# Patient Record
Sex: FEMALE | Race: WHITE | NOT HISPANIC OR LATINO
[De-identification: names, ages, dates, MRNs, and addresses within clinical notes are randomized per-mention and may not be internally consistent; named-entity substitution may affect disease eponyms.]

---

## 2018-02-27 ENCOUNTER — RESULT REVIEW (OUTPATIENT)
Age: 73
End: 2018-02-27

## 2018-03-19 ENCOUNTER — APPOINTMENT (OUTPATIENT)
Dept: VASCULAR SURGERY | Facility: CLINIC | Age: 73
End: 2018-03-19
Payer: MEDICARE

## 2018-03-19 VITALS
HEIGHT: 64 IN | HEART RATE: 93 BPM | BODY MASS INDEX: 33.29 KG/M2 | DIASTOLIC BLOOD PRESSURE: 85 MMHG | WEIGHT: 195 LBS | TEMPERATURE: 98.6 F | SYSTOLIC BLOOD PRESSURE: 138 MMHG

## 2018-03-19 DIAGNOSIS — Z71.9 COUNSELING, UNSPECIFIED: ICD-10-CM

## 2018-03-19 PROCEDURE — 93922 UPR/L XTREMITY ART 2 LEVELS: CPT

## 2018-03-19 PROCEDURE — 99203 OFFICE O/P NEW LOW 30 MIN: CPT

## 2022-04-27 ENCOUNTER — INPATIENT (INPATIENT)
Facility: HOSPITAL | Age: 77
LOS: 9 days | Discharge: NURSING, REHAB OR LONG-TERM CARE | End: 2022-05-07
Attending: HOSPITALIST | Admitting: HOSPITALIST
Payer: MEDICARE

## 2022-04-27 VITALS
OXYGEN SATURATION: 100 % | HEART RATE: 89 BPM | SYSTOLIC BLOOD PRESSURE: 118 MMHG | HEIGHT: 65.5 IN | RESPIRATION RATE: 20 BRPM | DIASTOLIC BLOOD PRESSURE: 68 MMHG | TEMPERATURE: 98 F

## 2022-04-27 DIAGNOSIS — R50.9 FEVER, UNSPECIFIED: ICD-10-CM

## 2022-04-27 LAB
ALBUMIN SERPL ELPH-MCNC: 3.3 G/DL — SIGNIFICANT CHANGE UP (ref 3.3–5)
ALBUMIN SERPL ELPH-MCNC: 3.6 G/DL — SIGNIFICANT CHANGE UP (ref 3.3–5)
ALP SERPL-CCNC: 111 U/L — SIGNIFICANT CHANGE UP (ref 40–120)
ALP SERPL-CCNC: 114 U/L — SIGNIFICANT CHANGE UP (ref 40–120)
ALT FLD-CCNC: 11 U/L — SIGNIFICANT CHANGE UP (ref 4–33)
ALT FLD-CCNC: 12 U/L — SIGNIFICANT CHANGE UP (ref 4–33)
ANION GAP SERPL CALC-SCNC: 16 MMOL/L — HIGH (ref 7–14)
ANION GAP SERPL CALC-SCNC: 17 MMOL/L — HIGH (ref 7–14)
APPEARANCE UR: CLEAR — SIGNIFICANT CHANGE UP
AST SERPL-CCNC: 26 U/L — SIGNIFICANT CHANGE UP (ref 4–32)
AST SERPL-CCNC: 40 U/L — HIGH (ref 4–32)
BASE EXCESS BLDV CALC-SCNC: -2.2 MMOL/L — LOW (ref -2–3)
BASOPHILS # BLD AUTO: 0 K/UL — SIGNIFICANT CHANGE UP (ref 0–0.2)
BASOPHILS NFR BLD AUTO: 0 % — SIGNIFICANT CHANGE UP (ref 0–2)
BILIRUB SERPL-MCNC: 0.6 MG/DL — SIGNIFICANT CHANGE UP (ref 0.2–1.2)
BILIRUB SERPL-MCNC: 0.6 MG/DL — SIGNIFICANT CHANGE UP (ref 0.2–1.2)
BILIRUB UR-MCNC: NEGATIVE — SIGNIFICANT CHANGE UP
BLOOD GAS VENOUS COMPREHENSIVE RESULT: SIGNIFICANT CHANGE UP
BUN SERPL-MCNC: 42 MG/DL — HIGH (ref 7–23)
BUN SERPL-MCNC: 43 MG/DL — HIGH (ref 7–23)
CALCIUM SERPL-MCNC: 8.6 MG/DL — SIGNIFICANT CHANGE UP (ref 8.4–10.5)
CALCIUM SERPL-MCNC: 9.1 MG/DL — SIGNIFICANT CHANGE UP (ref 8.4–10.5)
CHLORIDE BLDV-SCNC: 98 MMOL/L — SIGNIFICANT CHANGE UP (ref 96–108)
CHLORIDE SERPL-SCNC: 93 MMOL/L — LOW (ref 98–107)
CHLORIDE SERPL-SCNC: 99 MMOL/L — SIGNIFICANT CHANGE UP (ref 98–107)
CO2 BLDV-SCNC: 24.5 MMOL/L — SIGNIFICANT CHANGE UP (ref 22–26)
CO2 SERPL-SCNC: 13 MMOL/L — LOW (ref 22–31)
CO2 SERPL-SCNC: 15 MMOL/L — LOW (ref 22–31)
COLOR SPEC: YELLOW — SIGNIFICANT CHANGE UP
CREAT SERPL-MCNC: 1.39 MG/DL — HIGH (ref 0.5–1.3)
CREAT SERPL-MCNC: 1.51 MG/DL — HIGH (ref 0.5–1.3)
CRP SERPL-MCNC: 219.8 MG/L — HIGH
DIFF PNL FLD: NEGATIVE — SIGNIFICANT CHANGE UP
EGFR: 36 ML/MIN/1.73M2 — LOW
EGFR: 39 ML/MIN/1.73M2 — LOW
EOSINOPHIL # BLD AUTO: 0 K/UL — SIGNIFICANT CHANGE UP (ref 0–0.5)
EOSINOPHIL NFR BLD AUTO: 0 % — SIGNIFICANT CHANGE UP (ref 0–6)
ERYTHROCYTE [SEDIMENTATION RATE] IN BLOOD: 77 MM/HR — HIGH (ref 4–25)
GAS PNL BLDV: 126 MMOL/L — LOW (ref 136–145)
GLUCOSE BLDV-MCNC: 131 MG/DL — HIGH (ref 70–99)
GLUCOSE SERPL-MCNC: 120 MG/DL — HIGH (ref 70–99)
GLUCOSE SERPL-MCNC: 124 MG/DL — HIGH (ref 70–99)
GLUCOSE UR QL: NEGATIVE — SIGNIFICANT CHANGE UP
HCO3 BLDV-SCNC: 23 MMOL/L — SIGNIFICANT CHANGE UP (ref 22–29)
HCT VFR BLD CALC: 37.2 % — SIGNIFICANT CHANGE UP (ref 34.5–45)
HCT VFR BLDA CALC: 37 % — SIGNIFICANT CHANGE UP (ref 34.5–46.5)
HGB BLD CALC-MCNC: 12.2 G/DL — SIGNIFICANT CHANGE UP (ref 11.5–15.5)
HGB BLD-MCNC: 11.9 G/DL — SIGNIFICANT CHANGE UP (ref 11.5–15.5)
IANC: 28.31 K/UL — HIGH (ref 1.8–7.4)
KETONES UR-MCNC: NEGATIVE — SIGNIFICANT CHANGE UP
LACTATE BLDV-MCNC: 1.8 MMOL/L — SIGNIFICANT CHANGE UP (ref 0.5–2)
LEUKOCYTE ESTERASE UR-ACNC: NEGATIVE — SIGNIFICANT CHANGE UP
LYMPHOCYTES # BLD AUTO: 1.09 K/UL — SIGNIFICANT CHANGE UP (ref 1–3.3)
LYMPHOCYTES # BLD AUTO: 3.5 % — LOW (ref 13–44)
MCHC RBC-ENTMCNC: 27.3 PG — SIGNIFICANT CHANGE UP (ref 27–34)
MCHC RBC-ENTMCNC: 32 GM/DL — SIGNIFICANT CHANGE UP (ref 32–36)
MCV RBC AUTO: 85.3 FL — SIGNIFICANT CHANGE UP (ref 80–100)
MONOCYTES # BLD AUTO: 1.9 K/UL — HIGH (ref 0–0.9)
MONOCYTES NFR BLD AUTO: 6.1 % — SIGNIFICANT CHANGE UP (ref 2–14)
NEUTROPHILS # BLD AUTO: 28.21 K/UL — HIGH (ref 1.8–7.4)
NEUTROPHILS NFR BLD AUTO: 87.8 % — HIGH (ref 43–77)
NEUTS BAND # BLD: 2.6 % — SIGNIFICANT CHANGE UP (ref 0–6)
NITRITE UR-MCNC: NEGATIVE — SIGNIFICANT CHANGE UP
PCO2 BLDV: 41 MMHG — SIGNIFICANT CHANGE UP (ref 39–42)
PH BLDV: 7.36 — SIGNIFICANT CHANGE UP (ref 7.32–7.43)
PH UR: 5.5 — SIGNIFICANT CHANGE UP (ref 5–8)
PLAT MORPH BLD: NORMAL — SIGNIFICANT CHANGE UP
PLATELET # BLD AUTO: 308 K/UL — SIGNIFICANT CHANGE UP (ref 150–400)
PLATELET COUNT - ESTIMATE: NORMAL — SIGNIFICANT CHANGE UP
PO2 BLDV: <20 MMHG — SIGNIFICANT CHANGE UP
POTASSIUM BLDV-SCNC: 4.5 MMOL/L — SIGNIFICANT CHANGE UP (ref 3.5–5.1)
POTASSIUM SERPL-MCNC: 4.1 MMOL/L — SIGNIFICANT CHANGE UP (ref 3.5–5.3)
POTASSIUM SERPL-MCNC: 5.5 MMOL/L — HIGH (ref 3.5–5.3)
POTASSIUM SERPL-SCNC: 4.1 MMOL/L — SIGNIFICANT CHANGE UP (ref 3.5–5.3)
POTASSIUM SERPL-SCNC: 5.5 MMOL/L — HIGH (ref 3.5–5.3)
PROT SERPL-MCNC: 6.7 G/DL — SIGNIFICANT CHANGE UP (ref 6–8.3)
PROT SERPL-MCNC: 7.5 G/DL — SIGNIFICANT CHANGE UP (ref 6–8.3)
PROT UR-MCNC: ABNORMAL
RBC # BLD: 4.36 M/UL — SIGNIFICANT CHANGE UP (ref 3.8–5.2)
RBC # FLD: 15 % — HIGH (ref 10.3–14.5)
RBC BLD AUTO: NORMAL — SIGNIFICANT CHANGE UP
SAO2 % BLDV: 22.8 % — SIGNIFICANT CHANGE UP
SARS-COV-2 RNA SPEC QL NAA+PROBE: SIGNIFICANT CHANGE UP
SODIUM SERPL-SCNC: 125 MMOL/L — LOW (ref 135–145)
SODIUM SERPL-SCNC: 128 MMOL/L — LOW (ref 135–145)
SP GR SPEC: 1.02 — SIGNIFICANT CHANGE UP (ref 1–1.05)
UROBILINOGEN FLD QL: SIGNIFICANT CHANGE UP
WBC # BLD: 31.21 K/UL — HIGH (ref 3.8–10.5)
WBC # FLD AUTO: 31.21 K/UL — HIGH (ref 3.8–10.5)

## 2022-04-27 PROCEDURE — 74176 CT ABD & PELVIS W/O CONTRAST: CPT | Mod: 26,59,MA

## 2022-04-27 PROCEDURE — 99285 EMERGENCY DEPT VISIT HI MDM: CPT | Mod: FS,CS

## 2022-04-27 PROCEDURE — 73503 X-RAY EXAM HIP UNI 4/> VIEWS: CPT | Mod: 26,RT

## 2022-04-27 PROCEDURE — 71045 X-RAY EXAM CHEST 1 VIEW: CPT | Mod: 26

## 2022-04-27 PROCEDURE — 99223 1ST HOSP IP/OBS HIGH 75: CPT

## 2022-04-27 PROCEDURE — G1004: CPT

## 2022-04-27 PROCEDURE — 74174 CTA ABD&PLVS W/CONTRAST: CPT | Mod: 26,MG

## 2022-04-27 RX ORDER — MORPHINE SULFATE 50 MG/1
4 CAPSULE, EXTENDED RELEASE ORAL ONCE
Refills: 0 | Status: DISCONTINUED | OUTPATIENT
Start: 2022-04-27 | End: 2022-04-27

## 2022-04-27 RX ORDER — DIPHENHYDRAMINE HCL 50 MG
50 CAPSULE ORAL ONCE
Refills: 0 | Status: COMPLETED | OUTPATIENT
Start: 2022-04-27 | End: 2022-04-27

## 2022-04-27 RX ORDER — HYDROMORPHONE HYDROCHLORIDE 2 MG/ML
1 INJECTION INTRAMUSCULAR; INTRAVENOUS; SUBCUTANEOUS ONCE
Refills: 0 | Status: DISCONTINUED | OUTPATIENT
Start: 2022-04-27 | End: 2022-04-27

## 2022-04-27 RX ORDER — HYDROCORTISONE 20 MG
200 TABLET ORAL ONCE
Refills: 0 | Status: COMPLETED | OUTPATIENT
Start: 2022-04-27 | End: 2022-04-27

## 2022-04-27 RX ORDER — HYDROCORTISONE 20 MG
200 TABLET ORAL ONCE
Refills: 0 | Status: DISCONTINUED | OUTPATIENT
Start: 2022-04-27 | End: 2022-04-27

## 2022-04-27 RX ORDER — PIPERACILLIN AND TAZOBACTAM 4; .5 G/20ML; G/20ML
3.38 INJECTION, POWDER, LYOPHILIZED, FOR SOLUTION INTRAVENOUS ONCE
Refills: 0 | Status: COMPLETED | OUTPATIENT
Start: 2022-04-27 | End: 2022-04-27

## 2022-04-27 RX ORDER — VANCOMYCIN HCL 1 G
1000 VIAL (EA) INTRAVENOUS ONCE
Refills: 0 | Status: COMPLETED | OUTPATIENT
Start: 2022-04-27 | End: 2022-04-27

## 2022-04-27 RX ADMIN — Medication 250 MILLIGRAM(S): at 18:36

## 2022-04-27 RX ADMIN — MORPHINE SULFATE 4 MILLIGRAM(S): 50 CAPSULE, EXTENDED RELEASE ORAL at 15:30

## 2022-04-27 RX ADMIN — MORPHINE SULFATE 4 MILLIGRAM(S): 50 CAPSULE, EXTENDED RELEASE ORAL at 13:36

## 2022-04-27 RX ADMIN — PIPERACILLIN AND TAZOBACTAM 3.38 GRAM(S): 4; .5 INJECTION, POWDER, LYOPHILIZED, FOR SOLUTION INTRAVENOUS at 18:26

## 2022-04-27 RX ADMIN — Medication 50 MILLIGRAM(S): at 20:32

## 2022-04-27 RX ADMIN — PIPERACILLIN AND TAZOBACTAM 200 GRAM(S): 4; .5 INJECTION, POWDER, LYOPHILIZED, FOR SOLUTION INTRAVENOUS at 17:29

## 2022-04-27 RX ADMIN — MORPHINE SULFATE 4 MILLIGRAM(S): 50 CAPSULE, EXTENDED RELEASE ORAL at 13:45

## 2022-04-27 RX ADMIN — Medication 200 MILLIGRAM(S): at 17:29

## 2022-04-27 RX ADMIN — MORPHINE SULFATE 4 MILLIGRAM(S): 50 CAPSULE, EXTENDED RELEASE ORAL at 15:12

## 2022-04-27 RX ADMIN — HYDROMORPHONE HYDROCHLORIDE 1 MILLIGRAM(S): 2 INJECTION INTRAMUSCULAR; INTRAVENOUS; SUBCUTANEOUS at 18:36

## 2022-04-27 NOTE — H&P ADULT - HISTORY OF PRESENT ILLNESS
75 y/o F with pmh of SVT s/p ablation, HTN, HLD, hypothyroidism, vertigo, peripheral neuropathy, osteoarthritis, cervical ca p/w R sided groin/pelvic pain.  Pt reports pain starting 3 days ago described as severe pain that is dull in quality.  Pain makes it difficult to ambulate and bear weight.  Pt has been walking with cane and dragging R leg.  Pt reports she saw her PMD who performed hip joint injection without relief.  Pt reports walking without her cane for a few days prior to onset of symptoms.  She reports no falls or other injury.  She has otherwise felt at baseline without fevers, chills, cough, headache, abd pain, or n/v/d.      In the ED, pt had temp of 101.2 and leukocytosis to 31.  She was given morphine and Dilaudid for pain, and empiric vancomycin and Zosyn.

## 2022-04-27 NOTE — ED ADULT NURSE REASSESSMENT NOTE - NS ED NURSE REASSESS COMMENT FT1
Report rcvd from ANNA Cazares: Pt. resting calm and comfortable in bed. Denies pain/discomfort at this time. Mildly diaphoretic, afebrile. VSS. Pre-medicated for CT scan. Resident MD made aware, pt. pending CT. Pt. states her allergy to IV contrast results in hives.

## 2022-04-27 NOTE — ED PROVIDER NOTE - PHYSICAL EXAMINATION
Gen: Well appearing in NAD  Head: NC/AT  Neck: trachea midline  Resp:  No distress  Ext: no deformities  Neuro:  A&O appears non focal  Skin:  Warm and dry as visualized  Psych:  Normal affect and mood Gen: Well appearing in NAD  Head: NC/AT  Neck: trachea midline  Resp:  No distress  Ext: no deformities  Neuro:  A&O appears non focal  Skin:  Warm and dry as visualized  Psych:  Normal affect and mood  ATTENDING PHYSICAL EXAM  GEN - NAD; well appearing; A+O x3  HEAD - NC/AT; EYES/NOSE - PERRL, EOMI, mucous membranes moist, no discharge; THROAT: Oral cavity and pharynx normal. No inflammation, swelling, exudate, or lesions  NECK: Neck supple, non-tender without lymphadenopathy, no masses, no JVD  PULMONARY - CTA b/l, symmetric breath sounds, no w/r/r  CARDIAC -s1s2, RRR, no M,R,G  ABDOMEN - +NABS, + distended with TTP throughout but mostly RLQ.    BACK - no CVA tenderness, No vertebral or paravertebral tenderness  EXTREMITIES - symmetric pulses, 2+ dp, capillary refill < 2 seconds, no clubbing, no cyanosis, 1+ edema b/l  NEUROLOGIC - alert, CN 2-12 intact

## 2022-04-27 NOTE — H&P ADULT - NSHPLABSRESULTS_GEN_ALL_CORE
128<L>  |  99  |  43<H>  ----------------------------<  120<H>  4.1   |  13<L>  |  1.39<H>    Ca    8.6      2022 17:29    TPro  6.7  /  Alb  3.3  /  TBili  0.6  /  DBili  x   /  AST  26  /  ALT  12  /  AlkPhos  114                              11.9   31.21 )-----------( 308      ( 2022 14:13 )             37.2                   Urinalysis Basic - ( 2022 19:26 )    Color: Yellow / Appearance: Clear / S.019 / pH: x  Gluc: x / Ketone: Negative  / Bili: Negative / Urobili: <2 mg/dL   Blood: x / Protein: Trace / Nitrite: Negative   Leuk Esterase: Negative / RBC: x / WBC x   Sq Epi: x / Non Sq Epi: x / Bacteria: x    < from: CT Abdomen and Pelvis No Cont (22 @ 15:40) >    Right pelvic fractures with associated enlargement/hemorrhage of right   obturator internus muscle.    < end of copied text >    < from: CT Angio Abdomen and Pelvis w/ IV Cont (22 @ 21:37) >    Similar enlargement of the right obturator internus muscle is again noted   with hyperattenuation suggestive of hematoma.    There is no CTA evidence of contrast extravasation to suggest active   bleeding.    Similarappearance of pelvic fractures.    < end of copied text >    CXR film reviewed: Clear lungs

## 2022-04-27 NOTE — H&P ADULT - PROBLEM SELECTOR PROBLEM 7
DVT prophylaxis Orientation to room/Use of non-skid footwear for ambulating patients, use of appropriate size clothing to prevent risk of tripping/Patient and family education available to parents and patient/Document fall prevention teaching and include in plan of care Hypothyroidism

## 2022-04-27 NOTE — ED PROVIDER NOTE - NSICDXPASTMEDICALHX_GEN_ALL_CORE_FT
PAST MEDICAL HISTORY:  H/O hyperlipidemia     H/O peripheral neuropathy     H/O supraventricular tachycardia s/p ablation    History of cervical cancer     Palpitations     Vertigo

## 2022-04-27 NOTE — ED PROVIDER NOTE - CLINICAL SUMMARY MEDICAL DECISION MAKING FREE TEXT BOX
75 y/o female c/o right groin pain/rlq pain x 3 days with difficulty ambulating  -unclear - possible msk, r/o intrabadominal pathology  -labs esr crp  -ct a/p xrays  -pain control

## 2022-04-27 NOTE — CONSULT NOTE ADULT - NS ATTEND AMEND GEN_ALL_CORE FT
Agree with above. 76F presents with R groin pain. Patient says she had no falls however she is a poor historian and there are reports that she did in fact fall at some point this month. Was seen by her orthopaedist who gave her a steroid injection, unclear if this was superficial or intra-articular. Imaging reveals a R inferior/superior pubic rami fx. Patient presenting with sepsis, FU BCx +Staph aureus.   CT scan does not show a R hip effusion. Will hold off on any hip aspiration until further imaging is done (US/MRI) to evaluate for a collection/effusion. Would try to avoid aspiration in the setting of bacteremia to avoid contamination of the joint unless there is a high suspicion of a septic joint.

## 2022-04-27 NOTE — ED PROVIDER NOTE - NSICDXPASTSURGICALHX_GEN_ALL_CORE_FT
PAST SURGICAL HISTORY:  Hx of ankle fusion     Hx of cholecystectomy     S/P total abdominal hysterectomy

## 2022-04-27 NOTE — ED ADULT NURSE NOTE - OBJECTIVE STATEMENT
Received pt in bed  A and OX 3 in NAD  c/o RLQ abd pain onset 1 week ago worse last 3 days, denies N/.V fever chills, diarrhea, abd soft non distended, tender to RLQ. pt denies urinary complaints, also endorses pain int he hips B/L abd multiple joints aches, fall precautions in place, IV initiated, pt medicated as per order

## 2022-04-27 NOTE — H&P ADULT - PROBLEM SELECTOR PLAN 3
Fever and leukocytosis.  Possibly reactive 2/2 hematoma and fracture.  No source of infection identified  - monitor off antibiotics and f/u cultures

## 2022-04-27 NOTE — ED ADULT TRIAGE NOTE - CHIEF COMPLAINT QUOTE
pt coming from home with R. groin pain x 4 days, denies fall, pain to right hip/thigh, calf. some ABD diff. ABD.

## 2022-04-27 NOTE — H&P ADULT - NSHPREVIEWOFSYSTEMS_GEN_ALL_CORE
Review of Systems:   CONSTITUTIONAL: No fever or chills  EYES: No eye pain, visual disturbances, or discharge  ENMT:  No difficulty hearing, no throat pain  NECK: No pain or stiffness  RESPIRATORY: No cough, No shortness of breath  CARDIOVASCULAR: No chest pain, palpitations, dizziness, or leg swelling  GASTROINTESTINAL: No abdominal pain, nausea, vomiting or diarrhea  GENITOURINARY: No dysuria, or hematuria  NEUROLOGICAL: No headaches, weakness, or numbness  SKIN: No rashes, or lesions   LYMPH NODES: No enlarged glands  ENDOCRINE: No heat or cold intolerance  MUSCULOSKELETAL: pelvic pain as above  PSYCHIATRIC: No depression or anxiety  HEME/LYMPH: No easy bruising, or bleeding  ALLERGY AND IMMUNOLOGIC: No hives or eczema

## 2022-04-27 NOTE — H&P ADULT - NSHPPHYSICALEXAM_GEN_ALL_CORE
Vital Signs Last 24 Hrs  T(C): 36.8 (28 Apr 2022 05:49), Max: 38.4 (27 Apr 2022 15:15)  T(F): 98.2 (28 Apr 2022 05:49), Max: 101.2 (27 Apr 2022 15:15)  HR: 72 (28 Apr 2022 05:49) (72 - 96)  BP: 108/66 (28 Apr 2022 05:49) (103/61 - 133/63)  BP(mean): --  RR: 18 (28 Apr 2022 05:49) (17 - 20)  SpO2: 100% (28 Apr 2022 05:49) (98% - 100%)    PHYSICAL EXAM:  GENERAL: No Acute Distress  EYES: conjunctiva and sclera clear  ENMT: Moist mucous membranes   NECK: Supple  PULMONARY: Clear to auscultation bilaterally  CARDIAC: Regular rate and rhythm; No murmurs, rubs, or gallops  GASTROINTESTINAL: Abdomen soft, Nontender, Nondistended; Bowel sounds normal  EXTREMITIES:   No clubbing, cyanosis, or pedal edema  PSYCH: Normal Affect, Normal Behavior  NEUROLOGY:   - Mental status A&O x 3,  SKIN: No rashes or lesions  MUSCULOSKELETAL: No joint swelling

## 2022-04-27 NOTE — CONSULT NOTE ADULT - ASSESSMENT
A/P: 76 Female w/ R superior/inferior pubic rami fracture  -Pain control  -FU inlet/outlet views  -PT/WBAT with assistive devices as needed  -No acute orthopedic surgical intervention  -Care per primary team   -Follow up with Dr. Kolton Collazo as outpatient in 2 weeks, call office for appointment [phone] A/P: 76 Female w/ Right superior/inferior pubic rami fracture  -Pain control  -FU inlet/outlet views  -PT/WBAT with assistive devices as needed  -No acute orthopedic surgical intervention  -Care per primary team   -Follow up with Dr. Jordan, primary orthopedist as outpatient A/P: 76 Female w/ Right superior/inferior pubic rami fracture. Low suspicion for septic hip.     -Pain control  -FU inlet/outlet views  -PT/WBAT with assistive devices as needed  -No acute orthopedic surgical intervention  -Care per primary team   -Follow up with Dr. Jordan, primary orthopedist as outpatient

## 2022-04-27 NOTE — ED PROVIDER NOTE - OBJECTIVE STATEMENT
75 y/o female hx cervica ca, SVT arthritis presents to ER c/o right hip/groin and abdominal pain x 3 days. Pt. states over the past 3 days she has been experiencing worsneing right groin pain radiating down her leg and also RLQ pain - states these symptoms are worse with movment and cannot move leg or bear weight due to pain. tried taking tylenol and celebrex with minimal relief. took tramadol today also with minimal relief. Denies numbness tingling incontinence weakness dizziness. 77 y/o female hx cervical ca, SVT arthritis presents to ER c/o right hip/groin and abdominal pain x 3 days. Pt. states over the past 3 days she has been experiencing worsneing right groin pain radiating down her leg and also RLQ pain - states these symptoms are worse with movment and cannot move leg or bear weight due to pain. tried taking tylenol and celebrex with minimal relief. took tramadol today also with minimal relief. Denies numbness tingling incontinence weakness dizziness.  Attending - Agree with above.  I evaluated patient myself. 77 y/o f with sister at bedside.  c/o increasing RLQ/right inguinal pain since last Friday.  Never had this prior to Friday.  No preceding trauma/injury.  associated nausea.  No vomiting.  Last BM 2 days ago.  Pain radiates into medial aspect of right thigh.  Worse with  movement.  Plan to go to PMD for pain today, but unable to move on her own due to pain, so EMS called.  Denies fever, cough, recent covid.  No chest pain or shortness of breath.  Reports recently had noted increased b/l LE edema and to Cardiologist last week who prescribed a diuretic and recommeneded limiting fluid intake.  Takes tramadol for chronic knee/hip arthritis pains, and tramadol temporarily relieved RLQ pain.

## 2022-04-27 NOTE — ED PROVIDER NOTE - NS ED ATTENDING STATEMENT MOD
This was a shared visit with the KUNAL. I reviewed and verified the documentation and independently performed the documented:

## 2022-04-27 NOTE — H&P ADULT - ASSESSMENT
77 y/o F with pmh of SVT s/p ablation, HTN, HLD, hypothyroidism, vertigo, peripheral neuropathy, osteoarthritis, cervical ca p/w R sided groin/pelvic pain and found to have fx for R superior and inferior pubic rami with hematoma of R obturator internus muscle.  Pt also with fever and leukocytosis but no clear source of infection.

## 2022-04-27 NOTE — ED ADULT NURSE NOTE - NSIMPLEMENTINTERV_GEN_ALL_ED
Implemented All Fall with Harm Risk Interventions:  [place] to call system. Call bell, personal items and telephone within reach. Instruct patient to call for assistance. Room bathroom lighting operational. Non-slip footwear when patient is off stretcher. Physically safe environment: no spills, clutter or unnecessary equipment. Stretcher in lowest position, wheels locked, appropriate side rails in place. Provide visual cue, wrist band, yellow gown, etc. Monitor gait and stability. Monitor for mental status changes and reorient to person, place, and time. Review medications for side effects contributing to fall risk. Reinforce activity limits and safety measures with patient and family. Provide visual clues: red socks.

## 2022-04-28 DIAGNOSIS — E03.9 HYPOTHYROIDISM, UNSPECIFIED: ICD-10-CM

## 2022-04-28 DIAGNOSIS — S32.599A OTHER SPECIFIED FRACTURE OF UNSPECIFIED PUBIS, INITIAL ENCOUNTER FOR CLOSED FRACTURE: ICD-10-CM

## 2022-04-28 DIAGNOSIS — I10 ESSENTIAL (PRIMARY) HYPERTENSION: ICD-10-CM

## 2022-04-28 DIAGNOSIS — T14.8XXA OTHER INJURY OF UNSPECIFIED BODY REGION, INITIAL ENCOUNTER: ICD-10-CM

## 2022-04-28 DIAGNOSIS — Z29.9 ENCOUNTER FOR PROPHYLACTIC MEASURES, UNSPECIFIED: ICD-10-CM

## 2022-04-28 DIAGNOSIS — N17.9 ACUTE KIDNEY FAILURE, UNSPECIFIED: ICD-10-CM

## 2022-04-28 DIAGNOSIS — E87.1 HYPO-OSMOLALITY AND HYPONATREMIA: ICD-10-CM

## 2022-04-28 DIAGNOSIS — R65.10 SYSTEMIC INFLAMMATORY RESPONSE SYNDROME (SIRS) OF NON-INFECTIOUS ORIGIN WITHOUT ACUTE ORGAN DYSFUNCTION: ICD-10-CM

## 2022-04-28 LAB
ANION GAP SERPL CALC-SCNC: 15 MMOL/L — HIGH (ref 7–14)
BASOPHILS # BLD AUTO: 0.04 K/UL — SIGNIFICANT CHANGE UP (ref 0–0.2)
BASOPHILS NFR BLD AUTO: 0.1 % — SIGNIFICANT CHANGE UP (ref 0–2)
BUN SERPL-MCNC: 44 MG/DL — HIGH (ref 7–23)
CALCIUM SERPL-MCNC: 8.7 MG/DL — SIGNIFICANT CHANGE UP (ref 8.4–10.5)
CHLORIDE SERPL-SCNC: 95 MMOL/L — LOW (ref 98–107)
CHLORIDE UR-SCNC: <20 MMOL/L — SIGNIFICANT CHANGE UP
CO2 SERPL-SCNC: 16 MMOL/L — LOW (ref 22–31)
CREAT SERPL-MCNC: 1.52 MG/DL — HIGH (ref 0.5–1.3)
EGFR: 35 ML/MIN/1.73M2 — LOW
EOSINOPHIL # BLD AUTO: 0 K/UL — SIGNIFICANT CHANGE UP (ref 0–0.5)
EOSINOPHIL NFR BLD AUTO: 0 % — SIGNIFICANT CHANGE UP (ref 0–6)
GLUCOSE SERPL-MCNC: 214 MG/DL — HIGH (ref 70–99)
GRAM STN FLD: SIGNIFICANT CHANGE UP
HCT VFR BLD CALC: 36.2 % — SIGNIFICANT CHANGE UP (ref 34.5–45)
HGB BLD-MCNC: 11.5 G/DL — SIGNIFICANT CHANGE UP (ref 11.5–15.5)
IANC: 25.12 K/UL — HIGH (ref 1.8–7.4)
IMM GRANULOCYTES NFR BLD AUTO: 1.3 % — SIGNIFICANT CHANGE UP (ref 0–1.5)
LYMPHOCYTES # BLD AUTO: 1.11 K/UL — SIGNIFICANT CHANGE UP (ref 1–3.3)
LYMPHOCYTES # BLD AUTO: 4.1 % — LOW (ref 13–44)
MAGNESIUM SERPL-MCNC: 2.3 MG/DL — SIGNIFICANT CHANGE UP (ref 1.6–2.6)
MCHC RBC-ENTMCNC: 27.2 PG — SIGNIFICANT CHANGE UP (ref 27–34)
MCHC RBC-ENTMCNC: 31.8 GM/DL — LOW (ref 32–36)
MCV RBC AUTO: 85.6 FL — SIGNIFICANT CHANGE UP (ref 80–100)
METHOD TYPE: SIGNIFICANT CHANGE UP
MONOCYTES # BLD AUTO: 0.56 K/UL — SIGNIFICANT CHANGE UP (ref 0–0.9)
MONOCYTES NFR BLD AUTO: 2.1 % — SIGNIFICANT CHANGE UP (ref 2–14)
MSSA DNA SPEC QL NAA+PROBE: SIGNIFICANT CHANGE UP
NEUTROPHILS # BLD AUTO: 25.12 K/UL — HIGH (ref 1.8–7.4)
NEUTROPHILS NFR BLD AUTO: 92.4 % — HIGH (ref 43–77)
NRBC # BLD: 0 /100 WBCS — SIGNIFICANT CHANGE UP
NRBC # FLD: 0 K/UL — SIGNIFICANT CHANGE UP
PHOSPHATE SERPL-MCNC: 3.3 MG/DL — SIGNIFICANT CHANGE UP (ref 2.5–4.5)
PLATELET # BLD AUTO: 266 K/UL — SIGNIFICANT CHANGE UP (ref 150–400)
POTASSIUM SERPL-MCNC: 4.1 MMOL/L — SIGNIFICANT CHANGE UP (ref 3.5–5.3)
POTASSIUM SERPL-SCNC: 4.1 MMOL/L — SIGNIFICANT CHANGE UP (ref 3.5–5.3)
POTASSIUM UR-SCNC: 39.1 MMOL/L — SIGNIFICANT CHANGE UP
RBC # BLD: 4.23 M/UL — SIGNIFICANT CHANGE UP (ref 3.8–5.2)
RBC # FLD: 15.2 % — HIGH (ref 10.3–14.5)
SODIUM SERPL-SCNC: 126 MMOL/L — LOW (ref 135–145)
SODIUM UR-SCNC: <20 MMOL/L — SIGNIFICANT CHANGE UP
SPECIMEN SOURCE: SIGNIFICANT CHANGE UP
SPECIMEN SOURCE: SIGNIFICANT CHANGE UP
TSH SERPL-MCNC: 1.67 UIU/ML — SIGNIFICANT CHANGE UP (ref 0.27–4.2)
WBC # BLD: 27.19 K/UL — HIGH (ref 3.8–10.5)
WBC # FLD AUTO: 27.19 K/UL — HIGH (ref 3.8–10.5)

## 2022-04-28 PROCEDURE — 93306 TTE W/DOPPLER COMPLETE: CPT | Mod: 26

## 2022-04-28 PROCEDURE — 99222 1ST HOSP IP/OBS MODERATE 55: CPT

## 2022-04-28 PROCEDURE — 99233 SBSQ HOSP IP/OBS HIGH 50: CPT | Mod: GC

## 2022-04-28 RX ORDER — ROPINIROLE 8 MG/1
5 TABLET, FILM COATED, EXTENDED RELEASE ORAL AT BEDTIME
Refills: 0 | Status: DISCONTINUED | OUTPATIENT
Start: 2022-04-28 | End: 2022-05-07

## 2022-04-28 RX ORDER — FENOFIBRATE,MICRONIZED 130 MG
48 CAPSULE ORAL DAILY
Refills: 0 | Status: DISCONTINUED | OUTPATIENT
Start: 2022-04-28 | End: 2022-05-07

## 2022-04-28 RX ORDER — PANTOPRAZOLE SODIUM 20 MG/1
40 TABLET, DELAYED RELEASE ORAL
Refills: 0 | Status: DISCONTINUED | OUTPATIENT
Start: 2022-04-28 | End: 2022-04-28

## 2022-04-28 RX ORDER — HYDROMORPHONE HYDROCHLORIDE 2 MG/ML
1 INJECTION INTRAMUSCULAR; INTRAVENOUS; SUBCUTANEOUS ONCE
Refills: 0 | Status: DISCONTINUED | OUTPATIENT
Start: 2022-04-28 | End: 2022-04-28

## 2022-04-28 RX ORDER — CELECOXIB 200 MG/1
200 CAPSULE ORAL DAILY
Refills: 0 | Status: DISCONTINUED | OUTPATIENT
Start: 2022-04-28 | End: 2022-04-28

## 2022-04-28 RX ORDER — SODIUM CHLORIDE 9 MG/ML
1000 INJECTION INTRAMUSCULAR; INTRAVENOUS; SUBCUTANEOUS
Refills: 0 | Status: DISCONTINUED | OUTPATIENT
Start: 2022-04-28 | End: 2022-04-29

## 2022-04-28 RX ORDER — FENOFIBRATE,MICRONIZED 130 MG
145 CAPSULE ORAL DAILY
Refills: 0 | Status: DISCONTINUED | OUTPATIENT
Start: 2022-04-28 | End: 2022-04-28

## 2022-04-28 RX ORDER — SENNA PLUS 8.6 MG/1
1 TABLET ORAL DAILY
Refills: 0 | Status: DISCONTINUED | OUTPATIENT
Start: 2022-04-28 | End: 2022-04-29

## 2022-04-28 RX ORDER — DULOXETINE HYDROCHLORIDE 30 MG/1
60 CAPSULE, DELAYED RELEASE ORAL DAILY
Refills: 0 | Status: DISCONTINUED | OUTPATIENT
Start: 2022-04-28 | End: 2022-05-07

## 2022-04-28 RX ORDER — VANCOMYCIN HCL 1 G
1000 VIAL (EA) INTRAVENOUS EVERY 24 HOURS
Refills: 0 | Status: DISCONTINUED | OUTPATIENT
Start: 2022-04-28 | End: 2022-04-29

## 2022-04-28 RX ORDER — OXYCODONE HYDROCHLORIDE 5 MG/1
10 TABLET ORAL EVERY 6 HOURS
Refills: 0 | Status: DISCONTINUED | OUTPATIENT
Start: 2022-04-28 | End: 2022-04-30

## 2022-04-28 RX ORDER — HEPARIN SODIUM 5000 [USP'U]/ML
5000 INJECTION INTRAVENOUS; SUBCUTANEOUS THREE TIMES A DAY
Refills: 0 | Status: DISCONTINUED | OUTPATIENT
Start: 2022-04-28 | End: 2022-04-28

## 2022-04-28 RX ORDER — POLYETHYLENE GLYCOL 3350 17 G/17G
17 POWDER, FOR SOLUTION ORAL DAILY
Refills: 0 | Status: DISCONTINUED | OUTPATIENT
Start: 2022-04-28 | End: 2022-04-29

## 2022-04-28 RX ORDER — LANOLIN ALCOHOL/MO/W.PET/CERES
3 CREAM (GRAM) TOPICAL ONCE
Refills: 0 | Status: COMPLETED | OUTPATIENT
Start: 2022-04-28 | End: 2022-04-28

## 2022-04-28 RX ORDER — SPIRONOLACTONE 25 MG/1
1 TABLET, FILM COATED ORAL
Qty: 0 | Refills: 0 | DISCHARGE

## 2022-04-28 RX ORDER — LEVOTHYROXINE SODIUM 125 MCG
125 TABLET ORAL DAILY
Refills: 0 | Status: DISCONTINUED | OUTPATIENT
Start: 2022-04-28 | End: 2022-05-07

## 2022-04-28 RX ORDER — SPIRONOLACTONE 25 MG/1
50 TABLET, FILM COATED ORAL DAILY
Refills: 0 | Status: DISCONTINUED | OUTPATIENT
Start: 2022-04-28 | End: 2022-04-28

## 2022-04-28 RX ORDER — MECLIZINE HCL 12.5 MG
12.5 TABLET ORAL
Refills: 0 | Status: DISCONTINUED | OUTPATIENT
Start: 2022-04-28 | End: 2022-05-01

## 2022-04-28 RX ORDER — GABAPENTIN 400 MG/1
300 CAPSULE ORAL
Refills: 0 | Status: DISCONTINUED | OUTPATIENT
Start: 2022-04-28 | End: 2022-05-07

## 2022-04-28 RX ADMIN — Medication 48 MILLIGRAM(S): at 12:33

## 2022-04-28 RX ADMIN — Medication 3 MILLIGRAM(S): at 03:44

## 2022-04-28 RX ADMIN — OXYCODONE HYDROCHLORIDE 10 MILLIGRAM(S): 5 TABLET ORAL at 12:32

## 2022-04-28 RX ADMIN — ROPINIROLE 5 MILLIGRAM(S): 8 TABLET, FILM COATED, EXTENDED RELEASE ORAL at 22:43

## 2022-04-28 RX ADMIN — GABAPENTIN 300 MILLIGRAM(S): 400 CAPSULE ORAL at 17:51

## 2022-04-28 RX ADMIN — SODIUM CHLORIDE 75 MILLILITER(S): 9 INJECTION INTRAMUSCULAR; INTRAVENOUS; SUBCUTANEOUS at 18:50

## 2022-04-28 RX ADMIN — Medication 250 MILLIGRAM(S): at 13:26

## 2022-04-28 RX ADMIN — SODIUM CHLORIDE 75 MILLILITER(S): 9 INJECTION INTRAMUSCULAR; INTRAVENOUS; SUBCUTANEOUS at 22:45

## 2022-04-28 RX ADMIN — SENNA PLUS 1 TABLET(S): 8.6 TABLET ORAL at 13:27

## 2022-04-28 RX ADMIN — OXYCODONE HYDROCHLORIDE 10 MILLIGRAM(S): 5 TABLET ORAL at 19:30

## 2022-04-28 RX ADMIN — DULOXETINE HYDROCHLORIDE 60 MILLIGRAM(S): 30 CAPSULE, DELAYED RELEASE ORAL at 12:33

## 2022-04-28 RX ADMIN — POLYETHYLENE GLYCOL 3350 17 GRAM(S): 17 POWDER, FOR SOLUTION ORAL at 13:27

## 2022-04-28 RX ADMIN — OXYCODONE HYDROCHLORIDE 10 MILLIGRAM(S): 5 TABLET ORAL at 18:46

## 2022-04-28 RX ADMIN — HEPARIN SODIUM 5000 UNIT(S): 5000 INJECTION INTRAVENOUS; SUBCUTANEOUS at 05:38

## 2022-04-28 RX ADMIN — HYDROMORPHONE HYDROCHLORIDE 1 MILLIGRAM(S): 2 INJECTION INTRAMUSCULAR; INTRAVENOUS; SUBCUTANEOUS at 02:03

## 2022-04-28 RX ADMIN — Medication 125 MICROGRAM(S): at 06:40

## 2022-04-28 RX ADMIN — GABAPENTIN 300 MILLIGRAM(S): 400 CAPSULE ORAL at 06:41

## 2022-04-28 RX ADMIN — PANTOPRAZOLE SODIUM 40 MILLIGRAM(S): 20 TABLET, DELAYED RELEASE ORAL at 06:41

## 2022-04-28 RX ADMIN — OXYCODONE HYDROCHLORIDE 10 MILLIGRAM(S): 5 TABLET ORAL at 13:32

## 2022-04-28 RX ADMIN — SODIUM CHLORIDE 75 MILLILITER(S): 9 INJECTION INTRAMUSCULAR; INTRAVENOUS; SUBCUTANEOUS at 06:41

## 2022-04-28 RX ADMIN — Medication 10 MILLIGRAM(S): at 20:48

## 2022-04-28 NOTE — PROGRESS NOTE ADULT - PROBLEM SELECTOR PLAN 3
Patient presented with elevated Scr, unknown baseline, associated to Hyponatremia.   Could be 2/2 to hypovolemia.  - gentle hydration  - trend creatinine

## 2022-04-28 NOTE — PATIENT PROFILE ADULT - FALL HARM RISK - HARM RISK INTERVENTIONS
Assistance with ambulation/Monitor gait and stability/Reinforce activity limits and safety measures with patient and family/Review medications for side effects contributing to fall risk/Use of alarms - bed, chair and/or voice tab/Visual Cue: Yellow wristband and red socks/Bed in lowest position, wheels locked, appropriate side rails in place/Call bell, personal items and telephone in reach/Instruct patient to call for assistance before getting out of bed or chair/Non-slip footwear when patient is out of bed/[place] to call system/Physically safe environment - no spills, clutter or unnecessary equipment/Purposeful Proactive Rounding/Room/bathroom lighting operational, light cord in reach

## 2022-04-28 NOTE — CHART NOTE - NSCHARTNOTEFT_GEN_A_CORE
ORTHO CHART NOTE    Per D/W Dr Collazo, there is no clear orthopaedic etiology for current symptoms, pt may be WBAT with PT.  Pt may follow up as outpatient with Dr Collazo or outside Orthopaedic Surgeon as needed.  Dr Collazo office- [phone]. ORTHO CHART NOTE    Per D/W Dr Collazo, there is no clear orthopaedic etiology for current symptoms. Pt may be WBAT with PT.  Pt may follow up as outpatient with Dr Collazo or outside Orthopaedic Surgeon as needed.  Dr Collazo office- [phone].  Addendum: Per medicine team, Bcx growing GP cocci, and hip effusion per US, pls reconsult if IR aspiration cx positive. ORTHO CHART NOTE    Per D/W Dr Collazo, there is no clear orthopaedic etiology for current symptoms. Pt may be WBAT with PT.  Pt may follow up as outpatient with Dr Collazo or outside Orthopaedic Surgeon as needed.  Dr Collazo office- [phone].  Addendum: Per medicine team, Bcx growing GP cocci, and hip effusion per  bedside US, pls reconsult if IR aspiration cx positive.

## 2022-04-28 NOTE — PROGRESS NOTE ADULT - PROBLEM SELECTOR PLAN 2
Patient with hematoma of right obturator internus muscle + fever and leukocytosis concerning for infected hematoma. Meet SIRS criteria.  - will start Metronidazole and CTX empirically  - f/u BCx and UCx

## 2022-04-28 NOTE — PROGRESS NOTE ADULT - PROBLEM SELECTOR PLAN 1
- Ortho consulted: no acute intervention indicated  ------ Follow up with Dr. Jordan, primary orthopedist as outpatient  - pain control with Dilaudid/tramadol  - PT kaci

## 2022-04-28 NOTE — PHYSICAL THERAPY INITIAL EVALUATION ADULT - PERTINENT HX OF CURRENT PROBLEM, REHAB EVAL
Patient is 76 year old female PMHx of SVT s/p ablation, HTN, HLD, hypothyroidism, vertigo, peripheral neuropathy, osteoarthritis, cervical ca present with right sided groin/pelvic pain.

## 2022-04-28 NOTE — PROGRESS NOTE ADULT - SUBJECTIVE AND OBJECTIVE BOX
PROGRESS NOTE:   Authored by Dr. Shy Glaser    Patient is a 76y old  Female who presents with a chief complaint of pelvic pain (2022 23:59)      SUBJECTIVE / OVERNIGHT EVENTS: note ni progress    ADDITIONAL REVIEW OF SYSTEMS:    MEDICATIONS  (STANDING):  celecoxib 200 milliGRAM(s) Oral daily  DULoxetine 60 milliGRAM(s) Oral daily  fenofibrate Tablet 48 milliGRAM(s) Oral daily  gabapentin 300 milliGRAM(s) Oral two times a day  levothyroxine 125 MICROGram(s) Oral daily  rOPINIRole 5 milliGRAM(s) Oral at bedtime  sodium chloride 0.9%. 1000 milliLiter(s) (75 mL/Hr) IV Continuous <Continuous>    MEDICATIONS  (PRN):  meclizine 12.5 milliGRAM(s) Oral two times a day PRN Dizziness      CAPILLARY BLOOD GLUCOSE        I&O's Summary      PHYSICAL EXAM:  Vital Signs Last 24 Hrs  T(C): 36.8 (2022 05:49), Max: 38.4 (2022 15:15)  T(F): 98.2 (2022 05:49), Max: 101.2 (2022 15:15)  HR: 72 (2022 05:49) (72 - 96)  BP: 108/66 (2022 05:49) (103/61 - 133/63)  BP(mean): --  RR: 18 (2022 05:49) (17 - 20)  SpO2: 100% (2022 05:49) (98% - 100%)    GENERAL: NAD, lying comfortably in bed  HEAD: Atraumatic, normocephalic  EYES: EOMI b/l PERRLA b/l, conjunctiva and sclera clear  NECK: Supple, No JVD, No LAD  RESPIRATORY: Normal respiratory effort; lungs are clear to auscultation bilaterally  CARDIOVASCULAR: Regular rate and rhythm, normal S1 and S2, no murmur/rub/gallop; No lower extremity edema  ABDOMEN: Nontender, normoactive bowel sounds, no rebound/guarding; No hepatosplenomegaly  MUSCULOSKELETAL: no clubbing or cyanosis of digits; no joint swelling or tenderness to palpation  NEURO: Non focal   PSYCH: A+O to person, place, and time; affect appropriate    LABS:                        11.9   31.21 )-----------( 308      ( 2022 14:13 )             37.2         128<L>  |  99  |  43<H>  ----------------------------<  120<H>  4.1   |  13<L>  |  1.39<H>    Ca    8.6      2022 17:29    TPro  6.7  /  Alb  3.3  /  TBili  0.6  /  DBili  x   /  AST  26  /  ALT  12  /  AlkPhos  114            Urinalysis Basic - ( 2022 19:26 )    Color: Yellow / Appearance: Clear / S.019 / pH: x  Gluc: x / Ketone: Negative  / Bili: Negative / Urobili: <2 mg/dL   Blood: x / Protein: Trace / Nitrite: Negative   Leuk Esterase: Negative / RBC: x / WBC x   Sq Epi: x / Non Sq Epi: x / Bacteria: x

## 2022-04-28 NOTE — CONSULT NOTE ADULT - SUBJECTIVE AND OBJECTIVE BOX
HPI:  77 y/o F with pmh of SVT s/p ablation, vertigo, osteoarthritis, cervical ca p/w R sided groin/pelvic pain  "15/10"   Pain makes it difficult to ambulate and bear weight.  Pt has been walking with cane and dragging R leg.  Pt reports she saw her PMD who performed hip joint injection without relief.    Initially had pain in left knee.  Fell a few months ago while getting into car slipped on wet leaves.   Has had several falls in past; hardware in right ankle,  fx left ribs.    In the ED, pt had temp of 101.2 and leukocytosis to 31.  She was given morphine and Dilaudid for pain, and empiric vancomycin and Zosyn.  (2022 23:59)    Blood culture x 2 Staph aureus    PAST MEDICAL & SURGICAL HISTORY:  History of cervical cancer    H/O peripheral neuropathy    Vertigo    Palpitations    H/O supraventricular tachycardia  s/p ablation    H/O hyperlipidemia    Hx of cholecystectomy    S/P total abdominal hysterectomy    Hx of ankle fusion        Allergies    erythromycin (Hives)  IV Contrast (Anaphylaxis)    Intolerances        ANTIMICROBIALS:  vancomycin  IVPB 1000 every 24 hours      OTHER MEDS:  DULoxetine 60 milliGRAM(s) Oral daily  fenofibrate Tablet 48 milliGRAM(s) Oral daily  gabapentin 300 milliGRAM(s) Oral two times a day  levothyroxine 125 MICROGram(s) Oral daily  meclizine 12.5 milliGRAM(s) Oral two times a day PRN  oxyCODONE    IR 10 milliGRAM(s) Oral every 6 hours PRN  polyethylene glycol 3350 17 Gram(s) Oral daily  rOPINIRole 5 milliGRAM(s) Oral at bedtime  senna 1 Tablet(s) Oral daily  sodium chloride 0.9%. 1000 milliLiter(s) IV Continuous <Continuous>      SOCIAL HISTORY:    FAMILY HISTORY:  FH: myocardial infarction (Mother)        REVIEW OF SYSTEMS  [  ] ROS unobtainable because:    [ x ] All other systems negative except as noted below:	    Constitutional:  [ ] fever [ ] chills  [ ] weight loss  [ ] weakness  Skin:  [ ] rash [ ] phlebitis	  Eyes: [ ] icterus [ ] pain  [ ] discharge	  ENMT: [ ] sore throat  [ ] thrush [ ] ulcers [ ] exudates  Respiratory: [ ] dyspnea [ ] hemoptysis [ ] cough [ ] sputum	  Cardiovascular:  [ ] chest pain [ ] palpitations [ ] edema	  Gastrointestinal:  [ ] nausea [ ] vomiting [ ] diarrhea [ ] constipation [ ] pain	  Genitourinary:  [ ] dysuria [ ] frequency [ ] hematuria [ ] discharge [ ] flank pain  [ ] incontinence  Musculoskeletal:  [ ] myalgias [ ] arthralgias [ ] arthritis  [ ] back pain  right groin pain  Neurological:  [ ] headache [ ] seizures  [ ] confusion/altered mental status  Psychiatric:  [ ] anxiety [ ] depression	  Hematology/Lymphatics:  [ ] lymphadenopathy  Endocrine:  [ ] adrenal [ ] thyroid  Allergic/Immunologic:	 [ ] transplant [ ] seasonal    PHYSICAL EXAM:  Constitutional: non toxic, uncomfortable due to right groin pain  HEENT: flushed  Oral cavity: Clear, no lesions  RS: Chest clear ant  CVS: S1, S2   Abdomen: Soft. No guarding/rigidity/tenderness.  : Block  Extremities: no effusion knees, decreased ROM right hip due to pain, scar right ankle  Skin: no rash  Neuro: Alert, oriented to time/place/person      Drug Dosing Weight  Height (cm): 166.4 (2022 02:44)  Weight (kg): 96 (2022 02:44)  BMI (kg/m2): 34.7 (2022 02:44)  BSA (m2): 2.04 (2022 02:44)    Vital Signs Last 24 Hrs  T(F): 98.4 (22 @ 12:30), Max: 101.2 (22 @ 15:15)    Vital Signs Last 24 Hrs  HR: 76 (22 @ 12:30) (72 - 86)  BP: 120/87 (22 @ 12:30) (103/61 - 120/87)  RR: 18 (22 @ 12:30)  SpO2: 100% (22 @ 12:30) (98% - 100%)  Wt(kg): --                          11.5   27.19 )-----------( 266      ( 2022 10:51 )             36.2           126<L>  |  95<L>  |  44<H>  ----------------------------<  214<H>  4.1   |  16<L>  |  1.52<H>    Ca    8.7      2022 10:51  Phos  3.3       Mg     2.30         TPro  6.7  /  Alb  3.3  /  TBili  0.6  /  DBili  x   /  AST  26  /  ALT  12  /  AlkPhos  114        Urinalysis Basic - ( 2022 19:26 )    Color: Yellow / Appearance: Clear / S.019 / pH: x  Gluc: x / Ketone: Negative  / Bili: Negative / Urobili: <2 mg/dL   Blood: x / Protein: Trace / Nitrite: Negative   Leuk Esterase: Negative / RBC: x / WBC x   Sq Epi: x / Non Sq Epi: x / Bacteria: x        MICROBIOLOGY:  .Blood Blood-Peripheral  22   Growth in aerobic bottle: Gram Positive Cocci in Clusters  ***Blood Panel PCR results on this specimen are available  approximately 3 hours after the Gram stain result.***  Gram stain, PCR, and/or culture results may not always  correspond due to difference in methodologies.  ************************************************************  This PCR assay was performed by multiplex PCR. This  Assay tests for 66 bacterial and resistance gene targets.  Please refer to the [facility] test directory  at [email or web address] for details.  --  Blood Culture PCR          RADIOLOGY:    rd< from: Xray Chest 1 View- PORTABLE-Urgent (Xray Chest 1 View- PORTABLE-Urgent .) (22 @ 23:38) >    ACC: [id number] EXAM:  XR CHEST PORTABLE URGENT 1V                          PROCEDURE DATE:  2022          INTERPRETATION:  CLINICAL INFORMATION: Admission chest radiograph.    TECHNIQUE: Frontal view of the chest    COMPARISON: Chest radiograph  3/1/2016.    FINDINGS:    The lungs are clear.  Heart size cannot be accurately assessed in this projection.  No acute osseous abnormality. Chronic deformities of the right ribs.      IMPRESSION:    Clear lungs.        --- End of Report ---    < end of copied text >  < from: CT Angio Abdomen and Pelvis w/ IV Cont (22 @ 21:37) >  IMPRESSION:  Similar enlargement of the right obturator internus muscle is again noted   with hyperattenuation suggestive of hematoma.    There is no CTA evidence of contrast extravasation to suggest active   bleeding.    Similarappearance of pelvic fractures.    < end of copied text >  
Orthopedics Consult Note:  75 y/o Female, poor historian w/ PMH of cervical cancer presents to the ED c/o R groin pain s/p mechanical fall, unknown when but likely within the last month. Pt was able to walk after fall. Per patient she had an injection in the right hip by primary orthopedist Dr. Jordan ~1 week ago. Reports on 4/23 she bent over to play with her cat and since then has had progressively worsening pain. Per ED, patient had temp of 101.2 and WBC 31.1, started on Vanco/Zosyn and planning for further imaging of abdomen/pelvis. Pt denies HS/LOC, fever/chills. Denies pain/injury elsewhere. Pt at baseline ambulates with a cane, but states has not been ambulating since Saturday secondary to pain.     ROS: 10 point review of systems otherwise negative unless noted in HPI     PAST MEDICAL HISTORY:  Cervical cancer     Allergies  erythromycin (Hives)  IV Contrast (Anaphylaxis)    Vital Signs Last 24 Hrs  T(C): 38.4 (04-27-22 @ 15:15), Max: 38.4 (04-27-22 @ 15:15)  T(F): 101.2 (04-27-22 @ 15:15), Max: 101.2 (04-27-22 @ 15:15)  HR: 96 (04-27-22 @ 15:15) (89 - 96)  BP: 133/63 (04-27-22 @ 15:15) (118/68 - 133/63)  BP(mean): --  RR: 18 (04-27-22 @ 15:15) (18 - 20)  SpO2: 100% (04-27-22 @ 15:15) (100% - 100%)      Labs:                        11.9   31.21 )-----------( 308      ( 27 Apr 2022 14:13 )             37.2     27 Apr 2022 14:13    125    |  93     |  42     ----------------------------<  124    5.5     |  15     |  1.51     Ca    9.1        27 Apr 2022 14:13    TPro  7.5    /  Alb  3.6    /  TBili  0.6    /  DBili  x      /  AST  40     /  ALT  11     /  AlkPhos  111    27 Apr 2022 14:13        Physical Exam:  Gen: NAD  R LE:   Skin intact, +TTP groin, no bony TTP elsewhere  unable to SLR secondary to pain, +log roll/heel strike  +EHL/FHL/TA/GS function, sensation intact to light touch, DP 1+   Extremities warm, compartments soft      Imaging:   < from: CT Abdomen and Pelvis No Cont (04.27.22 @ 15:40) >  ACC: [id number] EXAM:  CT ABDOMEN AND PELVIS                        PROCEDURE DATE:  04/27/2022    INTERPRETATION:  CLINICAL INFORMATION: Right lower quadrant pain. Right   groin pain.    COMPARISON: None.    CONTRAST/COMPLICATIONS:  IV Contrast: NONE  Oral Contrast: NONE  Complications: None reported at time of study completion    PROCEDURE:  CT of the Abdomen and Pelvis was performed.  Sagittal and coronal reformats were performed.    FINDINGS:  LOWER CHEST: Within normal limits.    LIVER: Within normal limits.  BILE DUCTS: Normal caliber.  GALLBLADDER: Cholecystectomy.  SPLEEN: Within normal limits.  PANCREAS: Within normal limits.  ADRENALS: Within normal limits.  KIDNEYS/URETERS: Within normal limits.    BLADDER: Within normal limits.  REPRODUCTIVE ORGANS: Hysterectomy. Enlargement of the right obturator   internus muscle with adjacent fatty stranding and hemorrhage.    BOWEL: No bowel obstruction. Small hiatal hernia. Moderate amount of   stool.No appendicitis. Surgical clips are noted in the right lower   quadrant adjacent to a short tubular structure which may represent   portions of an appendix.  PERITONEUM: No ascites.  VESSELS: Atherosclerotic changes.  RETROPERITONEUM/LYMPH NODES: No lymphadenopathy.  ABDOMINAL WALL: Within normal limits.  BONES: Fractures of the right inferior and superior pubic rami, right   hemisacrum. The sacroiliac joints are within normal limits.    IMPRESSION:  Right pelvic fractures with associated enlargement/hemorrhage of right   obturator internus muscle.    --- End of Report ---      BRENDA MCADAMS MD; Attending Radiologist  This document has been electronically signed. Apr 27 2022  4:06PM    < end of copied text >

## 2022-04-28 NOTE — PROGRESS NOTE ADULT - ATTENDING COMMENTS
75 y/o F with pmh of SVT s/p ablation, HTN, HLD, hypothyroidism, vertigo, peripheral neuropathy, osteoarthritis, cervical ca p/w R sided groin/pelvic pain and found to have fx for R superior and inferior pubic rami with hematoma of R obturator internus muscle c/b sepsis 2/2 gram positive bacteremia     #Sepsis: Blood cultures growing gram positive cocci in clusters. CXR clear, UA neg. Source likely the hip. Leukocytosis, fever, elevated crp and esr raise suspicion for a septic joint wes given recent injection into joint space. Will obtain MRI to assess for effusion that can be tapped. Start on vanc for now, f/u final cx.  #Pelvic pain: 2/2 fracture of R superior and inferior pubic rami with hematoma of R obturator internus muscle, r/o septic joint as above. No acute intervention as per ortho recs. Pain control, bowel regimen. Block in place for now. Fracture likely related to underlying osteopenia, no recent falls  #KATHERINE: pre-renal in the setting of hypovolemic? Trial of IVF. F/u BMP and urine lytes  #hyponatremia: hypovolemic hyponatremia? Trial of IVF and trend. F/u urine lytes    Rest of care as above

## 2022-04-28 NOTE — CONSULT NOTE ADULT - ASSESSMENT
77 yo woman with OA, h/o falls presenting with right hip pain found to have fever and Staph aureus bacteremia  Imaging shows pelvic fractures with hyperattenuation right obturator internus muscle    Concern for septic arthritis right hip  Endocarditis in ddx    Suggest:  start cefazolin 2 gm iv q 8 h  aspirate right hip for culture and sensitivities  repeat blood culture to assess for clearance  check echo to look for vegetation    will follow 77 yo woman with OA, h/o falls presenting with right hip pain found to have fever and Staph aureus bacteremia  Imaging shows pelvic fractures with hyperattenuation right obturator internus muscle    Concern for septic arthritis right hip  Endocarditis in ddx  Leucocytosis  KATHERINE    Suggest:  start cefazolin 2 gm iv q 12 h  aspirate right hip for culture and sensitivities  repeat blood culture to assess for clearance  check echo to look for vegetation    will follow

## 2022-04-28 NOTE — CONSULT NOTE ADULT - ASSESSMENT
Interventional Radiology    Evaluate for Procedure:     HPI: 76y Female w/ PMHx osteoarthritis, cervical cancer presenting with R sided groin / pelvic pain. Pain started 3 days ago, outpatient doctor did hip joint aspiration and pain has not improved. Presented to the ED and found to be febrile with leukocytosis. Also found to be bacteremic.     Workup in the ED w/ CT showing pubic rami fractures with adjacent intramuscular hematomas. Gram positive cocci bacteremia. Ortho consulted. Bedside U/S showing hip joint effusion.     IR consulted for aspiration of hip joint for concern of septic joint.     Allergies: erythromycin (Hives)  IV Contrast (Anaphylaxis)    Medications (Abx/Cardiac/Anticoagulation/Blood Products)    heparin   Injectable: 5000 Unit(s) SubCutaneous (04-28 @ 05:38)  piperacillin/tazobactam IVPB...: 200 mL/Hr IV Intermittent (04-27 @ 17:29)  vancomycin  IVPB: 250 mL/Hr IV Intermittent (04-28 @ 13:26)  vancomycin  IVPB.: 250 mL/Hr IV Intermittent (04-27 @ 18:36)    Data:  166.4  96  T(C): 36.9  HR: 76  BP: 120/87  RR: 18  SpO2: 100%    -WBC 27.19 / HgB 11.5 / Hct 36.2 / Plt 266  -Na 126 / Cl 95 / BUN 44 / Glucose 214  -K 4.1 / CO2 16 / Cr 1.52  -ALT -- / Alk Phos -- / T.Bili --      Radiology:   76y Female w/ PMHx osteoarthritis, cervical cancer presenting with R sided groin / pelvic pain. Pain started 3 days ago, outpatient doctor did hip joint aspiration and pain has not improved. Presented to the ED and found to be febrile with leukocytosis. Also found to be bacteremic.     Workup in the ED w/ CT showing pubic rami fractures with adjacent intramuscular hematomas. Gram positive cocci bacteremia. Ortho consulted. Bedside U/S showing hip joint effusion.     Assessment/Plan:     -- Existing imaging including CT abdomen/pelvis reviewed, no collection that is safely amenable to CT guided aspiration.   -- Formal hip U/S and MRI hip ordered, will await this imaging to evaluate for sizable joint effusions.   -- At this time, no acute IR intervention. IV antibiotics per primary team.   -- Questions / concerns / change in clinical status, feel free to page IR at [phone]  -- Discussed w/ Dr. Andrews      --  Mg Bauer MD   Diagnostic Radiology Resident (PGY-2)  IR Pager: [phone] ([facility]) / [phone] ([facility]) Interventional Radiology    Evaluate for Procedure:     HPI: 76y Female w/ PMHx osteoarthritis, cervical cancer presenting with R sided groin / pelvic pain. Pain started 3 days ago, outpatient doctor did hip joint aspiration and pain has not improved. Presented to the ED and found to be febrile with leukocytosis. Also found to be bacteremic.     Workup in the ED w/ CT showing pubic rami fractures with adjacent intramuscular hematomas. Gram positive cocci bacteremia. Ortho consulted. Bedside U/S showing hip joint effusion.     IR consulted for aspiration of hip joint for concern of septic joint.     Allergies: erythromycin (Hives)  IV Contrast (Anaphylaxis)    Medications (Abx/Cardiac/Anticoagulation/Blood Products)    heparin   Injectable: 5000 Unit(s) SubCutaneous (04-28 @ 05:38)  piperacillin/tazobactam IVPB...: 200 mL/Hr IV Intermittent (04-27 @ 17:29)  vancomycin  IVPB: 250 mL/Hr IV Intermittent (04-28 @ 13:26)  vancomycin  IVPB.: 250 mL/Hr IV Intermittent (04-27 @ 18:36)    Data:  166.4  96  T(C): 36.9  HR: 76  BP: 120/87  RR: 18  SpO2: 100%    -WBC 27.19 / HgB 11.5 / Hct 36.2 / Plt 266  -Na 126 / Cl 95 / BUN 44 / Glucose 214  -K 4.1 / CO2 16 / Cr 1.52  -ALT -- / Alk Phos -- / T.Bili --      Radiology:   76y Female w/ PMHx osteoarthritis, cervical cancer presenting with R sided groin / pelvic pain. Pain started 3 days ago, outpatient doctor did hip joint aspiration and pain has not improved. Presented to the ED and found to be febrile with leukocytosis. Also found to be bacteremic.     Workup in the ED w/ CT showing pubic rami fractures with adjacent intramuscular hematomas. Gram positive cocci bacteremia. Ortho consulted. Bedside U/S showing hip joint effusion.     Assessment/Plan:     -- Existing imaging including CT abdomen/pelvis reviewed, no collection identified that is safely amenable to CT guided aspiration.   -- Formal hip U/S and MRI hip ordered, will await this imaging to evaluate for sizable joint effusions.   -- At this time, no acute IR intervention. IV antibiotics per primary team.   -- Questions / concerns / change in clinical status, feel free to page IR at [phone]  -- Discussed w/ Dr. Andrews      --  Mg Bauer MD   Diagnostic Radiology Resident (PGY-2)  IR Pager: [phone] ([facility]) / [phone] ([facility])

## 2022-04-28 NOTE — ED ADULT NURSE REASSESSMENT NOTE - NS ED NURSE REASSESS COMMENT FT1
Went to medicate patient for pain and noticed IV site was now swollen and cool to the touch after pt had received IV contrast @ CT. Removed IV and notified Charge RN, ANM and notified ACP team. Per ACP MD, place ice on the site and monitor closely. Called 9S RN [place] to notify of changes. Placed new #22g IV to rt. hand. Went to medicate patient for pain and noticed IV site was now swollen and cool to the touch after pt had received IV contrast @ CT. Was never notified of complications by CT. Removed IV and notified Charge RN, ANM and notified ACP team. Per ACP MD, place ice on the site and monitor closely. Called 9S RN [place] to notify of changes. Placed new #22g IV to rt. hand. Went to medicate patient for pain and noticed IV site was now swollen and cool to the touch after pt had received IV contrast @ CT. Was never notified of complications by CT. Removed IV and notified Charge SHAHZAD Desouza and notified ACP team (MD Stuart). Per ACP MD, place ice on the site and monitor closely. Called 9S RN [place] to notify of changes. Placed new #22g IV to rt. hand.

## 2022-04-29 DIAGNOSIS — M25.451 EFFUSION, RIGHT HIP: ICD-10-CM

## 2022-04-29 LAB
ANION GAP SERPL CALC-SCNC: 11 MMOL/L — SIGNIFICANT CHANGE UP (ref 7–14)
BASOPHILS # BLD AUTO: 0.03 K/UL — SIGNIFICANT CHANGE UP (ref 0–0.2)
BASOPHILS NFR BLD AUTO: 0.1 % — SIGNIFICANT CHANGE UP (ref 0–2)
BUN SERPL-MCNC: 55 MG/DL — HIGH (ref 7–23)
CALCIUM SERPL-MCNC: 8.3 MG/DL — LOW (ref 8.4–10.5)
CHLORIDE SERPL-SCNC: 96 MMOL/L — LOW (ref 98–107)
CO2 SERPL-SCNC: 17 MMOL/L — LOW (ref 22–31)
CREAT SERPL-MCNC: 1.66 MG/DL — HIGH (ref 0.5–1.3)
CULTURE RESULTS: NO GROWTH — SIGNIFICANT CHANGE UP
EGFR: 32 ML/MIN/1.73M2 — LOW
EOSINOPHIL # BLD AUTO: 0 K/UL — SIGNIFICANT CHANGE UP (ref 0–0.5)
EOSINOPHIL NFR BLD AUTO: 0 % — SIGNIFICANT CHANGE UP (ref 0–6)
GLUCOSE SERPL-MCNC: 126 MG/DL — HIGH (ref 70–99)
HCT VFR BLD CALC: 30.9 % — LOW (ref 34.5–45)
HGB BLD-MCNC: 10.1 G/DL — LOW (ref 11.5–15.5)
IANC: 18.74 K/UL — HIGH (ref 1.8–7.4)
IMM GRANULOCYTES NFR BLD AUTO: 0.9 % — SIGNIFICANT CHANGE UP (ref 0–1.5)
LYMPHOCYTES # BLD AUTO: 1.11 K/UL — SIGNIFICANT CHANGE UP (ref 1–3.3)
LYMPHOCYTES # BLD AUTO: 5.3 % — LOW (ref 13–44)
MAGNESIUM SERPL-MCNC: 2.3 MG/DL — SIGNIFICANT CHANGE UP (ref 1.6–2.6)
MCHC RBC-ENTMCNC: 27.2 PG — SIGNIFICANT CHANGE UP (ref 27–34)
MCHC RBC-ENTMCNC: 32.7 GM/DL — SIGNIFICANT CHANGE UP (ref 32–36)
MCV RBC AUTO: 83.3 FL — SIGNIFICANT CHANGE UP (ref 80–100)
MONOCYTES # BLD AUTO: 0.99 K/UL — HIGH (ref 0–0.9)
MONOCYTES NFR BLD AUTO: 4.7 % — SIGNIFICANT CHANGE UP (ref 2–14)
NEUTROPHILS # BLD AUTO: 18.74 K/UL — HIGH (ref 1.8–7.4)
NEUTROPHILS NFR BLD AUTO: 89 % — HIGH (ref 43–77)
NRBC # BLD: 0 /100 WBCS — SIGNIFICANT CHANGE UP
NRBC # FLD: 0 K/UL — SIGNIFICANT CHANGE UP
PHOSPHATE SERPL-MCNC: 3.1 MG/DL — SIGNIFICANT CHANGE UP (ref 2.5–4.5)
PLATELET # BLD AUTO: 258 K/UL — SIGNIFICANT CHANGE UP (ref 150–400)
POTASSIUM SERPL-MCNC: 4.4 MMOL/L — SIGNIFICANT CHANGE UP (ref 3.5–5.3)
POTASSIUM SERPL-SCNC: 4.4 MMOL/L — SIGNIFICANT CHANGE UP (ref 3.5–5.3)
RBC # BLD: 3.71 M/UL — LOW (ref 3.8–5.2)
RBC # FLD: 15.5 % — HIGH (ref 10.3–14.5)
SODIUM SERPL-SCNC: 124 MMOL/L — LOW (ref 135–145)
SPECIMEN SOURCE: SIGNIFICANT CHANGE UP
WBC # BLD: 21.05 K/UL — HIGH (ref 3.8–10.5)
WBC # FLD AUTO: 21.05 K/UL — HIGH (ref 3.8–10.5)

## 2022-04-29 PROCEDURE — 99232 SBSQ HOSP IP/OBS MODERATE 35: CPT

## 2022-04-29 PROCEDURE — 76881 US COMPL JOINT R-T W/IMG: CPT | Mod: 26,RT

## 2022-04-29 PROCEDURE — 76882 US LMTD JT/FCL EVL NVASC XTR: CPT | Mod: 26

## 2022-04-29 PROCEDURE — 99233 SBSQ HOSP IP/OBS HIGH 50: CPT | Mod: GC

## 2022-04-29 RX ORDER — SENNA PLUS 8.6 MG/1
2 TABLET ORAL ONCE
Refills: 0 | Status: COMPLETED | OUTPATIENT
Start: 2022-04-29 | End: 2022-04-29

## 2022-04-29 RX ORDER — ONDANSETRON 8 MG/1
4 TABLET, FILM COATED ORAL ONCE
Refills: 0 | Status: COMPLETED | OUTPATIENT
Start: 2022-04-29 | End: 2022-04-29

## 2022-04-29 RX ORDER — SENNA PLUS 8.6 MG/1
2 TABLET ORAL AT BEDTIME
Refills: 0 | Status: DISCONTINUED | OUTPATIENT
Start: 2022-04-29 | End: 2022-05-07

## 2022-04-29 RX ORDER — ACETAMINOPHEN 500 MG
650 TABLET ORAL ONCE
Refills: 0 | Status: COMPLETED | OUTPATIENT
Start: 2022-04-29 | End: 2022-04-29

## 2022-04-29 RX ORDER — POLYETHYLENE GLYCOL 3350 17 G/17G
17 POWDER, FOR SOLUTION ORAL ONCE
Refills: 0 | Status: COMPLETED | OUTPATIENT
Start: 2022-04-29 | End: 2022-04-29

## 2022-04-29 RX ORDER — LACTULOSE 10 G/15ML
30 SOLUTION ORAL ONCE
Refills: 0 | Status: COMPLETED | OUTPATIENT
Start: 2022-04-29 | End: 2022-04-29

## 2022-04-29 RX ORDER — CEFAZOLIN SODIUM 1 G
2000 VIAL (EA) INJECTION ONCE
Refills: 0 | Status: COMPLETED | OUTPATIENT
Start: 2022-04-29 | End: 2022-04-29

## 2022-04-29 RX ORDER — ONDANSETRON 8 MG/1
4 TABLET, FILM COATED ORAL
Refills: 0 | Status: DISCONTINUED | OUTPATIENT
Start: 2022-04-29 | End: 2022-05-07

## 2022-04-29 RX ORDER — FAMOTIDINE 10 MG/ML
20 INJECTION INTRAVENOUS DAILY
Refills: 0 | Status: DISCONTINUED | OUTPATIENT
Start: 2022-04-29 | End: 2022-05-03

## 2022-04-29 RX ORDER — ACETAMINOPHEN 500 MG
1000 TABLET ORAL ONCE
Refills: 0 | Status: COMPLETED | OUTPATIENT
Start: 2022-04-29 | End: 2022-04-29

## 2022-04-29 RX ORDER — CEFAZOLIN SODIUM 1 G
2000 VIAL (EA) INJECTION EVERY 12 HOURS
Refills: 0 | Status: DISCONTINUED | OUTPATIENT
Start: 2022-04-29 | End: 2022-05-07

## 2022-04-29 RX ORDER — POLYETHYLENE GLYCOL 3350 17 G/17G
17 POWDER, FOR SOLUTION ORAL
Refills: 0 | Status: DISCONTINUED | OUTPATIENT
Start: 2022-04-29 | End: 2022-05-07

## 2022-04-29 RX ORDER — CEFAZOLIN SODIUM 1 G
VIAL (EA) INJECTION
Refills: 0 | Status: DISCONTINUED | OUTPATIENT
Start: 2022-04-29 | End: 2022-05-07

## 2022-04-29 RX ADMIN — Medication 650 MILLIGRAM(S): at 15:42

## 2022-04-29 RX ADMIN — FAMOTIDINE 20 MILLIGRAM(S): 10 INJECTION INTRAVENOUS at 17:01

## 2022-04-29 RX ADMIN — ONDANSETRON 4 MILLIGRAM(S): 8 TABLET, FILM COATED ORAL at 17:01

## 2022-04-29 RX ADMIN — Medication 650 MILLIGRAM(S): at 16:00

## 2022-04-29 RX ADMIN — Medication 100 MILLIGRAM(S): at 09:45

## 2022-04-29 RX ADMIN — ROPINIROLE 5 MILLIGRAM(S): 8 TABLET, FILM COATED, EXTENDED RELEASE ORAL at 22:09

## 2022-04-29 RX ADMIN — OXYCODONE HYDROCHLORIDE 10 MILLIGRAM(S): 5 TABLET ORAL at 12:55

## 2022-04-29 RX ADMIN — SENNA PLUS 1 TABLET(S): 8.6 TABLET ORAL at 12:56

## 2022-04-29 RX ADMIN — Medication 12.5 MILLIGRAM(S): at 15:43

## 2022-04-29 RX ADMIN — DULOXETINE HYDROCHLORIDE 60 MILLIGRAM(S): 30 CAPSULE, DELAYED RELEASE ORAL at 12:55

## 2022-04-29 RX ADMIN — OXYCODONE HYDROCHLORIDE 10 MILLIGRAM(S): 5 TABLET ORAL at 00:47

## 2022-04-29 RX ADMIN — SENNA PLUS 2 TABLET(S): 8.6 TABLET ORAL at 17:01

## 2022-04-29 RX ADMIN — Medication 10 MILLIGRAM(S): at 17:00

## 2022-04-29 RX ADMIN — OXYCODONE HYDROCHLORIDE 10 MILLIGRAM(S): 5 TABLET ORAL at 22:50

## 2022-04-29 RX ADMIN — Medication 100 MILLIGRAM(S): at 17:10

## 2022-04-29 RX ADMIN — ONDANSETRON 4 MILLIGRAM(S): 8 TABLET, FILM COATED ORAL at 06:09

## 2022-04-29 RX ADMIN — Medication 5 MILLIGRAM(S): at 17:10

## 2022-04-29 RX ADMIN — Medication 125 MICROGRAM(S): at 06:44

## 2022-04-29 RX ADMIN — OXYCODONE HYDROCHLORIDE 10 MILLIGRAM(S): 5 TABLET ORAL at 13:50

## 2022-04-29 RX ADMIN — OXYCODONE HYDROCHLORIDE 10 MILLIGRAM(S): 5 TABLET ORAL at 01:17

## 2022-04-29 RX ADMIN — GABAPENTIN 300 MILLIGRAM(S): 400 CAPSULE ORAL at 17:01

## 2022-04-29 RX ADMIN — Medication 48 MILLIGRAM(S): at 12:56

## 2022-04-29 RX ADMIN — OXYCODONE HYDROCHLORIDE 10 MILLIGRAM(S): 5 TABLET ORAL at 22:10

## 2022-04-29 RX ADMIN — GABAPENTIN 300 MILLIGRAM(S): 400 CAPSULE ORAL at 09:46

## 2022-04-29 RX ADMIN — Medication 1000 MILLIGRAM(S): at 05:39

## 2022-04-29 RX ADMIN — Medication 400 MILLIGRAM(S): at 05:24

## 2022-04-29 NOTE — PROGRESS NOTE ADULT - ASSESSMENT
75 y/o F with pmh of SVT s/p ablation, HTN, HLD, hypothyroidism, vertigo, peripheral neuropathy, osteoarthritis, cervical ca p/w R sided groin/pelvic pain and found to have fx for R superior and inferior pubic rami with hematoma of R obturator internus muscle.  Pt also with fever and leukocytosis but no clear source of infection.

## 2022-04-29 NOTE — PROGRESS NOTE ADULT - PROBLEM SELECTOR PLAN 6
- continue synthroid  - check TSH: WNL - hold spironolactone 2/2 elevated creatinine and hyperkalemia

## 2022-04-29 NOTE — PROGRESS NOTE ADULT - ATTENDING COMMENTS
75 y/o F with pmh of SVT s/p ablation, HTN, HLD, hypothyroidism, vertigo, peripheral neuropathy, osteoarthritis, cervical ca p/w R sided groin/pelvic pain and found to have fx for R superior and inferior pubic rami with hematoma of R obturator internus muscle c/b sepsis 2/2 gram positive bacteremia   Pt reports improvement in hip/pelvic pain but reporting some nausea yesterday and this morning. Has not had a bowel movement since saturday but is passing gas, no abdominal pain.    #Sepsis: Blood cultures growing staph aureus. CXR clear, UA neg. Source likely the hip. Leukocytosis, fever, elevated crp and esr raise suspicion for a septic joint wes given recent injection into joint space although pt denies hip pain and instead reports pelvic pain. Will obtain MRI to assess for effusion that can be tapped. De-escalate to cefazolin, f/u final cx.  #Pelvic pain: 2/2 fracture of R superior and inferior pubic rami with hematoma of R obturator internus muscle, r/o septic joint as above. No acute intervention as per ortho recs. Pain control, bowel regimen. Block in place for now. Fracture likely related to underlying osteopenia, no recent falls  #hematoma of right obturator: slight downtrend in hgb noted. Repeat CBC in afternoon to trend   #Nausea: related to GERD/PUD, taken off long standing protonix. Will add famotidine. May also be related to constipation, add on bowel regimen   #KATHERINE: pre-renal in the setting of hypovolemic? Trial of IVF however minimal improvement noted. F/u urine lytes. Avoid nephrotoxins. Obtain baseline Cr  #hyponatremia: hypovolemic hyponatremia? Trial of IVF and noted with a decrease in sodium. May be SIADH 2/2 to nausea. Repeat BMP in the afternoon, F/u urine lytes    Rest of care as above .

## 2022-04-29 NOTE — PROGRESS NOTE ADULT - PROBLEM SELECTOR PLAN 1
- Ortho consulted: no acute intervention indicated  ------ Follow up with Dr. Jordan, primary orthopedist as outpatient  - pain control with Dilaudid/tramadol  - PT eval    - Effusion seen with POCUS in R hip joint.   --- BCx growing GPCs  --- MRI to confirm effusion  --- IR consulted for possible tap  --- Ortho to be reconsulted if tap is positive, no acute intervention at the moment  --- Started on Vanco 1g q24h (4/28) - Ortho consulted: no acute intervention indicated  ------ Follow up with Dr. Jordan, primary orthopedist as outpatient  - pain control with Dilaudid/tramadol  - PT kaci

## 2022-04-29 NOTE — PROGRESS NOTE ADULT - PROBLEM SELECTOR PLAN 5
- hold spironolactone 2/2 elevated creatinine and hyperkalemia Possibly hypovolemic vs increased ADH in the setting of pain  - check urine lytes  - trend Na

## 2022-04-29 NOTE — PROGRESS NOTE ADULT - PROBLEM SELECTOR PLAN 3
Patient presented with elevated Scr, unknown baseline, associated to Hyponatremia.   Could be 2/2 to hypovolemia.  - gentle hydration  - trend creatinine Patient with hematoma of right obturator internus muscle + fever and leukocytosis concerning for infected hematoma. Meet SIRS criteria.  - s/p Metronidazole and CTX empirically -- > VANCO 1g Q24h  - f/u BCx (GPCs) and UCx Patient with hematoma of right obturator internus muscle + fever and leukocytosis concerning for infected hematoma. Meet SIRS criteria.  - s/p Metronidazole and CTX empirically --> switched to Cefazolin iso SINA  - f/u BCx (GPCs) and UCx

## 2022-04-29 NOTE — CHART NOTE - NSCHARTNOTEFT_GEN_A_CORE
IR follow up to consult of 4/29.    Right hip ultrasound reviewed. Minimal fluid, aspiration would be of very low yield. Would defer aspiration for now.     Please reconsult as needed.     Please page IR with any questions or concerns, Pager [phone].    Case reviewed with Dr. Westbrook

## 2022-04-29 NOTE — PROGRESS NOTE ADULT - SUBJECTIVE AND OBJECTIVE BOX
Pt seen/examined. Doing well. Pain controlled. No acute overnight complaints or events.    T(C): 37.3 (04-28-22 @ 21:53), Max: 37.4 (04-28-22 @ 02:44)  HR: 82 (04-28-22 @ 21:53) (72 - 86)  BP: 105/66 (04-28-22 @ 21:53) (103/61 - 120/87)  RR: 18 (04-28-22 @ 21:53) (18 - 18)  SpO2: 100% (04-28-22 @ 21:53) (100% - 100%)  Wt(kg): --  - Gen: NAD    Physical Exam:  Gen: NAD  R LE:   Skin intact, +TTP groin, no bony TTP elsewhere  unable to SLR secondary to pain, +log roll/heel strike  +EHL/FHL/TA/GS function, sensation intact to light touch, DP 1+   Extremities warm, compartments soft    A/P: 76 Female w/ Right superior/inferior pubic rami fracture w Bcx growing CP cocci and hip effusion per bedside US.    -Pain control  -PT/WBAT with assistive devices as needed  - f/u MRI hip  - please reconsult if IR aspiration cx positive  - Can follow up as outpatient with Dr. Collazo

## 2022-04-29 NOTE — PROGRESS NOTE ADULT - SUBJECTIVE AND OBJECTIVE BOX
PROGRESS NOTE:   Authored by Dr. Shy Glaser    Patient is a 76y old  Female who presents with a chief complaint of pelvic pain (2022 02:36)      SUBJECTIVE / OVERNIGHT EVENTS: No overnight event. Patient reported feeling pain when she moves but otherwise improved. Was able to sit at edge of bed and stand up yesterda.    ADDITIONAL REVIEW OF SYSTEMS:  Review of Systems:  Constitutional: No fever, No weight loss, good appetite/po intake  Head: No headache   Eyes: No blurry vision, No diplopia  Neuro: No tremors, No muscle weakness   Cardiovascular: No chest pain, No palpitations  Respiratory: No SOB, No cough  GI: No nausea, No vomiting, No diarrhea  : No dysuria, No hematuria  Skin: No rash  MSK: + R hip and L knee pain  Psych: No depression      MEDICATIONS  (STANDING):  DULoxetine 60 milliGRAM(s) Oral daily  fenofibrate Tablet 48 milliGRAM(s) Oral daily  gabapentin 300 milliGRAM(s) Oral two times a day  levothyroxine 125 MICROGram(s) Oral daily  polyethylene glycol 3350 17 Gram(s) Oral daily  rOPINIRole 5 milliGRAM(s) Oral at bedtime  senna 1 Tablet(s) Oral daily  sodium chloride 0.9%. 1000 milliLiter(s) (75 mL/Hr) IV Continuous <Continuous>  vancomycin  IVPB 1000 milliGRAM(s) IV Intermittent every 24 hours    MEDICATIONS  (PRN):  meclizine 12.5 milliGRAM(s) Oral two times a day PRN Dizziness  oxyCODONE    IR 10 milliGRAM(s) Oral every 6 hours PRN moderate to severe pain      CAPILLARY BLOOD GLUCOSE        I&O's Summary    2022 07:01  -  2022 07:00  --------------------------------------------------------  IN: 2200 mL / OUT: 1250 mL / NET: 950 mL        PHYSICAL EXAM:  Vital Signs Last 24 Hrs  T(C): 37.2 (2022 05:57), Max: 37.3 (2022 21:53)  T(F): 98.9 (2022 05:57), Max: 99.1 (2022 21:53)  HR: 82 (2022 05:57) (76 - 86)  BP: 110/61 (2022 05:57) (105/66 - 120/87)  BP(mean): --  RR: 18 (2022 05:57) (18 - 18)  SpO2: 97% (2022 05:57) (97% - 100%)    GENERAL: NAD, lying comfortably in bed  HEAD: Atraumatic, normocephalic  EYES: EOMI b/l PERRLA b/l, conjunctiva and sclera clear  NECK: Supple, No JVD, No LAD  RESPIRATORY: Normal respiratory effort; lungs are clear to auscultation bilaterally  CARDIOVASCULAR: Regular rate and rhythm, normal S1 and S2, no murmur/rub/gallop; No lower extremity edema  ABDOMEN: Nontender, normoactive bowel sounds, no rebound/guarding; No hepatosplenomegaly  MUSCULOSKELETAL: no clubbing or cyanosis of digits; no joint swelling or tenderness to palpation  NEURO: Non focal   PSYCH: A+O to person, place, and time; affect appropriate    LABS:                        10.1   21.05 )-----------( 258      ( 2022 06:03 )             30.9     04-29    124<L>  |  96<L>  |  55<H>  ----------------------------<  126<H>  4.4   |  17<L>  |  1.66<H>    Ca    8.3<L>      2022 06:03  Phos  3.1     04-  Mg     2.30     -    TPro  6.7  /  Alb  3.3  /  TBili  0.6  /  DBili  x   /  AST  26  /  ALT  12  /  AlkPhos  114  -          Urinalysis Basic - ( 2022 19:26 )    Color: Yellow / Appearance: Clear / S.019 / pH: x  Gluc: x / Ketone: Negative  / Bili: Negative / Urobili: <2 mg/dL   Blood: x / Protein: Trace / Nitrite: Negative   Leuk Esterase: Negative / RBC: x / WBC x   Sq Epi: x / Non Sq Epi: x / Bacteria: x        Culture - Blood (collected 2022 21:09)  Source: .Blood Blood-Venous  Gram Stain (2022 18:24):    Growth in aerobic bottle: Gram Positive Cocci in Clusters    Growth in anaerobic bottle: Gram Positive Cocci in Clusters  Preliminary Report (2022 18:25):    Growth in aerobic bottle: Gram Positive Cocci in Clusters    Growth in anaerobic bottle: Gram Positive Cocci in Clusters    Culture - Blood (collected 2022 21:09)  Source: .Blood Blood-Peripheral  Gram Stain (2022 17:47):    Growth in aerobic bottle: Gram Positive Cocci in Clusters    Growth in anaerobic bottle: Gram Positive Cocci in Clusters  Preliminary Report (2022 17:47):    Growth in aerobic bottle: Gram Positive Cocci in Clusters    Growth in anaerobic bottle: Gram Positive Cocci in Clusters    ***Blood Panel PCR results on this specimen are available    approximately 3 hours after the Gram stain result.***    Gram stain, PCR, and/or culture results may not always    correspond due to difference in methodologies.    ************************************************************    This PCR assay was performed by multiplex PCR. This    Assay tests for 66 bacterial and resistance gene targets.    Please refer to the [facility] test directory    at [email or web address] for details.  Organism: Blood Culture PCR (2022 14:20)  Organism: Blood Culture PCR (2022 14:20)       PROGRESS NOTE:   Authored by Dr. Shy Glaser    Patient is a 76y old  Female who presents with a chief complaint of pelvic pain (2022 02:36)      SUBJECTIVE / OVERNIGHT EVENTS: No overnight event. Patient reported feeling pain when she moves but otherwise improved. Was able to sit at edge of bed and stand up yesterday.    ADDITIONAL REVIEW OF SYSTEMS:  Review of Systems:  Constitutional: No fever, No weight loss, good appetite/po intake  Head: No headache   Eyes: No blurry vision, No diplopia  Neuro: No tremors, No muscle weakness   Cardiovascular: No chest pain, No palpitations  Respiratory: No SOB, No cough  GI: No nausea, No vomiting, No diarrhea, + constipation  : No dysuria, No hematuria  Skin: No rash  MSK: + R hip and L knee pain  Psych: No depression      MEDICATIONS  (STANDING):  DULoxetine 60 milliGRAM(s) Oral daily  fenofibrate Tablet 48 milliGRAM(s) Oral daily  gabapentin 300 milliGRAM(s) Oral two times a day  levothyroxine 125 MICROGram(s) Oral daily  polyethylene glycol 3350 17 Gram(s) Oral daily  rOPINIRole 5 milliGRAM(s) Oral at bedtime  senna 1 Tablet(s) Oral daily  sodium chloride 0.9%. 1000 milliLiter(s) (75 mL/Hr) IV Continuous <Continuous>  vancomycin  IVPB 1000 milliGRAM(s) IV Intermittent every 24 hours    MEDICATIONS  (PRN):  meclizine 12.5 milliGRAM(s) Oral two times a day PRN Dizziness  oxyCODONE    IR 10 milliGRAM(s) Oral every 6 hours PRN moderate to severe pain      CAPILLARY BLOOD GLUCOSE        I&O's Summary    2022 07:01  -  2022 07:00  --------------------------------------------------------  IN: 2200 mL / OUT: 1250 mL / NET: 950 mL        PHYSICAL EXAM:  Vital Signs Last 24 Hrs  T(C): 37.2 (2022 05:57), Max: 37.3 (2022 21:53)  T(F): 98.9 (2022 05:57), Max: 99.1 (2022 21:53)  HR: 82 (2022 05:57) (76 - 86)  BP: 110/61 (2022 05:57) (105/66 - 120/87)  BP(mean): --  RR: 18 (2022 05:57) (18 - 18)  SpO2: 97% (2022 05:57) (97% - 100%)    GENERAL: NAD, lying comfortably in bed  HEAD: Atraumatic, normocephalic  EYES: EOMI b/l PERRLA b/l, conjunctiva and sclera clear  NECK: Supple, No JVD, No LAD  RESPIRATORY: Normal respiratory effort; lungs are clear to auscultation bilaterally  CARDIOVASCULAR: Regular rate and rhythm, normal S1 and S2, no murmur/rub/gallop; No lower extremity edema  ABDOMEN: Nontender, normoactive bowel sounds, no rebound/guarding; No hepatosplenomegaly  MUSCULOSKELETAL: no clubbing or cyanosis of digits; no joint swelling or tenderness to palpation  NEURO: Non focal   PSYCH: A+O to person, place, and time; affect appropriate    LABS:                        10.1   21.05 )-----------( 258      ( 2022 06:03 )             30.9     04-29    124<L>  |  96<L>  |  55<H>  ----------------------------<  126<H>  4.4   |  17<L>  |  1.66<H>    Ca    8.3<L>      2022 06:03  Phos  3.1     04-  Mg     2.30     -    TPro  6.7  /  Alb  3.3  /  TBili  0.6  /  DBili  x   /  AST  26  /  ALT  12  /  AlkPhos  114  -          Urinalysis Basic - ( 2022 19:26 )    Color: Yellow / Appearance: Clear / S.019 / pH: x  Gluc: x / Ketone: Negative  / Bili: Negative / Urobili: <2 mg/dL   Blood: x / Protein: Trace / Nitrite: Negative   Leuk Esterase: Negative / RBC: x / WBC x   Sq Epi: x / Non Sq Epi: x / Bacteria: x        Culture - Blood (collected 2022 21:09)  Source: .Blood Blood-Venous  Gram Stain (2022 18:24):    Growth in aerobic bottle: Gram Positive Cocci in Clusters    Growth in anaerobic bottle: Gram Positive Cocci in Clusters  Preliminary Report (2022 18:25):    Growth in aerobic bottle: Gram Positive Cocci in Clusters    Growth in anaerobic bottle: Gram Positive Cocci in Clusters    Culture - Blood (collected 2022 21:09)  Source: .Blood Blood-Peripheral  Gram Stain (2022 17:47):    Growth in aerobic bottle: Gram Positive Cocci in Clusters    Growth in anaerobic bottle: Gram Positive Cocci in Clusters  Preliminary Report (2022 17:47):    Growth in aerobic bottle: Gram Positive Cocci in Clusters    Growth in anaerobic bottle: Gram Positive Cocci in Clusters    ***Blood Panel PCR results on this specimen are available    approximately 3 hours after the Gram stain result.***    Gram stain, PCR, and/or culture results may not always    correspond due to difference in methodologies.    ************************************************************    This PCR assay was performed by multiplex PCR. This    Assay tests for 66 bacterial and resistance gene targets.    Please refer to the [facility] test directory    at [email or web address] for details.  Organism: Blood Culture PCR (2022 14:20)  Organism: Blood Culture PCR (2022 14:20)

## 2022-04-29 NOTE — PROGRESS NOTE ADULT - PROBLEM SELECTOR PLAN 2
Patient with hematoma of right obturator internus muscle + fever and leukocytosis concerning for infected hematoma. Meet SIRS criteria.  - s/p Metronidazole and CTX empirically -- > VANCO 1g Q24h  - f/u BCx (GPCs) and UCx on POCUS possible joint efussion seen  - joint US and MRI  --- BCx growing GPCs  --- IR consulted for possible tap  --- Ortho to be reconsulted if tap is positive, no acute intervention at the moment  --- Started on Vanco 1g q24h (4/28) on POCUS possible joint efussion seen  - joint US and MRI  --- BCx growing GPCs  --- IR consulted for possible tap  --- Ortho to be reconsulted if tap is positive, no acute intervention at the moment  --- Started on Vanco 1g q24h (4/28) --> switched to Cefazolin iso SINA

## 2022-04-29 NOTE — PROGRESS NOTE ADULT - PROBLEM SELECTOR PLAN 4
Possibly hypovolemic vs increased ADH in the setting of pain  - check urine lytes  - trend Na Patient presented with elevated Scr, unknown baseline, associated to Hyponatremia.   Could be 2/2 to hypovolemia.  - gentle hydration  - trend creatinine

## 2022-04-29 NOTE — PROGRESS NOTE ADULT - ASSESSMENT
77 yo woman with OA, h/o falls presenting with right hip pain found to have fever and Staph aureus bacteremia  Imaging shows pelvic fractures with hyperattenuation right obturator internus muscle    Concern for septic arthritis right hip  Endocarditis in ddx  Leucocytosis  KATHERINE    Suggest:  c/w cefazolin 2 gm iv q 12 h  follow MRI to assess hip   follow repeat blood culture to assess for clearance  check echo to look for vegetation    ID service will follow over weekend 77 yo woman with OA, h/o falls presenting with right hip pain found to have fever and Staph aureus bacteremia  Imaging shows pelvic fractures with hyperattenuation right obturator internus muscle    Concern for septic arthritis right hip  Endocarditis in ddx  TTE noted  Leucocytosis  KATHERINE    Suggest:  c/w cefazolin 2 gm iv q 12 h  follow MRI to assess hip for aspiration   follow repeat blood culture to assess for clearance      ID service will follow over weekend

## 2022-04-29 NOTE — PROGRESS NOTE ADULT - SUBJECTIVE AND OBJECTIVE BOX
Follow Up:  MSSA bacteremia    Interval History/ROS:  c/o constipation    c/o right groin pain    Allergies  erythromycin (Hives)  IV Contrast (Anaphylaxis)        ANTIMICROBIALS:  ceFAZolin   IVPB    ceFAZolin   IVPB 2000 every 12 hours      OTHER MEDS:  bisacodyl 5 milliGRAM(s) Oral once  bisacodyl Suppository 10 milliGRAM(s) Rectal once  DULoxetine 60 milliGRAM(s) Oral daily  famotidine    Tablet 20 milliGRAM(s) Oral daily  fenofibrate Tablet 48 milliGRAM(s) Oral daily  gabapentin 300 milliGRAM(s) Oral two times a day  lactulose Syrup 30 Gram(s) Oral once  levothyroxine 125 MICROGram(s) Oral daily  meclizine 12.5 milliGRAM(s) Oral two times a day PRN  ondansetron Injectable 4 milliGRAM(s) IV Push four times a day PRN  ondansetron Injectable 4 milliGRAM(s) IV Push once  oxyCODONE    IR 10 milliGRAM(s) Oral every 6 hours PRN  polyethylene glycol 3350 17 Gram(s) Oral two times a day  polyethylene glycol 3350 17 Gram(s) Oral once  rOPINIRole 5 milliGRAM(s) Oral at bedtime  senna 2 Tablet(s) Oral once  senna 2 Tablet(s) Oral at bedtime  sorbitol 70%/mineral oil/magnesium hydroxide/glycerin Enema 120 milliLiter(s) Rectal once      Vital Signs Last 24 Hrs  T(C): 38.2 (2022 15:09), Max: 38.2 (2022 15:09)  T(F): 100.8 (2022 15:09), Max: 100.8 (2022 15:09)  HR: 93 (2022 15:09) (82 - 93)  BP: 114/59 (2022 15:09) (105/66 - 119/72)  BP(mean): --  RR: 18 (2022 15:09) (18 - 18)  SpO2: 100% (2022 15:09) (97% - 100%)    PHYSICAL EXAM:  Constitutional: uncomfortable due to pain   Eyes: No icterus.  flushed  RS: Chest clear   CVS: S1, S2   Abdomen: Soft.   : knox  Extremities: tender right groin  Skin: No rash  Neuro: Alert, oriented                           10.1   21.05 )-----------( 258      ( 2022 06:03 )             30.9           124<L>  |  96<L>  |  55<H>  ----------------------------<  126<H>  4.4   |  17<L>  |  1.66<H>    Ca    8.3<L>      2022 06:03  Phos  3.1       Mg     2.30         TPro  6.7  /  Alb  3.3  /  TBili  0.6  /  DBili  x   /  AST  26  /  ALT  12  /  AlkPhos  114        Urinalysis Basic - ( 2022 19:26 )    Color: Yellow / Appearance: Clear / S.019 / pH: x  Gluc: x / Ketone: Negative  / Bili: Negative / Urobili: <2 mg/dL   Blood: x / Protein: Trace / Nitrite: Negative   Leuk Esterase: Negative / RBC: x / WBC x   Sq Epi: x / Non Sq Epi: x / Bacteria: x        MICROBIOLOGY:  v  .Blood Blood-Peripheral  22   Growth in aerobic and anaerobic bottles: Staphylococcus aureus  Susceptibility to follow.  ***Blood Panel PCR results on this specimen are available  approximately 3 hours after the Gram stain result.***  Gram stain, PCR, and/or culture results maynot always  correspond due to difference in methodologies.  ************************************************************  This PCR assay was performed by multiplex PCR. This  Assay tests for 66 bacterial and resistance gene targets.  Please refer to the [facility] test directory  at [email or web address] for details.  --  Blood Culture PCR      Clean Catch Clean Catch (Midstream)  22   No growth  --  --        RADIOLOGY:    rd< from: US Joint Complete, Right (22 @ 08:42) >    ACC: [id number] EXAM:  US JOINT COMPLETE RT                          PROCEDURE DATE:  2022          INTERPRETATION:  Clinical indication: Right superior and inferior pubic   rami fractures with positive blood cultures. Possible hip joint effusion.    Ultrasound evaluation of the right hip was performed.    Findings:    Study is limited as the radiologist was not present during scanning.   There is soft tissue edema within the subcutaneous fat overlying the   right hip. The right hip joint is not well imaged. Imaging more medially   in the suprapubic region there is a small fluid collection overlying the   pubic ramus measuring approximately 4 x 4 x 3 mm. There is may be related   to small hematoma related to known pubic ramus fracture in this region.    IMPRESSION:    Limited study. Right hip joint is not well imaged. Correlation with MRI   is recommended to exclude the possibility of an underlying hip joint   effusion. Other findings as above.    --- End of Report ---            CARIN LOZADA MD; Attending Radiologist  This document has been electronically signed. 2022 10:52AM    < end of copied text >

## 2022-04-30 DIAGNOSIS — R78.81 BACTEREMIA: ICD-10-CM

## 2022-04-30 DIAGNOSIS — K59.00 CONSTIPATION, UNSPECIFIED: ICD-10-CM

## 2022-04-30 LAB
-  AMPICILLIN/SULBACTAM: SIGNIFICANT CHANGE UP
-  CEFAZOLIN: SIGNIFICANT CHANGE UP
-  CLINDAMYCIN: SIGNIFICANT CHANGE UP
-  ERYTHROMYCIN: SIGNIFICANT CHANGE UP
-  GENTAMICIN: SIGNIFICANT CHANGE UP
-  OXACILLIN: SIGNIFICANT CHANGE UP
-  PENICILLIN: SIGNIFICANT CHANGE UP
-  RIFAMPIN: SIGNIFICANT CHANGE UP
-  TETRACYCLINE: SIGNIFICANT CHANGE UP
-  TRIMETHOPRIM/SULFAMETHOXAZOLE: SIGNIFICANT CHANGE UP
-  VANCOMYCIN: SIGNIFICANT CHANGE UP
ANION GAP SERPL CALC-SCNC: 9 MMOL/L — SIGNIFICANT CHANGE UP (ref 7–14)
BASOPHILS # BLD AUTO: 0.02 K/UL — SIGNIFICANT CHANGE UP (ref 0–0.2)
BASOPHILS NFR BLD AUTO: 0.1 % — SIGNIFICANT CHANGE UP (ref 0–2)
BUN SERPL-MCNC: 38 MG/DL — HIGH (ref 7–23)
CALCIUM SERPL-MCNC: 8.6 MG/DL — SIGNIFICANT CHANGE UP (ref 8.4–10.5)
CHLORIDE SERPL-SCNC: 99 MMOL/L — SIGNIFICANT CHANGE UP (ref 98–107)
CO2 SERPL-SCNC: 21 MMOL/L — LOW (ref 22–31)
CREAT SERPL-MCNC: 0.94 MG/DL — SIGNIFICANT CHANGE UP (ref 0.5–1.3)
CULTURE RESULTS: SIGNIFICANT CHANGE UP
CULTURE RESULTS: SIGNIFICANT CHANGE UP
EGFR: 63 ML/MIN/1.73M2 — SIGNIFICANT CHANGE UP
EOSINOPHIL # BLD AUTO: 0 K/UL — SIGNIFICANT CHANGE UP (ref 0–0.5)
EOSINOPHIL NFR BLD AUTO: 0 % — SIGNIFICANT CHANGE UP (ref 0–6)
GLUCOSE SERPL-MCNC: 142 MG/DL — HIGH (ref 70–99)
GRAM STN FLD: SIGNIFICANT CHANGE UP
GRAM STN FLD: SIGNIFICANT CHANGE UP
HCT VFR BLD CALC: 31.7 % — LOW (ref 34.5–45)
HGB BLD-MCNC: 10.4 G/DL — LOW (ref 11.5–15.5)
IANC: 15.88 K/UL — HIGH (ref 1.8–7.4)
IMM GRANULOCYTES NFR BLD AUTO: 0.8 % — SIGNIFICANT CHANGE UP (ref 0–1.5)
LYMPHOCYTES # BLD AUTO: 1.14 K/UL — SIGNIFICANT CHANGE UP (ref 1–3.3)
LYMPHOCYTES # BLD AUTO: 6.3 % — LOW (ref 13–44)
MAGNESIUM SERPL-MCNC: 2.3 MG/DL — SIGNIFICANT CHANGE UP (ref 1.6–2.6)
MCHC RBC-ENTMCNC: 27.2 PG — SIGNIFICANT CHANGE UP (ref 27–34)
MCHC RBC-ENTMCNC: 32.8 GM/DL — SIGNIFICANT CHANGE UP (ref 32–36)
MCV RBC AUTO: 83 FL — SIGNIFICANT CHANGE UP (ref 80–100)
METHOD TYPE: SIGNIFICANT CHANGE UP
MONOCYTES # BLD AUTO: 0.95 K/UL — HIGH (ref 0–0.9)
MONOCYTES NFR BLD AUTO: 5.2 % — SIGNIFICANT CHANGE UP (ref 2–14)
NEUTROPHILS # BLD AUTO: 15.88 K/UL — HIGH (ref 1.8–7.4)
NEUTROPHILS NFR BLD AUTO: 87.6 % — HIGH (ref 43–77)
NRBC # BLD: 0 /100 WBCS — SIGNIFICANT CHANGE UP
NRBC # FLD: 0 K/UL — SIGNIFICANT CHANGE UP
ORGANISM # SPEC MICROSCOPIC CNT: SIGNIFICANT CHANGE UP
PHOSPHATE SERPL-MCNC: 2 MG/DL — LOW (ref 2.5–4.5)
PLATELET # BLD AUTO: 297 K/UL — SIGNIFICANT CHANGE UP (ref 150–400)
POTASSIUM SERPL-MCNC: 4.2 MMOL/L — SIGNIFICANT CHANGE UP (ref 3.5–5.3)
POTASSIUM SERPL-SCNC: 4.2 MMOL/L — SIGNIFICANT CHANGE UP (ref 3.5–5.3)
RBC # BLD: 3.82 M/UL — SIGNIFICANT CHANGE UP (ref 3.8–5.2)
RBC # FLD: 15.4 % — HIGH (ref 10.3–14.5)
SODIUM SERPL-SCNC: 129 MMOL/L — LOW (ref 135–145)
SPECIMEN SOURCE: SIGNIFICANT CHANGE UP
WBC # BLD: 18.13 K/UL — HIGH (ref 3.8–10.5)
WBC # FLD AUTO: 18.13 K/UL — HIGH (ref 3.8–10.5)

## 2022-04-30 PROCEDURE — 99233 SBSQ HOSP IP/OBS HIGH 50: CPT | Mod: GC

## 2022-04-30 RX ORDER — OXYCODONE HYDROCHLORIDE 5 MG/1
5 TABLET ORAL EVERY 6 HOURS
Refills: 0 | Status: DISCONTINUED | OUTPATIENT
Start: 2022-04-30 | End: 2022-05-05

## 2022-04-30 RX ORDER — OXYCODONE HYDROCHLORIDE 5 MG/1
5 TABLET ORAL EVERY 6 HOURS
Refills: 0 | Status: DISCONTINUED | OUTPATIENT
Start: 2022-04-30 | End: 2022-04-30

## 2022-04-30 RX ORDER — SODIUM,POTASSIUM PHOSPHATES 278-250MG
1 POWDER IN PACKET (EA) ORAL ONCE
Refills: 0 | Status: COMPLETED | OUTPATIENT
Start: 2022-04-30 | End: 2022-04-30

## 2022-04-30 RX ADMIN — FAMOTIDINE 20 MILLIGRAM(S): 10 INJECTION INTRAVENOUS at 13:01

## 2022-04-30 RX ADMIN — Medication 5 MILLIGRAM(S): at 22:15

## 2022-04-30 RX ADMIN — OXYCODONE HYDROCHLORIDE 5 MILLIGRAM(S): 5 TABLET ORAL at 13:40

## 2022-04-30 RX ADMIN — OXYCODONE HYDROCHLORIDE 10 MILLIGRAM(S): 5 TABLET ORAL at 07:24

## 2022-04-30 RX ADMIN — OXYCODONE HYDROCHLORIDE 5 MILLIGRAM(S): 5 TABLET ORAL at 13:00

## 2022-04-30 RX ADMIN — Medication 48 MILLIGRAM(S): at 13:01

## 2022-04-30 RX ADMIN — Medication 100 MILLIGRAM(S): at 18:28

## 2022-04-30 RX ADMIN — OXYCODONE HYDROCHLORIDE 5 MILLIGRAM(S): 5 TABLET ORAL at 19:33

## 2022-04-30 RX ADMIN — Medication 125 MICROGRAM(S): at 05:46

## 2022-04-30 RX ADMIN — Medication 12.5 MILLIGRAM(S): at 22:12

## 2022-04-30 RX ADMIN — GABAPENTIN 300 MILLIGRAM(S): 400 CAPSULE ORAL at 05:46

## 2022-04-30 RX ADMIN — Medication 1 TABLET(S): at 09:55

## 2022-04-30 RX ADMIN — ROPINIROLE 5 MILLIGRAM(S): 8 TABLET, FILM COATED, EXTENDED RELEASE ORAL at 22:13

## 2022-04-30 RX ADMIN — OXYCODONE HYDROCHLORIDE 5 MILLIGRAM(S): 5 TABLET ORAL at 20:25

## 2022-04-30 RX ADMIN — Medication 100 MILLIGRAM(S): at 05:45

## 2022-04-30 RX ADMIN — OXYCODONE HYDROCHLORIDE 10 MILLIGRAM(S): 5 TABLET ORAL at 07:56

## 2022-04-30 RX ADMIN — DULOXETINE HYDROCHLORIDE 60 MILLIGRAM(S): 30 CAPSULE, DELAYED RELEASE ORAL at 13:01

## 2022-04-30 RX ADMIN — SENNA PLUS 2 TABLET(S): 8.6 TABLET ORAL at 22:12

## 2022-04-30 RX ADMIN — GABAPENTIN 300 MILLIGRAM(S): 400 CAPSULE ORAL at 18:29

## 2022-04-30 NOTE — PROGRESS NOTE ADULT - PROBLEM SELECTOR PLAN 3
Patient with hematoma of right obturator internus muscle + fever and leukocytosis concerning for infected hematoma. Meet SIRS criteria.  - s/p Metronidazole and CTX empirically --> switched to Cefazolin iso SINA  - f/u BCx (GPCs) and UCx - possible MSSA source?    #Hip joint effusion, right  - concern for septic joint  - seen on POCUS  - not well visualized on US, but overlying fluid collection [id number] is seen  - pending MRI

## 2022-04-30 NOTE — PROGRESS NOTE ADULT - PROBLEM SELECTOR PLAN 6
- hold spironolactone 2/2 elevated creatinine and hyperkalemia - Urine studies consistent with hypovolemia, consistent with history given vomiting and KATHERINE

## 2022-04-30 NOTE — PROGRESS NOTE ADULT - PROBLEM SELECTOR PLAN 4
Patient presented with elevated Scr, unknown baseline, associated to Hyponatremia.   Could be 2/2 to hypovolemia.  - gentle hydration  - trend creatinine - On 4/29 had not had bowel movement x5 days  - Gave enema x2, miralax, senna, dulcolax, lactulose  - Had one bowel movement last night  - Will titrate bowel regimen to 1 soft bowel movement daily - continue miralax, senna, dulcolax for now given she is taking pain medications

## 2022-04-30 NOTE — PROGRESS NOTE ADULT - PROBLEM SELECTOR PLAN 5
Possibly hypovolemic vs increased ADH in the setting of pain  - check urine lytes  - trend Na - improving

## 2022-04-30 NOTE — PROGRESS NOTE ADULT - ATTENDING COMMENTS
76 y/F PMH of  SVT s/p ablation, HTN, HLD, hypothyroidism, vertigo, peripheral neuropathy, osteoarthritis, cervical ca p/w R sided groin/pelvic pain and found to have fx for R superior and inferior pubic rami with hematoma of R obturator internus muscle c/b sepsis 2/2 gram positive bacteremia   Pt reports improvement in hip/pelvic pain but reporting  pain in left knee   Last  bowel movement Monday     #Sepsis with MSSA  Bacteremia with  concern for septic arthritis of right  hip   (Pt had injection in the right hip by primary orthopedist Dr. Jordan ~1 week ago, unclear if this was superficial or intra-articular.)  BC- Staph Aureus, CXR clear, UA neg.ESR of 77  ECHO- Unable to exclude endocarditis.    On Cefazolin 2 gm Q 12h   Appreciate Ortho- Advise MRI hip and IR consult if presence of effusion  Appreciate ID recs- Continue Cefazaolin, FU MRI Hip, repeat blood culture to assess for clearance,     #Pelvic pain: 2/2 fracture of R superior and inferior pubic rami with hematoma of R obturator internus muscle - Fracture likely related to underlying osteopenia, no recent falls  No acute intervention as per ortho recs.  Pain control- Oxy IR 10mg Q 6 PRN, continue  bowel regimen. Block in place for now    # Hematoma of right obturator post fall- HH ranging from  Monitor trend    #KATHERINE- Unclear if KATHERINE or CKD or KATHERINE on CKD as unclear baseline  s/p  Trial of IVF however minimal improvement noted. F/u urine lytes. Avoid nephrotoxins. Obtain baseline Cr     # Hyponatremia: hypovolemic hyponatremia- Improving - Other possibility is SIADH due to nausea/pain or medication Duloxetine  U NA of 20 consistent with hypovolemia  However further decrease in Na with trial of IV      #Nausea: related to GERD/PUD, taken off long standing protonix. On Famotidine  #H/o Peripheral Neuropathy- On Gabapentin, Cymbalta    #DVT Prox- On hold given fall/hematoma. Consider restarting in 2-3days if HH stable 76 y/F PMH of  SVT s/p ablation, HTN, HLD, hypothyroidism, vertigo, peripheral neuropathy, osteoarthritis, cervical ca p/w R sided groin/pelvic pain and found to have fx for R superior and inferior pubic rami with hematoma of R obturator internus muscle c/b sepsis 2/2 gram positive bacteremia   Pt reports improvement in hip/pelvic pain but reporting  pain in left knee   Last  bowel movement Monday     #Sepsis with MSSA  Bacteremia with  concern for septic arthritis of right  hip   (Pt had injection in the right hip by primary orthopedist Dr. Jordan ~1 week ago, unclear if this was superficial or intra-articular.)  Leukocytosis improving, Fever of 100.8 overnight   BC- Staph Aureus, CXR clear, UA neg.ESR of 77  ECHO- Unable to exclude endocarditis.    On Cefazolin 2 gm Q 12h   Appreciate Ortho- Advise MRI hip and IR consult if presence of effusion  Appreciate ID recs- Continue Cefazaolin, FU MRI Hip, repeat blood culture to assess for clearance,     #Pelvic pain: 2/2 fracture of R superior and inferior pubic rami with hematoma of R obturator internus muscle - Fracture likely related to underlying osteopenia, no recent falls  No acute intervention as per ortho recs.  Pain control- Oxy IR 10mg Q 6 PRN, continue  bowel regimen. Block in place for now    # Hematoma of right obturator post fall- HH stable.   Monitor trend    #KATHERINE- Unclear if KATHERINE or CKD or KATHERINE on CKD as unclear baseline- improving   s/p  Trial of IVF. F/u urine lytes. Avoid nephrotoxins. Obtain baseline Cr     # Hyponatremia: hypovolemic hyponatremia- Improving - Other possibility is SIADH due to nausea/pain or medication Duloxetine  U NA of 20 consistent with hypovolemia      #Nausea: related to GERD/PUD, taken off long standing protonix. On Famotidine  #H/o Peripheral Neuropathy- On Gabapentin, Cymbalta    #DVT Prox- On hold given fall/hematoma. Consider restarting in 2-3days if HH stable

## 2022-04-30 NOTE — PROGRESS NOTE ADULT - PROBLEM SELECTOR PLAN 1
- Ortho consulted: no acute intervention indicated  ------ Follow up with Dr. Jordan, primary orthopedist as outpatient  - pain control with Dilaudid/tramadol  - PT kaci - Continue cefazolin  - ID consult  - TTE no vegetations, consideration for SHAUN given concern for endocarditis  - pending MRI right hip given concern for septic joint vs infected hematoma  - XR left knee given new/worse pain (has baseline arthritis in left knee, but states right is usually more painful and today left is more painful)

## 2022-04-30 NOTE — PROGRESS NOTE ADULT - PROBLEM SELECTOR PLAN 2
on POCUS possible joint efussion seen  - joint US and MRI  --- BCx growing GPCs  --- IR consulted for possible tap  --- Ortho to be reconsulted if tap is positive, no acute intervention at the moment  --- Started on Vanco 1g q24h (4/28) --> switched to Cefazolin iso SINA - no acute orthopedic intervention  - will reduce oxycodone to 5mg q6h prn  - PT eval

## 2022-04-30 NOTE — PROGRESS NOTE ADULT - SUBJECTIVE AND OBJECTIVE BOX
***incomplete PROGRESS NOTE:     Patient is a 76y old  Female who presents with a chief complaint of pelvic pain (30 Apr 2022 06:56)      SUBJECTIVE / OVERNIGHT EVENTS:  No acute events overnight.  Patient had a bowel movement.  Having more left knee pain than usual, but no redness or swelling of that area.  right hip pain is unchanged.  No more nausea/vomiting.  Pain well controlled but she says the oxycodone makes her a bit too sleepy.  No other complaints.    MEDICATIONS  (STANDING):  bisacodyl 5 milliGRAM(s) Oral at bedtime  ceFAZolin   IVPB      ceFAZolin   IVPB 2000 milliGRAM(s) IV Intermittent every 12 hours  DULoxetine 60 milliGRAM(s) Oral daily  famotidine    Tablet 20 milliGRAM(s) Oral daily  fenofibrate Tablet 48 milliGRAM(s) Oral daily  gabapentin 300 milliGRAM(s) Oral two times a day  levothyroxine 125 MICROGram(s) Oral daily  polyethylene glycol 3350 17 Gram(s) Oral two times a day  rOPINIRole 5 milliGRAM(s) Oral at bedtime  senna 2 Tablet(s) Oral at bedtime    MEDICATIONS  (PRN):  meclizine 12.5 milliGRAM(s) Oral two times a day PRN Dizziness  ondansetron Injectable 4 milliGRAM(s) IV Push four times a day PRN Nausea and/or Vomiting  oxyCODONE    IR 5 milliGRAM(s) Oral every 6 hours PRN moderate to severe pain      CAPILLARY BLOOD GLUCOSE        I&O's Summary    29 Apr 2022 07:01  -  30 Apr 2022 07:00  --------------------------------------------------------  IN: 1980 mL / OUT: 3200 mL / NET: -1220 mL        PHYSICAL EXAM:  Vital Signs Last 24 Hrs  T(C): 36.9 (30 Apr 2022 05:52), Max: 38.2 (29 Apr 2022 15:09)  T(F): 98.4 (30 Apr 2022 05:52), Max: 100.8 (29 Apr 2022 15:09)  HR: 84 (30 Apr 2022 05:52) (80 - 93)  BP: 117/61 (30 Apr 2022 05:52) (113/60 - 117/61)  BP(mean): --  RR: 17 (30 Apr 2022 05:52) (17 - 18)  SpO2: 100% (30 Apr 2022 05:52) (100% - 100%)    CONSTITUTIONAL: NAD, well-developed, A&Ox3 to person, place, time.  RESPIRATORY: Normal respiratory effort; lungs are clear to auscultation bilaterally  CARDIOVASCULAR: Regular rate and rhythm, normal S1 and S2, no murmur/rub/gallop; No lower extremity edema; Peripheral pulses are 2+ bilaterally  ABDOMEN: Nontender to palpation, no rebound/guarding; No hepatosplenomegaly  MUSCLOSKELETAL: no clubbing or cyanosis of digits; +right hip pain with flexion past 10 degrees.  +left knee pain with palpation or flexion.  NEURO: CN 2-12 grossly intact, moves all limbs spontaneously      LABS:                        10.4   18.13 )-----------( 297      ( 30 Apr 2022 07:27 )             31.7     04-30    129<L>  |  99  |  38<H>  ----------------------------<  142<H>  4.2   |  21<L>  |  0.94    Ca    8.6      30 Apr 2022 07:27  Phos  2.0     04-30  Mg     2.30     04-30          Culture - Urine (collected 27 Apr 2022 19:00)  Source: Clean Catch Clean Catch (Midstream)  Final Report (29 Apr 2022 15:30):    No growth    Culture - Blood (collected 27 Apr 2022 18:30)  Source: .Blood Blood-Venous  Gram Stain (28 Apr 2022 18:24):    Growth in aerobic bottle: Gram Positive Cocci in Clusters    Growth in anaerobic bottle: Gram Positive Cocci in Clusters  Final Report (30 Apr 2022 09:22):    Growth in aerobic and anaerobic bottles: Staphylococcus aureus    See previous culture [id number]    Culture - Blood (collected 27 Apr 2022 18:30)  Source: .Blood Blood-Peripheral  Gram Stain (28 Apr 2022 17:47):    Growth in aerobic bottle: Gram Positive Cocci in Clusters    Growth in anaerobic bottle: Gram Positive Cocci in Clusters  Final Report (30 Apr 2022 07:36):    Growth in aerobic and anaerobic bottles: Staphylococcus aureus    ***Blood Panel PCR results on this specimen are available    approximately 3 hours after the Gram stain result.***    Gram stain, PCR, and/or culture results may not always    correspond dueto difference in methodologies.    ************************************************************    This PCR assay was performed by multiplex PCR. This    Assay tests for 66 bacterial and resistance gene targets.    Please refer to the [facility] test directory    at [email or web address] for details.  Organism: Blood Culture PCR  Staphylococcus aureus (30 Apr 2022 07:36)  Organism: Staphylococcus aureus (30 Apr 2022 07:36)  Organism: Blood Culture PCR (30 Apr 2022 07:36)        RADIOLOGY & ADDITIONAL TESTS:  Results Reviewed: y  Imaging Personally Reviewed:  Electrocardiogram Personally Reviewed:    COORDINATION OF CARE:  Care Discussed with Consultants/Other Providers [Y/N]:  Prior or Outpatient Records Reviewed [Y/N]:

## 2022-05-01 ENCOUNTER — TRANSCRIPTION ENCOUNTER (OUTPATIENT)
Age: 77
End: 2022-05-01

## 2022-05-01 LAB
ALBUMIN SERPL ELPH-MCNC: 2.8 G/DL — LOW (ref 3.3–5)
ALP SERPL-CCNC: 87 U/L — SIGNIFICANT CHANGE UP (ref 40–120)
ALT FLD-CCNC: 8 U/L — SIGNIFICANT CHANGE UP (ref 4–33)
ANION GAP SERPL CALC-SCNC: 12 MMOL/L — SIGNIFICANT CHANGE UP (ref 7–14)
AST SERPL-CCNC: 11 U/L — SIGNIFICANT CHANGE UP (ref 4–32)
BASOPHILS # BLD AUTO: 0.03 K/UL — SIGNIFICANT CHANGE UP (ref 0–0.2)
BASOPHILS NFR BLD AUTO: 0.2 % — SIGNIFICANT CHANGE UP (ref 0–2)
BILIRUB SERPL-MCNC: 0.5 MG/DL — SIGNIFICANT CHANGE UP (ref 0.2–1.2)
BUN SERPL-MCNC: 22 MG/DL — SIGNIFICANT CHANGE UP (ref 7–23)
CALCIUM SERPL-MCNC: 9.2 MG/DL — SIGNIFICANT CHANGE UP (ref 8.4–10.5)
CHLORIDE SERPL-SCNC: 99 MMOL/L — SIGNIFICANT CHANGE UP (ref 98–107)
CO2 SERPL-SCNC: 20 MMOL/L — LOW (ref 22–31)
CREAT SERPL-MCNC: 0.76 MG/DL — SIGNIFICANT CHANGE UP (ref 0.5–1.3)
CULTURE RESULTS: SIGNIFICANT CHANGE UP
EGFR: 81 ML/MIN/1.73M2 — SIGNIFICANT CHANGE UP
EOSINOPHIL # BLD AUTO: 0 K/UL — SIGNIFICANT CHANGE UP (ref 0–0.5)
EOSINOPHIL NFR BLD AUTO: 0 % — SIGNIFICANT CHANGE UP (ref 0–6)
GLUCOSE SERPL-MCNC: 125 MG/DL — HIGH (ref 70–99)
HCT VFR BLD CALC: 31.9 % — LOW (ref 34.5–45)
HGB BLD-MCNC: 10.4 G/DL — LOW (ref 11.5–15.5)
IANC: 16.01 K/UL — HIGH (ref 1.8–7.4)
IMM GRANULOCYTES NFR BLD AUTO: 0.7 % — SIGNIFICANT CHANGE UP (ref 0–1.5)
LYMPHOCYTES # BLD AUTO: 1.86 K/UL — SIGNIFICANT CHANGE UP (ref 1–3.3)
LYMPHOCYTES # BLD AUTO: 9.7 % — LOW (ref 13–44)
MAGNESIUM SERPL-MCNC: 1.9 MG/DL — SIGNIFICANT CHANGE UP (ref 1.6–2.6)
MCHC RBC-ENTMCNC: 27.4 PG — SIGNIFICANT CHANGE UP (ref 27–34)
MCHC RBC-ENTMCNC: 32.6 GM/DL — SIGNIFICANT CHANGE UP (ref 32–36)
MCV RBC AUTO: 84.2 FL — SIGNIFICANT CHANGE UP (ref 80–100)
MONOCYTES # BLD AUTO: 1.21 K/UL — HIGH (ref 0–0.9)
MONOCYTES NFR BLD AUTO: 6.3 % — SIGNIFICANT CHANGE UP (ref 2–14)
NEUTROPHILS # BLD AUTO: 16.01 K/UL — HIGH (ref 1.8–7.4)
NEUTROPHILS NFR BLD AUTO: 83.1 % — HIGH (ref 43–77)
NRBC # BLD: 0 /100 WBCS — SIGNIFICANT CHANGE UP
NRBC # FLD: 0 K/UL — SIGNIFICANT CHANGE UP
OSMOLALITY UR: 600 MOSM/KG — SIGNIFICANT CHANGE UP (ref 50–1200)
PHOSPHATE SERPL-MCNC: 2.3 MG/DL — LOW (ref 2.5–4.5)
PLATELET # BLD AUTO: 313 K/UL — SIGNIFICANT CHANGE UP (ref 150–400)
POTASSIUM SERPL-MCNC: 4.5 MMOL/L — SIGNIFICANT CHANGE UP (ref 3.5–5.3)
POTASSIUM SERPL-SCNC: 4.5 MMOL/L — SIGNIFICANT CHANGE UP (ref 3.5–5.3)
PROT SERPL-MCNC: 6.5 G/DL — SIGNIFICANT CHANGE UP (ref 6–8.3)
RBC # BLD: 3.79 M/UL — LOW (ref 3.8–5.2)
RBC # FLD: 15.6 % — HIGH (ref 10.3–14.5)
SODIUM SERPL-SCNC: 131 MMOL/L — LOW (ref 135–145)
SODIUM UR-SCNC: 32 MMOL/L — SIGNIFICANT CHANGE UP
SPECIMEN SOURCE: SIGNIFICANT CHANGE UP
UUN UR-MCNC: 1080.9 MG/DL — SIGNIFICANT CHANGE UP
WBC # BLD: 19.24 K/UL — HIGH (ref 3.8–10.5)
WBC # FLD AUTO: 19.24 K/UL — HIGH (ref 3.8–10.5)

## 2022-05-01 PROCEDURE — 99233 SBSQ HOSP IP/OBS HIGH 50: CPT | Mod: GC

## 2022-05-01 RX ORDER — MECLIZINE HCL 12.5 MG
12.5 TABLET ORAL
Refills: 0 | Status: DISCONTINUED | OUTPATIENT
Start: 2022-05-01 | End: 2022-05-07

## 2022-05-01 RX ORDER — SODIUM CHLORIDE 9 MG/ML
500 INJECTION INTRAMUSCULAR; INTRAVENOUS; SUBCUTANEOUS
Refills: 0 | Status: DISCONTINUED | OUTPATIENT
Start: 2022-05-01 | End: 2022-05-06

## 2022-05-01 RX ORDER — SODIUM,POTASSIUM PHOSPHATES 278-250MG
1 POWDER IN PACKET (EA) ORAL ONCE
Refills: 0 | Status: COMPLETED | OUTPATIENT
Start: 2022-05-01 | End: 2022-05-01

## 2022-05-01 RX ADMIN — SODIUM CHLORIDE 85 MILLILITER(S): 9 INJECTION INTRAMUSCULAR; INTRAVENOUS; SUBCUTANEOUS at 11:25

## 2022-05-01 RX ADMIN — Medication 125 MICROGRAM(S): at 05:49

## 2022-05-01 RX ADMIN — ONDANSETRON 4 MILLIGRAM(S): 8 TABLET, FILM COATED ORAL at 03:03

## 2022-05-01 RX ADMIN — GABAPENTIN 300 MILLIGRAM(S): 400 CAPSULE ORAL at 17:18

## 2022-05-01 RX ADMIN — Medication 10 MILLIGRAM(S): at 14:28

## 2022-05-01 RX ADMIN — Medication 12.5 MILLIGRAM(S): at 17:26

## 2022-05-01 RX ADMIN — OXYCODONE HYDROCHLORIDE 5 MILLIGRAM(S): 5 TABLET ORAL at 17:18

## 2022-05-01 RX ADMIN — Medication 1 PACKET(S): at 11:22

## 2022-05-01 RX ADMIN — POLYETHYLENE GLYCOL 3350 17 GRAM(S): 17 POWDER, FOR SOLUTION ORAL at 17:19

## 2022-05-01 RX ADMIN — Medication 100 MILLIGRAM(S): at 05:54

## 2022-05-01 RX ADMIN — OXYCODONE HYDROCHLORIDE 5 MILLIGRAM(S): 5 TABLET ORAL at 11:16

## 2022-05-01 RX ADMIN — Medication 100 MILLIGRAM(S): at 17:18

## 2022-05-01 RX ADMIN — Medication 48 MILLIGRAM(S): at 11:17

## 2022-05-01 RX ADMIN — OXYCODONE HYDROCHLORIDE 5 MILLIGRAM(S): 5 TABLET ORAL at 03:01

## 2022-05-01 RX ADMIN — ROPINIROLE 5 MILLIGRAM(S): 8 TABLET, FILM COATED, EXTENDED RELEASE ORAL at 21:34

## 2022-05-01 RX ADMIN — OXYCODONE HYDROCHLORIDE 5 MILLIGRAM(S): 5 TABLET ORAL at 23:14

## 2022-05-01 RX ADMIN — Medication 5 MILLIGRAM(S): at 21:34

## 2022-05-01 RX ADMIN — OXYCODONE HYDROCHLORIDE 5 MILLIGRAM(S): 5 TABLET ORAL at 03:50

## 2022-05-01 RX ADMIN — SENNA PLUS 2 TABLET(S): 8.6 TABLET ORAL at 21:33

## 2022-05-01 RX ADMIN — FAMOTIDINE 20 MILLIGRAM(S): 10 INJECTION INTRAVENOUS at 11:17

## 2022-05-01 RX ADMIN — GABAPENTIN 300 MILLIGRAM(S): 400 CAPSULE ORAL at 05:50

## 2022-05-01 RX ADMIN — DULOXETINE HYDROCHLORIDE 60 MILLIGRAM(S): 30 CAPSULE, DELAYED RELEASE ORAL at 11:17

## 2022-05-01 RX ADMIN — OXYCODONE HYDROCHLORIDE 5 MILLIGRAM(S): 5 TABLET ORAL at 12:00

## 2022-05-01 NOTE — PROGRESS NOTE ADULT - PROBLEM SELECTOR PLAN 5
- improving Resolved - Urine studies consistent with hypovolemia, consistent with history given vomiting and KATHERINE  - Could have a component of pain too  - Ordered new urine studies  - Patient continues to experience dizziness, nausea and vomiting  ---- changed Antivert to standing (she takes it standing at home)  ---- ordered 500cc NS @85cc/hr  ---- Zofran as nausea/vomiting can be related to HypoNa

## 2022-05-01 NOTE — PROGRESS NOTE ADULT - PROBLEM SELECTOR PLAN 9
DVT: scd iso hematoma  diet: DASH, regular    PT consulted DVT: scd iso hematoma  diet: DASH, regular    PT consulted: Rehab

## 2022-05-01 NOTE — DISCHARGE NOTE PROVIDER - NSDCCPCAREPLAN_GEN_ALL_CORE_FT
PRINCIPAL DISCHARGE DIAGNOSIS  Diagnosis: Fracture of multiple pubic rami  Assessment and Plan of Treatment: You were admitted after presenting with severe R hip/pelvic pain and being found to have a fracture of your pelvic bone. During admission the orthopedic team evaluated you and they determined that surgery was not indicated. During bedside evaluation, point-of-care ultrasound was performed which showed some fluid around the hip joint. Because you had elevated white blood cells and an episode of fevers at admission we were concerned about the joint fluid being infected. An MRI was performed which showed..... Interventional Radiology was consulted to evaluated if taking a sample of the fluid was possible. They determined that the amount was too small to perform a procedure.  Physical therapy evaluated you and determined that you would benefit from going to a rehabilitation facility....      SECONDARY DISCHARGE DIAGNOSES  Diagnosis: MSSA bacteremia  Assessment and Plan of Treatment: At admission you had elevated white blood cells, fever and there was concern of possible infection. You were started on antibiotics as a precaution for infection and later blood cultures confirmed that you had bacteria in your blood. An transthoracic echocardiogram was performed to evaluate if you had an infection in your heart valves as this could be a possible source of the bacteria found in your blood. Additionally, and Xray of your knee was performed which showed......     PRINCIPAL DISCHARGE DIAGNOSIS  Diagnosis: Fracture of multiple pubic rami  Assessment and Plan of Treatment: You were admitted after presenting with severe R hip/pelvic pain and being found to have a fracture of your pelvic bone. During admission the orthopedic team evaluated you and they determined that surgery was not indicated. During bedside evaluation, point-of-care ultrasound was performed which showed some fluid around the hip joint. Because you had elevated white blood cells and an episode of fevers at admission we were concerned about the joint fluid being infected. An MRI was performed which showed no OM or septic arthritis. Interventional Radiology was consulted to evaluated if taking a sample of the fluid was possible. They determined that the amount was too small to perform a procedure.  Physical therapy evaluated you and determined that you would benefit from going to a rehabilitation facility. You continued to improve and it was determined that you would benefit from a longer duration of antibiotics so a temporary IV was placed.   --        SECONDARY DISCHARGE DIAGNOSES  Diagnosis: MSSA bacteremia  Assessment and Plan of Treatment: At admission you had elevated white blood cells, fever and there was concern of possible infection. You were started on antibiotics as a precaution for infection and later blood cultures confirmed that you had bacteria in your blood. An transthoracic echocardiogram was performed to evaluate if you had an infection in your heart valves as this could be a possible source of the bacteria found in your blood. Additionally, and Xray of your knee was performed which showed......

## 2022-05-01 NOTE — PROGRESS NOTE ADULT - SUBJECTIVE AND OBJECTIVE BOX
PROGRESS NOTE:   Authored by Dr. Shy Glaser    Patient is a 76y old  Female who presents with a chief complaint of pelvic pain (30 Apr 2022 06:56)      SUBJECTIVE / OVERNIGHT EVENTS: No overnight events. In progress..    ADDITIONAL REVIEW OF SYSTEMS:    MEDICATIONS  (STANDING):  bisacodyl 5 milliGRAM(s) Oral at bedtime  ceFAZolin   IVPB      ceFAZolin   IVPB 2000 milliGRAM(s) IV Intermittent every 12 hours  DULoxetine 60 milliGRAM(s) Oral daily  famotidine    Tablet 20 milliGRAM(s) Oral daily  fenofibrate Tablet 48 milliGRAM(s) Oral daily  gabapentin 300 milliGRAM(s) Oral two times a day  levothyroxine 125 MICROGram(s) Oral daily  meclizine 12.5 milliGRAM(s) Oral two times a day  polyethylene glycol 3350 17 Gram(s) Oral two times a day  rOPINIRole 5 milliGRAM(s) Oral at bedtime  senna 2 Tablet(s) Oral at bedtime    MEDICATIONS  (PRN):  ondansetron Injectable 4 milliGRAM(s) IV Push four times a day PRN Nausea and/or Vomiting  oxyCODONE    IR 5 milliGRAM(s) Oral every 6 hours PRN moderate to severe pain      CAPILLARY BLOOD GLUCOSE        I&O's Summary    29 Apr 2022 07:01  -  30 Apr 2022 07:00  --------------------------------------------------------  IN: 1980 mL / OUT: 3200 mL / NET: -1220 mL    30 Apr 2022 07:01  -  01 May 2022 06:55  --------------------------------------------------------  IN: 1300 mL / OUT: 800 mL / NET: 500 mL        PHYSICAL EXAM:  Vital Signs Last 24 Hrs  T(C): 37.3 (30 Apr 2022 21:57), Max: 37.3 (30 Apr 2022 21:57)  T(F): 99.2 (30 Apr 2022 21:57), Max: 99.2 (30 Apr 2022 21:57)  HR: 86 (30 Apr 2022 21:57) (84 - 86)  BP: 132/62 (30 Apr 2022 21:57) (132/62 - 133/67)  BP(mean): --  RR: 18 (30 Apr 2022 21:57) (16 - 18)  SpO2: 97% (30 Apr 2022 21:57) (97% - 99%)    GENERAL: NAD, lying comfortably in bed  HEAD: Atraumatic, normocephalic  EYES: EOMI b/l PERRLA b/l, conjunctiva and sclera clear  NECK: Supple, No JVD, No LAD  RESPIRATORY: Normal respiratory effort; lungs are clear to auscultation bilaterally  CARDIOVASCULAR: Regular rate and rhythm, normal S1 and S2, no murmur/rub/gallop; No lower extremity edema  ABDOMEN: Nontender, normoactive bowel sounds, no rebound/guarding; No hepatosplenomegaly  MUSCULOSKELETAL: no clubbing or cyanosis of digits; no joint swelling or tenderness to palpation  NEURO: Non focal   PSYCH: A+O to person, place, and time; affect appropriate    LABS:                        10.4   18.13 )-----------( 297      ( 30 Apr 2022 07:27 )             31.7     04-30    129<L>  |  99  |  38<H>  ----------------------------<  142<H>  4.2   |  21<L>  |  0.94    Ca    8.6      30 Apr 2022 07:27  Phos  2.0     04-30  Mg     2.30     04-30                Culture - Blood (collected 29 Apr 2022 23:07)  Source: .Blood Blood-Venous  Preliminary Report (01 May 2022 01:02):    No growth to date.    Culture - Blood (collected 29 Apr 2022 18:20)  Source: .Blood Blood  Gram Stain (30 Apr 2022 15:39):    Growth in aerobic bottle: Gram Positive Cocci in Clusters    Growth in anaerobic bottle: Gram Positive Cocci in Clusters  Preliminary Report (30 Apr 2022 15:40):    Growth in aerobic bottle: Gram Positive Cocci in Clusters    Growth in anaerobic bottle: Gram Positive Cocci in Clusters       PROGRESS NOTE:   Authored by Dr. Shy Glaser    Patient is a 76y old  Female who presents with a chief complaint of pelvic pain (30 Apr 2022 06:56)      SUBJECTIVE / OVERNIGHT EVENTS: No overnight events. Patient reported that the pain is "ok" when she doesn't move but feels severe pain when moving.     ADDITIONAL REVIEW OF SYSTEMS:  Review of Systems:  Constitutional: No fever, No weight loss, good appetite/po intake  Head: No headache   Eyes: No blurry vision, No diplopia  Neuro: No tremors, No muscle weakness   Cardiovascular: No chest pain, No palpitations  Respiratory: No SOB, No cough  GI: No nausea, No vomiting, No diarrhea  : No dysuria, No hematuria  Skin: No rash  MSK: + hip and knee pain  Psych: No depression      MEDICATIONS  (STANDING):  bisacodyl 5 milliGRAM(s) Oral at bedtime  ceFAZolin   IVPB      ceFAZolin   IVPB 2000 milliGRAM(s) IV Intermittent every 12 hours  DULoxetine 60 milliGRAM(s) Oral daily  famotidine    Tablet 20 milliGRAM(s) Oral daily  fenofibrate Tablet 48 milliGRAM(s) Oral daily  gabapentin 300 milliGRAM(s) Oral two times a day  levothyroxine 125 MICROGram(s) Oral daily  meclizine 12.5 milliGRAM(s) Oral two times a day  polyethylene glycol 3350 17 Gram(s) Oral two times a day  rOPINIRole 5 milliGRAM(s) Oral at bedtime  senna 2 Tablet(s) Oral at bedtime    MEDICATIONS  (PRN):  ondansetron Injectable 4 milliGRAM(s) IV Push four times a day PRN Nausea and/or Vomiting  oxyCODONE    IR 5 milliGRAM(s) Oral every 6 hours PRN moderate to severe pain      CAPILLARY BLOOD GLUCOSE        I&O's Summary    29 Apr 2022 07:01  -  30 Apr 2022 07:00  --------------------------------------------------------  IN: 1980 mL / OUT: 3200 mL / NET: -1220 mL    30 Apr 2022 07:01  -  01 May 2022 06:55  --------------------------------------------------------  IN: 1300 mL / OUT: 800 mL / NET: 500 mL        PHYSICAL EXAM:  Vital Signs Last 24 Hrs  T(C): 37.3 (30 Apr 2022 21:57), Max: 37.3 (30 Apr 2022 21:57)  T(F): 99.2 (30 Apr 2022 21:57), Max: 99.2 (30 Apr 2022 21:57)  HR: 86 (30 Apr 2022 21:57) (84 - 86)  BP: 132/62 (30 Apr 2022 21:57) (132/62 - 133/67)  BP(mean): --  RR: 18 (30 Apr 2022 21:57) (16 - 18)  SpO2: 97% (30 Apr 2022 21:57) (97% - 99%)    GENERAL: NAD, lying comfortably in bed  HEAD: Atraumatic, normocephalic  EYES: EOMI b/l PERRLA b/l, conjunctiva and sclera clear  NECK: Supple, No JVD, No LAD  RESPIRATORY: Normal respiratory effort; lungs are clear to auscultation bilaterally  CARDIOVASCULAR: Regular rate and rhythm, normal S1 and S2, no murmur/rub/gallop; No lower extremity edema  ABDOMEN: Nontender, normoactive bowel sounds, no rebound/guarding; No hepatosplenomegaly  MUSCULOSKELETAL: no clubbing or cyanosis of digits; + tenderness in R hip/ pelvic area  NEURO: Non focal   PSYCH: A+O to person, place, and time; affect appropriate  Skin: small ecchymosis in R abdominal fold, bruises in arms bilaterally    LABS:                        10.4   18.13 )-----------( 297      ( 30 Apr 2022 07:27 )             31.7     04-30    129<L>  |  99  |  38<H>  ----------------------------<  142<H>  4.2   |  21<L>  |  0.94    Ca    8.6      30 Apr 2022 07:27  Phos  2.0     04-30  Mg     2.30     04-30                Culture - Blood (collected 29 Apr 2022 23:07)  Source: .Blood Blood-Venous  Preliminary Report (01 May 2022 01:02):    No growth to date.    Culture - Blood (collected 29 Apr 2022 18:20)  Source: .Blood Blood  Gram Stain (30 Apr 2022 15:39):    Growth in aerobic bottle: Gram Positive Cocci in Clusters    Growth in anaerobic bottle: Gram Positive Cocci in Clusters  Preliminary Report (30 Apr 2022 15:40):    Growth in aerobic bottle: Gram Positive Cocci in Clusters    Growth in anaerobic bottle: Gram Positive Cocci in Clusters

## 2022-05-01 NOTE — DISCHARGE NOTE PROVIDER - NSDCMRMEDTOKEN_GEN_ALL_CORE_FT
Actonel 35 mg oral tablet: 1 tab(s) orally once a week  CeleBREX 200 mg oral capsule: 1 cap(s) orally once a day  Cymbalta 60 mg oral delayed release capsule: 1 cap(s) orally once a day  fenofibrate 145 mg oral tablet: 1 tab(s) orally once a day  gabapentin 300 mg oral capsule: 1 cap(s) orally 2 times a day  levothyroxine 125 mcg (0.125 mg) oral tablet: 1 tab(s) orally once a day  meclizine 12.5 mg oral tablet: 1 tab(s) orally 2 times a day  Protonix 40 mg oral delayed release tablet: 1 tab(s) orally once a day  rOPINIRole 5 mg oral tablet: 1 tab(s) orally once a day (at bedtime)  spironolactone 50 mg oral tablet: 1 tab(s) orally once a day  traMADol 100 mg/24 hours oral tablet, extended release: 1 tab(s) orally once a day   Actonel 35 mg oral tablet: 1 tab(s) orally once a week  bisacodyl 5 mg oral delayed release tablet: 1 tab(s) orally once a day (at bedtime)  ceFAZolin: 2 gram(s) intravenous every 8 hours  CeleBREX 200 mg oral capsule: 1 cap(s) orally once a day  Cymbalta 60 mg oral delayed release capsule: 1 cap(s) orally once a day  famotidine 20 mg oral tablet: 1 tab(s) orally 2 times a day  fenofibrate 145 mg oral tablet: 1 tab(s) orally once a day  fluticasone 50 mcg/inh nasal spray: 1 spray(s) nasal 2 times a day  gabapentin 300 mg oral capsule: 1 cap(s) orally 2 times a day  levothyroxine 125 mcg (0.125 mg) oral tablet: 1 tab(s) orally once a day  meclizine 12.5 mg oral tablet: 1 tab(s) orally 2 times a day  nystatin 100,000 units/g topical powder: 1 application topically 2 times a day  oxyCODONE 5 mg oral tablet: 1 tab(s) orally every 6 hours, As needed, Severe Pain (7 - 10)  polyethylene glycol 3350 oral powder for reconstitution: 17 gram(s) orally 2 times a day  rOPINIRole 5 mg oral tablet: 1 tab(s) orally once a day (at bedtime)  senna oral tablet: 2 tab(s) orally once a day (at bedtime)  sodium chloride 0.65% nasal spray: nasal 2 times a day  traMADol 100 mg/24 hours oral tablet, extended release: 1 tab(s) orally once a day   Actonel 35 mg oral tablet: 1 tab(s) orally once a week  bisacodyl 5 mg oral delayed release tablet: 1 tab(s) orally once a day (at bedtime)  ceFAZolin: 2 gram(s) intravenous every 8 hours  Cymbalta 60 mg oral delayed release capsule: 1 cap(s) orally once a day  famotidine 20 mg oral tablet: 1 tab(s) orally 2 times a day  fenofibrate 145 mg oral tablet: 1 tab(s) orally once a day  fluticasone 50 mcg/inh nasal spray: 1 spray(s) nasal 2 times a day  gabapentin 300 mg oral capsule: 1 cap(s) orally 2 times a day  levothyroxine 125 mcg (0.125 mg) oral tablet: 1 tab(s) orally once a day  meclizine 12.5 mg oral tablet: 1 tab(s) orally 2 times a day  nystatin 100,000 units/g topical powder: 1 application topically 2 times a day  oxyCODONE 5 mg oral tablet: 1 tab(s) orally every 6 hours, As needed, Severe Pain (7 - 10)  polyethylene glycol 3350 oral powder for reconstitution: 17 gram(s) orally 2 times a day  rOPINIRole 5 mg oral tablet: 1 tab(s) orally once a day (at bedtime)  senna oral tablet: 2 tab(s) orally once a day (at bedtime)  sodium chloride 0.65% nasal spray: nasal 2 times a day  traMADol 100 mg/24 hours oral tablet, extended release: 1 tab(s) orally once a day

## 2022-05-01 NOTE — PROGRESS NOTE ADULT - PROBLEM SELECTOR PLAN 6
- Urine studies consistent with hypovolemia, consistent with history given vomiting and KATHERINE - Urine studies consistent with hypovolemia, consistent with history given vomiting and KATHERINE  - Could have a component of pain too Patient takes spironolactone at home. Held on admission due to KATHERINE   - BPs within goal during admission  - continue to hold iso HypoNa

## 2022-05-01 NOTE — PROGRESS NOTE ADULT - PROBLEM SELECTOR PLAN 4
- On 4/29 had not had bowel movement x5 days  - Gave enema x2, miralax, senna, dulcolax, lactulose  - Had one bowel movement last night  - Will titrate bowel regimen to 1 soft bowel movement daily - continue miralax, senna, dulcolax for now given she is taking pain medications - On 4/29 had not had bowel movement x5 days  - Gave enema x2, Miralax, senna, dulcolax, lactulose  - Had one bowel movement last night  - Will titrate bowel regimen to 1 soft bowel movement daily - continue Miralax, senna, dulcolax for now given she is taking pain medications  - Patient requesting enema as she can't take Miralax because it makes her nausea and vomit.

## 2022-05-01 NOTE — PROGRESS NOTE ADULT - PROBLEM SELECTOR PLAN 3
- possible MSSA source?    #Hip joint effusion, right  - concern for septic joint  - seen on POCUS  - not well visualized on US, but overlying fluid collection [id number] is seen  - pending MRI

## 2022-05-01 NOTE — DISCHARGE NOTE PROVIDER - HOSPITAL COURSE
STARTED FOR DOCUMENTATION    HPI:  77 y/o F with pmh of SVT s/p ablation, HTN, HLD, hypothyroidism, vertigo, peripheral neuropathy, osteoarthritis, cervical ca p/w R sided groin/pelvic pain.  Pt reports pain starting 3 days ago described as severe pain that is dull in quality.  Pain makes it difficult to ambulate and bear weight.  Pt has been walking with cane and dragging R leg.  Pt reports she saw her PMD who performed hip joint injection without relief.  Pt reports walking without her cane for a few days prior to onset of symptoms.  She reports no falls or other injury.  She has otherwise felt at baseline without fevers, chills, cough, headache, abd pain, or n/v/d.      In the ED, pt had temp of 101.2 and leukocytosis to 31.  She was given morphine and Dilaudid for pain, and empiric vancomycin and Zosyn.  (27 Apr 2022 23:59)      HOSPITAL COURSE  During admission the patient was evaluated by Ortho service which determined there was no indication for intervention. Fluid was seen around the hip joint on POCUS and US was ordered which was a limited study. MRI was ordered which showed......  IR was consulted to assess if tap would be possible; they determined that the amount of fluid was minor and that a tap was not indicated. During admission blood cultures were obtained as patient presented with leukocytosis and had a documented episode of fever; she was started on Vanco empirically. BCx grew MSSA and she was switched on Cefazolin. Additionally, the patient required a bowel regimen for history of constipation for 5 days, during admission she continued to have BM. PT evaluated her and recommended discharge to rehab facility. She complained of worsening R knee pain and and Xray was performed which showed.....TTE was performed due to concern of endocarditis as possible source of infection; results were unremarkable..... STARTED FOR DOCUMENTATION    HPI:  77 y/o F with pmh of SVT s/p ablation, HTN, HLD, hypothyroidism, vertigo, peripheral neuropathy, osteoarthritis, cervical ca p/w R sided groin/pelvic pain.  Pt reports pain starting 3 days ago described as severe pain that is dull in quality.  Pain makes it difficult to ambulate and bear weight.  Pt has been walking with cane and dragging R leg.  Pt reports she saw her PMD who performed hip joint injection without relief.  Pt reports walking without her cane for a few days prior to onset of symptoms.  She reports no falls or other injury.  She has otherwise felt at baseline without fevers, chills, cough, headache, abd pain, or n/v/d.      In the ED, pt had temp of 101.2 and leukocytosis to 31.  She was given morphine and Dilaudid for pain, and empiric vancomycin and Zosyn.  (27 Apr 2022 23:59)      HOSPITAL COURSE  During admission the patient was evaluated by Ortho service which determined there was no indication for intervention. Fluid was seen around the hip joint on POCUS and US was ordered which was a limited study. MRI was ordered which showed......  IR was consulted to assess if tap would be possible; they determined that the amount of fluid was minor and that a tap was not indicated. During admission blood cultures were obtained as patient presented with leukocytosis and had a documented episode of fever; she was started on Vanco empirically. BCx grew MSSA and she was switched on Cefazolin. Additionally, the patient required a bowel regimen for history of constipation for 5 days, during admission she continued to have BM. PT evaluated her and recommended discharge to rehab facility. She complained of worsening R knee pain and and Xray was performed which showed.....TTE was performed due to concern of endocarditis as possible source of infection; results were unremarkable. SHAUN was also done which also showed no signs of vegetation. MR confirmed no joint infection or osteomyelitis.  Plan for cefazolin 2 gm iv q 8 h  thru 5/29 with cbc, bmp weekly to Dr Derrell khan, STARTED FOR DOCUMENTATION    HPI:  77 y/o F with pmh of SVT s/p ablation, HTN, HLD, hypothyroidism, vertigo, peripheral neuropathy, osteoarthritis, cervical ca p/w R sided groin/pelvic pain.  Pt reports pain starting 3 days ago described as severe pain that is dull in quality.  Pain makes it difficult to ambulate and bear weight.  Pt has been walking with cane and dragging R leg.  Pt reports she saw her PMD who performed hip joint injection without relief.  Pt reports walking without her cane for a few days prior to onset of symptoms.  She reports no falls or other injury.  She has otherwise felt at baseline without fevers, chills, cough, headache, abd pain, or n/v/d.      In the ED, pt had temp of 101.2 and leukocytosis to 31.  She was given morphine and Dilaudid for pain, and empiric vancomycin and Zosyn.  (27 Apr 2022 23:59)      HOSPITAL COURSE  During admission the patient was evaluated by Ortho service which determined there was no indication for intervention. Fluid was seen around the hip joint on POCUS and US was ordered which was a limited study. Initial MRI without contrast was ordered which showed Acute mildly displaced fractures of the right superior and inferior pubic ramus with surrounding myofascial edema and small rim-enhancing hematomas within the right adductor musculature and within the right obturator internus. Acute nondisplaced right sacral alar fracture. No right hip joint effusion. Moderate right greater trochanteric bursitis. No Foci of underlying infection. IR was consulted to assess if tap would be possible; they determined that the amount of fluid was minor and that a tap was not indicated. During admission blood cultures were obtained as patient presented with leukocytosis and had a documented episode of fever; she was started on Vanco empirically. BCx grew MSSA and she was switched on Cefazolin. Additionally, the patient required a bowel regimen for history of constipation for 5 days, during admission she continued to have BM. PT evaluated her and recommended discharge to rehab facility. She complained of R knee pain which resolved within a few days of continued PT and pain medication. TTE was performed due to concern of endocarditis as possible source of infection; results were unremarkable. SHAUN was also done which also showed no signs of vegetation. Repeat MR w/contrast confirmed no joint infection or osteomyelitis.  Plan for cefazolin 2 gm iv q 8 h  thru 5/29 with cbc, bmp weekly to Dr Dove fax,

## 2022-05-01 NOTE — PROGRESS NOTE ADULT - ATTENDING COMMENTS
76 y/F PMH of  SVT s/p ablation, HTN, HLD, hypothyroidism, vertigo, peripheral neuropathy, osteoarthritis, cervical ca p/w R sided groin/pelvic pain and found to have fx for R superior and inferior pubic rami with hematoma of R obturator internus muscle c/b sepsis 2/2 gram positive bacteremia   Was  dizzy with episodes of vomiting, Was given IVF   Last  bowel movement yesterday     #Sepsis with MSSA  Bacteremia with  concern for septic arthritis of right  hip   (Pt had injection in the right hip by primary orthopedist Dr. Jordan ~1 week ago, unclear if this was superficial or intra-articular.)  Leukocytosis improving, Fever of 100.8 overnight   BC 4/27- MSSA. Repeat 4/29- 1/2 GPC   CXR clear, UA neg. ESR of 77  ECHO- Unable to exclude endocarditis.   Will order SHAUN as repeat BC still positive  On Cefazolin 2 gm Q 12h   Appreciate Ortho- Advise MRI hip and IR consult if presence of effusion  Appreciate ID recs- Continue Cefazaolin, FU MRI Hip, repeat blood culture to assess for clearance,     #Pelvic pain: 2/2 fracture of R superior and inferior pubic rami with hematoma of R obturator internus muscle - Fracture likely related to underlying osteopenia, no recent falls  No acute intervention as per ortho recs.  Pain control- Oxy IR 5mg Q 6 PRN, continue  bowel regimen. Block in place for now    # Hematoma of right obturator post fall- HH stable.   Monitor trend    #KATHERINE-likely pre-renal. Improvement in creatinine with trial of IVF. F/u urine lytes. Avoid nephrotoxins.     # Hyponatremia: hypovolemic hyponatremia- Improving - Other possibility is SIADH due to nausea/pain or medication Duloxetine  U NA of 20 consistent with hypovolemia. Receiving 500cs IVF today given vomiting this AM     #Nausea: related to GERD/PUD, taken off long standing protonix. On Famotidine    #H/o Peripheral Neuropathy- On Gabapentin, Cymbalta    #DVT Prox- On hold given fall/hematoma. Consider restarting in 1-2 days if HH stable. 76 y/F PMH of  SVT s/p ablation, HTN, HLD, hypothyroidism, vertigo, peripheral neuropathy, osteoarthritis, cervical ca p/w R sided groin/pelvic pain and found to have fx for R superior and inferior pubic rami with hematoma of R obturator internus muscle c/b sepsis 2/2 gram positive bacteremia     Was  dizzy with episodes of vomiting yesterday. Asking for her home med Antivert, Last  bowel movement yesterday     #Sepsis with MSSA  Bacteremia with  concern for septic arthritis of right  hip   (Pt had injection in the right hip by primary orthopedist Dr. Jordan ~1 week ago, unclear if this was superficial or intra-articular.)  Leukocytosis improving, Fever of 100.8 overnight   BC 4/27- MSSA. Repeat 4/29- 1/2 GPC   CXR clear, UA neg. ESR of 77  ECHO- Unable to exclude endocarditis.   Will order SHAUN as repeat BC still positive  On Cefazolin 2 gm Q 12h   Appreciate Ortho- Advise MRI hip and IR consult if presence of effusion  Appreciate ID recs- Continue Cefazaolin, FU MRI Hip, repeat blood culture to assess for clearance,     #Pelvic pain: 2/2 fracture of R superior and inferior pubic rami with hematoma of R obturator internus muscle - Fracture likely related to underlying osteopenia, no recent falls  No acute intervention as per ortho recs.  Pain control- Oxy IR 5mg Q 6 PRN, continue  bowel regimen. Block in place for now    # Hematoma of right obturator post fall- HH stable.   Monitor trend    #KATHERINE-likely pre-renal. Improvement in creatinine with trial of IVF. F/u urine lytes. Avoid nephrotoxins.   Block in place- Plan for  TOV.    # Hyponatremia: hypovolemic hyponatremia- Improving - Other possibility is SIADH due to nausea/pain or medication Duloxetine  U NA of 20 consistent with hypovolemia. Receiving 500cs IVF today given vomiting this AM     #Nausea: related to GERD/PUD, taken off long standing protonix. On Famotidine    #H/o Peripheral Neuropathy- On Gabapentin, Cymbalta    #DVT Prox- On hold given fall/hematoma. Consider restarting in 1-2 days if HH stable.

## 2022-05-01 NOTE — PROGRESS NOTE ADULT - PROBLEM SELECTOR PLAN 8
- continue synthroid  - check TSH: WNL - continue synthroid  - TSH: WNL DVT: scd iso hematoma  diet: DASH, regular    PT consulted: Rehab

## 2022-05-01 NOTE — PROGRESS NOTE ADULT - PROBLEM SELECTOR PLAN 1
- Continue cefazolin  - ID consult  -- BCx  ------ 4/27: MSSA | 4/29: Gram + cocci in clusters  - TTE no vegetations, consideration for SHAUN given concern for endocarditis  - pending MRI right hip given concern for septic joint vs infected hematoma  - XR left knee given new/worse pain (has baseline arthritis in left knee, but states right is usually more painful and today left is more painful) - Continue cefazolin  - ID consult  -- BCx: 4/27: MSSA | 4/29: Gram + cocci in clusters  - TTE no vegetations, consideration for SHAUN given concern for endocarditis  - pending MRI right hip given concern for septic joint vs infected hematoma  - XR left knee given new/worse pain (has baseline arthritis in left knee, but states right is usually more painful and today left is more painful) - Continue cefazolin  - ID consult  -- BCx: 4/27: MSSA | 4/29: Gram + cocci in clusters | repeat BCx on 5/2  - TTE no vegetations; SHAUN ordered  - pending MRI right hip given concern for septic joint vs infected hematoma  - XR left knee given new/worse pain (has baseline arthritis in left knee, but states right is usually more painful and today left is more painful)

## 2022-05-01 NOTE — DISCHARGE NOTE PROVIDER - CARE PROVIDER_API CALL
Jose R Chong (MD)  EndocrinologyMetabDiabetes; Internal Medicine  [address], [address]  [place] [address]  Phone: [phone]  Fax: [phone]  Follow Up Time:

## 2022-05-01 NOTE — DISCHARGE NOTE PROVIDER - NSDCCPTREATMENT_GEN_ALL_CORE_FT
PRINCIPAL PROCEDURE  Procedure: MR pelvis  Findings and Treatment: Findings:  Bones: Mildly displaced acute fractures of the right superior and   inferior pubic rami with adjacent myofascial edema and small   rim-enhancing hematomas within the right adductor compartment and within   the right obturator internus. For reference, right obturator internus   hematoma measures approximately 3.7 x 1.5 cm transaxially. Acute   nondisplaced fracture of the right sacral ala.  Joints: Mild chondral wear within bilateral hip joint spaces. No   significant hip joint effusions. Degenerative changes within the   visualized lower lumbar spine and left sacroiliac joint. Mild pubic   symphyseal arthrosis.  Soft tissues: There is myofascial edema with small rim-enhancing   hematomas within the right adductor compartment and within the right   obturator internus muscle. Moderate insertional tendinosis of the right   gluteus minimus. Mild insertional tendinosis of the right gluteus medius   with low-grade partial tearing. Mild bilateral hamstring tendon origin   tendinosis. Moderate right greater trochanteric bursitis.  Neurovascular structures are unremarkable.  Colonic diverticulosis noted. Multiple surgical clips noted throughout   the pelvis.  Impression:  Acute mildly displaced fractures of the right superior and inferior pubic   ramus with surrounding myofascial edema and small rim-enhancing hematomas   within the right adductor musculature and within the right obturator   internus. Overall appearance is similar to the prior study. Given the   acuity of these fractures, superimposed infection within these regions is   considered unlikely.  Acute nondisplaced right sacral alar fracture.  No right hip joint effusion.  Moderate right greater trochanteric bursitis.  Other findings as above.        SECONDARY PROCEDURE  Procedure: Transesophageal echocardiogram (SHAUN)  Findings and Treatment: OBSERVATIONS:  Mitral Valve: Mitral annular calcification, otherwise  normal mitral valve.  No vegetations seen in association  with the mitral valve. Minimal mitral regurgitation.  Aortic Root: Normal aortic root.  Mild non-mobile  atherosclerotic plaque in the aortic arch and descending  thoracic aorta.  Aortic Valve: Calcified trileaflet aortic valve with normal  opening.  No vegetations seen in association with the  aortic valve. No aortic valve regurgitation seen.  Left Atrium: Normal left atrium.  No left atrial or left  atrial appendage thrombus.  Left Ventricle: Normal left ventricular systolic function.  No segmental wall motion abnormalities.  Right Heart: Normal right atrium. Normal right ventricular  size and function. Normal tricuspid valve.  No vegetations  seen in association with the tricuspid valve.  Minimal  tricuspid regurgitation. Normal pulmonic valve.  No  vegetations seen in association with the pulmonic valve.  Mild pulmonic regurgitation.  Pericardium/PleuraNormal pericardium with no pericardial  effusion.  ------------------------------------------------------------------------  CONCLUSIONS:  1. Mitral annular calcification, otherwise normal mitral  valve.  No vegetations seen in association with the mitral  valve. Minimal mitral regurgitation.  2. Calcified trileaflet aortic valve with normal opening.  No vegetations seen in association with the aortic valve.  No aortic valve regurgitation seen.  3. Normal aortic root.  Mild non-mobile atherosclerotic  plaque in the aortic arch and descending thoracic aorta.  4. Normal left atrium.  No left atrial or left atrial  appendage thrombus.  5. Normal left ventricular systolic function. No segmental  wall motion abnormalities.  6.Normal right ventricular size and function.  7. Contrast injection demonstrates no evidence of a patent  foramen ovale.  No valvular vegetations were identified.

## 2022-05-01 NOTE — PROGRESS NOTE ADULT - PROBLEM SELECTOR PLAN 2
- no acute orthopedic intervention  - will reduce oxycodone to 5mg q6h prn  - PT eval - no acute orthopedic intervention  - will reduce oxycodone to 5mg q6h prn  - PT eval: Rehab

## 2022-05-02 LAB
ANION GAP SERPL CALC-SCNC: 10 MMOL/L — SIGNIFICANT CHANGE UP (ref 7–14)
BASOPHILS # BLD AUTO: 0.05 K/UL — SIGNIFICANT CHANGE UP (ref 0–0.2)
BASOPHILS NFR BLD AUTO: 0.3 % — SIGNIFICANT CHANGE UP (ref 0–2)
BUN SERPL-MCNC: 18 MG/DL — SIGNIFICANT CHANGE UP (ref 7–23)
CALCIUM SERPL-MCNC: 8.9 MG/DL — SIGNIFICANT CHANGE UP (ref 8.4–10.5)
CHLORIDE SERPL-SCNC: 98 MMOL/L — SIGNIFICANT CHANGE UP (ref 98–107)
CO2 SERPL-SCNC: 22 MMOL/L — SIGNIFICANT CHANGE UP (ref 22–31)
CREAT SERPL-MCNC: 0.62 MG/DL — SIGNIFICANT CHANGE UP (ref 0.5–1.3)
EGFR: 92 ML/MIN/1.73M2 — SIGNIFICANT CHANGE UP
EOSINOPHIL # BLD AUTO: 0.2 K/UL — SIGNIFICANT CHANGE UP (ref 0–0.5)
EOSINOPHIL NFR BLD AUTO: 1.1 % — SIGNIFICANT CHANGE UP (ref 0–6)
GLUCOSE SERPL-MCNC: 133 MG/DL — HIGH (ref 70–99)
HCT VFR BLD CALC: 30.5 % — LOW (ref 34.5–45)
HGB BLD-MCNC: 10.4 G/DL — LOW (ref 11.5–15.5)
IANC: 13.74 K/UL — HIGH (ref 1.8–7.4)
IMM GRANULOCYTES NFR BLD AUTO: 1.3 % — SIGNIFICANT CHANGE UP (ref 0–1.5)
LYMPHOCYTES # BLD AUTO: 11.7 % — LOW (ref 13–44)
LYMPHOCYTES # BLD AUTO: 2.05 K/UL — SIGNIFICANT CHANGE UP (ref 1–3.3)
MAGNESIUM SERPL-MCNC: 1.8 MG/DL — SIGNIFICANT CHANGE UP (ref 1.6–2.6)
MCHC RBC-ENTMCNC: 27.9 PG — SIGNIFICANT CHANGE UP (ref 27–34)
MCHC RBC-ENTMCNC: 34.1 GM/DL — SIGNIFICANT CHANGE UP (ref 32–36)
MCV RBC AUTO: 81.8 FL — SIGNIFICANT CHANGE UP (ref 80–100)
MONOCYTES # BLD AUTO: 1.25 K/UL — HIGH (ref 0–0.9)
MONOCYTES NFR BLD AUTO: 7.1 % — SIGNIFICANT CHANGE UP (ref 2–14)
NEUTROPHILS # BLD AUTO: 13.74 K/UL — HIGH (ref 1.8–7.4)
NEUTROPHILS NFR BLD AUTO: 78.5 % — HIGH (ref 43–77)
NRBC # BLD: 0 /100 WBCS — SIGNIFICANT CHANGE UP
NRBC # FLD: 0 K/UL — SIGNIFICANT CHANGE UP
PHOSPHATE SERPL-MCNC: 2.6 MG/DL — SIGNIFICANT CHANGE UP (ref 2.5–4.5)
PLATELET # BLD AUTO: 332 K/UL — SIGNIFICANT CHANGE UP (ref 150–400)
POTASSIUM SERPL-MCNC: 4.1 MMOL/L — SIGNIFICANT CHANGE UP (ref 3.5–5.3)
POTASSIUM SERPL-SCNC: 4.1 MMOL/L — SIGNIFICANT CHANGE UP (ref 3.5–5.3)
RBC # BLD: 3.73 M/UL — LOW (ref 3.8–5.2)
RBC # FLD: 15.1 % — HIGH (ref 10.3–14.5)
SARS-COV-2 RNA SPEC QL NAA+PROBE: SIGNIFICANT CHANGE UP
SODIUM SERPL-SCNC: 130 MMOL/L — LOW (ref 135–145)
WBC # BLD: 17.52 K/UL — HIGH (ref 3.8–10.5)
WBC # FLD AUTO: 17.52 K/UL — HIGH (ref 3.8–10.5)

## 2022-05-02 PROCEDURE — 99232 SBSQ HOSP IP/OBS MODERATE 35: CPT

## 2022-05-02 PROCEDURE — 99232 SBSQ HOSP IP/OBS MODERATE 35: CPT | Mod: GC

## 2022-05-02 PROCEDURE — 73721 MRI JNT OF LWR EXTRE W/O DYE: CPT | Mod: 26,RT

## 2022-05-02 RX ORDER — HYDROCORTISONE 20 MG
200 TABLET ORAL ONCE
Refills: 0 | Status: DISCONTINUED | OUTPATIENT
Start: 2022-05-02 | End: 2022-05-04

## 2022-05-02 RX ORDER — DIPHENHYDRAMINE HCL 50 MG
50 CAPSULE ORAL ONCE
Refills: 0 | Status: DISCONTINUED | OUTPATIENT
Start: 2022-05-02 | End: 2022-05-04

## 2022-05-02 RX ORDER — MORPHINE SULFATE 50 MG/1
1 CAPSULE, EXTENDED RELEASE ORAL ONCE
Refills: 0 | Status: DISCONTINUED | OUTPATIENT
Start: 2022-05-02 | End: 2022-05-02

## 2022-05-02 RX ORDER — ACETAMINOPHEN 500 MG
650 TABLET ORAL ONCE
Refills: 0 | Status: COMPLETED | OUTPATIENT
Start: 2022-05-02 | End: 2022-05-02

## 2022-05-02 RX ORDER — MORPHINE SULFATE 50 MG/1
2.5 CAPSULE, EXTENDED RELEASE ORAL ONCE
Refills: 0 | Status: DISCONTINUED | OUTPATIENT
Start: 2022-05-02 | End: 2022-05-02

## 2022-05-02 RX ADMIN — OXYCODONE HYDROCHLORIDE 5 MILLIGRAM(S): 5 TABLET ORAL at 17:08

## 2022-05-02 RX ADMIN — Medication 650 MILLIGRAM(S): at 15:03

## 2022-05-02 RX ADMIN — Medication 125 MICROGRAM(S): at 05:14

## 2022-05-02 RX ADMIN — ROPINIROLE 5 MILLIGRAM(S): 8 TABLET, FILM COATED, EXTENDED RELEASE ORAL at 21:58

## 2022-05-02 RX ADMIN — GABAPENTIN 300 MILLIGRAM(S): 400 CAPSULE ORAL at 05:14

## 2022-05-02 RX ADMIN — Medication 100 MILLIGRAM(S): at 15:04

## 2022-05-02 RX ADMIN — OXYCODONE HYDROCHLORIDE 5 MILLIGRAM(S): 5 TABLET ORAL at 18:08

## 2022-05-02 RX ADMIN — Medication 12.5 MILLIGRAM(S): at 05:15

## 2022-05-02 RX ADMIN — OXYCODONE HYDROCHLORIDE 5 MILLIGRAM(S): 5 TABLET ORAL at 12:55

## 2022-05-02 RX ADMIN — OXYCODONE HYDROCHLORIDE 5 MILLIGRAM(S): 5 TABLET ORAL at 00:00

## 2022-05-02 RX ADMIN — DULOXETINE HYDROCHLORIDE 60 MILLIGRAM(S): 30 CAPSULE, DELAYED RELEASE ORAL at 11:55

## 2022-05-02 RX ADMIN — Medication 100 MILLIGRAM(S): at 05:17

## 2022-05-02 RX ADMIN — GABAPENTIN 300 MILLIGRAM(S): 400 CAPSULE ORAL at 17:08

## 2022-05-02 RX ADMIN — FAMOTIDINE 20 MILLIGRAM(S): 10 INJECTION INTRAVENOUS at 11:55

## 2022-05-02 RX ADMIN — OXYCODONE HYDROCHLORIDE 5 MILLIGRAM(S): 5 TABLET ORAL at 05:50

## 2022-05-02 RX ADMIN — Medication 100 MILLIGRAM(S): at 17:08

## 2022-05-02 RX ADMIN — OXYCODONE HYDROCHLORIDE 5 MILLIGRAM(S): 5 TABLET ORAL at 23:19

## 2022-05-02 RX ADMIN — SENNA PLUS 2 TABLET(S): 8.6 TABLET ORAL at 21:58

## 2022-05-02 RX ADMIN — OXYCODONE HYDROCHLORIDE 5 MILLIGRAM(S): 5 TABLET ORAL at 05:13

## 2022-05-02 RX ADMIN — Medication 5 MILLIGRAM(S): at 21:58

## 2022-05-02 RX ADMIN — Medication 12.5 MILLIGRAM(S): at 17:09

## 2022-05-02 RX ADMIN — OXYCODONE HYDROCHLORIDE 5 MILLIGRAM(S): 5 TABLET ORAL at 11:55

## 2022-05-02 RX ADMIN — Medication 650 MILLIGRAM(S): at 15:24

## 2022-05-02 RX ADMIN — OXYCODONE HYDROCHLORIDE 5 MILLIGRAM(S): 5 TABLET ORAL at 23:50

## 2022-05-02 RX ADMIN — Medication 48 MILLIGRAM(S): at 11:55

## 2022-05-02 RX ADMIN — POLYETHYLENE GLYCOL 3350 17 GRAM(S): 17 POWDER, FOR SOLUTION ORAL at 17:08

## 2022-05-02 NOTE — PROGRESS NOTE ADULT - PROBLEM SELECTOR PLAN 5
- Urine studies consistent with hypovolemia, consistent with history given vomiting and KATHERINE  - Could have a component of pain too  - Ordered new urine studies  - Patient continues to experience dizziness, nausea and vomiting  ---- changed Antivert to standing (she takes it standing at home)  ---- ordered 500cc NS @85cc/hr  ---- Zofran as nausea/vomiting can be related to HypoNa - Urine studies consistent with hypovolemia, consistent with history given vomiting and KATHERINE  - Responded well to IVF  - Patient continues to experience dizziness, nausea and vomiting  - Continue Antivert home medication.

## 2022-05-02 NOTE — PROGRESS NOTE ADULT - PROBLEM SELECTOR PLAN 3
- possible MSSA source?    #Hip joint effusion, right  - concern for septic joint  - seen on POCUS  - not well visualized on US, but overlying fluid collection [id number] is seen  - pending MRI - possible MSSA source?  - Hip joint effusion, right  - concern for septic joint  - seen on POCUS  - not well visualized on US, but overlying fluid collection [id number] is seen  - pending MRI with pedro contrast. Will f/u non contrast study in the mean time.

## 2022-05-02 NOTE — PROGRESS NOTE ADULT - SUBJECTIVE AND OBJECTIVE BOX
Follow Up:  MSSA bacteremia    Interval History/ROS:  MRI done.      right groin pain controlled  knox removed    Allergies  erythromycin (Hives)  IV Contrast (Anaphylaxis)        ANTIMICROBIALS: ceFAZolin   IVPB    ceFAZolin   IVPB 2000 every 12 hours        OTHER MEDS:  bisacodyl 5 milliGRAM(s) Oral once  bisacodyl Suppository 10 milliGRAM(s) Rectal once  DULoxetine 60 milliGRAM(s) Oral daily  famotidine    Tablet 20 milliGRAM(s) Oral daily  fenofibrate Tablet 48 milliGRAM(s) Oral daily  gabapentin 300 milliGRAM(s) Oral two times a day  lactulose Syrup 30 Gram(s) Oral once  levothyroxine 125 MICROGram(s) Oral daily  meclizine 12.5 milliGRAM(s) Oral two times a day PRN  ondansetron Injectable 4 milliGRAM(s) IV Push four times a day PRN  ondansetron Injectable 4 milliGRAM(s) IV Push once  oxyCODONE    IR 10 milliGRAM(s) Oral every 6 hours PRN  polyethylene glycol 3350 17 Gram(s) Oral two times a day  polyethylene glycol 3350 17 Gram(s) Oral once  rOPINIRole 5 milliGRAM(s) Oral at bedtime  senna 2 Tablet(s) Oral once  senna 2 Tablet(s) Oral at bedtime  sorbitol 70%/mineral oil/magnesium hydroxide/glycerin Enema 120 milliLiter(s) Rectal once    Vital Signs Last 24 Hrs  T(F): 98.8 (05-02-22 @ 15:16), Max: 99.3 (05-01-22 @ 21:36)  HR: 81 (05-02-22 @ 15:16)  BP: 126/58 (05-02-22 @ 15:16)  RR: 18 (05-02-22 @ 15:16)  SpO2: 100% (05-02-22 @ 15:16) (95% - 100%)    PHYSICAL EXAM:  Constitutional: non toxic   Eyes: No icterus.  RS: Chest clear   CVS: S1, S2   Abdomen: Soft.   : no knox  Extremities: tender right groin  Skin: No rash  Neuro: Alert, oriented                           10.4   17.52 )-----------( 332      ( 02 May 2022 07:08 )             30.5 05-02    130  |  98  |  18  ----------------------------<  133  4.1   |  22  |  0.62  Ca    8.9      02 May 2022 07:08Phos  2.6     05-02Mg     1.80     05-02  TPro  6.5  /  Alb  2.8  /  TBili  0.5  /  DBili  x   /  AST  11  /  ALT  8   /  AlkPhos  87  05-01      MICROBIOLOGY:    clCulture - Blood (04.29.22 @ 23:07)   Specimen Source: .Blood Blood-Venous   Culture Results:   No growth to date. Culture - Blood (04.29.22 @ 15:50)   Gram Stain:   Growth in aerobic bottle: Gram Positive Cocci in Clusters   Growth in anaerobic bottle: Gram Positive Cocci in Clusters   Specimen Source: .Blood Blood   Culture Results:   Growth in aerobic and anaerobic bottles: Staphylococcus aureus   See previous culture [id number]     .Blood Blood-Peripheral  04-27-22   Growth in aerobic and anaerobic bottles: Staphylococcus aureus            Clean Catch Clean Catch (Midstream)  04-27-22   No growth  --  --        RADIOLOGY:    rad< from: MR Hip No Cont, Right (05.02.22 @ 13:01) >    ACC: [id number] EXAM:  MR HIP RT                          PROCEDURE DATE:  05/02/2022          INTERPRETATION:  EXAMINATION: MRI of the right hip without contrast    CLINICAL INFORMATION: Right hip pain. Evaluate for septic joint.    TECHNIQUE: Multiplanar, multisequential MR imaging was performed.    FINDINGS: There are acute mildly displaced fractures of the right   superior and inferior pubic rami with adjacent marrow edema and with   regional myofascial edema and hematoma. There is also a nondisplaced   fracture of the right sacral ala with adjacent marrow edema.    There are no other acute fractures. There is a physiologic amount of   right hip joint fluid without joint effusion. There are no advanced   arthritic changes of the right hip. There are no subchondral   abnormalities. There is lateral subcutaneous soft tissue edema.    IMPRESSION: Acute fractures of the right superior and inferior pubic rami   and of the right sacral ala with regional myofascial edema and hematoma.  No right hip joint effusion.    --- End of Report ---            AMRIT GARCIA MD; Attending Radiologist  This document has been electronically signed. May  2 2022  1:16PM    < end of copied text >      rd< from: US Joint Complete, Right (04.29.22 @ 08:42) >    ACC: [id number] EXAM:  US JOINT COMPLETE RT                          PROCEDURE DATE:  04/29/2022          INTERPRETATION:  Clinical indication: Right superior and inferior pubic   rami fractures with positive blood cultures. Possible hip joint effusion.    Ultrasound evaluation of the right hip was performed.    Findings:    Study is limited as the radiologist was not present during scanning.   There is soft tissue edema within the subcutaneous fat overlying the   right hip. The right hip joint is not well imaged. Imaging more medially   in the suprapubic region there is a small fluid collection overlying the   pubic ramus measuring approximately 4 x 4 x 3 mm. There is may be related   to small hematoma related to known pubic ramus fracture in this region.    IMPRESSION:    Limited study. Right hip joint is not well imaged. Correlation with MRI   is recommended to exclude the possibility of an underlying hip joint   effusion. Other findings as above.    --- End of Report ---            CARIN LOZADA MD; Attending Radiologist  This document has been electronically signed. Apr 29 2022 10:52AM    < end of copied text >

## 2022-05-02 NOTE — PROGRESS NOTE ADULT - PROBLEM SELECTOR PLAN 8
DVT: scd iso hematoma  diet: DASH, regular    PT consulted: Rehab DVT: scd iso hematoma  diet: DASH, regular. Patient NPO after midnight for procedure    PT consulted: Rehab

## 2022-05-02 NOTE — PROGRESS NOTE ADULT - ASSESSMENT
77 yo woman with OA, h/o falls presenting with right hip pain found to have fever and Staph aureus bacteremia  Imaging shows pelvic fractures with hyperattenuation right obturator internus muscle  MRI 5/2 acute fractures right superior and inferior pubic rami and right sacral ala with myofascial edema and hematoma.  No right hip joint effusion.    Pelvic fractures with high grade MSSA bacteremia  Endocarditis in ddx  TTE noted  Leucocytosis improving  KATHERINE improving    Suggest:   increase cefazolin 2 gm iv q 8 h   follow repeat blood culture to assess for clearance   ortho f/u

## 2022-05-02 NOTE — PROGRESS NOTE ADULT - PROBLEM SELECTOR PLAN 4
- On 4/29 had not had bowel movement x5 days  - Gave enema x2, Miralax, senna, dulcolax, lactulose  - Had one bowel movement last night  - Will titrate bowel regimen to 1 soft bowel movement daily - continue Miralax, senna, dulcolax for now given she is taking pain medications  - Patient requesting enema as she can't take Miralax because it makes her nausea and vomit. - On 4/29 had not had bowel movement x5 days  - Gave enema x2, Miralax, senna, dulcolax, lactulose  - Having bowel movement today  - Will titrate bowel regimen to 1 soft bowel movement daily - continue Miralax, senna, dulcolax for now given she is taking pain medications

## 2022-05-02 NOTE — PROGRESS NOTE ADULT - SUBJECTIVE AND OBJECTIVE BOX
PROGRESS NOTE:   Authored by Shree Torres MD  Pager:  [phone]    Patient is a 76y old  Female who presents with a chief complaint of pelvic pain (01 May 2022 08:32)      SUBJECTIVE / OVERNIGHT EVENTS:    ADDITIONAL REVIEW OF SYSTEMS:    MEDICATIONS  (STANDING):  bisacodyl 5 milliGRAM(s) Oral at bedtime  ceFAZolin   IVPB      ceFAZolin   IVPB 2000 milliGRAM(s) IV Intermittent every 12 hours  DULoxetine 60 milliGRAM(s) Oral daily  famotidine    Tablet 20 milliGRAM(s) Oral daily  fenofibrate Tablet 48 milliGRAM(s) Oral daily  gabapentin 300 milliGRAM(s) Oral two times a day  levothyroxine 125 MICROGram(s) Oral daily  meclizine 12.5 milliGRAM(s) Oral two times a day  polyethylene glycol 3350 17 Gram(s) Oral two times a day  rOPINIRole 5 milliGRAM(s) Oral at bedtime  senna 2 Tablet(s) Oral at bedtime  sodium chloride 0.9%. 500 milliLiter(s) (85 mL/Hr) IV Continuous <Continuous>    MEDICATIONS  (PRN):  ondansetron Injectable 4 milliGRAM(s) IV Push four times a day PRN Nausea and/or Vomiting  oxyCODONE    IR 5 milliGRAM(s) Oral every 6 hours PRN moderate to severe pain      CAPILLARY BLOOD GLUCOSE        I&O's Summary    01 May 2022 07:01  -  02 May 2022 07:00  --------------------------------------------------------  IN: 1960 mL / OUT: 750 mL / NET: 1210 mL        PHYSICAL EXAM:  Vital Signs Last 24 Hrs  T(C): 37.4 (02 May 2022 05:24), Max: 37.4 (01 May 2022 21:36)  T(F): 99.3 (02 May 2022 05:24), Max: 99.3 (01 May 2022 21:36)  HR: 82 (02 May 2022 05:24) (82 - 87)  BP: 144/63 (02 May 2022 05:24) (132/56 - 144/63)  BP(mean): --  RR: 18 (02 May 2022 05:24) (17 - 18)  SpO2: 95% (02 May 2022 05:24) (95% - 98%)    GENERAL: No acute distress, well-developed  HEAD:  Atraumatic, Normocephalic  EYES: EOMI, PERRLA, conjunctiva and sclera clear  NECK: Supple, no lymphadenopathy, no JVD  CHEST/LUNG: CTAB; No wheezes, rales, or rhonchi  HEART: Regular rate and rhythm; No murmurs, rubs, or gallops  ABDOMEN: Soft, non-tender, non-distended; normal bowel sounds, no organomegaly  EXTREMITIES:  2+ peripheral pulses b/l, No clubbing, cyanosis, or edema  NEUROLOGY: A&O x 3, no focal deficits  SKIN: No rashes or lesions    LABS:                        10.4   19.24 )-----------( 313      ( 01 May 2022 08:42 )             31.9     05-01    131<L>  |  99  |  22  ----------------------------<  125<H>  4.5   |  20<L>  |  0.76    Ca    9.2      01 May 2022 08:42  Phos  2.3     05-01  Mg     1.90     05-01    TPro  6.5  /  Alb  2.8<L>  /  TBili  0.5  /  DBili  x   /  AST  11  /  ALT  8   /  AlkPhos  87  05-01              Culture - Blood (collected 29 Apr 2022 23:07)  Source: .Blood Blood-Venous  Preliminary Report (01 May 2022 01:02):    No growth to date.    Culture - Blood (collected 29 Apr 2022 15:50)  Source: .Blood Blood  Gram Stain (30 Apr 2022 15:39):    Growth in aerobic bottle: Gram Positive Cocci in Clusters    Growth in anaerobic bottle: Gram Positive Cocci in Clusters  Final Report (01 May 2022 12:19):    Growth in aerobic and anaerobic bottles: Staphylococcus aureus    See previous culture [id number]        RADIOLOGY & ADDITIONAL TESTS:  Results Reviewed:   Imaging Personally Reviewed:  Electrocardiogram Personally Reviewed:    COORDINATION OF CARE:  Care Discussed with Consultants/Other Providers [Y/N]:  Prior or Outpatient Records Reviewed [Y/N]:   PROGRESS NOTE:   Authored by Shree Torres MD  Pager:  [phone]    Patient is a 76y old  Female who presents with a chief complaint of pelvic pain (01 May 2022 08:32)      SUBJECTIVE / OVERNIGHT EVENTS: NAEO. Patient feels okay this morning, though still complaining about right groin pain which is currently manageable on pain regimen.     ADDITIONAL REVIEW OF SYSTEMS:   REVIEW OF SYSTEMS:    CONSTITUTIONAL: No weakness, fevers or chills  RESPIRATORY: No cough, wheezing, hemoptysis; No shortness of breath  CARDIOVASCULAR: No chest pain or palpitations  GASTROINTESTINAL: No abdominal or epigastric pain. No nausea, vomiting, or hematemesis; No diarrhea or constipation. No melena or hematochezia.  SKIN: No itching, rashes      MEDICATIONS  (STANDING):  bisacodyl 5 milliGRAM(s) Oral at bedtime  ceFAZolin   IVPB      ceFAZolin   IVPB 2000 milliGRAM(s) IV Intermittent every 12 hours  DULoxetine 60 milliGRAM(s) Oral daily  famotidine    Tablet 20 milliGRAM(s) Oral daily  fenofibrate Tablet 48 milliGRAM(s) Oral daily  gabapentin 300 milliGRAM(s) Oral two times a day  levothyroxine 125 MICROGram(s) Oral daily  meclizine 12.5 milliGRAM(s) Oral two times a day  polyethylene glycol 3350 17 Gram(s) Oral two times a day  rOPINIRole 5 milliGRAM(s) Oral at bedtime  senna 2 Tablet(s) Oral at bedtime  sodium chloride 0.9%. 500 milliLiter(s) (85 mL/Hr) IV Continuous <Continuous>    MEDICATIONS  (PRN):  ondansetron Injectable 4 milliGRAM(s) IV Push four times a day PRN Nausea and/or Vomiting  oxyCODONE    IR 5 milliGRAM(s) Oral every 6 hours PRN moderate to severe pain      CAPILLARY BLOOD GLUCOSE        I&O's Summary    01 May 2022 07:01  -  02 May 2022 07:00  --------------------------------------------------------  IN: 1960 mL / OUT: 750 mL / NET: 1210 mL        PHYSICAL EXAM:  Vital Signs Last 24 Hrs  T(C): 37.4 (02 May 2022 05:24), Max: 37.4 (01 May 2022 21:36)  T(F): 99.3 (02 May 2022 05:24), Max: 99.3 (01 May 2022 21:36)  HR: 82 (02 May 2022 05:24) (82 - 87)  BP: 144/63 (02 May 2022 05:24) (132/56 - 144/63)  BP(mean): --  RR: 18 (02 May 2022 05:24) (17 - 18)  SpO2: 95% (02 May 2022 05:24) (95% - 98%)    GENERAL: NAD, lying comfortably in bed  HEAD: Atraumatic, normocephalic  EYES: EOMI b/l PERRLA b/l, conjunctiva and sclera clear  NECK: Supple, No JVD, No LAD  RESPIRATORY: Normal respiratory effort; lungs are clear to auscultation bilaterally  CARDIOVASCULAR: Regular rate and rhythm, normal S1 and S2, no murmur/rub/gallop; No lower extremity edema  ABDOMEN: Nontender, normoactive bowel sounds, no rebound/guarding; No hepatosplenomegaly  MUSCULOSKELETAL: no clubbing or cyanosis of digits; + tenderness in R hip/ pelvic area. Decreased ROM of the right hip 2/2 pain   NEURO: Non focal   PSYCH: A+O to person, place, and time; affect appropriate  Skin: small ecchymosis in R abdominal fold, bruises in arms bilaterally    LABS:                        10.4   19.24 )-----------( 313      ( 01 May 2022 08:42 )             31.9     05-01    131<L>  |  99  |  22  ----------------------------<  125<H>  4.5   |  20<L>  |  0.76    Ca    9.2      01 May 2022 08:42  Phos  2.3     05-01  Mg     1.90     05-01    TPro  6.5  /  Alb  2.8<L>  /  TBili  0.5  /  DBili  x   /  AST  11  /  ALT  8   /  AlkPhos  87  05-01              Culture - Blood (collected 29 Apr 2022 23:07)  Source: .Blood Blood-Venous  Preliminary Report (01 May 2022 01:02):    No growth to date.    Culture - Blood (collected 29 Apr 2022 15:50)  Source: .Blood Blood  Gram Stain (30 Apr 2022 15:39):    Growth in aerobic bottle: Gram Positive Cocci in Clusters    Growth in anaerobic bottle: Gram Positive Cocci in Clusters  Final Report (01 May 2022 12:19):    Growth in aerobic and anaerobic bottles: Staphylococcus aureus    See previous culture [id number]        RADIOLOGY & ADDITIONAL TESTS:  Results Reviewed:   Imaging Personally Reviewed:  Electrocardiogram Personally Reviewed:    COORDINATION OF CARE:  Care Discussed with Consultants/Other Providers [Y/N]:  Prior or Outpatient Records Reviewed [Y/N]:

## 2022-05-02 NOTE — PROGRESS NOTE ADULT - ATTENDING COMMENTS
75 yo F w/ PMH of SVT s/p ablation, HTN, HLD, hypothyroidism, vertigo, peripheral neuropathy, OA, cervical CA p/w R sided groin/pelvic pain and found to have fracture of Rt superior and inferior pubic rami with hematoma of Rt obturator internus muscle; course c/b sepsis 2/2 staph aureus bacteremia of unclear etiology.    # Sepsis 2/2 MSSA bacteremia  -etiology unclear; concern for septic arthritis of Rt hip but MRI Rt Hip (non-contrast) nonrevealing except acute fractures of the right superior and inferior pubic rami and of the right sacral ala with regional myofascial edema and hematoma. No right hip joint effusion.  -ID f/u appreciated  -F/u SHAUN to eval for endocarditis  -f/u repeat blood cultures  -leukocytosis improving  -c/ Cefazolin 2 gm Q 12h   -ortho f/u     #Rt superior and inferior pubic rami with hematoma of R obturator internus muscle   - Fracture likely related to underlying osteopenia, ? recent falls  - No acute intervention as per ortho recs.  - Pain control w/ Oxy IR 5mg Q 6 PRN, continue  bowel regimen

## 2022-05-02 NOTE — PROGRESS NOTE ADULT - PROBLEM SELECTOR PLAN 2
- no acute orthopedic intervention  - will reduce oxycodone to 5mg q6h prn  - PT eval: Rehab - No acute orthopedic intervention  - Continue Oxycodone and taper as needed for severe pain.  - PT eval: Rehab

## 2022-05-02 NOTE — PROGRESS NOTE ADULT - PROBLEM SELECTOR PLAN 1
- Continue cefazolin  - ID consult  -- BCx: 4/27: MSSA | 4/29: Gram + cocci in clusters | repeat BCx on 5/2  - TTE no vegetations; SHAUN ordered  - pending MRI right hip given concern for septic joint vs infected hematoma  - XR left knee given new/worse pain (has baseline arthritis in left knee, but states right is usually more painful and today left is more painful) - Continue cefazolin  - ID on board. Appreciate recs  - BCx: 4/27: MSSA | 4/29: Gram + cocci in clusters | repeat BCx on 5/2  - TTE no vegetations; SHAUN pending for tomorrow.   - MRI of Right Hip w/o contrast performed today due to IV contrast patient allergy. Will most likely be low yield for identifying infectious foci. Will plan for prepping patient for MRI with pedro tomorrow   - F/U XR left knee to r/o possibility of new infectious foci in knee joint.

## 2022-05-02 NOTE — PROGRESS NOTE ADULT - PROBLEM SELECTOR PLAN 6
Patient takes spironolactone at home. Held on admission due to KATHERINE   - BPs within goal during admission  - continue to hold iso HypoNa - Patient takes spironolactone at home. Held on admission due to KATHERINE   - BPs within goal during admission  - continue to hold iso HypoNa

## 2022-05-03 LAB
ANION GAP SERPL CALC-SCNC: 13 MMOL/L — SIGNIFICANT CHANGE UP (ref 7–14)
BUN SERPL-MCNC: 16 MG/DL — SIGNIFICANT CHANGE UP (ref 7–23)
CALCIUM SERPL-MCNC: 9.5 MG/DL — SIGNIFICANT CHANGE UP (ref 8.4–10.5)
CHLORIDE SERPL-SCNC: 97 MMOL/L — LOW (ref 98–107)
CO2 SERPL-SCNC: 22 MMOL/L — SIGNIFICANT CHANGE UP (ref 22–31)
CREAT SERPL-MCNC: 0.65 MG/DL — SIGNIFICANT CHANGE UP (ref 0.5–1.3)
EGFR: 91 ML/MIN/1.73M2 — SIGNIFICANT CHANGE UP
GLUCOSE SERPL-MCNC: 120 MG/DL — HIGH (ref 70–99)
HCT VFR BLD CALC: 32.3 % — LOW (ref 34.5–45)
HGB BLD-MCNC: 10.4 G/DL — LOW (ref 11.5–15.5)
MCHC RBC-ENTMCNC: 26.7 PG — LOW (ref 27–34)
MCHC RBC-ENTMCNC: 32.2 GM/DL — SIGNIFICANT CHANGE UP (ref 32–36)
MCV RBC AUTO: 83 FL — SIGNIFICANT CHANGE UP (ref 80–100)
NRBC # BLD: 0 /100 WBCS — SIGNIFICANT CHANGE UP
NRBC # FLD: 0 K/UL — SIGNIFICANT CHANGE UP
PLATELET # BLD AUTO: 370 K/UL — SIGNIFICANT CHANGE UP (ref 150–400)
POTASSIUM SERPL-MCNC: 4.9 MMOL/L — SIGNIFICANT CHANGE UP (ref 3.5–5.3)
POTASSIUM SERPL-SCNC: 4.9 MMOL/L — SIGNIFICANT CHANGE UP (ref 3.5–5.3)
RBC # BLD: 3.89 M/UL — SIGNIFICANT CHANGE UP (ref 3.8–5.2)
RBC # FLD: 15.5 % — HIGH (ref 10.3–14.5)
SODIUM SERPL-SCNC: 132 MMOL/L — LOW (ref 135–145)
WBC # BLD: 16.8 K/UL — HIGH (ref 3.8–10.5)
WBC # FLD AUTO: 16.8 K/UL — HIGH (ref 3.8–10.5)

## 2022-05-03 PROCEDURE — 93320 DOPPLER ECHO COMPLETE: CPT | Mod: 26,GC

## 2022-05-03 PROCEDURE — 99232 SBSQ HOSP IP/OBS MODERATE 35: CPT | Mod: GC

## 2022-05-03 PROCEDURE — 76376 3D RENDER W/INTRP POSTPROCES: CPT | Mod: 26

## 2022-05-03 PROCEDURE — 99232 SBSQ HOSP IP/OBS MODERATE 35: CPT

## 2022-05-03 PROCEDURE — 93325 DOPPLER ECHO COLOR FLOW MAPG: CPT | Mod: 26,GC

## 2022-05-03 PROCEDURE — 93312 ECHO TRANSESOPHAGEAL: CPT | Mod: 26

## 2022-05-03 RX ORDER — TAMSULOSIN HYDROCHLORIDE 0.4 MG/1
0.4 CAPSULE ORAL ONCE
Refills: 0 | Status: COMPLETED | OUTPATIENT
Start: 2022-05-03 | End: 2022-05-03

## 2022-05-03 RX ORDER — LANOLIN ALCOHOL/MO/W.PET/CERES
3 CREAM (GRAM) TOPICAL AT BEDTIME
Refills: 0 | Status: DISCONTINUED | OUTPATIENT
Start: 2022-05-03 | End: 2022-05-07

## 2022-05-03 RX ORDER — FAMOTIDINE 10 MG/ML
20 INJECTION INTRAVENOUS ONCE
Refills: 0 | Status: COMPLETED | OUTPATIENT
Start: 2022-05-03 | End: 2022-05-03

## 2022-05-03 RX ORDER — CALCIUM CARBONATE 500(1250)
1 TABLET ORAL ONCE
Refills: 0 | Status: COMPLETED | OUTPATIENT
Start: 2022-05-03 | End: 2022-05-03

## 2022-05-03 RX ORDER — TAMSULOSIN HYDROCHLORIDE 0.4 MG/1
0.4 CAPSULE ORAL AT BEDTIME
Refills: 0 | Status: DISCONTINUED | OUTPATIENT
Start: 2022-05-04 | End: 2022-05-07

## 2022-05-03 RX ORDER — FAMOTIDINE 10 MG/ML
20 INJECTION INTRAVENOUS
Refills: 0 | Status: DISCONTINUED | OUTPATIENT
Start: 2022-05-03 | End: 2022-05-07

## 2022-05-03 RX ADMIN — Medication 100 MILLIGRAM(S): at 16:36

## 2022-05-03 RX ADMIN — OXYCODONE HYDROCHLORIDE 5 MILLIGRAM(S): 5 TABLET ORAL at 05:32

## 2022-05-03 RX ADMIN — SENNA PLUS 2 TABLET(S): 8.6 TABLET ORAL at 21:19

## 2022-05-03 RX ADMIN — OXYCODONE HYDROCHLORIDE 5 MILLIGRAM(S): 5 TABLET ORAL at 21:21

## 2022-05-03 RX ADMIN — OXYCODONE HYDROCHLORIDE 5 MILLIGRAM(S): 5 TABLET ORAL at 22:15

## 2022-05-03 RX ADMIN — Medication 100 MILLIGRAM(S): at 05:31

## 2022-05-03 RX ADMIN — FAMOTIDINE 20 MILLIGRAM(S): 10 INJECTION INTRAVENOUS at 05:30

## 2022-05-03 RX ADMIN — Medication 125 MICROGRAM(S): at 05:30

## 2022-05-03 RX ADMIN — Medication 48 MILLIGRAM(S): at 13:35

## 2022-05-03 RX ADMIN — Medication 100 MILLIGRAM(S): at 01:10

## 2022-05-03 RX ADMIN — TAMSULOSIN HYDROCHLORIDE 0.4 MILLIGRAM(S): 0.4 CAPSULE ORAL at 03:25

## 2022-05-03 RX ADMIN — DULOXETINE HYDROCHLORIDE 60 MILLIGRAM(S): 30 CAPSULE, DELAYED RELEASE ORAL at 13:35

## 2022-05-03 RX ADMIN — Medication 12.5 MILLIGRAM(S): at 05:32

## 2022-05-03 RX ADMIN — Medication 3 MILLIGRAM(S): at 22:25

## 2022-05-03 RX ADMIN — GABAPENTIN 300 MILLIGRAM(S): 400 CAPSULE ORAL at 05:31

## 2022-05-03 RX ADMIN — Medication 100 MILLIGRAM(S): at 16:23

## 2022-05-03 RX ADMIN — OXYCODONE HYDROCHLORIDE 5 MILLIGRAM(S): 5 TABLET ORAL at 14:34

## 2022-05-03 RX ADMIN — POLYETHYLENE GLYCOL 3350 17 GRAM(S): 17 POWDER, FOR SOLUTION ORAL at 16:36

## 2022-05-03 RX ADMIN — FAMOTIDINE 20 MILLIGRAM(S): 10 INJECTION INTRAVENOUS at 16:36

## 2022-05-03 RX ADMIN — Medication 12.5 MILLIGRAM(S): at 16:36

## 2022-05-03 RX ADMIN — GABAPENTIN 300 MILLIGRAM(S): 400 CAPSULE ORAL at 16:36

## 2022-05-03 RX ADMIN — OXYCODONE HYDROCHLORIDE 5 MILLIGRAM(S): 5 TABLET ORAL at 06:02

## 2022-05-03 RX ADMIN — OXYCODONE HYDROCHLORIDE 5 MILLIGRAM(S): 5 TABLET ORAL at 13:34

## 2022-05-03 RX ADMIN — Medication 5 MILLIGRAM(S): at 21:19

## 2022-05-03 RX ADMIN — Medication 1 TABLET(S): at 04:16

## 2022-05-03 RX ADMIN — ROPINIROLE 5 MILLIGRAM(S): 8 TABLET, FILM COATED, EXTENDED RELEASE ORAL at 21:18

## 2022-05-03 NOTE — CHART NOTE - NSCHARTNOTEFT_GEN_A_CORE
MRI R hip results reviewed with ortho attending Dr. Collazo. No hip effusion seen.  At this time, no acute orthopedic surgical intervention.   Will sign off.  Please notify ortho with any further questions.  Dr. Collazo in agreement with above.

## 2022-05-03 NOTE — PROGRESS NOTE ADULT - SUBJECTIVE AND OBJECTIVE BOX
Follow Up:  MSSA bacteremia    Interval History/ROS:  SHAUN no vegetation  right groin pain controlled  knox reinserted    Allergies  erythromycin (Hives)  IV Contrast (Anaphylaxis)        ANTIMICROBIALS: ceFAZolin   IVPB    ceFAZolin   IVPB 2000 every 12 hours          OTHER MEDS:  bisacodyl 5 milliGRAM(s) Oral once  bisacodyl Suppository 10 milliGRAM(s) Rectal once  DULoxetine 60 milliGRAM(s) Oral daily  famotidine    Tablet 20 milliGRAM(s) Oral daily  fenofibrate Tablet 48 milliGRAM(s) Oral daily  gabapentin 300 milliGRAM(s) Oral two times a day  lactulose Syrup 30 Gram(s) Oral once  levothyroxine 125 MICROGram(s) Oral daily  meclizine 12.5 milliGRAM(s) Oral two times a day PRN  ondansetron Injectable 4 milliGRAM(s) IV Push four times a day PRN  ondansetron Injectable 4 milliGRAM(s) IV Push once  oxyCODONE    IR 10 milliGRAM(s) Oral every 6 hours PRN  polyethylene glycol 3350 17 Gram(s) Oral two times a day  polyethylene glycol 3350 17 Gram(s) Oral once  rOPINIRole 5 milliGRAM(s) Oral at bedtime  senna 2 Tablet(s) Oral once  senna 2 Tablet(s) Oral at bedtime  sorbitol 70%/mineral oil/magnesium hydroxide/glycerin Enema 120 milliLiter(s) Rectal once    Vital Signs Last 24 Hrs  T(F): 98.7 (05-03-22 @ 13:20), Max: 99.4 (05-02-22 @ 22:09)  HR: 79 (05-03-22 @ 13:20)  BP: 127/75 (05-03-22 @ 13:20)  RR: 18 (05-03-22 @ 13:20)  SpO2: 100% (05-03-22 @ 13:20) (98% - 100%)    PHYSICAL EXAM:  Constitutional: non toxic, comfortable  Eyes: No icterus  RS: Chest clear ant  CVS: S1, S2 distant  Abdomen: Soft.   :  knox  Extremities: tender right groin  Skin: No rash  Neuro: Alert, oriented                                      10.4   16.80 )-----------( 370      ( 03 May 2022 07:21 )             32.3 05-03    132  |  97  |  16  ----------------------------<  120  4.9   |  22  |  0.65  Ca    9.5      03 May 2022 07:21Phos  2.6     05-02Mg     1.80     05-02        MICROBIOLOGY:    5/2  blood culture x 2 no growth to date    clCulture - Blood (04.29.22 @ 23:07)   Specimen Source: .Blood Blood-Venous   Culture Results:   No growth to date. Culture - Blood (04.29.22 @ 15:50)   Gram Stain:   Growth in aerobic bottle: Gram Positive Cocci in Clusters   Growth in anaerobic bottle: Gram Positive Cocci in Clusters   Specimen Source: .Blood Blood   Culture Results:   Growth in aerobic and anaerobic bottles: Staphylococcus aureus   See previous culture [id number]     .Blood Blood-Peripheral  04-27-22   Growth in aerobic and anaerobic bottles: Staphylococcus aureus            Clean Catch Clean Catch (Midstream)  04-27-22   No growth  --  --        RADIOLOGY:    rad< from: MR Hip No Cont, Right (05.02.22 @ 13:01) >    ACC: [id number] EXAM:  MR HIP RT                          PROCEDURE DATE:  05/02/2022          INTERPRETATION:  EXAMINATION: MRI of the right hip without contrast    CLINICAL INFORMATION: Right hip pain. Evaluate for septic joint.    TECHNIQUE: Multiplanar, multisequential MR imaging was performed.    FINDINGS: There are acute mildly displaced fractures of the right   superior and inferior pubic rami with adjacent marrow edema and with   regional myofascial edema and hematoma. There is also a nondisplaced   fracture of the right sacral ala with adjacent marrow edema.    There are no other acute fractures. There is a physiologic amount of   right hip joint fluid without joint effusion. There are no advanced   arthritic changes of the right hip. There are no subchondral   abnormalities. There is lateral subcutaneous soft tissue edema.    IMPRESSION: Acute fractures of the right superior and inferior pubic rami   and of the right sacral ala with regional myofascial edema and hematoma.  No right hip joint effusion.    --- End of Report ---            AMRIT GARCIA MD; Attending Radiologist  This document has been electronically signed. May  2 2022  1:16PM    < end of copied text >      rd< from: US Joint Complete, Right (04.29.22 @ 08:42) >    ACC: [id number] EXAM:  US JOINT COMPLETE RT                          PROCEDURE DATE:  04/29/2022          INTERPRETATION:  Clinical indication: Right superior and inferior pubic   rami fractures with positive blood cultures. Possible hip joint effusion.    Ultrasound evaluation of the right hip was performed.    Findings:    Study is limited as the radiologist was not present during scanning.   There is soft tissue edema within the subcutaneous fat overlying the   right hip. The right hip joint is not well imaged. Imaging more medially   in the suprapubic region there is a small fluid collection overlying the   pubic ramus measuring approximately 4 x 4 x 3 mm. There is may be related   to small hematoma related to known pubic ramus fracture in this region.    IMPRESSION:    Limited study. Right hip joint is not well imaged. Correlation with MRI   is recommended to exclude the possibility of an underlying hip joint   effusion. Other findings as above.    --- End of Report ---            CARIN LOZADA MD; Attending Radiologist  This document has been electronically signed. Apr 29 2022 10:52AM    < end of copied text >    c< from: SHAUN w/o TTE (w/3D Echo) (05.03.22 @ 10:32) >    Patient name: SHELL MARTINEZ  YOB: 1945   Age: 76 (F)   MR#: [id number]  Study Date: 5/3/2022  Location: [id number]: Godfrey Thayer M.D.  Study quality: Technically good  Referring Physician: Pablito Wharton MD  Blood Pressure:120/94 mmHg  Height: 166 cm  Weight: 96 kg  BSA: 2.1 m2  ------------------------------------------------------------------------  PROCEDURE: Transesophageal (SHAUN) was performed in the  echocardiography laboratory.  Informed consent was first  obtained for SHAUN.   The patient was sedated by the  anesthesiologist service (reported separately).   The  procedure was monitored with automatic blood pressure  monitoring, ECG tracings and pulse oximetry.  Gag reflex  was abolished with topical Cetacaine and the  transesophageal probe was placed in the esophagus posterior  to the heart without complications.  The patient tolerated  the procedure well.   Real-time and reconstructed  3-dimensional imaging was performed.  Color Doppler  analysis was carried out using both 2D and 3D mapping.  Patient was injected with 10 cc's of aerosolized saline.  Patient was injected with 10 cc's of aerosolized saline.  INDICATION: Endocarditis, valve unspecified (I38)  ------------------------------------------------------------------------  OBSERVATIONS:  Mitral Valve: Mitral annular calcification, otherwise  normal mitral valve.  No vegetations seen in association  with the mitral valve. Minimal mitral regurgitation.  Aortic Root: Normal aortic root.  Mild non-mobile  atherosclerotic plaque in the aortic arch and descending  thoracic aorta.  Aortic Valve: Calcified trileaflet aortic valve with normal  opening.  No vegetations seen in association with the  aortic valve. No aortic valve regurgitation seen.  Left Atrium: Normal left atrium.  No left atrial or left  atrial appendage thrombus.  Left Ventricle: Normal left ventricular systolic function.  No segmental wall motion abnormalities.  Right Heart: Normal right atrium. Normal right ventricular  size and function. Normal tricuspid valve.  No vegetations  seen in association with the tricuspid valve.  Minimal  tricuspid regurgitation. Normal pulmonic valve.  No  vegetations seen in association with the pulmonic valve.  Mild pulmonic regurgitation.  Pericardium/PleuraNormal pericardium with no pericardial  effusion.  ------------------------------------------------------------------------  CONCLUSIONS:  1. Mitral annular calcification, otherwise normal mitral  valve.  No vegetations seen in association with the mitral  valve. Minimal mitral regurgitation.  2. Calcified trileaflet aortic valve with normal opening.  No vegetations seen in association with the aortic valve.  No aortic valve regurgitation seen.  3. Normal aortic root.  Mild non-mobile atherosclerotic  plaque in the aortic arch and descending thoracic aorta.  4. Normal left atrium.  No left atrial or left atrial  appendage thrombus.  5. Normal left ventricular systolic function. No segmental  wall motion abnormalities.  6.Normal right ventricular size and function.  7. Contrast injection demonstrates no evidence of a patent  foramen ovale.  No valvular vegetations were identified.  ------------------------------------------------------------------------  Confirmed on5/3/2022 - 13:59:20 by Danish Orozco M.D.,  [facility], [place]  ------------------------------------------------------------------------    < end of copied text >

## 2022-05-03 NOTE — PROGRESS NOTE ADULT - PROBLEM SELECTOR PLAN 2
- No acute orthopedic intervention  - Continue Oxycodone and taper as needed for severe pain.  - PT eval: Rehab

## 2022-05-03 NOTE — PROGRESS NOTE ADULT - PROBLEM SELECTOR PLAN 6
- Patient takes spironolactone at home. Held on admission due to KATHERINE   - BPs within goal during admission  - continue to hold iso HypoNa

## 2022-05-03 NOTE — PROGRESS NOTE ADULT - PROBLEM SELECTOR PLAN 1
- Continue cefazolin  - ID on board. Appreciate recs  - BCx: 4/27: MSSA | 4/29: Gram + cocci in clusters | repeat BCx on 5/2  - TTE no vegetations; SHAUN pending for tomorrow.   - MRI of Right Hip w/o contrast performed today due to IV contrast patient allergy. Will most likely be low yield for identifying infectious foci. Will plan for prepping patient for MRI with pedro tomorrow   - F/U XR left knee to r/o possibility of new infectious foci in knee joint. - Continue cefazolin  - ID on board. Appreciate recs  - BCx: 4/27: MSSA | 4/29: Gram + cocci in clusters | repeat BCx on 5/2 NGTD  - TTE and SHAUN negative for vegetations   - MRI of Right Hip w/o contrast performed today due to IV contrast patient allergy. Will most likely be low yield for identifying infectious foci. Will plan for prepping patient for MRI with pedro tomorrow   - F/U XR left knee to r/o possibility of new infectious foci in knee joint.

## 2022-05-03 NOTE — PROGRESS NOTE ADULT - PROBLEM SELECTOR PLAN 4
- On 4/29 had not had bowel movement x5 days  - Gave enema x2, Miralax, senna, dulcolax, lactulose  - Having bowel movement today  - Will titrate bowel regimen to 1 soft bowel movement daily - continue Miralax, senna, dulcolax for now given she is taking pain medications - Improved  - S/P enema x2, Miralax, senna, dulcolax, lactulose  - Continue bowel regimen

## 2022-05-03 NOTE — PROVIDER CONTACT NOTE (OTHER) - ACTION/TREATMENT ORDERED:
MD made aware, will come attempt to place Block
MD made aware, ordered indwelling Block catheter, nurse educator Daron also contacted, can place catheter according to [facility] policy

## 2022-05-03 NOTE — PROGRESS NOTE ADULT - SUBJECTIVE AND OBJECTIVE BOX
PROGRESS NOTE:   Authored by Shree Torres MD  Pager:  [phone]    Patient is a 76y old  Female who presents with a chief complaint of pelvic pain (02 May 2022 15:52)      SUBJECTIVE / OVERNIGHT EVENTS:    ADDITIONAL REVIEW OF SYSTEMS:    MEDICATIONS  (STANDING):  bisacodyl 5 milliGRAM(s) Oral at bedtime  ceFAZolin   IVPB      ceFAZolin   IVPB 2000 milliGRAM(s) IV Intermittent every 12 hours  diphenhydrAMINE Injectable 50 milliGRAM(s) IV Push once  DULoxetine 60 milliGRAM(s) Oral daily  famotidine    Tablet 20 milliGRAM(s) Oral two times a day  fenofibrate Tablet 48 milliGRAM(s) Oral daily  gabapentin 300 milliGRAM(s) Oral two times a day  hydrocortisone sodium succinate Injectable 200 milliGRAM(s) IV Push once  levothyroxine 125 MICROGram(s) Oral daily  meclizine 12.5 milliGRAM(s) Oral two times a day  polyethylene glycol 3350 17 Gram(s) Oral two times a day  rOPINIRole 5 milliGRAM(s) Oral at bedtime  senna 2 Tablet(s) Oral at bedtime  sodium chloride 0.9%. 500 milliLiter(s) (85 mL/Hr) IV Continuous <Continuous>    MEDICATIONS  (PRN):  aluminum hydroxide/magnesium hydroxide/simethicone Suspension 30 milliLiter(s) Oral every 6 hours PRN Dyspepsia  benzonatate 100 milliGRAM(s) Oral every 8 hours PRN Cough  ondansetron Injectable 4 milliGRAM(s) IV Push four times a day PRN Nausea and/or Vomiting  oxyCODONE    IR 5 milliGRAM(s) Oral every 6 hours PRN moderate to severe pain      CAPILLARY BLOOD GLUCOSE        I&O's Summary    02 May 2022 07:01  -  03 May 2022 07:00  --------------------------------------------------------  IN: 1400 mL / OUT: 0 mL / NET: 1400 mL        PHYSICAL EXAM:  Vital Signs Last 24 Hrs  T(C): 36.9 (03 May 2022 05:30), Max: 37.4 (02 May 2022 22:09)  T(F): 98.4 (03 May 2022 05:30), Max: 99.4 (02 May 2022 22:09)  HR: 79 (03 May 2022 05:30) (79 - 87)  BP: 133/57 (03 May 2022 05:30) (122/61 - 133/64)  BP(mean): --  RR: 18 (03 May 2022 05:30) (18 - 18)  SpO2: 99% (03 May 2022 05:30) (98% - 100%)    GENERAL: No acute distress, well-developed  HEAD:  Atraumatic, Normocephalic  EYES: EOMI, PERRLA, conjunctiva and sclera clear  NECK: Supple, no lymphadenopathy, no JVD  CHEST/LUNG: CTAB; No wheezes, rales, or rhonchi  HEART: Regular rate and rhythm; No murmurs, rubs, or gallops  ABDOMEN: Soft, non-tender, non-distended; normal bowel sounds, no organomegaly  EXTREMITIES:  2+ peripheral pulses b/l, No clubbing, cyanosis, or edema  NEUROLOGY: A&O x 3, no focal deficits  SKIN: No rashes or lesions    LABS:                        10.4   17.52 )-----------( 332      ( 02 May 2022 07:08 )             30.5     05-02    130<L>  |  98  |  18  ----------------------------<  133<H>  4.1   |  22  |  0.62    Ca    8.9      02 May 2022 07:08  Phos  2.6     05-02  Mg     1.80     05-02    TPro  6.5  /  Alb  2.8<L>  /  TBili  0.5  /  DBili  x   /  AST  11  /  ALT  8   /  AlkPhos  87  05-01                RADIOLOGY & ADDITIONAL TESTS:  Results Reviewed:   Imaging Personally Reviewed:  Electrocardiogram Personally Reviewed:    COORDINATION OF CARE:  Care Discussed with Consultants/Other Providers [Y/N]:  Prior or Outpatient Records Reviewed [Y/N]:   PROGRESS NOTE:   Authored by Shree Torres MD  Pager:  [phone]    Patient is a 76y old  Female who presents with a chief complaint of pelvic pain (02 May 2022 15:52)      SUBJECTIVE / OVERNIGHT EVENTS: Overnight Patient experienced some urinary retention so she required a knox. This morning she has no new complaints.     ADDITIONAL REVIEW OF SYSTEMS:  REVIEW OF SYSTEMS:    CONSTITUTIONAL: No weakness, fevers or chills  RESPIRATORY: No cough, wheezing, hemoptysis; No shortness of breath  CARDIOVASCULAR: No chest pain or palpitations  GASTROINTESTINAL: No abdominal or epigastric pain. No nausea, vomiting, or hematemesis; No diarrhea or constipation. No melena or hematochezia.  SKIN: No itching, rashes      MEDICATIONS  (STANDING):  bisacodyl 5 milliGRAM(s) Oral at bedtime  ceFAZolin   IVPB      ceFAZolin   IVPB 2000 milliGRAM(s) IV Intermittent every 12 hours  diphenhydrAMINE Injectable 50 milliGRAM(s) IV Push once  DULoxetine 60 milliGRAM(s) Oral daily  famotidine    Tablet 20 milliGRAM(s) Oral two times a day  fenofibrate Tablet 48 milliGRAM(s) Oral daily  gabapentin 300 milliGRAM(s) Oral two times a day  hydrocortisone sodium succinate Injectable 200 milliGRAM(s) IV Push once  levothyroxine 125 MICROGram(s) Oral daily  meclizine 12.5 milliGRAM(s) Oral two times a day  polyethylene glycol 3350 17 Gram(s) Oral two times a day  rOPINIRole 5 milliGRAM(s) Oral at bedtime  senna 2 Tablet(s) Oral at bedtime  sodium chloride 0.9%. 500 milliLiter(s) (85 mL/Hr) IV Continuous <Continuous>    MEDICATIONS  (PRN):  aluminum hydroxide/magnesium hydroxide/simethicone Suspension 30 milliLiter(s) Oral every 6 hours PRN Dyspepsia  benzonatate 100 milliGRAM(s) Oral every 8 hours PRN Cough  ondansetron Injectable 4 milliGRAM(s) IV Push four times a day PRN Nausea and/or Vomiting  oxyCODONE    IR 5 milliGRAM(s) Oral every 6 hours PRN moderate to severe pain      CAPILLARY BLOOD GLUCOSE        I&O's Summary    02 May 2022 07:01  -  03 May 2022 07:00  --------------------------------------------------------  IN: 1400 mL / OUT: 0 mL / NET: 1400 mL        PHYSICAL EXAM:  Vital Signs Last 24 Hrs  T(C): 36.9 (03 May 2022 05:30), Max: 37.4 (02 May 2022 22:09)  T(F): 98.4 (03 May 2022 05:30), Max: 99.4 (02 May 2022 22:09)  HR: 79 (03 May 2022 05:30) (79 - 87)  BP: 133/57 (03 May 2022 05:30) (122/61 - 133/64)  BP(mean): --  RR: 18 (03 May 2022 05:30) (18 - 18)  SpO2: 99% (03 May 2022 05:30) (98% - 100%)    GENERAL: No acute distress, well-developed  HEAD:  Atraumatic, Normocephalic  EYES: EOMI, PERRLA, conjunctiva and sclera clear  NECK: Supple, no lymphadenopathy, no JVD  CHEST/LUNG: CTAB; No wheezes, rales, or rhonchi  HEART: Regular rate and rhythm; No murmurs, rubs, or gallops  ABDOMEN: Soft, non-tender, non-distended; normal bowel sounds, no organomegaly  EXTREMITIES:  2+ peripheral pulses b/l, No clubbing, cyanosis, or edema  NEUROLOGY: A&O x 3, no focal deficits  SKIN: No rashes or lesions    LABS:                        10.4   17.52 )-----------( 332      ( 02 May 2022 07:08 )             30.5     05-02    130<L>  |  98  |  18  ----------------------------<  133<H>  4.1   |  22  |  0.62    Ca    8.9      02 May 2022 07:08  Phos  2.6     05-02  Mg     1.80     05-02    TPro  6.5  /  Alb  2.8<L>  /  TBili  0.5  /  DBili  x   /  AST  11  /  ALT  8   /  AlkPhos  87  05-01                RADIOLOGY & ADDITIONAL TESTS:  Results Reviewed:   Imaging Personally Reviewed:  Electrocardiogram Personally Reviewed:    COORDINATION OF CARE:  Care Discussed with Consultants/Other Providers [Y/N]:  Prior or Outpatient Records Reviewed [Y/N]:   PROGRESS NOTE:   Authored by Shree Torres MD  Pager:  [phone]    Patient is a 76y old  Female who presents with a chief complaint of pelvic pain (02 May 2022 15:52)      SUBJECTIVE / OVERNIGHT EVENTS: Overnight Patient experienced some urinary retention so she required a knox. This morning she has no new complaints.     ADDITIONAL REVIEW OF SYSTEMS:  REVIEW OF SYSTEMS:    CONSTITUTIONAL: No weakness, fevers or chills  RESPIRATORY: No cough, wheezing, hemoptysis; No shortness of breath  CARDIOVASCULAR: No chest pain or palpitations  GASTROINTESTINAL: No abdominal or epigastric pain. No nausea, vomiting, or hematemesis; No diarrhea or constipation. No melena or hematochezia.  SKIN: No itching, rashes      MEDICATIONS  (STANDING):  bisacodyl 5 milliGRAM(s) Oral at bedtime  ceFAZolin   IVPB      ceFAZolin   IVPB 2000 milliGRAM(s) IV Intermittent every 12 hours  diphenhydrAMINE Injectable 50 milliGRAM(s) IV Push once  DULoxetine 60 milliGRAM(s) Oral daily  famotidine    Tablet 20 milliGRAM(s) Oral two times a day  fenofibrate Tablet 48 milliGRAM(s) Oral daily  gabapentin 300 milliGRAM(s) Oral two times a day  hydrocortisone sodium succinate Injectable 200 milliGRAM(s) IV Push once  levothyroxine 125 MICROGram(s) Oral daily  meclizine 12.5 milliGRAM(s) Oral two times a day  polyethylene glycol 3350 17 Gram(s) Oral two times a day  rOPINIRole 5 milliGRAM(s) Oral at bedtime  senna 2 Tablet(s) Oral at bedtime  sodium chloride 0.9%. 500 milliLiter(s) (85 mL/Hr) IV Continuous <Continuous>    MEDICATIONS  (PRN):  aluminum hydroxide/magnesium hydroxide/simethicone Suspension 30 milliLiter(s) Oral every 6 hours PRN Dyspepsia  benzonatate 100 milliGRAM(s) Oral every 8 hours PRN Cough  ondansetron Injectable 4 milliGRAM(s) IV Push four times a day PRN Nausea and/or Vomiting  oxyCODONE    IR 5 milliGRAM(s) Oral every 6 hours PRN moderate to severe pain      CAPILLARY BLOOD GLUCOSE        I&O's Summary    02 May 2022 07:01  -  03 May 2022 07:00  --------------------------------------------------------  IN: 1400 mL / OUT: 0 mL / NET: 1400 mL        PHYSICAL EXAM:  Vital Signs Last 24 Hrs  T(C): 36.9 (03 May 2022 05:30), Max: 37.4 (02 May 2022 22:09)  T(F): 98.4 (03 May 2022 05:30), Max: 99.4 (02 May 2022 22:09)  HR: 79 (03 May 2022 05:30) (79 - 87)  BP: 133/57 (03 May 2022 05:30) (122/61 - 133/64)  BP(mean): --  RR: 18 (03 May 2022 05:30) (18 - 18)  SpO2: 99% (03 May 2022 05:30) (98% - 100%)    GENERAL: NAD, lying comfortably in bed  HEAD: Atraumatic, normocephalic  EYES: EOMI b/l PERRLA b/l, conjunctiva and sclera clear  NECK: Supple, No JVD, No LAD  RESPIRATORY: Normal respiratory effort; lungs are clear to auscultation bilaterally  CARDIOVASCULAR: Regular rate and rhythm, normal S1 and S2, no murmur/rub/gallop; Trace lower extremity edema  ABDOMEN: Nontender, normoactive bowel sounds, no rebound/guarding; No hepatosplenomegaly  MUSCULOSKELETAL: no clubbing or cyanosis of digits; + tenderness in R hip/ pelvic area. Decreased ROM of the right hip 2/2 pain   NEURO: Non focal   PSYCH: A+O to person, place, and time; affect appropriate  Skin: small ecchymosis in R abdominal fold, bruises in arms bilaterally    LABS:                        10.4   17.52 )-----------( 332      ( 02 May 2022 07:08 )             30.5     05-02    130<L>  |  98  |  18  ----------------------------<  133<H>  4.1   |  22  |  0.62    Ca    8.9      02 May 2022 07:08  Phos  2.6     05-02  Mg     1.80     05-02    TPro  6.5  /  Alb  2.8<L>  /  TBili  0.5  /  DBili  x   /  AST  11  /  ALT  8   /  AlkPhos  87  05-01                RADIOLOGY & ADDITIONAL TESTS:  Results Reviewed:   Imaging Personally Reviewed:  Electrocardiogram Personally Reviewed:    COORDINATION OF CARE:  Care Discussed with Consultants/Other Providers [Y/N]:  Prior or Outpatient Records Reviewed [Y/N]:

## 2022-05-03 NOTE — PROVIDER CONTACT NOTE (OTHER) - SITUATION
Unable to insert Block, senior nurse and nurse educator attempted to place
Pt third TOV bladder scan 721mL

## 2022-05-03 NOTE — PROGRESS NOTE ADULT - PROBLEM SELECTOR PLAN 3
- possible MSSA source?  - Hip joint effusion, right  - concern for septic joint  - seen on POCUS  - not well visualized on US, but overlying fluid collection [id number] is seen  - pending MRI with pedro contrast. Will f/u non contrast study in the mean time. - possible MSSA source?  - Not well visualized on US, but overlying fluid collection [id number] is seen  - MRI with Acute fractures of the right superior and inferior pubic rami and of the right sacral ala with regional myofascial edema and hematoma. No right hip joint effusion.  - May potentially need MRI with pedro contrast to better evaluate for infectious foci

## 2022-05-03 NOTE — PROVIDER CONTACT NOTE (OTHER) - BACKGROUND
Pt admitted for pelvis fracture, fever and leukocytosis
Pt admitted for pelvis fracture, fever and leukocytosis

## 2022-05-03 NOTE — PROGRESS NOTE ADULT - PROBLEM SELECTOR PLAN 5
- Urine studies consistent with hypovolemia, consistent with history given vomiting and KATHERINE  - Responded well to IVF  - Patient continues to experience dizziness, nausea and vomiting  - Continue Antivert home medication.

## 2022-05-03 NOTE — PROVIDER CONTACT NOTE (OTHER) - ASSESSMENT
Pt third TOV bladder scan 721mL, pt feels that she is voiding but has not
Unable to insert Block, senior nurse and nurse educator attempted to place

## 2022-05-03 NOTE — PROGRESS NOTE ADULT - ATTENDING COMMENTS
77 yo F w/ PMH of SVT s/p ablation, HTN, HLD, hypothyroidism, vertigo, peripheral neuropathy, OA, cervical CA p/w R sided groin/pelvic pain and found to have fracture of Rt superior and inferior pubic rami with hematoma of Rt obturator internus muscle; course c/b sepsis 2/2 MSSA bacteremia of unclear etiology.    # Sepsis 2/2 MSSA bacteremia  -etiology still unclear; concern for septic arthritis of Rt hip but MRI Rt Hip (non-contrast) nonrevealing except acute fractures of the right superior and inferior pubic rami and of the right sacral ala with regional myofascial edema and hematoma. No right hip joint effusion.  -SHAUN performed today - no vegetations  -repeat blood cultures from 5/2 no growth to date  -D/w ID - planning for 4-6 weeks IV cefazolin    #Rt superior and inferior pubic rami with hematoma of R obturator internus muscle   - No acute intervention as per ortho recs  - Pain control w/ Oxy IR 5mg Q 6 PRN, continue  bowel regimen

## 2022-05-03 NOTE — PROGRESS NOTE ADULT - ASSESSMENT
75 yo woman with OA, h/o falls presented 4/27 with right hip pain found to have fever and Staph aureus bacteremia  Imaging shows pelvic fractures with hyperattenuation right obturator internus muscle  MRI 5/2 acute fractures right superior and inferior pubic rami and right sacral ala with myofascial edema and hematoma.  No right hip joint effusion.  SHAUN no vegetation    Pelvic fractures with high grade MSSA bacteremia  Leucocytosis improving  SHAUN no veg  KATHERINE resolved    Suggest:   c/w cefazolin 2 gm iv q 8 h   follow 5/2 blood culture to assess for clearance   MRI with contrast    Will need 4- 6 weeks IV cefazolin  Defer PIC until document 5/2 blood cultures neg x 72 h

## 2022-05-03 NOTE — PROGRESS NOTE ADULT - PROBLEM SELECTOR PLAN 8
DVT: scd iso hematoma  diet: DASH, regular. Patient NPO after midnight for procedure    PT consulted: Rehab

## 2022-05-04 LAB
ANION GAP SERPL CALC-SCNC: 10 MMOL/L — SIGNIFICANT CHANGE UP (ref 7–14)
BUN SERPL-MCNC: 21 MG/DL — SIGNIFICANT CHANGE UP (ref 7–23)
CALCIUM SERPL-MCNC: 9.2 MG/DL — SIGNIFICANT CHANGE UP (ref 8.4–10.5)
CHLORIDE SERPL-SCNC: 98 MMOL/L — SIGNIFICANT CHANGE UP (ref 98–107)
CO2 SERPL-SCNC: 23 MMOL/L — SIGNIFICANT CHANGE UP (ref 22–31)
CREAT SERPL-MCNC: 0.75 MG/DL — SIGNIFICANT CHANGE UP (ref 0.5–1.3)
EGFR: 82 ML/MIN/1.73M2 — SIGNIFICANT CHANGE UP
GLUCOSE SERPL-MCNC: 120 MG/DL — HIGH (ref 70–99)
HCT VFR BLD CALC: 31.5 % — LOW (ref 34.5–45)
HGB BLD-MCNC: 10 G/DL — LOW (ref 11.5–15.5)
MAGNESIUM SERPL-MCNC: 2 MG/DL — SIGNIFICANT CHANGE UP (ref 1.6–2.6)
MCHC RBC-ENTMCNC: 26.9 PG — LOW (ref 27–34)
MCHC RBC-ENTMCNC: 31.7 GM/DL — LOW (ref 32–36)
MCV RBC AUTO: 84.7 FL — SIGNIFICANT CHANGE UP (ref 80–100)
NRBC # BLD: 0 /100 WBCS — SIGNIFICANT CHANGE UP
NRBC # FLD: 0 K/UL — SIGNIFICANT CHANGE UP
PHOSPHATE SERPL-MCNC: 4.1 MG/DL — SIGNIFICANT CHANGE UP (ref 2.5–4.5)
PLATELET # BLD AUTO: 312 K/UL — SIGNIFICANT CHANGE UP (ref 150–400)
POTASSIUM SERPL-MCNC: 5.4 MMOL/L — HIGH (ref 3.5–5.3)
POTASSIUM SERPL-SCNC: 5.4 MMOL/L — HIGH (ref 3.5–5.3)
RBC # BLD: 3.72 M/UL — LOW (ref 3.8–5.2)
RBC # FLD: 15.7 % — HIGH (ref 10.3–14.5)
SODIUM SERPL-SCNC: 131 MMOL/L — LOW (ref 135–145)
WBC # BLD: 14.61 K/UL — HIGH (ref 3.8–10.5)
WBC # FLD AUTO: 14.61 K/UL — HIGH (ref 3.8–10.5)

## 2022-05-04 PROCEDURE — 99232 SBSQ HOSP IP/OBS MODERATE 35: CPT | Mod: GC

## 2022-05-04 RX ORDER — ACETAMINOPHEN 500 MG
650 TABLET ORAL EVERY 6 HOURS
Refills: 0 | Status: DISCONTINUED | OUTPATIENT
Start: 2022-05-04 | End: 2022-05-07

## 2022-05-04 RX ORDER — DIPHENHYDRAMINE HCL 50 MG
50 CAPSULE ORAL ONCE
Refills: 0 | Status: COMPLETED | OUTPATIENT
Start: 2022-05-04

## 2022-05-04 RX ORDER — HYDROMORPHONE HYDROCHLORIDE 2 MG/ML
0.5 INJECTION INTRAMUSCULAR; INTRAVENOUS; SUBCUTANEOUS ONCE
Refills: 0 | Status: DISCONTINUED | OUTPATIENT
Start: 2022-05-04 | End: 2022-05-04

## 2022-05-04 RX ORDER — FAMOTIDINE 10 MG/ML
20 INJECTION INTRAVENOUS ONCE
Refills: 0 | Status: DISCONTINUED | OUTPATIENT
Start: 2022-05-04 | End: 2022-05-04

## 2022-05-04 RX ORDER — HYDROCORTISONE 20 MG
200 TABLET ORAL ONCE
Refills: 0 | Status: COMPLETED | OUTPATIENT
Start: 2022-05-04

## 2022-05-04 RX ADMIN — HYDROMORPHONE HYDROCHLORIDE 0.5 MILLIGRAM(S): 2 INJECTION INTRAMUSCULAR; INTRAVENOUS; SUBCUTANEOUS at 00:38

## 2022-05-04 RX ADMIN — Medication 650 MILLIGRAM(S): at 18:30

## 2022-05-04 RX ADMIN — OXYCODONE HYDROCHLORIDE 5 MILLIGRAM(S): 5 TABLET ORAL at 16:15

## 2022-05-04 RX ADMIN — HYDROMORPHONE HYDROCHLORIDE 0.5 MILLIGRAM(S): 2 INJECTION INTRAMUSCULAR; INTRAVENOUS; SUBCUTANEOUS at 00:51

## 2022-05-04 RX ADMIN — Medication 12.5 MILLIGRAM(S): at 21:02

## 2022-05-04 RX ADMIN — OXYCODONE HYDROCHLORIDE 5 MILLIGRAM(S): 5 TABLET ORAL at 09:45

## 2022-05-04 RX ADMIN — Medication 3 MILLIGRAM(S): at 21:11

## 2022-05-04 RX ADMIN — OXYCODONE HYDROCHLORIDE 5 MILLIGRAM(S): 5 TABLET ORAL at 08:59

## 2022-05-04 RX ADMIN — GABAPENTIN 300 MILLIGRAM(S): 400 CAPSULE ORAL at 18:38

## 2022-05-04 RX ADMIN — ROPINIROLE 5 MILLIGRAM(S): 8 TABLET, FILM COATED, EXTENDED RELEASE ORAL at 21:11

## 2022-05-04 RX ADMIN — OXYCODONE HYDROCHLORIDE 5 MILLIGRAM(S): 5 TABLET ORAL at 15:21

## 2022-05-04 RX ADMIN — OXYCODONE HYDROCHLORIDE 5 MILLIGRAM(S): 5 TABLET ORAL at 22:21

## 2022-05-04 RX ADMIN — DULOXETINE HYDROCHLORIDE 60 MILLIGRAM(S): 30 CAPSULE, DELAYED RELEASE ORAL at 12:18

## 2022-05-04 RX ADMIN — Medication 650 MILLIGRAM(S): at 17:30

## 2022-05-04 RX ADMIN — Medication 100 MILLIGRAM(S): at 04:01

## 2022-05-04 RX ADMIN — Medication 5 MILLIGRAM(S): at 21:11

## 2022-05-04 RX ADMIN — Medication 100 MILLIGRAM(S): at 05:39

## 2022-05-04 RX ADMIN — Medication 48 MILLIGRAM(S): at 12:18

## 2022-05-04 RX ADMIN — TAMSULOSIN HYDROCHLORIDE 0.4 MILLIGRAM(S): 0.4 CAPSULE ORAL at 21:16

## 2022-05-04 RX ADMIN — Medication 12.5 MILLIGRAM(S): at 05:38

## 2022-05-04 RX ADMIN — FAMOTIDINE 20 MILLIGRAM(S): 10 INJECTION INTRAVENOUS at 05:37

## 2022-05-04 RX ADMIN — FAMOTIDINE 20 MILLIGRAM(S): 10 INJECTION INTRAVENOUS at 18:37

## 2022-05-04 RX ADMIN — GABAPENTIN 300 MILLIGRAM(S): 400 CAPSULE ORAL at 05:37

## 2022-05-04 RX ADMIN — POLYETHYLENE GLYCOL 3350 17 GRAM(S): 17 POWDER, FOR SOLUTION ORAL at 21:02

## 2022-05-04 RX ADMIN — Medication 125 MICROGRAM(S): at 05:37

## 2022-05-04 RX ADMIN — POLYETHYLENE GLYCOL 3350 17 GRAM(S): 17 POWDER, FOR SOLUTION ORAL at 05:38

## 2022-05-04 RX ADMIN — SENNA PLUS 2 TABLET(S): 8.6 TABLET ORAL at 21:11

## 2022-05-04 RX ADMIN — Medication 100 MILLIGRAM(S): at 18:41

## 2022-05-04 RX ADMIN — OXYCODONE HYDROCHLORIDE 5 MILLIGRAM(S): 5 TABLET ORAL at 21:22

## 2022-05-04 NOTE — PROGRESS NOTE ADULT - ATTENDING COMMENTS
Ms. Bland is a 77 yo F w/ PMH of SVT s/p ablation, HTN, HLD, hypothyroidism, vertigo, peripheral neuropathy, OA, cervical CA p/w R sided groin/pelvic pain and found to have fracture of Rt superior and inferior pubic rami with hematoma of Rt obturator internus muscle; course c/b sepsis 2/2 MSSA bacteremia - etiology remains unclear.    # Sepsis 2/2 high grade MSSA bacteremia  -etiology still unclear; concern for septic arthritis of Rt hip but MRI Rt Hip (non-contrast) nonrevealing except acute fractures of the right superior and inferior pubic rami and of the right sacral ala with regional myofascial edema and hematoma. No right hip joint effusion. F/u MRI with contrast (pre-medication for allergy) to eval for OM.  -no vegetations on TTE and SHAUN  -repeat blood cultures from 5/2 no growth to date  -D/w ID - planning for 4-6 weeks IV cefazolin    #Rt superior and inferior pubic rami with hematoma of R obturator internus muscle   - No acute intervention as per ortho recs  - Pain control w/ Oxy IR 5mg Q 6 PRN, continue  bowel regimen    #urinary retention - attempt repeat TOV

## 2022-05-04 NOTE — DIETITIAN INITIAL EVALUATION ADULT - PERTINENT MEDS FT
bisacodyl, ceFAZolin IVPB, famotidine Tablet, hydrocortisone sodium succinate Injectable, levothyroxine, polyethylene glycol , senna, sodium chloride 0.9% IV Continuous, aluminum hydroxide/magnesium hydroxide/simethicone Suspension PRN, ondansetron Injectable PRN

## 2022-05-04 NOTE — PROGRESS NOTE ADULT - PROBLEM SELECTOR PLAN 3
- possible MSSA source?  - Not well visualized on US, but overlying fluid collection [id number] is seen  - MRI with Acute fractures of the right superior and inferior pubic rami and of the right sacral ala with regional myofascial edema and hematoma. No right hip joint effusion.  - May potentially need MRI with pedro contrast to better evaluate for infectious foci

## 2022-05-04 NOTE — DIETITIAN INITIAL EVALUATION ADULT - OTHER CALCULATIONS
Dosing weight (4/28) 211 lbs / 96.0 kg. No recent weight history available via chart.  Ideal Body Weight: 130 lbs / 59.1 kg +/-10%

## 2022-05-04 NOTE — PROGRESS NOTE ADULT - SUBJECTIVE AND OBJECTIVE BOX
PROGRESS NOTE:   Authored by Shree Torres MD  Pager:  [phone]    Patient is a 76y old  Female who presents with a chief complaint of pelvic pain (03 May 2022 15:56)      SUBJECTIVE / OVERNIGHT EVENTS:    ADDITIONAL REVIEW OF SYSTEMS:    MEDICATIONS  (STANDING):  bisacodyl 5 milliGRAM(s) Oral at bedtime  ceFAZolin   IVPB      ceFAZolin   IVPB 2000 milliGRAM(s) IV Intermittent every 12 hours  diphenhydrAMINE Injectable 50 milliGRAM(s) IV Push once  DULoxetine 60 milliGRAM(s) Oral daily  famotidine    Tablet 20 milliGRAM(s) Oral two times a day  fenofibrate Tablet 48 milliGRAM(s) Oral daily  gabapentin 300 milliGRAM(s) Oral two times a day  hydrocortisone sodium succinate Injectable 200 milliGRAM(s) IV Push once  levothyroxine 125 MICROGram(s) Oral daily  meclizine 12.5 milliGRAM(s) Oral two times a day  melatonin 3 milliGRAM(s) Oral at bedtime  polyethylene glycol 3350 17 Gram(s) Oral two times a day  rOPINIRole 5 milliGRAM(s) Oral at bedtime  senna 2 Tablet(s) Oral at bedtime  sodium chloride 0.9%. 500 milliLiter(s) (85 mL/Hr) IV Continuous <Continuous>  tamsulosin 0.4 milliGRAM(s) Oral at bedtime    MEDICATIONS  (PRN):  aluminum hydroxide/magnesium hydroxide/simethicone Suspension 30 milliLiter(s) Oral every 6 hours PRN Dyspepsia  benzonatate 100 milliGRAM(s) Oral every 8 hours PRN Cough  ondansetron Injectable 4 milliGRAM(s) IV Push four times a day PRN Nausea and/or Vomiting  oxyCODONE    IR 5 milliGRAM(s) Oral every 6 hours PRN moderate to severe pain      CAPILLARY BLOOD GLUCOSE        I&O's Summary    03 May 2022 07:01  -  04 May 2022 07:00  --------------------------------------------------------  IN: 1100 mL / OUT: 1400 mL / NET: -300 mL        PHYSICAL EXAM:  Vital Signs Last 24 Hrs  T(C): 36.5 (04 May 2022 05:47), Max: 37.4 (03 May 2022 22:04)  T(F): 97.7 (04 May 2022 05:47), Max: 99.3 (03 May 2022 22:04)  HR: 70 (04 May 2022 05:47) (70 - 88)  BP: 119/69 (04 May 2022 05:47) (118/60 - 127/75)  BP(mean): --  RR: 17 (04 May 2022 05:47) (17 - 18)  SpO2: 99% (04 May 2022 05:47) (98% - 100%)    GENERAL: No acute distress, well-developed  HEAD:  Atraumatic, Normocephalic  EYES: EOMI, PERRLA, conjunctiva and sclera clear  NECK: Supple, no lymphadenopathy, no JVD  CHEST/LUNG: CTAB; No wheezes, rales, or rhonchi  HEART: Regular rate and rhythm; No murmurs, rubs, or gallops  ABDOMEN: Soft, non-tender, non-distended; normal bowel sounds, no organomegaly  EXTREMITIES:  2+ peripheral pulses b/l, No clubbing, cyanosis, or edema  NEUROLOGY: A&O x 3, no focal deficits  SKIN: No rashes or lesions    LABS:                        10.4   16.80 )-----------( 370      ( 03 May 2022 07:21 )             32.3     05-03    132<L>  |  97<L>  |  16  ----------------------------<  120<H>  4.9   |  22  |  0.65    Ca    9.5      03 May 2022 07:21                Culture - Blood (collected 02 May 2022 10:44)  Source: .Blood Blood-Venous  Preliminary Report (03 May 2022 11:01):    No growth to date.    Culture - Blood (collected 02 May 2022 10:44)  Source: .Blood Blood  Preliminary Report (03 May 2022 11:01):    No growth to date.        RADIOLOGY & ADDITIONAL TESTS:  Results Reviewed:   Imaging Personally Reviewed:  Electrocardiogram Personally Reviewed:    COORDINATION OF CARE:  Care Discussed with Consultants/Other Providers [Y/N]:  Prior or Outpatient Records Reviewed [Y/N]:   PROGRESS NOTE:   Authored by Shree Torres MD  Pager:  [phone]    Patient is a 76y old  Female who presents with a chief complaint of pelvic pain (03 May 2022 15:56)      SUBJECTIVE / OVERNIGHT EVENTS: NAEO. Patient expresses no new complaints this morning.    ADDITIONAL REVIEW OF SYSTEMS:  REVIEW OF SYSTEMS:    CONSTITUTIONAL: No weakness, fevers or chills  RESPIRATORY: +cough. No wheezing, hemoptysis; No shortness of breath  CARDIOVASCULAR: No chest pain or palpitations  GASTROINTESTINAL: No abdominal or epigastric pain. No nausea, vomiting, or hematemesis; No diarrhea or constipation. No melena or hematochezia.  SKIN: No itching, rashes      MEDICATIONS  (STANDING):  bisacodyl 5 milliGRAM(s) Oral at bedtime  ceFAZolin   IVPB      ceFAZolin   IVPB 2000 milliGRAM(s) IV Intermittent every 12 hours  diphenhydrAMINE Injectable 50 milliGRAM(s) IV Push once  DULoxetine 60 milliGRAM(s) Oral daily  famotidine    Tablet 20 milliGRAM(s) Oral two times a day  fenofibrate Tablet 48 milliGRAM(s) Oral daily  gabapentin 300 milliGRAM(s) Oral two times a day  hydrocortisone sodium succinate Injectable 200 milliGRAM(s) IV Push once  levothyroxine 125 MICROGram(s) Oral daily  meclizine 12.5 milliGRAM(s) Oral two times a day  melatonin 3 milliGRAM(s) Oral at bedtime  polyethylene glycol 3350 17 Gram(s) Oral two times a day  rOPINIRole 5 milliGRAM(s) Oral at bedtime  senna 2 Tablet(s) Oral at bedtime  sodium chloride 0.9%. 500 milliLiter(s) (85 mL/Hr) IV Continuous <Continuous>  tamsulosin 0.4 milliGRAM(s) Oral at bedtime    MEDICATIONS  (PRN):  aluminum hydroxide/magnesium hydroxide/simethicone Suspension 30 milliLiter(s) Oral every 6 hours PRN Dyspepsia  benzonatate 100 milliGRAM(s) Oral every 8 hours PRN Cough  ondansetron Injectable 4 milliGRAM(s) IV Push four times a day PRN Nausea and/or Vomiting  oxyCODONE    IR 5 milliGRAM(s) Oral every 6 hours PRN moderate to severe pain      CAPILLARY BLOOD GLUCOSE        I&O's Summary    03 May 2022 07:01  -  04 May 2022 07:00  --------------------------------------------------------  IN: 1100 mL / OUT: 1400 mL / NET: -300 mL        PHYSICAL EXAM:  Vital Signs Last 24 Hrs  T(C): 36.5 (04 May 2022 05:47), Max: 37.4 (03 May 2022 22:04)  T(F): 97.7 (04 May 2022 05:47), Max: 99.3 (03 May 2022 22:04)  HR: 70 (04 May 2022 05:47) (70 - 88)  BP: 119/69 (04 May 2022 05:47) (118/60 - 127/75)  BP(mean): --  RR: 17 (04 May 2022 05:47) (17 - 18)  SpO2: 99% (04 May 2022 05:47) (98% - 100%)    GENERAL: NAD, lying comfortably in bed  HEAD: Atraumatic, normocephalic  EYES: EOMI b/l PERRLA b/l, conjunctiva and sclera clear  NECK: Supple, No JVD, No LAD  RESPIRATORY: Normal respiratory effort; lungs are clear to auscultation bilaterally  CARDIOVASCULAR: Regular rate and rhythm, normal S1 and S2, no murmur/rub/gallop; Trace lower extremity edema  ABDOMEN: Nontender, normoactive bowel sounds, no rebound/guarding; No hepatosplenomegaly  MUSCULOSKELETAL: no clubbing or cyanosis of digits; + tenderness in R hip/ pelvic area. Decreased ROM of the right hip 2/2 pain   NEURO: Non focal   PSYCH: A+O to person, place, and time; affect appropriate  Skin: small ecchymosis in R abdominal fold, bruises in arms bilaterally    LABS:                        10.4   16.80 )-----------( 370      ( 03 May 2022 07:21 )             32.3     05-03    132<L>  |  97<L>  |  16  ----------------------------<  120<H>  4.9   |  22  |  0.65    Ca    9.5      03 May 2022 07:21                Culture - Blood (collected 02 May 2022 10:44)  Source: .Blood Blood-Venous  Preliminary Report (03 May 2022 11:01):    No growth to date.    Culture - Blood (collected 02 May 2022 10:44)  Source: .Blood Blood  Preliminary Report (03 May 2022 11:01):    No growth to date.        RADIOLOGY & ADDITIONAL TESTS:  Results Reviewed:   Imaging Personally Reviewed:  Electrocardiogram Personally Reviewed:    COORDINATION OF CARE:  Care Discussed with Consultants/Other Providers [Y/N]:  Prior or Outpatient Records Reviewed [Y/N]:

## 2022-05-04 NOTE — DIETITIAN INITIAL EVALUATION ADULT - ENERGY INTAKE
Pt reports continued good appetite/PO intake in house, tolerating diet. Pt completing daily menus with personal food preferences. Pt vocalizes request for cookies.

## 2022-05-04 NOTE — DIETITIAN INITIAL EVALUATION ADULT - ADD RECOMMEND
2) Encouraged PO intake as tolerated. Obtained food preferences and will provide as able.  3) Monitor BMs, adjust bowel regimen as appropriate.

## 2022-05-04 NOTE — DIETITIAN INITIAL EVALUATION ADULT - OTHER INFO
Per chart, pt is 76 year old female PMHx SVT s/p ablation, HTN, HLD, hypothyroidism, vertigo, peripheral neuropathy, osteoarthritis, cervical ca presenting with R sided groin/pelvic pain found to have R superior/inferior pubic rami fx with hematoma of R obturator internus muscle, fever and leukocytosis of unclear source. Pt pending discharge to rehab.

## 2022-05-04 NOTE — DIETITIAN INITIAL EVALUATION ADULT - NSFNSGIIOFT_GEN_A_CORE
Pt endorses constipation, last BM 5/2 per flowsheets. S/p enema x2. Fecal incontinence noted. Ordered for bowel regimen (bisacodyl 5 mg qHS, senna 2 tablets qHS and Miralax 17 gm BID). Pt amenable to provision of prune juice.

## 2022-05-04 NOTE — PROGRESS NOTE ADULT - PROBLEM SELECTOR PLAN 1
- Continue cefazolin  - ID on board. Appreciate recs  - BCx: 4/27: MSSA | 4/29: Gram + cocci in clusters | repeat BCx on 5/2 NGTD  - TTE and SHAUN negative for vegetations   - MRI of Right Hip w/o contrast performed today due to IV contrast patient allergy. Will most likely be low yield for identifying infectious foci. Will plan for prepping patient for MRI with pedro tomorrow   - F/U XR left knee to r/o possibility of new infectious foci in knee joint. - WBC downtrending. Continue cefazolin  - ID on board. Appreciate recs  - BCx: 4/27: MSSA | 4/29: Gram + cocci in clusters | repeat BCx on 5/2 NGTD  - TTE and SHAUN negative for vegetations   - MRI of Right Hip w/o contrast performed today due to IV contrast patient allergy. Will most likely be low yield for identifying infectious foci. Will plan for prepping patient for MRI with pedro. Pending scheduling  - F/U XR left knee to r/o possibility of new infectious foci in knee joint. - WBC downtrending. Continue cefazolin  - ID on board. Appreciate recs  - BCx: 4/27: MSSA | 4/29: Gram + cocci in clusters | repeat BCx on 5/2 NGTD  - TTE and SHAUN negative for vegetations   - MRI of Right Hip w/o contrast performed today due to IV contrast patient allergy. Will most likely be low yield for identifying infectious foci. Will plan for prepping patient for MRI with pedro. Pending scheduling will premedicate   Methylprednisolone sodium succinate 32 mg PO - Two doses administered 12 hours and 2 hours  prior to contrast administration AND Diphenhydramine 50 mg IV, IM or PO 1 hour may be added prior to contrast administration as well per system protocol  - F/U XR left knee to r/o possibility of new infectious foci in knee joint. - WBC downtrending. Continue cefazolin  - ID on board. Appreciate recs  - BCx: 4/27: MSSA | 4/29: Gram + cocci in clusters | repeat BCx on 5/2 NGTD  - TTE and SHAUN negative for vegetations   - MRI of Right Hip w/o contrast performed today due to IV contrast patient allergy. Will most likely be low yield for identifying infectious foci. Will plan for prepping patient for MRI with pedro. Pending scheduling will premedicate with Methylprednisolone sodium succinate 40 mg IV 5 hours prior to contrast and then Diphenhydramine 50 mg IV 1 hour before contrast administrationl per system protocol  - F/U XR left knee to r/o possibility of new infectious foci in knee joint.

## 2022-05-04 NOTE — DIETITIAN INITIAL EVALUATION ADULT - ORAL INTAKE PTA/DIET HISTORY
Pt confirms NKFA, denies chewing/swallowing difficulties. Pt lives at home alone, was independent with ADLs PTA. Pt typically with good appetite/PO intake, adheres to regular diet.

## 2022-05-05 LAB
ANION GAP SERPL CALC-SCNC: 12 MMOL/L — SIGNIFICANT CHANGE UP (ref 7–14)
BUN SERPL-MCNC: 26 MG/DL — HIGH (ref 7–23)
CALCIUM SERPL-MCNC: 8.9 MG/DL — SIGNIFICANT CHANGE UP (ref 8.4–10.5)
CHLORIDE SERPL-SCNC: 96 MMOL/L — LOW (ref 98–107)
CO2 SERPL-SCNC: 21 MMOL/L — LOW (ref 22–31)
CREAT SERPL-MCNC: 0.91 MG/DL — SIGNIFICANT CHANGE UP (ref 0.5–1.3)
CULTURE RESULTS: SIGNIFICANT CHANGE UP
EGFR: 65 ML/MIN/1.73M2 — SIGNIFICANT CHANGE UP
GLUCOSE SERPL-MCNC: 129 MG/DL — HIGH (ref 70–99)
HCT VFR BLD CALC: 32 % — LOW (ref 34.5–45)
HGB BLD-MCNC: 10.4 G/DL — LOW (ref 11.5–15.5)
MAGNESIUM SERPL-MCNC: 2 MG/DL — SIGNIFICANT CHANGE UP (ref 1.6–2.6)
MCHC RBC-ENTMCNC: 26.6 PG — LOW (ref 27–34)
MCHC RBC-ENTMCNC: 32.5 GM/DL — SIGNIFICANT CHANGE UP (ref 32–36)
MCV RBC AUTO: 81.8 FL — SIGNIFICANT CHANGE UP (ref 80–100)
NRBC # BLD: 0 /100 WBCS — SIGNIFICANT CHANGE UP
NRBC # FLD: 0 K/UL — SIGNIFICANT CHANGE UP
PHOSPHATE SERPL-MCNC: 3.8 MG/DL — SIGNIFICANT CHANGE UP (ref 2.5–4.5)
PLATELET # BLD AUTO: 374 K/UL — SIGNIFICANT CHANGE UP (ref 150–400)
POTASSIUM SERPL-MCNC: 4.7 MMOL/L — SIGNIFICANT CHANGE UP (ref 3.5–5.3)
POTASSIUM SERPL-SCNC: 4.7 MMOL/L — SIGNIFICANT CHANGE UP (ref 3.5–5.3)
RBC # BLD: 3.91 M/UL — SIGNIFICANT CHANGE UP (ref 3.8–5.2)
RBC # FLD: 15.5 % — HIGH (ref 10.3–14.5)
SODIUM SERPL-SCNC: 129 MMOL/L — LOW (ref 135–145)
SPECIMEN SOURCE: SIGNIFICANT CHANGE UP
URATE SERPL-MCNC: 4.7 MG/DL — SIGNIFICANT CHANGE UP (ref 2.5–7)
WBC # BLD: 13.72 K/UL — HIGH (ref 3.8–10.5)
WBC # FLD AUTO: 13.72 K/UL — HIGH (ref 3.8–10.5)

## 2022-05-05 PROCEDURE — 99232 SBSQ HOSP IP/OBS MODERATE 35: CPT | Mod: GC

## 2022-05-05 PROCEDURE — 73723 MRI JOINT LWR EXTR W/O&W/DYE: CPT | Mod: 26,RT

## 2022-05-05 RX ORDER — SPIRONOLACTONE 25 MG/1
50 TABLET, FILM COATED ORAL DAILY
Refills: 0 | Status: ACTIVE | OUTPATIENT
Start: 2022-05-05 | End: 2023-04-03

## 2022-05-05 RX ORDER — OXYCODONE HYDROCHLORIDE 5 MG/1
2.5 TABLET ORAL EVERY 6 HOURS
Refills: 0 | Status: DISCONTINUED | OUTPATIENT
Start: 2022-05-05 | End: 2022-05-07

## 2022-05-05 RX ORDER — DIPHENHYDRAMINE HCL 50 MG
50 CAPSULE ORAL ONCE
Refills: 0 | Status: COMPLETED | OUTPATIENT
Start: 2022-05-05 | End: 2022-05-05

## 2022-05-05 RX ORDER — OXYCODONE HYDROCHLORIDE 5 MG/1
5 TABLET ORAL EVERY 6 HOURS
Refills: 0 | Status: DISCONTINUED | OUTPATIENT
Start: 2022-05-05 | End: 2022-05-07

## 2022-05-05 RX ORDER — FLUTICASONE PROPIONATE 50 MCG
2 SPRAY, SUSPENSION NASAL ONCE
Refills: 0 | Status: COMPLETED | OUTPATIENT
Start: 2022-05-05 | End: 2022-05-05

## 2022-05-05 RX ORDER — HYDROCORTISONE 20 MG
200 TABLET ORAL ONCE
Refills: 0 | Status: COMPLETED | OUTPATIENT
Start: 2022-05-05 | End: 2022-05-05

## 2022-05-05 RX ADMIN — Medication 100 MILLIGRAM(S): at 00:15

## 2022-05-05 RX ADMIN — Medication 2 SPRAY(S): at 11:43

## 2022-05-05 RX ADMIN — Medication 50 MILLIGRAM(S): at 17:04

## 2022-05-05 RX ADMIN — OXYCODONE HYDROCHLORIDE 5 MILLIGRAM(S): 5 TABLET ORAL at 18:04

## 2022-05-05 RX ADMIN — OXYCODONE HYDROCHLORIDE 5 MILLIGRAM(S): 5 TABLET ORAL at 03:42

## 2022-05-05 RX ADMIN — Medication 3 MILLIGRAM(S): at 22:18

## 2022-05-05 RX ADMIN — OXYCODONE HYDROCHLORIDE 5 MILLIGRAM(S): 5 TABLET ORAL at 17:04

## 2022-05-05 RX ADMIN — Medication 100 MILLIGRAM(S): at 06:07

## 2022-05-05 RX ADMIN — GABAPENTIN 300 MILLIGRAM(S): 400 CAPSULE ORAL at 06:12

## 2022-05-05 RX ADMIN — Medication 12.5 MILLIGRAM(S): at 06:13

## 2022-05-05 RX ADMIN — POLYETHYLENE GLYCOL 3350 17 GRAM(S): 17 POWDER, FOR SOLUTION ORAL at 06:13

## 2022-05-05 RX ADMIN — Medication 48 MILLIGRAM(S): at 11:44

## 2022-05-05 RX ADMIN — GABAPENTIN 300 MILLIGRAM(S): 400 CAPSULE ORAL at 17:04

## 2022-05-05 RX ADMIN — DULOXETINE HYDROCHLORIDE 60 MILLIGRAM(S): 30 CAPSULE, DELAYED RELEASE ORAL at 11:44

## 2022-05-05 RX ADMIN — ROPINIROLE 5 MILLIGRAM(S): 8 TABLET, FILM COATED, EXTENDED RELEASE ORAL at 22:23

## 2022-05-05 RX ADMIN — OXYCODONE HYDROCHLORIDE 5 MILLIGRAM(S): 5 TABLET ORAL at 04:12

## 2022-05-05 RX ADMIN — Medication 650 MILLIGRAM(S): at 13:54

## 2022-05-05 RX ADMIN — POLYETHYLENE GLYCOL 3350 17 GRAM(S): 17 POWDER, FOR SOLUTION ORAL at 17:06

## 2022-05-05 RX ADMIN — Medication 100 MILLIGRAM(S): at 17:03

## 2022-05-05 RX ADMIN — Medication 12.5 MILLIGRAM(S): at 17:05

## 2022-05-05 RX ADMIN — SPIRONOLACTONE 50 MILLIGRAM(S): 25 TABLET, FILM COATED ORAL at 22:17

## 2022-05-05 RX ADMIN — Medication 100 MILLIGRAM(S): at 09:30

## 2022-05-05 RX ADMIN — TAMSULOSIN HYDROCHLORIDE 0.4 MILLIGRAM(S): 0.4 CAPSULE ORAL at 22:18

## 2022-05-05 RX ADMIN — OXYCODONE HYDROCHLORIDE 5 MILLIGRAM(S): 5 TABLET ORAL at 23:43

## 2022-05-05 RX ADMIN — Medication 125 MICROGRAM(S): at 06:12

## 2022-05-05 RX ADMIN — FAMOTIDINE 20 MILLIGRAM(S): 10 INJECTION INTRAVENOUS at 06:13

## 2022-05-05 RX ADMIN — Medication 650 MILLIGRAM(S): at 14:51

## 2022-05-05 RX ADMIN — FAMOTIDINE 20 MILLIGRAM(S): 10 INJECTION INTRAVENOUS at 17:05

## 2022-05-05 RX ADMIN — OXYCODONE HYDROCHLORIDE 5 MILLIGRAM(S): 5 TABLET ORAL at 09:30

## 2022-05-05 RX ADMIN — Medication 200 MILLIGRAM(S): at 13:43

## 2022-05-05 RX ADMIN — OXYCODONE HYDROCHLORIDE 5 MILLIGRAM(S): 5 TABLET ORAL at 10:30

## 2022-05-05 NOTE — PROGRESS NOTE ADULT - SUBJECTIVE AND OBJECTIVE BOX
PROGRESS NOTE:   Authored by Shree Torres MD  Pager:  [phone]    Patient is a 76y old  Female who presents with a chief complaint of Fever due to unspecified condition     (04 May 2022 15:01)      SUBJECTIVE / OVERNIGHT EVENTS: No acute events overnight. Patient endorses no new complaints this morning.     ADDITIONAL REVIEW OF SYSTEMS:  REVIEW OF SYSTEMS:    CONSTITUTIONAL: No weakness, fevers or chills  RESPIRATORY: No cough, wheezing, hemoptysis; No shortness of breath  CARDIOVASCULAR: No chest pain or palpitations  GASTROINTESTINAL: No abdominal or epigastric pain. No nausea, vomiting, or hematemesis; No diarrhea or constipation. No melena or hematochezia.  SKIN: No itching, rashes      MEDICATIONS  (STANDING):  bisacodyl 5 milliGRAM(s) Oral at bedtime  ceFAZolin   IVPB      ceFAZolin   IVPB 2000 milliGRAM(s) IV Intermittent every 12 hours  diphenhydrAMINE Injectable 50 milliGRAM(s) IV Push once  DULoxetine 60 milliGRAM(s) Oral daily  famotidine    Tablet 20 milliGRAM(s) Oral two times a day  fenofibrate Tablet 48 milliGRAM(s) Oral daily  gabapentin 300 milliGRAM(s) Oral two times a day  hydrocortisone sodium succinate Injectable 200 milliGRAM(s) IV Push once  levothyroxine 125 MICROGram(s) Oral daily  meclizine 12.5 milliGRAM(s) Oral two times a day  melatonin 3 milliGRAM(s) Oral at bedtime  polyethylene glycol 3350 17 Gram(s) Oral two times a day  rOPINIRole 5 milliGRAM(s) Oral at bedtime  senna 2 Tablet(s) Oral at bedtime  sodium chloride 0.9%. 500 milliLiter(s) (85 mL/Hr) IV Continuous <Continuous>  tamsulosin 0.4 milliGRAM(s) Oral at bedtime    MEDICATIONS  (PRN):  acetaminophen     Tablet .. 650 milliGRAM(s) Oral every 6 hours PRN Mild Pain (1 - 3)  aluminum hydroxide/magnesium hydroxide/simethicone Suspension 30 milliLiter(s) Oral every 6 hours PRN Dyspepsia  benzonatate 100 milliGRAM(s) Oral every 8 hours PRN Cough  ondansetron Injectable 4 milliGRAM(s) IV Push four times a day PRN Nausea and/or Vomiting  oxyCODONE    IR 5 milliGRAM(s) Oral every 6 hours PRN moderate to severe pain      CAPILLARY BLOOD GLUCOSE        I&O's Summary    04 May 2022 07:01  -  05 May 2022 07:00  --------------------------------------------------------  IN: 50 mL / OUT: 1350 mL / NET: -1300 mL        PHYSICAL EXAM:  Vital Signs Last 24 Hrs  T(C): 36.6 (04 May 2022 21:29), Max: 37.3 (04 May 2022 14:51)  T(F): 97.9 (04 May 2022 21:29), Max: 99.1 (04 May 2022 14:51)  HR: 80 (04 May 2022 21:29) (80 - 84)  BP: 137/62 (04 May 2022 21:29) (130/61 - 137/62)  BP(mean): --  RR: 18 (04 May 2022 21:29) (18 - 18)  SpO2: 97% (04 May 2022 21:29) (97% - 98%)    GENERAL: NAD, lying comfortably in bed  HEAD: Atraumatic, normocephalic  EYES: EOMI b/l PERRLA b/l, conjunctiva and sclera clear  NECK: Supple, No JVD, No LAD  RESPIRATORY: Normal respiratory effort; lungs are clear to auscultation bilaterally  CARDIOVASCULAR: Regular rate and rhythm, normal S1 and S2, no murmur/rub/gallop; Trace lower extremity edema  ABDOMEN: Nontender, normoactive bowel sounds, no rebound/guarding; No hepatosplenomegaly  MUSCULOSKELETAL: no clubbing or cyanosis of digits; + tenderness in R hip/ pelvic area. Decreased ROM of the right hip 2/2 pain   NEURO: Non focal   PSYCH: A+O to person, place, and time; affect appropriate    LABS:                        10.0   14.61 )-----------( 312      ( 04 May 2022 07:58 )             31.5     05-04    131<L>  |  98  |  21  ----------------------------<  120<H>  5.4<H>   |  23  |  0.75    Ca    9.2      04 May 2022 07:58  Phos  4.1     05-04  Mg     2.00     05-04                Culture - Blood (collected 02 May 2022 10:44)  Source: .Blood Blood-Venous  Preliminary Report (03 May 2022 11:01):    No growth to date.    Culture - Blood (collected 02 May 2022 10:44)  Source: .Blood Blood  Preliminary Report (03 May 2022 11:01):    No growth to date.        RADIOLOGY & ADDITIONAL TESTS:  Results Reviewed:   Imaging Personally Reviewed:  Electrocardiogram Personally Reviewed:    COORDINATION OF CARE:  Care Discussed with Consultants/Other Providers [Y/N]:  Prior or Outpatient Records Reviewed [Y/N]:

## 2022-05-05 NOTE — PROGRESS NOTE ADULT - ATTENDING COMMENTS
75 yo F w/ PMH of SVT s/p ablation, HTN, HLD, hypothyroidism, vertigo, peripheral neuropathy, OA, cervical CA p/w R sided groin/pelvic pain and found to have fracture of Rt superior and inferior pubic rami with hematoma of Rt obturator internus muscle; course c/b sepsis 2/2 MSSA bacteremia - etiology remains unclear.    # Sepsis 2/2 high grade MSSA bacteremia  -etiology still unclear; concern for septic arthritis of Rt hip but MRI Rt Hip (non-contrast) nonrevealing except acute fractures of the right superior and inferior pubic rami and of the right sacral ala with regional myofascial edema and hematoma. No right hip joint effusion.   -F/u MRI with contrast (pre-medication for allergy) to eval for OM  -no vegetations on TTE and SHAUN  -repeat blood cultures from 5/2 NGTD  -planning for 4-6 weeks IV cefazolin depending on MRI results    #Rt superior and inferior pubic rami with hematoma of R obturator internus muscle   - No acute intervention as per ortho recs  - Pain control w/ Oxy IR 5mg Q 6 PRN, continue  bowel regimen    #Urinary retention - repeating TOV

## 2022-05-05 NOTE — PROGRESS NOTE ADULT - PROBLEM SELECTOR PLAN 3
- possible MSSA source?  - Not well visualized on US, but overlying fluid collection [id number] is seen  - MRI non contrast with Acute fractures of the right superior and inferior pubic rami and of the right sacral ala with regional myofascial edema and hematoma. No right hip joint effusion.  - Pending MRI with PRATIMA to evaluate for OM

## 2022-05-05 NOTE — PROGRESS NOTE ADULT - PROBLEM SELECTOR PLAN 1
- WBC downtrending. Continue cefazolin  - ID on board. Appreciate recs  - BCx: 4/27: MSSA | 4/29: Gram + cocci in clusters | repeat BCx on 5/2 NGTD  - TTE and SHAUN negative for vegetations   - MRI of Right Hip w/o contrast performed today due to IV contrast patient allergy. Will most likely be low yield for identifying infectious foci. Will plan for prepping patient for MRI with pedro. Pending scheduling will premedicate with Methylprednisolone sodium succinate 40 mg IV 5 hours prior to contrast and then Diphenhydramine 50 mg IV 1 hour before contrast administrationl per system protocol

## 2022-05-05 NOTE — PROGRESS NOTE ADULT - PROBLEM SELECTOR PLAN 2
- No acute orthopedic intervention  - Continue Oxycodone and taper as needed for severe pain.  - Continues to do well with PT  - PT eval: Rehab

## 2022-05-06 ENCOUNTER — TRANSCRIPTION ENCOUNTER (OUTPATIENT)
Age: 77
End: 2022-05-06

## 2022-05-06 VITALS
OXYGEN SATURATION: 100 % | SYSTOLIC BLOOD PRESSURE: 119 MMHG | DIASTOLIC BLOOD PRESSURE: 73 MMHG | RESPIRATION RATE: 15 BRPM | TEMPERATURE: 99 F | HEART RATE: 85 BPM

## 2022-05-06 DIAGNOSIS — R33.9 RETENTION OF URINE, UNSPECIFIED: ICD-10-CM

## 2022-05-06 LAB
ANION GAP SERPL CALC-SCNC: 11 MMOL/L — SIGNIFICANT CHANGE UP (ref 7–14)
BUN SERPL-MCNC: 39 MG/DL — HIGH (ref 7–23)
CALCIUM SERPL-MCNC: 8.8 MG/DL — SIGNIFICANT CHANGE UP (ref 8.4–10.5)
CHLORIDE SERPL-SCNC: 97 MMOL/L — LOW (ref 98–107)
CO2 SERPL-SCNC: 22 MMOL/L — SIGNIFICANT CHANGE UP (ref 22–31)
CREAT SERPL-MCNC: 1.09 MG/DL — SIGNIFICANT CHANGE UP (ref 0.5–1.3)
EGFR: 53 ML/MIN/1.73M2 — LOW
GLUCOSE SERPL-MCNC: 174 MG/DL — HIGH (ref 70–99)
HCT VFR BLD CALC: 29.9 % — LOW (ref 34.5–45)
HGB BLD-MCNC: 10 G/DL — LOW (ref 11.5–15.5)
MAGNESIUM SERPL-MCNC: 2.3 MG/DL — SIGNIFICANT CHANGE UP (ref 1.6–2.6)
MCHC RBC-ENTMCNC: 27.5 PG — SIGNIFICANT CHANGE UP (ref 27–34)
MCHC RBC-ENTMCNC: 33.4 GM/DL — SIGNIFICANT CHANGE UP (ref 32–36)
MCV RBC AUTO: 82.4 FL — SIGNIFICANT CHANGE UP (ref 80–100)
NRBC # BLD: 0 /100 WBCS — SIGNIFICANT CHANGE UP
NRBC # FLD: 0 K/UL — SIGNIFICANT CHANGE UP
PHOSPHATE SERPL-MCNC: 3.7 MG/DL — SIGNIFICANT CHANGE UP (ref 2.5–4.5)
PLATELET # BLD AUTO: 352 K/UL — SIGNIFICANT CHANGE UP (ref 150–400)
POTASSIUM SERPL-MCNC: 4.4 MMOL/L — SIGNIFICANT CHANGE UP (ref 3.5–5.3)
POTASSIUM SERPL-SCNC: 4.4 MMOL/L — SIGNIFICANT CHANGE UP (ref 3.5–5.3)
RBC # BLD: 3.63 M/UL — LOW (ref 3.8–5.2)
RBC # FLD: 15 % — HIGH (ref 10.3–14.5)
SARS-COV-2 RNA SPEC QL NAA+PROBE: SIGNIFICANT CHANGE UP
SODIUM SERPL-SCNC: 130 MMOL/L — LOW (ref 135–145)
WBC # BLD: 16.65 K/UL — HIGH (ref 3.8–10.5)
WBC # FLD AUTO: 16.65 K/UL — HIGH (ref 3.8–10.5)

## 2022-05-06 PROCEDURE — 99232 SBSQ HOSP IP/OBS MODERATE 35: CPT

## 2022-05-06 PROCEDURE — 99232 SBSQ HOSP IP/OBS MODERATE 35: CPT | Mod: GC

## 2022-05-06 RX ORDER — NYSTATIN CREAM 100000 [USP'U]/G
1 CREAM TOPICAL
Refills: 0 | Status: DISCONTINUED | OUTPATIENT
Start: 2022-05-06 | End: 2022-05-07

## 2022-05-06 RX ADMIN — OXYCODONE HYDROCHLORIDE 5 MILLIGRAM(S): 5 TABLET ORAL at 00:40

## 2022-05-06 RX ADMIN — Medication 100 MILLIGRAM(S): at 23:15

## 2022-05-06 RX ADMIN — FAMOTIDINE 20 MILLIGRAM(S): 10 INJECTION INTRAVENOUS at 17:08

## 2022-05-06 RX ADMIN — Medication 12.5 MILLIGRAM(S): at 06:36

## 2022-05-06 RX ADMIN — DULOXETINE HYDROCHLORIDE 60 MILLIGRAM(S): 30 CAPSULE, DELAYED RELEASE ORAL at 11:33

## 2022-05-06 RX ADMIN — OXYCODONE HYDROCHLORIDE 5 MILLIGRAM(S): 5 TABLET ORAL at 18:07

## 2022-05-06 RX ADMIN — NYSTATIN CREAM 1 APPLICATION(S): 100000 CREAM TOPICAL at 21:28

## 2022-05-06 RX ADMIN — Medication 100 MILLIGRAM(S): at 11:33

## 2022-05-06 RX ADMIN — ROPINIROLE 5 MILLIGRAM(S): 8 TABLET, FILM COATED, EXTENDED RELEASE ORAL at 21:18

## 2022-05-06 RX ADMIN — SENNA PLUS 2 TABLET(S): 8.6 TABLET ORAL at 21:18

## 2022-05-06 RX ADMIN — FAMOTIDINE 20 MILLIGRAM(S): 10 INJECTION INTRAVENOUS at 06:37

## 2022-05-06 RX ADMIN — OXYCODONE HYDROCHLORIDE 5 MILLIGRAM(S): 5 TABLET ORAL at 10:10

## 2022-05-06 RX ADMIN — Medication 5 MILLIGRAM(S): at 21:19

## 2022-05-06 RX ADMIN — POLYETHYLENE GLYCOL 3350 17 GRAM(S): 17 POWDER, FOR SOLUTION ORAL at 21:19

## 2022-05-06 RX ADMIN — Medication 100 MILLIGRAM(S): at 21:19

## 2022-05-06 RX ADMIN — TAMSULOSIN HYDROCHLORIDE 0.4 MILLIGRAM(S): 0.4 CAPSULE ORAL at 21:18

## 2022-05-06 RX ADMIN — GABAPENTIN 300 MILLIGRAM(S): 400 CAPSULE ORAL at 06:36

## 2022-05-06 RX ADMIN — OXYCODONE HYDROCHLORIDE 5 MILLIGRAM(S): 5 TABLET ORAL at 17:07

## 2022-05-06 RX ADMIN — Medication 3 MILLIGRAM(S): at 21:17

## 2022-05-06 RX ADMIN — GABAPENTIN 300 MILLIGRAM(S): 400 CAPSULE ORAL at 17:08

## 2022-05-06 RX ADMIN — Medication 48 MILLIGRAM(S): at 11:34

## 2022-05-06 RX ADMIN — Medication 12.5 MILLIGRAM(S): at 17:08

## 2022-05-06 RX ADMIN — OXYCODONE HYDROCHLORIDE 5 MILLIGRAM(S): 5 TABLET ORAL at 11:10

## 2022-05-06 RX ADMIN — Medication 125 MICROGRAM(S): at 06:36

## 2022-05-06 NOTE — PROGRESS NOTE ADULT - PROBLEM SELECTOR PLAN 4
- Improved  - S/P enema x2, Miralax, senna, dulcolax, lactulose  - Continue bowel regimen - Possible MSSA source?  - Not well visualized on US, but overlying fluid collection [id number] is seen  - MRI non contrast with Acute fractures of the right superior and inferior pubic rami and of the right sacral ala with regional myofascial edema and hematoma. No right hip joint effusion.  - MRI small rim-enhancing hematomas within the right adductor musculature and within the right obturator   internus. Low suspicion of underlying infection.

## 2022-05-06 NOTE — PROCEDURE NOTE - ADDITIONAL PROCEDURE DETAILS
18 gauge 10cm Powerglide midline catheter inserted into LEFT basilic vein with ultrasound guidance under sterile conditions. Catheter confirmed by positive blood return and position verified within vessel on ultrasound. Positive flush visualized within vessel proximal to catheter tip with color doppler.

## 2022-05-06 NOTE — PROGRESS NOTE ADULT - PROBLEM SELECTOR PLAN 7
- continue Synthroid  - TSH: WNL - Patient takes spironolactone at home. Held on admission due to KATHERINE   - BPs within goal during admission  - continue to hold iso Hyponatremia and labile potassium levels.  - Follow up with PCP

## 2022-05-06 NOTE — PROGRESS NOTE ADULT - ASSESSMENT
75 yo woman with OA, h/o falls presented 4/27 with right groin pain found to have fever and Staph aureus bacteremia  Imaging shows pelvic fractures with hyperattenuation right obturator internus muscle  MRI 5/2 acute fractures right superior and inferior pubic rami and right sacral ala with myofascial edema and hematoma.  No right hip joint effusion.  MRI with contrast rim enhancing hematomas  SHAUN no vegetation    Pelvic fractures with high grade MSSA bacteremia  Possible source infected hematomas  No OM  Leucocytosis improving  SHAUN no veg  KATHERINE resolved    Suggest:   c/w cefazolin 2 gm iv q 8 h  thru 5/29  cbc, bmp weekly to my fax  f/u with me in 2- 3 weeks

## 2022-05-06 NOTE — DISCHARGE NOTE NURSING/CASE MANAGEMENT/SOCIAL WORK - PATIENT PORTAL LINK FT
You can access the FollowMyHealth Patient Portal offered by [facility] by registering at the following website: [email or web address]. By joining [employer]’s FollowMyHealth portal, you will also be able to view your health information using other applications (apps) compatible with our system.

## 2022-05-06 NOTE — PROGRESS NOTE ADULT - ATTENDING COMMENTS
Patient seen and examined, care d/w HS 7.    In summary 76F SVT s/p ablation, HTN, HLD, hypothyroidism, vertigo, peripheral neuropathy, OA, cervical CA p/w R sided groin/pelvic pain and found to have fracture of Rt superior and inferior pubic rami with hematoma of Rt obturator internus muscle; course c/b sepsis 2/2 MSSA bacteremia - etiology remains unclear.    # Sepsis 2/2 high grade MSSA bacteremia  - source/etiology still unclear  - MRI w/o OM of hip  - no vegetations on TTE and SHAUN  - cultures + 4/27 & 4/19, repeat blood cultures from 5/2 cleared   - appreciate ID recs   - planning for 4-6 weeks IV cefazolin , suspect 4 given MRI will f/u with ID, PICC vs midline    #Rt superior and inferior pubic rami with hematoma of R obturator internus muscle   - no acute intervention as per ortho recs  - c/w pain control w/ Oxy IR 5mg Q 6 PRN, continue bowel regimen  - PT rec [email or web address]    #Urinary retention   - TOV 5/5, failed requiring straight cath x 2  - may need dc knox and then TOV when mobility improves    # LE swelling: symmetric chronic  - med rec re: home diuretics, has hyperK and low Na in past thus diuretics may be causing more risk than benefit (TTE normal EF, trace protein, Alb 2.)  - LE elevation     Dispo to [facility] pending access and final ID recs  COVID swab for dc planning

## 2022-05-06 NOTE — PROGRESS NOTE ADULT - PROBLEM SELECTOR PLAN 1
- WBC downtrending. Continue cefazolin  - ID on board. Appreciate recs  - BCx: 4/27: MSSA | 4/29: Gram + cocci in clusters | repeat BCx on 5/2 NGTD  - TTE and SHAUN negative for vegetations   - MRI of Right Hip w/o contrast performed today due to IV contrast patient allergy. Will most likely be low yield for identifying infectious foci. Will plan for prepping patient for MRI with perdo. Pending scheduling will premedicate with Methylprednisolone sodium succinate 40 mg IV 5 hours prior to contrast and then Diphenhydramine 50 mg IV 1 hour before contrast administrationl per system protocol - Persistently leucocytotic. Continue cefazolin  - ID on board. Appreciate recs  - BCx: 4/27: MSSA | 4/29: Gram + cocci in clusters | repeat BCx on 5/2 NGTD  - TTE and SHAUN negative for vegetations   - S/P MRI without evidence of osteomyelitis  - Will place midline and discharge on 4-6 weeks of Cefazolin

## 2022-05-06 NOTE — PROGRESS NOTE ADULT - PROBLEM SELECTOR PLAN 5
- Urine studies consistent with hypovolemia, consistent with history given vomiting and KATHERINE  - Responded well to IVF  - Patient continues to experience dizziness, nausea and vomiting  - Continue Antivert home medication. - Improved  - S/P enema x2, Miralax, senna, dulcolax, lactulose  - Continue bowel regimen

## 2022-05-06 NOTE — PROGRESS NOTE ADULT - PROBLEM SELECTOR PLAN 3
- possible MSSA source?  - Not well visualized on US, but overlying fluid collection [id number] is seen  - MRI non contrast with Acute fractures of the right superior and inferior pubic rami and of the right sacral ala with regional myofascial edema and hematoma. No right hip joint effusion.  - Pending MRI with PRATIMA to evaluate for OM - Urinary retention. Patient on flomax, though unlikely beneficial  - Has required Straight cath x 2  - TOV 5/5 unsuccessful. If requiring another straight cath, will likely require knox on D/C

## 2022-05-06 NOTE — PROGRESS NOTE ADULT - PROBLEM SELECTOR PLAN 6
- Patient takes spironolactone at home. Held on admission due to KATHERINE   - BPs within goal during admission  - continue to hold iso HypoNa - Urine studies consistent with hypovolemia, consistent with history given vomiting and KATHERINE  - Responded well to IVF  - Patient continues to experience dizziness, nausea and vomiting  - Continue Antivert home medication.

## 2022-05-06 NOTE — PROGRESS NOTE ADULT - SUBJECTIVE AND OBJECTIVE BOX
Follow Up:  MSSA bacteremia    Interval History/ROS:  mid line insertion in progress    Allergies  erythromycin (Hives)  IV Contrast (Anaphylaxis)        ANTIMICROBIALS: ceFAZolin   IVPB    ceFAZolin   IVPB 2000 every 12 hours      OTHER MEDS:  bisacodyl 5 milliGRAM(s) Oral once  bisacodyl Suppository 10 milliGRAM(s) Rectal once  DULoxetine 60 milliGRAM(s) Oral daily  famotidine    Tablet 20 milliGRAM(s) Oral daily  fenofibrate Tablet 48 milliGRAM(s) Oral daily  gabapentin 300 milliGRAM(s) Oral two times a day  lactulose Syrup 30 Gram(s) Oral once  levothyroxine 125 MICROGram(s) Oral daily  meclizine 12.5 milliGRAM(s) Oral two times a day PRN  ondansetron Injectable 4 milliGRAM(s) IV Push four times a day PRN  ondansetron Injectable 4 milliGRAM(s) IV Push once  oxyCODONE    IR 10 milliGRAM(s) Oral every 6 hours PRN  polyethylene glycol 3350 17 Gram(s) Oral two times a day  polyethylene glycol 3350 17 Gram(s) Oral once  rOPINIRole 5 milliGRAM(s) Oral at bedtime  senna 2 Tablet(s) Oral once  senna 2 Tablet(s) Oral at bedtime  sorbitol 70%/mineral oil/magnesium hydroxide/glycerin Enema 120 milliLiter(s) Rectal once    Vital Signs Last 24 Hrs  T(F): 98 (05-06-22 @ 15:20), Max: 99 (05-05-22 @ 21:44)  HR: 79 (05-06-22 @ 15:20)  BP: 118/68 (05-06-22 @ 15:20)  RR: 17 (05-06-22 @ 15:20)  SpO2: 99% (05-06-22 @ 15:20) (96% - 100%)    PHYSICAL EXAM:  Constitutional: non toxic  Eyes:   RS:   CVS:   Abdomen:   :  knox  Extremities:   Skin:   Neuro:                                      10.0   16.65 )-----------( 352      ( 06 May 2022 07:23 )             29.9 05-06    130  |  97  |  39  ----------------------------<  174  4.4   |  22  |  1.09  Ca    8.8      06 May 2022 07:23Phos  3.7     05-06Mg     2.30     05-06        MICROBIOLOGY:    5/2  blood culture x 2 no growth to date    clCulture - Blood (04.29.22 @ 23:07)   Specimen Source: .Blood Blood-Venous   Culture Results:   No growth to date. Culture - Blood (04.29.22 @ 15:50)   Gram Stain:   Growth in aerobic bottle: Gram Positive Cocci in Clusters   Growth in anaerobic bottle: Gram Positive Cocci in Clusters   Specimen Source: .Blood Blood   Culture Results:   Growth in aerobic and anaerobic bottles: Staphylococcus aureus   See previous culture [id number]     .Blood Blood-Peripheral  04-27-22   Growth in aerobic and anaerobic bottles: Staphylococcus aureus            Clean Catch Clean Catch (Midstream)  04-27-22   No growth  --  --        RADIOLOGY:    rad< from: MR Hip No Cont, Right (05.02.22 @ 13:01) >    ACC: [id number] EXAM:  MR HIP RT                          PROCEDURE DATE:  05/02/2022          INTERPRETATION:  EXAMINATION: MRI of the right hip without contrast    CLINICAL INFORMATION: Right hip pain. Evaluate for septic joint.    TECHNIQUE: Multiplanar, multisequential MR imaging was performed.    FINDINGS: There are acute mildly displaced fractures of the right   superior and inferior pubic rami with adjacent marrow edema and with   regional myofascial edema and hematoma. There is also a nondisplaced   fracture of the right sacral ala with adjacent marrow edema.    There are no other acute fractures. There is a physiologic amount of   right hip joint fluid without joint effusion. There are no advanced   arthritic changes of the right hip. There are no subchondral   abnormalities. There is lateral subcutaneous soft tissue edema.    IMPRESSION: Acute fractures of the right superior and inferior pubic rami   and of the right sacral ala with regional myofascial edema and hematoma.  No right hip joint effusion.    --- End of Report ---            AMRIT GARCIA MD; Attending Radiologist  This document has been electronically signed. May  2 2022  1:16PM    < end of copied text >      rd< from: US Joint Complete, Right (04.29.22 @ 08:42) >    ACC: [id number] EXAM:  US JOINT COMPLETE RT                          PROCEDURE DATE:  04/29/2022          INTERPRETATION:  Clinical indication: Right superior and inferior pubic   rami fractures with positive blood cultures. Possible hip joint effusion.    Ultrasound evaluation of the right hip was performed.    Findings:    Study is limited as the radiologist was not present during scanning.   There is soft tissue edema within the subcutaneous fat overlying the   right hip. The right hip joint is not well imaged. Imaging more medially   in the suprapubic region there is a small fluid collection overlying the   pubic ramus measuring approximately 4 x 4 x 3 mm. There is may be related   to small hematoma related to known pubic ramus fracture in this region.    IMPRESSION:    Limited study. Right hip joint is not well imaged. Correlation with MRI   is recommended to exclude the possibility of an underlying hip joint   effusion. Other findings as above.    --- End of Report ---    rad< from: MR Hip w/wo IV Cont, Right (05.05.22 @ 20:32) >    ACC: [id number] EXAM:  MR HIP WAW IC RT                          PROCEDURE DATE:  05/05/2022          INTERPRETATION:  History: Pelvic fractures, evaluate for infection.    Technique: Multiplanar and multi-sequence MRI images were obtained   through the right hip without and with intravenous contrast.   Approximately 10 cc intravenous Gadavist was administered.    Comparison: Right hip MRI dated 5/2/2022    Findings:    Bones: Mildly displaced acute fractures of the right superior and   inferior pubic rami with adjacent myofascial edema and small   rim-enhancing hematomas within the right adductor compartment and within   the right obturator internus. For reference, right obturator internus   hematoma measures approximately 3.7 x 1.5 cm transaxially. Acute   nondisplaced fracture of the right sacral ala.    Joints: Mild chondral wear within bilateral hip joint spaces. No   significant hip joint effusions. Degenerative changes within the   visualized lower lumbar spine and left sacroiliac joint. Mild pubic   symphyseal arthrosis.    Soft tissues: There is myofascial edema with small rim-enhancing   hematomas within the right adductor compartment and within the right   obturator internus muscle. Moderate insertional tendinosis of the right   gluteus minimus. Mild insertional tendinosis of the right gluteus medius   with low-grade partial tearing. Mild bilateral hamstring tendon origin   tendinosis. Moderate right greater trochanteric bursitis.    Neurovascular structures are unremarkable.    Colonic diverticulosis noted. Multiple surgical clips noted throughout   the pelvis.    Impression:    Acute mildly displaced fractures of the right superior and inferior pubic   ramus with surrounding myofascial edema and small rim-enhancing hematomas   within the right adductor musculature and within the right obturator   internus. Overall appearance is similar to the prior study. Given the   acuity of these fractures, superimposed infection within these regions is   considered unlikely.    Acute nondisplaced right sacral alar fracture.    No right hip joint effusion.    Moderate right greater trochanteric bursitis.    Other findings as above.    --- End of Report ---    < end of copied text >              CARIN LOZADA MD; Attending Radiologist  This document has been electronically signed. Apr 29 2022 10:52AM    < end of copied text >    c< from: SHAUN w/o TTE (w/3D Echo) (05.03.22 @ 10:32) >    Patient name: SHELL MARTINEZ  YOB: 1945   Age: 76 (F)   MR#: [id number]  Study Date: 5/3/2022  Location: [id number]: Godfrey Thayer M.D.  Study quality: Technically good  Referring Physician: Pablito Wharton MD  Blood Pressure:120/94 mmHg  Height: 166 cm  Weight: 96 kg  BSA: 2.1 m2  ------------------------------------------------------------------------  PROCEDURE: Transesophageal (SHAUN) was performed in the  echocardiography laboratory.  Informed consent was first  obtained for SHAUN.   The patient was sedated by the  anesthesiologist service (reported separately).   The  procedure was monitored with automatic blood pressure  monitoring, ECG tracings and pulse oximetry.  Gag reflex  was abolished with topical Cetacaine and the  transesophageal probe was placed in the esophagus posterior  to the heart without complications.  The patient tolerated  the procedure well.   Real-time and reconstructed  3-dimensional imaging was performed.  Color Doppler  analysis was carried out using both 2D and 3D mapping.  Patient was injected with 10 cc's of aerosolized saline.  Patient was injected with 10 cc's of aerosolized saline.  INDICATION: Endocarditis, valve unspecified (I38)  ------------------------------------------------------------------------  OBSERVATIONS:  Mitral Valve: Mitral annular calcification, otherwise  normal mitral valve.  No vegetations seen in association  with the mitral valve. Minimal mitral regurgitation.  Aortic Root: Normal aortic root.  Mild non-mobile  atherosclerotic plaque in the aortic arch and descending  thoracic aorta.  Aortic Valve: Calcified trileaflet aortic valve with normal  opening.  No vegetations seen in association with the  aortic valve. No aortic valve regurgitation seen.  Left Atrium: Normal left atrium.  No left atrial or left  atrial appendage thrombus.  Left Ventricle: Normal left ventricular systolic function.  No segmental wall motion abnormalities.  Right Heart: Normal right atrium. Normal right ventricular  size and function. Normal tricuspid valve.  No vegetations  seen in association with the tricuspid valve.  Minimal  tricuspid regurgitation. Normal pulmonic valve.  No  vegetations seen in association with the pulmonic valve.  Mild pulmonic regurgitation.  Pericardium/PleuraNormal pericardium with no pericardial  effusion.  ------------------------------------------------------------------------  CONCLUSIONS:  1. Mitral annular calcification, otherwise normal mitral  valve.  No vegetations seen in association with the mitral  valve. Minimal mitral regurgitation.  2. Calcified trileaflet aortic valve with normal opening.  No vegetations seen in association with the aortic valve.  No aortic valve regurgitation seen.  3. Normal aortic root.  Mild non-mobile atherosclerotic  plaque in the aortic arch and descending thoracic aorta.  4. Normal left atrium.  No left atrial or left atrial  appendage thrombus.  5. Normal left ventricular systolic function. No segmental  wall motion abnormalities.  6.Normal right ventricular size and function.  7. Contrast injection demonstrates no evidence of a patent  foramen ovale.  No valvular vegetations were identified.  ------------------------------------------------------------------------  Confirmed on5/3/2022 - 13:59:20 by Danish Orozco M.D.,  [facility], [facility]  ------------------------------------------------------------------------    < end of copied text >

## 2022-05-06 NOTE — PROGRESS NOTE ADULT - ASSESSMENT
75 y/o F with pmh of SVT s/p ablation, HTN, HLD, hypothyroidism, vertigo, peripheral neuropathy, osteoarthritis, cervical ca p/w R sided groin/pelvic pain and found to have fx for R superior and inferior pubic rami with hematoma of R obturator internus muscle.  Now afebrile on antibiotics and adequate pain regimen pending further workup 75 y/o F with pmh of SVT s/p ablation, HTN, HLD, hypothyroidism, vertigo, peripheral neuropathy, osteoarthritis, cervical ca p/w R sided groin/pelvic pain and found to have fx for R superior and inferior pubic rami with hematoma of R obturator internus muscle.  Now afebrile on antibiotics and adequate pain regimen.  MRI w/o OM of hip.  Pending final ID recs and AGNIESZKA placement.

## 2022-05-06 NOTE — PROGRESS NOTE ADULT - SUBJECTIVE AND OBJECTIVE BOX
PROGRESS NOTE:   Authored by Shree Torres MD  Pager:  [phone]    Patient is a 76y old  Female who presents with a chief complaint of pelvic pain (05 May 2022 07:16)      SUBJECTIVE / OVERNIGHT EVENTS:    ADDITIONAL REVIEW OF SYSTEMS:    MEDICATIONS  (STANDING):  bisacodyl 5 milliGRAM(s) Oral at bedtime  ceFAZolin   IVPB      ceFAZolin   IVPB 2000 milliGRAM(s) IV Intermittent every 12 hours  DULoxetine 60 milliGRAM(s) Oral daily  famotidine    Tablet 20 milliGRAM(s) Oral two times a day  fenofibrate Tablet 48 milliGRAM(s) Oral daily  gabapentin 300 milliGRAM(s) Oral two times a day  levothyroxine 125 MICROGram(s) Oral daily  meclizine 12.5 milliGRAM(s) Oral two times a day  melatonin 3 milliGRAM(s) Oral at bedtime  polyethylene glycol 3350 17 Gram(s) Oral two times a day  rOPINIRole 5 milliGRAM(s) Oral at bedtime  senna 2 Tablet(s) Oral at bedtime  sodium chloride 0.9%. 500 milliLiter(s) (85 mL/Hr) IV Continuous <Continuous>  spironolactone 50 milliGRAM(s) Oral daily  tamsulosin 0.4 milliGRAM(s) Oral at bedtime    MEDICATIONS  (PRN):  acetaminophen     Tablet .. 650 milliGRAM(s) Oral every 6 hours PRN Mild Pain (1 - 3)  aluminum hydroxide/magnesium hydroxide/simethicone Suspension 30 milliLiter(s) Oral every 6 hours PRN Dyspepsia  benzonatate 100 milliGRAM(s) Oral every 8 hours PRN Cough  ondansetron Injectable 4 milliGRAM(s) IV Push four times a day PRN Nausea and/or Vomiting  oxyCODONE    IR 5 milliGRAM(s) Oral every 6 hours PRN Severe Pain (7 - 10)  oxyCODONE    IR 2.5 milliGRAM(s) Oral every 6 hours PRN Moderate Pain (4 - 6)      CAPILLARY BLOOD GLUCOSE        I&O's Summary    05 May 2022 07:01  -  06 May 2022 07:00  --------------------------------------------------------  IN: 1500 mL / OUT: 800 mL / NET: 700 mL        PHYSICAL EXAM:  Vital Signs Last 24 Hrs  T(C): 36.5 (06 May 2022 07:03), Max: 37.5 (05 May 2022 15:25)  T(F): 97.7 (06 May 2022 07:03), Max: 99.5 (05 May 2022 15:25)  HR: 70 (06 May 2022 07:03) (68 - 86)  BP: 111/60 (06 May 2022 07:03) (111/60 - 141/60)  BP(mean): --  RR: 18 (06 May 2022 07:03) (18 - 18)  SpO2: 98% (06 May 2022 07:03) (96% - 100%)    GENERAL: No acute distress, well-developed  HEAD:  Atraumatic, Normocephalic  EYES: EOMI, PERRLA, conjunctiva and sclera clear  NECK: Supple, no lymphadenopathy, no JVD  CHEST/LUNG: CTAB; No wheezes, rales, or rhonchi  HEART: Regular rate and rhythm; No murmurs, rubs, or gallops  ABDOMEN: Soft, non-tender, non-distended; normal bowel sounds, no organomegaly  EXTREMITIES:  2+ peripheral pulses b/l, No clubbing, cyanosis, or edema  NEUROLOGY: A&O x 3, no focal deficits  SKIN: No rashes or lesions    LABS:                        10.4   13.72 )-----------( 374      ( 05 May 2022 07:48 )             32.0     05-05    129<L>  |  96<L>  |  26<H>  ----------------------------<  129<H>  4.7   |  21<L>  |  0.91    Ca    8.9      05 May 2022 07:48  Phos  3.8     05-05  Mg     2.00     05-05                  RADIOLOGY & ADDITIONAL TESTS:  Results Reviewed:   Imaging Personally Reviewed:  Electrocardiogram Personally Reviewed:    COORDINATION OF CARE:  Care Discussed with Consultants/Other Providers [Y/N]:  Prior or Outpatient Records Reviewed [Y/N]:   PROGRESS NOTE:   Authored by Shree Torres MD  Pager:  [phone]    Patient is a 76y old  Female who presents with a chief complaint of pelvic pain (05 May 2022 07:16)    SUBJECTIVE / OVERNIGHT EVENTS:    No CP, SOB, f/c/n/v  Reports LE swelling is chronic and she is normally on several diuretics  No joint pain but feels weak, wants to get OOB and use commode  denies prior issues with voiding at home     ADDITIONAL REVIEW OF SYSTEMS:    MEDICATIONS  (STANDING):  bisacodyl 5 milliGRAM(s) Oral at bedtime  ceFAZolin   IVPB 2000 milliGRAM(s) IV Intermittent every 12 hours  DULoxetine 60 milliGRAM(s) Oral daily  famotidine    Tablet 20 milliGRAM(s) Oral two times a day  fenofibrate Tablet 48 milliGRAM(s) Oral daily  gabapentin 300 milliGRAM(s) Oral two times a day  levothyroxine 125 MICROGram(s) Oral daily  meclizine 12.5 milliGRAM(s) Oral two times a day  melatonin 3 milliGRAM(s) Oral at bedtime  polyethylene glycol 3350 17 Gram(s) Oral two times a day  rOPINIRole 5 milliGRAM(s) Oral at bedtime  senna 2 Tablet(s) Oral at bedtime  sodium chloride 0.9%. 500 milliLiter(s) (85 mL/Hr) IV Continuous <Continuous>  spironolactone 50 milliGRAM(s) Oral daily  tamsulosin 0.4 milliGRAM(s) Oral at bedtime    MEDICATIONS  (PRN):  acetaminophen     Tablet .. 650 milliGRAM(s) Oral every 6 hours PRN Mild Pain (1 - 3)  aluminum hydroxide/magnesium hydroxide/simethicone Suspension 30 milliLiter(s) Oral every 6 hours PRN Dyspepsia  benzonatate 100 milliGRAM(s) Oral every 8 hours PRN Cough  ondansetron Injectable 4 milliGRAM(s) IV Push four times a day PRN Nausea and/or Vomiting  oxyCODONE    IR 5 milliGRAM(s) Oral every 6 hours PRN Severe Pain (7 - 10)  oxyCODONE    IR 2.5 milliGRAM(s) Oral every 6 hours PRN Moderate Pain (4 - 6)    I&O's Summary    05 May 2022 07:01  -  06 May 2022 07:00  --------------------------------------------------------  IN: 1500 mL / OUT: 800 mL / NET: 700 mL    PHYSICAL EXAM:  Vital Signs Last 24 Hrs  T(C): 36.5 (06 May 2022 07:03), Max: 37.5 (05 May 2022 15:25)  T(F): 97.7 (06 May 2022 07:03), Max: 99.5 (05 May 2022 15:25)  HR: 70 (06 May 2022 07:03) (68 - 86)  BP: 111/60 (06 May 2022 07:03) (111/60 - 141/60)  RR: 18 (06 May 2022 07:03) (18 - 18)  SpO2: 98% (06 May 2022 07:03) (96% - 100%)    GENERAL: No acute distress, well-developed  HEAD:  Atraumatic, Normocephalic  EYES: EOMI, PERRLA, conjunctiva and sclera clear  NECK: Supple, no lymphadenopathy, no JVD  CHEST/LUNG: CTAB; No wheezes, rales, or rhonchi  HEART: Regular rate and rhythm; No murmurs, rubs, or gallops  ABDOMEN: Soft, non-tender, non-distended; normal bowel sounds, no organomegaly  EXTREMITIES:  2+ peripheral pulses b/l, No clubbing, cyanosis, or edema  NEUROLOGY: A&O x 3, no focal deficits  SKIN: No rashes or lesions    LABS:                        10.4   13.72 )-----------( 374      ( 05 May 2022 07:48 )             32.0     05-05    129<L>  |  96<L>  |  26<H>  ----------------------------<  129<H>  4.7   |  21<L>  |  0.91    Ca    8.9      05 May 2022 07:48  Phos  3.8     05-05  Mg     2.00     05-05      RADIOLOGY & ADDITIONAL TESTS:  Results Reviewed:   Imaging Personally Reviewed:  Electrocardiogram Personally Reviewed:    COORDINATION OF CARE:  Care Discussed with Consultants/Other Providers [Y/N]:  Prior or Outpatient Records Reviewed [Y/N]:   PROGRESS NOTE:   Authored by Shree Torres MD  Pager:  [phone]    Patient is a 76y old  Female who presents with a chief complaint of pelvic pain (05 May 2022 07:16)    SUBJECTIVE / OVERNIGHT EVENTS:    No CP, SOB, f/c/n/v  Reports LE swelling is chronic and she is normally on several diuretics  No joint pain but feels weak, wants to get OOB and use commode  denies prior issues with voiding at home     ADDITIONAL REVIEW OF SYSTEMS:    MEDICATIONS  (STANDING):  bisacodyl 5 milliGRAM(s) Oral at bedtime  ceFAZolin   IVPB 2000 milliGRAM(s) IV Intermittent every 12 hours  DULoxetine 60 milliGRAM(s) Oral daily  famotidine    Tablet 20 milliGRAM(s) Oral two times a day  fenofibrate Tablet 48 milliGRAM(s) Oral daily  gabapentin 300 milliGRAM(s) Oral two times a day  levothyroxine 125 MICROGram(s) Oral daily  meclizine 12.5 milliGRAM(s) Oral two times a day  melatonin 3 milliGRAM(s) Oral at bedtime  polyethylene glycol 3350 17 Gram(s) Oral two times a day  rOPINIRole 5 milliGRAM(s) Oral at bedtime  senna 2 Tablet(s) Oral at bedtime  sodium chloride 0.9%. 500 milliLiter(s) (85 mL/Hr) IV Continuous <Continuous>  spironolactone 50 milliGRAM(s) Oral daily  tamsulosin 0.4 milliGRAM(s) Oral at bedtime    MEDICATIONS  (PRN):  acetaminophen     Tablet .. 650 milliGRAM(s) Oral every 6 hours PRN Mild Pain (1 - 3)  aluminum hydroxide/magnesium hydroxide/simethicone Suspension 30 milliLiter(s) Oral every 6 hours PRN Dyspepsia  benzonatate 100 milliGRAM(s) Oral every 8 hours PRN Cough  ondansetron Injectable 4 milliGRAM(s) IV Push four times a day PRN Nausea and/or Vomiting  oxyCODONE    IR 5 milliGRAM(s) Oral every 6 hours PRN Severe Pain (7 - 10)  oxyCODONE    IR 2.5 milliGRAM(s) Oral every 6 hours PRN Moderate Pain (4 - 6)    I&O's Summary    05 May 2022 07:01  -  06 May 2022 07:00  --------------------------------------------------------  IN: 1500 mL / OUT: 800 mL / NET: 700 mL    PHYSICAL EXAM:  Vital Signs Last 24 Hrs  T(C): 36.5 (06 May 2022 07:03), Max: 37.5 (05 May 2022 15:25)  T(F): 97.7 (06 May 2022 07:03), Max: 99.5 (05 May 2022 15:25)  HR: 70 (06 May 2022 07:03) (68 - 86)  BP: 111/60 (06 May 2022 07:03) (111/60 - 141/60)  RR: 18 (06 May 2022 07:03) (18 - 18)  SpO2: 98% (06 May 2022 07:03) (96% - 100%)    GENERAL: NAD, lying comfortably in bed  HEAD: Atraumatic, normocephalic  EYES: EOMI b/l PERRLA b/l, conjunctiva and sclera clear  NECK: Supple, No JVD, No LAD  RESPIRATORY: Normal respiratory effort; lungs are clear to auscultation bilaterally  CARDIOVASCULAR: Regular rate and rhythm, normal S1 and S2, no murmur/rub/gallop; Trace lower extremity edema  ABDOMEN: Nontender, normoactive bowel sounds, no rebound/guarding; No hepatosplenomegaly  MUSCULOSKELETAL: no clubbing or cyanosis of digits; + tenderness in R hip/ pelvic area. Decreased ROM of the right hip 2/2 pain     LABS:                        10.4   13.72 )-----------( 374      ( 05 May 2022 07:48 )             32.0     05-05    129<L>  |  96<L>  |  26<H>  ----------------------------<  129<H>  4.7   |  21<L>  |  0.91    Ca    8.9      05 May 2022 07:48  Phos  3.8     05-05  Mg     2.00     05-05      RADIOLOGY & ADDITIONAL TESTS:  Results Reviewed:   Imaging Personally Reviewed:  Electrocardiogram Personally Reviewed:    COORDINATION OF CARE:  Care Discussed with Consultants/Other Providers [Y/N]:  Prior or Outpatient Records Reviewed [Y/N]:

## 2022-05-06 NOTE — DISCHARGE NOTE NURSING/CASE MANAGEMENT/SOCIAL WORK - NSDCPEFALRISK_GEN_ALL_CORE
For information on Fall & Injury Prevention, visit: [email or web address] OR  [email or web address] OR  https://www.cdc.gov/steadi/patient.html

## 2022-05-07 LAB
ANION GAP SERPL CALC-SCNC: 10 MMOL/L — SIGNIFICANT CHANGE UP (ref 7–14)
BUN SERPL-MCNC: 48 MG/DL — HIGH (ref 7–23)
CALCIUM SERPL-MCNC: 8.9 MG/DL — SIGNIFICANT CHANGE UP (ref 8.4–10.5)
CHLORIDE SERPL-SCNC: 96 MMOL/L — LOW (ref 98–107)
CHLORIDE UR-SCNC: <20 MMOL/L — SIGNIFICANT CHANGE UP
CO2 SERPL-SCNC: 21 MMOL/L — LOW (ref 22–31)
CREAT SERPL-MCNC: 1.25 MG/DL — SIGNIFICANT CHANGE UP (ref 0.5–1.3)
CULTURE RESULTS: SIGNIFICANT CHANGE UP
CULTURE RESULTS: SIGNIFICANT CHANGE UP
EGFR: 45 ML/MIN/1.73M2 — LOW
GLUCOSE SERPL-MCNC: 113 MG/DL — HIGH (ref 70–99)
HCT VFR BLD CALC: 31.6 % — LOW (ref 34.5–45)
HGB BLD-MCNC: 10.1 G/DL — LOW (ref 11.5–15.5)
MAGNESIUM SERPL-MCNC: 2.3 MG/DL — SIGNIFICANT CHANGE UP (ref 1.6–2.6)
MCHC RBC-ENTMCNC: 26.7 PG — LOW (ref 27–34)
MCHC RBC-ENTMCNC: 32 GM/DL — SIGNIFICANT CHANGE UP (ref 32–36)
MCV RBC AUTO: 83.6 FL — SIGNIFICANT CHANGE UP (ref 80–100)
NRBC # BLD: 0 /100 WBCS — SIGNIFICANT CHANGE UP
NRBC # FLD: 0 K/UL — SIGNIFICANT CHANGE UP
PHOSPHATE SERPL-MCNC: 4.3 MG/DL — SIGNIFICANT CHANGE UP (ref 2.5–4.5)
PLATELET # BLD AUTO: 415 K/UL — HIGH (ref 150–400)
POTASSIUM SERPL-MCNC: 5.2 MMOL/L — SIGNIFICANT CHANGE UP (ref 3.5–5.3)
POTASSIUM SERPL-SCNC: 5.2 MMOL/L — SIGNIFICANT CHANGE UP (ref 3.5–5.3)
POTASSIUM UR-SCNC: 35.3 MMOL/L — SIGNIFICANT CHANGE UP
RBC # BLD: 3.78 M/UL — LOW (ref 3.8–5.2)
RBC # FLD: 15.4 % — HIGH (ref 10.3–14.5)
SODIUM SERPL-SCNC: 127 MMOL/L — LOW (ref 135–145)
SODIUM UR-SCNC: <20 MMOL/L — SIGNIFICANT CHANGE UP
SPECIMEN SOURCE: SIGNIFICANT CHANGE UP
SPECIMEN SOURCE: SIGNIFICANT CHANGE UP
WBC # BLD: 14.14 K/UL — HIGH (ref 3.8–10.5)
WBC # FLD AUTO: 14.14 K/UL — HIGH (ref 3.8–10.5)

## 2022-05-07 PROCEDURE — 99239 HOSP IP/OBS DSCHRG MGMT >30: CPT

## 2022-05-07 RX ORDER — FLUTICASONE PROPIONATE 50 MCG
1 SPRAY, SUSPENSION NASAL
Refills: 0 | Status: DISCONTINUED | OUTPATIENT
Start: 2022-05-07 | End: 2022-05-07

## 2022-05-07 RX ORDER — SENNA PLUS 8.6 MG/1
2 TABLET ORAL
Qty: 0 | Refills: 0 | DISCHARGE
Start: 2022-05-07

## 2022-05-07 RX ORDER — PANTOPRAZOLE SODIUM 20 MG/1
1 TABLET, DELAYED RELEASE ORAL
Qty: 0 | Refills: 0 | DISCHARGE

## 2022-05-07 RX ORDER — SODIUM CHLORIDE 9 MG/ML
500 INJECTION, SOLUTION INTRAVENOUS
Refills: 0 | Status: DISCONTINUED | OUTPATIENT
Start: 2022-05-07 | End: 2022-05-07

## 2022-05-07 RX ORDER — POLYETHYLENE GLYCOL 3350 17 G/17G
17 POWDER, FOR SOLUTION ORAL
Qty: 0 | Refills: 0 | DISCHARGE
Start: 2022-05-07

## 2022-05-07 RX ORDER — FAMOTIDINE 10 MG/ML
1 INJECTION INTRAVENOUS
Qty: 0 | Refills: 0 | DISCHARGE
Start: 2022-05-07

## 2022-05-07 RX ORDER — SODIUM CHLORIDE 0.65 %
1 AEROSOL, SPRAY (ML) NASAL
Refills: 0 | Status: DISCONTINUED | OUTPATIENT
Start: 2022-05-07 | End: 2022-05-07

## 2022-05-07 RX ORDER — CEFAZOLIN SODIUM 1 G
2 VIAL (EA) INJECTION
Qty: 0 | Refills: 0 | DISCHARGE
Start: 2022-05-07

## 2022-05-07 RX ORDER — SODIUM CHLORIDE 0.65 %
0 AEROSOL, SPRAY (ML) NASAL
Qty: 0 | Refills: 0 | DISCHARGE
Start: 2022-05-07

## 2022-05-07 RX ORDER — SPIRONOLACTONE 25 MG/1
1 TABLET, FILM COATED ORAL
Qty: 0 | Refills: 0 | DISCHARGE

## 2022-05-07 RX ORDER — CELECOXIB 200 MG/1
1 CAPSULE ORAL
Qty: 0 | Refills: 0 | DISCHARGE

## 2022-05-07 RX ORDER — FLUTICASONE PROPIONATE 50 MCG
1 SPRAY, SUSPENSION NASAL
Qty: 0 | Refills: 0 | DISCHARGE
Start: 2022-05-07

## 2022-05-07 RX ORDER — NYSTATIN CREAM 100000 [USP'U]/G
1 CREAM TOPICAL
Qty: 0 | Refills: 0 | DISCHARGE
Start: 2022-05-07

## 2022-05-07 RX ORDER — OXYCODONE HYDROCHLORIDE 5 MG/1
1 TABLET ORAL
Qty: 0 | Refills: 0 | DISCHARGE
Start: 2022-05-07

## 2022-05-07 RX ADMIN — Medication 12.5 MILLIGRAM(S): at 07:14

## 2022-05-07 RX ADMIN — NYSTATIN CREAM 1 APPLICATION(S): 100000 CREAM TOPICAL at 07:15

## 2022-05-07 RX ADMIN — FAMOTIDINE 20 MILLIGRAM(S): 10 INJECTION INTRAVENOUS at 07:14

## 2022-05-07 RX ADMIN — Medication 100 MILLIGRAM(S): at 10:20

## 2022-05-07 RX ADMIN — Medication 1 SPRAY(S): at 07:15

## 2022-05-07 RX ADMIN — OXYCODONE HYDROCHLORIDE 5 MILLIGRAM(S): 5 TABLET ORAL at 04:00

## 2022-05-07 RX ADMIN — Medication 650 MILLIGRAM(S): at 01:42

## 2022-05-07 RX ADMIN — GABAPENTIN 300 MILLIGRAM(S): 400 CAPSULE ORAL at 07:13

## 2022-05-07 RX ADMIN — SODIUM CHLORIDE 250 MILLILITER(S): 9 INJECTION, SOLUTION INTRAVENOUS at 10:08

## 2022-05-07 RX ADMIN — SPIRONOLACTONE 50 MILLIGRAM(S): 25 TABLET, FILM COATED ORAL at 07:14

## 2022-05-07 RX ADMIN — Medication 125 MICROGRAM(S): at 07:13

## 2022-05-07 NOTE — PROGRESS NOTE ADULT - REASON FOR ADMISSION
pelvic pain

## 2022-05-07 NOTE — PROGRESS NOTE ADULT - PROBLEM SELECTOR PLAN 1
- Persistently leucocytotic.   - ID on board. Appreciate recs  - BCx: 4/27: MSSA | 4/29: Gram + cocci in clusters | repeat BCx on 5/2 NGTD  - TTE and SHAUN negative for vegetations   - S/P MRI without evidence of osteomyelitis  - Continue cefazolin till 5/29 per ID recs. Midline in place 5/6

## 2022-05-07 NOTE — PROGRESS NOTE ADULT - PROVIDER SPECIALTY LIST ADULT
Infectious Disease
Internal Medicine
Orthopedics
Internal Medicine

## 2022-05-07 NOTE — PROGRESS NOTE ADULT - PROBLEM SELECTOR PLAN 3
- S/P Block 5/6. Continues to void appropriately  - Encourage use of Commode to prevent further retention.  - Continue ot monitor

## 2022-05-07 NOTE — PROGRESS NOTE ADULT - SUBJECTIVE AND OBJECTIVE BOX
PROGRESS NOTE:   Authored by Shree Torres MD  Pager:  [phone]    Patient is a 76y old  Female who presents with a chief complaint of pelvic pain (06 May 2022 17:26)      SUBJECTIVE / OVERNIGHT EVENTS:    ADDITIONAL REVIEW OF SYSTEMS:    MEDICATIONS  (STANDING):  bisacodyl 5 milliGRAM(s) Oral at bedtime  ceFAZolin   IVPB      ceFAZolin   IVPB 2000 milliGRAM(s) IV Intermittent every 12 hours  DULoxetine 60 milliGRAM(s) Oral daily  famotidine    Tablet 20 milliGRAM(s) Oral two times a day  fenofibrate Tablet 48 milliGRAM(s) Oral daily  fluticasone propionate 50 MICROgram(s)/spray Nasal Spray 1 Spray(s) Both Nostrils two times a day  gabapentin 300 milliGRAM(s) Oral two times a day  levothyroxine 125 MICROGram(s) Oral daily  meclizine 12.5 milliGRAM(s) Oral two times a day  melatonin 3 milliGRAM(s) Oral at bedtime  nystatin Powder 1 Application(s) Topical two times a day  polyethylene glycol 3350 17 Gram(s) Oral two times a day  rOPINIRole 5 milliGRAM(s) Oral at bedtime  senna 2 Tablet(s) Oral at bedtime  sodium chloride 0.65% Nasal 1 Spray(s) Both Nostrils two times a day  spironolactone 50 milliGRAM(s) Oral daily  tamsulosin 0.4 milliGRAM(s) Oral at bedtime    MEDICATIONS  (PRN):  acetaminophen     Tablet .. 650 milliGRAM(s) Oral every 6 hours PRN Mild Pain (1 - 3)  aluminum hydroxide/magnesium hydroxide/simethicone Suspension 30 milliLiter(s) Oral every 6 hours PRN Dyspepsia  benzonatate 100 milliGRAM(s) Oral every 8 hours PRN Cough  ondansetron Injectable 4 milliGRAM(s) IV Push four times a day PRN Nausea and/or Vomiting  oxyCODONE    IR 5 milliGRAM(s) Oral every 6 hours PRN Severe Pain (7 - 10)  oxyCODONE    IR 2.5 milliGRAM(s) Oral every 6 hours PRN Moderate Pain (4 - 6)      CAPILLARY BLOOD GLUCOSE        I&O's Summary    06 May 2022 07:01  -  07 May 2022 07:00  --------------------------------------------------------  IN: 1400 mL / OUT: 300 mL / NET: 1100 mL        PHYSICAL EXAM:  Vital Signs Last 24 Hrs  T(C): 37.1 (06 May 2022 17:00), Max: 37.1 (06 May 2022 17:00)  T(F): 98.7 (06 May 2022 17:00), Max: 98.7 (06 May 2022 17:00)  HR: 85 (06 May 2022 17:00) (79 - 85)  BP: 119/73 (06 May 2022 17:00) (118/68 - 120/73)  BP(mean): --  RR: 15 (06 May 2022 17:00) (15 - 18)  SpO2: 100% (06 May 2022 17:00) (99% - 100%)    GENERAL: No acute distress, well-developed  HEAD:  Atraumatic, Normocephalic  EYES: EOMI, PERRLA, conjunctiva and sclera clear  NECK: Supple, no lymphadenopathy, no JVD  CHEST/LUNG: CTAB; No wheezes, rales, or rhonchi  HEART: Regular rate and rhythm; No murmurs, rubs, or gallops  ABDOMEN: Soft, non-tender, non-distended; normal bowel sounds, no organomegaly  EXTREMITIES:  2+ peripheral pulses b/l, No clubbing, cyanosis, or edema  NEUROLOGY: A&O x 3, no focal deficits  SKIN: No rashes or lesions    LABS:                        10.0   16.65 )-----------( 352      ( 06 May 2022 07:23 )             29.9     05-06    130<L>  |  97<L>  |  39<H>  ----------------------------<  174<H>  4.4   |  22  |  1.09    Ca    8.8      06 May 2022 07:23  Phos  3.7     05-06  Mg     2.30     05-06                  RADIOLOGY & ADDITIONAL TESTS:  Results Reviewed:   Imaging Personally Reviewed:  Electrocardiogram Personally Reviewed:    COORDINATION OF CARE:  Care Discussed with Consultants/Other Providers [Y/N]:  Prior or Outpatient Records Reviewed [Y/N]:   PROGRESS NOTE:   Authored by Shree Torres MD  Pager:  [phone]    Patient is a 76y old  Female who presents with a chief complaint of pelvic pain (06 May 2022 17:26)      SUBJECTIVE / OVERNIGHT EVENTS: NAEO. Patient feels well this morning. Midline is not bothersome to her    ADDITIONAL REVIEW OF SYSTEMS:  REVIEW OF SYSTEMS:    CONSTITUTIONAL: No weakness, fevers or chills  RESPIRATORY: No cough, wheezing, hemoptysis; No shortness of breath  CARDIOVASCULAR: No chest pain or palpitations  GASTROINTESTINAL: No abdominal or epigastric pain. No nausea, vomiting, or hematemesis; No diarrhea or constipation. No melena or hematochezia.  SKIN: No itching, rashes      MEDICATIONS  (STANDING):  bisacodyl 5 milliGRAM(s) Oral at bedtime  ceFAZolin   IVPB      ceFAZolin   IVPB 2000 milliGRAM(s) IV Intermittent every 12 hours  DULoxetine 60 milliGRAM(s) Oral daily  famotidine    Tablet 20 milliGRAM(s) Oral two times a day  fenofibrate Tablet 48 milliGRAM(s) Oral daily  fluticasone propionate 50 MICROgram(s)/spray Nasal Spray 1 Spray(s) Both Nostrils two times a day  gabapentin 300 milliGRAM(s) Oral two times a day  levothyroxine 125 MICROGram(s) Oral daily  meclizine 12.5 milliGRAM(s) Oral two times a day  melatonin 3 milliGRAM(s) Oral at bedtime  nystatin Powder 1 Application(s) Topical two times a day  polyethylene glycol 3350 17 Gram(s) Oral two times a day  rOPINIRole 5 milliGRAM(s) Oral at bedtime  senna 2 Tablet(s) Oral at bedtime  sodium chloride 0.65% Nasal 1 Spray(s) Both Nostrils two times a day  spironolactone 50 milliGRAM(s) Oral daily  tamsulosin 0.4 milliGRAM(s) Oral at bedtime    MEDICATIONS  (PRN):  acetaminophen     Tablet .. 650 milliGRAM(s) Oral every 6 hours PRN Mild Pain (1 - 3)  aluminum hydroxide/magnesium hydroxide/simethicone Suspension 30 milliLiter(s) Oral every 6 hours PRN Dyspepsia  benzonatate 100 milliGRAM(s) Oral every 8 hours PRN Cough  ondansetron Injectable 4 milliGRAM(s) IV Push four times a day PRN Nausea and/or Vomiting  oxyCODONE    IR 5 milliGRAM(s) Oral every 6 hours PRN Severe Pain (7 - 10)  oxyCODONE    IR 2.5 milliGRAM(s) Oral every 6 hours PRN Moderate Pain (4 - 6)      CAPILLARY BLOOD GLUCOSE        I&O's Summary    06 May 2022 07:01  -  07 May 2022 07:00  --------------------------------------------------------  IN: 1400 mL / OUT: 300 mL / NET: 1100 mL        PHYSICAL EXAM:  Vital Signs Last 24 Hrs  T(C): 37.1 (06 May 2022 17:00), Max: 37.1 (06 May 2022 17:00)  T(F): 98.7 (06 May 2022 17:00), Max: 98.7 (06 May 2022 17:00)  HR: 85 (06 May 2022 17:00) (79 - 85)  BP: 119/73 (06 May 2022 17:00) (118/68 - 120/73)  BP(mean): --  RR: 15 (06 May 2022 17:00) (15 - 18)  SpO2: 100% (06 May 2022 17:00) (99% - 100%)    GENERAL: No acute distress, well-developed  HEAD:  Atraumatic, Normocephalic  EYES: EOMI, PERRLA, conjunctiva and sclera clear  NECK: Supple, no lymphadenopathy, no JVD  CHEST/LUNG: CTAB; No wheezes, rales, or rhonchi  HEART: Regular rate and rhythm; No murmurs, rubs, or gallops  ABDOMEN: Soft, non-tender, non-distended; normal bowel sounds, no organomegaly  EXTREMITIES:  2+ peripheral pulses b/l, No clubbing, cyanosis, or edema  NEUROLOGY: A&O x 3, no focal deficits  SKIN: No rashes or lesions    LABS:                        10.0   16.65 )-----------( 352      ( 06 May 2022 07:23 )             29.9     05-06    130<L>  |  97<L>  |  39<H>  ----------------------------<  174<H>  4.4   |  22  |  1.09    Ca    8.8      06 May 2022 07:23  Phos  3.7     05-06  Mg     2.30     05-06                  RADIOLOGY & ADDITIONAL TESTS:  Results Reviewed:   Imaging Personally Reviewed:  Electrocardiogram Personally Reviewed:    COORDINATION OF CARE:  Care Discussed with Consultants/Other Providers [Y/N]:  Prior or Outpatient Records Reviewed [Y/N]:   PROGRESS NOTE:   Authored by Shree Torres MD  Pager:  [phone]    Patient is a 76y old  Female who presents with a chief complaint of pelvic pain (06 May 2022 17:26)    SUBJECTIVE / OVERNIGHT EVENTS: NAEO. Patient feels well this morning. Midline is not bothersome to her    ADDITIONAL REVIEW OF SYSTEMS:  REVIEW OF SYSTEMS:    CONSTITUTIONAL: No weakness, fevers or chills  RESPIRATORY: No cough, wheezing, hemoptysis; No shortness of breath  CARDIOVASCULAR: No chest pain or palpitations  GASTROINTESTINAL: No abdominal or epigastric pain. No nausea, vomiting, or hematemesis; No diarrhea or constipation. No melena or hematochezia.  SKIN: No itching, rashes    MEDICATIONS  (STANDING):  bisacodyl 5 milliGRAM(s) Oral at bedtime  ceFAZolin   IVPB 2000 milliGRAM(s) IV Intermittent every 12 hours  DULoxetine 60 milliGRAM(s) Oral daily  famotidine    Tablet 20 milliGRAM(s) Oral two times a day  fenofibrate Tablet 48 milliGRAM(s) Oral daily  fluticasone propionate 50 MICROgram(s)/spray Nasal Spray 1 Spray(s) Both Nostrils two times a day  gabapentin 300 milliGRAM(s) Oral two times a day  levothyroxine 125 MICROGram(s) Oral daily  meclizine 12.5 milliGRAM(s) Oral two times a day  melatonin 3 milliGRAM(s) Oral at bedtime  nystatin Powder 1 Application(s) Topical two times a day  polyethylene glycol 3350 17 Gram(s) Oral two times a day  rOPINIRole 5 milliGRAM(s) Oral at bedtime  senna 2 Tablet(s) Oral at bedtime  sodium chloride 0.65% Nasal 1 Spray(s) Both Nostrils two times a day  spironolactone 50 milliGRAM(s) Oral daily  tamsulosin 0.4 milliGRAM(s) Oral at bedtime    MEDICATIONS  (PRN):  acetaminophen     Tablet .. 650 milliGRAM(s) Oral every 6 hours PRN Mild Pain (1 - 3)  aluminum hydroxide/magnesium hydroxide/simethicone Suspension 30 milliLiter(s) Oral every 6 hours PRN Dyspepsia  benzonatate 100 milliGRAM(s) Oral every 8 hours PRN Cough  ondansetron Injectable 4 milliGRAM(s) IV Push four times a day PRN Nausea and/or Vomiting  oxyCODONE    IR 5 milliGRAM(s) Oral every 6 hours PRN Severe Pain (7 - 10)  oxyCODONE    IR 2.5 milliGRAM(s) Oral every 6 hours PRN Moderate Pain (4 - 6)    I&O's Summary    06 May 2022 07:01  -  07 May 2022 07:00  --------------------------------------------------------  IN: 1400 mL / OUT: 300 mL / NET: 1100 mL    PHYSICAL EXAM:  Vital Signs Last 24 Hrs  T(C): 37.1 (06 May 2022 17:00), Max: 37.1 (06 May 2022 17:00)  T(F): 98.7 (06 May 2022 17:00), Max: 98.7 (06 May 2022 17:00)  HR: 85 (06 May 2022 17:00) (79 - 85)  BP: 119/73 (06 May 2022 17:00) (118/68 - 120/73)  RR: 15 (06 May 2022 17:00) (15 - 18)  SpO2: 100% (06 May 2022 17:00) (99% - 100%)    GENERAL: No acute distress, well-developed  HEAD:  Atraumatic, Normocephalic  EYES: EOMI, PERRLA, conjunctiva and sclera clear  NECK: Supple, no lymphadenopathy, no JVD  CHEST/LUNG: CTAB; No wheezes, rales, or rhonchi  HEART: Regular rate and rhythm; No murmurs, rubs, or gallops  ABDOMEN: Soft, non-tender, non-distended; normal bowel sounds, no organomegaly  EXTREMITIES:  2+ peripheral pulses b/l, No clubbing, cyanosis, or edema  NEUROLOGY: A&O x 3, no focal deficits  SKIN: No rashes or lesions    LABS:                        10.0   16.65 )-----------( 352      ( 06 May 2022 07:23 )             29.9     05-06    130<L>  |  97<L>  |  39<H>  ----------------------------<  174<H>  4.4   |  22  |  1.09    Ca    8.8      06 May 2022 07:23  Phos  3.7     05-06  Mg     2.30     05-06                  RADIOLOGY & ADDITIONAL TESTS:  Results Reviewed:   Imaging Personally Reviewed:  Electrocardiogram Personally Reviewed:    COORDINATION OF CARE:  Care Discussed with Consultants/Other Providers [Y/N]:  Prior or Outpatient Records Reviewed [Y/N]:

## 2022-05-07 NOTE — PROGRESS NOTE ADULT - PROBLEM SELECTOR PLAN 6
- Urine studies consistent with hypovolemia, consistent with history given vomiting and KATHERINE  - Responded well to IVF  - Patient continues to experience dizziness, nausea and vomiting  - Continue Antivert home medication. - Unclear etiology but recently exacerbated by Diuretic use  - Will discontinue spironolactone as risks of elctectrolyte imbalance outweighs benefit  - Continue to monitor BMP  - Consider IVF with NS

## 2022-05-07 NOTE — PROGRESS NOTE ADULT - PROBLEM SELECTOR PLAN 7
- Patient takes spironolactone at home. Held on admission due to KATHERINE   - BPs within goal during admission  - continue to hold iso Hyponatremia and labile potassium levels.  - Follow up with PCP - BPs within goal during admission  - continue to hold iso Hyponatremia and labile potassium levels.  - Follow up with PCP

## 2022-05-07 NOTE — PROGRESS NOTE ADULT - ATTENDING COMMENTS
Patient seen and examined, care d/w HS 7.    In summary 76F SVT s/p ablation, HTN, HLD, hypothyroidism, vertigo, peripheral neuropathy, OA, cervical CA p/w R sided groin/pelvic pain and found to have fracture of Rt superior and inferior pubic rami with hematoma of Rt obturator internus muscle; course c/b sepsis 2/2 MSSA bacteremia - etiology remains unclear.    # Sepsis 2/2 high grade MSSA bacteremia  - source/etiology still unclear  - MRI w/o OM of hip  - no vegetations on TTE and SHAUN  - cultures + 4/27 & 4/19, repeat blood cultures from 5/2 cleared   - appreciate ID recs   - s/p midline 5/6  - abx through 5/29, OP ID f/u and weekly labs    #Rt superior and inferior pubic rami with hematoma of R obturator internus muscle   - no acute intervention as per ortho recs  - c/w pain control w/ Oxy IR 5mg Q 6 PRN, continue bowel regimen  - PT rec [facility]    #Urinary retention   - now passed TOV  - educated pt to advise [facility] if issues voiding     # LE swelling: symmetric chronic  - med rec re: home diuretics, has hyperK and low Na in past thus diuretics may be causing more risk than benefit (TTE normal EF, trace protein, Alb 2.)  - LE elevation compression, can consider resumption of diuretics as OP    # Hyponatremia: slightly worse today, s/p spironolactone 50 g x 2 days, urine Na <20  - s/p IV  - hold diuretics on dc, BMP while at [facility]    Dispo to [facility] today  dispo time 33 min  care d/w sister at bedside

## 2022-05-07 NOTE — PROGRESS NOTE ADULT - ASSESSMENT
75 y/o F with pmh of SVT s/p ablation, HTN, HLD, hypothyroidism, vertigo, peripheral neuropathy, osteoarthritis, cervical ca p/w R sided groin/pelvic pain and found to have fx for R superior and inferior pubic rami with hematoma of R obturator internus muscle.  Now afebrile on antibiotics and adequate pain regimen.  MRI w/o OM of hip.  Pending final ID recs and AGNIESZKA placement.

## 2022-05-07 NOTE — PROGRESS NOTE ADULT - PROBLEM SELECTOR PLAN 4
- MRI w/contrast showing small rim-enhancing hematomas within the right adductor musculature and within the right obturator   internus. Low suspicion of underlying infection.  - Non expanding. Continue to monitor symptoms

## 2022-05-29 ENCOUNTER — INPATIENT (INPATIENT)
Facility: HOSPITAL | Age: 77
LOS: 9 days | Discharge: NURSING, REHAB OR LONG-TERM CARE | End: 2022-06-08
Attending: INTERNAL MEDICINE | Admitting: INTERNAL MEDICINE
Payer: MEDICARE

## 2022-05-29 VITALS
HEART RATE: 81 BPM | DIASTOLIC BLOOD PRESSURE: 79 MMHG | RESPIRATION RATE: 18 BRPM | SYSTOLIC BLOOD PRESSURE: 136 MMHG | TEMPERATURE: 98 F | OXYGEN SATURATION: 97 % | HEIGHT: 65.5 IN

## 2022-05-29 DIAGNOSIS — N39.0 URINARY TRACT INFECTION, SITE NOT SPECIFIED: ICD-10-CM

## 2022-05-29 DIAGNOSIS — Z29.9 ENCOUNTER FOR PROPHYLACTIC MEASURES, UNSPECIFIED: ICD-10-CM

## 2022-05-29 DIAGNOSIS — S32.9XXA FRACTURE OF UNSPECIFIED PARTS OF LUMBOSACRAL SPINE AND PELVIS, INITIAL ENCOUNTER FOR CLOSED FRACTURE: ICD-10-CM

## 2022-05-29 DIAGNOSIS — R78.81 BACTEREMIA: ICD-10-CM

## 2022-05-29 DIAGNOSIS — R09.89 OTHER SPECIFIED SYMPTOMS AND SIGNS INVOLVING THE CIRCULATORY AND RESPIRATORY SYSTEMS: ICD-10-CM

## 2022-05-29 DIAGNOSIS — N13.30 UNSPECIFIED HYDRONEPHROSIS: ICD-10-CM

## 2022-05-29 DIAGNOSIS — K59.00 CONSTIPATION, UNSPECIFIED: ICD-10-CM

## 2022-05-29 DIAGNOSIS — E83.52 HYPERCALCEMIA: ICD-10-CM

## 2022-05-29 DIAGNOSIS — E03.9 HYPOTHYROIDISM, UNSPECIFIED: ICD-10-CM

## 2022-05-29 DIAGNOSIS — I10 ESSENTIAL (PRIMARY) HYPERTENSION: ICD-10-CM

## 2022-05-29 LAB
ALBUMIN SERPL ELPH-MCNC: 2.6 G/DL — LOW (ref 3.3–5)
ALBUMIN SERPL ELPH-MCNC: 2.7 G/DL — LOW (ref 3.3–5)
ALP SERPL-CCNC: 65 U/L — SIGNIFICANT CHANGE UP (ref 40–120)
ALP SERPL-CCNC: 72 U/L — SIGNIFICANT CHANGE UP (ref 40–120)
ALT FLD-CCNC: 10 U/L — SIGNIFICANT CHANGE UP (ref 4–33)
ALT FLD-CCNC: SIGNIFICANT CHANGE UP U/L (ref 4–33)
ANION GAP SERPL CALC-SCNC: 10 MMOL/L — SIGNIFICANT CHANGE UP (ref 7–14)
ANION GAP SERPL CALC-SCNC: 13 MMOL/L — SIGNIFICANT CHANGE UP (ref 7–14)
APPEARANCE UR: ABNORMAL
AST SERPL-CCNC: 26 U/L — SIGNIFICANT CHANGE UP (ref 4–32)
AST SERPL-CCNC: 45 U/L — HIGH (ref 4–32)
BACTERIA # UR AUTO: ABNORMAL
BASE EXCESS BLDV CALC-SCNC: 8.7 MMOL/L — HIGH (ref -2–3)
BASOPHILS # BLD AUTO: 0.02 K/UL — SIGNIFICANT CHANGE UP (ref 0–0.2)
BASOPHILS NFR BLD AUTO: 0.1 % — SIGNIFICANT CHANGE UP (ref 0–2)
BILIRUB SERPL-MCNC: 0.4 MG/DL — SIGNIFICANT CHANGE UP (ref 0.2–1.2)
BILIRUB SERPL-MCNC: 0.4 MG/DL — SIGNIFICANT CHANGE UP (ref 0.2–1.2)
BILIRUB UR-MCNC: NEGATIVE — SIGNIFICANT CHANGE UP
BLOOD GAS VENOUS COMPREHENSIVE RESULT: SIGNIFICANT CHANGE UP
BUN SERPL-MCNC: 26 MG/DL — HIGH (ref 7–23)
BUN SERPL-MCNC: 26 MG/DL — HIGH (ref 7–23)
CA-I BLD-SCNC: 1.38 MMOL/L — HIGH (ref 1.15–1.29)
CALCIUM SERPL-MCNC: 11.3 MG/DL — HIGH (ref 8.4–10.5)
CALCIUM SERPL-MCNC: 12.4 MG/DL — HIGH (ref 8.4–10.5)
CALCIUM UR-MCNC: 14 MG/DL — SIGNIFICANT CHANGE UP
CHLORIDE BLDV-SCNC: 93 MMOL/L — LOW (ref 96–108)
CHLORIDE SERPL-SCNC: 93 MMOL/L — LOW (ref 98–107)
CHLORIDE SERPL-SCNC: 93 MMOL/L — LOW (ref 98–107)
CO2 BLDV-SCNC: 36.1 MMOL/L — HIGH (ref 22–26)
CO2 SERPL-SCNC: 24 MMOL/L — SIGNIFICANT CHANGE UP (ref 22–31)
CO2 SERPL-SCNC: 29 MMOL/L — SIGNIFICANT CHANGE UP (ref 22–31)
COLOR SPEC: YELLOW — SIGNIFICANT CHANGE UP
CREAT ?TM UR-MCNC: 50 MG/DL — SIGNIFICANT CHANGE UP
CREAT SERPL-MCNC: 0.98 MG/DL — SIGNIFICANT CHANGE UP (ref 0.5–1.3)
CREAT SERPL-MCNC: 1.16 MG/DL — SIGNIFICANT CHANGE UP (ref 0.5–1.3)
DIFF PNL FLD: ABNORMAL
EGFR: 49 ML/MIN/1.73M2 — LOW
EGFR: 59 ML/MIN/1.73M2 — LOW
EOSINOPHIL # BLD AUTO: 0 K/UL — SIGNIFICANT CHANGE UP (ref 0–0.5)
EOSINOPHIL NFR BLD AUTO: 0 % — SIGNIFICANT CHANGE UP (ref 0–6)
EPI CELLS # UR: SIGNIFICANT CHANGE UP /HPF (ref 0–5)
GAS PNL BLDV: 128 MMOL/L — LOW (ref 136–145)
GAS PNL BLDV: SIGNIFICANT CHANGE UP
GLUCOSE BLDV-MCNC: 113 MG/DL — HIGH (ref 70–99)
GLUCOSE SERPL-MCNC: 106 MG/DL — HIGH (ref 70–99)
GLUCOSE SERPL-MCNC: 90 MG/DL — SIGNIFICANT CHANGE UP (ref 70–99)
GLUCOSE UR QL: NEGATIVE — SIGNIFICANT CHANGE UP
HCO3 BLDV-SCNC: 34 MMOL/L — HIGH (ref 22–29)
HCT VFR BLD CALC: 37.7 % — SIGNIFICANT CHANGE UP (ref 34.5–45)
HCT VFR BLDA CALC: 37 % — SIGNIFICANT CHANGE UP (ref 34.5–46.5)
HGB BLD CALC-MCNC: 12.2 G/DL — SIGNIFICANT CHANGE UP (ref 11.5–15.5)
HGB BLD-MCNC: 11.9 G/DL — SIGNIFICANT CHANGE UP (ref 11.5–15.5)
IANC: 12.19 K/UL — HIGH (ref 1.8–7.4)
IMM GRANULOCYTES NFR BLD AUTO: 0.8 % — SIGNIFICANT CHANGE UP (ref 0–1.5)
KETONES UR-MCNC: NEGATIVE — SIGNIFICANT CHANGE UP
LACTATE BLDV-MCNC: 1.4 MMOL/L — SIGNIFICANT CHANGE UP (ref 0.5–2)
LEUKOCYTE ESTERASE UR-ACNC: ABNORMAL
LIDOCAIN IGE QN: 8 U/L — SIGNIFICANT CHANGE UP (ref 7–60)
LYMPHOCYTES # BLD AUTO: 18.7 % — SIGNIFICANT CHANGE UP (ref 13–44)
LYMPHOCYTES # BLD AUTO: 2.99 K/UL — SIGNIFICANT CHANGE UP (ref 1–3.3)
MCHC RBC-ENTMCNC: 26.9 PG — LOW (ref 27–34)
MCHC RBC-ENTMCNC: 31.6 GM/DL — LOW (ref 32–36)
MCV RBC AUTO: 85.1 FL — SIGNIFICANT CHANGE UP (ref 80–100)
MONOCYTES # BLD AUTO: 0.63 K/UL — SIGNIFICANT CHANGE UP (ref 0–0.9)
MONOCYTES NFR BLD AUTO: 3.9 % — SIGNIFICANT CHANGE UP (ref 2–14)
NEUTROPHILS # BLD AUTO: 12.19 K/UL — HIGH (ref 1.8–7.4)
NEUTROPHILS NFR BLD AUTO: 76.5 % — SIGNIFICANT CHANGE UP (ref 43–77)
NITRITE UR-MCNC: POSITIVE
NRBC # BLD: 0 /100 WBCS — SIGNIFICANT CHANGE UP
NRBC # FLD: 0 K/UL — SIGNIFICANT CHANGE UP
NT-PROBNP SERPL-SCNC: 1083 PG/ML — HIGH
PCO2 BLDV: 52 MMHG — HIGH (ref 39–42)
PH BLDV: 7.43 — SIGNIFICANT CHANGE UP (ref 7.32–7.43)
PH UR: 6 — SIGNIFICANT CHANGE UP (ref 5–8)
PLATELET # BLD AUTO: 294 K/UL — SIGNIFICANT CHANGE UP (ref 150–400)
PO2 BLDV: 26 MMHG — SIGNIFICANT CHANGE UP
POTASSIUM BLDV-SCNC: 3.9 MMOL/L — SIGNIFICANT CHANGE UP (ref 3.5–5.1)
POTASSIUM SERPL-MCNC: 4.1 MMOL/L — SIGNIFICANT CHANGE UP (ref 3.5–5.3)
POTASSIUM SERPL-MCNC: SIGNIFICANT CHANGE UP MMOL/L (ref 3.5–5.3)
POTASSIUM SERPL-SCNC: 4.1 MMOL/L — SIGNIFICANT CHANGE UP (ref 3.5–5.3)
POTASSIUM SERPL-SCNC: SIGNIFICANT CHANGE UP MMOL/L (ref 3.5–5.3)
PROT SERPL-MCNC: 6.2 G/DL — SIGNIFICANT CHANGE UP (ref 6–8.3)
PROT SERPL-MCNC: 6.7 G/DL — SIGNIFICANT CHANGE UP (ref 6–8.3)
PROT UR-MCNC: ABNORMAL
PTH-INTACT FLD-MCNC: 4 PG/ML — LOW (ref 15–65)
RBC # BLD: 4.43 M/UL — SIGNIFICANT CHANGE UP (ref 3.8–5.2)
RBC # FLD: 15.7 % — HIGH (ref 10.3–14.5)
RBC CASTS # UR COMP ASSIST: SIGNIFICANT CHANGE UP /HPF (ref 0–4)
SAO2 % BLDV: 32.6 % — SIGNIFICANT CHANGE UP
SARS-COV-2 RNA SPEC QL NAA+PROBE: SIGNIFICANT CHANGE UP
SODIUM SERPL-SCNC: 130 MMOL/L — LOW (ref 135–145)
SODIUM SERPL-SCNC: 132 MMOL/L — LOW (ref 135–145)
SODIUM UR-SCNC: 57 MMOL/L — SIGNIFICANT CHANGE UP
SP GR SPEC: 1.01 — SIGNIFICANT CHANGE UP (ref 1–1.05)
TROPONIN T, HIGH SENSITIVITY RESULT: 15 NG/L — SIGNIFICANT CHANGE UP
TROPONIN T, HIGH SENSITIVITY RESULT: 17 NG/L — SIGNIFICANT CHANGE UP
TSH SERPL-MCNC: 6.12 UIU/ML — HIGH (ref 0.27–4.2)
UROBILINOGEN FLD QL: SIGNIFICANT CHANGE UP
WBC # BLD: 15.96 K/UL — HIGH (ref 3.8–10.5)
WBC # FLD AUTO: 15.96 K/UL — HIGH (ref 3.8–10.5)
WBC UR QL: >50 /HPF — HIGH (ref 0–5)

## 2022-05-29 PROCEDURE — 74176 CT ABD & PELVIS W/O CONTRAST: CPT | Mod: 26,MA

## 2022-05-29 PROCEDURE — 99223 1ST HOSP IP/OBS HIGH 75: CPT | Mod: GC

## 2022-05-29 PROCEDURE — 93010 ELECTROCARDIOGRAM REPORT: CPT | Mod: GC

## 2022-05-29 PROCEDURE — 99285 EMERGENCY DEPT VISIT HI MDM: CPT | Mod: 25,GC

## 2022-05-29 PROCEDURE — 71045 X-RAY EXAM CHEST 1 VIEW: CPT | Mod: 26

## 2022-05-29 RX ORDER — OXYCODONE HYDROCHLORIDE 5 MG/1
5 TABLET ORAL EVERY 6 HOURS
Refills: 0 | Status: DISCONTINUED | OUTPATIENT
Start: 2022-05-29 | End: 2022-06-03

## 2022-05-29 RX ORDER — FUROSEMIDE 40 MG
40 TABLET ORAL ONCE
Refills: 0 | Status: COMPLETED | OUTPATIENT
Start: 2022-05-29 | End: 2022-05-29

## 2022-05-29 RX ORDER — LEVOTHYROXINE SODIUM 125 MCG
125 TABLET ORAL DAILY
Refills: 0 | Status: DISCONTINUED | OUTPATIENT
Start: 2022-05-29 | End: 2022-06-08

## 2022-05-29 RX ORDER — DULOXETINE HYDROCHLORIDE 30 MG/1
60 CAPSULE, DELAYED RELEASE ORAL DAILY
Refills: 0 | Status: DISCONTINUED | OUTPATIENT
Start: 2022-05-29 | End: 2022-06-08

## 2022-05-29 RX ORDER — NALOXEGOL OXALATE 12.5 MG/1
25 TABLET, FILM COATED ORAL ONCE
Refills: 0 | Status: COMPLETED | OUTPATIENT
Start: 2022-05-29 | End: 2022-05-29

## 2022-05-29 RX ORDER — FAMOTIDINE 10 MG/ML
20 INJECTION INTRAVENOUS
Refills: 0 | Status: DISCONTINUED | OUTPATIENT
Start: 2022-05-29 | End: 2022-06-08

## 2022-05-29 RX ORDER — KETOROLAC TROMETHAMINE 30 MG/ML
15 SYRINGE (ML) INJECTION ONCE
Refills: 0 | Status: DISCONTINUED | OUTPATIENT
Start: 2022-05-29 | End: 2022-05-29

## 2022-05-29 RX ORDER — FENOFIBRATE,MICRONIZED 130 MG
145 CAPSULE ORAL DAILY
Refills: 0 | Status: DISCONTINUED | OUTPATIENT
Start: 2022-05-29 | End: 2022-06-08

## 2022-05-29 RX ORDER — SENNA PLUS 8.6 MG/1
2 TABLET ORAL ONCE
Refills: 0 | Status: COMPLETED | OUTPATIENT
Start: 2022-05-29 | End: 2022-05-29

## 2022-05-29 RX ORDER — MORPHINE SULFATE 50 MG/1
4 CAPSULE, EXTENDED RELEASE ORAL ONCE
Refills: 0 | Status: DISCONTINUED | OUTPATIENT
Start: 2022-05-29 | End: 2022-05-29

## 2022-05-29 RX ORDER — SPIRONOLACTONE 25 MG/1
25 TABLET, FILM COATED ORAL DAILY
Refills: 0 | Status: DISCONTINUED | OUTPATIENT
Start: 2022-05-29 | End: 2022-06-08

## 2022-05-29 RX ORDER — CALCITONIN SALMON 200 [IU]/ML
380 INJECTION, SOLUTION INTRAMUSCULAR ONCE
Refills: 0 | Status: DISCONTINUED | OUTPATIENT
Start: 2022-05-29 | End: 2022-05-29

## 2022-05-29 RX ORDER — GABAPENTIN 400 MG/1
300 CAPSULE ORAL
Refills: 0 | Status: DISCONTINUED | OUTPATIENT
Start: 2022-05-29 | End: 2022-06-08

## 2022-05-29 RX ORDER — MECLIZINE HCL 12.5 MG
12.5 TABLET ORAL
Refills: 0 | Status: DISCONTINUED | OUTPATIENT
Start: 2022-05-29 | End: 2022-06-08

## 2022-05-29 RX ORDER — ALBUMIN HUMAN 25 %
250 VIAL (ML) INTRAVENOUS EVERY 8 HOURS
Refills: 0 | Status: DISCONTINUED | OUTPATIENT
Start: 2022-05-29 | End: 2022-05-29

## 2022-05-29 RX ORDER — MORPHINE SULFATE 50 MG/1
2 CAPSULE, EXTENDED RELEASE ORAL ONCE
Refills: 0 | Status: DISCONTINUED | OUTPATIENT
Start: 2022-05-29 | End: 2022-05-29

## 2022-05-29 RX ORDER — POLYETHYLENE GLYCOL 3350 17 G/17G
17 POWDER, FOR SOLUTION ORAL
Refills: 0 | Status: DISCONTINUED | OUTPATIENT
Start: 2022-05-29 | End: 2022-06-03

## 2022-05-29 RX ORDER — FLUTICASONE PROPIONATE 50 MCG
1 SPRAY, SUSPENSION NASAL
Refills: 0 | Status: DISCONTINUED | OUTPATIENT
Start: 2022-05-29 | End: 2022-06-08

## 2022-05-29 RX ORDER — ONDANSETRON 8 MG/1
4 TABLET, FILM COATED ORAL ONCE
Refills: 0 | Status: COMPLETED | OUTPATIENT
Start: 2022-05-29 | End: 2022-05-29

## 2022-05-29 RX ORDER — CALCITONIN SALMON 200 [IU]/ML
200 INJECTION, SOLUTION INTRAMUSCULAR EVERY 12 HOURS
Refills: 0 | Status: DISCONTINUED | OUTPATIENT
Start: 2022-05-29 | End: 2022-05-29

## 2022-05-29 RX ORDER — POLYETHYLENE GLYCOL 3350 17 G/17G
17 POWDER, FOR SOLUTION ORAL ONCE
Refills: 0 | Status: COMPLETED | OUTPATIENT
Start: 2022-05-29 | End: 2022-05-29

## 2022-05-29 RX ORDER — SODIUM CHLORIDE 9 MG/ML
1000 INJECTION INTRAMUSCULAR; INTRAVENOUS; SUBCUTANEOUS
Refills: 0 | Status: DISCONTINUED | OUTPATIENT
Start: 2022-05-29 | End: 2022-05-30

## 2022-05-29 RX ORDER — SENNA PLUS 8.6 MG/1
2 TABLET ORAL AT BEDTIME
Refills: 0 | Status: DISCONTINUED | OUTPATIENT
Start: 2022-05-29 | End: 2022-06-08

## 2022-05-29 RX ORDER — ACETAMINOPHEN 500 MG
650 TABLET ORAL EVERY 6 HOURS
Refills: 0 | Status: DISCONTINUED | OUTPATIENT
Start: 2022-05-29 | End: 2022-06-08

## 2022-05-29 RX ORDER — ROPINIROLE 8 MG/1
5 TABLET, FILM COATED, EXTENDED RELEASE ORAL AT BEDTIME
Refills: 0 | Status: DISCONTINUED | OUTPATIENT
Start: 2022-05-29 | End: 2022-06-08

## 2022-05-29 RX ORDER — SODIUM CHLORIDE 0.65 %
1 AEROSOL, SPRAY (ML) NASAL
Refills: 0 | Status: DISCONTINUED | OUTPATIENT
Start: 2022-05-29 | End: 2022-06-08

## 2022-05-29 RX ORDER — PIPERACILLIN AND TAZOBACTAM 4; .5 G/20ML; G/20ML
3.38 INJECTION, POWDER, LYOPHILIZED, FOR SOLUTION INTRAVENOUS ONCE
Refills: 0 | Status: COMPLETED | OUTPATIENT
Start: 2022-05-29 | End: 2022-05-29

## 2022-05-29 RX ORDER — FUROSEMIDE 40 MG
40 TABLET ORAL DAILY
Refills: 0 | Status: DISCONTINUED | OUTPATIENT
Start: 2022-05-29 | End: 2022-05-31

## 2022-05-29 RX ORDER — PIPERACILLIN AND TAZOBACTAM 4; .5 G/20ML; G/20ML
3.38 INJECTION, POWDER, LYOPHILIZED, FOR SOLUTION INTRAVENOUS EVERY 8 HOURS
Refills: 0 | Status: ACTIVE | OUTPATIENT
Start: 2022-05-29 | End: 2022-06-01

## 2022-05-29 RX ADMIN — ROPINIROLE 5 MILLIGRAM(S): 8 TABLET, FILM COATED, EXTENDED RELEASE ORAL at 22:20

## 2022-05-29 RX ADMIN — Medication 650 MILLIGRAM(S): at 14:28

## 2022-05-29 RX ADMIN — MORPHINE SULFATE 4 MILLIGRAM(S): 50 CAPSULE, EXTENDED RELEASE ORAL at 05:46

## 2022-05-29 RX ADMIN — Medication 12.5 MILLIGRAM(S): at 22:16

## 2022-05-29 RX ADMIN — PIPERACILLIN AND TAZOBACTAM 200 GRAM(S): 4; .5 INJECTION, POWDER, LYOPHILIZED, FOR SOLUTION INTRAVENOUS at 11:35

## 2022-05-29 RX ADMIN — Medication 1 SPRAY(S): at 22:18

## 2022-05-29 RX ADMIN — GABAPENTIN 300 MILLIGRAM(S): 400 CAPSULE ORAL at 22:18

## 2022-05-29 RX ADMIN — ONDANSETRON 4 MILLIGRAM(S): 8 TABLET, FILM COATED ORAL at 11:35

## 2022-05-29 RX ADMIN — Medication 5 MILLIGRAM(S): at 22:18

## 2022-05-29 RX ADMIN — SENNA PLUS 2 TABLET(S): 8.6 TABLET ORAL at 22:17

## 2022-05-29 RX ADMIN — Medication 125 MILLILITER(S): at 14:31

## 2022-05-29 RX ADMIN — MORPHINE SULFATE 4 MILLIGRAM(S): 50 CAPSULE, EXTENDED RELEASE ORAL at 08:00

## 2022-05-29 RX ADMIN — SENNA PLUS 2 TABLET(S): 8.6 TABLET ORAL at 12:59

## 2022-05-29 RX ADMIN — POLYETHYLENE GLYCOL 3350 17 GRAM(S): 17 POWDER, FOR SOLUTION ORAL at 12:59

## 2022-05-29 RX ADMIN — Medication 650 MILLIGRAM(S): at 16:00

## 2022-05-29 RX ADMIN — ONDANSETRON 4 MILLIGRAM(S): 8 TABLET, FILM COATED ORAL at 05:43

## 2022-05-29 RX ADMIN — FAMOTIDINE 20 MILLIGRAM(S): 10 INJECTION INTRAVENOUS at 22:18

## 2022-05-29 RX ADMIN — PIPERACILLIN AND TAZOBACTAM 25 GRAM(S): 4; .5 INJECTION, POWDER, LYOPHILIZED, FOR SOLUTION INTRAVENOUS at 22:19

## 2022-05-29 RX ADMIN — SODIUM CHLORIDE 200 MILLILITER(S): 9 INJECTION INTRAMUSCULAR; INTRAVENOUS; SUBCUTANEOUS at 17:41

## 2022-05-29 RX ADMIN — MORPHINE SULFATE 2 MILLIGRAM(S): 50 CAPSULE, EXTENDED RELEASE ORAL at 15:57

## 2022-05-29 RX ADMIN — POLYETHYLENE GLYCOL 3350 17 GRAM(S): 17 POWDER, FOR SOLUTION ORAL at 22:17

## 2022-05-29 RX ADMIN — Medication 40 MILLIGRAM(S): at 14:28

## 2022-05-29 RX ADMIN — MORPHINE SULFATE 2 MILLIGRAM(S): 50 CAPSULE, EXTENDED RELEASE ORAL at 17:01

## 2022-05-29 RX ADMIN — NALOXEGOL OXALATE 25 MILLIGRAM(S): 12.5 TABLET, FILM COATED ORAL at 15:20

## 2022-05-29 NOTE — H&P ADULT - PROBLEM SELECTOR PLAN 3
UA on admission with Large leuk est, positive nitrite, >50 WBCs, Many bacteria   CT AP with findings c/s cystitis  Likely 2/2 urostasis i/s/o constipation  Patient had been on Cefazolin for MSSA bacteremia to end on 5/29  F/u UCx  On Zosyn 5/29- UA on admission with Large leuk est, positive nitrite, >50 WBCs, Many bacteria   CT AP with findings c/s cystitis  Likely 2/2 urostasis i/s/o constipation  Patient had been on Cefazolin for MSSA bacteremia to end on 5/29  F/u UCx  F/u BCx  On Zosyn 5/29-

## 2022-05-29 NOTE — H&P ADULT - PROBLEM SELECTOR PLAN 5
Patient with pelvic fractures found on previous admission  Presenting from rehab  Pain control with IV tylenol, _________  PT consult Patient with pelvic fractures found on previous admission  Was getting Tramadol and Oxy at rehab  Presenting from rehab  Pain control with IV tylenol, Toradol for moderate pain  PT consult Patient with pelvic fractures found on previous admission  Was getting Tramadol and Oxy at rehab  Presenting from rehab  Pain control with IV tylenol, Oxy 5 q6prn- caution with opioids given constipation  PT consult

## 2022-05-29 NOTE — ED PROVIDER NOTE - CLINICAL SUMMARY MEDICAL DECISION MAKING FREE TEXT BOX
77 y F, coming from rehab center, PSH of cholecystectomy, hysterectomy, PMH of hypothyroidism, HTN, HTN, recent multiple pubic rami fx, MSSA bacteremia, presenting with worsening of 3-wk onset of abdominal pain. Reports nausea, vomiting. No BM, flatus for the past few (4-5) days. On exam, abdomen is distended with diffuse tenderness on palpation. Concern for bowel obstruction vs ileus. will get labs, CTAP without contrast due to patient's allergy status of IV contrast with anaphylaxis reaction. Pain control with morphine and zofran.

## 2022-05-29 NOTE — H&P ADULT - NSHPSOCIALHISTORY_GEN_ALL_CORE
Ambulates with a cane since pelvic fractures were found  Denies tobacco use  Denies alcohol use  Denies illicit drug use

## 2022-05-29 NOTE — ED ADULT NURSE REASSESSMENT NOTE - NS ED NURSE REASSESS COMMENT FT1
unable to obtain blood from pt, team 2 called, resident to come see pt. pt c/o generalized body pain, pt repositioned, given meds see emar for tx. pt passed dysphagia screening, md to order meal.

## 2022-05-29 NOTE — ED PROVIDER NOTE - OBJECTIVE STATEMENT
77 y F, coming from rehab center, PSH of cholecystectomy, hysterectomy, PMH of hypothyroidism, HTN, HTN, recent multiple pubic rami fx, MSSA bacteremia, presenting with worsening of 3-wk onset of abdominal pain. Reports nausea, vomiting. No BM, flatus for the past few (4-5) days. Been taking Miralax on and off. Denies fever. ROS positive for CP. 77 y F, coming from rehab center, PSH of cholecystectomy, hysterectomy, PMH of hypothyroidism, HTN, HTN, recent multiple pubic rami fx, MSSA bacteremia, presenting with worsening of 3-wk onset of abdominal pain. Reports nausea, vomiting. No BM, flatus for the past few (4-5) days. Been taking Miralax on and off. Denies fever.

## 2022-05-29 NOTE — H&P ADULT - PROBLEM SELECTOR PLAN 4
Patient had been on Cefazolin for MSSA bacteremia to end on 5/29  Found 2/2 being febrile on last admission, unclear source  SHAUN on 05/03 showed no vegetations  On Zosyn for UTI 5/29-  Low threshold to repeat BCx Patient had been on Cefazolin for MSSA bacteremia to end on 5/29  Found 2/2 being febrile on last admission, unclear source  SHAUN on 05/03 showed no vegetations  On Zosyn for UTI 5/29-  F/u BCx

## 2022-05-29 NOTE — ED PROVIDER NOTE - PHYSICAL EXAMINATION
Gen: Patient is well-appearing, NAD, AAOx3, able to ambulate without assistance  HEENT: NCAT, normal conjunctiva, tongue midline, oral mucosa moist  Lung: CTAB, no respiratory distress, no wheezes/rhonchi/rales B/L, speaking in full sentences  CV: RRR, no murmurs, rubs or gallops, distal pulses 2+ b/l  Abd: diffuse tenderness on palpation, distended abdomen, no guarding, no rigidity, no rebound tenderness  MSK: no visible deformities, ROM normal in UE/LE, no back TTP  Neuro: No focal sensory or motor deficits  Skin: Warm, well perfused, +2 pedal edema   Psych: normal affect, calm

## 2022-05-29 NOTE — ED PROVIDER NOTE - CARE PLAN
1 Principal Discharge DX:	Bilateral hydronephrosis  Secondary Diagnosis:	Acute UTI  Secondary Diagnosis:	Stercoral colitis

## 2022-05-29 NOTE — ED ADULT NURSE NOTE - CHIEF COMPLAINT QUOTE
Pt arrives from [facility] (at facility for recent pelvic fx,) with C/O intermittent upper abd pain x few weeks, becoming worse tonight. No BM or flatus x2 days as per patient. Vomiting x1 at rehab center and in triage. PICC from 5/17 noted to L arm. PMHx cholecystectomy, hysterectomy.

## 2022-05-29 NOTE — H&P ADULT - PROBLEM SELECTOR PLAN 7
TSH 6.12 on admission  Patient with acute UTI  C/w Synthroid 125 mcg daily TSH 6.12 on admission  Patient with acute UTI  Recheck thyroid fxn after clinical improvement  C/w Synthroid 125 mcg daily C/w Lasix 40mg daily, spironolactone for HTN and edema

## 2022-05-29 NOTE — PATIENT PROFILE ADULT - FALL HARM RISK - HARM RISK INTERVENTIONS
Assistance with ambulation/Assistance OOB with selected safe patient handling equipment/Communicate Risk of Fall with Harm to all staff/Discuss with provider need for PT consult/Monitor gait and stability/Reinforce activity limits and safety measures with patient and family/Tailored Fall Risk Interventions/Visual Cue: Yellow wristband and red socks/Bed in lowest position, wheels locked, appropriate side rails in place/Call bell, personal items and telephone in reach/Instruct patient to call for assistance before getting out of bed or chair/Non-slip footwear when patient is out of bed/[place] to call system/Physically safe environment - no spills, clutter or unnecessary equipment/Purposeful Proactive Rounding/Room/bathroom lighting operational, light cord in reach

## 2022-05-29 NOTE — ED PROVIDER NOTE - ATTENDING CONTRIBUTION TO CARE
78yo F from [facility] with PMHX recent pubic rami fracture requiring rehab stay, HTN, HLD, DVT, cervical ca s/p hysterectomy, cholecystectomy, p/w 3wks of worsening generalized abdominal pain with constipation and today began to have n/v.  Patient endorses scant stooling lasst 4-5 days ago.  No chest pains or fevers.    General: Patient alert in no apparent distress  Skin: Dry and intact  HEENT: Head atraumatic. Oral mucosa moist.   Eyes: Conjunctiva normal  Cardiac: Regular rhythm and rate. No pretibial edema b/l  Respiratory: Lungs clear b/l and symmetric. No respiratory distress. Able to speak in complete sentences.  Gastrointestinal: Abdomen soft, +distended, +tender diffusely  Musculoskeletal: Moves all extremities spontaneously  Neurological: alert and oriented to person, place, and time  Psychiatric: Calm and cooperative    a/p  concern for SBO vs ileus vs olgivies/constipation  labs to check lytes  will need CT abd/pelvis for further eval  pain meds

## 2022-05-29 NOTE — H&P ADULT - HISTORY OF PRESENT ILLNESS
77F PMHx w/ SVT s/p ablation, HTN, HLD, hypothyroidism, vertigo, peripheral neuropathy, osteoarthritis, cervical ca, recent admission for pubic rami fracture and MSSA bacteremia Tx w/ Cefazolin to end 5/29, presented to ED from rehab on 5/29 w/ 3 wks of worsening abdominal pain and 4-5 days of obstipation. Reports nausea during this time but no vomiting. At rehab, patient was Bisacodyl, Miralax, Sennasoides-Docusate, fiber. Patient unsure of how much Tramadol/oxy she was getting at rehab, but says pelvic pain had been improving. Patient has been taking calcium carbonate for GERD at the rehab. Denies fever, chills, SOB, CP, palpitations.    In the ED VS T: 99.1F HR: 87 BP: 136/81 (136/79 - 154/70) RR: 16 SpO2: 98% RA  Labs notable for WBC 16 PMN predominant, Ca 12.4 Albumin 2.7 Corrected Ca 13.1, Cr 1.16, HCO3 29, TSH 6.12  UA showed Large leuk est, positive nitrite, >50 WBCs, Many bacteria   EKG showed NSR  CXR showed no evidence of pulmonary disease  CT AP 1showed large amount of stool in the distal colon w/ mild edema c/f stercoral colitis, bladder thickening and inflammatory changes c/f cystitis, Moderate b/l hydronephrosis, Ureteral surgical clips, no renal stones, Severe Anasarca, subacute R superior and inferior pubic rami and sacral ala fractures no evidence of significant healing  Patient was disimpacted in ED 77F PMHx w/ SVT s/p ablation, HTN, HLD, hypothyroidism, vertigo, peripheral neuropathy, osteoarthritis, cervical ca, recent admission for pubic rami fracture and MSSA bacteremia Tx w/ Cefazolin to end 5/29, presented to ED from rehab on 5/29 w/ 3 wks of worsening abdominal pain and 4-5 days of obstipation. Reports nausea during this time but no vomiting. At rehab, patient was Bisacodyl, Miralax, Sennasoides-Docusate, fiber. Patient unsure of how much Tramadol/oxy she was getting at rehab, but says pelvic pain had been improving and was able to use walker with PT. She states she has been urinating at the rehab as well and is not sure when her last BM was. Patient has been taking calcium carbonate for GERD at the rehab. Denies fever, chills, SOB, CP, palpitations.    In the ED VS T: 99.1F HR: 87 BP: 136/81 (136/79 - 154/70) RR: 16 SpO2: 98% RA  Labs notable for WBC 16 PMN predominant, Ca 12.4 Albumin 2.7 Corrected Ca 13.1, Cr 1.16, HCO3 29, TSH 6.12  UA showed Large leuk est, positive nitrite, >50 WBCs, Many bacteria   EKG showed NSR  CXR showed no evidence of pulmonary disease  CT AP 1showed large amount of stool in the distal colon w/ mild edema c/f stercoral colitis, bladder thickening and inflammatory changes c/f cystitis, Moderate b/l hydronephrosis, Ureteral surgical clips, no renal stones, Severe Anasarca, subacute R superior and inferior pubic rami and sacral ala fractures no evidence of significant healing  Patient was disimpacted in ED

## 2022-05-29 NOTE — H&P ADULT - PROBLEM SELECTOR PLAN 1
Patient with 4-5 days of constipation, had been getting Tramadol/Oxy for pain control in rehab, S/p disimpaction in ED  CT showed large amount of stool in the distal colon w/ mild edema c/f stercoral colitis  Hypercalcemic in ED  Patient with limited mobility 2/2 pelvic fracture, had limited ambulation with cane at rehab  Treat hypercalcemia  Start Movantic daily Patient with 4-5 days of constipation, had been getting Tramadol/Oxy for pain control in rehab, S/p disimpaction in ED  CT showed large amount of stool in the distal colon w/ mild edema c/f stercoral colitis  Hypercalcemic in ED  Patient with limited mobility 2/2 pelvic fracture, had limited ambulation with cane at rehab  Treat hypercalcemia  Bowel Regimen- Miralax, senna, dulcolax  Start Movantic daily Patient with 4-5 days of constipation, had been getting Tramadol/Oxy for pain control in rehab, S/p disimpaction in ED  CT showed large amount of stool in the distal colon w/ mild edema c/f stercoral colitis  Constipation likely multifactorial in setting of hypercalcemia, opioid induced and limited mobility   Patient with limited mobility 2/2 pelvic fracture, had limited ambulation with cane at rehab  Treat hypercalcemia as below   Bowel Regimen- Miralax, senna, dulcolax  Start Movantic daily  Stool count

## 2022-05-29 NOTE — ED PROVIDER NOTE - NS ED ROS FT
Constitutional:  (-) fever, (-) chills, (-) lethargy  Eyes:  (-) eye pain (-) visual changes  ENMT: (-) nasal discharge, (-) sore throat. (-) neck pain or stiffness  Cardiac: (+) chest pain (-) palpitations  Respiratory:  (-) cough (-) respiratory distress.   GI:  (+) nausea (+) vomiting (-) diarrhea (+) abdominal pain.  :  (-) dysuria (-) frequency (-) burning.  MS:  (-) back pain (-) joint pain.  Neuro:  (-) headache (-) numbness (-) tingling (-) focal weakness  Skin:  (-) rash  Except as documented in the HPI,  all other systems are negative Constitutional:  (-) fever, (-) chills, (-) lethargy  Eyes:  (-) eye pain (-) visual changes  ENMT: (-) nasal discharge, (-) sore throat. (-) neck pain or stiffness  Cardiac: (-) chest pain (-) palpitations  Respiratory:  (-) cough (-) respiratory distress.   GI:  (+) nausea (+) vomiting (-) diarrhea (+) abdominal pain.  :  (-) dysuria (-) frequency (-) burning.  MS:  (-) back pain (-) joint pain.  Neuro:  (-) headache (-) numbness (-) tingling (-) focal weakness  Skin:  (-) rash  Except as documented in the HPI,  all other systems are negative

## 2022-05-29 NOTE — H&P ADULT - ASSESSMENT
77F PMHx w/ SVT s/p ablation, HTN, HLD, hypothyroidism, vertigo, peripheral neuropathy, osteoarthritis, cervical ca, recent admission for pubic rami fracture and MSSA bacteremia Tx w/ Cefazolin to end 5/29, presented to ED from rehab on 5/29 w/ 3 wks of worsening abdominal pain and 4-5 days of obstipation, s/p disimpaction in ED, UA c/f UTI, on Zosyn, found to be hypercalcemic to 12.4 (13.1 corrected), receiving IVF

## 2022-05-29 NOTE — H&P ADULT - NSHPLABSRESULTS_GEN_ALL_CORE
11.9   15.96 )-----------( 294      ( 29 May 2022 05:49 )             37.7     Auto Eosinophil # 0.00  / Auto Eosinophil % 0.0   / Auto Neutrophil # 12.19 / Auto Neutrophil % 76.5  / BANDS % x            132<L>  |  93<L>  |  26<H>  ----------------------------<  106<H>  4.1   |  29  |  1.16    Ca    12.4<H>      29 May 2022 05:49  TPro  6.2  /  Alb  2.7<L>  /  TBili  0.4  /  DBili  x   /  AST  26  /  ALT  10  /  AlkPhos  72            Urinalysis Basic - ( 29 May 2022 08:50 )    Color: Yellow / Appearance: Turbid / S.013 / pH: x  Gluc: x / Ketone: Negative  / Bili: Negative / Urobili: <2 mg/dL   Blood: x / Protein: 30 mg/dL / Nitrite: Positive   Leuk Esterase: Large / RBC: 5 - 10 /HPF / WBC >50 /HPF   Sq Epi: x / Non Sq Epi: 0 - 5 /HPF / Bacteria: Many          RESPIRATORY  VENT:    ABG:     VBG: 11.9   15.96 )-----------( 294      ( 29 May 2022 05:49 )             37.7     Auto Eosinophil # 0.00  / Auto Eosinophil % 0.0   / Auto Neutrophil # 12.19 / Auto Neutrophil % 76.5  / BANDS % x            132<L>  |  93<L>  |  26<H>  ----------------------------<  106<H>  4.1   |  29  |  1.16    Ca    12.4<H>      29 May 2022 05:49  TPro  6.2  /  Alb  2.7<L>  /  TBili  0.4  /  DBili  x   /  AST  26  /  ALT  10  /  AlkPhos  72            Urinalysis Basic - ( 29 May 2022 08:50 )    Color: Yellow / Appearance: Turbid / S.013 / pH: x  Gluc: x / Ketone: Negative  / Bili: Negative / Urobili: <2 mg/dL   Blood: x / Protein: 30 mg/dL / Nitrite: Positive   Leuk Esterase: Large / RBC: 5 - 10 /HPF / WBC >50 /HPF   Sq Epi: x / Non Sq Epi: 0 - 5 /HPF / Bacteria: Many

## 2022-05-29 NOTE — H&P ADULT - PROBLEM SELECTOR PLAN 6
C/w Lasix 40mg daily, spironolactone for HTN and edema Hydronephrosis noted on imaging  Likely obstructive i/s/o large stool burden  Monitor BMP  Block in place  Strict Is/Os Hydronephrosis noted on imaging  Likely obstructive i/s/o large stool burden  Monitor BMP  Block in place and draining well   Strict Is/Os

## 2022-05-29 NOTE — H&P ADULT - ATTENDING COMMENTS
77F w/ hx of obesity, SVT s/p ablation, HTN, hypothyroidism, vertigo, peripheral neuropathy, cervical CA, recent admission for pubic rami fracture and MSSA bacteremia (On Cefazolin via PICC to end 5/29) presented to ED from rehab for worsening abdominal pain and 4-5 days of obstipation found to have stercolitis, hypercalcemia, hydronephrosis and UTI. 77F w/ hx of obesity, SVT s/p ablation, HTN, hypothyroidism, vertigo, peripheral neuropathy, cervical CA, recent admission for pubic rami fracture and MSSA bacteremia (On Cefazolin via PICC to end 5/29) presented to ED from rehab for worsening abdominal pain and 4-5 days of obstipation found to have stercoral colitis, hypercalcemia, hydronephrosis and UTI. Patient poor historian, states that she has pain all over but wes in abdomen. Not sure when her last BM was but states she was urinating well while at [facility]. Also states that she has been having this pain since she was at the [facility] but was able to start using walker while there. She denies fevers, chills. States her LE swelling get worse while she was at [facility].     VSS   On exam, morbidly obese female in NAD. Dry mucus membranes. L PICC site c/d/i no erythema. Abd is soft, non-tender, +bowel sounds. Block in place draining yellow urine. Anasarca to thighs bilaterally. A&Ox2-3 on exam currently  Labs reviewed  Imaging reviewed    #Stercoral colitis  - S/p disimpaction in ED. Suspect multifactorial in setting of hypercalcemia, opioid induced and limited mobility   - Start on bowel regimen as above. Can give Movantic as patient has continued pain not responding to acetaminophen alone and will likely require use of opioids   - Treat hypercalcemia   - Stool count     #Hypercalcemia   - Suspect iatrogenic in setting of calcium carbonate tabs TID, will hold  - Give 1L IVF + lasix 40mg IVP and reassess given anasarca   - Can check PTH    #Hydronephrosis   - Seen on CT A/P, suspect obstructive component in setting of large stool burden, however patient also had issues with urinary retention during previous admission  - Block placed, monitor output and BMP    #Cystitis   - UA+ with leukocytosis and urinary retention. Will treat with Zosyn for UTI.     #MSSA Bacteremia   - Recent admission for MSSA bacteremia, source was unclear. On Cefazolin via PICC - last day was today. Will repeat bcx for clearance     #Pubic Rami Fractures   - Recent admission for fractures with dc to [facility]. Non-operative - c/w pain control and PT eval     #Anasarca   - On lasix and aldactone, will continue     Rest of plan as above  Discussed with patient   Discussed with HS Dr. Interiano

## 2022-05-29 NOTE — ED ADULT NURSE NOTE - OBJECTIVE STATEMENT
break coverage rn: pt a&ox4 c/o intermittent abdominal pain for the last few weeks that has worsened tonight. pt states she has not had a BM or passed flatus for the last two days. pt c/o nausea and vomiting associated with pain with last episode of vomiting at her rehab center (the [place] for recent pelvic fx) and in triage. pt c/o pain to bilateral upper quadrants of abdomen upon palpation. abdomen soft and appears round and nondistended from baseline. pt pitting edema to bilateral lower extremities. pt ambulatory at baseline with cane and states do to recent rehab "she stopped her water pills as per md and has not been able to ambulate with the retention".  pt states she lives alone and performs ADLS at baseline. pt arrives with PICC from 5/17 noted to L arm. 20g to the LAC. labs collected and sent. pt medicated as per md orders. pt respirations even and unlabored with no accessory muscle use.

## 2022-05-29 NOTE — H&P ADULT - NSHPREVIEWOFSYSTEMS_GEN_ALL_CORE
CONSTITUTIONAL:  No weight loss, fever, chills, weakness or fatigue.  HEENT:  Eyes:  No visual loss, blurred vision, double vision or yellow sclerae. Ears, Nose, Throat:  No hearing loss, sneezing, congestion, runny nose or sore throat.  SKIN:  No rash or itching.  CARDIOVASCULAR:  No chest pain, chest pressure or chest discomfort. No palpitations.  RESPIRATORY:  No shortness of breath, cough or sputum.  GASTROINTESTINAL:  No anorexia, +nausea, No vomiting or diarrhea.+No abdominal pain, no back pain  GENITOURINARY:  Denies hematuria, dysuria.   NEUROLOGICAL:  No headache, dizziness, syncope, paralysis, ataxia, numbness or tingling in the extremities. No change in bowel or bladder control.  MUSCULOSKELETAL:  No muscle, back pain, +joint pain, stiffness.  HEMATOLOGIC:  No anemia, bleeding or bruising.  LYMPHATICS:  No enlarged nodes.   PSYCHIATRIC:  No history of depression or anxiety.  ENDOCRINOLOGIC:  No reports of sweating, cold or heat intolerance. No polyuria or polydipsia.  ALLERGIES:  No history of asthma, hives, eczema or rhinitis. CONSTITUTIONAL:  No weight loss, fever, chills, weakness or fatigue.  HEENT:  Eyes:  No visual loss, blurred vision, double vision or yellow sclerae. Ears, Nose, Throat:  No hearing loss, sneezing, congestion, runny nose or sore throat.  SKIN:  No rash or itching.  CARDIOVASCULAR:  No chest pain, chest pressure or chest discomfort. No palpitations.  RESPIRATORY:  No shortness of breath, cough or sputum.  GASTROINTESTINAL:  No anorexia, +nausea, No vomiting or diarrhea. +No abdominal pain, no back pain  GENITOURINARY:  Denies hematuria, dysuria.   NEUROLOGICAL:  No headache, dizziness, syncope, paralysis, ataxia, numbness or tingling in the extremities. No change in bowel or bladder control.  MUSCULOSKELETAL:  No muscle, back pain, +joint pain, stiffness.  HEMATOLOGIC:  No anemia, bleeding or bruising.  LYMPHATICS:  No enlarged nodes.   PSYCHIATRIC:  No history of depression or anxiety.  ENDOCRINOLOGIC:  No reports of sweating, cold or heat intolerance. No polyuria or polydipsia.  ALLERGIES:  No history of asthma, hives, eczema or rhinitis.

## 2022-05-29 NOTE — ED ADULT TRIAGE NOTE - CHIEF COMPLAINT QUOTE
Pt arrives from [facility] (at facility for recent pelvic fx,) with C/O intermittent upper abd pain x few weeks, becoming worse tonight. No BM or flatus x2 days as per patient. Vomiting x1 at rehab center. 20G IV from 5/17 noted to L arm. PMHx cholecystectomy, hysterectomy. Pt arrives from [facility] (at facility for recent pelvic fx,) with C/O intermittent upper abd pain x few weeks, becoming worse tonight. No BM or flatus x2 days as per patient. Vomiting x1 at rehab center. PICC from 5/17 noted to L arm. PMHx cholecystectomy, hysterectomy. Pt arrives from [facility] (at facility for recent pelvic fx,) with C/O intermittent upper abd pain x few weeks, becoming worse tonight. No BM or flatus x2 days as per patient. Vomiting x1 at rehab center and in triage. PICC from 5/17 noted to L arm. PMHx cholecystectomy, hysterectomy.

## 2022-05-29 NOTE — H&P ADULT - PROBLEM SELECTOR PLAN 2
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

## 2022-05-29 NOTE — H&P ADULT - NSHPPHYSICALEXAM_GEN_ALL_CORE
GENERAL APPEARANCE: Well developed, anasarcic, obese habitus, knox with purulent drainage  HEENT:  PERRL, EOMI. hearing grossly intact.  NECK: Neck supple, non-tender no lymphadenopathy, masses or thyromegaly.  CARDIAC: Normal S1 and S2. no mrg. RRR  LUNGS: Clear to auscultation B/L, no rales, rhonchi, or wheezing  ABDOMEN: Soft , Mildly tender throughout, bowel sounds normal. No guarding or rebound.   MUSCULOSKELETAL: ROM intact.  No joint erythema or tenderness.   EXTREMITIES: 2+ bipedal edema. Peripheral pulses intact.   NEUROLOGICAL: Non focal.   SKIN: Warm and dry , Well perfused  PSYCHIATRIC: AOx3 , Normal mood and affect GENERAL APPEARANCE: Well developed, anasarcic, obese habitus, knox with purulent drainage  HEENT:  PERRL, EOMI. hearing grossly intact.  NECK: Neck supple, non-tender no lymphadenopathy, masses or thyromegaly.  CARDIAC: Normal S1 and S2. no mrg. RRR  LUNGS: Clear to auscultation B/L, no rales, rhonchi, or wheezing  ABDOMEN: Soft , Mildly tender throughout, bowel sounds normal. No guarding or rebound.   MUSCULOSKELETAL: ROM intact.  No joint erythema or tenderness.   EXTREMITIES: 2+ bipedal edema. Peripheral pulses intact.   NEUROLOGICAL: Non focal.   SKIN: Warm and dry , Well perfused  PSYCHIATRIC: AOx2-3 , Normal mood and affect Vital Signs Last 24 Hrs  T(C): 37.3 (29 May 2022 11:46), Max: 37.3 (29 May 2022 11:46)  T(F): 99.1 (29 May 2022 11:46), Max: 99.1 (29 May 2022 11:46)  HR: 87 (29 May 2022 11:46) (79 - 87)  BP: 136/81 (29 May 2022 11:46) (136/79 - 154/70)  BP(mean): --  RR: 16 (29 May 2022 11:46) (16 - 18)  SpO2: 98% (29 May 2022 11:46) (97% - 100%)    GENERAL APPEARANCE: Well developed, anasarcic, obese habitus, knox with purulent drainage  HEENT:  PERRL, EOMI. hearing grossly intact.  NECK: Neck supple, non-tender no lymphadenopathy, masses or thyromegaly.  CARDIAC: Normal S1 and S2. no mrg. RRR  LUNGS: Clear to auscultation B/L, no rales, rhonchi, or wheezing  ABDOMEN: Soft , Mildly tender throughout, bowel sounds normal. No guarding or rebound.   MUSCULOSKELETAL: ROM intact.  No joint erythema or tenderness.   EXTREMITIES: 4+ bipedal edema to thighs bilaterally    NEUROLOGICAL: Lethargic and confused at times but answering questions appropriately   SKIN: Warm and dry , Well perfused  PSYCHIATRIC: AOx2-3 , Normal mood and affect

## 2022-05-29 NOTE — ED ADULT TRIAGE NOTE - MEANS OF ARRIVAL
Pt calling out stating that he has to urinate. Pt provided with urinal.  Pt declines assistance at this time.      Alma Rosa Dunlap RN  11/29/20 [id number] stretcher

## 2022-05-29 NOTE — H&P ADULT - PROBLEM SELECTOR PLAN 8
DVT PPx: Lovenox SubQ    Diet: DASH    Dispo: Pending clinical course TSH 6.12 on admission  Patient with acute UTI  Recheck thyroid fxn after clinical improvement  C/w Synthroid 125 mcg daily

## 2022-05-30 LAB
24R-OH-CALCIDIOL SERPL-MCNC: 35.4 NG/ML — SIGNIFICANT CHANGE UP (ref 30–80)
ALBUMIN SERPL ELPH-MCNC: 2.7 G/DL — LOW (ref 3.3–5)
ALBUMIN SERPL ELPH-MCNC: 3.9 G/DL — SIGNIFICANT CHANGE UP (ref 3.3–5)
ALP SERPL-CCNC: 72 U/L — SIGNIFICANT CHANGE UP (ref 40–120)
ALP SERPL-CCNC: 91 U/L — SIGNIFICANT CHANGE UP (ref 40–120)
ALT FLD-CCNC: 40 U/L — HIGH (ref 4–33)
ALT FLD-CCNC: <5 U/L — LOW (ref 4–33)
ANION GAP SERPL CALC-SCNC: 11 MMOL/L — SIGNIFICANT CHANGE UP (ref 7–14)
ANION GAP SERPL CALC-SCNC: 13 MMOL/L — SIGNIFICANT CHANGE UP (ref 7–14)
ANION GAP SERPL CALC-SCNC: 16 MMOL/L — HIGH (ref 7–14)
AST SERPL-CCNC: 19 U/L — SIGNIFICANT CHANGE UP (ref 4–32)
AST SERPL-CCNC: 59 U/L — HIGH (ref 4–32)
BASOPHILS # BLD AUTO: 0 K/UL — SIGNIFICANT CHANGE UP (ref 0–0.2)
BASOPHILS # BLD AUTO: 0.1 K/UL — SIGNIFICANT CHANGE UP (ref 0–0.2)
BASOPHILS NFR BLD AUTO: 0 % — SIGNIFICANT CHANGE UP (ref 0–2)
BASOPHILS NFR BLD AUTO: 0.9 % — SIGNIFICANT CHANGE UP (ref 0–2)
BILIRUB SERPL-MCNC: 0.2 MG/DL — SIGNIFICANT CHANGE UP (ref 0.2–1.2)
BILIRUB SERPL-MCNC: 0.4 MG/DL — SIGNIFICANT CHANGE UP (ref 0.2–1.2)
BLOOD GAS ARTERIAL - LYTES,HGB,ICA,LACT RESULT: SIGNIFICANT CHANGE UP
BUN SERPL-MCNC: 26 MG/DL — HIGH (ref 7–23)
BUN SERPL-MCNC: 26 MG/DL — HIGH (ref 7–23)
BUN SERPL-MCNC: 63 MG/DL — HIGH (ref 7–23)
CA-I BLD-SCNC: 1.48 MMOL/L — HIGH (ref 1.15–1.29)
CALCIUM SERPL-MCNC: 10.9 MG/DL — HIGH (ref 8.4–10.5)
CALCIUM SERPL-MCNC: 11.1 MG/DL — HIGH (ref 8.4–10.5)
CALCIUM SERPL-MCNC: 9.7 MG/DL — SIGNIFICANT CHANGE UP (ref 8.4–10.5)
CHLORIDE SERPL-SCNC: 107 MMOL/L — SIGNIFICANT CHANGE UP (ref 98–107)
CHLORIDE SERPL-SCNC: 94 MMOL/L — LOW (ref 98–107)
CHLORIDE SERPL-SCNC: 95 MMOL/L — LOW (ref 98–107)
CO2 SERPL-SCNC: 15 MMOL/L — LOW (ref 22–31)
CO2 SERPL-SCNC: 25 MMOL/L — SIGNIFICANT CHANGE UP (ref 22–31)
CO2 SERPL-SCNC: 28 MMOL/L — SIGNIFICANT CHANGE UP (ref 22–31)
CREAT SERPL-MCNC: 0.92 MG/DL — SIGNIFICANT CHANGE UP (ref 0.5–1.3)
CREAT SERPL-MCNC: 0.98 MG/DL — SIGNIFICANT CHANGE UP (ref 0.5–1.3)
CREAT SERPL-MCNC: 3.11 MG/DL — HIGH (ref 0.5–1.3)
CULTURE RESULTS: SIGNIFICANT CHANGE UP
EGFR: 15 ML/MIN/1.73M2 — LOW
EGFR: 59 ML/MIN/1.73M2 — LOW
EGFR: 64 ML/MIN/1.73M2 — SIGNIFICANT CHANGE UP
EOSINOPHIL # BLD AUTO: 0 K/UL — SIGNIFICANT CHANGE UP (ref 0–0.5)
EOSINOPHIL # BLD AUTO: 0.94 K/UL — HIGH (ref 0–0.5)
EOSINOPHIL NFR BLD AUTO: 0 % — SIGNIFICANT CHANGE UP (ref 0–6)
EOSINOPHIL NFR BLD AUTO: 8.2 % — HIGH (ref 0–6)
GIANT PLATELETS BLD QL SMEAR: PRESENT — SIGNIFICANT CHANGE UP
GLUCOSE SERPL-MCNC: 133 MG/DL — HIGH (ref 70–99)
GLUCOSE SERPL-MCNC: 159 MG/DL — HIGH (ref 70–99)
GLUCOSE SERPL-MCNC: 173 MG/DL — HIGH (ref 70–99)
HCT VFR BLD CALC: 30.2 % — LOW (ref 34.5–45)
HCT VFR BLD CALC: 33.7 % — LOW (ref 34.5–45)
HCT VFR BLD CALC: 35.3 % — SIGNIFICANT CHANGE UP (ref 34.5–45)
HGB BLD-MCNC: 10.9 G/DL — LOW (ref 11.5–15.5)
HGB BLD-MCNC: 11 G/DL — LOW (ref 11.5–15.5)
HGB BLD-MCNC: 9.1 G/DL — LOW (ref 11.5–15.5)
HYPOCHROMIA BLD QL: SLIGHT — SIGNIFICANT CHANGE UP
IANC: 19.84 K/UL — HIGH (ref 1.8–7.4)
IANC: 5.72 K/UL — SIGNIFICANT CHANGE UP (ref 1.8–7.4)
IMM GRANULOCYTES NFR BLD AUTO: 1.3 % — SIGNIFICANT CHANGE UP (ref 0–1.5)
LYMPHOCYTES # BLD AUTO: 10.4 % — LOW (ref 13–44)
LYMPHOCYTES # BLD AUTO: 2.54 K/UL — SIGNIFICANT CHANGE UP (ref 1–3.3)
LYMPHOCYTES # BLD AUTO: 28.2 % — SIGNIFICANT CHANGE UP (ref 13–44)
LYMPHOCYTES # BLD AUTO: 3.21 K/UL — SIGNIFICANT CHANGE UP (ref 1–3.3)
MAGNESIUM SERPL-MCNC: 1.1 MG/DL — LOW (ref 1.6–2.6)
MAGNESIUM SERPL-MCNC: 1.6 MG/DL — SIGNIFICANT CHANGE UP (ref 1.6–2.6)
MAGNESIUM SERPL-MCNC: 2.1 MG/DL — SIGNIFICANT CHANGE UP (ref 1.6–2.6)
MCHC RBC-ENTMCNC: 26.5 PG — LOW (ref 27–34)
MCHC RBC-ENTMCNC: 26.7 PG — LOW (ref 27–34)
MCHC RBC-ENTMCNC: 27.5 PG — SIGNIFICANT CHANGE UP (ref 27–34)
MCHC RBC-ENTMCNC: 30.1 GM/DL — LOW (ref 32–36)
MCHC RBC-ENTMCNC: 30.9 GM/DL — LOW (ref 32–36)
MCHC RBC-ENTMCNC: 32.6 GM/DL — SIGNIFICANT CHANGE UP (ref 32–36)
MCV RBC AUTO: 84.3 FL — SIGNIFICANT CHANGE UP (ref 80–100)
MCV RBC AUTO: 86.3 FL — SIGNIFICANT CHANGE UP (ref 80–100)
MCV RBC AUTO: 87.8 FL — SIGNIFICANT CHANGE UP (ref 80–100)
MONOCYTES # BLD AUTO: 0.63 K/UL — SIGNIFICANT CHANGE UP (ref 0–0.9)
MONOCYTES # BLD AUTO: 1.28 K/UL — HIGH (ref 0–0.9)
MONOCYTES NFR BLD AUTO: 11.2 % — SIGNIFICANT CHANGE UP (ref 2–14)
MONOCYTES NFR BLD AUTO: 2.6 % — SIGNIFICANT CHANGE UP (ref 2–14)
NEUTROPHILS # BLD AUTO: 20.83 K/UL — HIGH (ref 1.8–7.4)
NEUTROPHILS # BLD AUTO: 5.72 K/UL — SIGNIFICANT CHANGE UP (ref 1.8–7.4)
NEUTROPHILS NFR BLD AUTO: 50.2 % — SIGNIFICANT CHANGE UP (ref 43–77)
NEUTROPHILS NFR BLD AUTO: 81 % — HIGH (ref 43–77)
NEUTS BAND # BLD: 4.3 % — SIGNIFICANT CHANGE UP (ref 0–6)
NRBC # BLD: 0 /100 WBCS — SIGNIFICANT CHANGE UP
NRBC # BLD: 0 /100 WBCS — SIGNIFICANT CHANGE UP
NRBC # BLD: 1 /100 — HIGH (ref 0–0)
NRBC # FLD: 0 K/UL — SIGNIFICANT CHANGE UP
NRBC # FLD: 0 K/UL — SIGNIFICANT CHANGE UP
PHOSPHATE SERPL-MCNC: 1.6 MG/DL — LOW (ref 2.5–4.5)
PHOSPHATE SERPL-MCNC: 4.2 MG/DL — SIGNIFICANT CHANGE UP (ref 2.5–4.5)
PHOSPHATE SERPL-MCNC: 5.2 MG/DL — HIGH (ref 2.5–4.5)
PLAT MORPH BLD: NORMAL — SIGNIFICANT CHANGE UP
PLATELET # BLD AUTO: 309 K/UL — SIGNIFICANT CHANGE UP (ref 150–400)
PLATELET # BLD AUTO: 335 K/UL — SIGNIFICANT CHANGE UP (ref 150–400)
PLATELET # BLD AUTO: 358 K/UL — SIGNIFICANT CHANGE UP (ref 150–400)
PLATELET COUNT - ESTIMATE: NORMAL — SIGNIFICANT CHANGE UP
POIKILOCYTOSIS BLD QL AUTO: SLIGHT — SIGNIFICANT CHANGE UP
POTASSIUM SERPL-MCNC: 3.2 MMOL/L — LOW (ref 3.5–5.3)
POTASSIUM SERPL-MCNC: 3.5 MMOL/L — SIGNIFICANT CHANGE UP (ref 3.5–5.3)
POTASSIUM SERPL-MCNC: 5 MMOL/L — SIGNIFICANT CHANGE UP (ref 3.5–5.3)
POTASSIUM SERPL-SCNC: 3.2 MMOL/L — LOW (ref 3.5–5.3)
POTASSIUM SERPL-SCNC: 3.5 MMOL/L — SIGNIFICANT CHANGE UP (ref 3.5–5.3)
POTASSIUM SERPL-SCNC: 5 MMOL/L — SIGNIFICANT CHANGE UP (ref 3.5–5.3)
PROT SERPL-MCNC: 5.9 G/DL — LOW (ref 6–8.3)
PROT SERPL-MCNC: 6.2 G/DL — SIGNIFICANT CHANGE UP (ref 6–8.3)
RBC # BLD: 3.44 M/UL — LOW (ref 3.8–5.2)
RBC # BLD: 4 M/UL — SIGNIFICANT CHANGE UP (ref 3.8–5.2)
RBC # BLD: 4.09 M/UL — SIGNIFICANT CHANGE UP (ref 3.8–5.2)
RBC # FLD: 15.9 % — HIGH (ref 10.3–14.5)
RBC # FLD: 15.9 % — HIGH (ref 10.3–14.5)
RBC # FLD: 16 % — HIGH (ref 10.3–14.5)
RBC BLD AUTO: ABNORMAL
SODIUM SERPL-SCNC: 133 MMOL/L — LOW (ref 135–145)
SODIUM SERPL-SCNC: 133 MMOL/L — LOW (ref 135–145)
SODIUM SERPL-SCNC: 138 MMOL/L — SIGNIFICANT CHANGE UP (ref 135–145)
SPECIMEN SOURCE: SIGNIFICANT CHANGE UP
VARIANT LYMPHS # BLD: 1.7 % — SIGNIFICANT CHANGE UP (ref 0–6)
WBC # BLD: 11.4 K/UL — HIGH (ref 3.8–10.5)
WBC # BLD: 24.42 K/UL — HIGH (ref 3.8–10.5)
WBC # BLD: 29.45 K/UL — HIGH (ref 3.8–10.5)
WBC # FLD AUTO: 11.4 K/UL — HIGH (ref 3.8–10.5)
WBC # FLD AUTO: 24.42 K/UL — HIGH (ref 3.8–10.5)
WBC # FLD AUTO: 29.45 K/UL — HIGH (ref 3.8–10.5)

## 2022-05-30 PROCEDURE — 99233 SBSQ HOSP IP/OBS HIGH 50: CPT

## 2022-05-30 RX ORDER — SODIUM CHLORIDE 9 MG/ML
1000 INJECTION, SOLUTION INTRAVENOUS
Refills: 0 | Status: DISCONTINUED | OUTPATIENT
Start: 2022-05-30 | End: 2022-05-30

## 2022-05-30 RX ORDER — SODIUM CHLORIDE 9 MG/ML
1000 INJECTION INTRAMUSCULAR; INTRAVENOUS; SUBCUTANEOUS
Refills: 0 | Status: DISCONTINUED | OUTPATIENT
Start: 2022-05-30 | End: 2022-05-30

## 2022-05-30 RX ORDER — MAGNESIUM SULFATE 500 MG/ML
2 VIAL (ML) INJECTION
Refills: 0 | Status: COMPLETED | OUTPATIENT
Start: 2022-05-30 | End: 2022-05-30

## 2022-05-30 RX ORDER — SODIUM CHLORIDE 9 MG/ML
1000 INJECTION INTRAMUSCULAR; INTRAVENOUS; SUBCUTANEOUS
Refills: 0 | Status: DISCONTINUED | OUTPATIENT
Start: 2022-05-30 | End: 2022-06-03

## 2022-05-30 RX ORDER — POTASSIUM PHOSPHATE, MONOBASIC POTASSIUM PHOSPHATE, DIBASIC 236; 224 MG/ML; MG/ML
30 INJECTION, SOLUTION INTRAVENOUS ONCE
Refills: 0 | Status: COMPLETED | OUTPATIENT
Start: 2022-05-30 | End: 2022-05-30

## 2022-05-30 RX ORDER — POTASSIUM CHLORIDE 20 MEQ
40 PACKET (EA) ORAL EVERY 4 HOURS
Refills: 0 | Status: COMPLETED | OUTPATIENT
Start: 2022-05-30 | End: 2022-05-30

## 2022-05-30 RX ORDER — FUROSEMIDE 40 MG
40 TABLET ORAL ONCE
Refills: 0 | Status: COMPLETED | OUTPATIENT
Start: 2022-05-30 | End: 2022-05-30

## 2022-05-30 RX ORDER — ENOXAPARIN SODIUM 100 MG/ML
40 INJECTION SUBCUTANEOUS EVERY 24 HOURS
Refills: 0 | Status: DISCONTINUED | OUTPATIENT
Start: 2022-05-30 | End: 2022-05-31

## 2022-05-30 RX ADMIN — ENOXAPARIN SODIUM 40 MILLIGRAM(S): 100 INJECTION SUBCUTANEOUS at 14:20

## 2022-05-30 RX ADMIN — OXYCODONE HYDROCHLORIDE 5 MILLIGRAM(S): 5 TABLET ORAL at 13:15

## 2022-05-30 RX ADMIN — Medication 40 MILLIGRAM(S): at 06:39

## 2022-05-30 RX ADMIN — Medication 25 GRAM(S): at 12:12

## 2022-05-30 RX ADMIN — Medication 40 MILLIEQUIVALENT(S): at 17:13

## 2022-05-30 RX ADMIN — Medication 12.5 MILLIGRAM(S): at 06:38

## 2022-05-30 RX ADMIN — POLYETHYLENE GLYCOL 3350 17 GRAM(S): 17 POWDER, FOR SOLUTION ORAL at 17:17

## 2022-05-30 RX ADMIN — Medication 5 MILLIGRAM(S): at 21:54

## 2022-05-30 RX ADMIN — PIPERACILLIN AND TAZOBACTAM 25 GRAM(S): 4; .5 INJECTION, POWDER, LYOPHILIZED, FOR SOLUTION INTRAVENOUS at 16:05

## 2022-05-30 RX ADMIN — GABAPENTIN 300 MILLIGRAM(S): 400 CAPSULE ORAL at 17:14

## 2022-05-30 RX ADMIN — ROPINIROLE 5 MILLIGRAM(S): 8 TABLET, FILM COATED, EXTENDED RELEASE ORAL at 21:54

## 2022-05-30 RX ADMIN — Medication 40 MILLIGRAM(S): at 11:18

## 2022-05-30 RX ADMIN — SPIRONOLACTONE 25 MILLIGRAM(S): 25 TABLET, FILM COATED ORAL at 06:39

## 2022-05-30 RX ADMIN — Medication 40 MILLIEQUIVALENT(S): at 11:19

## 2022-05-30 RX ADMIN — FAMOTIDINE 20 MILLIGRAM(S): 10 INJECTION INTRAVENOUS at 17:15

## 2022-05-30 RX ADMIN — DULOXETINE HYDROCHLORIDE 60 MILLIGRAM(S): 30 CAPSULE, DELAYED RELEASE ORAL at 11:21

## 2022-05-30 RX ADMIN — Medication 1 SPRAY(S): at 17:15

## 2022-05-30 RX ADMIN — Medication 40 MILLIEQUIVALENT(S): at 14:20

## 2022-05-30 RX ADMIN — Medication 12.5 MILLIGRAM(S): at 17:14

## 2022-05-30 RX ADMIN — Medication 125 MICROGRAM(S): at 06:38

## 2022-05-30 RX ADMIN — Medication 25 GRAM(S): at 14:18

## 2022-05-30 RX ADMIN — SENNA PLUS 2 TABLET(S): 8.6 TABLET ORAL at 21:54

## 2022-05-30 RX ADMIN — SODIUM CHLORIDE 75 MILLILITER(S): 9 INJECTION, SOLUTION INTRAVENOUS at 11:18

## 2022-05-30 RX ADMIN — SODIUM CHLORIDE 75 MILLILITER(S): 9 INJECTION INTRAMUSCULAR; INTRAVENOUS; SUBCUTANEOUS at 17:10

## 2022-05-30 RX ADMIN — OXYCODONE HYDROCHLORIDE 5 MILLIGRAM(S): 5 TABLET ORAL at 12:16

## 2022-05-30 RX ADMIN — OXYCODONE HYDROCHLORIDE 5 MILLIGRAM(S): 5 TABLET ORAL at 22:42

## 2022-05-30 RX ADMIN — Medication 145 MILLIGRAM(S): at 11:22

## 2022-05-30 RX ADMIN — POTASSIUM PHOSPHATE, MONOBASIC POTASSIUM PHOSPHATE, DIBASIC 83.33 MILLIMOLE(S): 236; 224 INJECTION, SOLUTION INTRAVENOUS at 21:55

## 2022-05-30 RX ADMIN — GABAPENTIN 300 MILLIGRAM(S): 400 CAPSULE ORAL at 06:38

## 2022-05-30 RX ADMIN — PIPERACILLIN AND TAZOBACTAM 25 GRAM(S): 4; .5 INJECTION, POWDER, LYOPHILIZED, FOR SOLUTION INTRAVENOUS at 06:39

## 2022-05-30 RX ADMIN — SODIUM CHLORIDE 75 MILLILITER(S): 9 INJECTION INTRAMUSCULAR; INTRAVENOUS; SUBCUTANEOUS at 21:55

## 2022-05-30 NOTE — PROGRESS NOTE ADULT - PROBLEM SELECTOR PLAN 4
Patient had been on Cefazolin for MSSA bacteremia to end on 5/29  Found 2/2 being febrile on last admission, unclear source  HSAUN on 05/03 showed no vegetations  On Zosyn for UTI 5/29-  F/u BCx

## 2022-05-30 NOTE — PROGRESS NOTE ADULT - PROBLEM SELECTOR PLAN 8
TSH 6.12 on admission  Patient with acute UTI  Recheck thyroid fxn after clinical improvement  C/w Synthroid 125 mcg daily

## 2022-05-30 NOTE — PROGRESS NOTE ADULT - PROBLEM SELECTOR PLAN 1
Patient with 4-5 days of constipation, had been getting Tramadol/Oxy for pain control in rehab, S/p disimpaction in ED  CT showed large amount of stool in the distal colon w/ mild edema c/f stercoral colitis  Constipation likely multifactorial in setting of hypercalcemia, opioid induced and limited mobility   Patient with limited mobility 2/2 pelvic fracture, had limited ambulation with cane at rehab  Treat hypercalcemia as below   Bowel Regimen- Miralax, senna, dulcolax  S/p Movantic 5/29  Stool count

## 2022-05-30 NOTE — PHYSICAL THERAPY INITIAL EVALUATION ADULT - ADDITIONAL COMMENTS
Pt comes from rehab facility.  Pt states at home she lives alone with a couple of steps to enter and a flight of stairs inside.  Pt states she used to ambulate with a cane, how moves with a walker since pubic fracture.  Pt states she was taking small steps at rehab.

## 2022-05-30 NOTE — PROGRESS NOTE ADULT - PROBLEM SELECTOR PLAN 3
UA on admission with Large leuk est, positive nitrite, >50 WBCs, Many bacteria   CT AP with findings c/s cystitis  Likely 2/2 urostasis i/s/o constipation  Patient had been on Cefazolin for MSSA bacteremia to end on 5/29  F/u UCx  F/u BCx  On Zosyn 5/29- UA on admission with Large leuk est, positive nitrite, >50 WBCs, Many bacteria   CT AP with findings c/s cystitis  Likely 2/2 urostasis i/s/o constipation  Patient had been on Cefazolin for MSSA bacteremia to end on 5/29  F/u UCx   F/u BCx   On Zosyn 5/29-

## 2022-05-30 NOTE — PHYSICAL THERAPY INITIAL EVALUATION ADULT - PERTINENT HX OF CURRENT PROBLEM, REHAB EVAL
77F PMHx w/ SVT s/p ablation, HTN, HLD, hypothyroidism, vertigo, peripheral neuropathy, osteoarthritis, cervical ca, recent admission for pubic rami fracture and MSSA bacteremia Tx w/ Cefazolin to end 5/29, presented to ED from rehab on 5/29 w/ 3 wks of worsening abdominal pain and 4-5 days of obstipation.

## 2022-05-30 NOTE — PROGRESS NOTE ADULT - PROBLEM SELECTOR PLAN 2
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 less likely hypercalcemia of malignancy  Patient had been getting CaCO3 TID with meals at rehab, will hold  Ionized calcium 1.38  PTH low at 4  Vitamin D 35.4  F/uPTHrP  S/p Lasix 40mg IV x 1  S/p NS @ 200cc/hr x 5 hrs  Patient on risedronate weekly at home/rehab, will try calcitonin or Pamidronate IV x 1 if Ca remains elevated  Trend CMP 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 less likely hypercalcemia of malignancy  Patient had been getting CaCO3 TID with meals at rehab, will hold  Ionized calcium 1.38  PTH low at 4  Vitamin D 35.4  S/p Lasix 40mg IV x 1  S/p NS @ 200cc/hr x 5 hrs  Patient on risedronate weekly at home/rehab, will try calcitonin or Pamidronate IV x 1 if Ca remains elevated  Trend CMP 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 less likely hypercalcemia of malignancy  Patient had been getting CaCO3 TID with meals at rehab, will hold  Ionized calcium 1.38  PTH low at 4  Vitamin D 35.4  S/p Lasix 40mg IV x 1  S/p NS @ 200cc/hr x 5 hrs  Patient on risedronate weekly at home/rehab, will try calcitonin +/- Pamidronate IV x 1 if Ca remains elevated  Trend CMP 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

## 2022-05-30 NOTE — PHYSICAL THERAPY INITIAL EVALUATION ADULT - DISCHARGE DISPOSITION, PT EVAL
return to rehab.  Difficulty advancing limbs with ambulation.  Lower extremity weakness limiting her functional ability.

## 2022-05-30 NOTE — PROGRESS NOTE ADULT - SUBJECTIVE AND OBJECTIVE BOX
DATE OF SERVICE: 22 @ 07:02    Patient is a 77y old  Female who presents with a chief complaint of abdominal pain (29 May 2022 12:46)      SUBJECTIVE / OVERNIGHT EVENTS: NAEO. Patient afebrile o/n. Patient feels     MEDICATIONS  (STANDING):  bisacodyl 5 milliGRAM(s) Oral at bedtime  DULoxetine 60 milliGRAM(s) Oral daily  famotidine    Tablet 20 milliGRAM(s) Oral two times a day  fenofibrate Tablet 145 milliGRAM(s) Oral daily  fluticasone propionate 50 MICROgram(s)/spray Nasal Spray 1 Spray(s) Both Nostrils two times a day  furosemide    Tablet 40 milliGRAM(s) Oral daily  gabapentin 300 milliGRAM(s) Oral two times a day  levothyroxine 125 MICROGram(s) Oral daily  meclizine 12.5 milliGRAM(s) Oral two times a day  piperacillin/tazobactam IVPB.. 3.375 Gram(s) IV Intermittent every 8 hours  polyethylene glycol 3350 17 Gram(s) Oral two times a day  rOPINIRole 5 milliGRAM(s) Oral at bedtime  senna 2 Tablet(s) Oral at bedtime  sodium chloride 0.9%. 1000 milliLiter(s) (200 mL/Hr) IV Continuous <Continuous>  spironolactone 25 milliGRAM(s) Oral daily    MEDICATIONS  (PRN):  acetaminophen     Tablet .. 650 milliGRAM(s) Oral every 6 hours PRN Temp greater or equal to 38C (100.4F), Mild Pain (1 - 3)  oxyCODONE    IR 5 milliGRAM(s) Oral every 6 hours PRN Severe Pain (7 - 10)  sodium chloride 0.65% Nasal 1 Spray(s) Both Nostrils two times a day PRN Nasal Congestion      Vital Signs Last 24 Hrs  T(C): 36.4 (30 May 2022 05:25), Max: 37.3 (29 May 2022 11:46)  T(F): 97.6 (30 May 2022 05:25), Max: 99.1 (29 May 2022 11:46)  HR: 90 (30 May 2022 05:25) (70 - 96)  BP: 147/72 (30 May 2022 05:25) (136/81 - 165/100)  BP(mean): --  RR: 18 (30 May 2022 05:25) (16 - 18)  SpO2: 100% (30 May 2022 05:25) (98% - 100%)  CAPILLARY BLOOD GLUCOSE        I&O's Summary    29 May 2022 07:01  -  30 May 2022 07:00  --------------------------------------------------------  IN: 200 mL / OUT: 2000 mL / NET: -1800 mL        PHYSICAL EXAM:  GENERAL APPEARANCE: Well developed, anasarcic, obese habitus, knox with purulent drainage  HEENT:  PERRL, EOMI. hearing grossly intact.  NECK: Neck supple, non-tender no lymphadenopathy, masses or thyromegaly.  CARDIAC: Normal S1 and S2. no mrg. RRR  LUNGS: Clear to auscultation B/L, no rales, rhonchi, or wheezing  ABDOMEN: Soft , Mildly tender throughout, bowel sounds normal. No guarding or rebound.   MUSCULOSKELETAL: ROM intact.  No joint erythema or tenderness.   EXTREMITIES: 4+ bipedal edema to thighs bilaterally    NEUROLOGICAL: Lethargic and confused at times but answering questions appropriately   SKIN: Warm and dry , Well perfused  PSYCHIATRIC: AOx2-3 , Normal mood and affect    LABS:                        11.9   15.96 )-----------( 294      ( 29 May 2022 05:49 )             37.7         130<L>  |  93<L>  |  26<H>  ----------------------------<  90  TNP   |  24  |  0.98    Ca    11.3<H>      29 May 2022 14:56  Phos  4.1         TPro  6.7  /  Alb  2.6<L>  /  TBili  0.4  /  DBili  x   /  AST  45<H>  /  ALT  TNP  /  AlkPhos  65            Urinalysis Basic - ( 29 May 2022 14:00 )    Color: Yellow / Appearance: Slightly Turbid / S.015 / pH: x  Gluc: x / Ketone: Trace  / Bili: Negative / Urobili: <2 mg/dL   Blood: x / Protein: 30 mg/dL / Nitrite: Positive   Leuk Esterase: Large / RBC: 8 /HPF /  /HPF   Sq Epi: x / Non Sq Epi: 0 /HPF / Bacteria: Few        RADIOLOGY & ADDITIONAL TESTS:    Imaging Personally Reviewed:    Consultant(s) Notes Reviewed:      Care Discussed with Consultants/Other Providers:   DATE OF SERVICE: 22 @ 07:02    Patient is a 77y old  Female who presents with a chief complaint of abdominal pain (29 May 2022 12:46)      SUBJECTIVE / OVERNIGHT EVENTS: NAEO. Patient afebrile o/n. Patient AOx2-3 this AM. Patient feel a little better this AM. Patient says she had a small bowel movement this morning. Denies CP, SOB, fevers/chills, N/V    MEDICATIONS  (STANDING):  bisacodyl 5 milliGRAM(s) Oral at bedtime  DULoxetine 60 milliGRAM(s) Oral daily  famotidine    Tablet 20 milliGRAM(s) Oral two times a day  fenofibrate Tablet 145 milliGRAM(s) Oral daily  fluticasone propionate 50 MICROgram(s)/spray Nasal Spray 1 Spray(s) Both Nostrils two times a day  furosemide    Tablet 40 milliGRAM(s) Oral daily  gabapentin 300 milliGRAM(s) Oral two times a day  levothyroxine 125 MICROGram(s) Oral daily  meclizine 12.5 milliGRAM(s) Oral two times a day  piperacillin/tazobactam IVPB.. 3.375 Gram(s) IV Intermittent every 8 hours  polyethylene glycol 3350 17 Gram(s) Oral two times a day  rOPINIRole 5 milliGRAM(s) Oral at bedtime  senna 2 Tablet(s) Oral at bedtime  sodium chloride 0.9%. 1000 milliLiter(s) (200 mL/Hr) IV Continuous <Continuous>  spironolactone 25 milliGRAM(s) Oral daily    MEDICATIONS  (PRN):  acetaminophen     Tablet .. 650 milliGRAM(s) Oral every 6 hours PRN Temp greater or equal to 38C (100.4F), Mild Pain (1 - 3)  oxyCODONE    IR 5 milliGRAM(s) Oral every 6 hours PRN Severe Pain (7 - 10)  sodium chloride 0.65% Nasal 1 Spray(s) Both Nostrils two times a day PRN Nasal Congestion      Vital Signs Last 24 Hrs  T(C): 36.4 (30 May 2022 05:25), Max: 37.3 (29 May 2022 11:46)  T(F): 97.6 (30 May 2022 05:25), Max: 99.1 (29 May 2022 11:46)  HR: 90 (30 May 2022 05:25) (70 - 96)  BP: 147/72 (30 May 2022 05:25) (136/81 - 165/100)  BP(mean): --  RR: 18 (30 May 2022 05:25) (16 - 18)  SpO2: 100% (30 May 2022 05:25) (98% - 100%)  CAPILLARY BLOOD GLUCOSE        I&O's Summary    29 May 2022 07:01  -  30 May 2022 07:00  --------------------------------------------------------  IN: 200 mL / OUT: 2000 mL / NET: -1800 mL        PHYSICAL EXAM:  GENERAL APPEARANCE: Well developed, anasarcic, obese habitus, knox with purulent drainage  HEENT:  PERRL, EOMI. hearing grossly intact.  NECK: Neck supple, non-tender no lymphadenopathy, masses or thyromegaly.  CARDIAC: Normal S1 and S2. no mrg. RRR  LUNGS: Clear to auscultation B/L, no rales, rhonchi, or wheezing  ABDOMEN: Soft , Mildly tender throughout, bowel sounds normal. No guarding or rebound.   MUSCULOSKELETAL: ROM intact.  No joint erythema or tenderness.   EXTREMITIES: 4+ bipedal edema to thighs bilaterally    NEUROLOGICAL: Lethargic and confused at times but answering questions appropriately   SKIN: Warm and dry , Well perfused  PSYCHIATRIC: AOx2-3 , Normal mood and affect    LABS:                        11.9   15.96 )-----------( 294      ( 29 May 2022 05:49 )             37.7         130<L>  |  93<L>  |  26<H>  ----------------------------<  90  TNP   |  24  |  0.98    Ca    11.3<H>      29 May 2022 14:56  Phos  4.1         TPro  6.7  /  Alb  2.6<L>  /  TBili  0.4  /  DBili  x   /  AST  45<H>  /  ALT  TNP  /  AlkPhos  65            Urinalysis Basic - ( 29 May 2022 14:00 )    Color: Yellow / Appearance: Slightly Turbid / S.015 / pH: x  Gluc: x / Ketone: Trace  / Bili: Negative / Urobili: <2 mg/dL   Blood: x / Protein: 30 mg/dL / Nitrite: Positive   Leuk Esterase: Large / RBC: 8 /HPF /  /HPF   Sq Epi: x / Non Sq Epi: 0 /HPF / Bacteria: Few        RADIOLOGY & ADDITIONAL TESTS:    Imaging Personally Reviewed:    Consultant(s) Notes Reviewed:      Care Discussed with Consultants/Other Providers:

## 2022-05-30 NOTE — PROGRESS NOTE ADULT - PROBLEM SELECTOR PLAN 5
Patient with pelvic fractures found on previous admission  Was getting Tramadol and Oxy at rehab  Presenting from rehab  Pain control with IV tylenol, Oxy 5 q6prn- caution with opioids given constipation  PT consult

## 2022-05-30 NOTE — PROGRESS NOTE ADULT - ASSESSMENT
77F PMHx w/ SVT s/p ablation, HTN, HLD, hypothyroidism, vertigo, peripheral neuropathy, osteoarthritis, cervical ca, recent admission for pubic rami fracture and MSSA bacteremia Tx w/ Cefazolin to end 5/29, presented to ED from rehab on 5/29 w/ 3 wks of worsening abdominal pain and 4-5 days of obstipation, s/p disimpaction in ED, UA c/f UTI, on Zosyn, found to be hypercalcemic to 12.4 (13.1 corrected), receiving IVF 77F PMHx w/ SVT s/p ablation, HTN, HLD, hypothyroidism, vertigo, peripheral neuropathy, osteoarthritis, cervical ca, recent admission for pubic rami fracture and MSSA bacteremia Tx w/ Cefazolin to end 5/29, presented to ED from rehab on 5/29 w/ 3 wks of worsening abdominal pain and 4-5 days of obstipation, s/p disimpaction in ED, UA c/f UTI, on Zosyn, found to be hypercalcemic to 12.4 (13.1 corrected)

## 2022-05-30 NOTE — PHYSICAL THERAPY INITIAL EVALUATION ADULT - RANGE OF MOTION EXAMINATION, REHAB EVAL
Upper extremities within functional limits.  Did not access end range of lower extremity hips due to history of pubic fracture.  L knee appears with signs and symptoms consistent with osteoarthritis

## 2022-05-30 NOTE — PROGRESS NOTE ADULT - PROBLEM SELECTOR PLAN 6
Hydronephrosis noted on imaging  Likely obstructive i/s/o large stool burden  Monitor BMP  Block in place and draining well   Strict Is/Os

## 2022-05-31 DIAGNOSIS — D72.829 ELEVATED WHITE BLOOD CELL COUNT, UNSPECIFIED: ICD-10-CM

## 2022-05-31 LAB
ALBUMIN SERPL ELPH-MCNC: 2.5 G/DL — LOW (ref 3.3–5)
ALP SERPL-CCNC: 65 U/L — SIGNIFICANT CHANGE UP (ref 40–120)
ALT FLD-CCNC: <5 U/L — LOW (ref 4–33)
ANION GAP SERPL CALC-SCNC: 13 MMOL/L — SIGNIFICANT CHANGE UP (ref 7–14)
AST SERPL-CCNC: 19 U/L — SIGNIFICANT CHANGE UP (ref 4–32)
BILIRUB SERPL-MCNC: 0.3 MG/DL — SIGNIFICANT CHANGE UP (ref 0.2–1.2)
BUN SERPL-MCNC: 26 MG/DL — HIGH (ref 7–23)
CALCIUM SERPL-MCNC: 10.3 MG/DL — SIGNIFICANT CHANGE UP (ref 8.4–10.5)
CHLORIDE SERPL-SCNC: 97 MMOL/L — LOW (ref 98–107)
CO2 SERPL-SCNC: 24 MMOL/L — SIGNIFICANT CHANGE UP (ref 22–31)
CREAT SERPL-MCNC: 0.9 MG/DL — SIGNIFICANT CHANGE UP (ref 0.5–1.3)
EGFR: 66 ML/MIN/1.73M2 — SIGNIFICANT CHANGE UP
GLUCOSE SERPL-MCNC: 95 MG/DL — SIGNIFICANT CHANGE UP (ref 70–99)
HCT VFR BLD CALC: 31.9 % — LOW (ref 34.5–45)
HGB BLD-MCNC: 9.9 G/DL — LOW (ref 11.5–15.5)
MAGNESIUM SERPL-MCNC: 1.6 MG/DL — SIGNIFICANT CHANGE UP (ref 1.6–2.6)
MCHC RBC-ENTMCNC: 27 PG — SIGNIFICANT CHANGE UP (ref 27–34)
MCHC RBC-ENTMCNC: 31 GM/DL — LOW (ref 32–36)
MCV RBC AUTO: 87.2 FL — SIGNIFICANT CHANGE UP (ref 80–100)
NRBC # BLD: 0 /100 WBCS — SIGNIFICANT CHANGE UP
NRBC # FLD: 0 K/UL — SIGNIFICANT CHANGE UP
PHOSPHATE SERPL-MCNC: 2.7 MG/DL — SIGNIFICANT CHANGE UP (ref 2.5–4.5)
PLATELET # BLD AUTO: 306 K/UL — SIGNIFICANT CHANGE UP (ref 150–400)
POTASSIUM SERPL-MCNC: 4 MMOL/L — SIGNIFICANT CHANGE UP (ref 3.5–5.3)
POTASSIUM SERPL-SCNC: 4 MMOL/L — SIGNIFICANT CHANGE UP (ref 3.5–5.3)
PROT SERPL-MCNC: 5.7 G/DL — LOW (ref 6–8.3)
RBC # BLD: 3.66 M/UL — LOW (ref 3.8–5.2)
RBC # FLD: 16.1 % — HIGH (ref 10.3–14.5)
SODIUM SERPL-SCNC: 134 MMOL/L — LOW (ref 135–145)
WBC # BLD: 20.31 K/UL — HIGH (ref 3.8–10.5)
WBC # FLD AUTO: 20.31 K/UL — HIGH (ref 3.8–10.5)

## 2022-05-31 PROCEDURE — 99233 SBSQ HOSP IP/OBS HIGH 50: CPT | Mod: GC

## 2022-05-31 PROCEDURE — 93970 EXTREMITY STUDY: CPT | Mod: 26

## 2022-05-31 RX ORDER — POTASSIUM CHLORIDE 20 MEQ
20 PACKET (EA) ORAL ONCE
Refills: 0 | Status: COMPLETED | OUTPATIENT
Start: 2022-05-31 | End: 2022-05-31

## 2022-05-31 RX ORDER — APIXABAN 2.5 MG/1
10 TABLET, FILM COATED ORAL
Refills: 0 | Status: COMPLETED | OUTPATIENT
Start: 2022-05-31 | End: 2022-06-07

## 2022-05-31 RX ORDER — APIXABAN 2.5 MG/1
5 TABLET, FILM COATED ORAL EVERY 12 HOURS
Refills: 0 | Status: DISCONTINUED | OUTPATIENT
Start: 2022-06-08 | End: 2022-06-08

## 2022-05-31 RX ORDER — FUROSEMIDE 40 MG
40 TABLET ORAL ONCE
Refills: 0 | Status: COMPLETED | OUTPATIENT
Start: 2022-05-31 | End: 2022-05-31

## 2022-05-31 RX ADMIN — GABAPENTIN 300 MILLIGRAM(S): 400 CAPSULE ORAL at 06:21

## 2022-05-31 RX ADMIN — ROPINIROLE 5 MILLIGRAM(S): 8 TABLET, FILM COATED, EXTENDED RELEASE ORAL at 22:01

## 2022-05-31 RX ADMIN — APIXABAN 10 MILLIGRAM(S): 2.5 TABLET, FILM COATED ORAL at 17:30

## 2022-05-31 RX ADMIN — SENNA PLUS 2 TABLET(S): 8.6 TABLET ORAL at 21:38

## 2022-05-31 RX ADMIN — FAMOTIDINE 20 MILLIGRAM(S): 10 INJECTION INTRAVENOUS at 06:21

## 2022-05-31 RX ADMIN — Medication 125 MICROGRAM(S): at 06:21

## 2022-05-31 RX ADMIN — OXYCODONE HYDROCHLORIDE 5 MILLIGRAM(S): 5 TABLET ORAL at 09:21

## 2022-05-31 RX ADMIN — SPIRONOLACTONE 25 MILLIGRAM(S): 25 TABLET, FILM COATED ORAL at 06:21

## 2022-05-31 RX ADMIN — Medication 12.5 MILLIGRAM(S): at 17:26

## 2022-05-31 RX ADMIN — OXYCODONE HYDROCHLORIDE 5 MILLIGRAM(S): 5 TABLET ORAL at 09:51

## 2022-05-31 RX ADMIN — FAMOTIDINE 20 MILLIGRAM(S): 10 INJECTION INTRAVENOUS at 17:26

## 2022-05-31 RX ADMIN — Medication 12.5 MILLIGRAM(S): at 06:22

## 2022-05-31 RX ADMIN — Medication 1 SPRAY(S): at 17:25

## 2022-05-31 RX ADMIN — Medication 5 MILLIGRAM(S): at 21:39

## 2022-05-31 RX ADMIN — Medication 20 MILLIEQUIVALENT(S): at 17:25

## 2022-05-31 RX ADMIN — Medication 145 MILLIGRAM(S): at 17:26

## 2022-05-31 RX ADMIN — OXYCODONE HYDROCHLORIDE 5 MILLIGRAM(S): 5 TABLET ORAL at 02:00

## 2022-05-31 RX ADMIN — PIPERACILLIN AND TAZOBACTAM 25 GRAM(S): 4; .5 INJECTION, POWDER, LYOPHILIZED, FOR SOLUTION INTRAVENOUS at 00:04

## 2022-05-31 RX ADMIN — POLYETHYLENE GLYCOL 3350 17 GRAM(S): 17 POWDER, FOR SOLUTION ORAL at 06:17

## 2022-05-31 RX ADMIN — DULOXETINE HYDROCHLORIDE 60 MILLIGRAM(S): 30 CAPSULE, DELAYED RELEASE ORAL at 17:27

## 2022-05-31 RX ADMIN — Medication 1 SPRAY(S): at 06:22

## 2022-05-31 RX ADMIN — Medication 40 MILLIGRAM(S): at 17:25

## 2022-05-31 RX ADMIN — GABAPENTIN 300 MILLIGRAM(S): 400 CAPSULE ORAL at 17:30

## 2022-05-31 RX ADMIN — PIPERACILLIN AND TAZOBACTAM 25 GRAM(S): 4; .5 INJECTION, POWDER, LYOPHILIZED, FOR SOLUTION INTRAVENOUS at 09:21

## 2022-05-31 RX ADMIN — Medication 40 MILLIGRAM(S): at 06:21

## 2022-05-31 RX ADMIN — PIPERACILLIN AND TAZOBACTAM 25 GRAM(S): 4; .5 INJECTION, POWDER, LYOPHILIZED, FOR SOLUTION INTRAVENOUS at 17:24

## 2022-05-31 RX ADMIN — POLYETHYLENE GLYCOL 3350 17 GRAM(S): 17 POWDER, FOR SOLUTION ORAL at 17:26

## 2022-05-31 RX ADMIN — OXYCODONE HYDROCHLORIDE 5 MILLIGRAM(S): 5 TABLET ORAL at 21:42

## 2022-05-31 RX ADMIN — OXYCODONE HYDROCHLORIDE 5 MILLIGRAM(S): 5 TABLET ORAL at 01:30

## 2022-05-31 NOTE — PROGRESS NOTE ADULT - PROBLEM SELECTOR PLAN 2
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

## 2022-05-31 NOTE — PROGRESS NOTE ADULT - PROBLEM SELECTOR PLAN 3
UA on admission with Large leuk est, positive nitrite, >50 WBCs, Many bacteria   CT AP with findings c/s cystitis  Likely 2/2 urostasis i/s/o constipation  Patient had been on Cefazolin for MSSA bacteremia to end on 5/29  UCx showed >3 organisms likely contamination  F/u BCx   On Zosyn 5/29- UA on admission with Large leuk est, positive nitrite, >50 WBCs, Many bacteria   CT AP with findings c/s cystitis  Likely 2/2 urostasis i/s/o constipation  Patient had been on Cefazolin for MSSA bacteremia to end on 5/29  UCx showed >3 organisms likely contamination  F/u BCx   On Zosyn 5/29-  Plan for 5 day course to end 6/2

## 2022-05-31 NOTE — PROGRESS NOTE ADULT - PROBLEM SELECTOR PLAN 6
Hydronephrosis noted on imaging  Likely obstructive i/s/o large stool burden  Monitor BMP  Block in place and draining well   Strict Is/Os Patient with pelvic fractures found on previous admission  Was getting Tramadol and Oxy at rehab  Presenting from rehab  Pain control with IV tylenol, Oxy 5 q6prn- caution with opioids given constipation  PT consult

## 2022-05-31 NOTE — PROGRESS NOTE ADULT - ASSESSMENT
77F PMHx w/ SVT s/p ablation, HTN, HLD, hypothyroidism, vertigo, peripheral neuropathy, osteoarthritis, cervical ca, recent admission for pubic rami fracture and MSSA bacteremia Tx w/ Cefazolin to end 5/29, presented to ED from rehab on 5/29 w/ 3 wks of worsening abdominal pain and 4-5 days of obstipation, s/p disimpaction in ED, UA c/f UTI, on Zosyn, found to be hypercalcemic to 12.4 (13.1 corrected)

## 2022-05-31 NOTE — PROGRESS NOTE ADULT - SUBJECTIVE AND OBJECTIVE BOX
DATE OF SERVICE: 22 @ 07:40    Patient is a 77y old  Female who presents with a chief complaint of abdominal pain (30 May 2022 07:02)      SUBJECTIVE / OVERNIGHT EVENTS: NAEO. Patient afebrile o/n. Patient feels. Patient had 1 small BM o/n.     MEDICATIONS  (STANDING):  bisacodyl 5 milliGRAM(s) Oral at bedtime  DULoxetine 60 milliGRAM(s) Oral daily  enoxaparin Injectable 40 milliGRAM(s) SubCutaneous every 24 hours  famotidine    Tablet 20 milliGRAM(s) Oral two times a day  fenofibrate Tablet 145 milliGRAM(s) Oral daily  fluticasone propionate 50 MICROgram(s)/spray Nasal Spray 1 Spray(s) Both Nostrils two times a day  furosemide    Tablet 40 milliGRAM(s) Oral daily  gabapentin 300 milliGRAM(s) Oral two times a day  levothyroxine 125 MICROGram(s) Oral daily  meclizine 12.5 milliGRAM(s) Oral two times a day  piperacillin/tazobactam IVPB.. 3.375 Gram(s) IV Intermittent every 8 hours  polyethylene glycol 3350 17 Gram(s) Oral two times a day  rOPINIRole 5 milliGRAM(s) Oral at bedtime  senna 2 Tablet(s) Oral at bedtime  sodium chloride 0.9%. 1000 milliLiter(s) (75 mL/Hr) IV Continuous <Continuous>  sorbitol 70%/mineral oil/magnesium hydroxide/glycerin Enema 120 milliLiter(s) Rectal once  spironolactone 25 milliGRAM(s) Oral daily    MEDICATIONS  (PRN):  acetaminophen     Tablet .. 650 milliGRAM(s) Oral every 6 hours PRN Temp greater or equal to 38C (100.4F), Mild Pain (1 - 3)  oxyCODONE    IR 5 milliGRAM(s) Oral every 6 hours PRN Severe Pain (7 - 10)  sodium chloride 0.65% Nasal 1 Spray(s) Both Nostrils two times a day PRN Nasal Congestion      Vital Signs Last 24 Hrs  T(C): 36.8 (31 May 2022 05:41), Max: 37.1 (30 May 2022 21:36)  T(F): 98.2 (31 May 2022 05:41), Max: 98.8 (30 May 2022 21:36)  HR: 78 (31 May 2022 05:41) (78 - 91)  BP: 121/65 (31 May 2022 05:41) (121/65 - 147/80)  BP(mean): --  RR: 17 (31 May 2022 05:41) (17 - 17)  SpO2: 97% (31 May 2022 05:41) (96% - 98%)  CAPILLARY BLOOD GLUCOSE        I&O's Summary    30 May 2022 07:01  -  31 May 2022 07:00  --------------------------------------------------------  IN: 0 mL / OUT: 1200 mL / NET: -1200 mL        PHYSICAL EXAM:  GENERAL APPEARANCE: Well developed, anasarcic, obese habitus, knox in place  HEENT:  PERRL, EOMI. hearing grossly intact.  NECK: Neck supple, non-tender no lymphadenopathy, masses or thyromegaly.  CARDIAC: Normal S1 and S2. no mrg. RRR  LUNGS: Clear to auscultation B/L, no rales, rhonchi, or wheezing  ABDOMEN: Soft , Mildly tender throughout, bowel sounds normal. No guarding or rebound.   MUSCULOSKELETAL: ROM intact.  No joint erythema or tenderness.   EXTREMITIES: 4+ bipedal edema to thighs bilaterally    NEUROLOGICAL: cConfused at times but answering questions appropriately   SKIN: Warm and dry , Well perfused  PSYCHIATRIC: AOx2-3 , Normal mood and affect      LABS:                        10.9   24.42 )-----------( 309      ( 30 May 2022 19:16 )             35.3     05-30    133<L>  |  94<L>  |  26<H>  ----------------------------<  159<H>  3.5   |  28  |  0.98    Ca    10.9<H>      30 May 2022 19:16  Phos  1.6     05-30  Mg     1.60     05-30    TPro  5.9<L>  /  Alb  2.7<L>  /  TBili  0.4  /  DBili  x   /  AST  19  /  ALT  <5<L>  /  AlkPhos  72  05-30          Urinalysis Basic - ( 29 May 2022 14:00 )    Color: Yellow / Appearance: Slightly Turbid / S.015 / pH: x  Gluc: x / Ketone: Trace  / Bili: Negative / Urobili: <2 mg/dL   Blood: x / Protein: 30 mg/dL / Nitrite: Positive   Leuk Esterase: Large / RBC: 8 /HPF /  /HPF   Sq Epi: x / Non Sq Epi: 0 /HPF / Bacteria: Few        RADIOLOGY & ADDITIONAL TESTS:    Imaging Personally Reviewed:    Consultant(s) Notes Reviewed:      Care Discussed with Consultants/Other Providers:   DATE OF SERVICE: 22 @ 07:40    Patient is a 77y old  Female who presents with a chief complaint of abdominal pain (30 May 2022 07:02)      SUBJECTIVE / OVERNIGHT EVENTS: NAEO. Patient afebrile o/n. Patient feels. Patient had several BMs yesterday and 1 BM o/n. Patient AOx2 this AM. Patient endorsing abdominal discomfort this AM about 3/10. Denies CP, SOB, fevers/chills, N/V.     MEDICATIONS  (STANDING):  bisacodyl 5 milliGRAM(s) Oral at bedtime  DULoxetine 60 milliGRAM(s) Oral daily  enoxaparin Injectable 40 milliGRAM(s) SubCutaneous every 24 hours  famotidine    Tablet 20 milliGRAM(s) Oral two times a day  fenofibrate Tablet 145 milliGRAM(s) Oral daily  fluticasone propionate 50 MICROgram(s)/spray Nasal Spray 1 Spray(s) Both Nostrils two times a day  furosemide    Tablet 40 milliGRAM(s) Oral daily  gabapentin 300 milliGRAM(s) Oral two times a day  levothyroxine 125 MICROGram(s) Oral daily  meclizine 12.5 milliGRAM(s) Oral two times a day  piperacillin/tazobactam IVPB.. 3.375 Gram(s) IV Intermittent every 8 hours  polyethylene glycol 3350 17 Gram(s) Oral two times a day  rOPINIRole 5 milliGRAM(s) Oral at bedtime  senna 2 Tablet(s) Oral at bedtime  sodium chloride 0.9%. 1000 milliLiter(s) (75 mL/Hr) IV Continuous <Continuous>  sorbitol 70%/mineral oil/magnesium hydroxide/glycerin Enema 120 milliLiter(s) Rectal once  spironolactone 25 milliGRAM(s) Oral daily    MEDICATIONS  (PRN):  acetaminophen     Tablet .. 650 milliGRAM(s) Oral every 6 hours PRN Temp greater or equal to 38C (100.4F), Mild Pain (1 - 3)  oxyCODONE    IR 5 milliGRAM(s) Oral every 6 hours PRN Severe Pain (7 - 10)  sodium chloride 0.65% Nasal 1 Spray(s) Both Nostrils two times a day PRN Nasal Congestion      Vital Signs Last 24 Hrs  T(C): 36.8 (31 May 2022 05:41), Max: 37.1 (30 May 2022 21:36)  T(F): 98.2 (31 May 2022 05:41), Max: 98.8 (30 May 2022 21:36)  HR: 78 (31 May 2022 05:41) (78 - 91)  BP: 121/65 (31 May 2022 05:41) (121/65 - 147/80)  BP(mean): --  RR: 17 (31 May 2022 05:41) (17 - 17)  SpO2: 97% (31 May 2022 05:41) (96% - 98%)  CAPILLARY BLOOD GLUCOSE        I&O's Summary    30 May 2022 07:01  -  31 May 2022 07:00  --------------------------------------------------------  IN: 0 mL / OUT: 1200 mL / NET: -1200 mL        PHYSICAL EXAM:  GENERAL APPEARANCE: Well developed, anasarcic, obese habitus, knox in place  HEENT:  PERRL, EOMI. hearing grossly intact.  NECK: Neck supple, non-tender no lymphadenopathy, masses or thyromegaly.  CARDIAC: Normal S1 and S2. no mrg. RRR  LUNGS: Clear to auscultation B/L, no rales, rhonchi, or wheezing  ABDOMEN: Soft , Mildly tender throughout, bowel sounds normal. No guarding or rebound.   MUSCULOSKELETAL: ROM intact.  No joint erythema or tenderness.   EXTREMITIES: 4+ bipedal edema to thighs bilaterally    NEUROLOGICAL: cConfused at times but answering questions appropriately   SKIN: Warm and dry , Well perfused  PSYCHIATRIC: AOx2-3 , Normal mood and affect      LABS:                        10.9   24.42 )-----------( 309      ( 30 May 2022 19:16 )             35.3     05-30    133<L>  |  94<L>  |  26<H>  ----------------------------<  159<H>  3.5   |  28  |  0.98    Ca    10.9<H>      30 May 2022 19:16  Phos  1.6     05-30  Mg     1.60     05-30    TPro  5.9<L>  /  Alb  2.7<L>  /  TBili  0.4  /  DBili  x   /  AST  19  /  ALT  <5<L>  /  AlkPhos  72  05-30          Urinalysis Basic - ( 29 May 2022 14:00 )    Color: Yellow / Appearance: Slightly Turbid / S.015 / pH: x  Gluc: x / Ketone: Trace  / Bili: Negative / Urobili: <2 mg/dL   Blood: x / Protein: 30 mg/dL / Nitrite: Positive   Leuk Esterase: Large / RBC: 8 /HPF /  /HPF   Sq Epi: x / Non Sq Epi: 0 /HPF / Bacteria: Few        RADIOLOGY & ADDITIONAL TESTS:    Imaging Personally Reviewed:    Consultant(s) Notes Reviewed:      Care Discussed with Consultants/Other Providers:   DATE OF SERVICE: 22 @ 07:40    Patient is a 77y old  Female who presents with a chief complaint of abdominal pain (30 May 2022 07:02)    SUBJECTIVE / OVERNIGHT EVENTS: NAEO. Patient afebrile o/n. Patient had several BMs yesterday and 1 BM o/n. Patient AOx2 this AM. Patient endorsing abdominal discomfort this AM about 3/10. Denies CP, SOB, fevers/chills, N/V.     MEDICATIONS  (STANDING):  bisacodyl 5 milliGRAM(s) Oral at bedtime  DULoxetine 60 milliGRAM(s) Oral daily  enoxaparin Injectable 40 milliGRAM(s) SubCutaneous every 24 hours  famotidine    Tablet 20 milliGRAM(s) Oral two times a day  fenofibrate Tablet 145 milliGRAM(s) Oral daily  fluticasone propionate 50 MICROgram(s)/spray Nasal Spray 1 Spray(s) Both Nostrils two times a day  furosemide    Tablet 40 milliGRAM(s) Oral daily  gabapentin 300 milliGRAM(s) Oral two times a day  levothyroxine 125 MICROGram(s) Oral daily  meclizine 12.5 milliGRAM(s) Oral two times a day  piperacillin/tazobactam IVPB.. 3.375 Gram(s) IV Intermittent every 8 hours  polyethylene glycol 3350 17 Gram(s) Oral two times a day  rOPINIRole 5 milliGRAM(s) Oral at bedtime  senna 2 Tablet(s) Oral at bedtime  sodium chloride 0.9%. 1000 milliLiter(s) (75 mL/Hr) IV Continuous <Continuous>  sorbitol 70%/mineral oil/magnesium hydroxide/glycerin Enema 120 milliLiter(s) Rectal once  spironolactone 25 milliGRAM(s) Oral daily    MEDICATIONS  (PRN):  acetaminophen     Tablet .. 650 milliGRAM(s) Oral every 6 hours PRN Temp greater or equal to 38C (100.4F), Mild Pain (1 - 3)  oxyCODONE    IR 5 milliGRAM(s) Oral every 6 hours PRN Severe Pain (7 - 10)  sodium chloride 0.65% Nasal 1 Spray(s) Both Nostrils two times a day PRN Nasal Congestion      Vital Signs Last 24 Hrs  T(C): 36.8 (31 May 2022 05:41), Max: 37.1 (30 May 2022 21:36)  T(F): 98.2 (31 May 2022 05:41), Max: 98.8 (30 May 2022 21:36)  HR: 78 (31 May 2022 05:41) (78 - 91)  BP: 121/65 (31 May 2022 05:41) (121/65 - 147/80)  BP(mean): --  RR: 17 (31 May 2022 05:41) (17 - 17)  SpO2: 97% (31 May 2022 05:41) (96% - 98%)  CAPILLARY BLOOD GLUCOSE        I&O's Summary    30 May 2022 07:01  -  31 May 2022 07:00  --------------------------------------------------------  IN: 0 mL / OUT: 1200 mL / NET: -1200 mL        PHYSICAL EXAM:  GENERAL APPEARANCE: Well developed, anasarcic, obese habitus, knox in place  HEENT:  PERRL, EOMI. hearing grossly intact.  NECK: Neck supple, non-tender no lymphadenopathy, masses or thyromegaly.  CARDIAC: Normal S1 and S2. no mrg. RRR  LUNGS: Clear to auscultation B/L, no rales, rhonchi, or wheezing  ABDOMEN: Soft , Mildly tender throughout, bowel sounds normal. No guarding or rebound.   MUSCULOSKELETAL: ROM intact.  No joint erythema or tenderness.   EXTREMITIES: 4+ bipedal edema to thighs bilaterally    NEUROLOGICAL: cConfused at times but answering questions appropriately   SKIN: Warm and dry , Well perfused  PSYCHIATRIC: AOx2-3 , Normal mood and affect      LABS:                        10.9   24.42 )-----------( 309      ( 30 May 2022 19:16 )             35.3     05-30    133<L>  |  94<L>  |  26<H>  ----------------------------<  159<H>  3.5   |  28  |  0.98    Ca    10.9<H>      30 May 2022 19:16  Phos  1.6     05-30  Mg     1.60     05-30    TPro  5.9<L>  /  Alb  2.7<L>  /  TBili  0.4  /  DBili  x   /  AST  19  /  ALT  <5<L>  /  AlkPhos  72  05-30          Urinalysis Basic - ( 29 May 2022 14:00 )    Color: Yellow / Appearance: Slightly Turbid / S.015 / pH: x  Gluc: x / Ketone: Trace  / Bili: Negative / Urobili: <2 mg/dL   Blood: x / Protein: 30 mg/dL / Nitrite: Positive   Leuk Esterase: Large / RBC: 8 /HPF /  /HPF   Sq Epi: x / Non Sq Epi: 0 /HPF / Bacteria: Few        RADIOLOGY & ADDITIONAL TESTS:    Imaging Personally Reviewed:    Consultant(s) Notes Reviewed:      Care Discussed with Consultants/Other Providers:

## 2022-05-31 NOTE — PROGRESS NOTE ADULT - PROBLEM SELECTOR PLAN 8
TSH 6.12 on admission  Patient with acute UTI  Recheck thyroid fxn after clinical improvement  C/w Synthroid 125 mcg daily C/w Lasix 40mg daily, spironolactone for HTN and edema

## 2022-05-31 NOTE — PROGRESS NOTE ADULT - PROBLEM SELECTOR PLAN 7
C/w Lasix 40mg daily, spironolactone for HTN and edema Hydronephrosis noted on imaging  Likely obstructive i/s/o large stool burden  Monitor BMP  Block in place and draining well   Strict Is/Os

## 2022-05-31 NOTE — PROGRESS NOTE ADULT - PROBLEM SELECTOR PLAN 5
Patient with pelvic fractures found on previous admission  Was getting Tramadol and Oxy at rehab  Presenting from rehab  Pain control with IV tylenol, Oxy 5 q6prn- caution with opioids given constipation  PT consult Patient had been on Cefazolin for MSSA bacteremia to end on 5/29  Found 2/2 being febrile on last admission, unclear source  SHAUN on 05/03 showed no vegetations  On Zosyn for UTI 5/29-  F/u BCx

## 2022-05-31 NOTE — PROGRESS NOTE ADULT - PROBLEM SELECTOR PLAN 4
Patient had been on Cefazolin for MSSA bacteremia to end on 5/29  Found 2/2 being febrile on last admission, unclear source  SHAUN on 05/03 showed no vegetations  On Zosyn for UTI 5/29-  F/u BCx Patient presented with leukocytosis to 16 worsened to 29 on 5/30, likely i/s/o UTI with possible reactive component from constipation, pelvic fractures  UCx >3 organisms likely contaiminated  F/u BCx  F/u VA duplex b/l LE to r/o DVT   Trend CBC w/ diff

## 2022-06-01 DIAGNOSIS — I82.409 ACUTE EMBOLISM AND THROMBOSIS OF UNSPECIFIED DEEP VEINS OF UNSPECIFIED LOWER EXTREMITY: ICD-10-CM

## 2022-06-01 LAB
ALBUMIN SERPL ELPH-MCNC: 2.5 G/DL — LOW (ref 3.3–5)
ALP SERPL-CCNC: 57 U/L — SIGNIFICANT CHANGE UP (ref 40–120)
ALT FLD-CCNC: <5 U/L — LOW (ref 4–33)
ANION GAP SERPL CALC-SCNC: 12 MMOL/L — SIGNIFICANT CHANGE UP (ref 7–14)
AST SERPL-CCNC: 18 U/L — SIGNIFICANT CHANGE UP (ref 4–32)
BASOPHILS # BLD AUTO: 0.02 K/UL — SIGNIFICANT CHANGE UP (ref 0–0.2)
BASOPHILS NFR BLD AUTO: 0.2 % — SIGNIFICANT CHANGE UP (ref 0–2)
BILIRUB SERPL-MCNC: 0.3 MG/DL — SIGNIFICANT CHANGE UP (ref 0.2–1.2)
BUN SERPL-MCNC: 22 MG/DL — SIGNIFICANT CHANGE UP (ref 7–23)
CALCIUM SERPL-MCNC: 9.5 MG/DL — SIGNIFICANT CHANGE UP (ref 8.4–10.5)
CHLORIDE SERPL-SCNC: 94 MMOL/L — LOW (ref 98–107)
CO2 SERPL-SCNC: 30 MMOL/L — SIGNIFICANT CHANGE UP (ref 22–31)
CREAT SERPL-MCNC: 0.87 MG/DL — SIGNIFICANT CHANGE UP (ref 0.5–1.3)
EGFR: 69 ML/MIN/1.73M2 — SIGNIFICANT CHANGE UP
EOSINOPHIL # BLD AUTO: 0 K/UL — SIGNIFICANT CHANGE UP (ref 0–0.5)
EOSINOPHIL NFR BLD AUTO: 0 % — SIGNIFICANT CHANGE UP (ref 0–6)
GLUCOSE SERPL-MCNC: 80 MG/DL — SIGNIFICANT CHANGE UP (ref 70–99)
HCT VFR BLD CALC: 32.7 % — LOW (ref 34.5–45)
HGB BLD-MCNC: 10.2 G/DL — LOW (ref 11.5–15.5)
IANC: 6.77 K/UL — SIGNIFICANT CHANGE UP (ref 1.8–7.4)
IMM GRANULOCYTES NFR BLD AUTO: 1.4 % — SIGNIFICANT CHANGE UP (ref 0–1.5)
LYMPHOCYTES # BLD AUTO: 2.63 K/UL — SIGNIFICANT CHANGE UP (ref 1–3.3)
LYMPHOCYTES # BLD AUTO: 25.9 % — SIGNIFICANT CHANGE UP (ref 13–44)
MAGNESIUM SERPL-MCNC: 1.4 MG/DL — LOW (ref 1.6–2.6)
MCHC RBC-ENTMCNC: 27 PG — SIGNIFICANT CHANGE UP (ref 27–34)
MCHC RBC-ENTMCNC: 31.2 GM/DL — LOW (ref 32–36)
MCV RBC AUTO: 86.5 FL — SIGNIFICANT CHANGE UP (ref 80–100)
MONOCYTES # BLD AUTO: 0.61 K/UL — SIGNIFICANT CHANGE UP (ref 0–0.9)
MONOCYTES NFR BLD AUTO: 6 % — SIGNIFICANT CHANGE UP (ref 2–14)
NEUTROPHILS # BLD AUTO: 6.77 K/UL — SIGNIFICANT CHANGE UP (ref 1.8–7.4)
NEUTROPHILS NFR BLD AUTO: 66.5 % — SIGNIFICANT CHANGE UP (ref 43–77)
NRBC # BLD: 0 /100 WBCS — SIGNIFICANT CHANGE UP
NRBC # FLD: 0 K/UL — SIGNIFICANT CHANGE UP
PHOSPHATE SERPL-MCNC: 1.8 MG/DL — LOW (ref 2.5–4.5)
PLATELET # BLD AUTO: 289 K/UL — SIGNIFICANT CHANGE UP (ref 150–400)
POTASSIUM SERPL-MCNC: 3.2 MMOL/L — LOW (ref 3.5–5.3)
POTASSIUM SERPL-SCNC: 3.2 MMOL/L — LOW (ref 3.5–5.3)
PROT SERPL-MCNC: 5.7 G/DL — LOW (ref 6–8.3)
RBC # BLD: 3.78 M/UL — LOW (ref 3.8–5.2)
RBC # FLD: 16.3 % — HIGH (ref 10.3–14.5)
SODIUM SERPL-SCNC: 136 MMOL/L — SIGNIFICANT CHANGE UP (ref 135–145)
WBC # BLD: 10.17 K/UL — SIGNIFICANT CHANGE UP (ref 3.8–10.5)
WBC # FLD AUTO: 10.17 K/UL — SIGNIFICANT CHANGE UP (ref 3.8–10.5)

## 2022-06-01 PROCEDURE — 99233 SBSQ HOSP IP/OBS HIGH 50: CPT | Mod: GC

## 2022-06-01 RX ORDER — FUROSEMIDE 40 MG
40 TABLET ORAL EVERY 24 HOURS
Refills: 0 | Status: DISCONTINUED | OUTPATIENT
Start: 2022-06-01 | End: 2022-06-08

## 2022-06-01 RX ORDER — PIPERACILLIN AND TAZOBACTAM 4; .5 G/20ML; G/20ML
3.38 INJECTION, POWDER, LYOPHILIZED, FOR SOLUTION INTRAVENOUS EVERY 8 HOURS
Refills: 0 | Status: COMPLETED | OUTPATIENT
Start: 2022-06-01 | End: 2022-06-02

## 2022-06-01 RX ORDER — POTASSIUM CHLORIDE 20 MEQ
40 PACKET (EA) ORAL ONCE
Refills: 0 | Status: COMPLETED | OUTPATIENT
Start: 2022-06-01 | End: 2022-06-01

## 2022-06-01 RX ORDER — POTASSIUM PHOSPHATE, MONOBASIC POTASSIUM PHOSPHATE, DIBASIC 236; 224 MG/ML; MG/ML
30 INJECTION, SOLUTION INTRAVENOUS ONCE
Refills: 0 | Status: COMPLETED | OUTPATIENT
Start: 2022-06-01 | End: 2022-06-01

## 2022-06-01 RX ORDER — POTASSIUM CHLORIDE 20 MEQ
40 PACKET (EA) ORAL ONCE
Refills: 0 | Status: DISCONTINUED | OUTPATIENT
Start: 2022-06-01 | End: 2022-06-01

## 2022-06-01 RX ORDER — MAGNESIUM SULFATE 500 MG/ML
2 VIAL (ML) INJECTION ONCE
Refills: 0 | Status: COMPLETED | OUTPATIENT
Start: 2022-06-01 | End: 2022-06-01

## 2022-06-01 RX ADMIN — PIPERACILLIN AND TAZOBACTAM 25 GRAM(S): 4; .5 INJECTION, POWDER, LYOPHILIZED, FOR SOLUTION INTRAVENOUS at 21:34

## 2022-06-01 RX ADMIN — OXYCODONE HYDROCHLORIDE 5 MILLIGRAM(S): 5 TABLET ORAL at 21:45

## 2022-06-01 RX ADMIN — POLYETHYLENE GLYCOL 3350 17 GRAM(S): 17 POWDER, FOR SOLUTION ORAL at 05:28

## 2022-06-01 RX ADMIN — OXYCODONE HYDROCHLORIDE 5 MILLIGRAM(S): 5 TABLET ORAL at 06:21

## 2022-06-01 RX ADMIN — GABAPENTIN 300 MILLIGRAM(S): 400 CAPSULE ORAL at 05:27

## 2022-06-01 RX ADMIN — Medication 1 SPRAY(S): at 19:09

## 2022-06-01 RX ADMIN — SPIRONOLACTONE 25 MILLIGRAM(S): 25 TABLET, FILM COATED ORAL at 05:40

## 2022-06-01 RX ADMIN — Medication 25 GRAM(S): at 09:43

## 2022-06-01 RX ADMIN — FAMOTIDINE 20 MILLIGRAM(S): 10 INJECTION INTRAVENOUS at 05:29

## 2022-06-01 RX ADMIN — POTASSIUM PHOSPHATE, MONOBASIC POTASSIUM PHOSPHATE, DIBASIC 83.33 MILLIMOLE(S): 236; 224 INJECTION, SOLUTION INTRAVENOUS at 13:48

## 2022-06-01 RX ADMIN — Medication 5 MILLIGRAM(S): at 21:37

## 2022-06-01 RX ADMIN — FAMOTIDINE 20 MILLIGRAM(S): 10 INJECTION INTRAVENOUS at 19:09

## 2022-06-01 RX ADMIN — Medication 40 MILLIEQUIVALENT(S): at 21:35

## 2022-06-01 RX ADMIN — POLYETHYLENE GLYCOL 3350 17 GRAM(S): 17 POWDER, FOR SOLUTION ORAL at 19:09

## 2022-06-01 RX ADMIN — GABAPENTIN 300 MILLIGRAM(S): 400 CAPSULE ORAL at 19:09

## 2022-06-01 RX ADMIN — OXYCODONE HYDROCHLORIDE 5 MILLIGRAM(S): 5 TABLET ORAL at 07:21

## 2022-06-01 RX ADMIN — OXYCODONE HYDROCHLORIDE 5 MILLIGRAM(S): 5 TABLET ORAL at 14:47

## 2022-06-01 RX ADMIN — Medication 145 MILLIGRAM(S): at 13:49

## 2022-06-01 RX ADMIN — Medication 125 MICROGRAM(S): at 05:27

## 2022-06-01 RX ADMIN — PIPERACILLIN AND TAZOBACTAM 25 GRAM(S): 4; .5 INJECTION, POWDER, LYOPHILIZED, FOR SOLUTION INTRAVENOUS at 08:54

## 2022-06-01 RX ADMIN — DULOXETINE HYDROCHLORIDE 60 MILLIGRAM(S): 30 CAPSULE, DELAYED RELEASE ORAL at 13:49

## 2022-06-01 RX ADMIN — PIPERACILLIN AND TAZOBACTAM 25 GRAM(S): 4; .5 INJECTION, POWDER, LYOPHILIZED, FOR SOLUTION INTRAVENOUS at 01:01

## 2022-06-01 RX ADMIN — Medication 12.5 MILLIGRAM(S): at 05:27

## 2022-06-01 RX ADMIN — PIPERACILLIN AND TAZOBACTAM 25 GRAM(S): 4; .5 INJECTION, POWDER, LYOPHILIZED, FOR SOLUTION INTRAVENOUS at 13:48

## 2022-06-01 RX ADMIN — Medication 12.5 MILLIGRAM(S): at 19:09

## 2022-06-01 RX ADMIN — APIXABAN 10 MILLIGRAM(S): 2.5 TABLET, FILM COATED ORAL at 05:28

## 2022-06-01 RX ADMIN — Medication 40 MILLIGRAM(S): at 13:47

## 2022-06-01 RX ADMIN — OXYCODONE HYDROCHLORIDE 5 MILLIGRAM(S): 5 TABLET ORAL at 13:47

## 2022-06-01 RX ADMIN — Medication 1 SPRAY(S): at 05:29

## 2022-06-01 RX ADMIN — APIXABAN 10 MILLIGRAM(S): 2.5 TABLET, FILM COATED ORAL at 19:09

## 2022-06-01 RX ADMIN — OXYCODONE HYDROCHLORIDE 5 MILLIGRAM(S): 5 TABLET ORAL at 22:30

## 2022-06-01 RX ADMIN — SENNA PLUS 2 TABLET(S): 8.6 TABLET ORAL at 21:37

## 2022-06-01 RX ADMIN — ROPINIROLE 5 MILLIGRAM(S): 8 TABLET, FILM COATED, EXTENDED RELEASE ORAL at 23:25

## 2022-06-01 NOTE — PROGRESS NOTE ADULT - PROBLEM SELECTOR PLAN 1
Patient with 4-5 days of constipation, had been getting Tramadol/Oxy for pain control in rehab, S/p disimpaction in ED  IMPROVING  CT showed large amount of stool in the distal colon w/ mild edema c/f stercoral colitis  Constipation likely multifactorial in setting of hypercalcemia, opioid induced and limited mobility   Patient with limited mobility 2/2 pelvic fracture, had limited ambulation with cane at rehab  Treat hypercalcemia as below   Bowel Regimen- Miralax, senna, dulcolax  S/p Movantic 5/29  Stool count

## 2022-06-01 NOTE — PROGRESS NOTE ADULT - PROBLEM SELECTOR PLAN 4
Patient presented with leukocytosis to 16 worsened to 29 on 5/30, likely i/s/o UTI with possible reactive component from constipation, pelvic fractures  UCx >3 organisms likely contaiminated  F/u BCx  F/u VA duplex b/l LE to r/o DVT   Trend CBC w/ diff UA on admission with Large leuk est, positive nitrite, >50 WBCs, Many bacteria   CT AP with findings c/s cystitis  Likely 2/2 urostasis i/s/o constipation  Patient had been on Cefazolin for MSSA bacteremia to end on 5/29  UCx showed >3 organisms likely contamination  BCx showed NGTD  On Zosyn 5/29-  Plan for 5 day course to end 6/2

## 2022-06-01 NOTE — PROGRESS NOTE ADULT - PROBLEM SELECTOR PLAN 6
Patient with pelvic fractures found on previous admission  Was getting Tramadol and Oxy at rehab  Presenting from rehab  Pain control with IV tylenol, Oxy 5 q6prn- caution with opioids given constipation  PT consult Patient had been on Cefazolin for MSSA bacteremia to end on 5/29  Found 2/2 being febrile on last admission, unclear source  SHAUN on 05/03 showed no vegetations  On Zosyn for UTI 5/29-  BCx showed NGTD

## 2022-06-01 NOTE — PROGRESS NOTE ADULT - PROBLEM SELECTOR PLAN 9
TSH 6.12 on admission  Patient with acute UTI  Recheck thyroid fxn after clinical improvement  C/w Synthroid 125 mcg daily C/w spironolactone for HTN and edema, lasix IV prn for edema/hypercalcemia

## 2022-06-01 NOTE — PROGRESS NOTE ADULT - PROBLEM SELECTOR PLAN 3
UA on admission with Large leuk est, positive nitrite, >50 WBCs, Many bacteria   CT AP with findings c/s cystitis  Likely 2/2 urostasis i/s/o constipation  Patient had been on Cefazolin for MSSA bacteremia to end on 5/29  UCx showed >3 organisms likely contamination  F/u BCx   On Zosyn 5/29-  Plan for 5 day course to end 6/2 VA duplex LLE showed non-occlusive thrombus proximal femoral vein, External iliac vein- Notified by phone on 5/31  Pending official report  Started on Eliquis 10mg BID x 7 days 5/31-

## 2022-06-01 NOTE — PROGRESS NOTE ADULT - SUBJECTIVE AND OBJECTIVE BOX
DATE OF SERVICE: 06-01-22 @ 06:54    Patient is a 77y old  Female who presents with a chief complaint of abdominal pain (31 May 2022 07:39)      SUBJECTIVE / OVERNIGHT EVENTS: NAEO. Patient afebrile o/n. Patient feels     MEDICATIONS  (STANDING):  apixaban 10 milliGRAM(s) Oral two times a day  bisacodyl 5 milliGRAM(s) Oral at bedtime  DULoxetine 60 milliGRAM(s) Oral daily  famotidine    Tablet 20 milliGRAM(s) Oral two times a day  fenofibrate Tablet 145 milliGRAM(s) Oral daily  fluticasone propionate 50 MICROgram(s)/spray Nasal Spray 1 Spray(s) Both Nostrils two times a day  gabapentin 300 milliGRAM(s) Oral two times a day  levothyroxine 125 MICROGram(s) Oral daily  meclizine 12.5 milliGRAM(s) Oral two times a day  piperacillin/tazobactam IVPB.. 3.375 Gram(s) IV Intermittent every 8 hours  polyethylene glycol 3350 17 Gram(s) Oral two times a day  rOPINIRole 5 milliGRAM(s) Oral at bedtime  senna 2 Tablet(s) Oral at bedtime  sodium chloride 0.9%. 1000 milliLiter(s) (75 mL/Hr) IV Continuous <Continuous>  spironolactone 25 milliGRAM(s) Oral daily    MEDICATIONS  (PRN):  acetaminophen     Tablet .. 650 milliGRAM(s) Oral every 6 hours PRN Temp greater or equal to 38C (100.4F), Mild Pain (1 - 3)  oxyCODONE    IR 5 milliGRAM(s) Oral every 6 hours PRN Severe Pain (7 - 10)  sodium chloride 0.65% Nasal 1 Spray(s) Both Nostrils two times a day PRN Nasal Congestion      Vital Signs Last 24 Hrs  T(C): 36.6 (01 Jun 2022 05:48), Max: 36.9 (31 May 2022 21:42)  T(F): 97.9 (01 Jun 2022 05:48), Max: 98.4 (31 May 2022 21:42)  HR: 78 (01 Jun 2022 05:48) (78 - 79)  BP: 135/68 (01 Jun 2022 05:48) (105/60 - 139/63)  BP(mean): --  RR: 16 (01 Jun 2022 05:48) (16 - 18)  SpO2: 99% (01 Jun 2022 05:48) (95% - 100%)  CAPILLARY BLOOD GLUCOSE        I&O's Summary    30 May 2022 07:01  -  31 May 2022 07:00  --------------------------------------------------------  IN: 0 mL / OUT: 1500 mL / NET: -1500 mL    31 May 2022 07:01  -  01 Jun 2022 06:54  --------------------------------------------------------  IN: 0 mL / OUT: 1400 mL / NET: -1400 mL        PHYSICAL EXAM:  GENERAL APPEARANCE: Well developed, anasarcic, obese habitus  HEENT:  PERRL, EOMI. hearing grossly intact.  NECK: Neck supple, non-tender no lymphadenopathy, masses or thyromegaly.  CARDIAC: Normal S1 and S2. no mrg. RRR  LUNGS: Clear to auscultation B/L, no rales, rhonchi, or wheezing  ABDOMEN: Soft , Mildly tender throughout, bowel sounds normal. No guarding or rebound.   MUSCULOSKELETAL: ROM intact.  No joint erythema or tenderness.   EXTREMITIES: 4+ bipedal edema to thighs bilaterally    NEUROLOGICAL: Confused at times but answering questions appropriately   SKIN: Warm and dry , Well perfused  PSYCHIATRIC: AOx2-3 , Normal mood and affect    LABS:                        9.9    20.31 )-----------( 306      ( 31 May 2022 06:40 )             31.9     05-31    134<L>  |  97<L>  |  26<H>  ----------------------------<  95  4.0   |  24  |  0.90    Ca    10.3      31 May 2022 06:40  Phos  2.7     05-31  Mg     1.60     05-31    TPro  5.7<L>  /  Alb  2.5<L>  /  TBili  0.3  /  DBili  x   /  AST  19  /  ALT  <5<L>  /  AlkPhos  65  05-31              RADIOLOGY & ADDITIONAL TESTS:    Imaging Personally Reviewed:    Consultant(s) Notes Reviewed:      Care Discussed with Consultants/Other Providers:   DATE OF SERVICE: 06-01-22 @ 06:54    Patient is a 77y old  Female who presents with a chief complaint of abdominal pain (31 May 2022 07:39)      SUBJECTIVE / OVERNIGHT EVENTS: NAEO. Patient afebrile o/n. Patient feels the same today, says she had 4/10 abdominal pain this morning, improved after BM. Patient had 400cc on bladder scan this morning straight cath x 1, undergoing TOV. Patient denies CP, SOB, fevers/chills, N/V/D.      MEDICATIONS  (STANDING):  apixaban 10 milliGRAM(s) Oral two times a day  bisacodyl 5 milliGRAM(s) Oral at bedtime  DULoxetine 60 milliGRAM(s) Oral daily  famotidine    Tablet 20 milliGRAM(s) Oral two times a day  fenofibrate Tablet 145 milliGRAM(s) Oral daily  fluticasone propionate 50 MICROgram(s)/spray Nasal Spray 1 Spray(s) Both Nostrils two times a day  gabapentin 300 milliGRAM(s) Oral two times a day  levothyroxine 125 MICROGram(s) Oral daily  meclizine 12.5 milliGRAM(s) Oral two times a day  piperacillin/tazobactam IVPB.. 3.375 Gram(s) IV Intermittent every 8 hours  polyethylene glycol 3350 17 Gram(s) Oral two times a day  rOPINIRole 5 milliGRAM(s) Oral at bedtime  senna 2 Tablet(s) Oral at bedtime  sodium chloride 0.9%. 1000 milliLiter(s) (75 mL/Hr) IV Continuous <Continuous>  spironolactone 25 milliGRAM(s) Oral daily    MEDICATIONS  (PRN):  acetaminophen     Tablet .. 650 milliGRAM(s) Oral every 6 hours PRN Temp greater or equal to 38C (100.4F), Mild Pain (1 - 3)  oxyCODONE    IR 5 milliGRAM(s) Oral every 6 hours PRN Severe Pain (7 - 10)  sodium chloride 0.65% Nasal 1 Spray(s) Both Nostrils two times a day PRN Nasal Congestion      Vital Signs Last 24 Hrs  T(C): 36.6 (01 Jun 2022 05:48), Max: 36.9 (31 May 2022 21:42)  T(F): 97.9 (01 Jun 2022 05:48), Max: 98.4 (31 May 2022 21:42)  HR: 78 (01 Jun 2022 05:48) (78 - 79)  BP: 135/68 (01 Jun 2022 05:48) (105/60 - 139/63)  BP(mean): --  RR: 16 (01 Jun 2022 05:48) (16 - 18)  SpO2: 99% (01 Jun 2022 05:48) (95% - 100%)  CAPILLARY BLOOD GLUCOSE        I&O's Summary    30 May 2022 07:01  -  31 May 2022 07:00  --------------------------------------------------------  IN: 0 mL / OUT: 1500 mL / NET: -1500 mL    31 May 2022 07:01  -  01 Jun 2022 06:54  --------------------------------------------------------  IN: 0 mL / OUT: 1400 mL / NET: -1400 mL        PHYSICAL EXAM:  GENERAL APPEARANCE: Well developed, anasarcic, obese habitus  HEENT:  PERRL, EOMI. hearing grossly intact.  NECK: Neck supple, non-tender no lymphadenopathy, masses or thyromegaly.  CARDIAC: Normal S1 and S2. no mrg. RRR  LUNGS: Clear to auscultation B/L, no rales, rhonchi, or wheezing  ABDOMEN: Soft , Mildly tender throughout, bowel sounds normal. No guarding or rebound.   MUSCULOSKELETAL: ROM intact.  No joint erythema or tenderness.   EXTREMITIES: 4+ bipedal edema to thighs bilaterally    NEUROLOGICAL: Confused at times but answering questions appropriately   SKIN: Warm and dry , Well perfused  PSYCHIATRIC: AOx2-3 , Normal mood and affect    LABS:                        9.9    20.31 )-----------( 306      ( 31 May 2022 06:40 )             31.9     05-31    134<L>  |  97<L>  |  26<H>  ----------------------------<  95  4.0   |  24  |  0.90    Ca    10.3      31 May 2022 06:40  Phos  2.7     05-31  Mg     1.60     05-31    TPro  5.7<L>  /  Alb  2.5<L>  /  TBili  0.3  /  DBili  x   /  AST  19  /  ALT  <5<L>  /  AlkPhos  65  05-31              RADIOLOGY & ADDITIONAL TESTS:    Imaging Personally Reviewed:    Consultant(s) Notes Reviewed:      Care Discussed with Consultants/Other Providers:

## 2022-06-01 NOTE — PROGRESS NOTE ADULT - ASSESSMENT
77F PMHx w/ SVT s/p ablation, HTN, HLD, hypothyroidism, vertigo, peripheral neuropathy, osteoarthritis, cervical ca, recent admission for pubic rami fracture and MSSA bacteremia Tx w/ Cefazolin to end 5/29, presented to ED from rehab on 5/29 w/ 3 wks of worsening abdominal pain and 4-5 days of obstipation, s/p disimpaction in ED, UA c/f UTI, on Zosyn, found to be hypercalcemic to 12.4 (13.1 corrected), Ca improving

## 2022-06-01 NOTE — PROGRESS NOTE ADULT - PROBLEM SELECTOR PLAN 7
Hydronephrosis noted on imaging  Likely obstructive i/s/o large stool burden  Monitor BMP  S/p Block  Undergoing TOV  Strict Is/Os Patient with pelvic fractures found on previous admission  Was getting Tramadol and Oxy at rehab  Presenting from rehab  Pain control with IV tylenol, Oxy 5 q6prn- caution with opioids given constipation  F/u PT recs

## 2022-06-01 NOTE — PROGRESS NOTE ADULT - PROBLEM SELECTOR PLAN 8
C/w spironolactone for HTN and edema, lasix IV prn for edema/hypercalcemia Hydronephrosis noted on imaging  Likely obstructive i/s/o large stool burden  Monitor BMP  S/p Block  Undergoing TOV  Strict Is/Os

## 2022-06-01 NOTE — PROGRESS NOTE ADULT - PROBLEM SELECTOR PLAN 2
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 less likely hypercalcemia of malignancy  Patient had been getting CaCO3 TID with meals at rehab, will hold  Ca improving, corrected Ca 11.9 this AM  Ionized calcium 1.38  PTH low at 4  Vitamin D 35.4  S/p Lasix 40mg IV x 1  S/p NS @ 200cc/hr x 5 hrs  S/p Lasix 40mg w/ LR @75  C/w IV Lasix prn  Patient on risedronate weekly at home/rehab, will try calcitonin +/- Pamidronate IV x 1 if Ca remains elevated  Trend CMP 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

## 2022-06-02 LAB
ALBUMIN SERPL ELPH-MCNC: 2.6 G/DL — LOW (ref 3.3–5)
ALP SERPL-CCNC: 55 U/L — SIGNIFICANT CHANGE UP (ref 40–120)
ALT FLD-CCNC: <5 U/L — LOW (ref 4–33)
ANION GAP SERPL CALC-SCNC: 11 MMOL/L — SIGNIFICANT CHANGE UP (ref 7–14)
AST SERPL-CCNC: 17 U/L — SIGNIFICANT CHANGE UP (ref 4–32)
BASOPHILS # BLD AUTO: 0.03 K/UL — SIGNIFICANT CHANGE UP (ref 0–0.2)
BASOPHILS NFR BLD AUTO: 0.3 % — SIGNIFICANT CHANGE UP (ref 0–2)
BILIRUB SERPL-MCNC: 0.2 MG/DL — SIGNIFICANT CHANGE UP (ref 0.2–1.2)
BUN SERPL-MCNC: 19 MG/DL — SIGNIFICANT CHANGE UP (ref 7–23)
CALCIUM SERPL-MCNC: 8.9 MG/DL — SIGNIFICANT CHANGE UP (ref 8.4–10.5)
CHLORIDE SERPL-SCNC: 96 MMOL/L — LOW (ref 98–107)
CO2 SERPL-SCNC: 28 MMOL/L — SIGNIFICANT CHANGE UP (ref 22–31)
CREAT SERPL-MCNC: 0.92 MG/DL — SIGNIFICANT CHANGE UP (ref 0.5–1.3)
EGFR: 64 ML/MIN/1.73M2 — SIGNIFICANT CHANGE UP
EOSINOPHIL # BLD AUTO: 0.01 K/UL — SIGNIFICANT CHANGE UP (ref 0–0.5)
EOSINOPHIL NFR BLD AUTO: 0.1 % — SIGNIFICANT CHANGE UP (ref 0–6)
GLUCOSE SERPL-MCNC: 85 MG/DL — SIGNIFICANT CHANGE UP (ref 70–99)
HCT VFR BLD CALC: 31 % — LOW (ref 34.5–45)
HGB BLD-MCNC: 9.5 G/DL — LOW (ref 11.5–15.5)
IANC: 5.72 K/UL — SIGNIFICANT CHANGE UP (ref 1.8–7.4)
IMM GRANULOCYTES NFR BLD AUTO: 1.5 % — SIGNIFICANT CHANGE UP (ref 0–1.5)
LYMPHOCYTES # BLD AUTO: 2.93 K/UL — SIGNIFICANT CHANGE UP (ref 1–3.3)
LYMPHOCYTES # BLD AUTO: 30.6 % — SIGNIFICANT CHANGE UP (ref 13–44)
MAGNESIUM SERPL-MCNC: 1.6 MG/DL — SIGNIFICANT CHANGE UP (ref 1.6–2.6)
MCHC RBC-ENTMCNC: 26.5 PG — LOW (ref 27–34)
MCHC RBC-ENTMCNC: 30.6 GM/DL — LOW (ref 32–36)
MCV RBC AUTO: 86.4 FL — SIGNIFICANT CHANGE UP (ref 80–100)
MONOCYTES # BLD AUTO: 0.73 K/UL — SIGNIFICANT CHANGE UP (ref 0–0.9)
MONOCYTES NFR BLD AUTO: 7.6 % — SIGNIFICANT CHANGE UP (ref 2–14)
NEUTROPHILS # BLD AUTO: 5.72 K/UL — SIGNIFICANT CHANGE UP (ref 1.8–7.4)
NEUTROPHILS NFR BLD AUTO: 59.9 % — SIGNIFICANT CHANGE UP (ref 43–77)
NRBC # BLD: 0 /100 WBCS — SIGNIFICANT CHANGE UP
NRBC # FLD: 0 K/UL — SIGNIFICANT CHANGE UP
PHOSPHATE SERPL-MCNC: 2.3 MG/DL — LOW (ref 2.5–4.5)
PLATELET # BLD AUTO: 298 K/UL — SIGNIFICANT CHANGE UP (ref 150–400)
POTASSIUM SERPL-MCNC: 3.9 MMOL/L — SIGNIFICANT CHANGE UP (ref 3.5–5.3)
POTASSIUM SERPL-SCNC: 3.9 MMOL/L — SIGNIFICANT CHANGE UP (ref 3.5–5.3)
PROT SERPL-MCNC: 5.6 G/DL — LOW (ref 6–8.3)
RBC # BLD: 3.59 M/UL — LOW (ref 3.8–5.2)
RBC # FLD: 15.9 % — HIGH (ref 10.3–14.5)
SODIUM SERPL-SCNC: 135 MMOL/L — SIGNIFICANT CHANGE UP (ref 135–145)
WBC # BLD: 9.56 K/UL — SIGNIFICANT CHANGE UP (ref 3.8–10.5)
WBC # FLD AUTO: 9.56 K/UL — SIGNIFICANT CHANGE UP (ref 3.8–10.5)

## 2022-06-02 PROCEDURE — 74018 RADEX ABDOMEN 1 VIEW: CPT | Mod: 26

## 2022-06-02 PROCEDURE — 99233 SBSQ HOSP IP/OBS HIGH 50: CPT | Mod: GC

## 2022-06-02 RX ORDER — SODIUM,POTASSIUM PHOSPHATES 278-250MG
1 POWDER IN PACKET (EA) ORAL
Refills: 0 | Status: COMPLETED | OUTPATIENT
Start: 2022-06-02 | End: 2022-06-03

## 2022-06-02 RX ADMIN — FAMOTIDINE 20 MILLIGRAM(S): 10 INJECTION INTRAVENOUS at 18:11

## 2022-06-02 RX ADMIN — Medication 5 MILLIGRAM(S): at 22:09

## 2022-06-02 RX ADMIN — Medication 145 MILLIGRAM(S): at 13:32

## 2022-06-02 RX ADMIN — PIPERACILLIN AND TAZOBACTAM 25 GRAM(S): 4; .5 INJECTION, POWDER, LYOPHILIZED, FOR SOLUTION INTRAVENOUS at 13:30

## 2022-06-02 RX ADMIN — FAMOTIDINE 20 MILLIGRAM(S): 10 INJECTION INTRAVENOUS at 06:00

## 2022-06-02 RX ADMIN — Medication 1 SPRAY(S): at 06:05

## 2022-06-02 RX ADMIN — Medication 1 PACKET(S): at 18:09

## 2022-06-02 RX ADMIN — Medication 40 MILLIGRAM(S): at 13:32

## 2022-06-02 RX ADMIN — Medication 1 SPRAY(S): at 18:11

## 2022-06-02 RX ADMIN — APIXABAN 10 MILLIGRAM(S): 2.5 TABLET, FILM COATED ORAL at 18:10

## 2022-06-02 RX ADMIN — Medication 12.5 MILLIGRAM(S): at 06:01

## 2022-06-02 RX ADMIN — GABAPENTIN 300 MILLIGRAM(S): 400 CAPSULE ORAL at 18:10

## 2022-06-02 RX ADMIN — OXYCODONE HYDROCHLORIDE 5 MILLIGRAM(S): 5 TABLET ORAL at 04:15

## 2022-06-02 RX ADMIN — PIPERACILLIN AND TAZOBACTAM 25 GRAM(S): 4; .5 INJECTION, POWDER, LYOPHILIZED, FOR SOLUTION INTRAVENOUS at 05:57

## 2022-06-02 RX ADMIN — Medication 12.5 MILLIGRAM(S): at 18:11

## 2022-06-02 RX ADMIN — SENNA PLUS 2 TABLET(S): 8.6 TABLET ORAL at 22:07

## 2022-06-02 RX ADMIN — APIXABAN 10 MILLIGRAM(S): 2.5 TABLET, FILM COATED ORAL at 06:00

## 2022-06-02 RX ADMIN — POLYETHYLENE GLYCOL 3350 17 GRAM(S): 17 POWDER, FOR SOLUTION ORAL at 18:10

## 2022-06-02 RX ADMIN — PIPERACILLIN AND TAZOBACTAM 25 GRAM(S): 4; .5 INJECTION, POWDER, LYOPHILIZED, FOR SOLUTION INTRAVENOUS at 22:06

## 2022-06-02 RX ADMIN — OXYCODONE HYDROCHLORIDE 5 MILLIGRAM(S): 5 TABLET ORAL at 23:00

## 2022-06-02 RX ADMIN — GABAPENTIN 300 MILLIGRAM(S): 400 CAPSULE ORAL at 06:00

## 2022-06-02 RX ADMIN — SPIRONOLACTONE 25 MILLIGRAM(S): 25 TABLET, FILM COATED ORAL at 06:00

## 2022-06-02 RX ADMIN — Medication 125 MICROGRAM(S): at 06:00

## 2022-06-02 RX ADMIN — POLYETHYLENE GLYCOL 3350 17 GRAM(S): 17 POWDER, FOR SOLUTION ORAL at 06:01

## 2022-06-02 RX ADMIN — OXYCODONE HYDROCHLORIDE 5 MILLIGRAM(S): 5 TABLET ORAL at 05:10

## 2022-06-02 RX ADMIN — ROPINIROLE 5 MILLIGRAM(S): 8 TABLET, FILM COATED, EXTENDED RELEASE ORAL at 22:10

## 2022-06-02 RX ADMIN — DULOXETINE HYDROCHLORIDE 60 MILLIGRAM(S): 30 CAPSULE, DELAYED RELEASE ORAL at 13:31

## 2022-06-02 NOTE — PROGRESS NOTE ADULT - PROBLEM SELECTOR PLAN 7
Patient with pelvic fractures found on previous admission  Was getting Tramadol and Oxy at rehab  Presenting from rehab  Pain control with IV tylenol, Oxy 5 q6prn- caution with opioids given constipation  F/u PT recs

## 2022-06-02 NOTE — PROGRESS NOTE ADULT - PROBLEM SELECTOR PLAN 8
Hydronephrosis noted on imaging  Likely obstructive i/s/o large stool burden  Monitor BMP  S/p Block  Undergoing TOV  Strict Is/Os

## 2022-06-02 NOTE — PROGRESS NOTE ADULT - PROBLEM SELECTOR PLAN 2
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

## 2022-06-02 NOTE — PROGRESS NOTE ADULT - PROBLEM SELECTOR PLAN 6
Patient had been on Cefazolin for MSSA bacteremia to end on 5/29  Found 2/2 being febrile on last admission, unclear source  SHAUN on 05/03 showed no vegetations  On Zosyn for UTI 5/29-  BCx showed NGTD

## 2022-06-02 NOTE — PROGRESS NOTE ADULT - PROBLEM SELECTOR PLAN 4
UA on admission with Large leuk est, positive nitrite, >50 WBCs, Many bacteria   CT AP with findings c/s cystitis  Likely 2/2 urostasis i/s/o constipation  Patient had been on Cefazolin for MSSA bacteremia to end on 5/29  UCx showed >3 organisms likely contamination  BCx showed NGTD  On Zosyn 5/29-  Plan for 5 day course to end 6/2

## 2022-06-02 NOTE — PROGRESS NOTE ADULT - PROBLEM SELECTOR PLAN 5
Patient presented with leukocytosis to 16 worsened to 29 on 5/30, likely i/s/o UTI with possible reactive component from constipation, pelvic fractures  UCx >3 organisms likely contaiminated  F/u BCx  F/u VA duplex b/l LE to r/o DVT   Trend CBC w/ diff

## 2022-06-02 NOTE — PROGRESS NOTE ADULT - SUBJECTIVE AND OBJECTIVE BOX
DATE OF SERVICE: 06-02-22 @ 09:01    Patient is a 77y old  Female who presents with a chief complaint of abdominal pain (01 Jun 2022 06:54)      SUBJECTIVE / OVERNIGHT EVENTS: NAEO. Patient afebrile o/n. Patient says she is still having abdominal pain 5-6/10. Says she does not feel ready to go to rehab and was sent too early from the hospital. 1 BM o/n. Denies CP, SOB, fevers/chills, N/V/D.     MEDICATIONS  (STANDING):  apixaban 10 milliGRAM(s) Oral two times a day  bisacodyl 5 milliGRAM(s) Oral at bedtime  DULoxetine 60 milliGRAM(s) Oral daily  famotidine    Tablet 20 milliGRAM(s) Oral two times a day  fenofibrate Tablet 145 milliGRAM(s) Oral daily  fluticasone propionate 50 MICROgram(s)/spray Nasal Spray 1 Spray(s) Both Nostrils two times a day  furosemide    Tablet 40 milliGRAM(s) Oral every 24 hours  gabapentin 300 milliGRAM(s) Oral two times a day  levothyroxine 125 MICROGram(s) Oral daily  meclizine 12.5 milliGRAM(s) Oral two times a day  piperacillin/tazobactam IVPB.. 3.375 Gram(s) IV Intermittent every 8 hours  polyethylene glycol 3350 17 Gram(s) Oral two times a day  potassium phosphate / sodium phosphate Powder (PHOS-NaK) 1 Packet(s) Oral two times a day  rOPINIRole 5 milliGRAM(s) Oral at bedtime  senna 2 Tablet(s) Oral at bedtime  sodium chloride 0.9%. 1000 milliLiter(s) (75 mL/Hr) IV Continuous <Continuous>  spironolactone 25 milliGRAM(s) Oral daily    MEDICATIONS  (PRN):  acetaminophen     Tablet .. 650 milliGRAM(s) Oral every 6 hours PRN Temp greater or equal to 38C (100.4F), Mild Pain (1 - 3)  oxyCODONE    IR 5 milliGRAM(s) Oral every 6 hours PRN Severe Pain (7 - 10)  sodium chloride 0.65% Nasal 1 Spray(s) Both Nostrils two times a day PRN Nasal Congestion      Vital Signs Last 24 Hrs  T(C): 37.1 (02 Jun 2022 05:41), Max: 37.1 (02 Jun 2022 05:41)  T(F): 98.7 (02 Jun 2022 05:41), Max: 98.7 (02 Jun 2022 05:41)  HR: 77 (02 Jun 2022 05:41) (77 - 88)  BP: 122/64 (02 Jun 2022 05:41) (122/60 - 139/64)  BP(mean): --  RR: 18 (02 Jun 2022 05:41) (17 - 22)  SpO2: 99% (02 Jun 2022 05:41) (93% - 100%)  CAPILLARY BLOOD GLUCOSE        I&O's Summary    01 Jun 2022 07:01  -  02 Jun 2022 07:00  --------------------------------------------------------  IN: 500 mL / OUT: 1900 mL / NET: -1400 mL        PHYSICAL EXAM:  GENERAL APPEARANCE: Well developed, anasarcic, obese habitus  HEENT:  PERRL, EOMI. hearing grossly intact.  NECK: Neck supple, non-tender no lymphadenopathy, masses or thyromegaly.  CARDIAC: Normal S1 and S2. no mrg. RRR  LUNGS: Clear to auscultation B/L, no rales, rhonchi, or wheezing  ABDOMEN: Soft , Mildly tender throughout, bowel sounds normal. No guarding or rebound.   MUSCULOSKELETAL: ROM intact.  No joint erythema or tenderness.   EXTREMITIES: 4+ bipedal edema to thighs bilaterally    NEUROLOGICAL: Confused at times but answering questions appropriately   SKIN: Warm and dry , Well perfused  PSYCHIATRIC: AOx2-3 , Normal mood and affect    LABS:                        9.5    9.56  )-----------( 298      ( 02 Jun 2022 05:50 )             31.0     06-02    135  |  96<L>  |  19  ----------------------------<  85  3.9   |  28  |  0.92    Ca    8.9      02 Jun 2022 05:50  Phos  2.3     06-02  Mg     1.60     06-02    TPro  5.6<L>  /  Alb  2.6<L>  /  TBili  0.2  /  DBili  x   /  AST  17  /  ALT  <5<L>  /  AlkPhos  55  06-02              RADIOLOGY & ADDITIONAL TESTS:    Imaging Personally Reviewed:    Consultant(s) Notes Reviewed:      Care Discussed with Consultants/Other Providers:

## 2022-06-02 NOTE — PROGRESS NOTE ADULT - PROBLEM SELECTOR PLAN 3
VA duplex LLE showed non-occlusive thrombus proximal femoral vein, External iliac vein- Notified by phone on 5/31  Pending official report  Started on Eliquis 10mg BID x 7 days 5/31-

## 2022-06-03 LAB
ALBUMIN SERPL ELPH-MCNC: 2.6 G/DL — LOW (ref 3.3–5)
ALP SERPL-CCNC: 53 U/L — SIGNIFICANT CHANGE UP (ref 40–120)
ALT FLD-CCNC: <5 U/L — LOW (ref 4–33)
ANION GAP SERPL CALC-SCNC: 10 MMOL/L — SIGNIFICANT CHANGE UP (ref 7–14)
AST SERPL-CCNC: 16 U/L — SIGNIFICANT CHANGE UP (ref 4–32)
BASOPHILS # BLD AUTO: 0.05 K/UL — SIGNIFICANT CHANGE UP (ref 0–0.2)
BASOPHILS NFR BLD AUTO: 0.5 % — SIGNIFICANT CHANGE UP (ref 0–2)
BILIRUB SERPL-MCNC: 0.3 MG/DL — SIGNIFICANT CHANGE UP (ref 0.2–1.2)
BUN SERPL-MCNC: 13 MG/DL — SIGNIFICANT CHANGE UP (ref 7–23)
CALCIUM SERPL-MCNC: 8.7 MG/DL — SIGNIFICANT CHANGE UP (ref 8.4–10.5)
CHLORIDE SERPL-SCNC: 94 MMOL/L — LOW (ref 98–107)
CO2 SERPL-SCNC: 26 MMOL/L — SIGNIFICANT CHANGE UP (ref 22–31)
CREAT SERPL-MCNC: 0.81 MG/DL — SIGNIFICANT CHANGE UP (ref 0.5–1.3)
EGFR: 75 ML/MIN/1.73M2 — SIGNIFICANT CHANGE UP
EOSINOPHIL # BLD AUTO: 0 K/UL — SIGNIFICANT CHANGE UP (ref 0–0.5)
EOSINOPHIL NFR BLD AUTO: 0 % — SIGNIFICANT CHANGE UP (ref 0–6)
GLUCOSE SERPL-MCNC: 116 MG/DL — HIGH (ref 70–99)
HCT VFR BLD CALC: 33.8 % — LOW (ref 34.5–45)
HGB BLD-MCNC: 10.3 G/DL — LOW (ref 11.5–15.5)
IANC: 5.35 K/UL — SIGNIFICANT CHANGE UP (ref 1.8–7.4)
IMM GRANULOCYTES NFR BLD AUTO: 1.8 % — HIGH (ref 0–1.5)
LYMPHOCYTES # BLD AUTO: 38.9 % — SIGNIFICANT CHANGE UP (ref 13–44)
LYMPHOCYTES # BLD AUTO: 4.05 K/UL — HIGH (ref 1–3.3)
MAGNESIUM SERPL-MCNC: 1.4 MG/DL — LOW (ref 1.6–2.6)
MCHC RBC-ENTMCNC: 26.5 PG — LOW (ref 27–34)
MCHC RBC-ENTMCNC: 30.5 GM/DL — LOW (ref 32–36)
MCV RBC AUTO: 87.1 FL — SIGNIFICANT CHANGE UP (ref 80–100)
MONOCYTES # BLD AUTO: 0.77 K/UL — SIGNIFICANT CHANGE UP (ref 0–0.9)
MONOCYTES NFR BLD AUTO: 7.4 % — SIGNIFICANT CHANGE UP (ref 2–14)
NEUTROPHILS # BLD AUTO: 5.35 K/UL — SIGNIFICANT CHANGE UP (ref 1.8–7.4)
NEUTROPHILS NFR BLD AUTO: 51.4 % — SIGNIFICANT CHANGE UP (ref 43–77)
NRBC # BLD: 0 /100 WBCS — SIGNIFICANT CHANGE UP
NRBC # FLD: 0 K/UL — SIGNIFICANT CHANGE UP
PHOSPHATE SERPL-MCNC: 2.2 MG/DL — LOW (ref 2.5–4.5)
PLATELET # BLD AUTO: 278 K/UL — SIGNIFICANT CHANGE UP (ref 150–400)
POTASSIUM SERPL-MCNC: 4 MMOL/L — SIGNIFICANT CHANGE UP (ref 3.5–5.3)
POTASSIUM SERPL-SCNC: 4 MMOL/L — SIGNIFICANT CHANGE UP (ref 3.5–5.3)
PROT SERPL-MCNC: 5.7 G/DL — LOW (ref 6–8.3)
RBC # BLD: 3.88 M/UL — SIGNIFICANT CHANGE UP (ref 3.8–5.2)
RBC # FLD: 16 % — HIGH (ref 10.3–14.5)
SARS-COV-2 RNA SPEC QL NAA+PROBE: SIGNIFICANT CHANGE UP
SODIUM SERPL-SCNC: 130 MMOL/L — LOW (ref 135–145)
WBC # BLD: 10.41 K/UL — SIGNIFICANT CHANGE UP (ref 3.8–10.5)
WBC # FLD AUTO: 10.41 K/UL — SIGNIFICANT CHANGE UP (ref 3.8–10.5)

## 2022-06-03 PROCEDURE — 99239 HOSP IP/OBS DSCHRG MGMT >30: CPT

## 2022-06-03 RX ORDER — SIMETHICONE 80 MG/1
80 TABLET, CHEWABLE ORAL ONCE
Refills: 0 | Status: COMPLETED | OUTPATIENT
Start: 2022-06-03 | End: 2022-06-03

## 2022-06-03 RX ORDER — MAGNESIUM SULFATE 500 MG/ML
2 VIAL (ML) INJECTION ONCE
Refills: 0 | Status: COMPLETED | OUTPATIENT
Start: 2022-06-03 | End: 2022-06-03

## 2022-06-03 RX ORDER — LANOLIN ALCOHOL/MO/W.PET/CERES
3 CREAM (GRAM) TOPICAL AT BEDTIME
Refills: 0 | Status: DISCONTINUED | OUTPATIENT
Start: 2022-06-03 | End: 2022-06-08

## 2022-06-03 RX ORDER — SODIUM,POTASSIUM PHOSPHATES 278-250MG
1 POWDER IN PACKET (EA) ORAL
Refills: 0 | Status: COMPLETED | OUTPATIENT
Start: 2022-06-03 | End: 2022-06-04

## 2022-06-03 RX ORDER — TRAMADOL HYDROCHLORIDE 50 MG/1
25 TABLET ORAL EVERY 6 HOURS
Refills: 0 | Status: DISCONTINUED | OUTPATIENT
Start: 2022-06-03 | End: 2022-06-05

## 2022-06-03 RX ORDER — LANOLIN ALCOHOL/MO/W.PET/CERES
3 CREAM (GRAM) TOPICAL ONCE
Refills: 0 | Status: COMPLETED | OUTPATIENT
Start: 2022-06-03 | End: 2022-06-03

## 2022-06-03 RX ORDER — MAGNESIUM OXIDE 400 MG ORAL TABLET 241.3 MG
400 TABLET ORAL ONCE
Refills: 0 | Status: COMPLETED | OUTPATIENT
Start: 2022-06-03 | End: 2022-06-03

## 2022-06-03 RX ADMIN — Medication 5 MILLIGRAM(S): at 21:04

## 2022-06-03 RX ADMIN — TRAMADOL HYDROCHLORIDE 25 MILLIGRAM(S): 50 TABLET ORAL at 18:16

## 2022-06-03 RX ADMIN — Medication 145 MILLIGRAM(S): at 11:27

## 2022-06-03 RX ADMIN — OXYCODONE HYDROCHLORIDE 5 MILLIGRAM(S): 5 TABLET ORAL at 05:54

## 2022-06-03 RX ADMIN — Medication 3 MILLIGRAM(S): at 01:44

## 2022-06-03 RX ADMIN — OXYCODONE HYDROCHLORIDE 5 MILLIGRAM(S): 5 TABLET ORAL at 06:54

## 2022-06-03 RX ADMIN — SENNA PLUS 2 TABLET(S): 8.6 TABLET ORAL at 21:04

## 2022-06-03 RX ADMIN — Medication 1 SPRAY(S): at 05:43

## 2022-06-03 RX ADMIN — APIXABAN 10 MILLIGRAM(S): 2.5 TABLET, FILM COATED ORAL at 05:44

## 2022-06-03 RX ADMIN — FAMOTIDINE 20 MILLIGRAM(S): 10 INJECTION INTRAVENOUS at 17:16

## 2022-06-03 RX ADMIN — Medication 1 SPRAY(S): at 17:17

## 2022-06-03 RX ADMIN — OXYCODONE HYDROCHLORIDE 5 MILLIGRAM(S): 5 TABLET ORAL at 00:00

## 2022-06-03 RX ADMIN — Medication 1 PACKET(S): at 05:44

## 2022-06-03 RX ADMIN — Medication 40 MILLIGRAM(S): at 12:49

## 2022-06-03 RX ADMIN — APIXABAN 10 MILLIGRAM(S): 2.5 TABLET, FILM COATED ORAL at 17:16

## 2022-06-03 RX ADMIN — Medication 12.5 MILLIGRAM(S): at 17:16

## 2022-06-03 RX ADMIN — ROPINIROLE 5 MILLIGRAM(S): 8 TABLET, FILM COATED, EXTENDED RELEASE ORAL at 21:03

## 2022-06-03 RX ADMIN — DULOXETINE HYDROCHLORIDE 60 MILLIGRAM(S): 30 CAPSULE, DELAYED RELEASE ORAL at 11:27

## 2022-06-03 RX ADMIN — GABAPENTIN 300 MILLIGRAM(S): 400 CAPSULE ORAL at 05:43

## 2022-06-03 RX ADMIN — FAMOTIDINE 20 MILLIGRAM(S): 10 INJECTION INTRAVENOUS at 05:43

## 2022-06-03 RX ADMIN — TRAMADOL HYDROCHLORIDE 25 MILLIGRAM(S): 50 TABLET ORAL at 17:16

## 2022-06-03 RX ADMIN — Medication 1 PACKET(S): at 17:17

## 2022-06-03 RX ADMIN — Medication 125 MICROGRAM(S): at 05:43

## 2022-06-03 RX ADMIN — GABAPENTIN 300 MILLIGRAM(S): 400 CAPSULE ORAL at 17:16

## 2022-06-03 RX ADMIN — Medication 3 MILLIGRAM(S): at 21:06

## 2022-06-03 RX ADMIN — SIMETHICONE 80 MILLIGRAM(S): 80 TABLET, CHEWABLE ORAL at 12:49

## 2022-06-03 RX ADMIN — Medication 12.5 MILLIGRAM(S): at 05:43

## 2022-06-03 RX ADMIN — MAGNESIUM OXIDE 400 MG ORAL TABLET 400 MILLIGRAM(S): 241.3 TABLET ORAL at 11:25

## 2022-06-03 RX ADMIN — SPIRONOLACTONE 25 MILLIGRAM(S): 25 TABLET, FILM COATED ORAL at 05:43

## 2022-06-03 NOTE — PROGRESS NOTE ADULT - PROBLEM SELECTOR PLAN 1
Patient with 4-5 days of constipation, had been getting Tramadol/Oxy for pain control in rehab, S/p disimpaction in ED  IMPROVING  CT showed large amount of stool in the distal colon w/ mild edema c/f stercoral colitis  Constipation likely multifactorial in setting of hypercalcemia, opioid induced and limited mobility   Patient with limited mobility 2/2 pelvic fracture, had limited ambulation with cane at rehab  Treat hypercalcemia as below   Bowel Regimen- Miralax, senna, dulcolax  S/p Movantic 5/29  Stool count Patient with 4-5 days of constipation, had been getting Tramadol/Oxy for pain control in rehab, S/p disimpaction in ED  IMPROVING  CT showed large amount of stool in the distal colon w/ mild edema c/f stercoral colitis  Constipation likely multifactorial in setting of hypercalcemia, opioid induced and limited mobility   Patient with limited mobility 2/2 pelvic fracture, had limited ambulation with cane at rehab  Treat hypercalcemia as below   Bowel Regimen- senna, dulcolax  S/p Movantic 5/29  Stool count

## 2022-06-03 NOTE — PROGRESS NOTE ADULT - PROBLEM SELECTOR PLAN 3
VA duplex LLE showed non-occlusive thrombus proximal femoral vein, External iliac vein  Started on Eliquis 10mg BID x 7 days 5/31-67, then transition to eliquis 5 BID

## 2022-06-03 NOTE — PROGRESS NOTE ADULT - PROBLEM SELECTOR PLAN 8
Hydronephrosis noted on imaging  Likely obstructive i/s/o large stool burden  Monitor BMP  S/p Block  Failed TOV, will reattempt TOV if patient remains inpatient   Strict Is/Os

## 2022-06-03 NOTE — PROGRESS NOTE ADULT - PROBLEM SELECTOR PLAN 6
Patient had been on Cefazolin for MSSA bacteremia to end on 5/29  Found 2/2 being febrile on last admission, unclear source  SHAUN on 05/03 showed no vegetations  S/p Zosyn 5/29-6/2  BCx 5/30 showed NGTD

## 2022-06-03 NOTE — PROGRESS NOTE ADULT - PROBLEM SELECTOR PLAN 7
Patient with pelvic fractures found on previous admission  Was getting Tramadol and Oxy at rehab  Presenting from rehab  Pain control with IV tylenol, Oxy 5 q6prn for severe pain- caution with opioids given constipation  F/u PT recs Patient with pelvic fractures found on previous admission  Was getting Tramadol and Oxy at rehab  Presenting from rehab  Switch to Tramadol 25mg q6prn for severe pain- caution with opioids given constipation  F/u PT recs

## 2022-06-03 NOTE — PROGRESS NOTE ADULT - PROBLEM SELECTOR PLAN 2
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

## 2022-06-03 NOTE — PROGRESS NOTE ADULT - PROBLEM SELECTOR PLAN 4
UA on admission with Large leuk est, positive nitrite, >50 WBCs, Many bacteria   CT AP with findings c/s cystitis  Likely 2/2 urostasis i/s/o constipation  Patient had been on Cefazolin for MSSA bacteremia to end on 5/29  UCx showed >3 organisms likely contamination  BCx showed NGTD  S/p Zosyn 5/29-6/2

## 2022-06-03 NOTE — PROGRESS NOTE ADULT - ASSESSMENT
77F PMHx w/ SVT s/p ablation, HTN, HLD, hypothyroidism, vertigo, peripheral neuropathy, osteoarthritis, cervical ca, recent admission for pubic rami fracture and MSSA bacteremia Tx w/ Cefazolin through 5/29, presented to ED from rehab on 5/29 w/ 3 wks of worsening abdominal pain and 4-5 days of obstipation, s/p disimpaction in ED, UA c/f UTI, on Zosyn, found to be hypercalcemic to 12.4 (13.1 corrected), Ca improved

## 2022-06-03 NOTE — PROGRESS NOTE ADULT - SUBJECTIVE AND OBJECTIVE BOX
DATE OF SERVICE: 06-03-22 @ 06:16    Patient is a 77y old  Female who presents with a chief complaint of abdominal pain (02 Jun 2022 09:00)      SUBJECTIVE / OVERNIGHT EVENTS: NAEO. Patient afebrile o/n. Patient feels    MEDICATIONS  (STANDING):  apixaban 10 milliGRAM(s) Oral two times a day  bisacodyl 5 milliGRAM(s) Oral at bedtime  DULoxetine 60 milliGRAM(s) Oral daily  famotidine    Tablet 20 milliGRAM(s) Oral two times a day  fenofibrate Tablet 145 milliGRAM(s) Oral daily  fluticasone propionate 50 MICROgram(s)/spray Nasal Spray 1 Spray(s) Both Nostrils two times a day  furosemide    Tablet 40 milliGRAM(s) Oral every 24 hours  gabapentin 300 milliGRAM(s) Oral two times a day  levothyroxine 125 MICROGram(s) Oral daily  meclizine 12.5 milliGRAM(s) Oral two times a day  melatonin 3 milliGRAM(s) Oral at bedtime  polyethylene glycol 3350 17 Gram(s) Oral two times a day  rOPINIRole 5 milliGRAM(s) Oral at bedtime  senna 2 Tablet(s) Oral at bedtime  sodium chloride 0.9%. 1000 milliLiter(s) (75 mL/Hr) IV Continuous <Continuous>  spironolactone 25 milliGRAM(s) Oral daily    MEDICATIONS  (PRN):  acetaminophen     Tablet .. 650 milliGRAM(s) Oral every 6 hours PRN Temp greater or equal to 38C (100.4F), Mild Pain (1 - 3)  oxyCODONE    IR 5 milliGRAM(s) Oral every 6 hours PRN Severe Pain (7 - 10)  sodium chloride 0.65% Nasal 1 Spray(s) Both Nostrils two times a day PRN Nasal Congestion      Vital Signs Last 24 Hrs  T(C): 36.8 (03 Jun 2022 05:17), Max: 37.2 (02 Jun 2022 14:10)  T(F): 98.2 (03 Jun 2022 05:17), Max: 98.9 (02 Jun 2022 14:10)  HR: 75 (03 Jun 2022 05:17) (75 - 88)  BP: 144/66 (03 Jun 2022 05:17) (144/66 - 152/67)  BP(mean): 9 (02 Jun 2022 21:45) (9 - 9)  RR: 18 (03 Jun 2022 05:17) (18 - 18)  SpO2: 100% (03 Jun 2022 05:17) (100% - 100%)  CAPILLARY BLOOD GLUCOSE        I&O's Summary    01 Jun 2022 07:01  -  02 Jun 2022 07:00  --------------------------------------------------------  IN: 500 mL / OUT: 1900 mL / NET: -1400 mL    02 Jun 2022 07:01  -  03 Jun 2022 06:16  --------------------------------------------------------  IN: 0 mL / OUT: 2900 mL / NET: -2900 mL        PHYSICAL EXAM:  GENERAL APPEARANCE: Well developed, anasarcic, obese habitus  HEENT:  PERRL, EOMI. hearing grossly intact.  NECK: Neck supple, non-tender no lymphadenopathy, masses or thyromegaly.  CARDIAC: Normal S1 and S2. no mrg. RRR  LUNGS: Clear to auscultation B/L, no rales, rhonchi, or wheezing  ABDOMEN: Soft , Mildly tender throughout, bowel sounds normal. No guarding or rebound.   MUSCULOSKELETAL: ROM intact.  No joint erythema or tenderness.   EXTREMITIES: 4+ bipedal edema to thighs bilaterally    NEUROLOGICAL: Confused at times but answering questions appropriately   SKIN: Warm and dry , Well perfused  PSYCHIATRIC: AOx2-3 , Normal mood and affect    LABS:                        9.5    9.56  )-----------( 298      ( 02 Jun 2022 05:50 )             31.0     06-02    135  |  96<L>  |  19  ----------------------------<  85  3.9   |  28  |  0.92    Ca    8.9      02 Jun 2022 05:50  Phos  2.3     06-02  Mg     1.60     06-02    TPro  5.6<L>  /  Alb  2.6<L>  /  TBili  0.2  /  DBili  x   /  AST  17  /  ALT  <5<L>  /  AlkPhos  55  06-02              RADIOLOGY & ADDITIONAL TESTS:    Imaging Personally Reviewed:    Consultant(s) Notes Reviewed:      Care Discussed with Consultants/Other Providers:   DATE OF SERVICE: 06-03-22 @ 06:16    Patient is a 77y old  Female who presents with a chief complaint of abdominal pain (02 Jun 2022 09:00)      SUBJECTIVE / OVERNIGHT EVENTS: NAEO. Patient afebrile o/n. Patient feels better this AM, says that abdominal pain is improved. Patient had BM o/n. "I want to move and do my exercises." Patient denies CP, SOB, fevers/chills, N/V/D    MEDICATIONS  (STANDING):  apixaban 10 milliGRAM(s) Oral two times a day  bisacodyl 5 milliGRAM(s) Oral at bedtime  DULoxetine 60 milliGRAM(s) Oral daily  famotidine    Tablet 20 milliGRAM(s) Oral two times a day  fenofibrate Tablet 145 milliGRAM(s) Oral daily  fluticasone propionate 50 MICROgram(s)/spray Nasal Spray 1 Spray(s) Both Nostrils two times a day  furosemide    Tablet 40 milliGRAM(s) Oral every 24 hours  gabapentin 300 milliGRAM(s) Oral two times a day  levothyroxine 125 MICROGram(s) Oral daily  meclizine 12.5 milliGRAM(s) Oral two times a day  melatonin 3 milliGRAM(s) Oral at bedtime  polyethylene glycol 3350 17 Gram(s) Oral two times a day  rOPINIRole 5 milliGRAM(s) Oral at bedtime  senna 2 Tablet(s) Oral at bedtime  sodium chloride 0.9%. 1000 milliLiter(s) (75 mL/Hr) IV Continuous <Continuous>  spironolactone 25 milliGRAM(s) Oral daily    MEDICATIONS  (PRN):  acetaminophen     Tablet .. 650 milliGRAM(s) Oral every 6 hours PRN Temp greater or equal to 38C (100.4F), Mild Pain (1 - 3)  oxyCODONE    IR 5 milliGRAM(s) Oral every 6 hours PRN Severe Pain (7 - 10)  sodium chloride 0.65% Nasal 1 Spray(s) Both Nostrils two times a day PRN Nasal Congestion      Vital Signs Last 24 Hrs  T(C): 36.8 (03 Jun 2022 05:17), Max: 37.2 (02 Jun 2022 14:10)  T(F): 98.2 (03 Jun 2022 05:17), Max: 98.9 (02 Jun 2022 14:10)  HR: 75 (03 Jun 2022 05:17) (75 - 88)  BP: 144/66 (03 Jun 2022 05:17) (144/66 - 152/67)  BP(mean): 9 (02 Jun 2022 21:45) (9 - 9)  RR: 18 (03 Jun 2022 05:17) (18 - 18)  SpO2: 100% (03 Jun 2022 05:17) (100% - 100%)  CAPILLARY BLOOD GLUCOSE        I&O's Summary    01 Jun 2022 07:01  -  02 Jun 2022 07:00  --------------------------------------------------------  IN: 500 mL / OUT: 1900 mL / NET: -1400 mL    02 Jun 2022 07:01  -  03 Jun 2022 06:16  --------------------------------------------------------  IN: 0 mL / OUT: 2900 mL / NET: -2900 mL        PHYSICAL EXAM:  GENERAL APPEARANCE: Well developed, anasarcic, obese habitus  HEENT:  PERRL, EOMI. hearing grossly intact.  NECK: Neck supple, non-tender no lymphadenopathy, masses or thyromegaly.  CARDIAC: Normal S1 and S2. no mrg. RRR  LUNGS: Clear to auscultation B/L, no rales, rhonchi, or wheezing  ABDOMEN: Soft , Mildly tender throughout, bowel sounds normal. No guarding or rebound.   MUSCULOSKELETAL: ROM intact.  No joint erythema or tenderness.   EXTREMITIES: 4+ bipedal edema to thighs bilaterally    NEUROLOGICAL: Confused at times but answering questions appropriately   SKIN: Warm and dry , Well perfused  PSYCHIATRIC: AOx2-3 , Normal mood and affect    LABS:                        9.5    9.56  )-----------( 298      ( 02 Jun 2022 05:50 )             31.0     06-02    135  |  96<L>  |  19  ----------------------------<  85  3.9   |  28  |  0.92    Ca    8.9      02 Jun 2022 05:50  Phos  2.3     06-02  Mg     1.60     06-02    TPro  5.6<L>  /  Alb  2.6<L>  /  TBili  0.2  /  DBili  x   /  AST  17  /  ALT  <5<L>  /  AlkPhos  55  06-02              RADIOLOGY & ADDITIONAL TESTS:    Imaging Personally Reviewed:    Consultant(s) Notes Reviewed:      Care Discussed with Consultants/Other Providers:

## 2022-06-03 NOTE — PROGRESS NOTE ADULT - PROBLEM SELECTOR PLAN 5
Patient presented with leukocytosis to 16 worsened to 29 on 5/30, likely i/s/o UTI with possible reactive component from constipation, pelvic fractures, DVT  IMPROVING  UCx >3 organisms likely contaiminated  BCx NGTD  DVT found on Duplex- nonocclusive thrombus proximal femoral vein, External iliac vein  Trend CBC w/ diff

## 2022-06-04 ENCOUNTER — TRANSCRIPTION ENCOUNTER (OUTPATIENT)
Age: 77
End: 2022-06-04

## 2022-06-04 LAB
ANION GAP SERPL CALC-SCNC: 10 MMOL/L — SIGNIFICANT CHANGE UP (ref 7–14)
BUN SERPL-MCNC: 11 MG/DL — SIGNIFICANT CHANGE UP (ref 7–23)
CALCIUM SERPL-MCNC: 8.6 MG/DL — SIGNIFICANT CHANGE UP (ref 8.4–10.5)
CHLORIDE SERPL-SCNC: 95 MMOL/L — LOW (ref 98–107)
CO2 SERPL-SCNC: 26 MMOL/L — SIGNIFICANT CHANGE UP (ref 22–31)
CREAT SERPL-MCNC: 0.66 MG/DL — SIGNIFICANT CHANGE UP (ref 0.5–1.3)
CULTURE RESULTS: SIGNIFICANT CHANGE UP
EGFR: 90 ML/MIN/1.73M2 — SIGNIFICANT CHANGE UP
GLUCOSE SERPL-MCNC: 109 MG/DL — HIGH (ref 70–99)
HCT VFR BLD CALC: 33.7 % — LOW (ref 34.5–45)
HGB BLD-MCNC: 10.5 G/DL — LOW (ref 11.5–15.5)
MAGNESIUM SERPL-MCNC: 1.5 MG/DL — LOW (ref 1.6–2.6)
MCHC RBC-ENTMCNC: 26.5 PG — LOW (ref 27–34)
MCHC RBC-ENTMCNC: 31.2 GM/DL — LOW (ref 32–36)
MCV RBC AUTO: 85.1 FL — SIGNIFICANT CHANGE UP (ref 80–100)
NRBC # BLD: 0 /100 WBCS — SIGNIFICANT CHANGE UP
NRBC # FLD: 0 K/UL — SIGNIFICANT CHANGE UP
PHOSPHATE SERPL-MCNC: 1.8 MG/DL — LOW (ref 2.5–4.5)
PLATELET # BLD AUTO: 322 K/UL — SIGNIFICANT CHANGE UP (ref 150–400)
POTASSIUM SERPL-MCNC: 3.9 MMOL/L — SIGNIFICANT CHANGE UP (ref 3.5–5.3)
POTASSIUM SERPL-SCNC: 3.9 MMOL/L — SIGNIFICANT CHANGE UP (ref 3.5–5.3)
RBC # BLD: 3.96 M/UL — SIGNIFICANT CHANGE UP (ref 3.8–5.2)
RBC # FLD: 16.1 % — HIGH (ref 10.3–14.5)
SODIUM SERPL-SCNC: 131 MMOL/L — LOW (ref 135–145)
SPECIMEN SOURCE: SIGNIFICANT CHANGE UP
WBC # BLD: 10.41 K/UL — SIGNIFICANT CHANGE UP (ref 3.8–10.5)
WBC # FLD AUTO: 10.41 K/UL — SIGNIFICANT CHANGE UP (ref 3.8–10.5)

## 2022-06-04 PROCEDURE — 99232 SBSQ HOSP IP/OBS MODERATE 35: CPT

## 2022-06-04 RX ORDER — FAMOTIDINE 10 MG/ML
20 INJECTION INTRAVENOUS DAILY
Refills: 0 | Status: DISCONTINUED | OUTPATIENT
Start: 2022-06-04 | End: 2022-06-04

## 2022-06-04 RX ORDER — SIMETHICONE 80 MG/1
80 TABLET, CHEWABLE ORAL ONCE
Refills: 0 | Status: COMPLETED | OUTPATIENT
Start: 2022-06-04 | End: 2022-06-04

## 2022-06-04 RX ORDER — SODIUM,POTASSIUM PHOSPHATES 278-250MG
1 POWDER IN PACKET (EA) ORAL ONCE
Refills: 0 | Status: COMPLETED | OUTPATIENT
Start: 2022-06-04 | End: 2022-06-04

## 2022-06-04 RX ADMIN — Medication 12.5 MILLIGRAM(S): at 05:05

## 2022-06-04 RX ADMIN — Medication 5 MILLIGRAM(S): at 21:06

## 2022-06-04 RX ADMIN — TRAMADOL HYDROCHLORIDE 25 MILLIGRAM(S): 50 TABLET ORAL at 07:42

## 2022-06-04 RX ADMIN — TRAMADOL HYDROCHLORIDE 25 MILLIGRAM(S): 50 TABLET ORAL at 15:00

## 2022-06-04 RX ADMIN — FAMOTIDINE 20 MILLIGRAM(S): 10 INJECTION INTRAVENOUS at 05:04

## 2022-06-04 RX ADMIN — Medication 125 MICROGRAM(S): at 05:06

## 2022-06-04 RX ADMIN — Medication 1 SPRAY(S): at 17:36

## 2022-06-04 RX ADMIN — TRAMADOL HYDROCHLORIDE 25 MILLIGRAM(S): 50 TABLET ORAL at 14:00

## 2022-06-04 RX ADMIN — TRAMADOL HYDROCHLORIDE 25 MILLIGRAM(S): 50 TABLET ORAL at 00:50

## 2022-06-04 RX ADMIN — TRAMADOL HYDROCHLORIDE 25 MILLIGRAM(S): 50 TABLET ORAL at 08:42

## 2022-06-04 RX ADMIN — Medication 1 PACKET(S): at 10:29

## 2022-06-04 RX ADMIN — DULOXETINE HYDROCHLORIDE 60 MILLIGRAM(S): 30 CAPSULE, DELAYED RELEASE ORAL at 12:08

## 2022-06-04 RX ADMIN — Medication 1 PACKET(S): at 05:08

## 2022-06-04 RX ADMIN — Medication 145 MILLIGRAM(S): at 12:08

## 2022-06-04 RX ADMIN — Medication 40 MILLIGRAM(S): at 12:08

## 2022-06-04 RX ADMIN — APIXABAN 10 MILLIGRAM(S): 2.5 TABLET, FILM COATED ORAL at 05:07

## 2022-06-04 RX ADMIN — GABAPENTIN 300 MILLIGRAM(S): 400 CAPSULE ORAL at 17:36

## 2022-06-04 RX ADMIN — GABAPENTIN 300 MILLIGRAM(S): 400 CAPSULE ORAL at 05:03

## 2022-06-04 RX ADMIN — SIMETHICONE 80 MILLIGRAM(S): 80 TABLET, CHEWABLE ORAL at 10:29

## 2022-06-04 RX ADMIN — TRAMADOL HYDROCHLORIDE 25 MILLIGRAM(S): 50 TABLET ORAL at 01:50

## 2022-06-04 RX ADMIN — Medication 3 MILLIGRAM(S): at 21:06

## 2022-06-04 RX ADMIN — Medication 1 SPRAY(S): at 05:02

## 2022-06-04 RX ADMIN — Medication 12.5 MILLIGRAM(S): at 17:36

## 2022-06-04 RX ADMIN — SPIRONOLACTONE 25 MILLIGRAM(S): 25 TABLET, FILM COATED ORAL at 05:08

## 2022-06-04 RX ADMIN — SENNA PLUS 2 TABLET(S): 8.6 TABLET ORAL at 21:06

## 2022-06-04 RX ADMIN — FAMOTIDINE 20 MILLIGRAM(S): 10 INJECTION INTRAVENOUS at 17:36

## 2022-06-04 RX ADMIN — ROPINIROLE 5 MILLIGRAM(S): 8 TABLET, FILM COATED, EXTENDED RELEASE ORAL at 21:05

## 2022-06-04 RX ADMIN — APIXABAN 10 MILLIGRAM(S): 2.5 TABLET, FILM COATED ORAL at 17:36

## 2022-06-04 NOTE — PROGRESS NOTE ADULT - PROBLEM SELECTOR PLAN 2
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

## 2022-06-04 NOTE — DISCHARGE NOTE PROVIDER - CARE PROVIDER_API CALL
Jose R Philip  [address]  [place] [address]  Phone: [phone]  Fax: (   )    -  Established Patient  Follow Up Time: 2 weeks

## 2022-06-04 NOTE — DISCHARGE NOTE PROVIDER - NSDCMRMEDTOKEN_GEN_ALL_CORE_FT
Actonel 35 mg oral tablet: 1 tab(s) orally once a week  bisacodyl 5 mg oral delayed release tablet: 1 tab(s) orally once a day (at bedtime)  calcium carbonate 500 mg (200 mg elemental calcium) oral tablet, chewable: 1 tab(s) orally 3 times a day (with meals)  ceFAZolin: 2 gram(s) intravenous every 8 hours  Cymbalta 60 mg oral delayed release capsule: 1 cap(s) orally once a day  famotidine 20 mg oral tablet: 1 tab(s) orally 2 times a day  fenofibrate 145 mg oral tablet: 1 tab(s) orally once a day  fluticasone 50 mcg/inh nasal spray: 1 spray(s) nasal 2 times a day  gabapentin 300 mg oral capsule: 1 cap(s) orally 2 times a day  Lasix 40 mg oral tablet: 1 tab(s) orally once a day, As Needed  levothyroxine 125 mcg (0.125 mg) oral tablet: 1 tab(s) orally once a day  meclizine 12.5 mg oral tablet: 1 tab(s) orally 2 times a day  oxyCODONE 5 mg oral tablet: 1 tab(s) orally every 6 hours, As needed, Severe Pain (7 - 10)  polyethylene glycol 3350 oral powder for reconstitution: 17 gram(s) orally 2 times a day  rOPINIRole 5 mg oral tablet: 1 tab(s) orally once a day (at bedtime)  senna oral tablet: 2 tab(s) orally once a day (at bedtime)  sodium chloride 0.65% nasal spray: nasal 2 times a day  spironolactone 25 mg oral tablet: 1 tab(s) orally once a day, As Needed  tiZANidine 4 mg oral tablet: orally 2 times a day  traMADol 100 mg/24 hours oral tablet, extended release: 1 tab(s) orally once a day   Actonel 35 mg oral tablet: 1 tab(s) orally once a week  apixaban 5 mg oral tablet: 1 tab(s) orally every 12 hours  bisacodyl 5 mg oral delayed release tablet: 1 tab(s) orally once a day (at bedtime)  DULoxetine 60 mg oral delayed release capsule: 1 cap(s) orally once a day  famotidine 20 mg oral tablet: 1 tab(s) orally 2 times a day  fenofibrate 145 mg oral tablet: 1 tab(s) orally once a day  fluticasone 50 mcg/inh nasal spray: 1 spray(s) nasal 2 times a day  furosemide 40 mg oral tablet: 1 tab(s) orally every 24 hours  gabapentin 300 mg oral capsule: 1 cap(s) orally 2 times a day  levothyroxine 125 mcg (0.125 mg) oral tablet: 1 tab(s) orally once a day  meclizine 12.5 mg oral tablet: 1 tab(s) orally 2 times a day  melatonin 3 mg oral tablet: 1 tab(s) orally once a day (at bedtime)  polyethylene glycol 3350 oral powder for reconstitution: 17 gram(s) orally 2 times a day, As Needed  rOPINIRole 5 mg oral tablet: 1 tab(s) orally once a day (at bedtime)  senna oral tablet: 2 tab(s) orally once a day (at bedtime)  sodium chloride 0.65% nasal spray: nasal 2 times a day  sodium chloride 1 g oral tablet: 3 tab(s) orally 3 times a day  spironolactone 25 mg oral tablet: 1 tab(s) orally once a day  traMADol 50 mg oral tablet: 0.5 tab(s) orally every 8 hours, As needed, Severe Pain (7 - 10)

## 2022-06-04 NOTE — DIETITIAN INITIAL EVALUATION ADULT - OTHER CALCULATIONS
Ideal Body Weight: 130 lbs / 59.1 kg +/-10%  Dosing weight (5/29) 252.4 lbs. Recent chart weight (4/28) 211 lbs. Apparent weight gain however noted with 4+ LE edema.

## 2022-06-04 NOTE — PROGRESS NOTE ADULT - SUBJECTIVE AND OBJECTIVE BOX
DATE OF SERVICE: 06-04-22 @ 06:45    Patient is a 77y old  Female who presents with a chief complaint of abdominal pain (03 Jun 2022 06:15)      SUBJECTIVE / OVERNIGHT EVENTS: NAEO. Patient afebrile o/n. Patient feels    MEDICATIONS  (STANDING):  apixaban 10 milliGRAM(s) Oral two times a day  bisacodyl 5 milliGRAM(s) Oral at bedtime  DULoxetine 60 milliGRAM(s) Oral daily  famotidine    Tablet 20 milliGRAM(s) Oral two times a day  fenofibrate Tablet 145 milliGRAM(s) Oral daily  fluticasone propionate 50 MICROgram(s)/spray Nasal Spray 1 Spray(s) Both Nostrils two times a day  furosemide    Tablet 40 milliGRAM(s) Oral every 24 hours  gabapentin 300 milliGRAM(s) Oral two times a day  levothyroxine 125 MICROGram(s) Oral daily  meclizine 12.5 milliGRAM(s) Oral two times a day  melatonin 3 milliGRAM(s) Oral at bedtime  rOPINIRole 5 milliGRAM(s) Oral at bedtime  senna 2 Tablet(s) Oral at bedtime  spironolactone 25 milliGRAM(s) Oral daily    MEDICATIONS  (PRN):  acetaminophen     Tablet .. 650 milliGRAM(s) Oral every 6 hours PRN Temp greater or equal to 38C (100.4F), Mild Pain (1 - 3)  sodium chloride 0.65% Nasal 1 Spray(s) Both Nostrils two times a day PRN Nasal Congestion  traMADol 25 milliGRAM(s) Oral every 6 hours PRN Severe Pain (7 - 10)      Vital Signs Last 24 Hrs  T(C): 37.1 (04 Jun 2022 05:00), Max: 37.1 (03 Jun 2022 21:18)  T(F): 98.7 (04 Jun 2022 05:00), Max: 98.8 (03 Jun 2022 21:18)  HR: 81 (04 Jun 2022 05:00) (81 - 87)  BP: 155/81 (04 Jun 2022 05:00) (145/65 - 157/68)  BP(mean): --  RR: 17 (04 Jun 2022 05:00) (17 - 18)  SpO2: 100% (04 Jun 2022 05:00) (98% - 100%)  CAPILLARY BLOOD GLUCOSE        I&O's Summary    02 Jun 2022 07:01  -  03 Jun 2022 07:00  --------------------------------------------------------  IN: 0 mL / OUT: 2900 mL / NET: -2900 mL    03 Jun 2022 07:01  -  04 Jun 2022 06:45  --------------------------------------------------------  IN: 600 mL / OUT: 2750 mL / NET: -2150 mL        PHYSICAL EXAM:  GENERAL APPEARANCE: Well developed, anasarcic, obese habitus  HEENT:  PERRL, EOMI. hearing grossly intact.  NECK: Neck supple, non-tender no lymphadenopathy, masses or thyromegaly.  CARDIAC: Normal S1 and S2. no mrg. RRR  LUNGS: Clear to auscultation B/L, no rales, rhonchi, or wheezing  ABDOMEN: Soft , Mildly tender throughout, bowel sounds normal. No guarding or rebound.   MUSCULOSKELETAL: ROM intact.  No joint erythema or tenderness.   EXTREMITIES: 4+ bipedal edema to thighs bilaterally    NEUROLOGICAL: Confused at times but answering questions appropriately   SKIN: Warm and dry , Well perfused  PSYCHIATRIC: AOx2-3 , Normal mood and affect    LABS:                        10.3   10.41 )-----------( 278      ( 03 Jun 2022 06:50 )             33.8     06-03    130<L>  |  94<L>  |  13  ----------------------------<  116<H>  4.0   |  26  |  0.81    Ca    8.7      03 Jun 2022 06:50  Phos  2.2     06-03  Mg     1.40     06-03    TPro  5.7<L>  /  Alb  2.6<L>  /  TBili  0.3  /  DBili  x   /  AST  16  /  ALT  <5<L>  /  AlkPhos  53  06-03              RADIOLOGY & ADDITIONAL TESTS:    Imaging Personally Reviewed:    Consultant(s) Notes Reviewed:      Care Discussed with Consultants/Other Providers:   DATE OF SERVICE: 06-04-22 @ 06:45    Patient is a 77y old  Female who presents with a chief complaint of abdominal pain (03 Jun 2022 06:15)      SUBJECTIVE / OVERNIGHT EVENTS: NAEO. Patient afebrile o/n. Patient feels well this AM, has mild abdominal discomfort, feels like gas pain. Denies CP, SOB, fevers/chills, N/V/D.     MEDICATIONS  (STANDING):  apixaban 10 milliGRAM(s) Oral two times a day  bisacodyl 5 milliGRAM(s) Oral at bedtime  DULoxetine 60 milliGRAM(s) Oral daily  famotidine    Tablet 20 milliGRAM(s) Oral two times a day  fenofibrate Tablet 145 milliGRAM(s) Oral daily  fluticasone propionate 50 MICROgram(s)/spray Nasal Spray 1 Spray(s) Both Nostrils two times a day  furosemide    Tablet 40 milliGRAM(s) Oral every 24 hours  gabapentin 300 milliGRAM(s) Oral two times a day  levothyroxine 125 MICROGram(s) Oral daily  meclizine 12.5 milliGRAM(s) Oral two times a day  melatonin 3 milliGRAM(s) Oral at bedtime  rOPINIRole 5 milliGRAM(s) Oral at bedtime  senna 2 Tablet(s) Oral at bedtime  spironolactone 25 milliGRAM(s) Oral daily    MEDICATIONS  (PRN):  acetaminophen     Tablet .. 650 milliGRAM(s) Oral every 6 hours PRN Temp greater or equal to 38C (100.4F), Mild Pain (1 - 3)  sodium chloride 0.65% Nasal 1 Spray(s) Both Nostrils two times a day PRN Nasal Congestion  traMADol 25 milliGRAM(s) Oral every 6 hours PRN Severe Pain (7 - 10)      Vital Signs Last 24 Hrs  T(C): 37.1 (04 Jun 2022 05:00), Max: 37.1 (03 Jun 2022 21:18)  T(F): 98.7 (04 Jun 2022 05:00), Max: 98.8 (03 Jun 2022 21:18)  HR: 81 (04 Jun 2022 05:00) (81 - 87)  BP: 155/81 (04 Jun 2022 05:00) (145/65 - 157/68)  BP(mean): --  RR: 17 (04 Jun 2022 05:00) (17 - 18)  SpO2: 100% (04 Jun 2022 05:00) (98% - 100%)  CAPILLARY BLOOD GLUCOSE        I&O's Summary    02 Jun 2022 07:01  -  03 Jun 2022 07:00  --------------------------------------------------------  IN: 0 mL / OUT: 2900 mL / NET: -2900 mL    03 Jun 2022 07:01  -  04 Jun 2022 06:45  --------------------------------------------------------  IN: 600 mL / OUT: 2750 mL / NET: -2150 mL        PHYSICAL EXAM:  GENERAL APPEARANCE: Well developed, anasarcic, obese habitus  HEENT:  PERRL, EOMI. hearing grossly intact.  NECK: Neck supple, non-tender no lymphadenopathy, masses or thyromegaly.  CARDIAC: Normal S1 and S2. no mrg. RRR  LUNGS: Clear to auscultation B/L, no rales, rhonchi, or wheezing  ABDOMEN: Soft , Mildly tender throughout, bowel sounds normal. No guarding or rebound.   MUSCULOSKELETAL: ROM intact.  No joint erythema or tenderness.   EXTREMITIES: 4+ bipedal edema to thighs bilaterally    NEUROLOGICAL: Confused at times but answering questions appropriately   SKIN: Warm and dry , Well perfused  PSYCHIATRIC: AOx2-3 , Normal mood and affect    LABS:                        10.3   10.41 )-----------( 278      ( 03 Jun 2022 06:50 )             33.8     06-03    130<L>  |  94<L>  |  13  ----------------------------<  116<H>  4.0   |  26  |  0.81    Ca    8.7      03 Jun 2022 06:50  Phos  2.2     06-03  Mg     1.40     06-03    TPro  5.7<L>  /  Alb  2.6<L>  /  TBili  0.3  /  DBili  x   /  AST  16  /  ALT  <5<L>  /  AlkPhos  53  06-03              RADIOLOGY & ADDITIONAL TESTS:    Imaging Personally Reviewed:    Consultant(s) Notes Reviewed:      Care Discussed with Consultants/Other Providers:

## 2022-06-04 NOTE — DISCHARGE NOTE PROVIDER - HOSPITAL COURSE
77F PMHx w/ SVT s/p ablation, HTN, HLD, hypothyroidism, vertigo, peripheral neuropathy, osteoarthritis, cervical ca, recent admission for pubic rami fracture and MSSA bacteremia Tx w/ Cefazolin to end 5/29, presented to ED from rehab on 5/29 w/ 3 wks of worsening abdominal pain and 4-5 days of obstipation. Reports nausea during this time but no vomiting. At rehab, patient was Bisacodyl, Miralax, Sennasoides-Docusate, fiber. Patient unsure of how much Tramadol/oxy she was getting at rehab, but says pelvic pain had been improving and was able to use walker with PT. She states she has been urinating at the rehab as well and is not sure when her last BM was. Patient has been taking calcium carbonate for GERD at the rehab. Denies fever, chills, SOB, CP, palpitations.    In the ED VS T: 99.1F HR: 87 BP: 136/81 (136/79 - 154/70) RR: 16 SpO2: 98% RA  Labs notable for WBC 16 PMN predominant, Ca 12.4 Albumin 2.7 Corrected Ca 13.1, Cr 1.16, HCO3 29, TSH 6.12  UA showed Large leuk est, positive nitrite, >50 WBCs, Many bacteria   EKG showed NSR  CXR showed no evidence of pulmonary disease  CT AP 1showed large amount of stool in the distal colon w/ mild edema c/f stercoral colitis, bladder thickening and inflammatory changes c/f cystitis, Moderate b/l hydronephrosis, Ureteral surgical clips, no renal stones, Severe Anasarca, subacute R superior and inferior pubic rami and sacral ala fractures no evidence of significant healing  Patient was disimpacted in ED    Hospital Course:   77F PMHx w/ SVT s/p ablation, HTN, HLD, hypothyroidism, vertigo, peripheral neuropathy, osteoarthritis, cervical ca, recent admission for pubic rami fracture and MSSA bacteremia Tx w/ Cefazolin to end 5/29, presented to ED from rehab on 5/29 w/ 3 wks of worsening abdominal pain and 4-5 days of obstipation. Reports nausea during this time but no vomiting. At rehab, patient was Bisacodyl, Miralax, Sennasoides-Docusate, fiber. Patient unsure of how much Tramadol/oxy she was getting at rehab, but says pelvic pain had been improving and was able to use walker with PT. She states she has been urinating at the rehab as well and is not sure when her last BM was. Patient has been taking calcium carbonate for GERD at the rehab. Denies fever, chills, SOB, CP, palpitations.    In the ED VS T: 99.1F HR: 87 BP: 136/81 (136/79 - 154/70) RR: 16 SpO2: 98% RA  Labs notable for WBC 16 PMN predominant, Ca 12.4 Albumin 2.7 Corrected Ca 13.1, Cr 1.16, HCO3 29, TSH 6.12  UA showed Large leuk est, positive nitrite, >50 WBCs, Many bacteria   EKG showed NSR  CXR showed no evidence of pulmonary disease  CT AP 1showed large amount of stool in the distal colon w/ mild edema c/f stercoral colitis, bladder thickening and inflammatory changes c/f cystitis, Moderate b/l hydronephrosis, Ureteral surgical clips, no renal stones, Severe Anasarca, subacute R superior and inferior pubic rami and sacral ala fractures no evidence of significant healing  Patient was disimpacted in ED    Hospital Course:  Patient was treated with a 5 day course of zosyn for UTI. Patient's constipation improved with senna, miralax, dulcolax. The patient's hypercalcemia improved with lasix and fluids.    On day of discharge, the patient was clinically stable. Patient will be discharged to ___________ 77F PMHx w/ SVT s/p ablation, HTN, HLD, hypothyroidism, vertigo, peripheral neuropathy, osteoarthritis, cervical ca, recent admission for pubic rami fracture and MSSA bacteremia Tx w/ Cefazolin to end 5/29, presented to ED from rehab on 5/29 w/ 3 wks of worsening abdominal pain and 4-5 days of obstipation. Reports nausea during this time but no vomiting. At rehab, patient was Bisacodyl, Miralax, Sennasoides-Docusate, fiber. Patient unsure of how much Tramadol/oxy she was getting at rehab, but says pelvic pain had been improving and was able to use walker with PT. She states she has been urinating at the rehab as well and is not sure when her last BM was. Patient has been taking calcium carbonate for GERD at the rehab. Denies fever, chills, SOB, CP, palpitations.    In the ED VS T: 99.1F HR: 87 BP: 136/81 (136/79 - 154/70) RR: 16 SpO2: 98% RA  Labs notable for WBC 16 PMN predominant, Ca 12.4 Albumin 2.7 Corrected Ca 13.1, Cr 1.16, HCO3 29, TSH 6.12  UA showed Large leuk est, positive nitrite, >50 WBCs, Many bacteria   EKG showed NSR  CXR showed no evidence of pulmonary disease  CT AP 1showed large amount of stool in the distal colon w/ mild edema c/f stercoral colitis, bladder thickening and inflammatory changes c/f cystitis, Moderate b/l hydronephrosis, Ureteral surgical clips, no renal stones, Severe Anasarca, subacute R superior and inferior pubic rami and sacral ala fractures no evidence of significant healing  Patient was disimpacted in ED    Hospital Course:  Patient was treated with a 5 day course of zosyn for UTI. Patient's constipation improved with senna, miralax, dulcolax. The patient's hypercalcemia improved with lasix and fluids.     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 Pt was started on salt tabs with improvement of sodium.     Course was complicated by delirium, addressed with zyprexa PRN.     Pt medically ready for discharge. 77W PMHx w/ SVT s/p ablation, HTN, HLD, hypothyroidism, vertigo, peripheral neuropathy, osteoarthritis, cervical ca, recent admission for pubic rami fracture and MSSA bacteremia Tx w/ Cefazolin to end 5/29, presented to ED from rehab on 5/29 w/ 3 wks of worsening abdominal pain and 4-5 days of obstipation. Reports nausea during this time but no vomiting. At rehab, patient was Bisacodyl, Miralax, Sennasoides-Docusate, fiber. Patient unsure of how much Tramadol/oxy she was getting at rehab, but says pelvic pain had been improving and was able to use walker with PT. She states she has been urinating at the rehab as well and is not sure when her last BM was. Patient has been taking calcium carbonate for GERD at the rehab. Denies fever, chills, SOB, CP, palpitations.    In the ED VS T: 99.1F HR: 87 BP: 136/81 (136/79 - 154/70) RR: 16 SpO2: 98% RA  Labs notable for WBC 16 PMN predominant, Ca 12.4 Albumin 2.7 Corrected Ca 13.1, Cr 1.16, HCO3 29, TSH 6.12  UA showed Large leuk est, positive nitrite, >50 WBCs, Many bacteria   EKG showed NSR  CXR showed no evidence of pulmonary disease  CT AP 1showed large amount of stool in the distal colon w/ mild edema c/f stercoral colitis, bladder thickening and inflammatory changes c/f cystitis, Moderate b/l hydronephrosis, Ureteral surgical clips, no renal stones, Severe Anasarca, subacute R superior and inferior pubic rami and sacral ala fractures no evidence of significant healing  Patient was disimpacted in ED    Hospital Course:  Patient was treated with a 5 day course of zosyn for UTI. Patient's constipation improved with senna, miralax, dulcolax. The patient's hypercalcemia improved with lasix and fluids.     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 Pt was started on salt tabs with improvement of sodium.     Course was complicated by delirium, addressed with zyprexa PRN.     Pt medically ready for discharge.

## 2022-06-04 NOTE — DIETITIAN INITIAL EVALUATION ADULT - ADD RECOMMEND
1) Recommend liberalized regular diet to maximize menu item availability, may d/c DASH/TLC restriction.  2) Recommend continue Ensure Enlive 1 PO 2x daily (provides 350 kcal, 20 gm protein per 8oz serving).  3) RDN remains available to obtain food preferences PRN. Encourage PO intake as tolerated, provide assistance at meal times PRN, document % PO intake in flowsheets.  4) Continue to monitor electrolytes (K+, Mg, P) and replete to WDL as clinically indicated.  5) Obtain weekly weights.  6) Monitor BMs, adjust bowel regimen as appropriate.

## 2022-06-04 NOTE — DISCHARGE NOTE PROVIDER - NSDCCPTREATMENT_GEN_ALL_CORE_FT
PRINCIPAL PROCEDURE  Procedure: CT abdomen pelvis wo/w con  Findings and Treatment: IMPRESSION:  1. Large amount of stool in the distal colon.  Suggestion of mild wall   thickening in the descending colon with mild edema in the pericolonic   mesentery.  Diffuse air-fluid levels throughout the cecum, ascending   colon and transverse colon.  The findings are suspicious for stercoral   colitis.  2. There is bladder wall thickening and perivesicular inflammatory,   changes suspicious for cystitis.  Correlate with clinical symptoms and   urinalysis.  3. Moderate bilateral hydroureteronephrosis is new from 04/27/2022. Both   ureters are markedly dilated to the level of  multiple surgical clips   located along the pelvic sidewalls in the upper pelvis; the distal   ureters cannot be visualized.  There is no evidence of renal stones is   uncertain whether the hydronephrosis is secondary to cystitis or if it is   secondary to ureteral strictures.  4. Severe anasarca.  5. There are subacute fractures of the right superior and inferior pubic   rami and right sacral ala, which demonstrate markedly increased lucency   compared to 04/27/2022, when they were acute.  There is no evidence of   any significant healing..

## 2022-06-04 NOTE — PROGRESS NOTE ADULT - PROBLEM SELECTOR PLAN 1
Patient with 4-5 days of constipation, had been getting Tramadol/Oxy for pain control in rehab, S/p disimpaction in ED  IMPROVING  CT showed large amount of stool in the distal colon w/ mild edema c/f stercoral colitis  Constipation likely multifactorial in setting of hypercalcemia, opioid induced and limited mobility   Patient with limited mobility 2/2 pelvic fracture, had limited ambulation with cane at rehab  Abd CXR on 6/2 showed no evidence of obstruction  Treat hypercalcemia as below   Bowel Regimen- senna, dulcolax  S/p Movantic 5/29  Stool count Patient with 4-5 days of constipation, had been getting Tramadol/Oxy for pain control in rehab, S/p disimpaction in ED  IMPROVING  CT showed large amount of stool in the distal colon w/ mild edema c/f stercoral colitis  Constipation likely multifactorial in setting of hypercalcemia, opioid induced and limited mobility   Patient with limited mobility 2/2 pelvic fracture, had limited ambulation with cane at rehab  Abd CXR on 6/2 showed no evidence of obstruction  Treat hypercalcemia as below   Bowel Regimen- senna, dulcolax  S/p Movantic 5/29  PRN simethicone for gas pain  Stool count

## 2022-06-04 NOTE — DIETITIAN INITIAL EVALUATION ADULT - PHYSCIAL ASSESSMENT
Appearance consistent with BMI. BMI >40 however pt with 4+ LE edema. Pt with no overt signs of muscle wasting/fat loss upon visualization.

## 2022-06-04 NOTE — PROGRESS NOTE ADULT - PROBLEM SELECTOR PLAN 7
Patient with pelvic fractures found on previous admission  Was getting Tramadol and Oxy at rehab  Presenting from rehab  Switch to Tramadol 25mg q6prn for severe pain- caution with opioids given constipation  F/u PT recs

## 2022-06-05 LAB — SARS-COV-2 RNA SPEC QL NAA+PROBE: SIGNIFICANT CHANGE UP

## 2022-06-05 PROCEDURE — 99232 SBSQ HOSP IP/OBS MODERATE 35: CPT | Mod: GC

## 2022-06-05 RX ORDER — TRAMADOL HYDROCHLORIDE 50 MG/1
12.5 TABLET ORAL EVERY 6 HOURS
Refills: 0 | Status: DISCONTINUED | OUTPATIENT
Start: 2022-06-05 | End: 2022-06-05

## 2022-06-05 RX ORDER — TRAMADOL HYDROCHLORIDE 50 MG/1
25 TABLET ORAL EVERY 8 HOURS
Refills: 0 | Status: DISCONTINUED | OUTPATIENT
Start: 2022-06-05 | End: 2022-06-08

## 2022-06-05 RX ADMIN — Medication 12.5 MILLIGRAM(S): at 06:16

## 2022-06-05 RX ADMIN — GABAPENTIN 300 MILLIGRAM(S): 400 CAPSULE ORAL at 22:40

## 2022-06-05 RX ADMIN — FAMOTIDINE 20 MILLIGRAM(S): 10 INJECTION INTRAVENOUS at 22:40

## 2022-06-05 RX ADMIN — Medication 12.5 MILLIGRAM(S): at 22:40

## 2022-06-05 RX ADMIN — GABAPENTIN 300 MILLIGRAM(S): 400 CAPSULE ORAL at 06:16

## 2022-06-05 RX ADMIN — FAMOTIDINE 20 MILLIGRAM(S): 10 INJECTION INTRAVENOUS at 06:16

## 2022-06-05 RX ADMIN — APIXABAN 10 MILLIGRAM(S): 2.5 TABLET, FILM COATED ORAL at 06:15

## 2022-06-05 RX ADMIN — Medication 40 MILLIGRAM(S): at 13:33

## 2022-06-05 RX ADMIN — SPIRONOLACTONE 25 MILLIGRAM(S): 25 TABLET, FILM COATED ORAL at 06:16

## 2022-06-05 RX ADMIN — Medication 145 MILLIGRAM(S): at 11:42

## 2022-06-05 RX ADMIN — APIXABAN 10 MILLIGRAM(S): 2.5 TABLET, FILM COATED ORAL at 18:41

## 2022-06-05 RX ADMIN — DULOXETINE HYDROCHLORIDE 60 MILLIGRAM(S): 30 CAPSULE, DELAYED RELEASE ORAL at 11:42

## 2022-06-05 RX ADMIN — Medication 125 MICROGRAM(S): at 06:15

## 2022-06-05 NOTE — CHART NOTE - NSCHARTNOTEFT_GEN_A_CORE
Provider was contacted about patient having AMS. The patient was examined at bedside and was found to be awake, alert, AOx2 (person and place) in NAD. She reported wanting to leave the hospital at that time because she did not want to stay there and was unhappy about her care. She was also asking for a  and that she was going to "neil everyone". Although the patient was AOX2 she was expressing paranoid ideas like she was being given "funny pills" which the sister at bedside confirmed it was Tylenol. During evaluation the patient's speech was clear, no facial assymetry was observed, she was moving extremities symmetrically. She also expressed that she had "a lot of money" and that she wanted to go to a hotel tonight and that she would hire staff to help her while she waits to go back to [facility] ([place]). The provider explained the need of her staying overnight and was able to deescalate the situation. The patient agreed to stayed and wanted some ice cream/pudding after the conversation. RN was notified about the patient being calmer after interaction w/ provider.    Shy Glaser MD  PGY-1 Internal Medicine

## 2022-06-05 NOTE — PROGRESS NOTE ADULT - SUBJECTIVE AND OBJECTIVE BOX
PROGRESS NOTE:     Patient is a 77y old  Female who presents with a chief complaint of abdominal pain (04 Jun 2022 21:53)      SUBJECTIVE / OVERNIGHT EVENTS:  Night float called for slight confusion overnight. Improved this AM.    ADDITIONAL REVIEW OF SYSTEMS:    MEDICATIONS  (STANDING):  apixaban 10 milliGRAM(s) Oral two times a day  bisacodyl 5 milliGRAM(s) Oral at bedtime  DULoxetine 60 milliGRAM(s) Oral daily  famotidine    Tablet 20 milliGRAM(s) Oral two times a day  fenofibrate Tablet 145 milliGRAM(s) Oral daily  fluticasone propionate 50 MICROgram(s)/spray Nasal Spray 1 Spray(s) Both Nostrils two times a day  furosemide    Tablet 40 milliGRAM(s) Oral every 24 hours  gabapentin 300 milliGRAM(s) Oral two times a day  levothyroxine 125 MICROGram(s) Oral daily  meclizine 12.5 milliGRAM(s) Oral two times a day  melatonin 3 milliGRAM(s) Oral at bedtime  rOPINIRole 5 milliGRAM(s) Oral at bedtime  senna 2 Tablet(s) Oral at bedtime  spironolactone 25 milliGRAM(s) Oral daily    MEDICATIONS  (PRN):  acetaminophen     Tablet .. 650 milliGRAM(s) Oral every 6 hours PRN Temp greater or equal to 38C (100.4F), Mild Pain (1 - 3)  sodium chloride 0.65% Nasal 1 Spray(s) Both Nostrils two times a day PRN Nasal Congestion  traMADol 25 milliGRAM(s) Oral every 6 hours PRN Severe Pain (7 - 10)      CAPILLARY BLOOD GLUCOSE        I&O's Summary    04 Jun 2022 07:01  -  05 Jun 2022 07:00  --------------------------------------------------------  IN: 360 mL / OUT: 1450 mL / NET: -1090 mL        PHYSICAL EXAM:  Vital Signs Last 24 Hrs  T(C): 36.4 (05 Jun 2022 05:28), Max: 36.8 (04 Jun 2022 11:58)  T(F): 97.5 (05 Jun 2022 05:28), Max: 98.3 (04 Jun 2022 11:58)  HR: 84 (05 Jun 2022 05:28) (78 - 87)  BP: 147/63 (05 Jun 2022 05:28) (147/63 - 156/82)  BP(mean): --  RR: 17 (05 Jun 2022 05:28) (17 - 18)  SpO2: 98% (05 Jun 2022 05:28) (97% - 100%)    GENERAL: NAD, lying in bed comfortably  HEAD:  Atraumatic, Normocephalic  EYES: EOMI, PERRLA, conjunctiva and sclera clear  ENT: Moist mucous membranes  NECK: Supple, No JVD  CHEST/LUNG: Clear to auscultation bilaterally; No rales, rhonchi, wheezing, or rubs. Unlabored respirations  HEART: Regular rate and rhythm; No murmurs, rubs, or gallops  ABDOMEN: BSx4; Soft, nontender, nondistended  EXTREMITIES:  2+ Peripheral Pulses, brisk capillary refill. No clubbing, cyanosis, or edema  NERVOUS SYSTEM:  A&Ox3, no focal deficits   SKIN: No rashes or lesions    LABS:                        10.5   10.41 )-----------( 322      ( 04 Jun 2022 05:43 )             33.7     06-04    131<L>  |  95<L>  |  11  ----------------------------<  109<H>  3.9   |  26  |  0.66    Ca    8.6      04 Jun 2022 05:43  Phos  1.8     06-04  Mg     1.50     06-04                  RADIOLOGY & ADDITIONAL TESTS:  Results Reviewed:   Imaging Personally Reviewed:  Electrocardiogram Personally Reviewed:    COORDINATION OF CARE:  Care Discussed with Consultants/Other Providers [Y/N]:  Prior or Outpatient Records Reviewed [Y/N]:   PROGRESS NOTE:     Patient is a 77y old  Female who presents with a chief complaint of abdominal pain (04 Jun 2022 21:53)      SUBJECTIVE / OVERNIGHT EVENTS:  Night float called for slight confusion overnight. Improved this AM. Denies acute complaints. Covid swabbed this AM for dispo planning tomorrow.    ADDITIONAL REVIEW OF SYSTEMS:    MEDICATIONS  (STANDING):  apixaban 10 milliGRAM(s) Oral two times a day  bisacodyl 5 milliGRAM(s) Oral at bedtime  DULoxetine 60 milliGRAM(s) Oral daily  famotidine    Tablet 20 milliGRAM(s) Oral two times a day  fenofibrate Tablet 145 milliGRAM(s) Oral daily  fluticasone propionate 50 MICROgram(s)/spray Nasal Spray 1 Spray(s) Both Nostrils two times a day  furosemide    Tablet 40 milliGRAM(s) Oral every 24 hours  gabapentin 300 milliGRAM(s) Oral two times a day  levothyroxine 125 MICROGram(s) Oral daily  meclizine 12.5 milliGRAM(s) Oral two times a day  melatonin 3 milliGRAM(s) Oral at bedtime  rOPINIRole 5 milliGRAM(s) Oral at bedtime  senna 2 Tablet(s) Oral at bedtime  spironolactone 25 milliGRAM(s) Oral daily    MEDICATIONS  (PRN):  acetaminophen     Tablet .. 650 milliGRAM(s) Oral every 6 hours PRN Temp greater or equal to 38C (100.4F), Mild Pain (1 - 3)  sodium chloride 0.65% Nasal 1 Spray(s) Both Nostrils two times a day PRN Nasal Congestion  traMADol 25 milliGRAM(s) Oral every 6 hours PRN Severe Pain (7 - 10)      CAPILLARY BLOOD GLUCOSE        I&O's Summary    04 Jun 2022 07:01  -  05 Jun 2022 07:00  --------------------------------------------------------  IN: 360 mL / OUT: 1450 mL / NET: -1090 mL        PHYSICAL EXAM:  Vital Signs Last 24 Hrs  T(C): 36.4 (05 Jun 2022 05:28), Max: 36.8 (04 Jun 2022 11:58)  T(F): 97.5 (05 Jun 2022 05:28), Max: 98.3 (04 Jun 2022 11:58)  HR: 84 (05 Jun 2022 05:28) (78 - 87)  BP: 147/63 (05 Jun 2022 05:28) (147/63 - 156/82)  BP(mean): --  RR: 17 (05 Jun 2022 05:28) (17 - 18)  SpO2: 98% (05 Jun 2022 05:28) (97% - 100%)    GENERAL: NAD, lying in bed comfortably  HEAD:  Atraumatic, Normocephalic  EYES: EOMI, PERRLA, conjunctiva and sclera clear  ENT: Moist mucous membranes  NECK: Supple, No JVD  CHEST/LUNG: Clear to auscultation bilaterally; No rales, rhonchi, wheezing, or rubs. Unlabored respirations  HEART: Regular rate and rhythm; No murmurs, rubs, or gallops  ABDOMEN: BSx4; Soft, nontender, nondistended  EXTREMITIES: 3+ pitting edema in bilateral lower extremities  NERVOUS SYSTEM:  A&Ox3, no focal deficits   SKIN: No rashes or lesions    LABS:                        10.5   10.41 )-----------( 322      ( 04 Jun 2022 05:43 )             33.7     06-04    131<L>  |  95<L>  |  11  ----------------------------<  109<H>  3.9   |  26  |  0.66    Ca    8.6      04 Jun 2022 05:43  Phos  1.8     06-04  Mg     1.50     06-04                  RADIOLOGY & ADDITIONAL TESTS:  Results Reviewed:   Imaging Personally Reviewed:  Electrocardiogram Personally Reviewed:    COORDINATION OF CARE:  Care Discussed with Consultants/Other Providers [Y/N]:  Prior or Outpatient Records Reviewed [Y/N]:

## 2022-06-05 NOTE — PROGRESS NOTE ADULT - PROBLEM SELECTOR PLAN 1
Patient with 4-5 days of constipation, had been getting Tramadol/Oxy for pain control in rehab, S/p disimpaction in ED  IMPROVING  CT showed large amount of stool in the distal colon w/ mild edema c/f stercoral colitis  Constipation likely multifactorial in setting of hypercalcemia, opioid induced and limited mobility   Patient with limited mobility 2/2 pelvic fracture, had limited ambulation with cane at rehab  Abd CXR on 6/2 showed no evidence of obstruction  Treat hypercalcemia as below   Bowel Regimen- senna, dulcolax  S/p Movantic 5/29  PRN simethicone for gas pain  Stool count

## 2022-06-06 ENCOUNTER — APPOINTMENT (OUTPATIENT)
Dept: VASCULAR SURGERY | Facility: CLINIC | Age: 77
End: 2022-06-06

## 2022-06-06 DIAGNOSIS — E87.1 HYPO-OSMOLALITY AND HYPONATREMIA: ICD-10-CM

## 2022-06-06 LAB
ALBUMIN SERPL ELPH-MCNC: 3 G/DL — LOW (ref 3.3–5)
ALP SERPL-CCNC: 55 U/L — SIGNIFICANT CHANGE UP (ref 40–120)
ALT FLD-CCNC: <5 U/L — LOW (ref 4–33)
ANION GAP SERPL CALC-SCNC: 10 MMOL/L — SIGNIFICANT CHANGE UP (ref 7–14)
AST SERPL-CCNC: 15 U/L — SIGNIFICANT CHANGE UP (ref 4–32)
BILIRUB SERPL-MCNC: 0.4 MG/DL — SIGNIFICANT CHANGE UP (ref 0.2–1.2)
BUN SERPL-MCNC: 8 MG/DL — SIGNIFICANT CHANGE UP (ref 7–23)
CALCIUM SERPL-MCNC: 8.8 MG/DL — SIGNIFICANT CHANGE UP (ref 8.4–10.5)
CHLORIDE SERPL-SCNC: 97 MMOL/L — LOW (ref 98–107)
CO2 SERPL-SCNC: 21 MMOL/L — LOW (ref 22–31)
CREAT SERPL-MCNC: 0.64 MG/DL — SIGNIFICANT CHANGE UP (ref 0.5–1.3)
EGFR: 91 ML/MIN/1.73M2 — SIGNIFICANT CHANGE UP
GLUCOSE SERPL-MCNC: 118 MG/DL — HIGH (ref 70–99)
HCT VFR BLD CALC: 34.7 % — SIGNIFICANT CHANGE UP (ref 34.5–45)
HGB BLD-MCNC: 10.7 G/DL — LOW (ref 11.5–15.5)
MAGNESIUM SERPL-MCNC: 1.6 MG/DL — SIGNIFICANT CHANGE UP (ref 1.6–2.6)
MCHC RBC-ENTMCNC: 26.4 PG — LOW (ref 27–34)
MCHC RBC-ENTMCNC: 30.8 GM/DL — LOW (ref 32–36)
MCV RBC AUTO: 85.5 FL — SIGNIFICANT CHANGE UP (ref 80–100)
NRBC # BLD: 0 /100 WBCS — SIGNIFICANT CHANGE UP
NRBC # FLD: 0 K/UL — SIGNIFICANT CHANGE UP
OSMOLALITY UR: 280 MOSM/KG — SIGNIFICANT CHANGE UP (ref 50–1200)
PHOSPHATE SERPL-MCNC: 2 MG/DL — LOW (ref 2.5–4.5)
PLATELET # BLD AUTO: 316 K/UL — SIGNIFICANT CHANGE UP (ref 150–400)
POTASSIUM SERPL-MCNC: 4 MMOL/L — SIGNIFICANT CHANGE UP (ref 3.5–5.3)
POTASSIUM SERPL-SCNC: 4 MMOL/L — SIGNIFICANT CHANGE UP (ref 3.5–5.3)
PROT SERPL-MCNC: 6.5 G/DL — SIGNIFICANT CHANGE UP (ref 6–8.3)
RBC # BLD: 4.06 M/UL — SIGNIFICANT CHANGE UP (ref 3.8–5.2)
RBC # FLD: 15.9 % — HIGH (ref 10.3–14.5)
SODIUM SERPL-SCNC: 128 MMOL/L — LOW (ref 135–145)
SODIUM UR-SCNC: 124 MMOL/L — SIGNIFICANT CHANGE UP
WBC # BLD: 11.73 K/UL — HIGH (ref 3.8–10.5)
WBC # FLD AUTO: 11.73 K/UL — HIGH (ref 3.8–10.5)

## 2022-06-06 PROCEDURE — 99233 SBSQ HOSP IP/OBS HIGH 50: CPT | Mod: GC

## 2022-06-06 RX ORDER — OLANZAPINE 15 MG/1
1.25 TABLET, FILM COATED ORAL ONCE
Refills: 0 | Status: COMPLETED | OUTPATIENT
Start: 2022-06-06 | End: 2022-06-06

## 2022-06-06 RX ORDER — MAGNESIUM SULFATE 500 MG/ML
2 VIAL (ML) INJECTION ONCE
Refills: 0 | Status: DISCONTINUED | OUTPATIENT
Start: 2022-06-06 | End: 2022-06-06

## 2022-06-06 RX ORDER — SODIUM,POTASSIUM PHOSPHATES 278-250MG
1 POWDER IN PACKET (EA) ORAL
Refills: 0 | Status: COMPLETED | OUTPATIENT
Start: 2022-06-06 | End: 2022-06-07

## 2022-06-06 RX ORDER — SODIUM CHLORIDE 9 MG/ML
3 INJECTION INTRAMUSCULAR; INTRAVENOUS; SUBCUTANEOUS THREE TIMES A DAY
Refills: 0 | Status: DISCONTINUED | OUTPATIENT
Start: 2022-06-06 | End: 2022-06-08

## 2022-06-06 RX ORDER — MAGNESIUM OXIDE 400 MG ORAL TABLET 241.3 MG
400 TABLET ORAL ONCE
Refills: 0 | Status: COMPLETED | OUTPATIENT
Start: 2022-06-06 | End: 2022-06-06

## 2022-06-06 RX ADMIN — SODIUM CHLORIDE 3 GRAM(S): 9 INJECTION INTRAMUSCULAR; INTRAVENOUS; SUBCUTANEOUS at 22:03

## 2022-06-06 RX ADMIN — Medication 125 MICROGRAM(S): at 05:16

## 2022-06-06 RX ADMIN — GABAPENTIN 300 MILLIGRAM(S): 400 CAPSULE ORAL at 05:15

## 2022-06-06 RX ADMIN — FAMOTIDINE 20 MILLIGRAM(S): 10 INJECTION INTRAVENOUS at 05:16

## 2022-06-06 RX ADMIN — Medication 1 SPRAY(S): at 18:08

## 2022-06-06 RX ADMIN — Medication 1 PACKET(S): at 11:08

## 2022-06-06 RX ADMIN — MAGNESIUM OXIDE 400 MG ORAL TABLET 400 MILLIGRAM(S): 241.3 TABLET ORAL at 11:05

## 2022-06-06 RX ADMIN — OLANZAPINE 1.25 MILLIGRAM(S): 15 TABLET, FILM COATED ORAL at 17:00

## 2022-06-06 RX ADMIN — Medication 145 MILLIGRAM(S): at 11:06

## 2022-06-06 RX ADMIN — Medication 12.5 MILLIGRAM(S): at 05:15

## 2022-06-06 RX ADMIN — DULOXETINE HYDROCHLORIDE 60 MILLIGRAM(S): 30 CAPSULE, DELAYED RELEASE ORAL at 11:06

## 2022-06-06 RX ADMIN — ROPINIROLE 5 MILLIGRAM(S): 8 TABLET, FILM COATED, EXTENDED RELEASE ORAL at 22:32

## 2022-06-06 RX ADMIN — GABAPENTIN 300 MILLIGRAM(S): 400 CAPSULE ORAL at 18:07

## 2022-06-06 RX ADMIN — APIXABAN 10 MILLIGRAM(S): 2.5 TABLET, FILM COATED ORAL at 18:06

## 2022-06-06 RX ADMIN — TRAMADOL HYDROCHLORIDE 25 MILLIGRAM(S): 50 TABLET ORAL at 21:00

## 2022-06-06 RX ADMIN — Medication 3 MILLIGRAM(S): at 20:01

## 2022-06-06 RX ADMIN — TRAMADOL HYDROCHLORIDE 25 MILLIGRAM(S): 50 TABLET ORAL at 20:00

## 2022-06-06 RX ADMIN — Medication 40 MILLIGRAM(S): at 11:08

## 2022-06-06 RX ADMIN — Medication 5 MILLIGRAM(S): at 21:58

## 2022-06-06 RX ADMIN — SENNA PLUS 2 TABLET(S): 8.6 TABLET ORAL at 21:57

## 2022-06-06 RX ADMIN — Medication 1 PACKET(S): at 18:08

## 2022-06-06 RX ADMIN — Medication 12.5 MILLIGRAM(S): at 18:08

## 2022-06-06 RX ADMIN — FAMOTIDINE 20 MILLIGRAM(S): 10 INJECTION INTRAVENOUS at 18:07

## 2022-06-06 RX ADMIN — APIXABAN 10 MILLIGRAM(S): 2.5 TABLET, FILM COATED ORAL at 05:15

## 2022-06-06 RX ADMIN — Medication 1 SPRAY(S): at 05:14

## 2022-06-06 RX ADMIN — SPIRONOLACTONE 25 MILLIGRAM(S): 25 TABLET, FILM COATED ORAL at 05:16

## 2022-06-06 NOTE — PROGRESS NOTE ADULT - PROBLEM SELECTOR PLAN 8
Hydronephrosis noted on imaging  Likely obstructive i/s/o large stool burden  Monitor BMP  S/p Block  Failed TOV, will reattempt TOV if patient remains inpatient   Strict Is/Os Hydronephrosis noted on imaging  Likely obstructive i/s/o large stool burden  Monitor BMP  Patient with knox TSH 6.12 on admission  C/w Synthroid 125 mcg daily  Patient will need f/u lab work outpt C/w spironolactone and PO lasix for HTN and edema

## 2022-06-06 NOTE — PROGRESS NOTE ADULT - PROBLEM SELECTOR PLAN 9
C/w spironolactone and PO lasix for HTN and edema DVT PPx: Lovenox SubQ    Diet: DASH    Dispo: Rehab TSH 6.12 on admission  C/w Synthroid 125 mcg daily  Patient will need f/u lab work outpt

## 2022-06-06 NOTE — PROGRESS NOTE ADULT - PROBLEM SELECTOR PROBLEM 3
----- Message from Jason Avelar sent at 1/15/2019  3:29 PM CST -----  Contact: pt  Type:  RX Refill Request    Who Called:  pt  Refill or New Rx:  Refill / new  RX Name and Strength:  PIRMELLA 0.5/0.75/1 mg- 35 mcg per tablet  How is the patient currently taking it? (ex. 1XDay):  1 x day  Is this a 30 day or 90 day RX:  90  Preferred Pharmacy with phone number:    [facility]/pharmacy #[id number] - [place] - [id number]  [place] [address]  Phone: [phone] Fax: [phone]      Local or Mail Order:    Ordering Provider:    Best Call Back Number:  [phone]  Additional Information:       DVT, lower extremity Hypercalcemia

## 2022-06-06 NOTE — PROGRESS NOTE ADULT - PROBLEM SELECTOR PLAN 7
Patient with pelvic fractures found on previous admission  Was getting Tramadol and Oxy at rehab  Presenting from rehab  Switch to Tramadol 25mg q6prn for severe pain- caution with opioids given constipation  F/u PT recs Patient with pelvic fractures found on previous admission  Was getting Tramadol and Oxy at rehab  Presenting from rehab  Switch to Tramadol 25mg q6prn for severe pain- caution with opioids given constipation C/w spironolactone and PO lasix for HTN and edema

## 2022-06-06 NOTE — PROGRESS NOTE ADULT - PROBLEM SELECTOR PLAN 5
Patient presented with leukocytosis to 16 worsened to 29 on 5/30, likely i/s/o UTI with possible reactive component from constipation, pelvic fractures, DVT  UCx >3 organisms likely contaiminated  BCx NGTD  DVT found on Duplex- nonocclusive thrombus proximal femoral vein, External iliac vein  Trend CBC w/ diff Patient had been on Cefazolin for MSSA bacteremia to end on 5/29  Found 2/2 being febrile on last admission, unclear source  SHAUN on 05/03 showed no vegetations  S/p Zosyn 5/29-6/2  BCx 5/30 showed NGTD UA on admission with Large leuk est, positive nitrite, >50 WBCs, Many bacteria   CT AP with findings c/s cystitis  Likely 2/2 urostasis i/s/o constipation  Patient had been on Cefazolin for MSSA bacteremia to end on 5/29  UCx showed >3 organisms likely contamination  BCx showed NGTD  S/p Zosyn 5/29-6/2

## 2022-06-06 NOTE — PROGRESS NOTE ADULT - PROBLEM SELECTOR PLAN 6
Patient had been on Cefazolin for MSSA bacteremia to end on 5/29  Found 2/2 being febrile on last admission, unclear source  SHAUN on 05/03 showed no vegetations  S/p Zosyn 5/29-6/2  BCx 5/30 showed NGTD Patient with pelvic fractures found on previous admission  Was getting Tramadol and Oxy at rehab  Presenting from rehab  Switch to Tramadol 25mg q6prn for severe pain- caution with opioids given constipation

## 2022-06-06 NOTE — PROGRESS NOTE ADULT - PROBLEM SELECTOR PLAN 1
Patient with 4-5 days of constipation, had been getting Tramadol/Oxy for pain control in rehab, S/p disimpaction in ED  CT showed large amount of stool in the distal colon w/ mild edema c/f stercoral colitis  Constipation likely multifactorial in setting of hypercalcemia, opioid induced and limited mobility   Patient with limited mobility 2/2 pelvic fracture, had limited ambulation with cane at rehab  Abd CXR on 6/2 showed no evidence of obstruction  Bowel Regimen- senna, dulcolax  S/p Movantic 5/29  PRN simethicone for gas pain  Stool count, last BM recorded 6/5 Patient with 4-5 days of constipation, had been getting Tramadol/Oxy for pain control in rehab, S/p disimpaction in ED  CT showed large amount of stool in the distal colon w/ mild edema c/f stercoral colitis  Constipation likely multifactorial in setting of hypercalcemia, opioid induced and limited mobility   Patient with limited mobility 2/2 pelvic fracture, had limited ambulation with cane at rehab  Abd CXR on 6/2 showed no evidence of obstruction  Bowel Regimen- senna, dulcolax  S/p Movantic 5/29  PRN simethicone for gas pain Patient with 4-5 days of constipation, had been getting Tramadol/Oxy for pain control in rehab, S/p disimpaction in ED  CT showed large amount of stool in the distal colon w/ mild edema c/f stercoral colitis  Constipation likely multifactorial in setting of hypercalcemia, opioid induced and limited mobility   Patient with limited mobility 2/2 pelvic fracture, had limited ambulation with cane at rehab  Bowel Regimen- senna, dulcolax  S/p Movantic 5/29  PRN simethicone for gas pain sodium 128 on 6/6   - urine studies consistent with SIADH   - started patient on salt tabs and fluid restricted  - will continue to trend BMP

## 2022-06-06 NOTE — PROGRESS NOTE ADULT - PROBLEM SELECTOR PLAN 3
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

## 2022-06-06 NOTE — PROGRESS NOTE ADULT - SUBJECTIVE AND OBJECTIVE BOX
PROGRESS NOTE:     Patient is a 77y old  Female who presents with a chief complaint of abdominal pain (05 Jun 2022 07:17)      SUBJECTIVE / OVERNIGHT EVENTS: No acute events overnight.     ADDITIONAL REVIEW OF SYSTEMS:    MEDICATIONS  (STANDING):  apixaban 10 milliGRAM(s) Oral two times a day  bisacodyl 5 milliGRAM(s) Oral at bedtime  DULoxetine 60 milliGRAM(s) Oral daily  famotidine    Tablet 20 milliGRAM(s) Oral two times a day  fenofibrate Tablet 145 milliGRAM(s) Oral daily  fluticasone propionate 50 MICROgram(s)/spray Nasal Spray 1 Spray(s) Both Nostrils two times a day  furosemide    Tablet 40 milliGRAM(s) Oral every 24 hours  gabapentin 300 milliGRAM(s) Oral two times a day  levothyroxine 125 MICROGram(s) Oral daily  meclizine 12.5 milliGRAM(s) Oral two times a day  melatonin 3 milliGRAM(s) Oral at bedtime  rOPINIRole 5 milliGRAM(s) Oral at bedtime  senna 2 Tablet(s) Oral at bedtime  spironolactone 25 milliGRAM(s) Oral daily    MEDICATIONS  (PRN):  acetaminophen     Tablet .. 650 milliGRAM(s) Oral every 6 hours PRN Temp greater or equal to 38C (100.4F), Mild Pain (1 - 3)  sodium chloride 0.65% Nasal 1 Spray(s) Both Nostrils two times a day PRN Nasal Congestion  traMADol 25 milliGRAM(s) Oral every 8 hours PRN Severe Pain (7 - 10)      CAPILLARY BLOOD GLUCOSE        I&O's Summary    05 Jun 2022 07:01  -  06 Jun 2022 07:00  --------------------------------------------------------  IN: 720 mL / OUT: 3100 mL / NET: -2380 mL        PHYSICAL EXAM:  Vital Signs Last 24 Hrs  T(C): 36.9 (06 Jun 2022 04:54), Max: 37.6 (05 Jun 2022 21:40)  T(F): 98.5 (06 Jun 2022 04:54), Max: 99.6 (05 Jun 2022 21:40)  HR: 86 (06 Jun 2022 04:54) (82 - 99)  BP: 147/75 (06 Jun 2022 04:54) (144/63 - 153/77)  BP(mean): --  RR: 18 (06 Jun 2022 04:54) (18 - 18)  SpO2: 96% (06 Jun 2022 04:54) (95% - 100%)    CONSTITUTIONAL: NAD, well-developed  RESPIRATORY: Normal respiratory effort; lungs are clear to auscultation bilaterally  CARDIOVASCULAR: Regular rate and rhythm, normal S1 and S2, no murmur/rub/gallop; No lower extremity edema; Peripheral pulses are 2+ bilaterally  ABDOMEN: Nontender to palpation, normoactive bowel sounds, no rebound/guarding  MUSCULOSKELETAL: no clubbing or cyanosis of digits; no joint swelling or tenderness to palpation  PSYCH: A+O to person, place, and time; affect appropriate    LABS:                        10.7   11.73 )-----------( 316      ( 06 Jun 2022 06:11 )             34.7                       RADIOLOGY & ADDITIONAL TESTS:  Results Reviewed:   Imaging Personally Reviewed:  Electrocardiogram Personally Reviewed:    COORDINATION OF CARE:  Care Discussed with Consultants/Other Providers [Y/N]:  Prior or Outpatient Records Reviewed [Y/N]:   PROGRESS NOTE:     Patient is a 77y old  Female who presents with a chief complaint of abdominal pain (05 Jun 2022 07:17)      SUBJECTIVE / OVERNIGHT EVENTS: No acute events overnight. This AM patient seen and examined at bedside. Patient A+Ox3, pleasant. States she is eagerly awaiting discharge, has a "nervous stomach" but no other complaints at this time.     ADDITIONAL REVIEW OF SYSTEMS: Negative     MEDICATIONS  (STANDING):  apixaban 10 milliGRAM(s) Oral two times a day  bisacodyl 5 milliGRAM(s) Oral at bedtime  DULoxetine 60 milliGRAM(s) Oral daily  famotidine    Tablet 20 milliGRAM(s) Oral two times a day  fenofibrate Tablet 145 milliGRAM(s) Oral daily  fluticasone propionate 50 MICROgram(s)/spray Nasal Spray 1 Spray(s) Both Nostrils two times a day  furosemide    Tablet 40 milliGRAM(s) Oral every 24 hours  gabapentin 300 milliGRAM(s) Oral two times a day  levothyroxine 125 MICROGram(s) Oral daily  meclizine 12.5 milliGRAM(s) Oral two times a day  melatonin 3 milliGRAM(s) Oral at bedtime  rOPINIRole 5 milliGRAM(s) Oral at bedtime  senna 2 Tablet(s) Oral at bedtime  spironolactone 25 milliGRAM(s) Oral daily    MEDICATIONS  (PRN):  acetaminophen     Tablet .. 650 milliGRAM(s) Oral every 6 hours PRN Temp greater or equal to 38C (100.4F), Mild Pain (1 - 3)  sodium chloride 0.65% Nasal 1 Spray(s) Both Nostrils two times a day PRN Nasal Congestion  traMADol 25 milliGRAM(s) Oral every 8 hours PRN Severe Pain (7 - 10)      CAPILLARY BLOOD GLUCOSE        I&O's Summary    05 Jun 2022 07:01  -  06 Jun 2022 07:00  --------------------------------------------------------  IN: 720 mL / OUT: 3100 mL / NET: -2380 mL        PHYSICAL EXAM:  Vital Signs Last 24 Hrs  T(C): 36.9 (06 Jun 2022 04:54), Max: 37.6 (05 Jun 2022 21:40)  T(F): 98.5 (06 Jun 2022 04:54), Max: 99.6 (05 Jun 2022 21:40)  HR: 86 (06 Jun 2022 04:54) (82 - 99)  BP: 147/75 (06 Jun 2022 04:54) (144/63 - 153/77)  BP(mean): --  RR: 18 (06 Jun 2022 04:54) (18 - 18)  SpO2: 96% (06 Jun 2022 04:54) (95% - 100%)    GENERAL: NAD, lying in bed comfortably  HEAD:  Atraumatic, Normocephalic  EYES: EOMI, PERRLA, conjunctiva and sclera clear  ENT: Moist mucous membranes  NECK: Supple, No JVD  CHEST/LUNG: Clear to auscultation bilaterally; No rales, rhonchi, wheezing, or rubs. Unlabored respirations  HEART: Regular rate and rhythm; No murmurs, rubs, or gallops  ABDOMEN: BSx4; Soft, nontender, nondistended  EXTREMITIES: 3+ pitting edema in bilateral lower extremities  NERVOUS SYSTEM:  A&Ox3, no focal deficits   SKIN: No rashes or lesions    LABS:                        10.7   11.73 )-----------( 316      ( 06 Jun 2022 06:11 )             34.7     RADIOLOGY & ADDITIONAL TESTS:  Results Reviewed:   Imaging Personally Reviewed:  Electrocardiogram Personally Reviewed:    COORDINATION OF CARE:  Care Discussed with Consultants/Other Providers [Y/N]:  Prior or Outpatient Records Reviewed [Y/N]:

## 2022-06-06 NOTE — PROGRESS NOTE ADULT - PROBLEM SELECTOR PLAN 10
TSH 6.12 on admission  C/w Synthroid 125 mcg daily  Patient will need f/u lab work outpt DVT PPx: Lovenox SubQ    Diet: DASH    Dispo: Rehab

## 2022-06-06 NOTE — PROGRESS NOTE ADULT - PROBLEM SELECTOR PLAN 2
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 less likely hypercalcemia of malignancy  Patient had been getting CaCO3 TID with meals at rehab, will hold  PTH low at 4  Vitamin D 35.4  S/p Lasix and normal saline   C/w PO lasix   Patient on risedronate weekly at home/rehab, will resume on d/c Patient with 4-5 days of constipation, had been getting Tramadol/Oxy for pain control in rehab, S/p disimpaction in ED  CT showed large amount of stool in the distal colon w/ mild edema c/f stercoral colitis  Constipation likely multifactorial in setting of hypercalcemia, opioid induced and limited mobility   Patient with limited mobility 2/2 pelvic fracture, had limited ambulation with cane at rehab  Bowel Regimen- senna, dulcolax  S/p Movantic 5/29  PRN simethicone for gas pain

## 2022-06-06 NOTE — PROGRESS NOTE ADULT - PROBLEM SELECTOR PLAN 4
UA on admission with Large leuk est, positive nitrite, >50 WBCs, Many bacteria   CT AP with findings c/s cystitis  Likely 2/2 urostasis i/s/o constipation  Patient had been on Cefazolin for MSSA bacteremia to end on 5/29  UCx showed >3 organisms likely contamination  BCx showed NGTD  S/p Zosyn 5/29-6/2 VA duplex LLE showed non-occlusive thrombus proximal femoral vein, External iliac vein  Started on Eliquis 10mg BID x 7 days 5/31-6/7, then transition to eliquis 5 BID

## 2022-06-06 NOTE — PROGRESS NOTE ADULT - PROBLEM SELECTOR PROBLEM 11
Patient comes ER ( dropped off) with complaints of ABD PAIN- intermittent cramping with blood in stools (states had blood in stool in 2014 had colonoscopy states related to dehydration). Patient states vomited x2 episodes, stooling episodes x6+ since 2300. States feels week from not drinking- not eating. Patient states was at a family cookout last PM and no one else is ill ( 10 people) shrimp fried rice  
Prophylactic measure

## 2022-06-07 DIAGNOSIS — F39 UNSPECIFIED MOOD [AFFECTIVE] DISORDER: ICD-10-CM

## 2022-06-07 DIAGNOSIS — R41.89 OTHER SYMPTOMS AND SIGNS INVOLVING COGNITIVE FUNCTIONS AND AWARENESS: ICD-10-CM

## 2022-06-07 DIAGNOSIS — R45.1 RESTLESSNESS AND AGITATION: ICD-10-CM

## 2022-06-07 LAB
ALBUMIN SERPL ELPH-MCNC: 2.9 G/DL — LOW (ref 3.3–5)
ALP SERPL-CCNC: 53 U/L — SIGNIFICANT CHANGE UP (ref 40–120)
ALT FLD-CCNC: <5 U/L — SIGNIFICANT CHANGE UP (ref 4–33)
ANION GAP SERPL CALC-SCNC: 12 MMOL/L — SIGNIFICANT CHANGE UP (ref 7–14)
AST SERPL-CCNC: 16 U/L — SIGNIFICANT CHANGE UP (ref 4–32)
BILIRUB SERPL-MCNC: 0.5 MG/DL — SIGNIFICANT CHANGE UP (ref 0.2–1.2)
BUN SERPL-MCNC: 10 MG/DL — SIGNIFICANT CHANGE UP (ref 7–23)
CALCIUM SERPL-MCNC: 9.2 MG/DL — SIGNIFICANT CHANGE UP (ref 8.4–10.5)
CHLORIDE SERPL-SCNC: 99 MMOL/L — SIGNIFICANT CHANGE UP (ref 98–107)
CO2 SERPL-SCNC: 21 MMOL/L — LOW (ref 22–31)
CREAT SERPL-MCNC: 0.72 MG/DL — SIGNIFICANT CHANGE UP (ref 0.5–1.3)
EGFR: 86 ML/MIN/1.73M2 — SIGNIFICANT CHANGE UP
GLUCOSE SERPL-MCNC: 99 MG/DL — SIGNIFICANT CHANGE UP (ref 70–99)
HCT VFR BLD CALC: 31.9 % — LOW (ref 34.5–45)
HGB BLD-MCNC: 10.1 G/DL — LOW (ref 11.5–15.5)
MAGNESIUM SERPL-MCNC: 1.7 MG/DL — SIGNIFICANT CHANGE UP (ref 1.6–2.6)
MCHC RBC-ENTMCNC: 26.6 PG — LOW (ref 27–34)
MCHC RBC-ENTMCNC: 31.7 GM/DL — LOW (ref 32–36)
MCV RBC AUTO: 83.9 FL — SIGNIFICANT CHANGE UP (ref 80–100)
NRBC # BLD: 0 /100 WBCS — SIGNIFICANT CHANGE UP
NRBC # FLD: 0 K/UL — SIGNIFICANT CHANGE UP
PHOSPHATE SERPL-MCNC: 2.9 MG/DL — SIGNIFICANT CHANGE UP (ref 2.5–4.5)
PLATELET # BLD AUTO: 314 K/UL — SIGNIFICANT CHANGE UP (ref 150–400)
POTASSIUM SERPL-MCNC: 4.1 MMOL/L — SIGNIFICANT CHANGE UP (ref 3.5–5.3)
POTASSIUM SERPL-SCNC: 4.1 MMOL/L — SIGNIFICANT CHANGE UP (ref 3.5–5.3)
PROT SERPL-MCNC: 6.4 G/DL — SIGNIFICANT CHANGE UP (ref 6–8.3)
RBC # BLD: 3.8 M/UL — SIGNIFICANT CHANGE UP (ref 3.8–5.2)
RBC # FLD: 16.2 % — HIGH (ref 10.3–14.5)
SODIUM SERPL-SCNC: 132 MMOL/L — LOW (ref 135–145)
WBC # BLD: 10.12 K/UL — SIGNIFICANT CHANGE UP (ref 3.8–10.5)
WBC # FLD AUTO: 10.12 K/UL — SIGNIFICANT CHANGE UP (ref 3.8–10.5)

## 2022-06-07 PROCEDURE — 93970 EXTREMITY STUDY: CPT | Mod: 26

## 2022-06-07 PROCEDURE — 99232 SBSQ HOSP IP/OBS MODERATE 35: CPT | Mod: GC

## 2022-06-07 PROCEDURE — 90792 PSYCH DIAG EVAL W/MED SRVCS: CPT

## 2022-06-07 RX ORDER — MAGNESIUM SULFATE 500 MG/ML
2 VIAL (ML) INJECTION ONCE
Refills: 0 | Status: DISCONTINUED | OUTPATIENT
Start: 2022-06-07 | End: 2022-06-07

## 2022-06-07 RX ORDER — MAGNESIUM OXIDE 400 MG ORAL TABLET 241.3 MG
400 TABLET ORAL
Refills: 0 | Status: COMPLETED | OUTPATIENT
Start: 2022-06-07 | End: 2022-06-07

## 2022-06-07 RX ORDER — MAGNESIUM OXIDE 400 MG ORAL TABLET 241.3 MG
400 TABLET ORAL ONCE
Refills: 0 | Status: DISCONTINUED | OUTPATIENT
Start: 2022-06-07 | End: 2022-06-07

## 2022-06-07 RX ADMIN — MAGNESIUM OXIDE 400 MG ORAL TABLET 400 MILLIGRAM(S): 241.3 TABLET ORAL at 12:36

## 2022-06-07 RX ADMIN — Medication 1 SPRAY(S): at 05:19

## 2022-06-07 RX ADMIN — SODIUM CHLORIDE 3 GRAM(S): 9 INJECTION INTRAMUSCULAR; INTRAVENOUS; SUBCUTANEOUS at 12:36

## 2022-06-07 RX ADMIN — Medication 5 MILLIGRAM(S): at 21:47

## 2022-06-07 RX ADMIN — Medication 40 MILLIGRAM(S): at 12:38

## 2022-06-07 RX ADMIN — FAMOTIDINE 20 MILLIGRAM(S): 10 INJECTION INTRAVENOUS at 05:18

## 2022-06-07 RX ADMIN — TRAMADOL HYDROCHLORIDE 25 MILLIGRAM(S): 50 TABLET ORAL at 13:38

## 2022-06-07 RX ADMIN — SPIRONOLACTONE 25 MILLIGRAM(S): 25 TABLET, FILM COATED ORAL at 05:18

## 2022-06-07 RX ADMIN — Medication 145 MILLIGRAM(S): at 12:36

## 2022-06-07 RX ADMIN — DULOXETINE HYDROCHLORIDE 60 MILLIGRAM(S): 30 CAPSULE, DELAYED RELEASE ORAL at 12:36

## 2022-06-07 RX ADMIN — SODIUM CHLORIDE 3 GRAM(S): 9 INJECTION INTRAMUSCULAR; INTRAVENOUS; SUBCUTANEOUS at 05:18

## 2022-06-07 RX ADMIN — Medication 1 PACKET(S): at 06:19

## 2022-06-07 RX ADMIN — Medication 1 SPRAY(S): at 17:16

## 2022-06-07 RX ADMIN — Medication 125 MICROGRAM(S): at 05:17

## 2022-06-07 RX ADMIN — SODIUM CHLORIDE 3 GRAM(S): 9 INJECTION INTRAMUSCULAR; INTRAVENOUS; SUBCUTANEOUS at 21:47

## 2022-06-07 RX ADMIN — MAGNESIUM OXIDE 400 MG ORAL TABLET 400 MILLIGRAM(S): 241.3 TABLET ORAL at 08:51

## 2022-06-07 RX ADMIN — GABAPENTIN 300 MILLIGRAM(S): 400 CAPSULE ORAL at 05:18

## 2022-06-07 RX ADMIN — Medication 12.5 MILLIGRAM(S): at 05:20

## 2022-06-07 RX ADMIN — GABAPENTIN 300 MILLIGRAM(S): 400 CAPSULE ORAL at 17:16

## 2022-06-07 RX ADMIN — Medication 3 MILLIGRAM(S): at 21:46

## 2022-06-07 RX ADMIN — FAMOTIDINE 20 MILLIGRAM(S): 10 INJECTION INTRAVENOUS at 17:16

## 2022-06-07 RX ADMIN — ROPINIROLE 5 MILLIGRAM(S): 8 TABLET, FILM COATED, EXTENDED RELEASE ORAL at 21:45

## 2022-06-07 RX ADMIN — APIXABAN 10 MILLIGRAM(S): 2.5 TABLET, FILM COATED ORAL at 05:21

## 2022-06-07 RX ADMIN — Medication 12.5 MILLIGRAM(S): at 17:16

## 2022-06-07 RX ADMIN — MAGNESIUM OXIDE 400 MG ORAL TABLET 400 MILLIGRAM(S): 241.3 TABLET ORAL at 17:15

## 2022-06-07 RX ADMIN — SENNA PLUS 2 TABLET(S): 8.6 TABLET ORAL at 21:47

## 2022-06-07 RX ADMIN — TRAMADOL HYDROCHLORIDE 25 MILLIGRAM(S): 50 TABLET ORAL at 12:38

## 2022-06-07 NOTE — BH CONSULTATION LIAISON ASSESSMENT NOTE - CASE SUMMARY
Met with the patient along with pa-s, impression and plan discussed and agreed upon.   I coordinated above plan with medicine resident as well.

## 2022-06-07 NOTE — BH CONSULTATION LIAISON ASSESSMENT NOTE - NSBHCONSULTFOLLOWAFTERCARE_PSY_A_CORE FT
No indications for psychiatric intervention at this time. Patient may benefit from frequent orientation. Patient not a danger to herself or others at this time, stable for discharge with psychiatric f/u in rehab facility.  no psychiatric contraindications to d/c planning as long as behaviors under control, no prn needs, and a safe d/c plan is coordinated with sister.   Psychiatric f/u at rehab will be beneficial

## 2022-06-07 NOTE — PROGRESS NOTE ADULT - ASSESSMENT
77F PMHx w/ SVT s/p ablation, HTN, HLD, hypothyroidism, vertigo, peripheral neuropathy, osteoarthritis, cervical ca, recent admission for pubic rami fracture and MSSA bacteremia Tx w/ Cefazolin through 5/29, presented to ED from rehab on 5/29 w/ 3 wks of worsening abdominal pain and 4-5 days of obstipation, s/p disimpaction in ED, UA c/f UTI, on Zosyn, found to be hypercalcemic to 12.4 (13.1 corrected), Ca improved 77F PMHx w/ SVT s/p ablation, HTN, HLD, hypothyroidism, vertigo, peripheral neuropathy, osteoarthritis, cervical ca, recent admission for pubic rami fracture and MSSA bacteremia Tx w/ Cefazolin through 5/29, presented to ED from rehab on 5/29 w/ 3 wks of worsening abdominal pain and 4-5 days of obstipation, s/p disimpaction in ED, UA c/f UTI, on Zosyn, found to be hypercalcemic to 12.4 (13.1 corrected), Ca improved. Now being treated for hypernatremia and intermittent agitation

## 2022-06-07 NOTE — BH CONSULTATION LIAISON ASSESSMENT NOTE - CURRENT MEDICATION
MEDICATIONS  (STANDING):  bisacodyl 5 milliGRAM(s) Oral at bedtime  DULoxetine 60 milliGRAM(s) Oral daily  famotidine    Tablet 20 milliGRAM(s) Oral two times a day  fenofibrate Tablet 145 milliGRAM(s) Oral daily  fluticasone propionate 50 MICROgram(s)/spray Nasal Spray 1 Spray(s) Both Nostrils two times a day  furosemide    Tablet 40 milliGRAM(s) Oral every 24 hours  gabapentin 300 milliGRAM(s) Oral two times a day  levothyroxine 125 MICROGram(s) Oral daily  magnesium oxide 400 milliGRAM(s) Oral three times a day with meals  meclizine 12.5 milliGRAM(s) Oral two times a day  melatonin 3 milliGRAM(s) Oral at bedtime  rOPINIRole 5 milliGRAM(s) Oral at bedtime  senna 2 Tablet(s) Oral at bedtime  sodium chloride 3 Gram(s) Oral three times a day  spironolactone 25 milliGRAM(s) Oral daily    MEDICATIONS  (PRN):  acetaminophen     Tablet .. 650 milliGRAM(s) Oral every 6 hours PRN Temp greater or equal to 38C (100.4F), Mild Pain (1 - 3)  aluminum hydroxide/magnesium hydroxide/simethicone Suspension 30 milliLiter(s) Oral once PRN Dyspepsia  sodium chloride 0.65% Nasal 1 Spray(s) Both Nostrils two times a day PRN Nasal Congestion  traMADol 25 milliGRAM(s) Oral every 8 hours PRN Severe Pain (7 - 10)

## 2022-06-07 NOTE — BH CONSULTATION LIAISON ASSESSMENT NOTE - RISK ASSESSMENT
Prolonged inpatient stay with episodes of previous agitation, numerous acute health problems  Protective factors include supportive family, responsibility to pet, treatment compliance, and hope for the future.  No acute risk for suicide or violence.

## 2022-06-07 NOTE — PROGRESS NOTE ADULT - SUBJECTIVE AND OBJECTIVE BOX
PROGRESS NOTE:     Patient is a 77y old  Female who presents with a chief complaint of abdominal pain (06 Jun 2022 07:02)      SUBJECTIVE / OVERNIGHT EVENTS:    ADDITIONAL REVIEW OF SYSTEMS:    MEDICATIONS  (STANDING):  bisacodyl 5 milliGRAM(s) Oral at bedtime  DULoxetine 60 milliGRAM(s) Oral daily  famotidine    Tablet 20 milliGRAM(s) Oral two times a day  fenofibrate Tablet 145 milliGRAM(s) Oral daily  fluticasone propionate 50 MICROgram(s)/spray Nasal Spray 1 Spray(s) Both Nostrils two times a day  furosemide    Tablet 40 milliGRAM(s) Oral every 24 hours  gabapentin 300 milliGRAM(s) Oral two times a day  levothyroxine 125 MICROGram(s) Oral daily  meclizine 12.5 milliGRAM(s) Oral two times a day  melatonin 3 milliGRAM(s) Oral at bedtime  rOPINIRole 5 milliGRAM(s) Oral at bedtime  senna 2 Tablet(s) Oral at bedtime  sodium chloride 3 Gram(s) Oral three times a day  spironolactone 25 milliGRAM(s) Oral daily    MEDICATIONS  (PRN):  acetaminophen     Tablet .. 650 milliGRAM(s) Oral every 6 hours PRN Temp greater or equal to 38C (100.4F), Mild Pain (1 - 3)  aluminum hydroxide/magnesium hydroxide/simethicone Suspension 30 milliLiter(s) Oral once PRN Dyspepsia  sodium chloride 0.65% Nasal 1 Spray(s) Both Nostrils two times a day PRN Nasal Congestion  traMADol 25 milliGRAM(s) Oral every 8 hours PRN Severe Pain (7 - 10)      CAPILLARY BLOOD GLUCOSE        I&O's Summary    06 Jun 2022 07:01  -  07 Jun 2022 07:00  --------------------------------------------------------  IN: 798 mL / OUT: 2800 mL / NET: -2002 mL        PHYSICAL EXAM:  Vital Signs Last 24 Hrs  T(C): 36.8 (07 Jun 2022 04:55), Max: 37 (06 Jun 2022 12:16)  T(F): 98.3 (07 Jun 2022 04:55), Max: 98.6 (06 Jun 2022 12:16)  HR: 88 (07 Jun 2022 04:55) (82 - 94)  BP: 137/74 (07 Jun 2022 04:55) (130/68 - 138/60)  BP(mean): --  RR: 19 (07 Jun 2022 04:55) (18 - 20)  SpO2: 97% (07 Jun 2022 04:55) (95% - 97%)    CONSTITUTIONAL: NAD, well-developed  RESPIRATORY: Normal respiratory effort; lungs are clear to auscultation bilaterally  CARDIOVASCULAR: Regular rate and rhythm, normal S1 and S2, no murmur/rub/gallop; No lower extremity edema; Peripheral pulses are 2+ bilaterally  ABDOMEN: Nontender to palpation, normoactive bowel sounds, no rebound/guarding  MUSCULOSKELETAL: no clubbing or cyanosis of digits; no joint swelling or tenderness to palpation  PSYCH: A+O to person, place, and time; affect appropriate    LABS:                        10.1   10.12 )-----------( 314      ( 07 Jun 2022 06:30 )             31.9     06-06    128<L>  |  97<L>  |  8   ----------------------------<  118<H>  4.0   |  21<L>  |  0.64    Ca    8.8      06 Jun 2022 06:11  Phos  2.0     06-06  Mg     1.60     06-06    TPro  6.5  /  Alb  3.0<L>  /  TBili  0.4  /  DBili  x   /  AST  15  /  ALT  <5<L>  /  AlkPhos  55  06-06                RADIOLOGY & ADDITIONAL TESTS:  Results Reviewed:   Imaging Personally Reviewed:  Electrocardiogram Personally Reviewed:    COORDINATION OF CARE:  Care Discussed with Consultants/Other Providers [Y/N]:  Prior or Outpatient Records Reviewed [Y/N]:   PROGRESS NOTE:     Patient is a 77y old  Female who presents with a chief complaint of abdominal pain (06 Jun 2022 07:02)      SUBJECTIVE / OVERNIGHT EVENTS: No acute events overnight. Patient seen and examined at bedside. Patient A+Ox3 and not agitated but remains paranoid     ADDITIONAL REVIEW OF SYSTEMS: Negative     MEDICATIONS  (STANDING):  bisacodyl 5 milliGRAM(s) Oral at bedtime  DULoxetine 60 milliGRAM(s) Oral daily  famotidine    Tablet 20 milliGRAM(s) Oral two times a day  fenofibrate Tablet 145 milliGRAM(s) Oral daily  fluticasone propionate 50 MICROgram(s)/spray Nasal Spray 1 Spray(s) Both Nostrils two times a day  furosemide    Tablet 40 milliGRAM(s) Oral every 24 hours  gabapentin 300 milliGRAM(s) Oral two times a day  levothyroxine 125 MICROGram(s) Oral daily  meclizine 12.5 milliGRAM(s) Oral two times a day  melatonin 3 milliGRAM(s) Oral at bedtime  rOPINIRole 5 milliGRAM(s) Oral at bedtime  senna 2 Tablet(s) Oral at bedtime  sodium chloride 3 Gram(s) Oral three times a day  spironolactone 25 milliGRAM(s) Oral daily    MEDICATIONS  (PRN):  acetaminophen     Tablet .. 650 milliGRAM(s) Oral every 6 hours PRN Temp greater or equal to 38C (100.4F), Mild Pain (1 - 3)  aluminum hydroxide/magnesium hydroxide/simethicone Suspension 30 milliLiter(s) Oral once PRN Dyspepsia  sodium chloride 0.65% Nasal 1 Spray(s) Both Nostrils two times a day PRN Nasal Congestion  traMADol 25 milliGRAM(s) Oral every 8 hours PRN Severe Pain (7 - 10)      CAPILLARY BLOOD GLUCOSE        I&O's Summary    06 Jun 2022 07:01  -  07 Jun 2022 07:00  --------------------------------------------------------  IN: 798 mL / OUT: 2800 mL / NET: -2002 mL        PHYSICAL EXAM:  Vital Signs Last 24 Hrs  T(C): 36.8 (07 Jun 2022 04:55), Max: 37 (06 Jun 2022 12:16)  T(F): 98.3 (07 Jun 2022 04:55), Max: 98.6 (06 Jun 2022 12:16)  HR: 88 (07 Jun 2022 04:55) (82 - 94)  BP: 137/74 (07 Jun 2022 04:55) (130/68 - 138/60)  BP(mean): --  RR: 19 (07 Jun 2022 04:55) (18 - 20)  SpO2: 97% (07 Jun 2022 04:55) (95% - 97%)    GENERAL: NAD, lying in bed comfortably  HEAD:  Atraumatic, Normocephalic  EYES: EOMI, PERRLA, conjunctiva and sclera clear  ENT: Moist mucous membranes  NECK: Supple, No JVD  CHEST/LUNG: Clear to auscultation bilaterally; No rales, rhonchi, wheezing, or rubs. Unlabored respirations  HEART: Regular rate and rhythm; No murmurs, rubs, or gallops  ABDOMEN: BSx4; Soft, nontender, nondistended  EXTREMITIES: 3+ pitting edema in bilateral lower extremities  NERVOUS SYSTEM:  A&Ox3, no focal deficits   SKIN: No rashes or lesions    LABS:                        10.1   10.12 )-----------( 314      ( 07 Jun 2022 06:30 )             31.9     06-06    128<L>  |  97<L>  |  8   ----------------------------<  118<H>  4.0   |  21<L>  |  0.64    Ca    8.8      06 Jun 2022 06:11  Phos  2.0     06-06  Mg     1.60     06-06    TPro  6.5  /  Alb  3.0<L>  /  TBili  0.4  /  DBili  x   /  AST  15  /  ALT  <5<L>  /  AlkPhos  55  06-06                RADIOLOGY & ADDITIONAL TESTS:  Results Reviewed:   Imaging Personally Reviewed:  Electrocardiogram Personally Reviewed:    COORDINATION OF CARE:  Care Discussed with Consultants/Other Providers [Y/N]:  Prior or Outpatient Records Reviewed [Y/N]:   PROGRESS NOTE:     Patient is a 77y old  Female who presents with a chief complaint of abdominal pain (06 Jun 2022 07:02)      SUBJECTIVE / OVERNIGHT EVENTS: No acute events overnight. Patient seen and examined at bedside. Patient A+Ox3 and not agitated but remains paranoid     No n/v/d/cp/sob    ADDITIONAL REVIEW OF SYSTEMS: Negative     MEDICATIONS  (STANDING):  bisacodyl 5 milliGRAM(s) Oral at bedtime  DULoxetine 60 milliGRAM(s) Oral daily  famotidine    Tablet 20 milliGRAM(s) Oral two times a day  fenofibrate Tablet 145 milliGRAM(s) Oral daily  fluticasone propionate 50 MICROgram(s)/spray Nasal Spray 1 Spray(s) Both Nostrils two times a day  furosemide    Tablet 40 milliGRAM(s) Oral every 24 hours  gabapentin 300 milliGRAM(s) Oral two times a day  levothyroxine 125 MICROGram(s) Oral daily  meclizine 12.5 milliGRAM(s) Oral two times a day  melatonin 3 milliGRAM(s) Oral at bedtime  rOPINIRole 5 milliGRAM(s) Oral at bedtime  senna 2 Tablet(s) Oral at bedtime  sodium chloride 3 Gram(s) Oral three times a day  spironolactone 25 milliGRAM(s) Oral daily    MEDICATIONS  (PRN):  acetaminophen     Tablet .. 650 milliGRAM(s) Oral every 6 hours PRN Temp greater or equal to 38C (100.4F), Mild Pain (1 - 3)  aluminum hydroxide/magnesium hydroxide/simethicone Suspension 30 milliLiter(s) Oral once PRN Dyspepsia  sodium chloride 0.65% Nasal 1 Spray(s) Both Nostrils two times a day PRN Nasal Congestion  traMADol 25 milliGRAM(s) Oral every 8 hours PRN Severe Pain (7 - 10)      CAPILLARY BLOOD GLUCOSE        I&O's Summary    06 Jun 2022 07:01  -  07 Jun 2022 07:00  --------------------------------------------------------  IN: 798 mL / OUT: 2800 mL / NET: -2002 mL        PHYSICAL EXAM:  Vital Signs Last 24 Hrs  T(C): 36.8 (07 Jun 2022 04:55), Max: 37 (06 Jun 2022 12:16)  T(F): 98.3 (07 Jun 2022 04:55), Max: 98.6 (06 Jun 2022 12:16)  HR: 88 (07 Jun 2022 04:55) (82 - 94)  BP: 137/74 (07 Jun 2022 04:55) (130/68 - 138/60)  BP(mean): --  RR: 19 (07 Jun 2022 04:55) (18 - 20)  SpO2: 97% (07 Jun 2022 04:55) (95% - 97%)    GENERAL: NAD, lying in bed comfortably  HEAD:  Atraumatic, Normocephalic  EYES: EOMI, PERRLA, conjunctiva and sclera clear  ENT: Moist mucous membranes  NECK: Supple, No JVD  CHEST/LUNG: Clear to auscultation bilaterally; No rales, rhonchi, wheezing, or rubs. Unlabored respirations  HEART: Regular rate and rhythm; No murmurs, rubs, or gallops  ABDOMEN: BSx4; Soft, nontender, nondistended  EXTREMITIES: 3+ pitting edema in bilateral lower extremities  NERVOUS SYSTEM:  A&Ox3, no focal deficits   SKIN: No rashes or lesions    LABS:                        10.1   10.12 )-----------( 314      ( 07 Jun 2022 06:30 )             31.9     06-06    128<L>  |  97<L>  |  8   ----------------------------<  118<H>  4.0   |  21<L>  |  0.64    Ca    8.8      06 Jun 2022 06:11  Phos  2.0     06-06  Mg     1.60     06-06    TPro  6.5  /  Alb  3.0<L>  /  TBili  0.4  /  DBili  x   /  AST  15  /  ALT  <5<L>  /  AlkPhos  55  06-06                RADIOLOGY & ADDITIONAL TESTS:  Results Reviewed:   Imaging Personally Reviewed:  Electrocardiogram Personally Reviewed:    COORDINATION OF CARE:  Care Discussed with Consultants/Other Providers [Y/N]:  Prior or Outpatient Records Reviewed [Y/N]:

## 2022-06-07 NOTE — PROGRESS NOTE ADULT - PROBLEM SELECTOR PLAN 4
VA duplex LLE showed non-occlusive thrombus proximal femoral vein, External iliac vein  Started on Eliquis 10mg BID x 7 days 5/31-6/7, then transition to eliquis 5 BID Patient with 4-5 days of constipation, had been getting Tramadol/Oxy for pain control in rehab, S/p disimpaction in ED  -CT showed large amount of stool in the distal colon w/ mild edema c/f stercoral colitis  -Constipation likely multifactorial in setting of hypercalcemia, opioid induced and limited mobility   - Patient with limited mobility 2/2 pelvic fracture, had limited ambulation with cane at rehab  - Continue with bowel regimen   - Last BM 6/7

## 2022-06-07 NOTE — PROGRESS NOTE ADULT - PROBLEM SELECTOR PLAN 5
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

## 2022-06-07 NOTE — PROGRESS NOTE ADULT - PROBLEM SELECTOR PLAN 6
Patient had been on Cefazolin for MSSA bacteremia to end on 5/29  Found 2/2 being febrile on last admission, unclear source  SHAUN on 05/03 showed no vegetations  S/p Zosyn 5/29-6/2  BCx 5/30 showed NGTD UA on admission with Large leuk est, positive nitrite, >50 WBCs, Many bacteria   CT AP with findings c/s cystitis  Likely 2/2 urostasis i/s/o constipation  Patient had been on Cefazolin for MSSA bacteremia to end on 5/29  UCx showed >3 organisms likely contamination  BCx showed NGTD  S/p Zosyn 5/29-6/2

## 2022-06-07 NOTE — PROGRESS NOTE ADULT - PROBLEM SELECTOR PLAN 2
Patient with 4-5 days of constipation, had been getting Tramadol/Oxy for pain control in rehab, S/p disimpaction in ED  CT showed large amount of stool in the distal colon w/ mild edema c/f stercoral colitis  Constipation likely multifactorial in setting of hypercalcemia, opioid induced and limited mobility   Patient with limited mobility 2/2 pelvic fracture, had limited ambulation with cane at rehab  Bowel Regimen- senna, dulcolax  S/p Movantic 5/29  PRN simethicone for gas pain Patient with 4-5 days of constipation, had been getting Tramadol/Oxy for pain control in rehab, S/p disimpaction in ED  CT showed large amount of stool in the distal colon w/ mild edema c/f stercoral colitis  Constipation likely multifactorial in setting of hypercalcemia, opioid induced and limited mobility   Patient with limited mobility 2/2 pelvic fracture, had limited ambulation with cane at rehab  Bowel Regimen- senna, dulcolax  PRN simethicone for gas pain Patient with intermittent agitation through hospitalization with paranoid delusions   - received zyprexa 1.25 IM 6/6 for safety with good response  - psychiatry to evaluate patient further

## 2022-06-07 NOTE — BH CONSULTATION LIAISON ASSESSMENT NOTE - NSBHCHARTREVIEWVS_PSY_A_CORE FT
Vital Signs Last 24 Hrs  T(C): 36.8 (07 Jun 2022 04:55), Max: 36.8 (07 Jun 2022 04:55)  T(F): 98.3 (07 Jun 2022 04:55), Max: 98.3 (07 Jun 2022 04:55)  HR: 88 (07 Jun 2022 04:55) (88 - 94)  BP: 137/74 (07 Jun 2022 04:55) (130/68 - 137/74)  BP(mean): --  RR: 19 (07 Jun 2022 04:55) (19 - 20)  SpO2: 97% (07 Jun 2022 04:55) (96% - 97%)

## 2022-06-07 NOTE — BH CONSULTATION LIAISON ASSESSMENT NOTE - HPI (INCLUDE ILLNESS QUALITY, SEVERITY, DURATION, TIMING, CONTEXT, MODIFYING FACTORS, ASSOCIATED SIGNS AND SYMPTOMS)
77 year old female who lived at home alone prior to a fall in April and now has been in a rehab facility, with PMHx of SVT s/p ablation, HTN, HLD, hypothyroidism, vertigo, peripheral neuropathy, osteoarthritis, cervical ca, recent admission for pubic rami fracture and MSSA bacteremia Tx w/ Cefazolin to end 5/29, presenting to ED from rehab with c/o worsening abdominal pain x3wks and obstipation x4-5 days, psychiatry has been consulted for evaluation and medicine management of agitation in the hospital setting.    Met with patient in her room with sister at bedside. Patient and sister were both very upset and thrown off by the psychiatry visit because they were not informed that a consult had been made for psychiatric evaluation. Not aggressive. Patient reports mood is 'fantastic'. A&Ox3. She reports that she gets frustrated being 'taken and trapped in here for so long'. She expresses anger about her prolonged hospital/rehab stays since breaking her hip in late April. When asked if she knows why psychiatry may have been called to see her, patient says 'oh I know', but refuses to further elaborate saying 'I am great now'. Sister reports that patient has never had any history of psychiatric illness, psychiatric hospitalizations, SI, or episodes of agitation or aggression. Sister also denies any psychiatric FHx. She reports that since the weekend 'my sister has been having episodes of confusion and agitation around the same time of day'. She notes that once the patient's episode of agitation passes, she returns back to her baseline mood. Sister also reports that prior to falling and breaking her hip in April, patient was completely independent and self sufficient, able to manage all of her ADLs. Patient denies SI, HI, AH, VH, paranoid delusions, and history of psychiatric illness.  77 year old female who lived at home alone prior to a fall in April and now has been in a rehab facility, with PMHx of SVT s/p ablation, HTN, HLD, hypothyroidism, vertigo, peripheral neuropathy, osteoarthritis, cervical ca, recent admission for pubic rami fracture and MSSA bacteremia Tx w/ Cefazolin to end 5/29, presenting to ED from rehab with c/o worsening abdominal pain x3wks and constipation x4-5 days, psychiatry has been consulted for evaluation and medicine management of agitation in the hospital setting.    Met with patient in her room with sister at bedside. Patient and sister were both very upset and thrown off by the psychiatry visit because they were not informed that a consult had been made for psychiatric evaluation. Not aggressive. Both amenable to consult after reason was explained.     Patient reports mood is 'fantastic'. A&Ox3. She reports that she gets frustrated being 'taken and trapped in here for so long'. She expresses anger about her prolonged hospital/rehab stays since breaking her hip in late April. When asked if she knows why psychiatry may have been called to see her, patient says 'oh I know', but refuses to further elaborate saying 'I am great now'. Sister reports that patient has never had any history of psychiatric illness, psychiatric hospitalizations, SI, or episodes of agitation or aggression. Sister also denies any psychiatric FHx. She reports that since the weekend 'my sister has been having episodes of confusion and agitation around the same time of day'. She notes that once the patient's episode of agitation passes, she returns back to her baseline mood. Sister also reports that prior to falling and breaking her hip in April, patient was completely independent and self sufficient, able to manage all of her ADLs. Patient denies SI, HI, AH, VH, paranoid delusions, and history of psychiatric illness.

## 2022-06-07 NOTE — PROGRESS NOTE ADULT - PROBLEM SELECTOR PLAN 9
TSH 6.12 on admission  C/w Synthroid 125 mcg daily  Patient will need f/u lab work outpt C/w spironolactone and PO lasix for HTN and edema

## 2022-06-07 NOTE — BH CONSULTATION LIAISON ASSESSMENT NOTE - PRIVATE RESIDENCE
Typically lives in a house alone, but has been between the hospital and a rehab facility since late April.

## 2022-06-07 NOTE — PROGRESS NOTE ADULT - PROBLEM SELECTOR PLAN 1
sodium 128 on 6/6   - urine studies consistent with SIADH   - started patient on salt tabs and fluid restricted  - will continue to trend BMP sodium 128 on 6/6   - urine studies consistent with SIADH   - started patient on salt tabs and fluid restricted  - sodium improved to 132 6/7, will continue to monitor

## 2022-06-07 NOTE — PROGRESS NOTE ADULT - PROBLEM SELECTOR PLAN 7
Patient with pelvic fractures found on previous admission  Was getting Tramadol and Oxy at rehab  Presenting from rehab  Switch to Tramadol 25mg q6prn for severe pain- caution with opioids given constipation Patient had been on Cefazolin for MSSA bacteremia to end on 5/29  Found 2/2 being febrile on last admission, unclear source  SHAUN on 05/03 showed no vegetations  S/p Zosyn 5/29-6/2  BCx 5/30 showed NGTD

## 2022-06-07 NOTE — PROGRESS NOTE ADULT - PROBLEM SELECTOR PLAN 3
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

## 2022-06-07 NOTE — BH CONSULTATION LIAISON ASSESSMENT NOTE - NSBHCHARTREVIEWLAB_PSY_A_CORE FT
10.1   10.12 )-----------( 314      ( 07 Jun 2022 06:30 )             31.9     06-07    132<L>  |  99  |  10  ----------------------------<  99  4.1   |  21<L>  |  0.72    Ca    9.2      07 Jun 2022 06:30  Phos  2.9     06-07  Mg     1.70     06-07    TPro  6.4  /  Alb  2.9<L>  /  TBili  0.5  /  DBili  x   /  AST  16  /  ALT  <5  /  AlkPhos  53  06-07

## 2022-06-07 NOTE — PROGRESS NOTE ADULT - PROBLEM SELECTOR PLAN 8
C/w spironolactone and PO lasix for HTN and edema Patient with pelvic fractures found on previous admission  Was getting Tramadol and Oxy at rehab  Presenting from rehab  Switch to Tramadol 25mg q6prn for severe pain- caution with opioids given constipation

## 2022-06-08 ENCOUNTER — TRANSCRIPTION ENCOUNTER (OUTPATIENT)
Age: 77
End: 2022-06-08

## 2022-06-08 VITALS
SYSTOLIC BLOOD PRESSURE: 122 MMHG | OXYGEN SATURATION: 97 % | TEMPERATURE: 98 F | DIASTOLIC BLOOD PRESSURE: 50 MMHG | HEART RATE: 84 BPM | RESPIRATION RATE: 17 BRPM

## 2022-06-08 DIAGNOSIS — R41.0 DISORIENTATION, UNSPECIFIED: ICD-10-CM

## 2022-06-08 LAB
ALBUMIN SERPL ELPH-MCNC: 3 G/DL — LOW (ref 3.3–5)
ALP SERPL-CCNC: 50 U/L — SIGNIFICANT CHANGE UP (ref 40–120)
ALT FLD-CCNC: <5 U/L — LOW (ref 4–33)
ANION GAP SERPL CALC-SCNC: 11 MMOL/L — SIGNIFICANT CHANGE UP (ref 7–14)
AST SERPL-CCNC: 16 U/L — SIGNIFICANT CHANGE UP (ref 4–32)
BILIRUB SERPL-MCNC: 0.3 MG/DL — SIGNIFICANT CHANGE UP (ref 0.2–1.2)
BUN SERPL-MCNC: 12 MG/DL — SIGNIFICANT CHANGE UP (ref 7–23)
CALCIUM SERPL-MCNC: 9.1 MG/DL — SIGNIFICANT CHANGE UP (ref 8.4–10.5)
CHLORIDE SERPL-SCNC: 100 MMOL/L — SIGNIFICANT CHANGE UP (ref 98–107)
CO2 SERPL-SCNC: 21 MMOL/L — LOW (ref 22–31)
CREAT SERPL-MCNC: 0.68 MG/DL — SIGNIFICANT CHANGE UP (ref 0.5–1.3)
EGFR: 90 ML/MIN/1.73M2 — SIGNIFICANT CHANGE UP
FOLATE SERPL-MCNC: 7.9 NG/ML — SIGNIFICANT CHANGE UP (ref 3.1–17.5)
GLUCOSE SERPL-MCNC: 106 MG/DL — HIGH (ref 70–99)
HCT VFR BLD CALC: 30.6 % — LOW (ref 34.5–45)
HGB BLD-MCNC: 9.7 G/DL — LOW (ref 11.5–15.5)
MAGNESIUM SERPL-MCNC: 1.8 MG/DL — SIGNIFICANT CHANGE UP (ref 1.6–2.6)
MCHC RBC-ENTMCNC: 27.1 PG — SIGNIFICANT CHANGE UP (ref 27–34)
MCHC RBC-ENTMCNC: 31.7 GM/DL — LOW (ref 32–36)
MCV RBC AUTO: 85.5 FL — SIGNIFICANT CHANGE UP (ref 80–100)
NRBC # BLD: 0 /100 WBCS — SIGNIFICANT CHANGE UP
NRBC # FLD: 0 K/UL — SIGNIFICANT CHANGE UP
PHOSPHATE SERPL-MCNC: 2.7 MG/DL — SIGNIFICANT CHANGE UP (ref 2.5–4.5)
PLATELET # BLD AUTO: 303 K/UL — SIGNIFICANT CHANGE UP (ref 150–400)
POTASSIUM SERPL-MCNC: 4.4 MMOL/L — SIGNIFICANT CHANGE UP (ref 3.5–5.3)
POTASSIUM SERPL-SCNC: 4.4 MMOL/L — SIGNIFICANT CHANGE UP (ref 3.5–5.3)
PROT SERPL-MCNC: 6.3 G/DL — SIGNIFICANT CHANGE UP (ref 6–8.3)
RBC # BLD: 3.58 M/UL — LOW (ref 3.8–5.2)
RBC # FLD: 16.5 % — HIGH (ref 10.3–14.5)
SODIUM SERPL-SCNC: 132 MMOL/L — LOW (ref 135–145)
TSH SERPL-MCNC: 5.9 UIU/ML — HIGH (ref 0.27–4.2)
VIT B12 SERPL-MCNC: 464 PG/ML — SIGNIFICANT CHANGE UP (ref 200–900)
WBC # BLD: 9.94 K/UL — SIGNIFICANT CHANGE UP (ref 3.8–10.5)
WBC # FLD AUTO: 9.94 K/UL — SIGNIFICANT CHANGE UP (ref 3.8–10.5)

## 2022-06-08 PROCEDURE — 99232 SBSQ HOSP IP/OBS MODERATE 35: CPT | Mod: GC

## 2022-06-08 RX ORDER — CALCIUM CARBONATE 500(1250)
1 TABLET ORAL
Qty: 0 | Refills: 0 | DISCHARGE

## 2022-06-08 RX ORDER — ROPINIROLE 8 MG/1
1 TABLET, FILM COATED, EXTENDED RELEASE ORAL
Qty: 0 | Refills: 0 | DISCHARGE

## 2022-06-08 RX ORDER — LEVOTHYROXINE SODIUM 125 MCG
1 TABLET ORAL
Qty: 0 | Refills: 0 | DISCHARGE

## 2022-06-08 RX ORDER — LEVOTHYROXINE SODIUM 125 MCG
1 TABLET ORAL
Qty: 0 | Refills: 0 | DISCHARGE
Start: 2022-06-08

## 2022-06-08 RX ORDER — ROPINIROLE 8 MG/1
1 TABLET, FILM COATED, EXTENDED RELEASE ORAL
Qty: 0 | Refills: 0 | DISCHARGE
Start: 2022-06-08

## 2022-06-08 RX ORDER — FLUTICASONE PROPIONATE 50 MCG
1 SPRAY, SUSPENSION NASAL
Qty: 0 | Refills: 0 | DISCHARGE
Start: 2022-06-08

## 2022-06-08 RX ORDER — DULOXETINE HYDROCHLORIDE 30 MG/1
1 CAPSULE, DELAYED RELEASE ORAL
Qty: 0 | Refills: 0 | DISCHARGE
Start: 2022-06-08

## 2022-06-08 RX ORDER — SPIRONOLACTONE 25 MG/1
1 TABLET, FILM COATED ORAL
Qty: 0 | Refills: 0 | DISCHARGE

## 2022-06-08 RX ORDER — FAMOTIDINE 10 MG/ML
1 INJECTION INTRAVENOUS
Qty: 0 | Refills: 0 | DISCHARGE
Start: 2022-06-08

## 2022-06-08 RX ORDER — OLANZAPINE 15 MG/1
1.25 TABLET, FILM COATED ORAL EVERY 6 HOURS
Refills: 0 | Status: DISCONTINUED | OUTPATIENT
Start: 2022-06-08 | End: 2022-06-08

## 2022-06-08 RX ORDER — FENOFIBRATE,MICRONIZED 130 MG
1 CAPSULE ORAL
Qty: 0 | Refills: 0 | DISCHARGE

## 2022-06-08 RX ORDER — GABAPENTIN 400 MG/1
1 CAPSULE ORAL
Qty: 0 | Refills: 0 | DISCHARGE
Start: 2022-06-08

## 2022-06-08 RX ORDER — LANOLIN ALCOHOL/MO/W.PET/CERES
1 CREAM (GRAM) TOPICAL
Qty: 0 | Refills: 0 | DISCHARGE
Start: 2022-06-08

## 2022-06-08 RX ORDER — FENOFIBRATE,MICRONIZED 130 MG
1 CAPSULE ORAL
Qty: 0 | Refills: 0 | DISCHARGE
Start: 2022-06-08

## 2022-06-08 RX ORDER — FUROSEMIDE 40 MG
1 TABLET ORAL
Qty: 0 | Refills: 0 | DISCHARGE
Start: 2022-06-08

## 2022-06-08 RX ORDER — MECLIZINE HCL 12.5 MG
1 TABLET ORAL
Qty: 0 | Refills: 0 | DISCHARGE

## 2022-06-08 RX ORDER — SPIRONOLACTONE 25 MG/1
1 TABLET, FILM COATED ORAL
Qty: 0 | Refills: 0 | DISCHARGE
Start: 2022-06-08

## 2022-06-08 RX ORDER — SODIUM CHLORIDE 9 MG/ML
3 INJECTION INTRAMUSCULAR; INTRAVENOUS; SUBCUTANEOUS
Qty: 0 | Refills: 0 | DISCHARGE
Start: 2022-06-08

## 2022-06-08 RX ORDER — DULOXETINE HYDROCHLORIDE 30 MG/1
1 CAPSULE, DELAYED RELEASE ORAL
Qty: 0 | Refills: 0 | DISCHARGE

## 2022-06-08 RX ORDER — TRAMADOL HYDROCHLORIDE 50 MG/1
0.5 TABLET ORAL
Qty: 0 | Refills: 0 | DISCHARGE
Start: 2022-06-08

## 2022-06-08 RX ORDER — TRAMADOL HYDROCHLORIDE 50 MG/1
2 TABLET ORAL
Qty: 0 | Refills: 0 | DISCHARGE
Start: 2022-06-08

## 2022-06-08 RX ORDER — TIZANIDINE 4 MG/1
0 TABLET ORAL
Qty: 0 | Refills: 0 | DISCHARGE

## 2022-06-08 RX ORDER — TRAMADOL HYDROCHLORIDE 50 MG/1
1 TABLET ORAL
Qty: 0 | Refills: 0 | DISCHARGE

## 2022-06-08 RX ORDER — APIXABAN 2.5 MG/1
1 TABLET, FILM COATED ORAL
Qty: 0 | Refills: 0 | DISCHARGE
Start: 2022-06-08

## 2022-06-08 RX ORDER — SENNA PLUS 8.6 MG/1
2 TABLET ORAL
Qty: 0 | Refills: 0 | DISCHARGE
Start: 2022-06-08

## 2022-06-08 RX ORDER — FUROSEMIDE 40 MG
1 TABLET ORAL
Qty: 0 | Refills: 0 | DISCHARGE

## 2022-06-08 RX ORDER — MECLIZINE HCL 12.5 MG
1 TABLET ORAL
Qty: 0 | Refills: 0 | DISCHARGE
Start: 2022-06-08

## 2022-06-08 RX ORDER — GABAPENTIN 400 MG/1
1 CAPSULE ORAL
Qty: 0 | Refills: 0 | DISCHARGE

## 2022-06-08 RX ADMIN — APIXABAN 5 MILLIGRAM(S): 2.5 TABLET, FILM COATED ORAL at 05:55

## 2022-06-08 RX ADMIN — SODIUM CHLORIDE 3 GRAM(S): 9 INJECTION INTRAMUSCULAR; INTRAVENOUS; SUBCUTANEOUS at 05:56

## 2022-06-08 RX ADMIN — GABAPENTIN 300 MILLIGRAM(S): 400 CAPSULE ORAL at 05:55

## 2022-06-08 RX ADMIN — Medication 1 SPRAY(S): at 05:55

## 2022-06-08 RX ADMIN — Medication 12.5 MILLIGRAM(S): at 05:55

## 2022-06-08 RX ADMIN — SPIRONOLACTONE 25 MILLIGRAM(S): 25 TABLET, FILM COATED ORAL at 05:55

## 2022-06-08 RX ADMIN — Medication 145 MILLIGRAM(S): at 13:26

## 2022-06-08 RX ADMIN — Medication 125 MICROGRAM(S): at 05:55

## 2022-06-08 RX ADMIN — DULOXETINE HYDROCHLORIDE 60 MILLIGRAM(S): 30 CAPSULE, DELAYED RELEASE ORAL at 13:27

## 2022-06-08 RX ADMIN — FAMOTIDINE 20 MILLIGRAM(S): 10 INJECTION INTRAVENOUS at 05:55

## 2022-06-08 RX ADMIN — SODIUM CHLORIDE 3 GRAM(S): 9 INJECTION INTRAMUSCULAR; INTRAVENOUS; SUBCUTANEOUS at 13:27

## 2022-06-08 RX ADMIN — Medication 40 MILLIGRAM(S): at 13:26

## 2022-06-08 NOTE — DISCHARGE NOTE NURSING/CASE MANAGEMENT/SOCIAL WORK - PATIENT PORTAL LINK FT
You can access the FollowMyHealth Patient Portal offered by [facility] by registering at the following website: [email or web address]. By joining [facility]’s FollowMyHealth portal, you will also be able to view your health information using other applications (apps) compatible with our system.

## 2022-06-08 NOTE — PROGRESS NOTE ADULT - PROBLEM SELECTOR PLAN 3
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 less likely hypercalcemia of malignancy  Patient had been getting CaCO3 TID with meals at rehab, will hold  PTH low at 4  Vitamin D 35.4  S/p Lasix and normal saline   C/w PO lasix   Patient on risedronate weekly at home/rehab, will resume on d/c RESOLVED  Patient presented with 4-5 days of constipation, had been getting Tramadol/Oxy for pain control in rehab, S/p disimpaction in ED  CT showed large amount of stool in the distal colon w/ mild edema c/f stercoral colitis  Constipation likely multifactorial in setting of hypercalcemia, opioid induced and limited mobility   Patient with limited mobility 2/2 pelvic fracture, had limited ambulation with cane at rehab  Bowel Regimen- senna, dulcolax  S/p Movantic 5/29  PRN simethicone for gas pain

## 2022-06-08 NOTE — PROGRESS NOTE ADULT - ATTENDING COMMENTS
77F w/ hx of obesity, SVT s/p ablation, HTN, hypothyroidism, vertigo, peripheral neuropathy, cervical CA, recent admission for pubic rami fracture and MSSA bacteremia (On Cefazolin via PICC now completed 5/29) presented to ED from rehab for worsening abdominal pain and 4-5 days of obstipation found to have stercoral colitis, hypercalcemia, hydronephrosis and UTI.  #Stercoral colitis: S/p disimpaction in ED. c/w bowel regimen and enemas, abd XR 6/1 unremarkable.   #Hypercalcemia: Improved, suspect in setting of calcium carbonate tabs TID, here s/p IVF and lasix, and holding calcium carbonate tabs, PTH appropriately low. Ca improved.   #Hydronephrosis on  CTAP: Failed TOVx3, knox ordered. C/w bowel regimen in case constipation was also contributing to retention.   #Cystitis: UA+ with leukocytosis, urinary retention, completing abx course w Zosyn today.  #MSSA Bacteremia Recent admission for MSSA bacteremia, completed Cefazolin 5/29,  surveillance culture neg  #Pubic Rami Fractures: Recent admission for fractures with dc to [facility]. Non-operative - c/w pain control. PT recommends [facility]   #Anasarca: lasix and aldactone, continue  #Acute delirium: Risk factors including age, hospitalization, poor sleep wake cycle, above infection. No new focal neurological changes, continue frequent reorientation, delirium precautions
77F w/ hx of obesity, SVT s/p ablation, HTN, hypothyroidism, vertigo, peripheral neuropathy, cervical CA, recent admission for pubic rami fracture and MSSA bacteremia (On Cefazolin via PICC now completed 5/29) presented to ED from rehab for worsening abdominal pain and 4-5 days of obstipation found to have stercoral colitis, hypercalcemia, hydronephrosis and UTI.    #Stercoral colitis: S/p disimpaction in ED. c/w bowel regimen and enemas, abd XR 6/1 unremarkable. Having BMs without issue now. No abdominal pain today, benign abd exam.   #Hypercalcemia: Improved, suspect in setting of calcium carbonate tabs TID, here s/p IVF and lasix, and holding calcium carbonate tabs, PTH appropriately low. Ca improved.   #Hydronephrosis on  CTAP: Failed TOVx3, knox ordered. C/w bowel regimen in case constipation was also contributing to retention.   #Cystitis: UA+ with leukocytosis, urinary retention, completed abx course w Zosyn.  #MSSA Bacteremia Recent admission for MSSA bacteremia, completed Cefazolin 5/29,  surveillance culture neg  #Pubic Rami Fractures: Recent admission for fractures with dc to [facility]. Non-operative - c/w pain control. PT recommends [facility]   #Anasarca: continue regimen with lasix and aldactone    Episode of confusion overnight, likely transient delirium. AOx3 this AM.     Medically optimized for DC to rehab.
77F w/ hx of obesity, SVT s/p ablation, HTN, hypothyroidism, vertigo, peripheral neuropathy, cervical CA, recent admission for pubic rami fracture and MSSA bacteremia (On Cefazolin via PICC now completed 5/29) presented to ED from rehab for worsening abdominal pain and 4-5 days of obstipation found to have stercoral colitis, hypercalcemia, hydronephrosis and UTI.    #Stercoral colitis: S/p disimpaction in ED. c/w bowel regimen and enemas, abd XR 6/1 unremarkable. Having BMs without issue now. No abdominal pain today, benign abd exam.   #Hypercalcemia: Improved, suspect in setting of calcium carbonate tabs TID, here s/p IVF and lasix, and holding calcium carbonate tabs, PTH appropriately low. Ca improved.   #Hydronephrosis on  CTAP: Failed TOVx3, knox ordered. C/w bowel regimen in case constipation was also contributing to retention.   #Cystitis: UA+ with leukocytosis, urinary retention, completed abx course w Zosyn.  #MSSA Bacteremia Recent admission for MSSA bacteremia, completed Cefazolin 5/29,  surveillance culture neg  #Pubic Rami Fractures: Recent admission for fractures with dc to [facility]. Non-operative - c/w pain control. PT recommends [facility]   #Anasarca: continue regimen with lasix and aldactone    Medically optimized for DC to rehab.
77F w/ hx of obesity, SVT s/p ablation, HTN, hypothyroidism, vertigo, peripheral neuropathy, cervical CA, recent admission for pubic rami fracture and MSSA bacteremia (On Cefazolin via PICC now completed 5/29) presented to ED from rehab for worsening abdominal pain and 4-5 days of obstipation found to have stercoral colitis, hypercalcemia, hydronephrosis and UTI.    #Stercoral colitis: S/p disimpaction in ED. c/w bowel regimen and enemas, abd XR 6/1 unremarkable. Having BMs without issue now. No abdominal pain today, benign abd exam.   #Hypercalcemia: Improved, suspect in setting of calcium carbonate tabs TID, here s/p IVF and lasix, and holding calcium carbonate tabs, PTH appropriately low. Ca improved.   #Hydronephrosis on  CTAP: Failed TOVx3, knox ordered. C/w bowel regimen in case constipation was also contributing to retention.   #Cystitis: UA+ with leukocytosis, urinary retention, completed abx course w Zosyn.  #MSSA Bacteremia Recent admission for MSSA bacteremia, completed Cefazolin 5/29,  surveillance culture neg  #Pubic Rami Fractures: Recent admission for fractures with dc to [facility]. Non-operative - c/w pain control. PT recommends [facility]   #Anasarca: continue regimen with lasix and aldactone    Moderate hyponatremia on labs today. Clinically euvolemic. Peña and Uosm elevated. TSH wnl. Likely SIADH. Will start salt tabs, repeat BMP in AM.
77F w/ hx of obesity, SVT s/p ablation, HTN, hypothyroidism, vertigo, peripheral neuropathy, cervical CA, recent admission for pubic rami fracture and MSSA bacteremia (On Cefazolin via PICC to end 5/29) presented to ED from rehab for worsening abdominal pain and 4-5 days of obstipation found to have stercoral colitis, hypercalcemia, hydronephrosis and UTI.  #Stercoral colitis: S/p disimpaction in ED. c/w bowel regimen and enemas  #Hypercalcemia: ?iatrogenic in setting of calcium carbonate tabs TID, given IVF and Lasix, Ca still elevated, PTH low. Trend CMP, c/w hydration   #Hydronephrosis on  CTAP: Likely obstructive component in setting of large stool burden, however patient also had issues with urinary retention during previous admission. Monitor UOP via Block. Monitor BMP  #Cystitis: UA+ with leukocytosis, urinary retentionc c/w Zosyn  #MSSA Bacteremia Recent admission for MSSA bacteremia, completed Cefazolin 5/29, f/u surveillance cultures, to be sent 5/30.   #Pubic Rami Fractures: Recent admission for fractures with dc to [facility]. Non-operative - c/w pain control. PT recommends [facility]   #Anasarca: lasix and aldactone, will continue
77F w/ hx of obesity, SVT s/p ablation, HTN, hypothyroidism, vertigo, peripheral neuropathy, cervical CA, recent admission for pubic rami fracture and MSSA bacteremia (On Cefazolin via PICC now completed 5/29) presented to ED from rehab for worsening abdominal pain and 4-5 days of obstipation found to have stercoral colitis, hypercalcemia, hydronephrosis and UTI.    #Stercoral colitis: S/p disimpaction in ED. c/w bowel regimen and enemas, abd XR 6/1 unremarkable. Having BMs without issue now. No abdominal pain today, benign abd exam.   #Hypercalcemia: Improved, suspect in setting of calcium carbonate tabs TID, here s/p IVF and lasix, and holding calcium carbonate tabs, PTH appropriately low. Ca improved.   #Hydronephrosis on  CTAP: Failed TOVx3, knox ordered. C/w bowel regimen in case constipation was also contributing to retention.   #Cystitis: UA+ with leukocytosis, urinary retention, completed abx course w Zosyn.  #MSSA Bacteremia Recent admission for MSSA bacteremia, completed Cefazolin 5/29,  surveillance culture neg  #Pubic Rami Fractures: Recent admission for fractures with dc to [facility]. Non-operative - c/w pain control. PT recommends [facility]   #Anasarca: continue regimen with lasix and aldactone    Medically optimized for DC to rehab. HS updated sister, on board with plan of care. DC planning 35 mins.
77F w/ hx of obesity, SVT s/p ablation, HTN, hypothyroidism, vertigo, peripheral neuropathy, cervical CA, recent admission for pubic rami fracture and MSSA bacteremia (On Cefazolin via PICC now completed 5/29) presented to ED from rehab for worsening abdominal pain and 4-5 days of constipation found to have stercoral colitis, hypercalcemia, hydronephrosis and UTI.  #Stercoral colitis: S/p disimpaction in ED. c/w bowel regimen and enemas  #Hypercalcemia: Suspect in setting of calcium carbonate tabs TID, given IVF and Lasix, and holding calcium carbonate tabs, PTH appropriately low. Ca downtrending.   #Hydronephrosis on  CTAP: Likely obstructive component in setting of large stool burden, however patient also had issues with urinary retention during previous admission. Monitor UOP via Block. Monitor BMP  #Cystitis: UA+ with leukocytosis, urinary retention c/w Zosyn  #MSSA Bacteremia Recent admission for MSSA bacteremia, completed Cefazolin 5/29, f/u surveillance cultures, sent 5/30.   #Pubic Rami Fractures: Recent admission for fractures with dc to [facility]. Non-operative - c/w pain control. PT recommends [facility]   #Anasarca: lasix and aldactone, will continue
77F w/ hx of obesity, SVT s/p ablation, HTN, hypothyroidism, vertigo, peripheral neuropathy, cervical CA, recent admission for pubic rami fracture and MSSA bacteremia (On Cefazolin via PICC now completed 5/29) presented to ED from rehab for worsening abdominal pain and 4-5 days of obstipation found to have stercoral colitis, hypercalcemia, hydronephrosis and UTI.    #Stercoral colitis: S/p disimpaction in ED. c/w bowel regimen and enemas, abd XR 6/1 unremarkable. Having BMs without issue now. No abdominal pain today, benign abd exam.   #Hypercalcemia: Improved, suspect in setting of calcium carbonate tabs TID, here s/p IVF and lasix, and holding calcium carbonate tabs, PTH appropriately low. Ca improved.   #Hydronephrosis on  CTAP: Failed TOVx3, knox ordered. C/w bowel regimen in case constipation was also contributing to retention.   #Cystitis: UA+ with leukocytosis, urinary retention, completed abx course w Zosyn.  #MSSA Bacteremia Recent admission for MSSA bacteremia, completed Cefazolin 5/29,  surveillance culture neg  #Pubic Rami Fractures: Recent admission for fractures with dc to [facility]. Non-operative - c/w pain control. PT recommends [facility]   #Anasarca: continue regimen with lasix and aldactone    Mild hyponatremia, improving. Clinically euvolemic. Peña and Uosm elevated. TSH wnl. Likely SIADH. c/w salt tabs.    Awaiting [facility]. Stable for dc.    I spent 35 minutes counseling this patient and coordinating their discharge.
77F w/ hx of obesity, SVT s/p ablation, HTN, hypothyroidism, vertigo, peripheral neuropathy, cervical CA, recent admission for pubic rami fracture and MSSA bacteremia (On Cefazolin via PICC now completed 5/29) presented to ED from rehab for worsening abdominal pain and 4-5 days of constipation found to have stercoral colitis, hypercalcemia, hydronephrosis and UTI.  #Stercoral colitis: S/p disimpaction in ED. c/w bowel regimen and enemas  #Hypercalcemia: Improved, suspect in setting of calcium carbonate tabs TID, here s/p IVF and lasix, and holding calcium carbonate tabs, PTH appropriately low. Ca downtrending.   #Hydronephrosis on  CTAP: Likely obstructive component in setting of large stool burden, however patient also had issues with urinary retention during previous admission. Monitor UOP via Block. Monitor BMP  #Cystitis: UA+ with leukocytosis, urinary retention on zosyn to end tomorrow  #MSSA Bacteremia Recent admission for MSSA bacteremia, completed Cefazolin 5/29,  surveillance culture neg  #Pubic Rami Fractures: Recent admission for fractures with dc to [facility]. Non-operative - c/w pain control. PT recommends [facility]   #Anasarca: lasix and aldactone, will continue
77F w/ hx of obesity, SVT s/p ablation, HTN, hypothyroidism, vertigo, peripheral neuropathy, cervical CA, recent admission for pubic rami fracture and MSSA bacteremia (On Cefazolin via PICC now completed 5/29) presented to ED from rehab for worsening abdominal pain and 4-5 days of obstipation found to have stercoral colitis, hypercalcemia, hydronephrosis and UTI.    #Stercoral colitis: S/p disimpaction in ED. c/w bowel regimen and enemas, abd XR 6/1 unremarkable. Having BMs without issue now. No abdominal pain today, benign abd exam.   #Hypercalcemia: Improved, suspect in setting of calcium carbonate tabs TID, here s/p IVF and lasix, and holding calcium carbonate tabs, PTH appropriately low. Ca improved.   #Hydronephrosis on  CTAP: Failed TOVx3, knox ordered. C/w bowel regimen in case constipation was also contributing to retention.   #Cystitis: UA+ with leukocytosis, urinary retention, completed abx course w Zosyn.  #MSSA Bacteremia Recent admission for MSSA bacteremia, completed Cefazolin 5/29,  surveillance culture neg  #Pubic Rami Fractures: Recent admission for fractures with dc to [facility]. Non-operative - c/w pain control. PT recommends [facility]   #Anasarca: continue regimen with lasix and aldactone    Mild hyponatremia today, improving. Clinically euvolemic. Peña and Uosm elevated. TSH wnl. Likely SIADH. c/w salt tabs, repeat BMP in AM.    Awaiting [facility].

## 2022-06-08 NOTE — PROGRESS NOTE ADULT - PROBLEM SELECTOR PLAN 2
Patient with 4-5 days of constipation, had been getting Tramadol/Oxy for pain control in rehab, S/p disimpaction in ED  CT showed large amount of stool in the distal colon w/ mild edema c/f stercoral colitis  Constipation likely multifactorial in setting of hypercalcemia, opioid induced and limited mobility   Patient with limited mobility 2/2 pelvic fracture, had limited ambulation with cane at rehab  Bowel Regimen- senna, dulcolax  S/p Movantic 5/29  PRN simethicone for gas pain likely 2/2 prolonged hospitalization  BH following, appreciate their recs  start melatonin at bedtime  start Zyprexa PRN 1.25mg q6h for agitation

## 2022-06-08 NOTE — PROGRESS NOTE ADULT - SUBJECTIVE AND OBJECTIVE BOX
Clarissa Ricks MD  PGY-1, Internal Medicine      PROGRESS NOTE:     Patient is a 77y old  Female who presents with a chief complaint of abdominal pain (07 Jun 2022 07:29)      SUBJECTIVE / OVERNIGHT EVENTS:  No acute events overnight    went to bedside to assess patient. Offered no new complaints.    ADDITIONAL REVIEW OF SYSTEMS:  denies N/V/D, SOB, cough, CP, palpitations, dysuria, hematuria, rashes, wounds, fatigue, fevers    MEDICATIONS  (STANDING):  apixaban 5 milliGRAM(s) Oral every 12 hours  bisacodyl 5 milliGRAM(s) Oral at bedtime  DULoxetine 60 milliGRAM(s) Oral daily  famotidine    Tablet 20 milliGRAM(s) Oral two times a day  fenofibrate Tablet 145 milliGRAM(s) Oral daily  fluticasone propionate 50 MICROgram(s)/spray Nasal Spray 1 Spray(s) Both Nostrils two times a day  furosemide    Tablet 40 milliGRAM(s) Oral every 24 hours  gabapentin 300 milliGRAM(s) Oral two times a day  levothyroxine 125 MICROGram(s) Oral daily  meclizine 12.5 milliGRAM(s) Oral two times a day  melatonin 3 milliGRAM(s) Oral at bedtime  rOPINIRole 5 milliGRAM(s) Oral at bedtime  senna 2 Tablet(s) Oral at bedtime  sodium chloride 3 Gram(s) Oral three times a day  spironolactone 25 milliGRAM(s) Oral daily    MEDICATIONS  (PRN):  acetaminophen     Tablet .. 650 milliGRAM(s) Oral every 6 hours PRN Temp greater or equal to 38C (100.4F), Mild Pain (1 - 3)  aluminum hydroxide/magnesium hydroxide/simethicone Suspension 30 milliLiter(s) Oral once PRN Dyspepsia  sodium chloride 0.65% Nasal 1 Spray(s) Both Nostrils two times a day PRN Nasal Congestion  traMADol 25 milliGRAM(s) Oral every 8 hours PRN Severe Pain (7 - 10)      CAPILLARY BLOOD GLUCOSE        I&O's Summary    07 Jun 2022 07:01  -  08 Jun 2022 07:00  --------------------------------------------------------  IN: 834 mL / OUT: 1500 mL / NET: -666 mL        PHYSICAL EXAM:  Vital Signs Last 24 Hrs  T(C): 36.8 (08 Jun 2022 05:50), Max: 37.3 (07 Jun 2022 22:08)  T(F): 98.3 (08 Jun 2022 05:50), Max: 99.2 (07 Jun 2022 22:08)  HR: 83 (08 Jun 2022 05:50) (83 - 89)  BP: 143/62 (08 Jun 2022 05:50) (141/57 - 146/71)  BP(mean): --  RR: 18 (08 Jun 2022 05:50) (17 - 19)  SpO2: 100% (08 Jun 2022 05:50) (96% - 100%)    CONSTITUTIONAL: NAD  HENT: PERRL  RESPIRATORY: CTA BL  CARDIOVASCULAR: RRR without murmurs or gallops  ABDOMEN: soft, non-tender, non-distended, BS wnl  EXTREMITIES: euvolemic, warm and well-perfused, 2+ peripheral pulses  SKIN: warm, dry, pink; no rashes or wounds  NEURO: non-focal, moving all extremities equally, symmetric facial expression, speech intact, withdraws to noxious stimuli  PSYCH: AOx4, calm, pleasant, cooperative  MSK: no joint effusions, symmetric muscle bulk, no muscle tenderness    LABS:                        9.7    9.94  )-----------( 303      ( 08 Jun 2022 06:35 )             30.6     06-08    132<L>  |  100  |  12  ----------------------------<  106<H>  4.4   |  21<L>  |  0.68    Ca    9.1      08 Jun 2022 06:35  Phos  2.7     06-08  Mg     1.80     06-08    TPro  6.3  /  Alb  3.0<L>  /  TBili  0.3  /  DBili  x   /  AST  16  /  ALT  <5<L>  /  AlkPhos  50  06-08                RADIOLOGY & ADDITIONAL TESTS:   Clarissa Ricks MD  PGY-1, Internal Medicine      PROGRESS NOTE:     Patient is a 77y old  Female who presents with a chief complaint of abdominal pain (07 Jun 2022 07:29)      SUBJECTIVE / OVERNIGHT EVENTS:  No acute events overnight    went to bedside to assess patient. She briefly felt nauseous and was worried she might vomit, but the feeling went away with a PRN medication. She reports that her stools are well-formed. She thinks that the swelling in her legs has improved.    ADDITIONAL REVIEW OF SYSTEMS:  denies N/V/D, SOB, cough, CP, palpitations, dysuria, hematuria, rashes, wounds, fatigue, fevers    MEDICATIONS  (STANDING):  apixaban 5 milliGRAM(s) Oral every 12 hours  bisacodyl 5 milliGRAM(s) Oral at bedtime  DULoxetine 60 milliGRAM(s) Oral daily  famotidine    Tablet 20 milliGRAM(s) Oral two times a day  fenofibrate Tablet 145 milliGRAM(s) Oral daily  fluticasone propionate 50 MICROgram(s)/spray Nasal Spray 1 Spray(s) Both Nostrils two times a day  furosemide    Tablet 40 milliGRAM(s) Oral every 24 hours  gabapentin 300 milliGRAM(s) Oral two times a day  levothyroxine 125 MICROGram(s) Oral daily  meclizine 12.5 milliGRAM(s) Oral two times a day  melatonin 3 milliGRAM(s) Oral at bedtime  rOPINIRole 5 milliGRAM(s) Oral at bedtime  senna 2 Tablet(s) Oral at bedtime  sodium chloride 3 Gram(s) Oral three times a day  spironolactone 25 milliGRAM(s) Oral daily    MEDICATIONS  (PRN):  acetaminophen     Tablet .. 650 milliGRAM(s) Oral every 6 hours PRN Temp greater or equal to 38C (100.4F), Mild Pain (1 - 3)  aluminum hydroxide/magnesium hydroxide/simethicone Suspension 30 milliLiter(s) Oral once PRN Dyspepsia  sodium chloride 0.65% Nasal 1 Spray(s) Both Nostrils two times a day PRN Nasal Congestion  traMADol 25 milliGRAM(s) Oral every 8 hours PRN Severe Pain (7 - 10)      CAPILLARY BLOOD GLUCOSE        I&O's Summary    07 Jun 2022 07:01  -  08 Jun 2022 07:00  --------------------------------------------------------  IN: 834 mL / OUT: 1500 mL / NET: -666 mL        PHYSICAL EXAM:  Vital Signs Last 24 Hrs  T(C): 36.8 (08 Jun 2022 05:50), Max: 37.3 (07 Jun 2022 22:08)  T(F): 98.3 (08 Jun 2022 05:50), Max: 99.2 (07 Jun 2022 22:08)  HR: 83 (08 Jun 2022 05:50) (83 - 89)  BP: 143/62 (08 Jun 2022 05:50) (141/57 - 146/71)  BP(mean): --  RR: 18 (08 Jun 2022 05:50) (17 - 19)  SpO2: 100% (08 Jun 2022 05:50) (96% - 100%)    CONSTITUTIONAL: NAD, elderly female  HENT: PERRL, no JVD  RESPIRATORY: CTA BL, unlabored on RA  CARDIOVASCULAR: RRR without murmurs or gallops  ABDOMEN: soft, non-tender, non-distended, BS wnl  EXTREMITIES: euvolemic, warm and well-perfused, 2+ peripheral pulses; 1+ pitting edema on BL LE  SKIN: warm, dry, pink; no rashes or wounds  NEURO: non-focal, moving all extremities equally, symmetric facial expression, speech intact, withdraws to noxious stimuli  PSYCH: AOx4, calm, pleasant, cooperative  MSK: no joint effusions, symmetric muscle bulk, no muscle tenderness    LABS:                        9.7    9.94  )-----------( 303      ( 08 Jun 2022 06:35 )             30.6     06-08    132<L>  |  100  |  12  ----------------------------<  106<H>  4.4   |  21<L>  |  0.68    Ca    9.1      08 Jun 2022 06:35  Phos  2.7     06-08  Mg     1.80     06-08    TPro  6.3  /  Alb  3.0<L>  /  TBili  0.3  /  DBili  x   /  AST  16  /  ALT  <5<L>  /  AlkPhos  50  06-08    folate WNL  vitamin B12 WNL  TSH 5.9 (appropriate given patient's age)              RADIOLOGY & ADDITIONAL TESTS:  Ultrasound showed LLE DVT

## 2022-06-08 NOTE — PROGRESS NOTE ADULT - PROBLEM SELECTOR PLAN 9
TSH 6.12 on admission  C/w Synthroid 125 mcg daily  Patient will need f/u lab work outpt C/w spironolactone and PO lasix for HTN and edema  monitor vitals per routine  BP control adequate at this time

## 2022-06-08 NOTE — PROGRESS NOTE ADULT - PROBLEM SELECTOR PLAN 1
sodium 128 on 6/6   - urine studies consistent with SIADH   - started patient on salt tabs and fluid restricted  - will continue to trend BMP sodium 128 on 6/6   - urine studies consistent with SIADH   - c/w patient on salt tabs and fluid restricted  - will continue to trend BMP  - SNa now normalizing

## 2022-06-08 NOTE — DISCHARGE NOTE NURSING/CASE MANAGEMENT/SOCIAL WORK - NSDCPEFALRISK_GEN_ALL_CORE
For information on Fall & Injury Prevention, visit: [email or web address] OR  [email or web address] OR  https://www.cdc.gov/steadi/patient.html

## 2022-06-08 NOTE — PROGRESS NOTE ADULT - PROBLEM SELECTOR PLAN 6
Patient had been on Cefazolin for MSSA bacteremia to end on 5/29  Found 2/2 being febrile on last admission, unclear source  SHAUN on 05/03 showed no vegetations  S/p Zosyn 5/29-6/2  BCx 5/30 showed NGTD UA on admission with Large leuk est, positive nitrite, >50 WBCs, Many bacteria   CT AP with findings c/s cystitis  Likely 2/2 urostasis i/s/o constipation  Patient had been on Cefazolin for MSSA bacteremia to end on 5/29  UCx showed >3 organisms likely contamination  BCx showed NGTD  S/p Zosyn 5/29-6/2  c/w Block  TOV when appropriate

## 2022-08-08 NOTE — PROGRESS NOTE ADULT - PROBLEM SELECTOR PLAN 6
- Patient takes spironolactone at home. Held on admission due to KATHERINE   - BPs within goal during admission  - continue to hold iso HypoNa see ambulance record

## 2022-10-25 ENCOUNTER — INPATIENT (INPATIENT)
Facility: HOSPITAL | Age: 77
LOS: 30 days | Discharge: NURSING, REHAB OR LONG-TERM CARE | End: 2022-11-25
Attending: STUDENT IN AN ORGANIZED HEALTH CARE EDUCATION/TRAINING PROGRAM | Admitting: STUDENT IN AN ORGANIZED HEALTH CARE EDUCATION/TRAINING PROGRAM
Payer: MEDICARE

## 2022-10-25 VITALS
DIASTOLIC BLOOD PRESSURE: 58 MMHG | OXYGEN SATURATION: 100 % | HEIGHT: 65.5 IN | TEMPERATURE: 98 F | RESPIRATION RATE: 16 BRPM | HEART RATE: 98 BPM | SYSTOLIC BLOOD PRESSURE: 121 MMHG
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
## 2022-10-25 DIAGNOSIS — C53.9 MALIGNANT NEOPLASM OF CERVIX UTERI, UNSPECIFIED: ICD-10-CM

## 2022-10-25 DIAGNOSIS — I47.1 SUPRAVENTRICULAR TACHYCARDIA: ICD-10-CM

## 2022-10-25 DIAGNOSIS — E87.1 HYPO-OSMOLALITY AND HYPONATREMIA: ICD-10-CM

## 2022-10-25 DIAGNOSIS — E03.9 HYPOTHYROIDISM, UNSPECIFIED: ICD-10-CM

## 2022-10-25 DIAGNOSIS — D64.9 ANEMIA, UNSPECIFIED: ICD-10-CM

## 2022-10-25 DIAGNOSIS — I10 ESSENTIAL (PRIMARY) HYPERTENSION: ICD-10-CM

## 2022-10-25 DIAGNOSIS — D72.829 ELEVATED WHITE BLOOD CELL COUNT, UNSPECIFIED: ICD-10-CM

## 2022-10-25 DIAGNOSIS — N17.9 ACUTE KIDNEY FAILURE, UNSPECIFIED: ICD-10-CM

## 2022-10-25 DIAGNOSIS — Z29.9 ENCOUNTER FOR PROPHYLACTIC MEASURES, UNSPECIFIED: ICD-10-CM

## 2022-10-25 DIAGNOSIS — I82.90 ACUTE EMBOLISM AND THROMBOSIS OF UNSPECIFIED VEIN: ICD-10-CM

## 2022-10-25 DIAGNOSIS — S22.000A WEDGE COMPRESSION FRACTURE OF UNSPECIFIED THORACIC VERTEBRA, INITIAL ENCOUNTER FOR CLOSED FRACTURE: ICD-10-CM

## 2022-10-25 DIAGNOSIS — R05.9 COUGH, UNSPECIFIED: ICD-10-CM

## 2022-10-25 LAB
ALBUMIN SERPL ELPH-MCNC: 3.1 G/DL — LOW (ref 3.3–5)
ALBUMIN SERPL ELPH-MCNC: 3.3 G/DL — SIGNIFICANT CHANGE UP (ref 3.3–5)
ALP SERPL-CCNC: 75 U/L — SIGNIFICANT CHANGE UP (ref 40–120)
ALP SERPL-CCNC: 77 U/L — SIGNIFICANT CHANGE UP (ref 40–120)
ALT FLD-CCNC: 10 U/L — SIGNIFICANT CHANGE UP (ref 4–33)
ALT FLD-CCNC: 11 U/L — SIGNIFICANT CHANGE UP (ref 4–33)
ANION GAP SERPL CALC-SCNC: 13 MMOL/L — SIGNIFICANT CHANGE UP (ref 7–14)
ANION GAP SERPL CALC-SCNC: 14 MMOL/L — SIGNIFICANT CHANGE UP (ref 7–14)
ANION GAP SERPL CALC-SCNC: 14 MMOL/L — SIGNIFICANT CHANGE UP (ref 7–14)
ANION GAP SERPL CALC-SCNC: 15 MMOL/L — HIGH (ref 7–14)
ANISOCYTOSIS BLD QL: SLIGHT — SIGNIFICANT CHANGE UP
APPEARANCE UR: ABNORMAL
AST SERPL-CCNC: 42 U/L — HIGH (ref 4–32)
AST SERPL-CCNC: 46 U/L — HIGH (ref 4–32)
BACTERIA # UR AUTO: ABNORMAL
BASOPHILS # BLD AUTO: 0 K/UL — SIGNIFICANT CHANGE UP (ref 0–0.2)
BASOPHILS NFR BLD AUTO: 0 % — SIGNIFICANT CHANGE UP (ref 0–2)
BILIRUB SERPL-MCNC: 0.7 MG/DL — SIGNIFICANT CHANGE UP (ref 0.2–1.2)
BILIRUB SERPL-MCNC: 0.8 MG/DL — SIGNIFICANT CHANGE UP (ref 0.2–1.2)
BILIRUB UR-MCNC: NEGATIVE — SIGNIFICANT CHANGE UP
BUN SERPL-MCNC: 31 MG/DL — HIGH (ref 7–23)
BUN SERPL-MCNC: 32 MG/DL — HIGH (ref 7–23)
CALCIUM SERPL-MCNC: 9.6 MG/DL — SIGNIFICANT CHANGE UP (ref 8.4–10.5)
CALCIUM SERPL-MCNC: 9.7 MG/DL — SIGNIFICANT CHANGE UP (ref 8.4–10.5)
CHLORIDE SERPL-SCNC: 92 MMOL/L — LOW (ref 98–107)
CHLORIDE SERPL-SCNC: 92 MMOL/L — LOW (ref 98–107)
CHLORIDE SERPL-SCNC: 93 MMOL/L — LOW (ref 98–107)
CHLORIDE SERPL-SCNC: 93 MMOL/L — LOW (ref 98–107)
CK SERPL-CCNC: 536 U/L — HIGH (ref 25–170)
CO2 SERPL-SCNC: 17 MMOL/L — LOW (ref 22–31)
CO2 SERPL-SCNC: 18 MMOL/L — LOW (ref 22–31)
CO2 SERPL-SCNC: 20 MMOL/L — LOW (ref 22–31)
CO2 SERPL-SCNC: 20 MMOL/L — LOW (ref 22–31)
COLOR SPEC: SIGNIFICANT CHANGE UP
CREAT SERPL-MCNC: 1.03 MG/DL — SIGNIFICANT CHANGE UP (ref 0.5–1.3)
CREAT SERPL-MCNC: 1.07 MG/DL — SIGNIFICANT CHANGE UP (ref 0.5–1.3)
CREAT SERPL-MCNC: 1.08 MG/DL — SIGNIFICANT CHANGE UP (ref 0.5–1.3)
CREAT SERPL-MCNC: 1.13 MG/DL — SIGNIFICANT CHANGE UP (ref 0.5–1.3)
DACRYOCYTES BLD QL SMEAR: SLIGHT — SIGNIFICANT CHANGE UP
DIFF PNL FLD: ABNORMAL
EGFR: 50 ML/MIN/1.73M2 — LOW
EGFR: 53 ML/MIN/1.73M2 — LOW
EGFR: 54 ML/MIN/1.73M2 — LOW
EGFR: 56 ML/MIN/1.73M2 — LOW
EOSINOPHIL # BLD AUTO: 0 K/UL — SIGNIFICANT CHANGE UP (ref 0–0.5)
EOSINOPHIL NFR BLD AUTO: 0 % — SIGNIFICANT CHANGE UP (ref 0–6)
EPI CELLS # UR: 2 /HPF — SIGNIFICANT CHANGE UP (ref 0–5)
GIANT PLATELETS BLD QL SMEAR: PRESENT — SIGNIFICANT CHANGE UP
GLUCOSE SERPL-MCNC: 131 MG/DL — HIGH (ref 70–99)
GLUCOSE SERPL-MCNC: 131 MG/DL — HIGH (ref 70–99)
GLUCOSE SERPL-MCNC: 142 MG/DL — HIGH (ref 70–99)
GLUCOSE SERPL-MCNC: 143 MG/DL — HIGH (ref 70–99)
GLUCOSE UR QL: NEGATIVE — SIGNIFICANT CHANGE UP
HCT VFR BLD CALC: 28.9 % — LOW (ref 34.5–45)
HGB BLD-MCNC: 9.1 G/DL — LOW (ref 11.5–15.5)
IANC: 20.95 K/UL — HIGH (ref 1.8–7.4)
KETONES UR-MCNC: NEGATIVE — SIGNIFICANT CHANGE UP
LEUKOCYTE ESTERASE UR-ACNC: ABNORMAL
LYMPHOCYTES # BLD AUTO: 12.2 % — LOW (ref 13–44)
LYMPHOCYTES # BLD AUTO: 3.08 K/UL — SIGNIFICANT CHANGE UP (ref 1–3.3)
MACROCYTES BLD QL: SLIGHT — SIGNIFICANT CHANGE UP
MAGNESIUM SERPL-MCNC: 2 MG/DL — SIGNIFICANT CHANGE UP (ref 1.6–2.6)
MANUAL SMEAR VERIFICATION: SIGNIFICANT CHANGE UP
MCHC RBC-ENTMCNC: 23.7 PG — LOW (ref 27–34)
MCHC RBC-ENTMCNC: 31.5 GM/DL — LOW (ref 32–36)
MCV RBC AUTO: 75.3 FL — LOW (ref 80–100)
MONOCYTES # BLD AUTO: 0.66 K/UL — SIGNIFICANT CHANGE UP (ref 0–0.9)
MONOCYTES NFR BLD AUTO: 2.6 % — SIGNIFICANT CHANGE UP (ref 2–14)
NEUTROPHILS # BLD AUTO: 20.6 K/UL — HIGH (ref 1.8–7.4)
NEUTROPHILS NFR BLD AUTO: 80.8 % — HIGH (ref 43–77)
NEUTS BAND # BLD: 0.9 % — SIGNIFICANT CHANGE UP (ref 0–6)
NITRITE UR-MCNC: NEGATIVE — SIGNIFICANT CHANGE UP
OVALOCYTES BLD QL SMEAR: SLIGHT — SIGNIFICANT CHANGE UP
PH UR: 7.5 — SIGNIFICANT CHANGE UP (ref 5–8)
PHOSPHATE SERPL-MCNC: 2.7 MG/DL — SIGNIFICANT CHANGE UP (ref 2.5–4.5)
PLAT MORPH BLD: NORMAL — SIGNIFICANT CHANGE UP
PLATELET # BLD AUTO: 509 K/UL — HIGH (ref 150–400)
PLATELET COUNT - ESTIMATE: ABNORMAL
POIKILOCYTOSIS BLD QL AUTO: SLIGHT — SIGNIFICANT CHANGE UP
POLYCHROMASIA BLD QL SMEAR: SLIGHT — SIGNIFICANT CHANGE UP
POTASSIUM SERPL-MCNC: 4.5 MMOL/L — SIGNIFICANT CHANGE UP (ref 3.5–5.3)
POTASSIUM SERPL-MCNC: 4.6 MMOL/L — SIGNIFICANT CHANGE UP (ref 3.5–5.3)
POTASSIUM SERPL-MCNC: 5.4 MMOL/L — HIGH (ref 3.5–5.3)
POTASSIUM SERPL-MCNC: 6 MMOL/L — HIGH (ref 3.5–5.3)
POTASSIUM SERPL-SCNC: 4.5 MMOL/L — SIGNIFICANT CHANGE UP (ref 3.5–5.3)
POTASSIUM SERPL-SCNC: 4.6 MMOL/L — SIGNIFICANT CHANGE UP (ref 3.5–5.3)
POTASSIUM SERPL-SCNC: 5.4 MMOL/L — HIGH (ref 3.5–5.3)
POTASSIUM SERPL-SCNC: 6 MMOL/L — HIGH (ref 3.5–5.3)
PROT SERPL-MCNC: 8.1 G/DL — SIGNIFICANT CHANGE UP (ref 6–8.3)
PROT SERPL-MCNC: 8.2 G/DL — SIGNIFICANT CHANGE UP (ref 6–8.3)
PROT UR-MCNC: ABNORMAL
RBC # BLD: 3.84 M/UL — SIGNIFICANT CHANGE UP (ref 3.8–5.2)
RBC # FLD: 17.7 % — HIGH (ref 10.3–14.5)
RBC BLD AUTO: ABNORMAL
RBC CASTS # UR COMP ASSIST: 6 /HPF — HIGH (ref 0–4)
SARS-COV-2 RNA SPEC QL NAA+PROBE: SIGNIFICANT CHANGE UP
SODIUM SERPL-SCNC: 123 MMOL/L — LOW (ref 135–145)
SODIUM SERPL-SCNC: 125 MMOL/L — LOW (ref 135–145)
SODIUM SERPL-SCNC: 126 MMOL/L — LOW (ref 135–145)
SODIUM SERPL-SCNC: 127 MMOL/L — LOW (ref 135–145)
SP GR SPEC: 1.02 — SIGNIFICANT CHANGE UP (ref 1.01–1.05)
UROBILINOGEN FLD QL: SIGNIFICANT CHANGE UP
VARIANT LYMPHS # BLD: 3.5 % — SIGNIFICANT CHANGE UP (ref 0–6)
WBC # BLD: 25.22 K/UL — HIGH (ref 3.8–10.5)
WBC # FLD AUTO: 25.22 K/UL — HIGH (ref 3.8–10.5)
WBC UR QL: 12 /HPF — HIGH (ref 0–5)

## 2022-10-25 PROCEDURE — 70450 CT HEAD/BRAIN W/O DYE: CPT | Mod: 26,MA

## 2022-10-25 PROCEDURE — 86335 IMMUNFIX E-PHORSIS/URINE/CSF: CPT | Mod: 26

## 2022-10-25 PROCEDURE — 71250 CT THORAX DX C-: CPT | Mod: 26,MF

## 2022-10-25 PROCEDURE — 99221 1ST HOSP IP/OBS SF/LOW 40: CPT

## 2022-10-25 PROCEDURE — 72170 X-RAY EXAM OF PELVIS: CPT | Mod: 26

## 2022-10-25 PROCEDURE — 93970 EXTREMITY STUDY: CPT | Mod: 26

## 2022-10-25 PROCEDURE — 72125 CT NECK SPINE W/O DYE: CPT | Mod: 26,MA

## 2022-10-25 PROCEDURE — 71045 X-RAY EXAM CHEST 1 VIEW: CPT | Mod: 26

## 2022-10-25 PROCEDURE — 99285 EMERGENCY DEPT VISIT HI MDM: CPT | Mod: GC

## 2022-10-25 PROCEDURE — G1004: CPT

## 2022-10-25 RX ORDER — ROPINIROLE 8 MG/1
2 TABLET, FILM COATED, EXTENDED RELEASE ORAL DAILY
Refills: 0 | Status: DISCONTINUED | OUTPATIENT
Start: 2022-10-25 | End: 2022-11-25

## 2022-10-25 RX ORDER — FUROSEMIDE 40 MG
40 TABLET ORAL EVERY 24 HOURS
Refills: 0 | Status: DISCONTINUED | OUTPATIENT
Start: 2022-10-25 | End: 2022-10-27

## 2022-10-25 RX ORDER — ALBUTEROL 90 UG/1
2 AEROSOL, METERED ORAL EVERY 6 HOURS
Refills: 0 | Status: DISCONTINUED | OUTPATIENT
Start: 2022-10-25 | End: 2022-11-25

## 2022-10-25 RX ORDER — ACETAMINOPHEN 500 MG
650 TABLET ORAL EVERY 6 HOURS
Refills: 0 | Status: DISCONTINUED | OUTPATIENT
Start: 2022-10-25 | End: 2022-11-19

## 2022-10-25 RX ORDER — POLYETHYLENE GLYCOL 3350 17 G/17G
17 POWDER, FOR SOLUTION ORAL
Refills: 0 | Status: DISCONTINUED | OUTPATIENT
Start: 2022-10-25 | End: 2022-11-25

## 2022-10-25 RX ORDER — HEPARIN SODIUM 5000 [USP'U]/ML
5000 INJECTION INTRAVENOUS; SUBCUTANEOUS EVERY 8 HOURS
Refills: 0 | Status: DISCONTINUED | OUTPATIENT
Start: 2022-10-25 | End: 2022-11-25

## 2022-10-25 RX ORDER — QUETIAPINE FUMARATE 200 MG/1
25 TABLET, FILM COATED ORAL AT BEDTIME
Refills: 0 | Status: DISCONTINUED | OUTPATIENT
Start: 2022-10-25 | End: 2022-11-25

## 2022-10-25 RX ORDER — SODIUM CHLORIDE 9 MG/ML
1000 INJECTION INTRAMUSCULAR; INTRAVENOUS; SUBCUTANEOUS ONCE
Refills: 0 | Status: COMPLETED | OUTPATIENT
Start: 2022-10-25 | End: 2022-10-25

## 2022-10-25 RX ORDER — PANTOPRAZOLE SODIUM 20 MG/1
40 TABLET, DELAYED RELEASE ORAL
Refills: 0 | Status: DISCONTINUED | OUTPATIENT
Start: 2022-10-25 | End: 2022-11-25

## 2022-10-25 RX ORDER — DULOXETINE HYDROCHLORIDE 30 MG/1
60 CAPSULE, DELAYED RELEASE ORAL DAILY
Refills: 0 | Status: DISCONTINUED | OUTPATIENT
Start: 2022-10-25 | End: 2022-11-25

## 2022-10-25 RX ORDER — SPIRONOLACTONE 25 MG/1
50 TABLET, FILM COATED ORAL DAILY
Refills: 0 | Status: DISCONTINUED | OUTPATIENT
Start: 2022-10-25 | End: 2022-10-25

## 2022-10-25 RX ORDER — ACETAMINOPHEN 500 MG
650 TABLET ORAL ONCE
Refills: 0 | Status: COMPLETED | OUTPATIENT
Start: 2022-10-25 | End: 2022-10-25

## 2022-10-25 RX ORDER — CEFTRIAXONE 500 MG/1
1000 INJECTION, POWDER, FOR SOLUTION INTRAMUSCULAR; INTRAVENOUS ONCE
Refills: 0 | Status: COMPLETED | OUTPATIENT
Start: 2022-10-25 | End: 2022-10-25

## 2022-10-25 RX ORDER — GABAPENTIN 400 MG/1
300 CAPSULE ORAL
Refills: 0 | Status: DISCONTINUED | OUTPATIENT
Start: 2022-10-25 | End: 2022-11-17

## 2022-10-25 RX ORDER — SODIUM CHLORIDE 0.65 %
1 AEROSOL, SPRAY (ML) NASAL
Refills: 0 | Status: DISCONTINUED | OUTPATIENT
Start: 2022-10-25 | End: 2022-11-25

## 2022-10-25 RX ORDER — SENNA PLUS 8.6 MG/1
2 TABLET ORAL AT BEDTIME
Refills: 0 | Status: DISCONTINUED | OUTPATIENT
Start: 2022-10-25 | End: 2022-11-25

## 2022-10-25 RX ORDER — MECLIZINE HCL 12.5 MG
12.5 TABLET ORAL
Refills: 0 | Status: DISCONTINUED | OUTPATIENT
Start: 2022-10-25 | End: 2022-11-25

## 2022-10-25 RX ORDER — TRAMADOL HYDROCHLORIDE 50 MG/1
100 TABLET ORAL EVERY 6 HOURS
Refills: 0 | Status: DISCONTINUED | OUTPATIENT
Start: 2022-10-25 | End: 2022-10-27

## 2022-10-25 RX ORDER — LANOLIN ALCOHOL/MO/W.PET/CERES
3 CREAM (GRAM) TOPICAL AT BEDTIME
Refills: 0 | Status: DISCONTINUED | OUTPATIENT
Start: 2022-10-25 | End: 2022-11-04

## 2022-10-25 RX ORDER — TRAMADOL HYDROCHLORIDE 50 MG/1
50 TABLET ORAL EVERY 8 HOURS
Refills: 0 | Status: DISCONTINUED | OUTPATIENT
Start: 2022-10-25 | End: 2022-10-25

## 2022-10-25 RX ORDER — TRAMADOL HYDROCHLORIDE 50 MG/1
50 TABLET ORAL ONCE
Refills: 0 | Status: DISCONTINUED | OUTPATIENT
Start: 2022-10-25 | End: 2022-10-25

## 2022-10-25 RX ORDER — FERROUS SULFATE 325(65) MG
325 TABLET ORAL DAILY
Refills: 0 | Status: DISCONTINUED | OUTPATIENT
Start: 2022-10-25 | End: 2022-11-25

## 2022-10-25 RX ORDER — ONDANSETRON 8 MG/1
4 TABLET, FILM COATED ORAL EVERY 8 HOURS
Refills: 0 | Status: DISCONTINUED | OUTPATIENT
Start: 2022-10-25 | End: 2022-11-04

## 2022-10-25 RX ORDER — TRAMADOL HYDROCHLORIDE 50 MG/1
50 TABLET ORAL EVERY 4 HOURS
Refills: 0 | Status: DISCONTINUED | OUTPATIENT
Start: 2022-10-25 | End: 2022-10-27

## 2022-10-25 RX ORDER — FENOFIBRATE,MICRONIZED 130 MG
145 CAPSULE ORAL DAILY
Refills: 0 | Status: DISCONTINUED | OUTPATIENT
Start: 2022-10-25 | End: 2022-11-25

## 2022-10-25 RX ORDER — LEVOTHYROXINE SODIUM 125 MCG
125 TABLET ORAL DAILY
Refills: 0 | Status: DISCONTINUED | OUTPATIENT
Start: 2022-10-25 | End: 2022-11-25

## 2022-10-25 RX ORDER — CEFTRIAXONE 500 MG/1
1000 INJECTION, POWDER, FOR SOLUTION INTRAMUSCULAR; INTRAVENOUS EVERY 24 HOURS
Refills: 0 | Status: DISCONTINUED | OUTPATIENT
Start: 2022-10-26 | End: 2022-10-27

## 2022-10-25 RX ORDER — LIDOCAINE 4 G/100G
1 CREAM TOPICAL EVERY 24 HOURS
Refills: 0 | Status: DISCONTINUED | OUTPATIENT
Start: 2022-10-25 | End: 2022-11-25

## 2022-10-25 RX ADMIN — CEFTRIAXONE 100 MILLIGRAM(S): 500 INJECTION, POWDER, FOR SOLUTION INTRAMUSCULAR; INTRAVENOUS at 07:55

## 2022-10-25 RX ADMIN — Medication 40 MILLIGRAM(S): at 17:00

## 2022-10-25 RX ADMIN — SODIUM CHLORIDE 1000 MILLILITER(S): 9 INJECTION INTRAMUSCULAR; INTRAVENOUS; SUBCUTANEOUS at 07:55

## 2022-10-25 RX ADMIN — QUETIAPINE FUMARATE 25 MILLIGRAM(S): 200 TABLET, FILM COATED ORAL at 23:34

## 2022-10-25 RX ADMIN — Medication 650 MILLIGRAM(S): at 17:00

## 2022-10-25 RX ADMIN — SENNA PLUS 2 TABLET(S): 8.6 TABLET ORAL at 23:34

## 2022-10-25 RX ADMIN — DULOXETINE HYDROCHLORIDE 60 MILLIGRAM(S): 30 CAPSULE, DELAYED RELEASE ORAL at 17:00

## 2022-10-25 RX ADMIN — Medication 145 MILLIGRAM(S): at 16:59

## 2022-10-25 RX ADMIN — POLYETHYLENE GLYCOL 3350 17 GRAM(S): 17 POWDER, FOR SOLUTION ORAL at 17:20

## 2022-10-25 RX ADMIN — HEPARIN SODIUM 5000 UNIT(S): 5000 INJECTION INTRAVENOUS; SUBCUTANEOUS at 16:59

## 2022-10-25 RX ADMIN — HEPARIN SODIUM 5000 UNIT(S): 5000 INJECTION INTRAVENOUS; SUBCUTANEOUS at 23:33

## 2022-10-25 RX ADMIN — Medication 5 MILLIGRAM(S): at 23:34

## 2022-10-25 RX ADMIN — Medication 100 MILLIGRAM(S): at 17:00

## 2022-10-25 RX ADMIN — GABAPENTIN 300 MILLIGRAM(S): 400 CAPSULE ORAL at 17:20

## 2022-10-25 RX ADMIN — Medication 12.5 MILLIGRAM(S): at 17:20

## 2022-10-25 RX ADMIN — TRAMADOL HYDROCHLORIDE 50 MILLIGRAM(S): 50 TABLET ORAL at 13:39

## 2022-10-25 RX ADMIN — Medication 650 MILLIGRAM(S): at 06:22

## 2022-10-25 NOTE — H&P ADULT - HISTORY OF PRESENT ILLNESS
78 yo F with PMHx significant for hypothyroidism, HTN, HLD, SVT (s/p ablation), Cervical cancer (s/p hysterectomy), DVT (June 2022, still on eliquis), OA and peripheral neuropathy presenting to [facility] after having fall from bed overnight. Patient reports fall from bed onto L side. Patient states she was sound asleep and woke up on floor, with no memory of rolling over out fo bed. She denied LOC, memory loss, or hitting her head. Patient denied having confusion when waking up. Patient reportedly had 8/10 pain on L ribs, back and hip, and was unable to get up. Patient states distance from bed to floor was about 2 feet. She pressed life alert alarm and was on floor for about 3 hours before help came. Patient denied lightheadedness, but states she has chronic dizziness. No diarrhea, abdominal pain, nausea or vomiting but patient reports decreased appetite and constipation (no BM x3 days). Additionally, patient reports chronic stress incontinence and nocturia, and wears a diaper at all times. Denied fevers, chills, malaise, dysuria, frequency, hematuria, chest pain, palpitations, changes in vision or hearing, bleeding, new rash, joint pain, and weakness. Patient states she has had chronic cough since she was COVID+ several months ago and has nagging dry cough. Pain in back and shoulder worsens with cough since her fall.     Of note patient was recently admitted for a UTI and DVT in June of 2022, and started on Eliquis. Per discharge note in June, patient was to complete a 3 month course and then stop taking. Per patient and PCP, patient still taking the eliquis. Patient reportedly has had 5 UTIs in the past year. Was discharged from rehab facility to home several weeks ago, but still reporting difficulty with ambulation.     Per ASHWIN Gillespie, who saw patient and yadira blood work at patient's home 10/23, patient had leukocytosis, with iron panel suggesting MAR, HgB 8.7, Na 125, and Cr 1.4 at that time.

## 2022-10-25 NOTE — H&P ADULT - NSHPPHYSICALEXAM_GEN_ALL_CORE
PHYSICAL EXAM:  GENERAL: NAD, well-developed  HEAD:  Atraumatic, Normocephalic  EYES: EOMI, PERRLA, conjunctiva and sclera clear  NECK: Supple, No JVD  CHEST/LUNG: Clear to auscultation bilaterally; No wheeze/rhonchi/rale  HEART: Regular rate and rhythm; No murmurs, rubs, or gallops  ABDOMEN: Soft, Nontender, Nondistended; Bowel sounds present  EXTREMITIES:  2+ Peripheral Pulses, No clubbing, cyanosis, or edema. TTP over L posterior ribs.   PSYCH: AAOx4  NEUROLOGY: non-focal  SKIN: No rashes or lesions

## 2022-10-25 NOTE — ED PROVIDER NOTE - PHYSICAL EXAMINATION
Vitals: I have reviewed the patients vital signs  General: nontoxic appearing  HEENT: Atraumatic, normocephalic, airway patent  Eyes: EOMI, tracking appropriately  Neck: no tracheal deviation  Chest/Lungs: TTP over L chest wall, symmetric chest rise, speaking in complete sentences,  no resp distress CTAB  Heart: skin and extremities well perfused, regular rate and rhythm  Neuro: A+Ox2, appears non focal  MSK: no deformity and there is pain in L hip with log roll.  Good distal PMS  Skin: no cyanosis, no jaundice

## 2022-10-25 NOTE — H&P ADULT - NS ATTEND AMEND GEN_ALL_CORE FT
76 yo female with pmhx of SVT s/p ablation, HTN, HLD, Hypothyroidism, Cervical ca, MSSA bacteremia, prior fall with pubic rami fracture, chronic hyponatremia, dvt (s/p eliquis),  BIBEMS after sustaining a fall. patient was sleeping overnight and doesn't remember how she fell to the ground but after fall called life alarm, and was on the floor for almost 3 hrs before the help ca.e Denies any LOC, no dizziness, cp or sob after. Denies any recent fever/chills, no abd nausea/vomiting. denies dysuria but reports incd frequency. no other symptoms. currently endorsing Left upper back pain and hip pain.      Vitals:   Afebrile 98.5, HR:80, /43  Exam: elderly, NAD. s1/s2, no murmur, clear lungs, abd soft, BS present, non-tender, no spinal tenderness, tenderness on left upper back, no ecchymosis, LE tender with palpation but no pitting edema. ANO3. no focal deficits.     Labs with leukocytosis to 25.22, neutrophilic predominance, thrombocytosis. Anemia 9.1 (baseline 10s)  BMP with hyponatremia 126 (improved from 123), bicarb 20, bun 32/1.13  Ua positive for LE+, bacteria, WBC    Imagings:   Ct head with no acute intracranial pathology  Ct chest/cervical- T7 acute/subacute compression fracture  Nonspecific subcutaneous centimeter lytic lesion of the right sternum.   Etiology includes, but not limited to, metastases and multiple myeloma.  Xray pelvis: no acute fracture.    ekg: nsr    AP:  #Fall, likely mechanical. other etiology cannot be ruled out.   - monitor on telemetry  - Orthostatic vitals  - ortho/nsgy eval for T7 acute/subcute fracture  - Pt eval and treat  - pain control, tylenol prn, resume home tramadol, lidocaine patch  -fall precautions    #Leukocytosis, afebrile, VSS.   #Acute cystitis, symptomatic with increased urinary frequency  doesn't meet sepsis criteria.   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 hypothyroidism  s/p 1L NS bolus  improved. cont to monitor  hold home hct-losartan, hold aldactone     #Hypothyroidism  fu TSH  Cont with home synthroid.     rest as above.   consider med rec, avoid BEERS criteria meds. polypharmacy is potential cause for fall as well.     DVT: heparin

## 2022-10-25 NOTE — CONSULT NOTE ADULT - ASSESSMENT
78 y/o F, with T7 compression Fx after falling out of bed last night 78 y/o F, with T7  end plate compression Fx after falling out of bed last night

## 2022-10-25 NOTE — H&P ADULT - PROBLEM SELECTOR PLAN 10
- Recent DVT in 06/22 - started on eliquis  - holding eliquis for now, VTE prophylaxis with heparin 5000 sq q8  - B/L LE swelling present, per patient same amount of swelling as normal, but tender to palpation b/l calves  - B/L LE doppler for DVT rule out ordered   - Monitor swelling

## 2022-10-25 NOTE — CONSULT NOTE ADULT - SUBJECTIVE AND OBJECTIVE BOX
HPI:  78 yo F with PMHx significant for hypothyroidism, HTN, HLD, SVT (s/p ablation), Cervical cancer (s/p hysterectomy), DVT (2022, still on eliquis), OA and peripheral neuropathy presenting to [facility] after having fall from bed overnight. Patient reports fall from bed onto L side. Patient states she was sound asleep and woke up on floor, with no memory of rolling over out fo bed. She denied LOC, memory loss, or hitting her head. Patient denied having confusion when waking up. Patient reportedly had 8/10 pain on L ribs, back and hip, and was unable to get up. Patient states distance from bed to floor was about 2 feet. She pressed life alert alarm and was on floor for about 3 hours before help came. Patient denied lightheadedness, but states she has chronic dizziness. No diarrhea, abdominal pain, nausea or vomiting but patient reports decreased appetite and constipation (no BM x3 days). Additionally, patient reports chronic stress incontinence and nocturia, and wears a diaper at all times. Denied fevers, chills, malaise, dysuria, frequency, hematuria, chest pain, palpitations, changes in vision or hearing, bleeding, new rash, joint pain, and weakness. Patient states she has had chronic cough since she was COVID+ several months ago and has nagging dry cough. Pain in back and shoulder worsens with cough since her fall.     Of note patient was recently admitted for a UTI and DVT in 2022, and started on Eliquis. Per discharge note in , patient was to complete a 3 month course and then stop taking. Per patient and PCP, patient still taking the eliquis. Patient reportedly has had 5 UTIs in the past year. Was discharged from rehab facility to home several weeks ago, but still reporting difficulty with ambulation.   Per ASHWIN Gillespie, who saw patient and yadira blood work at patient's home 10/23, patient had leukocytosis, with iron panel suggesting MAR, HgB 8.7, Na 125, and Cr 1.4 at that time.  (25 Oct 2022 12:14)    CT of the thoracic spine shows There is mild loss of height of T7 with mild adjacent perivertebral thickening suggestive of an acute/subacute compression fracture deformity. There is no significant bony retropulsion. Recommend clinical correlation.      PAST MEDICAL & SURGICAL HISTORY:  History of cervical cancer s/p hysterectomy  H/O peripheral neuropathy  Vertigo  Palpitations  H/O supraventricular tachycardia s/p ablation  H/O hyperlipidemia  HTN (hypertension)  Hx of cholecystectomy  S/P total abdominal hysterectomy  Hx of ankle fusion    Allergies  erythromycin (Hives)  IV Contrast (Anaphylaxis)    MEDICATIONS  (STANDING):  benzonatate 100 milliGRAM(s) Oral three times a day  bisacodyl 5 milliGRAM(s) Oral at bedtime  cefTRIAXone   IVPB 1000 milliGRAM(s) IV Intermittent every 24 hours  DULoxetine 60 milliGRAM(s) Oral daily  fenofibrate Tablet 145 milliGRAM(s) Oral daily  furosemide    Tablet 40 milliGRAM(s) Oral every 24 hours  gabapentin 300 milliGRAM(s) Oral two times a day  heparin   Injectable 5000 Unit(s) SubCutaneous every 8 hours  levothyroxine 125 MICROGram(s) Oral daily  meclizine 12.5 milliGRAM(s) Oral two times a day  pantoprazole    Tablet 40 milliGRAM(s) Oral before breakfast  polyethylene glycol 3350 17 Gram(s) Oral two times a day  QUEtiapine 25 milliGRAM(s) Oral at bedtime  rOPINIRole 2 milliGRAM(s) Oral daily  senna 2 Tablet(s) Oral at bedtime  sodium chloride 0.65% Nasal 1 Spray(s) Both Nostrils two times a day    MEDICATIONS  (PRN):  acetaminophen     Tablet .. 650 milliGRAM(s) Oral every 6 hours PRN Temp greater or equal to 38C (100.4F), Mild Pain (1 - 3)  ALBUTerol    90 MICROgram(s) HFA Inhaler 2 Puff(s) Inhalation every 6 hours PRN Wheezing  aluminum hydroxide/magnesium hydroxide/simethicone Suspension 30 milliLiter(s) Oral every 4 hours PRN Dyspepsia  melatonin 3 milliGRAM(s) Oral at bedtime PRN Insomnia  ondansetron Injectable 4 milliGRAM(s) IV Push every 8 hours PRN Nausea and/or Vomiting  traMADol 50 milliGRAM(s) Oral every 8 hours PRN Severe Pain (7 - 10)    Vital Signs Last 24 Hrs  T(C): 36.9 (25 Oct 2022 08:55), Max: 36.9 (25 Oct 2022 04:13)  T(F): 98.5 (25 Oct 2022 08:55), Max: 98.5 (25 Oct 2022 08:55)  HR: 82 (25 Oct 2022 08:55) (80 - 98)  BP: 115/62 (25 Oct 2022 08:55) (115/62 - 121/58)  BP(mean): --  RR: 18 (25 Oct 2022 08:55) (16 - 18)  SpO2: 98% (25 Oct 2022 08:55) (98% - 100%)    Parameters below as of 25 Oct 2022 08:55  Patient On (Oxygen Delivery Method): room air        PE:  AA&0 x 3, CN 2-12 grossly intact, speech clear, follows commands, PERRL  Motor: strength 5/5 throughout  Sensory: SILT  Thoracic spine: no palpable bony tenderness      LABS:                        9.1    25. )-----------( 509      ( 25 Oct 2022 05:00 )             28.9     10-25    126<L>  |  93<L>  |  32<H>  ----------------------------<  131<H>  4.6   |  20<L>  |  1.13    Ca    9.6      25 Oct 2022 09:45    TPro  8.1  /  Alb  3.3  /  TBili  0.7  /  DBili  x   /  AST  42<H>  /  ALT  11  /  AlkPhos  75  10-25      Urinalysis Basic - ( 25 Oct 2022 06:00 )    Color: Light Yellow / Appearance: Slightly Turbid / S.018 / pH: x  Gluc: x / Ketone: Negative  / Bili: Negative / Urobili: <2 mg/dL   Blood: x / Protein: 100 mg/dL / Nitrite: Negative   Leuk Esterase: Moderate / RBC: 6 /HPF / WBC 12 /HPF   Sq Epi: x / Non Sq Epi: 2 /HPF / Bacteria: Many             HPI:  78 yo F with PMHx significant for hypothyroidism, HTN, HLD, SVT (s/p ablation), Cervical cancer (s/p hysterectomy), DVT (2022, still on eliquis), OA and peripheral neuropathy presenting to [facility] after having fall from bed overnight. Patient reports fall from bed onto L side. Patient states she was sound asleep and woke up on floor, with no memory of rolling over out fo bed. She denied LOC, memory loss, or hitting her head. Patient denied having confusion when waking up. Patient reportedly had 8/10 pain on L ribs, back and hip, and was unable to get up. Patient states distance from bed to floor was about 2 feet. She pressed life alert alarm and was on floor for about 3 hours before help came. Patient denied lightheadedness, but states she has chronic dizziness. No diarrhea, abdominal pain, nausea or vomiting but patient reports decreased appetite and constipation (no BM x3 days). Additionally, patient reports chronic stress incontinence and nocturia, and wears a diaper at all times. Denied fevers, chills, malaise, dysuria, frequency, hematuria, chest pain, palpitations, changes in vision or hearing, bleeding, new rash, joint pain, and weakness. Patient states she has had chronic cough since she was COVID+ several months ago and has nagging dry cough. Pain in back and shoulder worsens with cough since her fall.     Of note patient was recently admitted for a UTI and DVT in 2022, and started on Eliquis. Per discharge note in , patient was to complete a 3 month course and then stop taking. Per patient and PCP, patient still taking the eliquis. Patient reportedly has had 5 UTIs in the past year. Was discharged from rehab facility to home several weeks ago, but still reporting difficulty with ambulation.   Per ASHWIN Gillespie, who saw patient and yadira blood work at patient's home 10/23, patient had leukocytosis, with iron panel suggesting MAR, HgB 8.7, Na 125, and Cr 1.4 at that time.  (25 Oct 2022 12:14)    CT of the thoracic spine shows There is mild loss of height of T7 with mild adjacent perivertebral thickening suggestive of an acute/subacute compression fracture deformity. There is no significant bony retropulsion. Recommend clinical correlation.    PAST MEDICAL & SURGICAL HISTORY:  History of cervical cancer s/p hysterectomy  H/O peripheral neuropathy  Vertigo  Palpitations  H/O supraventricular tachycardia s/p ablation  H/O hyperlipidemia  HTN (hypertension)  Hx of cholecystectomy  S/P total abdominal hysterectomy  Hx of ankle fusion    Allergies  erythromycin (Hives)  IV Contrast (Anaphylaxis)    MEDICATIONS  (STANDING):  benzonatate 100 milliGRAM(s) Oral three times a day  bisacodyl 5 milliGRAM(s) Oral at bedtime  cefTRIAXone   IVPB 1000 milliGRAM(s) IV Intermittent every 24 hours  DULoxetine 60 milliGRAM(s) Oral daily  fenofibrate Tablet 145 milliGRAM(s) Oral daily  furosemide    Tablet 40 milliGRAM(s) Oral every 24 hours  gabapentin 300 milliGRAM(s) Oral two times a day  heparin   Injectable 5000 Unit(s) SubCutaneous every 8 hours  levothyroxine 125 MICROGram(s) Oral daily  meclizine 12.5 milliGRAM(s) Oral two times a day  pantoprazole    Tablet 40 milliGRAM(s) Oral before breakfast  polyethylene glycol 3350 17 Gram(s) Oral two times a day  QUEtiapine 25 milliGRAM(s) Oral at bedtime  rOPINIRole 2 milliGRAM(s) Oral daily  senna 2 Tablet(s) Oral at bedtime  sodium chloride 0.65% Nasal 1 Spray(s) Both Nostrils two times a day    MEDICATIONS  (PRN):  acetaminophen     Tablet .. 650 milliGRAM(s) Oral every 6 hours PRN Temp greater or equal to 38C (100.4F), Mild Pain (1 - 3)  ALBUTerol    90 MICROgram(s) HFA Inhaler 2 Puff(s) Inhalation every 6 hours PRN Wheezing  aluminum hydroxide/magnesium hydroxide/simethicone Suspension 30 milliLiter(s) Oral every 4 hours PRN Dyspepsia  melatonin 3 milliGRAM(s) Oral at bedtime PRN Insomnia  ondansetron Injectable 4 milliGRAM(s) IV Push every 8 hours PRN Nausea and/or Vomiting  traMADol 50 milliGRAM(s) Oral every 8 hours PRN Severe Pain (7 - 10)    Vital Signs Last 24 Hrs  T(C): 36.9 (25 Oct 2022 08:55), Max: 36.9 (25 Oct 2022 04:13)  T(F): 98.5 (25 Oct 2022 08:55), Max: 98.5 (25 Oct 2022 08:55)  HR: 82 (25 Oct 2022 08:55) (80 - 98)  BP: 115/62 (25 Oct 2022 08:55) (115/62 - 121/58)  BP(mean): --  RR: 18 (25 Oct 2022 08:55) (16 - 18)  SpO2: 98% (25 Oct 2022 08:55) (98% - 100%)    Parameters below as of 25 Oct 2022 08:55  Patient On (Oxygen Delivery Method): room air    PE:  AA&0 x  Motor:               Sensory:   Thoracic Spine:         LABS:                        9.1    . )-----------( 509      ( 25 Oct 2022 05:00 )             28.9     10-25    126<L>  |  93<L>  |  32<H>  ----------------------------<  131<H>  4.6   |  20<L>  |  1.13    Ca    9.6      25 Oct 2022 09:45    TPro  8.1  /  Alb  3.3  /  TBili  0.7  /  DBili  x   /  AST  42<H>  /  ALT  11  /  AlkPhos  75  10-25      Urinalysis Basic - ( 25 Oct 2022 06:00 )    Color: Light Yellow / Appearance: Slightly Turbid / S.018 / pH: x  Gluc: x / Ketone: Negative  / Bili: Negative / Urobili: <2 mg/dL   Blood: x / Protein: 100 mg/dL / Nitrite: Negative   Leuk Esterase: Moderate / RBC: 6 /HPF / WBC 12 /HPF   Sq Epi: x / Non Sq Epi: 2 /HPF / Bacteria: Many             HPI:  76 yo F with PMHx significant for hypothyroidism, HTN, HLD, SVT (s/p ablation), Cervical cancer (s/p hysterectomy), DVT (2022, still on eliquis), OA and peripheral neuropathy presenting to [facility] after having fall from bed overnight. Patient reports fall from bed onto L side. Patient states she was sound asleep and woke up on floor, with no memory of rolling over out fo bed. She denied LOC, memory loss, or hitting her head. Patient denied having confusion when waking up. Patient reportedly had 8/10 pain on L ribs, back and hip, and was unable to get up. Patient states distance from bed to floor was about 2 feet. She pressed life alert alarm and was on floor for about 3 hours before help came. Patient denied lightheadedness, but states she has chronic dizziness. No diarrhea, abdominal pain, nausea or vomiting but patient reports decreased appetite and constipation (no BM x3 days). Additionally, patient reports chronic stress incontinence and nocturia, and wears a diaper at all times. Denied fevers, chills, malaise, dysuria, frequency, hematuria, chest pain, palpitations, changes in vision or hearing, bleeding, new rash, joint pain, and weakness. Patient states she has had chronic cough since she was COVID+ several months ago and has nagging dry cough. Pain in back and shoulder worsens with cough since her fall.     Of note patient was recently admitted for a UTI and DVT in 2022, and started on Eliquis. Per discharge note in , patient was to complete a 3 month course and then stop taking. Per patient and PCP, patient still taking the eliquis. Patient reportedly has had 5 UTIs in the past year. Was discharged from rehab facility to home several weeks ago, but still reporting difficulty with ambulation.   Per ASHWIN Gillespie, who saw patient and yadira blood work at patient's home 10/23, patient had leukocytosis, with iron panel suggesting MAR, HgB 8.7, Na 125, and Cr 1.4 at that time.  (25 Oct 2022 12:14)    CT of the thoracic spine shows There is mild loss of height of T7 with mild adjacent perivertebral thickening suggestive of an acute/subacute compression fracture deformity. There is no significant bony retropulsion. Recommend clinical correlation.    PAST MEDICAL & SURGICAL HISTORY:  History of cervical cancer s/p hysterectomy  H/O peripheral neuropathy  Vertigo  Palpitations  H/O supraventricular tachycardia s/p ablation  H/O hyperlipidemia  HTN (hypertension)  Hx of cholecystectomy  S/P total abdominal hysterectomy  Hx of ankle fusion    Allergies  erythromycin (Hives)  IV Contrast (Anaphylaxis)    MEDICATIONS  (STANDING):  benzonatate 100 milliGRAM(s) Oral three times a day  bisacodyl 5 milliGRAM(s) Oral at bedtime  cefTRIAXone   IVPB 1000 milliGRAM(s) IV Intermittent every 24 hours  DULoxetine 60 milliGRAM(s) Oral daily  fenofibrate Tablet 145 milliGRAM(s) Oral daily  furosemide    Tablet 40 milliGRAM(s) Oral every 24 hours  gabapentin 300 milliGRAM(s) Oral two times a day  heparin   Injectable 5000 Unit(s) SubCutaneous every 8 hours  levothyroxine 125 MICROGram(s) Oral daily  meclizine 12.5 milliGRAM(s) Oral two times a day  pantoprazole    Tablet 40 milliGRAM(s) Oral before breakfast  polyethylene glycol 3350 17 Gram(s) Oral two times a day  QUEtiapine 25 milliGRAM(s) Oral at bedtime  rOPINIRole 2 milliGRAM(s) Oral daily  senna 2 Tablet(s) Oral at bedtime  sodium chloride 0.65% Nasal 1 Spray(s) Both Nostrils two times a day    MEDICATIONS  (PRN):  acetaminophen     Tablet .. 650 milliGRAM(s) Oral every 6 hours PRN Temp greater or equal to 38C (100.4F), Mild Pain (1 - 3)  ALBUTerol    90 MICROgram(s) HFA Inhaler 2 Puff(s) Inhalation every 6 hours PRN Wheezing  aluminum hydroxide/magnesium hydroxide/simethicone Suspension 30 milliLiter(s) Oral every 4 hours PRN Dyspepsia  melatonin 3 milliGRAM(s) Oral at bedtime PRN Insomnia  ondansetron Injectable 4 milliGRAM(s) IV Push every 8 hours PRN Nausea and/or Vomiting  traMADol 50 milliGRAM(s) Oral every 8 hours PRN Severe Pain (7 - 10)    Vital Signs Last 24 Hrs  T(C): 36.9 (25 Oct 2022 08:55), Max: 36.9 (25 Oct 2022 04:13)  T(F): 98.5 (25 Oct 2022 08:55), Max: 98.5 (25 Oct 2022 08:55)  HR: 82 (25 Oct 2022 08:55) (80 - 98)  BP: 115/62 (25 Oct 2022 08:55) (115/62 - 121/58)  BP(mean): --  RR: 18 (25 Oct 2022 08:55) (16 - 18)  SpO2: 98% (25 Oct 2022 08:55) (98% - 100%)    Parameters below as of 25 Oct 2022 08:55  Patient On (Oxygen Delivery Method): room air    Awake Alert Oriented x 3  Appears slightly confused  SOB with expressed pain with coughing and deep breaths  Exam limited by patient participation and complains of pain  Paraspinal tenderness noted around T7-8 area  TTP of b/l LE extremities which appear swollen  Antigravity 4/5 in all extremities in all muscle groups  No clonus, No babinski  DTR 2+  Sensation intact grossly to light ouch         LABS:                        9.1    25. )-----------( 509      ( 25 Oct 2022 05:00 )             28.9     10-25    126<L>  |  93<L>  |  32<H>  ----------------------------<  131<H>  4.6   |  20<L>  |  1.13    Ca    9.6      25 Oct 2022 09:45    TPro  8.1  /  Alb  3.3  /  TBili  0.7  /  DBili  x   /  AST  42<H>  /  ALT  11  /  AlkPhos  75  10-25      Urinalysis Basic - ( 25 Oct 2022 06:00 )    Color: Light Yellow / Appearance: Slightly Turbid / S.018 / pH: x  Gluc: x / Ketone: Negative  / Bili: Negative / Urobili: <2 mg/dL   Blood: x / Protein: 100 mg/dL / Nitrite: Negative   Leuk Esterase: Moderate / RBC: 6 /HPF / WBC 12 /HPF   Sq Epi: x / Non Sq Epi: 2 /HPF / Bacteria: Many

## 2022-10-25 NOTE — H&P ADULT - PROBLEM SELECTOR PLAN 4
- Baseline HgB from 06/22 --> 10-11  - 9.1/ 28.9 on admission  - Per outpatient NP - 10/23 HgB 8.7, evidence of MAR  - Folate/ b12 WNL, iron panel pending   - Monitor CBC  - Keep active T+S

## 2022-10-25 NOTE — H&P ADULT - PROBLEM SELECTOR PLAN 5
- Baseline SCr from 06/22 --> 0.6 - 1.0   - SCr on admission 1.13   - Per outpatient NP - 10/23 SCr 1.4  - Hold home Losartan-HCTZ for now   - Monitor SCr, if worsening order urine electrolytes / Protein   - Avoid nephrotoxins

## 2022-10-25 NOTE — H&P ADULT - NSICDXPASTMEDICALHX_GEN_ALL_CORE_FT
PAST MEDICAL HISTORY:  H/O hyperlipidemia     H/O peripheral neuropathy     H/O supraventricular tachycardia s/p ablation    History of cervical cancer s/p hysterectomy    HTN (hypertension)     Palpitations     Vertigo

## 2022-10-25 NOTE — ED ADULT TRIAGE NOTE - LOCATION:
Right arm;
PROCEDURES:  Removal of Infusaport 13-Dec-2019 13:11:49  Fan Hampton  Breast mass excision 13-Dec-2019 13:11:25  Fan Hampton  Lumpectomy of right breast with needle localization 13-Dec-2019 13:10:56  Fan Hampton

## 2022-10-25 NOTE — H&P ADULT - NSHPREVIEWOFSYSTEMS_GEN_ALL_CORE
CONSTITUTIONAL: No fever; No chills; No night sweats; No weight loss; No fatigue  EYES: No eye pain; No blurry vision  ENMT:  No difficulty hearing; No sinus or throat pain  NECK: L sided pain and stiffness   RESPIRATORY: AS ABOVE  CARDIOVASCULAR: No chest pain; No palpitations; No leg swelling  GASTROINTESTINAL: No abdominal pain; No nausea; No vomiting; No hematemesis; No diarrhea; No melena or hematochezia. Constipation (no BM x 3 days)  GENITOURINARY: No dysuria; No frequency; No hematuria; Stress incontinence and nocturia present  NEUROLOGICAL: No headaches; No loss of strength; No numbness  SKIN: No itching/burning; No rashes or lesions   MUSCULOSKELETAL: AS ABOVE   HEME/LYMPH: No easy bruising, or bleeding gums

## 2022-10-25 NOTE — H&P ADULT - PROBLEM SELECTOR PLAN 12
VTE: Hep SQ 5000 q8hr  Diet: passed dysphagia screen, starting regular diet  Electrolytes: replenish as needed  Activity: ambulate with assistance, pending PT/OT

## 2022-10-25 NOTE — ED PROVIDER NOTE - OBJECTIVE STATEMENT
76 yo with PMH mild dementia and cervical CA presenting after a fall.  Pt thinks she rolled out of bed and fell to the floor.  There is reports of pain to the L side ribs as well as left hip area.  Pt thinks she may have been on the ground for about 3 hours before people came to help her after pushing her med alert.  Does not think she lost consciousness.  No CP SOB HA or neck pain.

## 2022-10-25 NOTE — ED ADULT NURSE NOTE - OBJECTIVE STATEMENT
76yo female received in room 17. pt A&Ox4, hx of cervical cancer, present to the ER after falling out of the bed today. Pt denies LOC and head strike, states that she was on the floor for 3 hours "when I pressed the necklace button nobody came". Abrasion noted to right elbow. no apparent trauma or injury anywhere else. Pain to left rib when palpated. MD clemons in progress. Side rails up, bed at lowest position, call bell within reach, patient oriented to the unit, safety maintained. Awaiting for radiology.

## 2022-10-25 NOTE — ED PROVIDER NOTE - CLINICAL SUMMARY MEDICAL DECISION MAKING FREE TEXT BOX
pt with roll from bed to floor.  Question whether or not was mechanical in nature.  Will get labs as reports was on the ground for 3 hours.  Will also image ribs head and pelvis.  Depending on results will need to be at baseline ambulation with negative films prior to dispo. *The above represents an initial assessment/impression. Please refer to progress notes for potential changes in patient clinical course*

## 2022-10-25 NOTE — CHART NOTE - NSCHARTNOTEFT_GEN_A_CORE
REF #: [id number]  Patient Name: Corbin Bland YOB: 1945  Address: [address] [place], [place] [address]Dmc: Female  Rx Written	Rx Dispensed	Drug	                                     Quantity	Days Supply	Prescriber Name	Prescriber Rosa Isela #	Payment Method	Dispenser  05/10/2022	05/10/2022	oxycodone hcl (ir) 5 mg cap	              14	14	Gabbay, Mary	            [id number]	Cash	[facility]  05/09/2022	05/09/2022	oxycodone hcl (ir) 5 mg tablet	30	7	Gabbay, Mary	            [id number]	Cash	[facility]  05/07/2022	05/07/2022	tramadol hcl er 100 mg tablet	30	30	Jose Carrizales MD	[id number]	[place]	[facility]    Patient Name: Corbin BlandBirth Date: 1945  Address: [address] [place], [place] [address]Jrb: Female  Rx Written	Rx Dispensed	Drug	Quantity	Days Supply	Prescriber Name	Prescriber Rosa Isela #	Payment Method	Dispenser  08/22/2022	09/14/2022	tramadol hcl 50 mg tablet	45	15	Thong Ricci	[id number]	Medicare	[facility]  08/19/2022	08/20/2022	tramadol hcl 50 mg tablet	30	10	ShirThong londono	[id number]	[place]	[facility]  06/08/2022	06/09/2022	tramadol hcl 50 mg tablet	45	15	ShirThong londono	[id number]	[place]	[facility]    Patient Name: Corbin BlandBirth Date: 1945  Address: [address] [place], [place] [address]Sob: Female  Rx Written	Rx Dispensed	Drug	Quantity	Days Supply	Prescriber Name	Prescriber Rosa Isela #	Payment Method	Dispenser  01/04/2022	01/07/2022	tramadol hcl er 100 mg tablet	180	90	Bandar Ponce MD	[id number]	Medicare	[facility]    Nicola Roe PA-C  Department of Medicine  Pager #[phone]

## 2022-10-25 NOTE — ED ADULT TRIAGE NOTE - CHIEF COMPLAINT QUOTE
Pt. arrives from home found by EMS laying on the floor. Pt. states she does not know what happened. unknown LOC. Denies head trauma. endorses left-sided rib pain, and dried blood noted to lip. PHx cervical CA

## 2022-10-25 NOTE — ED PROVIDER NOTE - PROGRESS NOTE DETAILS
Colette, PGY2: Patient signed out to my care. Repeat CMP hemolyzed again with K 5.4. EKG NSR with no signs of peaked T waves or bradycardia at this time. Will hold off on repeating exam again at this time. Pending CT chest read at this time. Patient preliminary found with a UTI, hyponatremia, and leukocytosis. CK moderately elevated will start IVFs. Kidney function within normal limits at this time. Will admit to medicine.

## 2022-10-25 NOTE — ED ADULT NURSE REASSESSMENT NOTE - NS ED NURSE REASSESS COMMENT FT1
Report received from ANNA Dhillon. Patient is AA&Ox4, in no acute distress, no respiratory distress, saturating 100% on room air. Patient has IV 20 g in place. To be admitted.

## 2022-10-25 NOTE — H&P ADULT - PROBLEM SELECTOR PLAN 11
- Present since patient had covid several months ago  - RVP negative  - CXR clear lungs  - Started albuterol prn, tessalon pearle

## 2022-10-25 NOTE — H&P ADULT - PROBLEM SELECTOR PLAN 1
- Admit to medicine  - Pelvic Xray - No acute fractures or dislocations, evidence of nonhealing fractures seen on prior imaging. Bilateral hip joint arthrosis present.   - CXR - No acute displaced rib fracture. Chronic right rib deformities.  - CT Head/ C spine -- > no acute intracranial hemorrhage, territorial infarct, mass effect or calvarial fracture. Evidence of multilevel degenerative changes, no fracture  - CT chest non cont - Possible acute/subacute compression fracture d/t mild loss of height at T7  - Started on Eliquis 5mg BID after DVT in June, was supposed to stop in early september, but still taking   - Hold Eliquis for now   - Consider spine surgery consult for subacute T7 compression fracture evaluation  - Activity - ambulate with assistance   - Orthostatic vital signs pending  - Pain control with tylenol and tramadol   - PT/ OT pending

## 2022-10-25 NOTE — H&P ADULT - PROBLEM SELECTOR PLAN 2
- WBC 25.22 ( IANC 20.95)   - UA with many bacteria, small blood, mod LE and protein  - s/p IV CTX 1g @ 8AM , continue with empiric CTX for now   - Urine culture pending, follow sensitivities

## 2022-10-25 NOTE — H&P ADULT - PROBLEM SELECTOR PLAN 3
- Likely chronic in nature  - 123 -> 126  s/p 0.9% NaCl 1L bolus   - Baseline 128-134 in 06/22, per home NP was 124 on 10/23  - Monitor q12 BMP, increase Na 4-6 mEq in first 24 hours  - 1000mL fluid restriction for now, can start NaCl tablets as needed   - If worsens, consider renal consult

## 2022-10-25 NOTE — CONSULT NOTE ADULT - PROBLEM SELECTOR RECOMMENDATION 9
1. case to be d/w attending, no acute neurosurgical intervention. - No neurosurgical intervention  - No furtther imaging needed for T7 end plate fracture  - No brace needed  - OOB as tolerated  - Outpatient follow up after discharge with Dr. Sherrie Armendariz

## 2022-10-25 NOTE — H&P ADULT - ASSESSMENT
76 yo F with PMHx significant for hypothyroidism, HTN, HLD, SVT (s/p ablation), Cervical cancer (s/p hysterectomy), DVT (June 2022, still on eliquis), OA and peripheral neuropathy presenting to [facility] after having fall from bed overnight. Patient found to have UTI and started on IV CTX. CT head with no evidence of bleed, CT C spine and chest no evidence of fractures. Admit to medicine for further evaluation.

## 2022-10-25 NOTE — PATIENT PROFILE ADULT - FALL HARM RISK - HARM RISK INTERVENTIONS

## 2022-10-25 NOTE — H&P ADULT - NSHPSOCIALHISTORY_GEN_ALL_CORE
Patient lives at home with home health aide that comes in for 4 hours a day. She was  and is now , with no children. Her sister Veronica lives in [place] and speaks with her frequently. Patient is retired. Feels safe at home.

## 2022-10-26 LAB
ANION GAP SERPL CALC-SCNC: 13 MMOL/L — SIGNIFICANT CHANGE UP (ref 7–14)
BASOPHILS # BLD AUTO: 0.03 K/UL — SIGNIFICANT CHANGE UP (ref 0–0.2)
BASOPHILS NFR BLD AUTO: 0.2 % — SIGNIFICANT CHANGE UP (ref 0–2)
BLD GP AB SCN SERPL QL: NEGATIVE — SIGNIFICANT CHANGE UP
BUN SERPL-MCNC: 25 MG/DL — HIGH (ref 7–23)
CALCIUM SERPL-MCNC: 9.6 MG/DL — SIGNIFICANT CHANGE UP (ref 8.4–10.5)
CHLORIDE SERPL-SCNC: 94 MMOL/L — LOW (ref 98–107)
CHOLEST SERPL-MCNC: 118 MG/DL — SIGNIFICANT CHANGE UP
CO2 SERPL-SCNC: 21 MMOL/L — LOW (ref 22–31)
CREAT SERPL-MCNC: 0.86 MG/DL — SIGNIFICANT CHANGE UP (ref 0.5–1.3)
EGFR: 70 ML/MIN/1.73M2 — SIGNIFICANT CHANGE UP
EOSINOPHIL # BLD AUTO: 0 K/UL — SIGNIFICANT CHANGE UP (ref 0–0.5)
EOSINOPHIL NFR BLD AUTO: 0 % — SIGNIFICANT CHANGE UP (ref 0–6)
GLUCOSE SERPL-MCNC: 104 MG/DL — HIGH (ref 70–99)
GRAM STN FLD: SIGNIFICANT CHANGE UP
HCT VFR BLD CALC: 26.5 % — LOW (ref 34.5–45)
HCV AB S/CO SERPL IA: 0.16 S/CO — SIGNIFICANT CHANGE UP (ref 0–0.99)
HCV AB SERPL-IMP: SIGNIFICANT CHANGE UP
HDLC SERPL-MCNC: 21 MG/DL — LOW
HGB BLD-MCNC: 8.5 G/DL — LOW (ref 11.5–15.5)
IANC: 12.94 K/UL — HIGH (ref 1.8–7.4)
IMM GRANULOCYTES NFR BLD AUTO: 1.4 % — HIGH (ref 0–0.9)
LIPID PNL WITH DIRECT LDL SERPL: 60 MG/DL — SIGNIFICANT CHANGE UP
LYMPHOCYTES # BLD AUTO: 15.1 % — SIGNIFICANT CHANGE UP (ref 13–44)
LYMPHOCYTES # BLD AUTO: 2.5 K/UL — SIGNIFICANT CHANGE UP (ref 1–3.3)
MAGNESIUM SERPL-MCNC: 1.9 MG/DL — SIGNIFICANT CHANGE UP (ref 1.6–2.6)
MCHC RBC-ENTMCNC: 23.9 PG — LOW (ref 27–34)
MCHC RBC-ENTMCNC: 32.1 GM/DL — SIGNIFICANT CHANGE UP (ref 32–36)
MCV RBC AUTO: 74.4 FL — LOW (ref 80–100)
METHOD TYPE: SIGNIFICANT CHANGE UP
MONOCYTES # BLD AUTO: 0.9 K/UL — SIGNIFICANT CHANGE UP (ref 0–0.9)
MONOCYTES NFR BLD AUTO: 5.4 % — SIGNIFICANT CHANGE UP (ref 2–14)
MSSA DNA SPEC QL NAA+PROBE: SIGNIFICANT CHANGE UP
NEUTROPHILS # BLD AUTO: 12.94 K/UL — HIGH (ref 1.8–7.4)
NEUTROPHILS NFR BLD AUTO: 77.9 % — HIGH (ref 43–77)
NON HDL CHOLESTEROL: 97 MG/DL — SIGNIFICANT CHANGE UP
NRBC # BLD: 0 /100 WBCS — SIGNIFICANT CHANGE UP (ref 0–0)
NRBC # FLD: 0 K/UL — SIGNIFICANT CHANGE UP (ref 0–0)
PHOSPHATE SERPL-MCNC: 2.8 MG/DL — SIGNIFICANT CHANGE UP (ref 2.5–4.5)
PLATELET # BLD AUTO: 504 K/UL — HIGH (ref 150–400)
POTASSIUM SERPL-MCNC: 4.4 MMOL/L — SIGNIFICANT CHANGE UP (ref 3.5–5.3)
POTASSIUM SERPL-SCNC: 4.4 MMOL/L — SIGNIFICANT CHANGE UP (ref 3.5–5.3)
RBC # BLD: 3.56 M/UL — LOW (ref 3.8–5.2)
RBC # FLD: 17.5 % — HIGH (ref 10.3–14.5)
RH IG SCN BLD-IMP: POSITIVE — SIGNIFICANT CHANGE UP
SODIUM SERPL-SCNC: 128 MMOL/L — LOW (ref 135–145)
SPECIMEN SOURCE: SIGNIFICANT CHANGE UP
TRIGL SERPL-MCNC: 185 MG/DL — HIGH
TSH SERPL-MCNC: 7.98 UIU/ML — HIGH (ref 0.27–4.2)
WBC # BLD: 16.6 K/UL — HIGH (ref 3.8–10.5)
WBC # FLD AUTO: 16.6 K/UL — HIGH (ref 3.8–10.5)

## 2022-10-26 PROCEDURE — 99232 SBSQ HOSP IP/OBS MODERATE 35: CPT

## 2022-10-26 RX ORDER — LACTULOSE 10 G/15ML
10 SOLUTION ORAL ONCE
Refills: 0 | Status: DISCONTINUED | OUTPATIENT
Start: 2022-10-26 | End: 2022-11-04

## 2022-10-26 RX ORDER — VANCOMYCIN HCL 1 G
1000 VIAL (EA) INTRAVENOUS ONCE
Refills: 0 | Status: COMPLETED | OUTPATIENT
Start: 2022-10-26 | End: 2022-10-27

## 2022-10-26 RX ADMIN — Medication 1 SPRAY(S): at 16:50

## 2022-10-26 RX ADMIN — Medication 100 MILLIGRAM(S): at 13:54

## 2022-10-26 RX ADMIN — CEFTRIAXONE 100 MILLIGRAM(S): 500 INJECTION, POWDER, FOR SOLUTION INTRAMUSCULAR; INTRAVENOUS at 09:29

## 2022-10-26 RX ADMIN — HEPARIN SODIUM 5000 UNIT(S): 5000 INJECTION INTRAVENOUS; SUBCUTANEOUS at 05:56

## 2022-10-26 RX ADMIN — Medication 12.5 MILLIGRAM(S): at 16:50

## 2022-10-26 RX ADMIN — Medication 325 MILLIGRAM(S): at 14:00

## 2022-10-26 RX ADMIN — Medication 5 MILLIGRAM(S): at 21:12

## 2022-10-26 RX ADMIN — SENNA PLUS 2 TABLET(S): 8.6 TABLET ORAL at 21:12

## 2022-10-26 RX ADMIN — LIDOCAINE 1 PATCH: 4 CREAM TOPICAL at 19:31

## 2022-10-26 RX ADMIN — Medication 1 SPRAY(S): at 05:58

## 2022-10-26 RX ADMIN — QUETIAPINE FUMARATE 25 MILLIGRAM(S): 200 TABLET, FILM COATED ORAL at 21:12

## 2022-10-26 RX ADMIN — Medication 40 MILLIGRAM(S): at 16:51

## 2022-10-26 RX ADMIN — POLYETHYLENE GLYCOL 3350 17 GRAM(S): 17 POWDER, FOR SOLUTION ORAL at 05:58

## 2022-10-26 RX ADMIN — PANTOPRAZOLE SODIUM 40 MILLIGRAM(S): 20 TABLET, DELAYED RELEASE ORAL at 05:57

## 2022-10-26 RX ADMIN — ROPINIROLE 2 MILLIGRAM(S): 8 TABLET, FILM COATED, EXTENDED RELEASE ORAL at 13:55

## 2022-10-26 RX ADMIN — Medication 100 MILLIGRAM(S): at 05:57

## 2022-10-26 RX ADMIN — GABAPENTIN 300 MILLIGRAM(S): 400 CAPSULE ORAL at 05:57

## 2022-10-26 RX ADMIN — GABAPENTIN 300 MILLIGRAM(S): 400 CAPSULE ORAL at 16:50

## 2022-10-26 RX ADMIN — HEPARIN SODIUM 5000 UNIT(S): 5000 INJECTION INTRAVENOUS; SUBCUTANEOUS at 14:02

## 2022-10-26 RX ADMIN — Medication 145 MILLIGRAM(S): at 14:02

## 2022-10-26 RX ADMIN — HEPARIN SODIUM 5000 UNIT(S): 5000 INJECTION INTRAVENOUS; SUBCUTANEOUS at 21:12

## 2022-10-26 RX ADMIN — DULOXETINE HYDROCHLORIDE 60 MILLIGRAM(S): 30 CAPSULE, DELAYED RELEASE ORAL at 13:57

## 2022-10-26 RX ADMIN — Medication 100 MILLIGRAM(S): at 00:05

## 2022-10-26 RX ADMIN — Medication 12.5 MILLIGRAM(S): at 05:57

## 2022-10-26 RX ADMIN — Medication 100 MILLIGRAM(S): at 21:12

## 2022-10-26 RX ADMIN — LIDOCAINE 1 PATCH: 4 CREAM TOPICAL at 13:53

## 2022-10-26 RX ADMIN — Medication 125 MICROGRAM(S): at 05:57

## 2022-10-26 NOTE — SWALLOW BEDSIDE ASSESSMENT ADULT - ASR SWALLOW RECOMMEND DIAG
Consider Cinesophagram given patient with complaints of globus sensation at the throat and difficulty swallowing/VFSS/MBS

## 2022-10-26 NOTE — PHYSICAL THERAPY INITIAL EVALUATION ADULT - IMPAIRED TRANSFERS: SIT/STAND, REHAB EVAL
impaired balance/cognition/decreased flexibility/impaired motor control/impaired postural control/decreased ROM/decreased strength

## 2022-10-26 NOTE — SWALLOW BEDSIDE ASSESSMENT ADULT - SWALLOW EVAL: DIAGNOSIS
1) Mild oral dysphagia for regular solids marked by adequate containment, and increased mastication time, however sufficient bolus collection prior to posterior transfer/transit. Adequate oral clearance observed. 2) Functional oral stage of swallow for puree, mildly thick, and thin fluids marked by adequate acceptance, bolus manipulation, and coordination. Anterior to posterior oral transit/transfer noted. Adequate oral clearance observed. 3) Functional pharyngeal stage of swallow for regular solids, puree, mildly thick, and thin fluids marked by initiation of pharyngeal swallow trigger and hyolaryngeal excursion noted upon digital palpation. No overt signs/symptoms of penetration/aspiration noted across all consistencies.

## 2022-10-26 NOTE — PHYSICAL THERAPY INITIAL EVALUATION ADULT - PERTINENT HX OF CURRENT PROBLEM, REHAB EVAL
patient is a 77 year old female admitted s/p fall at home, found to have compression fracture at T7, no surgical intervention being recommended

## 2022-10-26 NOTE — OCCUPATIONAL THERAPY INITIAL EVALUATION ADULT - PERTINENT HX OF CURRENT PROBLEM, REHAB EVAL
77 year old female with history of hypothyroidism, HTN, HLD, SVT (s/p ablation), Cervical cancer (s/p hysterectomy), DVT, OA and peripheral neuropathy presenting to [facility] after having fall from bed overnight. Patient found to have UTI. CT head with no evidence of bleed, CT C spine and chest no evidence of fractures.

## 2022-10-26 NOTE — PROGRESS NOTE ADULT - SUBJECTIVE AND OBJECTIVE BOX
Hospitalist Progress Note  Germaine Cardoza MD Pager # [phone]    OVERNIGHT EVENTS: JYOTI    SUBJECTIVE / INTERVAL HPI: Patient seen and examined at bedside. Pt. reports no pain or discomfort. She has not had a BM in a few days. She denies abdominal discomfort. NO nausea/vomiting. Denies dizziness/lightheadedness. All other ROS negative.     VITAL SIGNS:  Vital Signs Last 24 Hrs  T(C): 36.9 (26 Oct 2022 12:13), Max: 37 (25 Oct 2022 23:56)  T(F): 98.4 (26 Oct 2022 12:13), Max: 98.6 (25 Oct 2022 23:56)  HR: 78 (26 Oct 2022 12:13) (77 - 85)  BP: 124/62 (26 Oct 2022 12:13) (120/61 - 129/64)  BP(mean): --  RR: 18 (26 Oct 2022 12:13) (18 - 22)  SpO2: 98% (26 Oct 2022 12:13) (95% - 98%)    Parameters below as of 26 Oct 2022 12:13  Patient On (Oxygen Delivery Method): room air        PHYSICAL EXAM:    General: Elderly female resting in bed   HEENT: NC/AT; PERRL, anicteric sclera; MMM  Neck: supple  Cardiovascular: +S1/S2; RRR  Respiratory: CTA B/L; no W/R/R  Gastrointestinal: soft, distended abdomen - non-tender; +BSx4  Extremities: WWP; no edema, clubbing or cyanosis  Vascular: 2+ radial, DP/PT pulses B/L  Neurological: AAOx2; no focal deficits    MEDICATIONS:  MEDICATIONS  (STANDING):  benzonatate 100 milliGRAM(s) Oral three times a day  bisacodyl 5 milliGRAM(s) Oral at bedtime  cefTRIAXone   IVPB 1000 milliGRAM(s) IV Intermittent every 24 hours  DULoxetine 60 milliGRAM(s) Oral daily  fenofibrate Tablet 145 milliGRAM(s) Oral daily  ferrous    sulfate 325 milliGRAM(s) Oral daily  furosemide    Tablet 40 milliGRAM(s) Oral every 24 hours  gabapentin 300 milliGRAM(s) Oral two times a day  heparin   Injectable 5000 Unit(s) SubCutaneous every 8 hours  levothyroxine 125 MICROGram(s) Oral daily  lidocaine   4% Patch 1 Patch Transdermal every 24 hours  meclizine 12.5 milliGRAM(s) Oral two times a day  pantoprazole    Tablet 40 milliGRAM(s) Oral before breakfast  polyethylene glycol 3350 17 Gram(s) Oral two times a day  QUEtiapine 25 milliGRAM(s) Oral at bedtime  rOPINIRole 2 milliGRAM(s) Oral daily  senna 2 Tablet(s) Oral at bedtime  sodium chloride 0.65% Nasal 1 Spray(s) Both Nostrils two times a day    MEDICATIONS  (PRN):  acetaminophen     Tablet .. 650 milliGRAM(s) Oral every 6 hours PRN Temp greater or equal to 38C (100.4F), Mild Pain (1 - 3)  ALBUTerol    90 MICROgram(s) HFA Inhaler 2 Puff(s) Inhalation every 6 hours PRN Wheezing  aluminum hydroxide/magnesium hydroxide/simethicone Suspension 30 milliLiter(s) Oral every 4 hours PRN Dyspepsia  melatonin 3 milliGRAM(s) Oral at bedtime PRN Insomnia  ondansetron Injectable 4 milliGRAM(s) IV Push every 8 hours PRN Nausea and/or Vomiting  traMADol 100 milliGRAM(s) Oral every 6 hours PRN Severe Pain (7 - 10)  traMADol 50 milliGRAM(s) Oral every 4 hours PRN Moderate Pain (4 - 6)      ALLERGIES:  Allergies    erythromycin (Hives)  IV Contrast (Anaphylaxis)    Intolerances        LABS:                        8.5    16.60 )-----------( 504      ( 26 Oct 2022 06:00 )             26.5     10-26    128<L>  |  94<L>  |  25<H>  ----------------------------<  104<H>  4.4   |  21<L>  |  0.86    Ca    9.6      26 Oct 2022 06:00  Phos  2.8     10-26  Mg     1.90     10-    TPro  8.1  /  Alb  3.3  /  TBili  0.7  /  DBili  x   /  AST  42<H>  /  ALT  11  /  AlkPhos  75  10-25      Urinalysis Basic - ( 25 Oct 2022 06:00 )    Color: Light Yellow / Appearance: Slightly Turbid / S.018 / pH: x  Gluc: x / Ketone: Negative  / Bili: Negative / Urobili: <2 mg/dL   Blood: x / Protein: 100 mg/dL / Nitrite: Negative   Leuk Esterase: Moderate / RBC: 6 /HPF / WBC 12 /HPF   Sq Epi: x / Non Sq Epi: 2 /HPF / Bacteria: Many      CAPILLARY BLOOD GLUCOSE      POCT Blood Glucose.: 153 mg/dL (25 Oct 2022 04:15)      RADIOLOGY & ADDITIONAL TESTS: Reviewed.    ASSESSMENT:    PLAN:

## 2022-10-26 NOTE — SWALLOW BEDSIDE ASSESSMENT ADULT - COMMENTS
Hospitalist 10/26 "78 yo F with PMHx significant for hypothyroidism, HTN, HLD, SVT (s/p ablation), Cervical cancer (s/p hysterectomy), DVT (June 2022, still on eliquis), OA and peripheral neuropathy presenting to [facility] after having fall from bed overnight. Patient found to have UTI and started on IV CTX. CT head with no evidence of bleed, CT C spine and chest no evidence of fractures. Admit to medicine for further evaluation."    CXR 10/25 "The lungs are clear."    Patient seen at bedside, awake/alert and oriented, during clinical swallow evaluation this PM. Patient's sister present during the assessment and served as a reliable informant. Patient able to follow directions and answer questions appropriately. Patient reported occasional difficulty swallowing and odynophagia at the throat. Patient with complaints of globus sensation at the throat and chest/midsternum region during PO intake. Patient stated "food goes down slow". Per sister, patient was followed by ENT and scheduled for a Barium Swallow Study as an outpatient.

## 2022-10-26 NOTE — OCCUPATIONAL THERAPY INITIAL EVALUATION ADULT - MD ORDER
Occupational therapy to evaluate and treat. Occupational therapy to evaluate and treat.  Ambulate with assistance.

## 2022-10-26 NOTE — SWALLOW BEDSIDE ASSESSMENT ADULT - ADDITIONAL RECOMMENDATIONS
This department to follow up as schedule permits to assess for diet tolerance, and/or possible diet advancement. Medical team further advised to reconsult if there is a change in medical status and/or observed change in patient's tolerance of recommended PO diet.

## 2022-10-26 NOTE — OCCUPATIONAL THERAPY INITIAL EVALUATION ADULT - LIVES WITH, PROFILE
Patient reports living alone in an apartment with 16 stairs to access (RN reports patient will be getting a stair lift tomorrow).

## 2022-10-26 NOTE — PHYSICAL THERAPY INITIAL EVALUATION ADULT - ADDITIONAL COMMENTS
patient uses walker. she reports she has home health aide but is unable to remember for how many days/hours

## 2022-10-26 NOTE — PHYSICAL THERAPY INITIAL EVALUATION ADULT - IMPAIRMENTS CONTRIBUTING TO GAIT DEVIATIONS, PT EVAL
Yes impaired balance/decreased flexibility/impaired motor control/impaired postural control/decreased ROM/decreased strength

## 2022-10-26 NOTE — PHYSICAL THERAPY INITIAL EVALUATION ADULT - GAIT DEVIATIONS NOTED, PT EVAL
decreased italo/increased time in double stance/decreased velocity of limb motion/decreased step length/decreased swing-to-stance ratio/decreased weight-shifting ability

## 2022-10-27 DIAGNOSIS — R78.81 BACTEREMIA: ICD-10-CM

## 2022-10-27 LAB
ALBUMIN SERPL ELPH-MCNC: 3.2 G/DL — LOW (ref 3.3–5)
ALP SERPL-CCNC: 70 U/L — SIGNIFICANT CHANGE UP (ref 40–120)
ALT FLD-CCNC: 8 U/L — SIGNIFICANT CHANGE UP (ref 4–33)
ANION GAP SERPL CALC-SCNC: 12 MMOL/L — SIGNIFICANT CHANGE UP (ref 7–14)
AST SERPL-CCNC: 25 U/L — SIGNIFICANT CHANGE UP (ref 4–32)
BILIRUB DIRECT SERPL-MCNC: <0.2 MG/DL — SIGNIFICANT CHANGE UP (ref 0–0.3)
BILIRUB INDIRECT FLD-MCNC: >0.2 MG/DL — SIGNIFICANT CHANGE UP (ref 0–1)
BILIRUB SERPL-MCNC: 0.4 MG/DL — SIGNIFICANT CHANGE UP (ref 0.2–1.2)
BUN SERPL-MCNC: 22 MG/DL — SIGNIFICANT CHANGE UP (ref 7–23)
CALCIUM SERPL-MCNC: 9.5 MG/DL — SIGNIFICANT CHANGE UP (ref 8.4–10.5)
CHLORIDE SERPL-SCNC: 89 MMOL/L — LOW (ref 98–107)
CO2 SERPL-SCNC: 23 MMOL/L — SIGNIFICANT CHANGE UP (ref 22–31)
CREAT SERPL-MCNC: 0.84 MG/DL — SIGNIFICANT CHANGE UP (ref 0.5–1.3)
CULTURE RESULTS: SIGNIFICANT CHANGE UP
EGFR: 72 ML/MIN/1.73M2 — SIGNIFICANT CHANGE UP
GLUCOSE SERPL-MCNC: 99 MG/DL — SIGNIFICANT CHANGE UP (ref 70–99)
GRAM STN FLD: SIGNIFICANT CHANGE UP
HCT VFR BLD CALC: 28.4 % — LOW (ref 34.5–45)
HGB BLD-MCNC: 8.9 G/DL — LOW (ref 11.5–15.5)
MAGNESIUM SERPL-MCNC: 1.8 MG/DL — SIGNIFICANT CHANGE UP (ref 1.6–2.6)
MCHC RBC-ENTMCNC: 23.9 PG — LOW (ref 27–34)
MCHC RBC-ENTMCNC: 31.3 GM/DL — LOW (ref 32–36)
MCV RBC AUTO: 76.1 FL — LOW (ref 80–100)
NRBC # BLD: 0 /100 WBCS — SIGNIFICANT CHANGE UP (ref 0–0)
NRBC # FLD: 0 K/UL — SIGNIFICANT CHANGE UP (ref 0–0)
PHOSPHATE SERPL-MCNC: 3.3 MG/DL — SIGNIFICANT CHANGE UP (ref 2.5–4.5)
PLATELET # BLD AUTO: 441 K/UL — HIGH (ref 150–400)
POTASSIUM SERPL-MCNC: 3.7 MMOL/L — SIGNIFICANT CHANGE UP (ref 3.5–5.3)
POTASSIUM SERPL-SCNC: 3.7 MMOL/L — SIGNIFICANT CHANGE UP (ref 3.5–5.3)
PROT ?TM UR-MCNC: 49 MG/DL — SIGNIFICANT CHANGE UP
PROT SERPL-MCNC: 7.8 G/DL — SIGNIFICANT CHANGE UP (ref 6–8.3)
PROT SERPL-MCNC: 8.2 G/DL — SIGNIFICANT CHANGE UP (ref 6–8.3)
RBC # BLD: 3.73 M/UL — LOW (ref 3.8–5.2)
RBC # FLD: 18 % — HIGH (ref 10.3–14.5)
SODIUM SERPL-SCNC: 124 MMOL/L — LOW (ref 135–145)
SPECIMEN SOURCE: SIGNIFICANT CHANGE UP
T4 AB SER-ACNC: 7.65 UG/DL — SIGNIFICANT CHANGE UP (ref 5.1–13)
WBC # BLD: 13.28 K/UL — HIGH (ref 3.8–10.5)
WBC # FLD AUTO: 13.28 K/UL — HIGH (ref 3.8–10.5)

## 2022-10-27 PROCEDURE — 99223 1ST HOSP IP/OBS HIGH 75: CPT | Mod: GC

## 2022-10-27 PROCEDURE — 84165 PROTEIN E-PHORESIS SERUM: CPT | Mod: 26

## 2022-10-27 PROCEDURE — 84165 PROTEIN E-PHORESIS SERUM: CPT | Mod: 26,59

## 2022-10-27 PROCEDURE — 84166 PROTEIN E-PHORESIS/URINE/CSF: CPT | Mod: 26

## 2022-10-27 PROCEDURE — 99233 SBSQ HOSP IP/OBS HIGH 50: CPT

## 2022-10-27 RX ORDER — TRAMADOL HYDROCHLORIDE 50 MG/1
50 TABLET ORAL EVERY 4 HOURS
Refills: 0 | Status: DISCONTINUED | OUTPATIENT
Start: 2022-10-27 | End: 2022-11-02

## 2022-10-27 RX ORDER — IPRATROPIUM/ALBUTEROL SULFATE 18-103MCG
3 AEROSOL WITH ADAPTER (GRAM) INHALATION EVERY 6 HOURS
Refills: 0 | Status: DISCONTINUED | OUTPATIENT
Start: 2022-10-27 | End: 2022-10-29

## 2022-10-27 RX ORDER — CEFAZOLIN SODIUM 1 G
2000 VIAL (EA) INJECTION EVERY 8 HOURS
Refills: 0 | Status: DISCONTINUED | OUTPATIENT
Start: 2022-10-27 | End: 2022-11-25

## 2022-10-27 RX ORDER — TRAMADOL HYDROCHLORIDE 50 MG/1
100 TABLET ORAL EVERY 6 HOURS
Refills: 0 | Status: DISCONTINUED | OUTPATIENT
Start: 2022-10-27 | End: 2022-11-02

## 2022-10-27 RX ORDER — CEFAZOLIN SODIUM 1 G
2000 VIAL (EA) INJECTION EVERY 12 HOURS
Refills: 0 | Status: DISCONTINUED | OUTPATIENT
Start: 2022-10-27 | End: 2022-10-27

## 2022-10-27 RX ADMIN — TRAMADOL HYDROCHLORIDE 100 MILLIGRAM(S): 50 TABLET ORAL at 22:19

## 2022-10-27 RX ADMIN — LIDOCAINE 1 PATCH: 4 CREAM TOPICAL at 01:32

## 2022-10-27 RX ADMIN — Medication 100 MILLIGRAM(S): at 21:40

## 2022-10-27 RX ADMIN — QUETIAPINE FUMARATE 25 MILLIGRAM(S): 200 TABLET, FILM COATED ORAL at 21:42

## 2022-10-27 RX ADMIN — DULOXETINE HYDROCHLORIDE 60 MILLIGRAM(S): 30 CAPSULE, DELAYED RELEASE ORAL at 13:36

## 2022-10-27 RX ADMIN — GABAPENTIN 300 MILLIGRAM(S): 400 CAPSULE ORAL at 17:42

## 2022-10-27 RX ADMIN — LIDOCAINE 1 PATCH: 4 CREAM TOPICAL at 19:39

## 2022-10-27 RX ADMIN — Medication 3 MILLILITER(S): at 20:49

## 2022-10-27 RX ADMIN — Medication 100 MILLIGRAM(S): at 13:36

## 2022-10-27 RX ADMIN — Medication 1 SPRAY(S): at 05:10

## 2022-10-27 RX ADMIN — Medication 5 MILLIGRAM(S): at 21:41

## 2022-10-27 RX ADMIN — LIDOCAINE 1 PATCH: 4 CREAM TOPICAL at 13:35

## 2022-10-27 RX ADMIN — Medication 325 MILLIGRAM(S): at 13:36

## 2022-10-27 RX ADMIN — GABAPENTIN 300 MILLIGRAM(S): 400 CAPSULE ORAL at 06:09

## 2022-10-27 RX ADMIN — ALBUTEROL 2 PUFF(S): 90 AEROSOL, METERED ORAL at 11:12

## 2022-10-27 RX ADMIN — Medication 650 MILLIGRAM(S): at 11:12

## 2022-10-27 RX ADMIN — Medication 12.5 MILLIGRAM(S): at 06:09

## 2022-10-27 RX ADMIN — ROPINIROLE 2 MILLIGRAM(S): 8 TABLET, FILM COATED, EXTENDED RELEASE ORAL at 22:19

## 2022-10-27 RX ADMIN — HEPARIN SODIUM 5000 UNIT(S): 5000 INJECTION INTRAVENOUS; SUBCUTANEOUS at 21:41

## 2022-10-27 RX ADMIN — Medication 125 MICROGRAM(S): at 05:11

## 2022-10-27 RX ADMIN — POLYETHYLENE GLYCOL 3350 17 GRAM(S): 17 POWDER, FOR SOLUTION ORAL at 17:43

## 2022-10-27 RX ADMIN — Medication 12.5 MILLIGRAM(S): at 17:42

## 2022-10-27 RX ADMIN — Medication 100 MILLIGRAM(S): at 17:39

## 2022-10-27 RX ADMIN — Medication 1 SPRAY(S): at 17:43

## 2022-10-27 RX ADMIN — POLYETHYLENE GLYCOL 3350 17 GRAM(S): 17 POWDER, FOR SOLUTION ORAL at 06:07

## 2022-10-27 RX ADMIN — Medication 250 MILLIGRAM(S): at 00:36

## 2022-10-27 RX ADMIN — Medication 650 MILLIGRAM(S): at 12:12

## 2022-10-27 RX ADMIN — TRAMADOL HYDROCHLORIDE 100 MILLIGRAM(S): 50 TABLET ORAL at 23:19

## 2022-10-27 RX ADMIN — SENNA PLUS 2 TABLET(S): 8.6 TABLET ORAL at 21:42

## 2022-10-27 RX ADMIN — HEPARIN SODIUM 5000 UNIT(S): 5000 INJECTION INTRAVENOUS; SUBCUTANEOUS at 05:17

## 2022-10-27 RX ADMIN — HEPARIN SODIUM 5000 UNIT(S): 5000 INJECTION INTRAVENOUS; SUBCUTANEOUS at 13:35

## 2022-10-27 RX ADMIN — Medication 100 MILLIGRAM(S): at 06:09

## 2022-10-27 RX ADMIN — PANTOPRAZOLE SODIUM 40 MILLIGRAM(S): 20 TABLET, DELAYED RELEASE ORAL at 06:09

## 2022-10-27 RX ADMIN — Medication 145 MILLIGRAM(S): at 13:36

## 2022-10-27 RX ADMIN — CEFTRIAXONE 100 MILLIGRAM(S): 500 INJECTION, POWDER, FOR SOLUTION INTRAMUSCULAR; INTRAVENOUS at 05:15

## 2022-10-27 RX ADMIN — Medication 100 MILLIGRAM(S): at 10:32

## 2022-10-27 NOTE — CONSULT NOTE ADULT - ATTENDING COMMENTS
77 year old female with hypothyroidism, HTN, HLD, SVT s/p ablation, cervical ca s/p hysterectomy, DVT on Eliquis, OA and peripheral neuropathy presenting after fall at home, rolling off her bed to the floor on her buttocks. Leukocytosis. Afebrile. Urine culture with GNR. CXR clear.  Blood cultures with MSSA.    Of note, was admitted to [facility] 4/27 - 5/7/2022 for right groin pain and fever found to have staph bacteremia. She states prior to that admission she received a gluteal shot for the groin pain. Imaging with pelvic fracture with hyperattenuation in the R obturator internus muscle, MRI with rim enhancing hematomas. SHAUN performed no vegetation and was discharged on cefazolin completing on 5/29/2022.    Recommend:  #Leukocytosis/ MSSA bacteremia  -No skin source identified for the MSSA bacteremia  -Has R ankle rods - no signs of septic arthritis on exam - no erythema, no swelling, no tenderness on movement  -Cefazolin 2 grams IV q 8 hours  -Repeat blood cultures q 48 hours to document clearance  -TTE, may need SHAUN given MSSA bacteremia again  -Obtain MRI pelvis to further evaluate prior infected hematoma  -MRI T/L/S spine to evaluate for OM given compression fracture  -Trend WBC  -Monitor for fevers    Eliseo Sutton MD  Available through MS Teams  If no response, or after 5pm/weekends, call [phone]

## 2022-10-27 NOTE — CONSULT NOTE ADULT - ASSESSMENT
WORK UP          DIAGNOSIS and IMPRESSION          RECOMMENDATIONS        PT TO BE SEEN. PRELIM NOTE  PENDING RECS. PLEASE WAIT FOR FINAL RECS AFTER DISCUSSION WITH ATTENDING#    Kamar Schumacher DO, PGY-4   ID fellow  [employer] Teams Preferred  After 5pm/weekends call [phone]   WORK UP  WBC 25 > 13  Cr 0.84  UA with pyuria, bacteria and LE, without nitrite  UCx 10/25 with >GNR  CXR clear  XR Hip no fracture  CTH and Cervical negative  CT Chest with bibasilar atelectasis and acute/subacute T7 compression fracture  US doppler negative  COVID negative  BCx (10/25) 1 out of 2 GPC MSSA pcr positive    s/p Vanco x1  s/p CTX x3d    ceFAZolin   IVPB 2000 every 12 hours (10/27 --- )    DIAGNOSIS and IMPRESSION  77F with hypothyroidism, HTN, HLD, SVT (s/p ablation), Cervical cancer (s/p hysterectomy), DVT (June 2022, still on eliquis), OA and peripheral neuropathy presenting to [facility] after having fall, found to have UTI. ID consulted for MSSA Bacteremia.     #MSSA Bacteremia  #Leukocytosis  #Back Pain ?Acute/Subacute T7 Compression Fracture  #Asymptomatic Bacteruria     RECOMMENDATIONS        PT TO BE SEEN. PRELIM NOTE  PENDING RECS. PLEASE WAIT FOR FINAL RECS AFTER DISCUSSION WITH ATTENDINGJasmyn Schumacher DO, PGY-4   ID fellow  [employer] Teams Preferred  After 5pm/weekends call [phone]   WORK UP  WBC 25 > 13  Cr 0.84  UA with pyuria, bacteria and LE, without nitrite  UCx 10/25 with >GNR  CXR clear  XR Hip no fracture  CTH and Cervical negative  CT Chest with bibasilar atelectasis and acute/subacute T7 compression fracture  US doppler negative  COVID negative  BCx (10/25) 1 out of 2 GPC MSSA pcr positive    s/p Vanco x1  s/p CTX x3d    ceFAZolin   IVPB 2000 every 12 hours (10/27 --- )    DIAGNOSIS and IMPRESSION  77F with hypothyroidism, HTN, HLD, SVT (s/p ablation), Cervical cancer (s/p hysterectomy), DVT (June 2022, still on eliquis), OA and peripheral neuropathy presenting to [facility] after having fall, found to have UTI. ID consulted for MSSA Bacteremia.     #MSSA Bacteremia  #Leukocytosis  #Back Pain ?Acute/Subacute T7 Compression Fracture  #Asymptomatic Bacteruria     - unclear source ?hematoma in past ?spine source ?ankle rods     RECOMMENDATIONS  - switch Cefazolin 2G q8hr  - repeat BCx x2 q48 till clear  - obtain TTE  - obtain CT AP   - obtain CT T/L spine    Case d/w attending and primary team      Kamar Schumacher, , PGY-4   ID fellow  [employer] Teams Preferred  After 5pm/weekends call [phone]   WORK UP  WBC 25 > 13  Cr 0.84  UA with pyuria, bacteria and LE, without nitrite  UCx 10/25 with >GNR  CXR clear  XR Hip no fracture  CTH and Cervical negative  CT Chest with bibasilar atelectasis and acute/subacute T7 compression fracture  US doppler negative  COVID negative  BCx (10/25) 1 out of 2 GPC MSSA pcr positive    s/p Vanco x1  s/p CTX x3d    ceFAZolin   IVPB 2000 every 12 hours (10/27 --- )    DIAGNOSIS and IMPRESSION  77F with hypothyroidism, HTN, HLD, SVT (s/p ablation), Cervical cancer (s/p hysterectomy), DVT (June 2022, still on eliquis), OA and peripheral neuropathy presenting to [facility] after having fall, found to have UTI. ID consulted for MSSA Bacteremia.     #MSSA Bacteremia  #Leukocytosis  #Back Pain ?Acute/Subacute T7 Compression Fracture  #Asymptomatic Bacteruria     - unclear source ?hematoma in past ?spine source ?ankle rods     RECOMMENDATIONS  - switch Cefazolin 2G q8hr  - repeat BCx x2 q48 till clear  - obtain TTE  - obtain MRI AP contrast   - obtain MRI T/L spine contrast     Case d/w attending and primary team      Kamar Schumacher DO, PGY-4   ID fellow  [employer] Teams Preferred  After 5pm/weekends call [phone]

## 2022-10-27 NOTE — CONSULT NOTE ADULT - SUBJECTIVE AND OBJECTIVE BOX
Patient is a 77y old  Female who presents with a chief complaint of Fall / UTI (26 Oct 2022 14:10)    HPI:  76 yo F with PMHx significant for hypothyroidism, HTN, HLD, SVT (s/p ablation), Cervical cancer (s/p hysterectomy), DVT (June 2022, still on eliquis), OA and peripheral neuropathy presenting to [facility] after having fall from bed overnight. Patient reports fall from bed onto L side. Patient states she was sound asleep and woke up on floor, with no memory of rolling over out fo bed. She denied LOC, memory loss, or hitting her head. Patient denied having confusion when waking up. Patient reportedly had 8/10 pain on L ribs, back and hip, and was unable to get up. Patient states distance from bed to floor was about 2 feet. She pressed life alert alarm and was on floor for about 3 hours before help came. Patient denied lightheadedness, but states she has chronic dizziness. No diarrhea, abdominal pain, nausea or vomiting but patient reports decreased appetite and constipation (no BM x3 days). Additionally, patient reports chronic stress incontinence and nocturia, and wears a diaper at all times. Denied fevers, chills, malaise, dysuria, frequency, hematuria, chest pain, palpitations, changes in vision or hearing, bleeding, new rash, joint pain, and weakness. Patient states she has had chronic cough since she was COVID+ several months ago and has nagging dry cough. Pain in back and shoulder worsens with cough since her fall.     Of note patient was recently admitted for a UTI and DVT in June of 2022, and started on Eliquis. Per discharge note in June, patient was to complete a 3 month course and then stop taking. Per patient and PCP, patient still taking the eliquis. Patient reportedly has had 5 UTIs in the past year. Was discharged from rehab facility to home several weeks ago, but still reporting difficulty with ambulation.     Per ASHWIN Gillespie, who saw patient and yadira blood work at patient's home 10/23, patient had leukocytosis, with iron panel suggesting MAR, HgB 8.7, Na 125, and Cr 1.4 at that time.  (25 Oct 2022 12:14)     s/p Vanco x1  s/p CTX x3d    ceFAZolin   IVPB 2000 every 12 hours (10/27 --- )    PAST MEDICAL & SURGICAL HISTORY:  History of cervical cancer  s/p hysterectomy  H/O peripheral neuropathy  Vertigo  Palpitations  H/O supraventricular tachycardia  s/p ablation  H/O hyperlipidemi  HTN (hypertension)  Hx of cholecystectomy  S/P total abdominal hysterectomy  Hx of ankle fusion          Allergies  erythromycin (Hives)  IV Contrast (Anaphylaxis)    ANTIMICROBIALS (past 90 days)  MEDICATIONS  (STANDING):  s/p Vanco x1  s/p CTX x3d    ceFAZolin   IVPB 2000 every 12 hours (10/27 --- )    MEDICATIONS  (STANDING):  acetaminophen     Tablet .. 650 every 6 hours PRN  ALBUTerol    90 MICROgram(s) HFA Inhaler 2 every 6 hours PRN  aluminum hydroxide/magnesium hydroxide/simethicone Suspension 30 every 4 hours PRN  benzonatate 100 three times a day  bisacodyl 5 at bedtime  DULoxetine 60 daily  fenofibrate Tablet 145 daily  furosemide    Tablet 40 every 24 hours  gabapentin 300 two times a day  heparin   Injectable 5000 every 8 hours  lactulose Syrup 10 once  levothyroxine 125 daily  meclizine 12.5 two times a day  melatonin 3 at bedtime PRN  ondansetron Injectable 4 every 8 hours PRN  pantoprazole    Tablet 40 before breakfast  polyethylene glycol 3350 17 two times a day  QUEtiapine 25 at bedtime  rOPINIRole 2 daily  senna 2 at bedtime  traMADol 50 every 4 hours PRN  traMADol 100 every 6 hours PRN    SOCIAL HISTORY:       FAMILY HISTORY:  FH: myocardial infarction (Mother)      REVIEW OF SYSTEMS  [  ] ROS unobtainable because:    [  ] All other systems negative except as noted below:	    Constitutional:  [ ] fever [ ] chills  [ ] weight loss  [ ] weakness  Skin:  [ ] rash [ ] phlebitis	  Eyes: [ ] icterus [ ] pain  [ ] discharge	  ENMT: [ ] sore throat  [ ] thrush [ ] ulcers [ ] exudates  Respiratory: [ ] dyspnea [ ] hemoptysis [ ] cough [ ] sputum	  Cardiovascular:  [ ] chest pain [ ] palpitations [ ] edema	  Gastrointestinal:  [ ] nausea [ ] vomiting [ ] diarrhea [ ] constipation [ ] pain	  Genitourinary:  [ ] dysuria [ ] frequency [ ] hematuria [ ] discharge [ ] flank pain  [ ] incontinence  Musculoskeletal:  [ ] myalgias [ ] arthralgias [ ] arthritis  [ ] back pain  Neurological:  [ ] headache [ ] seizures  [ ] confusion/altered mental status  Psychiatric:  [ ] anxiety [ ] depression	  Hematology/Lymphatics:  [ ] lymphadenopathy  Endocrine:  [ ] adrenal [ ] thyroid  Allergic/Immunologic:	 [ ] transplant [ ] seasonal    Vital Signs Last 24 Hrs  T(F): 98.2 (10-27-22 @ 05:00), Max: 98.6 (10-25-22 @ 23:56)  Vital Signs Last 24 Hrs  HR: 98 (10-27-22 @ 05:00) (78 - 99)  BP: 109/60 (10-27-22 @ 05:00) (109/60 - 124/62)  RR: 18 (10-27-22 @ 05:00)  SpO2: 100% (10-27-22 @ 05:00) (98% - 100%)  Wt(kg): --    Physical Exam:  Constitutional:  well preserved, comfortable  Head/Eyes: no icterus  ENT:  supple, no cervical lymphadenopathy   LUNGS:  CTA  CVS:  regular rhythm, no murmur  Abd:  soft, non-tender; non-distended  Ext:  no edema  Vascular:  IV site no erythema tenderness or discharge  MSK:  joints without swelling  Neuro: AAO X 3, non- focal                            8.9    13.28 )-----------( 441      ( 27 Oct 2022 06:45 )             28.4   10-27    124<L>  |  89<L>  |  22  ----------------------------<  99  3.7   |  23  |  0.84    Ca    9.5      27 Oct 2022 06:45  Phos  3.3     10-27  Mg     1.80     10-27      MICROBIOLOGY:  Culture - Blood (collected 25 Oct 2022 15:49)  Source: .Blood Blood  Gram Stain (26 Oct 2022 19:25):    Growth in aerobic bottle: Gram Positive Cocci in Clusters  Preliminary Report (26 Oct 2022 19:25):    Growth in aerobic bottle: Gram Positive Cocci in Clusters    ***Blood Panel PCR results on this specimen are available    approximately 3 hours after the Gram stain result.***    Gram stain, PCR, and/or culture results may not always    correspond due to difference in methodologies.    ************************************************************    This PCR assay was performed by multiplex PCR. This    Assay tests for 66 bacterial and resistance gene targets.    Please refer to the [facility] Labs test directory    at [email or web address] for details.  Organism: Blood Culture PCR (26 Oct 2022 23:07)  Organism: Blood Culture PCR (26 Oct 2022 23:07)      -  Methicillin SENSITIVE Staphylococcus aureus (MSSA): Detec Any isolate of Staphylococcus aureus from a blood culture is NOT considered a contaminant.      Method Type: PCR    Culture - Urine (collected 25 Oct 2022 09:51)  Source: Clean Catch Clean Catch (Midstream)  Preliminary Report (26 Oct 2022 21:01):    >100,000 CFU/ml Gram Negative Rods      RADIOLOGY:  imaging below personally reviewed and agree with findings  < from: CT Chest No Cont (10.25.22 @ 07:10) >  IMPRESSION:  There is mild loss of height of T7 with mild adjacent perivertebral   thickening suggestive of an acute/subacute compression fracture   deformity. There is no significant bony retropulsion. Recommend clinical   correlation.    Nonspecific subcutaneous centimeter lytic lesion of the right sternum.   Etiology includes, but not limited to, metastases and multiple myeloma.    < end of copied text >  < from: CT Cervical Spine No Cont (10.25.22 @ 07:10) >  IMPRESSION:  1. No acute intracranial hemorrhage, territorial infarct, mass effect or   calvarial fracture.  2. Multilevel degenerative changes of the cervical spine without evidence   of a fracture.    < end of copied text >  < from: CT Head No Cont (10.25.22 @ 07:10) >  IMPRESSION:  1. No acute intracranial hemorrhage, territorial infarct, mass effect or   calvarial fracture.  2. Multilevel degenerative changes of the cervical spine without evidence   of a fracture.    < end of copied text >  < from: Xray Pelvis AP only (10.25.22 @ 05:21) >  IMPRESSION:  1.  No acute fracture or dislocation.  2.  Chronic incompletely healed fractures of the right superior and   inferior pubic rami with extensive callus formation.    < end of copied text >     HPI:  77F with hypothyroidism, HTN, HLD, SVT (s/p ablation), Cervical cancer (s/p hysterectomy), DVT (June 2022, still on eliquis), OA and peripheral neuropathy presenting to [facility] after having fall, found to have UTI. ID consulted for MSSA Bacteremia.     Patient states ---  Patient states she was sound asleep and woke up on floor, with no memory of rolling over out fo bed. She denied LOC, memory loss, or hitting her head.  Denied fevers, chills, malaise, dysuria, frequency, hematuria, chest pain, palpitations, changes in vision or hearing, bleeding, new rash, joint pain, and weakness.             s/p Vanco x1  s/p CTX x3d    ceFAZolin   IVPB 2000 every 12 hours (10/27 --- )        PAST MEDICAL & SURGICAL HISTORY:  History of cervical cancer  s/p hysterectomy  H/O peripheral neuropathy  Vertigo  Palpitations  H/O supraventricular tachycardia  s/p ablation  H/O hyperlipidemi  HTN (hypertension)  Hx of cholecystectomy  S/P total abdominal hysterectomy  Hx of ankle fusion          Allergies  erythromycin (Hives)  IV Contrast (Anaphylaxis)    ANTIMICROBIALS (past 90 days)  MEDICATIONS  (STANDING):  s/p Vanco x1  s/p CTX x3d    ceFAZolin   IVPB 2000 every 12 hours (10/27 --- )    MEDICATIONS  (STANDING):  acetaminophen     Tablet .. 650 every 6 hours PRN  ALBUTerol    90 MICROgram(s) HFA Inhaler 2 every 6 hours PRN  aluminum hydroxide/magnesium hydroxide/simethicone Suspension 30 every 4 hours PRN  benzonatate 100 three times a day  bisacodyl 5 at bedtime  DULoxetine 60 daily  fenofibrate Tablet 145 daily  furosemide    Tablet 40 every 24 hours  gabapentin 300 two times a day  heparin   Injectable 5000 every 8 hours  lactulose Syrup 10 once  levothyroxine 125 daily  meclizine 12.5 two times a day  melatonin 3 at bedtime PRN  ondansetron Injectable 4 every 8 hours PRN  pantoprazole    Tablet 40 before breakfast  polyethylene glycol 3350 17 two times a day  QUEtiapine 25 at bedtime  rOPINIRole 2 daily  senna 2 at bedtime  traMADol 50 every 4 hours PRN  traMADol 100 every 6 hours PRN    SOCIAL HISTORY:       FAMILY HISTORY:  FH: myocardial infarction (Mother)      REVIEW OF SYSTEMS  [  ] ROS unobtainable because:    [  ] All other systems negative except as noted below:	    Constitutional:  [ ] fever [ ] chills  [ ] weight loss  [ ] weakness  Skin:  [ ] rash [ ] phlebitis	  Eyes: [ ] icterus [ ] pain  [ ] discharge	  ENMT: [ ] sore throat  [ ] thrush [ ] ulcers [ ] exudates  Respiratory: [ ] dyspnea [ ] hemoptysis [ ] cough [ ] sputum	  Cardiovascular:  [ ] chest pain [ ] palpitations [ ] edema	  Gastrointestinal:  [ ] nausea [ ] vomiting [ ] diarrhea [ ] constipation [ ] pain	  Genitourinary:  [ ] dysuria [ ] frequency [ ] hematuria [ ] discharge [ ] flank pain  [ ] incontinence  Musculoskeletal:  [ ] myalgias [ ] arthralgias [ ] arthritis  [ ] back pain  Neurological:  [ ] headache [ ] seizures  [ ] confusion/altered mental status  Psychiatric:  [ ] anxiety [ ] depression	  Hematology/Lymphatics:  [ ] lymphadenopathy  Endocrine:  [ ] adrenal [ ] thyroid  Allergic/Immunologic:	 [ ] transplant [ ] seasonal    Vital Signs Last 24 Hrs  T(F): 98.2 (10-27-22 @ 05:00), Max: 98.6 (10-25-22 @ 23:56)  Vital Signs Last 24 Hrs  HR: 98 (10-27-22 @ 05:00) (78 - 99)  BP: 109/60 (10-27-22 @ 05:00) (109/60 - 124/62)  RR: 18 (10-27-22 @ 05:00)  SpO2: 100% (10-27-22 @ 05:00) (98% - 100%)  Wt(kg): --    Physical Exam:  Constitutional:  well preserved, comfortable  Head/Eyes: no icterus  ENT:  supple, no cervical lymphadenopathy   LUNGS:  CTA  CVS:  regular rhythm, no murmur  Abd:  soft, non-tender; non-distended  Ext:  no edema  Vascular:  IV site no erythema tenderness or discharge  MSK:  joints without swelling  Neuro: AAO X 3, non- focal                            8.9    13.28 )-----------( 441      ( 27 Oct 2022 06:45 )             28.4   10-27    124<L>  |  89<L>  |  22  ----------------------------<  99  3.7   |  23  |  0.84    Ca    9.5      27 Oct 2022 06:45  Phos  3.3     10-27  Mg     1.80     10-27      MICROBIOLOGY:  Culture - Blood (collected 25 Oct 2022 15:49)  Source: .Blood Blood  Gram Stain (26 Oct 2022 19:25):    Growth in aerobic bottle: Gram Positive Cocci in Clusters  Preliminary Report (26 Oct 2022 19:25):    Growth in aerobic bottle: Gram Positive Cocci in Clusters    ***Blood Panel PCR results on this specimen are available    approximately 3 hours after the Gram stain result.***    Gram stain, PCR, and/or culture results may not always    correspond due to difference in methodologies.    ************************************************************    This PCR assay was performed by multiplex PCR. This    Assay tests for 66 bacterial and resistance gene targets.    Please refer to the [facility] Labs test directory    at [email or web address] for details.  Organism: Blood Culture PCR (26 Oct 2022 23:07)  Organism: Blood Culture PCR (26 Oct 2022 23:07)      -  Methicillin SENSITIVE Staphylococcus aureus (MSSA): Detec Any isolate of Staphylococcus aureus from a blood culture is NOT considered a contaminant.      Method Type: PCR    Culture - Urine (collected 25 Oct 2022 09:51)  Source: Clean Catch Clean Catch (Midstream)  Preliminary Report (26 Oct 2022 21:01):    >100,000 CFU/ml Gram Negative Rods      RADIOLOGY:  imaging below personally reviewed and agree with findings  < from: CT Chest No Cont (10.25.22 @ 07:10) >  IMPRESSION:  There is mild loss of height of T7 with mild adjacent perivertebral   thickening suggestive of an acute/subacute compression fracture   deformity. There is no significant bony retropulsion. Recommend clinical   correlation.    Nonspecific subcutaneous centimeter lytic lesion of the right sternum.   Etiology includes, but not limited to, metastases and multiple myeloma.    < end of copied text >  < from: CT Cervical Spine No Cont (10.25.22 @ 07:10) >  IMPRESSION:  1. No acute intracranial hemorrhage, territorial infarct, mass effect or   calvarial fracture.  2. Multilevel degenerative changes of the cervical spine without evidence   of a fracture.    < end of copied text >  < from: CT Head No Cont (10.25.22 @ 07:10) >  IMPRESSION:  1. No acute intracranial hemorrhage, territorial infarct, mass effect or   calvarial fracture.  2. Multilevel degenerative changes of the cervical spine without evidence   of a fracture.    < end of copied text >  < from: Xray Pelvis AP only (10.25.22 @ 05:21) >  IMPRESSION:  1.  No acute fracture or dislocation.  2.  Chronic incompletely healed fractures of the right superior and   inferior pubic rami with extensive callus formation.    < end of copied text >     HPI:  77F with hypothyroidism, HTN, HLD, SVT (s/p ablation), Cervical cancer (s/p hysterectomy), DVT (June 2022, still on eliquis), OA and peripheral neuropathy presenting to [facility] after having fall, found to have UTI. ID consulted for MSSA Bacteremia.     Patient states she accidentally rolled off her bed. She fell straight to the floor. She denied LOC, memory loss, or hitting her head.  Patient reports 2 days of generalize weakness without strength to ambulate. She ambulates with walker.  She also reports chills with chronic back pain for years. Also has intermittent cough.  Denied fevers, malaise, dysuria, frequency, hematuria, chest pain, palpitations, changes in vision or hearing, bleeding, new rash.  Denies any prosthetic joints or cardiac devices    Afebrile VSS  WBC 25 > 13  Cr 0.84  UA with pyuria, bacteria and LE, without nitrite  UCx 10/25 with >GNR  CXR clear  XR Hip no fracture  CTH and Cervical negative  CT Chest with bibasilar atelectasis and acute/subacute T7 compression fracture  US doppler negative  COVID negative  BCx (10/25) 1 out of 2 GPC MSSA pcr positive    s/p Vanco x1  s/p CTX x3d    ceFAZolin   IVPB 2000 every 12 hours (10/27 --- )        PAST MEDICAL & SURGICAL HISTORY:  History of cervical cancer  s/p hysterectomy  H/O peripheral neuropathy  Vertigo  Palpitations  H/O supraventricular tachycardia  s/p ablation  H/O hyperlipidemi  HTN (hypertension)  Hx of cholecystectomy  S/P total abdominal hysterectomy  Hx of ankle fusion      Allergies  erythromycin (Hives)  IV Contrast (Anaphylaxis)    ANTIMICROBIALS (past 90 days)  MEDICATIONS  (STANDING):  s/p Vanco x1  s/p CTX x3d    ceFAZolin   IVPB 2000 every 12 hours (10/27 --- )    MEDICATIONS  (STANDING):  acetaminophen     Tablet .. 650 every 6 hours PRN  ALBUTerol    90 MICROgram(s) HFA Inhaler 2 every 6 hours PRN  aluminum hydroxide/magnesium hydroxide/simethicone Suspension 30 every 4 hours PRN  benzonatate 100 three times a day  bisacodyl 5 at bedtime  DULoxetine 60 daily  fenofibrate Tablet 145 daily  furosemide    Tablet 40 every 24 hours  gabapentin 300 two times a day  heparin   Injectable 5000 every 8 hours  lactulose Syrup 10 once  levothyroxine 125 daily  meclizine 12.5 two times a day  melatonin 3 at bedtime PRN  ondansetron Injectable 4 every 8 hours PRN  pantoprazole    Tablet 40 before breakfast  polyethylene glycol 3350 17 two times a day  QUEtiapine 25 at bedtime  rOPINIRole 2 daily  senna 2 at bedtime  traMADol 50 every 4 hours PRN  traMADol 100 every 6 hours PRN    SOCIAL HISTORY:   Denies alcohol, tobacco, recreational drug use      FAMILY HISTORY:  FH: myocardial infarction (Mother)      REVIEW OF SYSTEMS  [  ] ROS unobtainable because:    [x  ] All other systems negative except as noted below:	    Constitutional:  [ ] fever [ x] chills  [ ] weight loss  [x ] weakness  Skin:  [ ] rash [ ] phlebitis	  Eyes: [ ] icterus [ ] pain  [ ] discharge	  ENMT: [ ] sore throat  [ ] thrush [ ] ulcers [ ] exudates  Respiratory: [ ] dyspnea [ ] hemoptysis [ x] cough [ ] sputum	  Cardiovascular:  [ ] chest pain [ ] palpitations [ ] edema	  Gastrointestinal:  [ ] nausea [ ] vomiting [ ] diarrhea [ ] constipation [ ] pain	  Genitourinary:  [ ] dysuria [ ] frequency [ ] hematuria [ ] discharge [ ] flank pain  [ ] incontinence  Musculoskeletal:  [ ] myalgias [ ] arthralgias [ ] arthritis  [ ] back pain  Neurological:  [ ] headache [ ] seizures  [ ] confusion/altered mental status  Psychiatric:  [ ] anxiety [ ] depression	  Hematology/Lymphatics:  [ ] lymphadenopathy  Endocrine:  [ ] adrenal [ ] thyroid  Allergic/Immunologic:	 [ ] transplant [ ] seasonal    Vital Signs Last 24 Hrs  T(F): 98.2 (10-27-22 @ 05:00), Max: 98.6 (10-25-22 @ 23:56)  Vital Signs Last 24 Hrs  HR: 98 (10-27-22 @ 05:00) (78 - 99)  BP: 109/60 (10-27-22 @ 05:00) (109/60 - 124/62)  RR: 18 (10-27-22 @ 05:00)  SpO2: 100% (10-27-22 @ 05:00) (98% - 100%)  Wt(kg): --    Physical Exam:  Constitutional:  obese, comfortable  Head/Eyes: no icterus  ENT:  supple, no cervical lymphadenopathy   LUNGS:  decrease breath sounds bibasilar   CVS:  regular rhythm, no murmur  Abd:  soft, non-tender; non-distended  Ext:  no edema  Vascular:  IV site no erythema tenderness or discharge  MSK:  joints without swelling  Neuro: AAO X 3, non- focal                            8.9    13.28 )-----------( 441      ( 27 Oct 2022 06:45 )             28.4   10-27    124<L>  |  89<L>  |  22  ----------------------------<  99  3.7   |  23  |  0.84    Ca    9.5      27 Oct 2022 06:45  Phos  3.3     10-27  Mg     1.80     10-27      MICROBIOLOGY:  Culture - Blood (collected 25 Oct 2022 15:49)  Source: .Blood Blood  Gram Stain (26 Oct 2022 19:25):    Growth in aerobic bottle: Gram Positive Cocci in Clusters  Preliminary Report (26 Oct 2022 19:25):    Growth in aerobic bottle: Gram Positive Cocci in Clusters    ***Blood Panel PCR results on this specimen are available    approximately 3 hours after the Gram stain result.***    Gram stain, PCR, and/or culture results may not always    correspond due to difference in methodologies.    ************************************************************    This PCR assay was performed by multiplex PCR. This    Assay tests for 66 bacterial and resistance gene targets.    Please refer to the [facility] Labs test directory    at [email or web address] for details.  Organism: Blood Culture PCR (26 Oct 2022 23:07)  Organism: Blood Culture PCR (26 Oct 2022 23:07)      -  Methicillin SENSITIVE Staphylococcus aureus (MSSA): Detec Any isolate of Staphylococcus aureus from a blood culture is NOT considered a contaminant.      Method Type: PCR    Culture - Urine (collected 25 Oct 2022 09:51)  Source: Clean Catch Clean Catch (Midstream)  Preliminary Report (26 Oct 2022 21:01):    >100,000 CFU/ml Gram Negative Rods      RADIOLOGY:  imaging below personally reviewed and agree with findings  < from: CT Chest No Cont (10.25.22 @ 07:10) >  IMPRESSION:  There is mild loss of height of T7 with mild adjacent perivertebral   thickening suggestive of an acute/subacute compression fracture   deformity. There is no significant bony retropulsion. Recommend clinical   correlation.    Nonspecific subcutaneous centimeter lytic lesion of the right sternum.   Etiology includes, but not limited to, metastases and multiple myeloma.    < end of copied text >  < from: CT Cervical Spine No Cont (10.25.22 @ 07:10) >  IMPRESSION:  1. No acute intracranial hemorrhage, territorial infarct, mass effect or   calvarial fracture.  2. Multilevel degenerative changes of the cervical spine without evidence   of a fracture.    < end of copied text >  < from: CT Head No Cont (10.25.22 @ 07:10) >  IMPRESSION:  1. No acute intracranial hemorrhage, territorial infarct, mass effect or   calvarial fracture.  2. Multilevel degenerative changes of the cervical spine without evidence   of a fracture.    < end of copied text >  < from: Xray Pelvis AP only (10.25.22 @ 05:21) >  IMPRESSION:  1.  No acute fracture or dislocation.  2.  Chronic incompletely healed fractures of the right superior and   inferior pubic rami with extensive callus formation.    < end of copied text >     HPI:  77F with hypothyroidism, HTN, HLD, SVT (s/p ablation), Cervical cancer (s/p hysterectomy), DVT (June 2022, still on eliquis), OA and peripheral neuropathy presenting to [facility] after having fall, found to have UTI. ID consulted for MSSA Bacteremia.     Patient states she accidentally rolled off her bed. She fell straight to the floor. She denied LOC, memory loss, or hitting her head.  Patient reports 2 days of generalize weakness without strength to ambulate. She ambulates with walker.  She also reports chills with chronic back pain for years. Also has intermittent cough.  Denied fevers, malaise, dysuria, frequency, hematuria, chest pain, palpitations, changes in vision or hearing, bleeding, new rash.  Denies any cardiac devices but has pins and rods of R ankle    Afebrile VSS  WBC 25 > 13  Cr 0.84  UA with pyuria, bacteria and LE, without nitrite  UCx 10/25 with >GNR  CXR clear  XR Hip no fracture  CTH and Cervical negative  CT Chest with bibasilar atelectasis and acute/subacute T7 compression fracture  US doppler negative  COVID negative  BCx (10/25) 1 out of 2 GPC MSSA pcr positive    s/p Vanco x1  s/p CTX x3d    ceFAZolin   IVPB 2000 every 12 hours (10/27 --- )        PAST MEDICAL & SURGICAL HISTORY:  History of cervical cancer  s/p hysterectomy  H/O peripheral neuropathy  Vertigo  Palpitations  H/O supraventricular tachycardia  s/p ablation  H/O hyperlipidemi  HTN (hypertension)  Hx of cholecystectomy  S/P total abdominal hysterectomy  Hx of ankle fusion      Allergies  erythromycin (Hives)  IV Contrast (Anaphylaxis)    ANTIMICROBIALS (past 90 days)  MEDICATIONS  (STANDING):  s/p Vanco x1  s/p CTX x3d    ceFAZolin   IVPB 2000 every 12 hours (10/27 --- )    MEDICATIONS  (STANDING):  acetaminophen     Tablet .. 650 every 6 hours PRN  ALBUTerol    90 MICROgram(s) HFA Inhaler 2 every 6 hours PRN  aluminum hydroxide/magnesium hydroxide/simethicone Suspension 30 every 4 hours PRN  benzonatate 100 three times a day  bisacodyl 5 at bedtime  DULoxetine 60 daily  fenofibrate Tablet 145 daily  furosemide    Tablet 40 every 24 hours  gabapentin 300 two times a day  heparin   Injectable 5000 every 8 hours  lactulose Syrup 10 once  levothyroxine 125 daily  meclizine 12.5 two times a day  melatonin 3 at bedtime PRN  ondansetron Injectable 4 every 8 hours PRN  pantoprazole    Tablet 40 before breakfast  polyethylene glycol 3350 17 two times a day  QUEtiapine 25 at bedtime  rOPINIRole 2 daily  senna 2 at bedtime  traMADol 50 every 4 hours PRN  traMADol 100 every 6 hours PRN    SOCIAL HISTORY:   Denies alcohol, tobacco, recreational drug use      FAMILY HISTORY:  FH: myocardial infarction (Mother)      REVIEW OF SYSTEMS  [  ] ROS unobtainable because:    [x  ] All other systems negative except as noted below:	    Constitutional:  [ ] fever [ x] chills  [ ] weight loss  [x ] weakness  Skin:  [ ] rash [ ] phlebitis	  Eyes: [ ] icterus [ ] pain  [ ] discharge	  ENMT: [ ] sore throat  [ ] thrush [ ] ulcers [ ] exudates  Respiratory: [ ] dyspnea [ ] hemoptysis [ x] cough [ ] sputum	  Cardiovascular:  [ ] chest pain [ ] palpitations [ ] edema	  Gastrointestinal:  [ ] nausea [ ] vomiting [ ] diarrhea [ ] constipation [ ] pain	  Genitourinary:  [ ] dysuria [ ] frequency [ ] hematuria [ ] discharge [ ] flank pain  [ ] incontinence  Musculoskeletal:  [ ] myalgias [ ] arthralgias [ ] arthritis  [ ] back pain  Neurological:  [ ] headache [ ] seizures  [ ] confusion/altered mental status  Psychiatric:  [ ] anxiety [ ] depression	  Hematology/Lymphatics:  [ ] lymphadenopathy  Endocrine:  [ ] adrenal [ ] thyroid  Allergic/Immunologic:	 [ ] transplant [ ] seasonal    Vital Signs Last 24 Hrs  T(F): 98.2 (10-27-22 @ 05:00), Max: 98.6 (10-25-22 @ 23:56)  Vital Signs Last 24 Hrs  HR: 98 (10-27-22 @ 05:00) (78 - 99)  BP: 109/60 (10-27-22 @ 05:00) (109/60 - 124/62)  RR: 18 (10-27-22 @ 05:00)  SpO2: 100% (10-27-22 @ 05:00) (98% - 100%)  Wt(kg): --    Physical Exam:  Constitutional:  obese, comfortable  Head/Eyes: no icterus  ENT:  supple, no cervical lymphadenopathy   LUNGS:  decrease breath sounds bibasilar   CVS:  regular rhythm, no murmur  Abd:  soft, non-tender; non-distended  Ext:  no edema  Vascular:  IV site no erythema tenderness or discharge  MSK:  joints without swelling  Neuro: AAO X 3, non- focal                            8.9    13.28 )-----------( 441      ( 27 Oct 2022 06:45 )             28.4   10-27    124<L>  |  89<L>  |  22  ----------------------------<  99  3.7   |  23  |  0.84    Ca    9.5      27 Oct 2022 06:45  Phos  3.3     10-27  Mg     1.80     10-27      MICROBIOLOGY:  Culture - Blood (collected 25 Oct 2022 15:49)  Source: .Blood Blood  Gram Stain (26 Oct 2022 19:25):    Growth in aerobic bottle: Gram Positive Cocci in Clusters  Preliminary Report (26 Oct 2022 19:25):    Growth in aerobic bottle: Gram Positive Cocci in Clusters    ***Blood Panel PCR results on this specimen are available    approximately 3 hours after the Gram stain result.***    Gram stain, PCR, and/or culture results may not always    correspond due to difference in methodologies.    ************************************************************    This PCR assay was performed by multiplex PCR. This    Assay tests for 66 bacterial and resistance gene targets.    Please refer to the [facility] test directory    at [email or web address] for details.  Organism: Blood Culture PCR (26 Oct 2022 23:07)  Organism: Blood Culture PCR (26 Oct 2022 23:07)      -  Methicillin SENSITIVE Staphylococcus aureus (MSSA): Detec Any isolate of Staphylococcus aureus from a blood culture is NOT considered a contaminant.      Method Type: PCR    Culture - Urine (collected 25 Oct 2022 09:51)  Source: Clean Catch Clean Catch (Midstream)  Preliminary Report (26 Oct 2022 21:01):    >100,000 CFU/ml Gram Negative Rods      RADIOLOGY:  imaging below personally reviewed and agree with findings  < from: CT Chest No Cont (10.25.22 @ 07:10) >  IMPRESSION:  There is mild loss of height of T7 with mild adjacent perivertebral   thickening suggestive of an acute/subacute compression fracture   deformity. There is no significant bony retropulsion. Recommend clinical   correlation.    Nonspecific subcutaneous centimeter lytic lesion of the right sternum.   Etiology includes, but not limited to, metastases and multiple myeloma.    < end of copied text >  < from: CT Cervical Spine No Cont (10.25.22 @ 07:10) >  IMPRESSION:  1. No acute intracranial hemorrhage, territorial infarct, mass effect or   calvarial fracture.  2. Multilevel degenerative changes of the cervical spine without evidence   of a fracture.    < end of copied text >  < from: CT Head No Cont (10.25.22 @ 07:10) >  IMPRESSION:  1. No acute intracranial hemorrhage, territorial infarct, mass effect or   calvarial fracture.  2. Multilevel degenerative changes of the cervical spine without evidence   of a fracture.    < end of copied text >  < from: Xray Pelvis AP only (10.25.22 @ 05:21) >  IMPRESSION:  1.  No acute fracture or dislocation.  2.  Chronic incompletely healed fractures of the right superior and   inferior pubic rami with extensive callus formation.    < end of copied text >

## 2022-10-27 NOTE — PHARMACOTHERAPY INTERVENTION NOTE - COMMENTS
77 F with MSSA bacteremia on ceftriaxone IV.    Recommendation(s):  1) As per hospital policy, patients with Staphylococcus aureus invasive disease (including bacteremia), require a mandatory ID consultation, especially since patients is on suboptimal treatment at this time.    Sasha Garner, PharmD, [facility]  Clinical Pharmacy Specialist, Infectious Diseases  Spectra extension [phone]  .

## 2022-10-27 NOTE — PROGRESS NOTE ADULT - SUBJECTIVE AND OBJECTIVE BOX
Hospitalist Progress Note  Germaine Cardoza MD Pager # [phone]    OVERNIGHT EVENTS: JYOTI    SUBJECTIVE / INTERVAL HPI: Patient seen and examined at bedside. Pt. reports that she continues to have a cough. The cough seems to be worsening. She also complains of pain on the right side when coughing. No shortness of breath. No fevers/chills.     VITAL SIGNS:  Vital Signs Last 24 Hrs  T(C): 36.7 (27 Oct 2022 12:16), Max: 37 (26 Oct 2022 21:00)  T(F): 98.1 (27 Oct 2022 12:16), Max: 98.6 (26 Oct 2022 21:00)  HR: 81 (27 Oct 2022 12:16) (81 - 99)  BP: 107/58 (27 Oct 2022 12:16) (107/58 - 116/60)  BP(mean): --  RR: 18 (27 Oct 2022 12:16) (18 - 18)  SpO2: 99% (27 Oct 2022 12:16) (99% - 100%)    Parameters below as of 27 Oct 2022 12:16  Patient On (Oxygen Delivery Method): room air        PHYSICAL EXAM:    General: Elderly female resting in bed   HEENT: NC/AT; PERRL, anicteric sclera; MMM  Neck: supple  Cardiovascular: +S1/S2; RRR  Respiratory: CTA B/L; no W/R/R  Gastrointestinal: soft, NT/ND; +BSx4  Extremities: WWP; no edema, clubbing or cyanosis  Vascular: 2+ radial, DP/PT pulses B/L  Neurological: AAOx3; no focal deficits    MEDICATIONS:  MEDICATIONS  (STANDING):  albuterol/ipratropium for Nebulization 3 milliLiter(s) Nebulizer every 6 hours  benzonatate 100 milliGRAM(s) Oral three times a day  bisacodyl 5 milliGRAM(s) Oral at bedtime  ceFAZolin   IVPB 2000 milliGRAM(s) IV Intermittent every 8 hours  DULoxetine 60 milliGRAM(s) Oral daily  fenofibrate Tablet 145 milliGRAM(s) Oral daily  ferrous    sulfate 325 milliGRAM(s) Oral daily  gabapentin 300 milliGRAM(s) Oral two times a day  heparin   Injectable 5000 Unit(s) SubCutaneous every 8 hours  lactulose Syrup 10 Gram(s) Oral once  levothyroxine 125 MICROGram(s) Oral daily  lidocaine   4% Patch 1 Patch Transdermal every 24 hours  meclizine 12.5 milliGRAM(s) Oral two times a day  pantoprazole    Tablet 40 milliGRAM(s) Oral before breakfast  polyethylene glycol 3350 17 Gram(s) Oral two times a day  QUEtiapine 25 milliGRAM(s) Oral at bedtime  rOPINIRole 2 milliGRAM(s) Oral daily  senna 2 Tablet(s) Oral at bedtime  sodium chloride 0.65% Nasal 1 Spray(s) Both Nostrils two times a day    MEDICATIONS  (PRN):  acetaminophen     Tablet .. 650 milliGRAM(s) Oral every 6 hours PRN Temp greater or equal to 38C (100.4F), Mild Pain (1 - 3)  ALBUTerol    90 MICROgram(s) HFA Inhaler 2 Puff(s) Inhalation every 6 hours PRN Wheezing  aluminum hydroxide/magnesium hydroxide/simethicone Suspension 30 milliLiter(s) Oral every 4 hours PRN Dyspepsia  guaiFENesin Oral Liquid (Sugar-Free) 100 milliGRAM(s) Oral every 6 hours PRN Cough  melatonin 3 milliGRAM(s) Oral at bedtime PRN Insomnia  ondansetron Injectable 4 milliGRAM(s) IV Push every 8 hours PRN Nausea and/or Vomiting  traMADol 50 milliGRAM(s) Oral every 4 hours PRN Moderate Pain (4 - 6)  traMADol 100 milliGRAM(s) Oral every 6 hours PRN Severe Pain (7 - 10)      ALLERGIES:  Allergies    erythromycin (Hives)  IV Contrast (Anaphylaxis)    Intolerances        LABS:                        8.9    13.28 )-----------( 441      ( 27 Oct 2022 06:45 )             28.4     10-27    124<L>  |  89<L>  |  22  ----------------------------<  99  3.7   |  23  |  0.84    Ca    9.5      27 Oct 2022 06:45  Phos  3.3     10-27  Mg     1.80     10-27          CAPILLARY BLOOD GLUCOSE          RADIOLOGY & ADDITIONAL TESTS: Reviewed.    ASSESSMENT:    PLAN:

## 2022-10-28 LAB
% ALBUMIN: 31.9 % — SIGNIFICANT CHANGE UP
% ALPHA 1: 8.3 % — SIGNIFICANT CHANGE UP
% ALPHA 2: 17.3 % — SIGNIFICANT CHANGE UP
% BETA: 14.4 % — SIGNIFICANT CHANGE UP
% GAMMA: 28.1 % — SIGNIFICANT CHANGE UP
-  AMPICILLIN/SULBACTAM: SIGNIFICANT CHANGE UP
-  CEFAZOLIN: SIGNIFICANT CHANGE UP
-  CLINDAMYCIN: SIGNIFICANT CHANGE UP
-  ERYTHROMYCIN: SIGNIFICANT CHANGE UP
-  GENTAMICIN: SIGNIFICANT CHANGE UP
-  OXACILLIN: SIGNIFICANT CHANGE UP
-  PENICILLIN: SIGNIFICANT CHANGE UP
-  RIFAMPIN: SIGNIFICANT CHANGE UP
-  TETRACYCLINE: SIGNIFICANT CHANGE UP
-  TRIMETHOPRIM/SULFAMETHOXAZOLE: SIGNIFICANT CHANGE UP
-  VANCOMYCIN: SIGNIFICANT CHANGE UP
ALBUMIN SERPL ELPH-MCNC: 2.49 G/DL — LOW (ref 3.3–4.4)
ALBUMIN SERPL ELPH-MCNC: 3 G/DL — LOW (ref 3.3–5)
ALBUMIN/GLOB SERPL ELPH: 0.5 RATIO — SIGNIFICANT CHANGE UP
ALP SERPL-CCNC: 66 U/L — SIGNIFICANT CHANGE UP (ref 40–120)
ALPHA1 GLOB SERPL ELPH-MCNC: 0.65 G/DL — HIGH (ref 0.1–0.3)
ALPHA2 GLOB SERPL ELPH-MCNC: 1.3 G/DL — HIGH (ref 0.6–1)
ALT FLD-CCNC: 6 U/L — SIGNIFICANT CHANGE UP (ref 4–33)
ANION GAP SERPL CALC-SCNC: 10 MMOL/L — SIGNIFICANT CHANGE UP (ref 7–14)
AST SERPL-CCNC: 20 U/L — SIGNIFICANT CHANGE UP (ref 4–32)
B-GLOBULIN SERPL ELPH-MCNC: 1.12 G/DL — HIGH (ref 0.6–1.1)
BILIRUB DIRECT SERPL-MCNC: <0.2 MG/DL — SIGNIFICANT CHANGE UP (ref 0–0.3)
BILIRUB INDIRECT FLD-MCNC: >0.2 MG/DL — SIGNIFICANT CHANGE UP (ref 0–1)
BILIRUB SERPL-MCNC: 0.4 MG/DL — SIGNIFICANT CHANGE UP (ref 0.2–1.2)
BUN SERPL-MCNC: 19 MG/DL — SIGNIFICANT CHANGE UP (ref 7–23)
CALCIUM SERPL-MCNC: 9.4 MG/DL — SIGNIFICANT CHANGE UP (ref 8.4–10.5)
CHLORIDE SERPL-SCNC: 92 MMOL/L — LOW (ref 98–107)
CO2 SERPL-SCNC: 24 MMOL/L — SIGNIFICANT CHANGE UP (ref 22–31)
CREAT SERPL-MCNC: 0.82 MG/DL — SIGNIFICANT CHANGE UP (ref 0.5–1.3)
CULTURE RESULTS: SIGNIFICANT CHANGE UP
EGFR: 74 ML/MIN/1.73M2 — SIGNIFICANT CHANGE UP
GAMMA GLOBULIN: 2.19 G/DL — HIGH (ref 0.7–1.7)
GLUCOSE SERPL-MCNC: 99 MG/DL — SIGNIFICANT CHANGE UP (ref 70–99)
HCT VFR BLD CALC: 26.8 % — LOW (ref 34.5–45)
HGB BLD-MCNC: 8.5 G/DL — LOW (ref 11.5–15.5)
MAGNESIUM SERPL-MCNC: 1.9 MG/DL — SIGNIFICANT CHANGE UP (ref 1.6–2.6)
MCHC RBC-ENTMCNC: 24 PG — LOW (ref 27–34)
MCHC RBC-ENTMCNC: 31.7 GM/DL — LOW (ref 32–36)
MCV RBC AUTO: 75.7 FL — LOW (ref 80–100)
METHOD TYPE: SIGNIFICANT CHANGE UP
NRBC # BLD: 0 /100 WBCS — SIGNIFICANT CHANGE UP (ref 0–0)
NRBC # FLD: 0 K/UL — SIGNIFICANT CHANGE UP (ref 0–0)
ORGANISM # SPEC MICROSCOPIC CNT: SIGNIFICANT CHANGE UP
OSMOLALITY UR: 397 MOSM/KG — SIGNIFICANT CHANGE UP (ref 50–1200)
PHOSPHATE SERPL-MCNC: 3.6 MG/DL — SIGNIFICANT CHANGE UP (ref 2.5–4.5)
PLATELET # BLD AUTO: 454 K/UL — HIGH (ref 150–400)
POTASSIUM SERPL-MCNC: 3.9 MMOL/L — SIGNIFICANT CHANGE UP (ref 3.5–5.3)
POTASSIUM SERPL-SCNC: 3.9 MMOL/L — SIGNIFICANT CHANGE UP (ref 3.5–5.3)
PROT PATTERN SERPL ELPH-IMP: SIGNIFICANT CHANGE UP
PROT SERPL-MCNC: 7.4 G/DL — SIGNIFICANT CHANGE UP (ref 6–8.3)
PROT SERPL-MCNC: 7.8 G/DL — SIGNIFICANT CHANGE UP
RBC # BLD: 3.54 M/UL — LOW (ref 3.8–5.2)
RBC # FLD: 18.1 % — HIGH (ref 10.3–14.5)
SODIUM SERPL-SCNC: 126 MMOL/L — LOW (ref 135–145)
SODIUM UR-SCNC: <20 MMOL/L — SIGNIFICANT CHANGE UP
SPECIMEN SOURCE: SIGNIFICANT CHANGE UP
WBC # BLD: 12.13 K/UL — HIGH (ref 3.8–10.5)
WBC # FLD AUTO: 12.13 K/UL — HIGH (ref 3.8–10.5)

## 2022-10-28 PROCEDURE — 99232 SBSQ HOSP IP/OBS MODERATE 35: CPT

## 2022-10-28 PROCEDURE — 93306 TTE W/DOPPLER COMPLETE: CPT | Mod: 26

## 2022-10-28 PROCEDURE — 99233 SBSQ HOSP IP/OBS HIGH 50: CPT

## 2022-10-28 RX ADMIN — Medication 125 MICROGRAM(S): at 05:21

## 2022-10-28 RX ADMIN — POLYETHYLENE GLYCOL 3350 17 GRAM(S): 17 POWDER, FOR SOLUTION ORAL at 05:19

## 2022-10-28 RX ADMIN — Medication 3 MILLILITER(S): at 15:10

## 2022-10-28 RX ADMIN — Medication 100 MILLIGRAM(S): at 10:43

## 2022-10-28 RX ADMIN — HEPARIN SODIUM 5000 UNIT(S): 5000 INJECTION INTRAVENOUS; SUBCUTANEOUS at 05:20

## 2022-10-28 RX ADMIN — SENNA PLUS 2 TABLET(S): 8.6 TABLET ORAL at 21:26

## 2022-10-28 RX ADMIN — LIDOCAINE 1 PATCH: 4 CREAM TOPICAL at 19:38

## 2022-10-28 RX ADMIN — HEPARIN SODIUM 5000 UNIT(S): 5000 INJECTION INTRAVENOUS; SUBCUTANEOUS at 21:25

## 2022-10-28 RX ADMIN — Medication 100 MILLIGRAM(S): at 02:52

## 2022-10-28 RX ADMIN — Medication 3 MILLILITER(S): at 09:13

## 2022-10-28 RX ADMIN — Medication 5 MILLIGRAM(S): at 21:26

## 2022-10-28 RX ADMIN — PANTOPRAZOLE SODIUM 40 MILLIGRAM(S): 20 TABLET, DELAYED RELEASE ORAL at 05:20

## 2022-10-28 RX ADMIN — Medication 100 MILLIGRAM(S): at 21:24

## 2022-10-28 RX ADMIN — Medication 650 MILLIGRAM(S): at 14:13

## 2022-10-28 RX ADMIN — Medication 12.5 MILLIGRAM(S): at 18:31

## 2022-10-28 RX ADMIN — Medication 650 MILLIGRAM(S): at 15:13

## 2022-10-28 RX ADMIN — HEPARIN SODIUM 5000 UNIT(S): 5000 INJECTION INTRAVENOUS; SUBCUTANEOUS at 13:43

## 2022-10-28 RX ADMIN — LIDOCAINE 1 PATCH: 4 CREAM TOPICAL at 14:14

## 2022-10-28 RX ADMIN — LIDOCAINE 1 PATCH: 4 CREAM TOPICAL at 00:00

## 2022-10-28 RX ADMIN — ROPINIROLE 2 MILLIGRAM(S): 8 TABLET, FILM COATED, EXTENDED RELEASE ORAL at 21:25

## 2022-10-28 RX ADMIN — DULOXETINE HYDROCHLORIDE 60 MILLIGRAM(S): 30 CAPSULE, DELAYED RELEASE ORAL at 13:43

## 2022-10-28 RX ADMIN — Medication 100 MILLIGRAM(S): at 05:21

## 2022-10-28 RX ADMIN — QUETIAPINE FUMARATE 25 MILLIGRAM(S): 200 TABLET, FILM COATED ORAL at 21:26

## 2022-10-28 RX ADMIN — GABAPENTIN 300 MILLIGRAM(S): 400 CAPSULE ORAL at 18:31

## 2022-10-28 RX ADMIN — Medication 100 MILLIGRAM(S): at 13:43

## 2022-10-28 RX ADMIN — Medication 12.5 MILLIGRAM(S): at 05:21

## 2022-10-28 RX ADMIN — Medication 3 MILLILITER(S): at 21:36

## 2022-10-28 RX ADMIN — TRAMADOL HYDROCHLORIDE 100 MILLIGRAM(S): 50 TABLET ORAL at 22:22

## 2022-10-28 RX ADMIN — Medication 100 MILLIGRAM(S): at 18:30

## 2022-10-28 RX ADMIN — Medication 325 MILLIGRAM(S): at 13:44

## 2022-10-28 RX ADMIN — GABAPENTIN 300 MILLIGRAM(S): 400 CAPSULE ORAL at 05:20

## 2022-10-28 RX ADMIN — Medication 145 MILLIGRAM(S): at 13:44

## 2022-10-28 RX ADMIN — Medication 1 SPRAY(S): at 05:22

## 2022-10-28 RX ADMIN — Medication 3 MILLILITER(S): at 04:00

## 2022-10-28 RX ADMIN — Medication 1 SPRAY(S): at 18:31

## 2022-10-28 RX ADMIN — TRAMADOL HYDROCHLORIDE 100 MILLIGRAM(S): 50 TABLET ORAL at 21:22

## 2022-10-28 RX ADMIN — POLYETHYLENE GLYCOL 3350 17 GRAM(S): 17 POWDER, FOR SOLUTION ORAL at 18:31

## 2022-10-28 NOTE — PROGRESS NOTE ADULT - SUBJECTIVE AND OBJECTIVE BOX
CC: Patient is a 77y old  Female who presents with a chief complaint of Fall / UTI (27 Oct 2022 16:29)    ID following for MSSA bacteremia    Interval History/ROS: Patient has no new complaints. No fevers, no chills.    Rest of ROS negative.    Allergies  erythromycin (Hives)  IV Contrast (Anaphylaxis)    ANTIMICROBIALS:  ceFAZolin   IVPB 2000 every 8 hours    OTHER MEDS:  acetaminophen     Tablet .. 650 milliGRAM(s) Oral every 6 hours PRN  ALBUTerol    90 MICROgram(s) HFA Inhaler 2 Puff(s) Inhalation every 6 hours PRN  albuterol/ipratropium for Nebulization 3 milliLiter(s) Nebulizer every 6 hours  aluminum hydroxide/magnesium hydroxide/simethicone Suspension 30 milliLiter(s) Oral every 4 hours PRN  benzonatate 100 milliGRAM(s) Oral three times a day  bisacodyl 5 milliGRAM(s) Oral at bedtime  DULoxetine 60 milliGRAM(s) Oral daily  fenofibrate Tablet 145 milliGRAM(s) Oral daily  ferrous    sulfate 325 milliGRAM(s) Oral daily  gabapentin 300 milliGRAM(s) Oral two times a day  guaiFENesin Oral Liquid (Sugar-Free) 100 milliGRAM(s) Oral every 6 hours PRN  heparin   Injectable 5000 Unit(s) SubCutaneous every 8 hours  lactulose Syrup 10 Gram(s) Oral once  levothyroxine 125 MICROGram(s) Oral daily  lidocaine   4% Patch 1 Patch Transdermal every 24 hours  meclizine 12.5 milliGRAM(s) Oral two times a day  melatonin 3 milliGRAM(s) Oral at bedtime PRN  ondansetron Injectable 4 milliGRAM(s) IV Push every 8 hours PRN  pantoprazole    Tablet 40 milliGRAM(s) Oral before breakfast  polyethylene glycol 3350 17 Gram(s) Oral two times a day  QUEtiapine 25 milliGRAM(s) Oral at bedtime  rOPINIRole 2 milliGRAM(s) Oral daily  senna 2 Tablet(s) Oral at bedtime  sodium chloride 0.65% Nasal 1 Spray(s) Both Nostrils two times a day  traMADol 50 milliGRAM(s) Oral every 4 hours PRN  traMADol 100 milliGRAM(s) Oral every 6 hours PRN    PE:    Vital Signs Last 24 Hrs  T(C): 36.7 (28 Oct 2022 11:54), Max: 36.7 (28 Oct 2022 05:18)  T(F): 98 (28 Oct 2022 11:54), Max: 98.1 (28 Oct 2022 05:18)  HR: 80 (28 Oct 2022 11:54) (77 - 90)  BP: 115/55 (28 Oct 2022 11:54) (102/57 - 115/55)  BP(mean): --  RR: 18 (28 Oct 2022 11:54) (17 - 18)  SpO2: 100% (28 Oct 2022 11:54) (95% - 100%)    Parameters below as of 28 Oct 2022 11:54  Patient On (Oxygen Delivery Method): room air    Gen: AOx3, NAD  CV: S1+S2 normal, no murmurs  Resp: Clear bilat, no resp distress  Abd: Soft, nontender, +BS  Ext: No LE edema, no wounds  : No Block  IV/Skin: No thrombophlebitis, L elbow abrasion, no drainage, healing  Neuro: no focal deficits    LABS:                          8.5    12.13 )-----------( 454      ( 28 Oct 2022 08:05 )             26.8       10-28    126<L>  |  92<L>  |  19  ----------------------------<  99  3.9   |  24  |  0.82    Ca    9.4      28 Oct 2022 06:33  Phos  3.6     10-28  Mg     1.90     10-28    TPro  7.4  /  Alb  3.0<L>  /  TBili  0.4  /  DBili  <0.2  /  AST  20  /  ALT  6   /  AlkPhos  66  10-28    MICROBIOLOGY:  v  .Blood Blood-Peripheral  10-27-22   No growth to date.  --  --      .Blood Blood-Peripheral  10-27-22   No growth to date.  --  --      .Blood Blood  10-25-22   Growth in aerobic and anaerobic bottles: Staphylococcus aureus  ***Blood Panel PCR results on this specimen are available  approximately 3 hours after the Gram stain result.***  Gram stain, PCR, and/or culture results may not always  correspond dueto difference in methodologies.  ************************************************************  This PCR assay was performed by multiplex PCR. This  Assay tests for 66 bacterial and resistance gene targets.  Please refer to the [facility] test directory  at [email or web address] for details.  --  Blood Culture PCR  Staphylococcus aureus      Clean Catch Clean Catch (Midstream)  10-25-22   Culture grew 3 or more types of organisms which indicate  collection contamination; consider recollection only if clinically  indicated.  --  --    RADIOLOGY:  < from: CT Chest No Cont (10.25.22 @ 07:10) >  IMPRESSION:  There is mild loss of height of T7 with mild adjacent perivertebral   thickening suggestive of an acute/subacute compression fracture   deformity. There is no significant bony retropulsion. Recommend clinical   correlation.    Nonspecific subcutaneous centimeter lytic lesion of the right sternum.   Etiology includes, but not limited to, metastases and multiple myeloma.    < end of copied text >    < from: US Duplex Venous Lower Ext Complete, Bilateral (10.25.22 @ 15:03) >  IMPRESSION:  No evidence of deep venous thrombosis in either lower extremity.    < end of copied text >

## 2022-10-28 NOTE — PROGRESS NOTE ADULT - SUBJECTIVE AND OBJECTIVE BOX
Hospitalist Progress Note  Germaine Cardoza MD Pager # [phone]    OVERNIGHT EVENTS: JYOTI    SUBJECTIVE / INTERVAL HPI: Patient seen and examined at bedside. Pt. reports that she continues to cough. No fevers/chills. She had a small BM. She continues to have pain on the right side when she coughs.     VITAL SIGNS:  Vital Signs Last 24 Hrs  T(C): 36.7 (28 Oct 2022 11:54), Max: 36.7 (28 Oct 2022 05:18)  T(F): 98 (28 Oct 2022 11:54), Max: 98.1 (28 Oct 2022 05:18)  HR: 80 (28 Oct 2022 11:54) (77 - 90)  BP: 115/55 (28 Oct 2022 11:54) (102/57 - 115/55)  BP(mean): --  RR: 18 (28 Oct 2022 11:54) (17 - 18)  SpO2: 100% (28 Oct 2022 11:54) (95% - 100%)    Parameters below as of 28 Oct 2022 11:54  Patient On (Oxygen Delivery Method): room air        PHYSICAL EXAM:    General: Elderly female resting in bed   HEENT: NC/AT; PERRL, anicteric sclera; MMM  Neck: supple  Cardiovascular: +S1/S2; RRR  Respiratory: crackles at the bases bilaterally; no W/R/R  Gastrointestinal: soft, NT/ND; +BSx4  Extremities: WWP; no edema, clubbing or cyanosis  Vascular: 2+ radial, DP/PT pulses B/L  Neurological: AAOx3; no focal deficits    MEDICATIONS:  MEDICATIONS  (STANDING):  albuterol/ipratropium for Nebulization 3 milliLiter(s) Nebulizer every 6 hours  benzonatate 100 milliGRAM(s) Oral three times a day  bisacodyl 5 milliGRAM(s) Oral at bedtime  ceFAZolin   IVPB 2000 milliGRAM(s) IV Intermittent every 8 hours  DULoxetine 60 milliGRAM(s) Oral daily  fenofibrate Tablet 145 milliGRAM(s) Oral daily  ferrous    sulfate 325 milliGRAM(s) Oral daily  gabapentin 300 milliGRAM(s) Oral two times a day  heparin   Injectable 5000 Unit(s) SubCutaneous every 8 hours  lactulose Syrup 10 Gram(s) Oral once  levothyroxine 125 MICROGram(s) Oral daily  lidocaine   4% Patch 1 Patch Transdermal every 24 hours  meclizine 12.5 milliGRAM(s) Oral two times a day  pantoprazole    Tablet 40 milliGRAM(s) Oral before breakfast  polyethylene glycol 3350 17 Gram(s) Oral two times a day  QUEtiapine 25 milliGRAM(s) Oral at bedtime  rOPINIRole 2 milliGRAM(s) Oral daily  senna 2 Tablet(s) Oral at bedtime  sodium chloride 0.65% Nasal 1 Spray(s) Both Nostrils two times a day    MEDICATIONS  (PRN):  acetaminophen     Tablet .. 650 milliGRAM(s) Oral every 6 hours PRN Temp greater or equal to 38C (100.4F), Mild Pain (1 - 3)  ALBUTerol    90 MICROgram(s) HFA Inhaler 2 Puff(s) Inhalation every 6 hours PRN Wheezing  aluminum hydroxide/magnesium hydroxide/simethicone Suspension 30 milliLiter(s) Oral every 4 hours PRN Dyspepsia  guaiFENesin Oral Liquid (Sugar-Free) 100 milliGRAM(s) Oral every 6 hours PRN Cough  melatonin 3 milliGRAM(s) Oral at bedtime PRN Insomnia  ondansetron Injectable 4 milliGRAM(s) IV Push every 8 hours PRN Nausea and/or Vomiting  traMADol 50 milliGRAM(s) Oral every 4 hours PRN Moderate Pain (4 - 6)  traMADol 100 milliGRAM(s) Oral every 6 hours PRN Severe Pain (7 - 10)      ALLERGIES:  Allergies    erythromycin (Hives)  IV Contrast (Anaphylaxis)    Intolerances        LABS:                        8.5    12.13 )-----------( 454      ( 28 Oct 2022 08:05 )             26.8     10-28    126<L>  |  92<L>  |  19  ----------------------------<  99  3.9   |  24  |  0.82    Ca    9.4      28 Oct 2022 06:33  Phos  3.6     10-28  Mg     1.90     10-28    TPro  7.4  /  Alb  3.0<L>  /  TBili  0.4  /  DBili  <0.2  /  AST  20  /  ALT  6   /  AlkPhos  66  10-28        CAPILLARY BLOOD GLUCOSE          RADIOLOGY & ADDITIONAL TESTS: Reviewed.    ASSESSMENT:    PLAN:

## 2022-10-29 LAB
ANION GAP SERPL CALC-SCNC: 11 MMOL/L — SIGNIFICANT CHANGE UP (ref 7–14)
BUN SERPL-MCNC: 16 MG/DL — SIGNIFICANT CHANGE UP (ref 7–23)
CALCIUM SERPL-MCNC: 9.7 MG/DL — SIGNIFICANT CHANGE UP (ref 8.4–10.5)
CHLORIDE SERPL-SCNC: 94 MMOL/L — LOW (ref 98–107)
CO2 SERPL-SCNC: 22 MMOL/L — SIGNIFICANT CHANGE UP (ref 22–31)
CREAT SERPL-MCNC: 0.66 MG/DL — SIGNIFICANT CHANGE UP (ref 0.5–1.3)
CULTURE RESULTS: SIGNIFICANT CHANGE UP
EGFR: 90 ML/MIN/1.73M2 — SIGNIFICANT CHANGE UP
GLUCOSE SERPL-MCNC: 104 MG/DL — HIGH (ref 70–99)
GRAM STN FLD: SIGNIFICANT CHANGE UP
HCT VFR BLD CALC: 26.3 % — LOW (ref 34.5–45)
HGB BLD-MCNC: 8.1 G/DL — LOW (ref 11.5–15.5)
MAGNESIUM SERPL-MCNC: 1.9 MG/DL — SIGNIFICANT CHANGE UP (ref 1.6–2.6)
MCHC RBC-ENTMCNC: 23.2 PG — LOW (ref 27–34)
MCHC RBC-ENTMCNC: 30.8 GM/DL — LOW (ref 32–36)
MCV RBC AUTO: 75.4 FL — LOW (ref 80–100)
NRBC # BLD: 0 /100 WBCS — SIGNIFICANT CHANGE UP (ref 0–0)
NRBC # FLD: 0 K/UL — SIGNIFICANT CHANGE UP (ref 0–0)
PHOSPHATE SERPL-MCNC: 2.8 MG/DL — SIGNIFICANT CHANGE UP (ref 2.5–4.5)
PLATELET # BLD AUTO: 492 K/UL — HIGH (ref 150–400)
POTASSIUM SERPL-MCNC: 4.2 MMOL/L — SIGNIFICANT CHANGE UP (ref 3.5–5.3)
POTASSIUM SERPL-SCNC: 4.2 MMOL/L — SIGNIFICANT CHANGE UP (ref 3.5–5.3)
RBC # BLD: 3.49 M/UL — LOW (ref 3.8–5.2)
RBC # FLD: 18 % — HIGH (ref 10.3–14.5)
SODIUM SERPL-SCNC: 127 MMOL/L — LOW (ref 135–145)
SPECIMEN SOURCE: SIGNIFICANT CHANGE UP
WBC # BLD: 15.28 K/UL — HIGH (ref 3.8–10.5)
WBC # FLD AUTO: 15.28 K/UL — HIGH (ref 3.8–10.5)

## 2022-10-29 PROCEDURE — 99233 SBSQ HOSP IP/OBS HIGH 50: CPT

## 2022-10-29 RX ORDER — IPRATROPIUM/ALBUTEROL SULFATE 18-103MCG
3 AEROSOL WITH ADAPTER (GRAM) INHALATION EVERY 12 HOURS
Refills: 0 | Status: DISCONTINUED | OUTPATIENT
Start: 2022-10-29 | End: 2022-11-19

## 2022-10-29 RX ADMIN — Medication 100 MILLIGRAM(S): at 06:08

## 2022-10-29 RX ADMIN — HEPARIN SODIUM 5000 UNIT(S): 5000 INJECTION INTRAVENOUS; SUBCUTANEOUS at 06:08

## 2022-10-29 RX ADMIN — Medication 3 MILLILITER(S): at 09:24

## 2022-10-29 RX ADMIN — GABAPENTIN 300 MILLIGRAM(S): 400 CAPSULE ORAL at 17:05

## 2022-10-29 RX ADMIN — Medication 145 MILLIGRAM(S): at 12:29

## 2022-10-29 RX ADMIN — Medication 100 MILLIGRAM(S): at 10:44

## 2022-10-29 RX ADMIN — DULOXETINE HYDROCHLORIDE 60 MILLIGRAM(S): 30 CAPSULE, DELAYED RELEASE ORAL at 12:29

## 2022-10-29 RX ADMIN — PANTOPRAZOLE SODIUM 40 MILLIGRAM(S): 20 TABLET, DELAYED RELEASE ORAL at 06:08

## 2022-10-29 RX ADMIN — Medication 3 MILLILITER(S): at 20:44

## 2022-10-29 RX ADMIN — HEPARIN SODIUM 5000 UNIT(S): 5000 INJECTION INTRAVENOUS; SUBCUTANEOUS at 13:19

## 2022-10-29 RX ADMIN — Medication 125 MICROGRAM(S): at 05:56

## 2022-10-29 RX ADMIN — POLYETHYLENE GLYCOL 3350 17 GRAM(S): 17 POWDER, FOR SOLUTION ORAL at 06:59

## 2022-10-29 RX ADMIN — ROPINIROLE 2 MILLIGRAM(S): 8 TABLET, FILM COATED, EXTENDED RELEASE ORAL at 12:30

## 2022-10-29 RX ADMIN — Medication 1 SPRAY(S): at 06:59

## 2022-10-29 RX ADMIN — Medication 100 MILLIGRAM(S): at 02:31

## 2022-10-29 RX ADMIN — Medication 100 MILLIGRAM(S): at 17:02

## 2022-10-29 RX ADMIN — LIDOCAINE 1 PATCH: 4 CREAM TOPICAL at 02:15

## 2022-10-29 RX ADMIN — Medication 12.5 MILLIGRAM(S): at 06:08

## 2022-10-29 RX ADMIN — Medication 325 MILLIGRAM(S): at 12:29

## 2022-10-29 RX ADMIN — QUETIAPINE FUMARATE 25 MILLIGRAM(S): 200 TABLET, FILM COATED ORAL at 21:11

## 2022-10-29 RX ADMIN — HEPARIN SODIUM 5000 UNIT(S): 5000 INJECTION INTRAVENOUS; SUBCUTANEOUS at 21:11

## 2022-10-29 RX ADMIN — Medication 100 MILLIGRAM(S): at 13:19

## 2022-10-29 RX ADMIN — POLYETHYLENE GLYCOL 3350 17 GRAM(S): 17 POWDER, FOR SOLUTION ORAL at 17:04

## 2022-10-29 RX ADMIN — GABAPENTIN 300 MILLIGRAM(S): 400 CAPSULE ORAL at 06:08

## 2022-10-29 RX ADMIN — Medication 100 MILLIGRAM(S): at 21:10

## 2022-10-29 RX ADMIN — Medication 1 SPRAY(S): at 17:10

## 2022-10-29 RX ADMIN — Medication 12.5 MILLIGRAM(S): at 17:05

## 2022-10-29 NOTE — PROGRESS NOTE ADULT - SUBJECTIVE AND OBJECTIVE BOX
Hospitalist Progress Note  Germaine Cardoza MD Pager # [phone]    OVERNIGHT EVENTS: JYOTI    SUBJECTIVE / INTERVAL HPI: Patient seen and examined at bedside. Pt. reports that her cough is improving. She had multiple bowel movements and she is feeling better.     VITAL SIGNS:  Vital Signs Last 24 Hrs  T(C): 36.7 (29 Oct 2022 12:35), Max: 36.8 (29 Oct 2022 05:54)  T(F): 98 (29 Oct 2022 12:35), Max: 98.2 (29 Oct 2022 05:54)  HR: 85 (29 Oct 2022 12:35) (77 - 98)  BP: 105/63 (29 Oct 2022 12:35) (105/63 - 137/68)  BP(mean): --  RR: 18 (29 Oct 2022 12:35) (17 - 18)  SpO2: 100% (29 Oct 2022 12:35) (96% - 100%)    Parameters below as of 29 Oct 2022 12:35  Patient On (Oxygen Delivery Method): room air        PHYSICAL EXAM:    General: Elderly female resting in bed   HEENT: NC/AT; PERRL, anicteric sclera; MMM  Neck: supple  Cardiovascular: +S1/S2; RRR  Respiratory: Crackles at the bases, no wheezing or rhonchi. No respiratory distress.   Gastrointestinal: soft, NT/ND; +BSx4  Extremities: WWP; no edema, clubbing or cyanosis  Vascular: 2+ radial, DP/PT pulses B/L  Neurological: AAOx3; no focal deficits    MEDICATIONS:  MEDICATIONS  (STANDING):  albuterol/ipratropium for Nebulization 3 milliLiter(s) Nebulizer every 12 hours  benzonatate 100 milliGRAM(s) Oral three times a day  bisacodyl 5 milliGRAM(s) Oral at bedtime  ceFAZolin   IVPB 2000 milliGRAM(s) IV Intermittent every 8 hours  DULoxetine 60 milliGRAM(s) Oral daily  fenofibrate Tablet 145 milliGRAM(s) Oral daily  ferrous    sulfate 325 milliGRAM(s) Oral daily  gabapentin 300 milliGRAM(s) Oral two times a day  heparin   Injectable 5000 Unit(s) SubCutaneous every 8 hours  lactulose Syrup 10 Gram(s) Oral once  levothyroxine 125 MICROGram(s) Oral daily  lidocaine   4% Patch 1 Patch Transdermal every 24 hours  meclizine 12.5 milliGRAM(s) Oral two times a day  pantoprazole    Tablet 40 milliGRAM(s) Oral before breakfast  polyethylene glycol 3350 17 Gram(s) Oral two times a day  QUEtiapine 25 milliGRAM(s) Oral at bedtime  rOPINIRole 2 milliGRAM(s) Oral daily  senna 2 Tablet(s) Oral at bedtime  sodium chloride 0.65% Nasal 1 Spray(s) Both Nostrils two times a day    MEDICATIONS  (PRN):  acetaminophen     Tablet .. 650 milliGRAM(s) Oral every 6 hours PRN Temp greater or equal to 38C (100.4F), Mild Pain (1 - 3)  ALBUTerol    90 MICROgram(s) HFA Inhaler 2 Puff(s) Inhalation every 6 hours PRN Wheezing  aluminum hydroxide/magnesium hydroxide/simethicone Suspension 30 milliLiter(s) Oral every 4 hours PRN Dyspepsia  guaiFENesin Oral Liquid (Sugar-Free) 100 milliGRAM(s) Oral every 6 hours PRN Cough  melatonin 3 milliGRAM(s) Oral at bedtime PRN Insomnia  ondansetron Injectable 4 milliGRAM(s) IV Push every 8 hours PRN Nausea and/or Vomiting  traMADol 50 milliGRAM(s) Oral every 4 hours PRN Moderate Pain (4 - 6)  traMADol 100 milliGRAM(s) Oral every 6 hours PRN Severe Pain (7 - 10)      ALLERGIES:  Allergies    erythromycin (Hives)  IV Contrast (Anaphylaxis)    Intolerances        LABS:                        8.1    15.28 )-----------( 492      ( 29 Oct 2022 05:14 )             26.3     10-29    127<L>  |  94<L>  |  16  ----------------------------<  104<H>  4.2   |  22  |  0.66    Ca    9.7      29 Oct 2022 05:14  Phos  2.8     10-29  Mg     1.90     10-29    TPro  7.4  /  Alb  3.0<L>  /  TBili  0.4  /  DBili  <0.2  /  AST  20  /  ALT  6   /  AlkPhos  66  10-28        CAPILLARY BLOOD GLUCOSE          RADIOLOGY & ADDITIONAL TESTS: Reviewed.    ASSESSMENT:    PLAN:

## 2022-10-29 NOTE — PROVIDER CONTACT NOTE (CRITICAL VALUE NOTIFICATION) - ASSESSMENT
pt ANO3, resting comfortably in bed. Denying pain/sob.
pt. A&Ox4, resting comfortably in bed. Denying pain/sob.

## 2022-10-29 NOTE — PROVIDER CONTACT NOTE (CRITICAL VALUE NOTIFICATION) - BACKGROUND
Pt admitted with hyponatremia, after a fall at home. found to have UTI .
Pt admitted with hyponatremia, after a fall at home. found to have UTI .

## 2022-10-29 NOTE — PROVIDER CONTACT NOTE (CRITICAL VALUE NOTIFICATION) - TEST AND RESULT REPORTED:
Cr Karen. Prelim gram+ cocci in clusters found in arobic bottle
gram+ cocci in clusters found in anaerobic bottle

## 2022-10-29 NOTE — PROVIDER CONTACT NOTE (CRITICAL VALUE NOTIFICATION) - SITUATION
BC from 10/27, anaerobic bottle found gram+ cocci in clusters found in aerobic bottle
Prelim gram+ cocci in clusters found in aerobic bottle

## 2022-10-30 LAB
ANION GAP SERPL CALC-SCNC: 9 MMOL/L — SIGNIFICANT CHANGE UP (ref 7–14)
BUN SERPL-MCNC: 12 MG/DL — SIGNIFICANT CHANGE UP (ref 7–23)
CALCIUM SERPL-MCNC: 9.6 MG/DL — SIGNIFICANT CHANGE UP (ref 8.4–10.5)
CHLORIDE SERPL-SCNC: 93 MMOL/L — LOW (ref 98–107)
CO2 SERPL-SCNC: 24 MMOL/L — SIGNIFICANT CHANGE UP (ref 22–31)
CREAT SERPL-MCNC: 0.66 MG/DL — SIGNIFICANT CHANGE UP (ref 0.5–1.3)
EGFR: 90 ML/MIN/1.73M2 — SIGNIFICANT CHANGE UP
GLUCOSE SERPL-MCNC: 90 MG/DL — SIGNIFICANT CHANGE UP (ref 70–99)
HCT VFR BLD CALC: 26.3 % — LOW (ref 34.5–45)
HGB BLD-MCNC: 8.1 G/DL — LOW (ref 11.5–15.5)
MAGNESIUM SERPL-MCNC: 2 MG/DL — SIGNIFICANT CHANGE UP (ref 1.6–2.6)
MCHC RBC-ENTMCNC: 23.5 PG — LOW (ref 27–34)
MCHC RBC-ENTMCNC: 30.8 GM/DL — LOW (ref 32–36)
MCV RBC AUTO: 76.2 FL — LOW (ref 80–100)
NRBC # BLD: 0 /100 WBCS — SIGNIFICANT CHANGE UP (ref 0–0)
NRBC # FLD: 0 K/UL — SIGNIFICANT CHANGE UP (ref 0–0)
PHOSPHATE SERPL-MCNC: 2.5 MG/DL — SIGNIFICANT CHANGE UP (ref 2.5–4.5)
PLATELET # BLD AUTO: 515 K/UL — HIGH (ref 150–400)
POTASSIUM SERPL-MCNC: 4.3 MMOL/L — SIGNIFICANT CHANGE UP (ref 3.5–5.3)
POTASSIUM SERPL-SCNC: 4.3 MMOL/L — SIGNIFICANT CHANGE UP (ref 3.5–5.3)
RBC # BLD: 3.45 M/UL — LOW (ref 3.8–5.2)
RBC # FLD: 18.5 % — HIGH (ref 10.3–14.5)
SODIUM SERPL-SCNC: 126 MMOL/L — LOW (ref 135–145)
WBC # BLD: 11.6 K/UL — HIGH (ref 3.8–10.5)
WBC # FLD AUTO: 11.6 K/UL — HIGH (ref 3.8–10.5)

## 2022-10-30 PROCEDURE — 99233 SBSQ HOSP IP/OBS HIGH 50: CPT

## 2022-10-30 RX ADMIN — Medication 1 SPRAY(S): at 18:09

## 2022-10-30 RX ADMIN — PANTOPRAZOLE SODIUM 40 MILLIGRAM(S): 20 TABLET, DELAYED RELEASE ORAL at 05:32

## 2022-10-30 RX ADMIN — Medication 1 SPRAY(S): at 05:31

## 2022-10-30 RX ADMIN — GABAPENTIN 300 MILLIGRAM(S): 400 CAPSULE ORAL at 05:31

## 2022-10-30 RX ADMIN — Medication 100 MILLIGRAM(S): at 13:20

## 2022-10-30 RX ADMIN — Medication 325 MILLIGRAM(S): at 13:21

## 2022-10-30 RX ADMIN — Medication 12.5 MILLIGRAM(S): at 18:14

## 2022-10-30 RX ADMIN — Medication 100 MILLIGRAM(S): at 13:17

## 2022-10-30 RX ADMIN — SENNA PLUS 2 TABLET(S): 8.6 TABLET ORAL at 21:20

## 2022-10-30 RX ADMIN — HEPARIN SODIUM 5000 UNIT(S): 5000 INJECTION INTRAVENOUS; SUBCUTANEOUS at 21:20

## 2022-10-30 RX ADMIN — POLYETHYLENE GLYCOL 3350 17 GRAM(S): 17 POWDER, FOR SOLUTION ORAL at 18:13

## 2022-10-30 RX ADMIN — Medication 5 MILLIGRAM(S): at 21:20

## 2022-10-30 RX ADMIN — TRAMADOL HYDROCHLORIDE 100 MILLIGRAM(S): 50 TABLET ORAL at 14:50

## 2022-10-30 RX ADMIN — DULOXETINE HYDROCHLORIDE 60 MILLIGRAM(S): 30 CAPSULE, DELAYED RELEASE ORAL at 13:21

## 2022-10-30 RX ADMIN — TRAMADOL HYDROCHLORIDE 100 MILLIGRAM(S): 50 TABLET ORAL at 13:53

## 2022-10-30 RX ADMIN — HEPARIN SODIUM 5000 UNIT(S): 5000 INJECTION INTRAVENOUS; SUBCUTANEOUS at 13:19

## 2022-10-30 RX ADMIN — Medication 3 MILLILITER(S): at 08:46

## 2022-10-30 RX ADMIN — Medication 12.5 MILLIGRAM(S): at 05:32

## 2022-10-30 RX ADMIN — HEPARIN SODIUM 5000 UNIT(S): 5000 INJECTION INTRAVENOUS; SUBCUTANEOUS at 05:32

## 2022-10-30 RX ADMIN — TRAMADOL HYDROCHLORIDE 100 MILLIGRAM(S): 50 TABLET ORAL at 05:36

## 2022-10-30 RX ADMIN — ROPINIROLE 2 MILLIGRAM(S): 8 TABLET, FILM COATED, EXTENDED RELEASE ORAL at 13:21

## 2022-10-30 RX ADMIN — TRAMADOL HYDROCHLORIDE 100 MILLIGRAM(S): 50 TABLET ORAL at 06:36

## 2022-10-30 RX ADMIN — GABAPENTIN 300 MILLIGRAM(S): 400 CAPSULE ORAL at 18:14

## 2022-10-30 RX ADMIN — QUETIAPINE FUMARATE 25 MILLIGRAM(S): 200 TABLET, FILM COATED ORAL at 21:20

## 2022-10-30 RX ADMIN — Medication 100 MILLIGRAM(S): at 21:16

## 2022-10-30 RX ADMIN — Medication 125 MICROGRAM(S): at 05:30

## 2022-10-30 RX ADMIN — Medication 100 MILLIGRAM(S): at 21:22

## 2022-10-30 RX ADMIN — Medication 145 MILLIGRAM(S): at 13:19

## 2022-10-30 RX ADMIN — Medication 3 MILLIGRAM(S): at 21:20

## 2022-10-30 RX ADMIN — Medication 100 MILLIGRAM(S): at 02:38

## 2022-10-30 RX ADMIN — Medication 100 MILLIGRAM(S): at 05:32

## 2022-10-30 NOTE — PROGRESS NOTE ADULT - SUBJECTIVE AND OBJECTIVE BOX
Hospitalist Progress Note  Germaine Cardoza MD Pager # [phone]    OVERNIGHT EVENTS: JYOTI    SUBJECTIVE / INTERVAL HPI: Patient seen and examined at bedside. Pt. reports her cough has improved. She is having BMs. She denies fevers/chills.     VITAL SIGNS:  Vital Signs Last 24 Hrs  T(C): 36.7 (30 Oct 2022 05:23), Max: 36.8 (29 Oct 2022 20:30)  T(F): 98.1 (30 Oct 2022 05:23), Max: 98.2 (29 Oct 2022 20:30)  HR: 87 (30 Oct 2022 08:46) (75 - 88)  BP: 124/71 (30 Oct 2022 05:23) (105/63 - 124/71)  BP(mean): --  RR: 18 (30 Oct 2022 05:23) (17 - 18)  SpO2: 97% (30 Oct 2022 08:46) (97% - 100%)    Parameters below as of 30 Oct 2022 08:46  Patient On (Oxygen Delivery Method): room air        PHYSICAL EXAM:    General: Well appearing female - comfortable.   HEENT: NC/AT; PERRL, anicteric sclera; MMM  Neck: supple  Cardiovascular: +S1/S2; RRR  Respiratory: CTA B/L; no W/R/R  Gastrointestinal: soft, NT/ND; +BSx4  Extremities: WWP; no edema, clubbing or cyanosis  Vascular: 2+ radial, DP/PT pulses B/L  Neurological: AAOx3; no focal deficits    MEDICATIONS:  MEDICATIONS  (STANDING):  albuterol/ipratropium for Nebulization 3 milliLiter(s) Nebulizer every 12 hours  benzonatate 100 milliGRAM(s) Oral three times a day  bisacodyl 5 milliGRAM(s) Oral at bedtime  ceFAZolin   IVPB 2000 milliGRAM(s) IV Intermittent every 8 hours  DULoxetine 60 milliGRAM(s) Oral daily  fenofibrate Tablet 145 milliGRAM(s) Oral daily  ferrous    sulfate 325 milliGRAM(s) Oral daily  gabapentin 300 milliGRAM(s) Oral two times a day  heparin   Injectable 5000 Unit(s) SubCutaneous every 8 hours  lactulose Syrup 10 Gram(s) Oral once  levothyroxine 125 MICROGram(s) Oral daily  lidocaine   4% Patch 1 Patch Transdermal every 24 hours  meclizine 12.5 milliGRAM(s) Oral two times a day  pantoprazole    Tablet 40 milliGRAM(s) Oral before breakfast  polyethylene glycol 3350 17 Gram(s) Oral two times a day  QUEtiapine 25 milliGRAM(s) Oral at bedtime  rOPINIRole 2 milliGRAM(s) Oral daily  senna 2 Tablet(s) Oral at bedtime  sodium chloride 0.65% Nasal 1 Spray(s) Both Nostrils two times a day    MEDICATIONS  (PRN):  acetaminophen     Tablet .. 650 milliGRAM(s) Oral every 6 hours PRN Temp greater or equal to 38C (100.4F), Mild Pain (1 - 3)  ALBUTerol    90 MICROgram(s) HFA Inhaler 2 Puff(s) Inhalation every 6 hours PRN Wheezing  aluminum hydroxide/magnesium hydroxide/simethicone Suspension 30 milliLiter(s) Oral every 4 hours PRN Dyspepsia  guaiFENesin Oral Liquid (Sugar-Free) 100 milliGRAM(s) Oral every 6 hours PRN Cough  melatonin 3 milliGRAM(s) Oral at bedtime PRN Insomnia  ondansetron Injectable 4 milliGRAM(s) IV Push every 8 hours PRN Nausea and/or Vomiting  traMADol 50 milliGRAM(s) Oral every 4 hours PRN Moderate Pain (4 - 6)  traMADol 100 milliGRAM(s) Oral every 6 hours PRN Severe Pain (7 - 10)      ALLERGIES:  Allergies    erythromycin (Hives)  IV Contrast (Anaphylaxis)    Intolerances        LABS:                        8.1    11.60 )-----------( 515      ( 30 Oct 2022 05:40 )             26.3     10-30    126<L>  |  93<L>  |  12  ----------------------------<  90  4.3   |  24  |  0.66    Ca    9.6      30 Oct 2022 05:40  Phos  2.5     10-30  Mg     2.00     10-30          CAPILLARY BLOOD GLUCOSE          RADIOLOGY & ADDITIONAL TESTS: Reviewed.    ASSESSMENT:    PLAN:

## 2022-10-31 LAB
ANION GAP SERPL CALC-SCNC: 8 MMOL/L — SIGNIFICANT CHANGE UP (ref 7–14)
BUN SERPL-MCNC: 12 MG/DL — SIGNIFICANT CHANGE UP (ref 7–23)
CALCIUM SERPL-MCNC: 9.7 MG/DL — SIGNIFICANT CHANGE UP (ref 8.4–10.5)
CHLORIDE SERPL-SCNC: 93 MMOL/L — LOW (ref 98–107)
CO2 SERPL-SCNC: 25 MMOL/L — SIGNIFICANT CHANGE UP (ref 22–31)
CREAT SERPL-MCNC: 0.61 MG/DL — SIGNIFICANT CHANGE UP (ref 0.5–1.3)
DEPRECATED KAPPA LC FREE/LAMBDA SER: 6.27 RATIO — HIGH (ref 0.7–6.02)
EGFR: 92 ML/MIN/1.73M2 — SIGNIFICANT CHANGE UP
GLUCOSE SERPL-MCNC: 117 MG/DL — HIGH (ref 70–99)
HCT VFR BLD CALC: 27.9 % — LOW (ref 34.5–45)
HGB BLD-MCNC: 8.5 G/DL — LOW (ref 11.5–15.5)
KAPPA LC UR-MCNC: 111.8 MG/L — HIGH
LAMBDA LC UR-MCNC: 17.84 MG/L — HIGH
MAGNESIUM SERPL-MCNC: 2 MG/DL — SIGNIFICANT CHANGE UP (ref 1.6–2.6)
MCHC RBC-ENTMCNC: 23.7 PG — LOW (ref 27–34)
MCHC RBC-ENTMCNC: 30.5 GM/DL — LOW (ref 32–36)
MCV RBC AUTO: 77.7 FL — LOW (ref 80–100)
NRBC # BLD: 0 /100 WBCS — SIGNIFICANT CHANGE UP (ref 0–0)
NRBC # FLD: 0 K/UL — SIGNIFICANT CHANGE UP (ref 0–0)
PHOSPHATE SERPL-MCNC: 2.6 MG/DL — SIGNIFICANT CHANGE UP (ref 2.5–4.5)
PLATELET # BLD AUTO: 434 K/UL — HIGH (ref 150–400)
POTASSIUM SERPL-MCNC: 4 MMOL/L — SIGNIFICANT CHANGE UP (ref 3.5–5.3)
POTASSIUM SERPL-SCNC: 4 MMOL/L — SIGNIFICANT CHANGE UP (ref 3.5–5.3)
RBC # BLD: 3.59 M/UL — LOW (ref 3.8–5.2)
RBC # FLD: 18.4 % — HIGH (ref 10.3–14.5)
SODIUM SERPL-SCNC: 126 MMOL/L — LOW (ref 135–145)
WBC # BLD: 10.27 K/UL — SIGNIFICANT CHANGE UP (ref 3.8–10.5)
WBC # FLD AUTO: 10.27 K/UL — SIGNIFICANT CHANGE UP (ref 3.8–10.5)

## 2022-10-31 PROCEDURE — 72157 MRI CHEST SPINE W/O & W/DYE: CPT | Mod: 26

## 2022-10-31 PROCEDURE — 99232 SBSQ HOSP IP/OBS MODERATE 35: CPT

## 2022-10-31 PROCEDURE — 99233 SBSQ HOSP IP/OBS HIGH 50: CPT

## 2022-10-31 PROCEDURE — 72158 MRI LUMBAR SPINE W/O & W/DYE: CPT | Mod: 26

## 2022-10-31 RX ORDER — KETOROLAC TROMETHAMINE 30 MG/ML
15 SYRINGE (ML) INJECTION ONCE
Refills: 0 | Status: DISCONTINUED | OUTPATIENT
Start: 2022-10-31 | End: 2022-10-31

## 2022-10-31 RX ADMIN — Medication 1 SPRAY(S): at 06:21

## 2022-10-31 RX ADMIN — Medication 3 MILLILITER(S): at 22:22

## 2022-10-31 RX ADMIN — Medication 1 SPRAY(S): at 17:49

## 2022-10-31 RX ADMIN — Medication 15 MILLIGRAM(S): at 14:45

## 2022-10-31 RX ADMIN — DULOXETINE HYDROCHLORIDE 60 MILLIGRAM(S): 30 CAPSULE, DELAYED RELEASE ORAL at 13:57

## 2022-10-31 RX ADMIN — PANTOPRAZOLE SODIUM 40 MILLIGRAM(S): 20 TABLET, DELAYED RELEASE ORAL at 06:14

## 2022-10-31 RX ADMIN — TRAMADOL HYDROCHLORIDE 50 MILLIGRAM(S): 50 TABLET ORAL at 07:44

## 2022-10-31 RX ADMIN — Medication 15 MILLIGRAM(S): at 13:49

## 2022-10-31 RX ADMIN — Medication 100 MILLIGRAM(S): at 06:12

## 2022-10-31 RX ADMIN — HEPARIN SODIUM 5000 UNIT(S): 5000 INJECTION INTRAVENOUS; SUBCUTANEOUS at 06:13

## 2022-10-31 RX ADMIN — Medication 100 MILLIGRAM(S): at 21:56

## 2022-10-31 RX ADMIN — Medication 145 MILLIGRAM(S): at 13:57

## 2022-10-31 RX ADMIN — GABAPENTIN 300 MILLIGRAM(S): 400 CAPSULE ORAL at 17:49

## 2022-10-31 RX ADMIN — TRAMADOL HYDROCHLORIDE 100 MILLIGRAM(S): 50 TABLET ORAL at 11:46

## 2022-10-31 RX ADMIN — GABAPENTIN 300 MILLIGRAM(S): 400 CAPSULE ORAL at 06:15

## 2022-10-31 RX ADMIN — Medication 325 MILLIGRAM(S): at 13:57

## 2022-10-31 RX ADMIN — Medication 12.5 MILLIGRAM(S): at 17:49

## 2022-10-31 RX ADMIN — ROPINIROLE 2 MILLIGRAM(S): 8 TABLET, FILM COATED, EXTENDED RELEASE ORAL at 13:58

## 2022-10-31 RX ADMIN — Medication 100 MILLIGRAM(S): at 13:57

## 2022-10-31 RX ADMIN — Medication 12.5 MILLIGRAM(S): at 06:14

## 2022-10-31 RX ADMIN — Medication 5 MILLIGRAM(S): at 21:53

## 2022-10-31 RX ADMIN — HEPARIN SODIUM 5000 UNIT(S): 5000 INJECTION INTRAVENOUS; SUBCUTANEOUS at 21:53

## 2022-10-31 RX ADMIN — Medication 100 MILLIGRAM(S): at 21:54

## 2022-10-31 RX ADMIN — Medication 100 MILLIGRAM(S): at 13:56

## 2022-10-31 RX ADMIN — Medication 3 MILLIGRAM(S): at 21:54

## 2022-10-31 RX ADMIN — Medication 125 MICROGRAM(S): at 06:21

## 2022-10-31 RX ADMIN — TRAMADOL HYDROCHLORIDE 50 MILLIGRAM(S): 50 TABLET ORAL at 07:02

## 2022-10-31 RX ADMIN — POLYETHYLENE GLYCOL 3350 17 GRAM(S): 17 POWDER, FOR SOLUTION ORAL at 06:16

## 2022-10-31 RX ADMIN — HEPARIN SODIUM 5000 UNIT(S): 5000 INJECTION INTRAVENOUS; SUBCUTANEOUS at 13:57

## 2022-10-31 RX ADMIN — TRAMADOL HYDROCHLORIDE 100 MILLIGRAM(S): 50 TABLET ORAL at 12:45

## 2022-10-31 RX ADMIN — QUETIAPINE FUMARATE 25 MILLIGRAM(S): 200 TABLET, FILM COATED ORAL at 21:53

## 2022-10-31 RX ADMIN — Medication 100 MILLIGRAM(S): at 06:17

## 2022-10-31 RX ADMIN — SENNA PLUS 2 TABLET(S): 8.6 TABLET ORAL at 21:55

## 2022-10-31 RX ADMIN — Medication 3 MILLILITER(S): at 09:39

## 2022-10-31 NOTE — PROGRESS NOTE ADULT - SUBJECTIVE AND OBJECTIVE BOX
CC: Patient is a 77y old  Female who presents with a chief complaint of Fall / UTI (31 Oct 2022 14:42)    ID following for MSSA bacteremia    Interval History/ROS: Patient remains with lower back pain and epigastric pain. No fevers. No dysuria. No other complaints.    Rest of ROS negative.    Allergies  erythromycin (Hives)  IV Contrast (Anaphylaxis)    ANTIMICROBIALS:  ceFAZolin   IVPB 2000 every 8 hours    OTHER MEDS:  acetaminophen     Tablet .. 650 milliGRAM(s) Oral every 6 hours PRN  ALBUTerol    90 MICROgram(s) HFA Inhaler 2 Puff(s) Inhalation every 6 hours PRN  albuterol/ipratropium for Nebulization 3 milliLiter(s) Nebulizer every 12 hours  aluminum hydroxide/magnesium hydroxide/simethicone Suspension 30 milliLiter(s) Oral every 4 hours PRN  benzonatate 100 milliGRAM(s) Oral three times a day  bisacodyl 5 milliGRAM(s) Oral at bedtime  DULoxetine 60 milliGRAM(s) Oral daily  fenofibrate Tablet 145 milliGRAM(s) Oral daily  ferrous    sulfate 325 milliGRAM(s) Oral daily  gabapentin 300 milliGRAM(s) Oral two times a day  guaiFENesin Oral Liquid (Sugar-Free) 100 milliGRAM(s) Oral every 6 hours PRN  heparin   Injectable 5000 Unit(s) SubCutaneous every 8 hours  lactulose Syrup 10 Gram(s) Oral once  levothyroxine 125 MICROGram(s) Oral daily  lidocaine   4% Patch 1 Patch Transdermal every 24 hours  LORazepam     Tablet 0.5 milliGRAM(s) Oral once  meclizine 12.5 milliGRAM(s) Oral two times a day  melatonin 3 milliGRAM(s) Oral at bedtime PRN  ondansetron Injectable 4 milliGRAM(s) IV Push every 8 hours PRN  pantoprazole    Tablet 40 milliGRAM(s) Oral before breakfast  polyethylene glycol 3350 17 Gram(s) Oral two times a day  QUEtiapine 25 milliGRAM(s) Oral at bedtime  rOPINIRole 2 milliGRAM(s) Oral daily  senna 2 Tablet(s) Oral at bedtime  sodium chloride 0.65% Nasal 1 Spray(s) Both Nostrils two times a day  traMADol 50 milliGRAM(s) Oral every 4 hours PRN  traMADol 100 milliGRAM(s) Oral every 6 hours PRN    PE:    Vital Signs Last 24 Hrs  T(C): 36.6 (31 Oct 2022 12:58), Max: 36.7 (31 Oct 2022 05:10)  T(F): 97.8 (31 Oct 2022 12:58), Max: 98.1 (31 Oct 2022 05:10)  HR: 75 (31 Oct 2022 12:58) (74 - 80)  BP: 115/66 (31 Oct 2022 12:58) (115/66 - 141/69)  BP(mean): --  RR: 18 (31 Oct 2022 12:58) (18 - 18)  SpO2: 100% (31 Oct 2022 12:58) (100% - 100%)    Parameters below as of 31 Oct 2022 12:58  Patient On (Oxygen Delivery Method): room air    Gen: AOx3, NAD  CV: S1+S2 normal, no murmurs  Resp: Clear bilat, no resp distress  Abd: Soft, nontender, +BS  Ext: No LE edema, no wounds  : No Block  IV/Skin: No thrombophlebitis  Neuro: no focal deficits    LABS:                          8.5    10.27 )-----------( 434      ( 31 Oct 2022 05:42 )             27.9       10-31    126<L>  |  93<L>  |  12  ----------------------------<  117<H>  4.0   |  25  |  0.61    Ca    9.7      31 Oct 2022 05:42  Phos  2.6     10-31  Mg     2.00     10-31    MICROBIOLOGY:  v  .Blood Blood-Venous  10-29-22   No growth to date.  --  --      .Blood Blood-Peripheral  10-29-22   No growth to date.  --  --      .Blood Blood-Peripheral  10-27-22   No growth to date.  --  --      .Blood Blood-Peripheral  10-27-22   Growth in anaerobic bottle: Staphylococcus aureus  See previous culture [id number]  --    Growth in anaerobic bottle: Gram Positive Cocci in Clusters      .Blood Blood  10-25-22   Growth in aerobic and anaerobic bottles: Staphylococcus aureus  ***Blood Panel PCR results on this specimen are available  approximately 3 hours after the Gram stain result.***  Gram stain, PCR, and/or culture results may not always  correspond dueto difference in methodologies.  ************************************************************  This PCR assay was performed by multiplex PCR. This  Assay tests for 66 bacterial and resistance gene targets.  Please refer to the [facility] Labs test directory  at [email or web address] for details.  --  Blood Culture PCR  Staphylococcus aureus      Clean Catch Clean Catch (Midstream)  10-25-22   Culture grew 3 or more types of organisms which indicate  collection contamination; consider recollection only if clinically  indicated.  --  --    RADIOLOGY:    < from: US Duplex Venous Lower Ext Complete, Bilateral (10.25.22 @ 15:03) >  IMPRESSION:  No evidence of deep venous thrombosis in either lower extremity.    < end of copied text >

## 2022-10-31 NOTE — PROGRESS NOTE ADULT - SUBJECTIVE AND OBJECTIVE BOX
Collin Salcedo MD  Academic Hospitalist  Pager [phone]  Email: [email or web address]  Available on [employer] Teams        PROGRESS NOTE:     Patient is a 77y old  Female who presents with a chief complaint of Fall / UTI (30 Oct 2022 12:01)      SUBJECTIVE / OVERNIGHT EVENTS:  Patient seen and examined this morning. Awaiting her MRI.  ADDITIONAL REVIEW OF SYSTEMS:  No f/c/n/v    MEDICATIONS  (STANDING):  albuterol/ipratropium for Nebulization 3 milliLiter(s) Nebulizer every 12 hours  benzonatate 100 milliGRAM(s) Oral three times a day  bisacodyl 5 milliGRAM(s) Oral at bedtime  ceFAZolin   IVPB 2000 milliGRAM(s) IV Intermittent every 8 hours  DULoxetine 60 milliGRAM(s) Oral daily  fenofibrate Tablet 145 milliGRAM(s) Oral daily  ferrous    sulfate 325 milliGRAM(s) Oral daily  gabapentin 300 milliGRAM(s) Oral two times a day  heparin   Injectable 5000 Unit(s) SubCutaneous every 8 hours  lactulose Syrup 10 Gram(s) Oral once  levothyroxine 125 MICROGram(s) Oral daily  lidocaine   4% Patch 1 Patch Transdermal every 24 hours  LORazepam     Tablet 0.5 milliGRAM(s) Oral once  meclizine 12.5 milliGRAM(s) Oral two times a day  pantoprazole    Tablet 40 milliGRAM(s) Oral before breakfast  polyethylene glycol 3350 17 Gram(s) Oral two times a day  QUEtiapine 25 milliGRAM(s) Oral at bedtime  rOPINIRole 2 milliGRAM(s) Oral daily  senna 2 Tablet(s) Oral at bedtime  sodium chloride 0.65% Nasal 1 Spray(s) Both Nostrils two times a day    MEDICATIONS  (PRN):  acetaminophen     Tablet .. 650 milliGRAM(s) Oral every 6 hours PRN Temp greater or equal to 38C (100.4F), Mild Pain (1 - 3)  ALBUTerol    90 MICROgram(s) HFA Inhaler 2 Puff(s) Inhalation every 6 hours PRN Wheezing  aluminum hydroxide/magnesium hydroxide/simethicone Suspension 30 milliLiter(s) Oral every 4 hours PRN Dyspepsia  guaiFENesin Oral Liquid (Sugar-Free) 100 milliGRAM(s) Oral every 6 hours PRN Cough  melatonin 3 milliGRAM(s) Oral at bedtime PRN Insomnia  ondansetron Injectable 4 milliGRAM(s) IV Push every 8 hours PRN Nausea and/or Vomiting  traMADol 50 milliGRAM(s) Oral every 4 hours PRN Moderate Pain (4 - 6)  traMADol 100 milliGRAM(s) Oral every 6 hours PRN Severe Pain (7 - 10)      CAPILLARY BLOOD GLUCOSE        I&O's Summary      PHYSICAL EXAM:  Vital Signs Last 24 Hrs  T(C): 36.6 (31 Oct 2022 12:58), Max: 36.7 (31 Oct 2022 05:10)  T(F): 97.8 (31 Oct 2022 12:58), Max: 98.1 (31 Oct 2022 05:10)  HR: 75 (31 Oct 2022 12:58) (74 - 80)  BP: 115/66 (31 Oct 2022 12:58) (115/66 - 141/69)  BP(mean): --  RR: 18 (31 Oct 2022 12:58) (18 - 18)  SpO2: 100% (31 Oct 2022 12:58) (100% - 100%)    Parameters below as of 31 Oct 2022 12:58  Patient On (Oxygen Delivery Method): room air        General: Well appearing female - comfortable.   HEENT: anicteric sclera; MMM  Neck: supple  Cardiovascular: +S1/S2; RRR  Respiratory: CTA B/L; no W/R/R  Gastrointestinal: soft, NT/ND; +BSx4  Extremities: WWP; no edema, clubbing or cyanosis  Vascular: 2+ radial, DP/PT pulses B/L  Neurological: AAOx3; no focal deficits    LABS:                        8.5    10.27 )-----------( 434      ( 31 Oct 2022 05:42 )             27.9     10-31    126<L>  |  93<L>  |  12  ----------------------------<  117<H>  4.0   |  25  |  0.61    Ca    9.7      31 Oct 2022 05:42  Phos  2.6     10-31  Mg     2.00     10-31                Culture - Blood (collected 29 Oct 2022 18:25)  Source: .Blood Blood-Venous  Preliminary Report (31 Oct 2022 01:02):    No growth to date.    Culture - Blood (collected 29 Oct 2022 18:10)  Source: .Blood Blood-Peripheral  Preliminary Report (31 Oct 2022 01:02):    No growth to date.        RADIOLOGY & ADDITIONAL TESTS:  Results Reviewed:   Imaging Personally Reviewed:  Electrocardiogram Personally Reviewed:    COORDINATION OF CARE:  Care Discussed with Consultants/Other Providers [Y/N]:  Prior or Outpatient Records Reviewed [Y/N]:

## 2022-11-01 DIAGNOSIS — M46.20 OSTEOMYELITIS OF VERTEBRA, SITE UNSPECIFIED: ICD-10-CM

## 2022-11-01 LAB
% GAMMA, URINE: SIGNIFICANT CHANGE UP
ALBUMIN 24H MFR UR ELPH: PRESENT
ALPHA1 GLOB 24H MFR UR ELPH: SIGNIFICANT CHANGE UP
ALPHA2 GLOB 24H MFR UR ELPH: SIGNIFICANT CHANGE UP
ANION GAP SERPL CALC-SCNC: 11 MMOL/L — SIGNIFICANT CHANGE UP (ref 7–14)
B-GLOBULIN 24H MFR UR ELPH: SIGNIFICANT CHANGE UP
BUN SERPL-MCNC: 16 MG/DL — SIGNIFICANT CHANGE UP (ref 7–23)
CALCIUM SERPL-MCNC: 9.8 MG/DL — SIGNIFICANT CHANGE UP (ref 8.4–10.5)
CHLORIDE SERPL-SCNC: 94 MMOL/L — LOW (ref 98–107)
CO2 SERPL-SCNC: 23 MMOL/L — SIGNIFICANT CHANGE UP (ref 22–31)
CREAT SERPL-MCNC: 0.74 MG/DL — SIGNIFICANT CHANGE UP (ref 0.5–1.3)
CULTURE RESULTS: SIGNIFICANT CHANGE UP
EGFR: 83 ML/MIN/1.73M2 — SIGNIFICANT CHANGE UP
GLUCOSE SERPL-MCNC: 90 MG/DL — SIGNIFICANT CHANGE UP (ref 70–99)
HCT VFR BLD CALC: 27.8 % — LOW (ref 34.5–45)
HGB BLD-MCNC: 8.5 G/DL — LOW (ref 11.5–15.5)
M PROTEIN 24H UR ELPH-MRATE: SIGNIFICANT CHANGE UP
MAGNESIUM SERPL-MCNC: 2.1 MG/DL — SIGNIFICANT CHANGE UP (ref 1.6–2.6)
MCHC RBC-ENTMCNC: 23.9 PG — LOW (ref 27–34)
MCHC RBC-ENTMCNC: 30.6 GM/DL — LOW (ref 32–36)
MCV RBC AUTO: 78.3 FL — LOW (ref 80–100)
NRBC # BLD: 0 /100 WBCS — SIGNIFICANT CHANGE UP (ref 0–0)
NRBC # FLD: 0 K/UL — SIGNIFICANT CHANGE UP (ref 0–0)
PHOSPHATE SERPL-MCNC: 3.5 MG/DL — SIGNIFICANT CHANGE UP (ref 2.5–4.5)
PLATELET # BLD AUTO: 462 K/UL — HIGH (ref 150–400)
POTASSIUM SERPL-MCNC: 4.6 MMOL/L — SIGNIFICANT CHANGE UP (ref 3.5–5.3)
POTASSIUM SERPL-SCNC: 4.6 MMOL/L — SIGNIFICANT CHANGE UP (ref 3.5–5.3)
PROT ?TM UR-MCNC: 49 MG/DL — SIGNIFICANT CHANGE UP
PROT PATTERN 24H UR ELPH-IMP: SIGNIFICANT CHANGE UP
RBC # BLD: 3.55 M/UL — LOW (ref 3.8–5.2)
RBC # FLD: 18.6 % — HIGH (ref 10.3–14.5)
SARS-COV-2 RNA SPEC QL NAA+PROBE: SIGNIFICANT CHANGE UP
SODIUM SERPL-SCNC: 128 MMOL/L — LOW (ref 135–145)
SPECIMEN SOURCE: SIGNIFICANT CHANGE UP
TROPONIN T, HIGH SENSITIVITY RESULT: 18 NG/L — SIGNIFICANT CHANGE UP
WBC # BLD: 11.7 K/UL — HIGH (ref 3.8–10.5)
WBC # FLD AUTO: 11.7 K/UL — HIGH (ref 3.8–10.5)

## 2022-11-01 PROCEDURE — 99233 SBSQ HOSP IP/OBS HIGH 50: CPT

## 2022-11-01 PROCEDURE — 93010 ELECTROCARDIOGRAM REPORT: CPT

## 2022-11-01 PROCEDURE — 99232 SBSQ HOSP IP/OBS MODERATE 35: CPT | Mod: FS

## 2022-11-01 RX ORDER — FLUTICASONE PROPIONATE 50 MCG
1 SPRAY, SUSPENSION NASAL EVERY 12 HOURS
Refills: 0 | Status: DISCONTINUED | OUTPATIENT
Start: 2022-11-01 | End: 2022-11-25

## 2022-11-01 RX ORDER — ACETAMINOPHEN 500 MG
1000 TABLET ORAL ONCE
Refills: 0 | Status: COMPLETED | OUTPATIENT
Start: 2022-11-01 | End: 2022-11-01

## 2022-11-01 RX ADMIN — Medication 12.5 MILLIGRAM(S): at 18:30

## 2022-11-01 RX ADMIN — TRAMADOL HYDROCHLORIDE 50 MILLIGRAM(S): 50 TABLET ORAL at 04:47

## 2022-11-01 RX ADMIN — GABAPENTIN 300 MILLIGRAM(S): 400 CAPSULE ORAL at 05:50

## 2022-11-01 RX ADMIN — TRAMADOL HYDROCHLORIDE 100 MILLIGRAM(S): 50 TABLET ORAL at 16:20

## 2022-11-01 RX ADMIN — TRAMADOL HYDROCHLORIDE 100 MILLIGRAM(S): 50 TABLET ORAL at 10:21

## 2022-11-01 RX ADMIN — HEPARIN SODIUM 5000 UNIT(S): 5000 INJECTION INTRAVENOUS; SUBCUTANEOUS at 15:26

## 2022-11-01 RX ADMIN — Medication 125 MICROGRAM(S): at 05:50

## 2022-11-01 RX ADMIN — Medication 100 MILLIGRAM(S): at 21:57

## 2022-11-01 RX ADMIN — PANTOPRAZOLE SODIUM 40 MILLIGRAM(S): 20 TABLET, DELAYED RELEASE ORAL at 05:50

## 2022-11-01 RX ADMIN — Medication 400 MILLIGRAM(S): at 17:50

## 2022-11-01 RX ADMIN — Medication 100 MILLIGRAM(S): at 05:50

## 2022-11-01 RX ADMIN — Medication 1000 MILLIGRAM(S): at 18:10

## 2022-11-01 RX ADMIN — Medication 1 SPRAY(S): at 05:52

## 2022-11-01 RX ADMIN — Medication 3 MILLILITER(S): at 10:15

## 2022-11-01 RX ADMIN — HEPARIN SODIUM 5000 UNIT(S): 5000 INJECTION INTRAVENOUS; SUBCUTANEOUS at 22:01

## 2022-11-01 RX ADMIN — Medication 100 MILLIGRAM(S): at 05:51

## 2022-11-01 RX ADMIN — Medication 1 SPRAY(S): at 18:30

## 2022-11-01 RX ADMIN — Medication 3 MILLILITER(S): at 22:04

## 2022-11-01 RX ADMIN — Medication 325 MILLIGRAM(S): at 15:25

## 2022-11-01 RX ADMIN — Medication 5 MILLIGRAM(S): at 21:57

## 2022-11-01 RX ADMIN — DULOXETINE HYDROCHLORIDE 60 MILLIGRAM(S): 30 CAPSULE, DELAYED RELEASE ORAL at 15:25

## 2022-11-01 RX ADMIN — POLYETHYLENE GLYCOL 3350 17 GRAM(S): 17 POWDER, FOR SOLUTION ORAL at 05:55

## 2022-11-01 RX ADMIN — TRAMADOL HYDROCHLORIDE 100 MILLIGRAM(S): 50 TABLET ORAL at 22:35

## 2022-11-01 RX ADMIN — GABAPENTIN 300 MILLIGRAM(S): 400 CAPSULE ORAL at 18:30

## 2022-11-01 RX ADMIN — QUETIAPINE FUMARATE 25 MILLIGRAM(S): 200 TABLET, FILM COATED ORAL at 21:57

## 2022-11-01 RX ADMIN — Medication 12.5 MILLIGRAM(S): at 05:49

## 2022-11-01 RX ADMIN — Medication 100 MILLIGRAM(S): at 15:25

## 2022-11-01 RX ADMIN — HEPARIN SODIUM 5000 UNIT(S): 5000 INJECTION INTRAVENOUS; SUBCUTANEOUS at 05:52

## 2022-11-01 RX ADMIN — TRAMADOL HYDROCHLORIDE 100 MILLIGRAM(S): 50 TABLET ORAL at 21:35

## 2022-11-01 RX ADMIN — TRAMADOL HYDROCHLORIDE 100 MILLIGRAM(S): 50 TABLET ORAL at 09:21

## 2022-11-01 RX ADMIN — Medication 100 MILLIGRAM(S): at 22:50

## 2022-11-01 RX ADMIN — ROPINIROLE 2 MILLIGRAM(S): 8 TABLET, FILM COATED, EXTENDED RELEASE ORAL at 15:26

## 2022-11-01 RX ADMIN — SENNA PLUS 2 TABLET(S): 8.6 TABLET ORAL at 21:57

## 2022-11-01 RX ADMIN — Medication 100 MILLIGRAM(S): at 15:22

## 2022-11-01 RX ADMIN — Medication 145 MILLIGRAM(S): at 15:25

## 2022-11-01 RX ADMIN — TRAMADOL HYDROCHLORIDE 100 MILLIGRAM(S): 50 TABLET ORAL at 15:22

## 2022-11-01 RX ADMIN — TRAMADOL HYDROCHLORIDE 50 MILLIGRAM(S): 50 TABLET ORAL at 05:40

## 2022-11-01 NOTE — DIETITIAN INITIAL EVALUATION ADULT - PERTINENT MEDS FT
MEDICATIONS  (STANDING):  albuterol/ipratropium for Nebulization 3 milliLiter(s) Nebulizer every 12 hours  benzonatate 100 milliGRAM(s) Oral three times a day  bisacodyl 5 milliGRAM(s) Oral at bedtime  ceFAZolin   IVPB 2000 milliGRAM(s) IV Intermittent every 8 hours  DULoxetine 60 milliGRAM(s) Oral daily  fenofibrate Tablet 145 milliGRAM(s) Oral daily  ferrous    sulfate 325 milliGRAM(s) Oral daily  gabapentin 300 milliGRAM(s) Oral two times a day  heparin   Injectable 5000 Unit(s) SubCutaneous every 8 hours  lactulose Syrup 10 Gram(s) Oral once  levothyroxine 125 MICROGram(s) Oral daily  lidocaine   4% Patch 1 Patch Transdermal every 24 hours  LORazepam     Tablet 0.5 milliGRAM(s) Oral once  meclizine 12.5 milliGRAM(s) Oral two times a day  pantoprazole    Tablet 40 milliGRAM(s) Oral before breakfast  polyethylene glycol 3350 17 Gram(s) Oral two times a day  QUEtiapine 25 milliGRAM(s) Oral at bedtime  rOPINIRole 2 milliGRAM(s) Oral daily  senna 2 Tablet(s) Oral at bedtime  sodium chloride 0.65% Nasal 1 Spray(s) Both Nostrils two times a day    MEDICATIONS  (PRN):  acetaminophen     Tablet .. 650 milliGRAM(s) Oral every 6 hours PRN Temp greater or equal to 38C (100.4F), Mild Pain (1 - 3)  ALBUTerol    90 MICROgram(s) HFA Inhaler 2 Puff(s) Inhalation every 6 hours PRN Wheezing  aluminum hydroxide/magnesium hydroxide/simethicone Suspension 30 milliLiter(s) Oral every 4 hours PRN Dyspepsia  guaiFENesin Oral Liquid (Sugar-Free) 100 milliGRAM(s) Oral every 6 hours PRN Cough  melatonin 3 milliGRAM(s) Oral at bedtime PRN Insomnia  ondansetron Injectable 4 milliGRAM(s) IV Push every 8 hours PRN Nausea and/or Vomiting  traMADol 50 milliGRAM(s) Oral every 4 hours PRN Moderate Pain (4 - 6)  traMADol 100 milliGRAM(s) Oral every 6 hours PRN Severe Pain (7 - 10)   Heparin, Lactulose Syrup, Dulcolax, Fe-Sulfate, Protonix, Miralax, Senna,

## 2022-11-01 NOTE — PROGRESS NOTE ADULT - SUBJECTIVE AND OBJECTIVE BOX
CC: Patient is a 77y old  Female who presents with a chief complaint of Fall / UTI (01 Nov 2022 17:19)    ID following for MSSA bacteremia     Interval History/ROS: Patient remains with lower back pain. No fevers.    Rest of ROS negative.    Allergies  erythromycin (Hives)  IV Contrast (Anaphylaxis)    ANTIMICROBIALS:  ceFAZolin   IVPB 2000 every 8 hours    OTHER MEDS:  acetaminophen     Tablet .. 650 milliGRAM(s) Oral every 6 hours PRN  ALBUTerol    90 MICROgram(s) HFA Inhaler 2 Puff(s) Inhalation every 6 hours PRN  albuterol/ipratropium for Nebulization 3 milliLiter(s) Nebulizer every 12 hours  aluminum hydroxide/magnesium hydroxide/simethicone Suspension 30 milliLiter(s) Oral every 4 hours PRN  benzonatate 100 milliGRAM(s) Oral three times a day  bisacodyl 5 milliGRAM(s) Oral at bedtime  DULoxetine 60 milliGRAM(s) Oral daily  fenofibrate Tablet 145 milliGRAM(s) Oral daily  ferrous    sulfate 325 milliGRAM(s) Oral daily  fluticasone propionate 50 MICROgram(s)/spray Nasal Spray 1 Spray(s) Both Nostrils every 12 hours PRN  gabapentin 300 milliGRAM(s) Oral two times a day  guaiFENesin Oral Liquid (Sugar-Free) 100 milliGRAM(s) Oral every 6 hours PRN  heparin   Injectable 5000 Unit(s) SubCutaneous every 8 hours  lactulose Syrup 10 Gram(s) Oral once  levothyroxine 125 MICROGram(s) Oral daily  lidocaine   4% Patch 1 Patch Transdermal every 24 hours  LORazepam     Tablet 0.5 milliGRAM(s) Oral once  meclizine 12.5 milliGRAM(s) Oral two times a day  melatonin 3 milliGRAM(s) Oral at bedtime PRN  multivitamin 1 Tablet(s) Oral daily  ondansetron Injectable 4 milliGRAM(s) IV Push every 8 hours PRN  pantoprazole    Tablet 40 milliGRAM(s) Oral before breakfast  polyethylene glycol 3350 17 Gram(s) Oral two times a day  QUEtiapine 25 milliGRAM(s) Oral at bedtime  rOPINIRole 2 milliGRAM(s) Oral daily  senna 2 Tablet(s) Oral at bedtime  sodium chloride 0.65% Nasal 1 Spray(s) Both Nostrils two times a day  traMADol 50 milliGRAM(s) Oral every 4 hours PRN  traMADol 100 milliGRAM(s) Oral every 6 hours PRN      PE:    Vital Signs Last 24 Hrs  T(C): 36.7 (01 Nov 2022 17:05), Max: 36.8 (31 Oct 2022 21:39)  T(F): 98 (01 Nov 2022 17:05), Max: 98.3 (31 Oct 2022 21:39)  HR: 85 (01 Nov 2022 17:05) (75 - 85)  BP: 139/73 (01 Nov 2022 17:05) (120/60 - 139/73)  BP(mean): --  RR: 21 (01 Nov 2022 17:05) (18 - 21)  SpO2: 100% (01 Nov 2022 17:05) (100% - 100%)    Parameters below as of 01 Nov 2022 17:05  Patient On (Oxygen Delivery Method): room air    Gen: AOx3, NAD  CV: S1+S2 normal, no murmurs  Resp: Clear bilat, no resp distress  Abd: Soft, nontender, +BS  Ext: No LE edema, no wounds  : No Block  IV/Skin: No thrombophlebitis  Neuro: no focal deficits    LABS:                          8.5    11.70 )-----------( 462      ( 01 Nov 2022 05:40 )             27.8       11-01    128<L>  |  94<L>  |  16  ----------------------------<  90  4.6   |  23  |  0.74    Ca    9.8      01 Nov 2022 05:40  Phos  3.5     11-01  Mg     2.10     11-01            MICROBIOLOGY:  v  .Blood Blood-Venous  10-29-22   No growth to date.  --  --      .Blood Blood-Peripheral  10-29-22   No growth to date.  --  --      .Blood Blood-Peripheral  10-27-22   No Growth Final  --  --      .Blood Blood-Peripheral  10-27-22   Growth in anaerobic bottle: Staphylococcus aureus  See previous culture [id number]  --    Growth in anaerobic bottle: Gram Positive Cocci in Clusters      .Blood Blood  10-25-22   Growth in aerobic and anaerobic bottles: Staphylococcus aureus  ***Blood Panel PCR results on this specimen are available  approximately 3 hours after the Gram stain result.***  Gram stain, PCR, and/or culture results may not always  correspond dueto difference in methodologies.  ************************************************************  This PCR assay was performed by multiplex PCR. This  Assay tests for 66 bacterial and resistance gene targets.  Please refer to the [facility] Labs test directory  at [email or web address] for details.  --  Blood Culture PCR  Staphylococcus aureus      Clean Catch Clean Catch (Midstream)  10-25-22   Culture grew 3 or more types of organisms which indicate  collection contamination; consider recollection only if clinically  indicated.  --  --      RADIOLOGY:    < from: MR Lumbar Spine w/wo IV Cont (10.31.22 @ 19:33) >    IMPRESSION:    1. T6-T7 discitis/osteomyelitis with associated mild anterior loss of   height of T7 as well as epidural and paravertebral phlegmon. No sadiq   epidural abscess at this time. Mild canal stenosis at this level without   sadiq cord compression.    2. Additional area of left-sided paravertebral phlegmon at the T9-T10   level approximating the costovertebral junction and left T9-T10 neural   foramen.    3. Bilateral SI joint arthropathy/sacroiliitis. Superimposed infection of   the bilateral SI joints are difficult to exclude. 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    4. Multilevel lumbar spondylosis.    < end of copied text >

## 2022-11-01 NOTE — CHART NOTE - NSCHARTNOTEFT_GEN_A_CORE
Called bedside by nurse as patient complaining of shortness of breath with epigastric pain  Asked if this is similar to any experiences that she has had in the past - "no you're the doctor tell me" and would not elaborate if she has ever experienced this pain before  STAT EKG in sinus rhythm without ST elevations - high sens trop ordered  Vitals stable, saturating well on room air  Patient exclaimed she needs her nasal spray as she "has chronic sinus issues you can ask my sister"  Added on fluticasone as patient uses it at home with relief - does not appear to be true chest pain and only nasal discomfort causing her to become anxious    Sarahy Cannon PA-C  [id number]

## 2022-11-01 NOTE — PROGRESS NOTE ADULT - SUBJECTIVE AND OBJECTIVE BOX
Collin Salcedo MD  [employer]ist  Pager [phone]  Email: [email or web address]  Available on [employer] Teams        PROGRESS NOTE:     Patient is a 77y old  Female who presents with a chief complaint of Hyponatremia, hypo-osmolarity, or hypo-osmolar hyponatremia     (01 Nov 2022 10:52)      SUBJECTIVE / OVERNIGHT EVENTS:  Patient seen and examined this morning after returning from MRI which was still pending report.   ADDITIONAL REVIEW OF SYSTEMS:    MEDICATIONS  (STANDING):  albuterol/ipratropium for Nebulization 3 milliLiter(s) Nebulizer every 12 hours  benzonatate 100 milliGRAM(s) Oral three times a day  bisacodyl 5 milliGRAM(s) Oral at bedtime  ceFAZolin   IVPB 2000 milliGRAM(s) IV Intermittent every 8 hours  DULoxetine 60 milliGRAM(s) Oral daily  fenofibrate Tablet 145 milliGRAM(s) Oral daily  ferrous    sulfate 325 milliGRAM(s) Oral daily  gabapentin 300 milliGRAM(s) Oral two times a day  heparin   Injectable 5000 Unit(s) SubCutaneous every 8 hours  lactulose Syrup 10 Gram(s) Oral once  levothyroxine 125 MICROGram(s) Oral daily  lidocaine   4% Patch 1 Patch Transdermal every 24 hours  LORazepam     Tablet 0.5 milliGRAM(s) Oral once  meclizine 12.5 milliGRAM(s) Oral two times a day  pantoprazole    Tablet 40 milliGRAM(s) Oral before breakfast  polyethylene glycol 3350 17 Gram(s) Oral two times a day  QUEtiapine 25 milliGRAM(s) Oral at bedtime  rOPINIRole 2 milliGRAM(s) Oral daily  senna 2 Tablet(s) Oral at bedtime  sodium chloride 0.65% Nasal 1 Spray(s) Both Nostrils two times a day    MEDICATIONS  (PRN):  acetaminophen     Tablet .. 650 milliGRAM(s) Oral every 6 hours PRN Temp greater or equal to 38C (100.4F), Mild Pain (1 - 3)  ALBUTerol    90 MICROgram(s) HFA Inhaler 2 Puff(s) Inhalation every 6 hours PRN Wheezing  aluminum hydroxide/magnesium hydroxide/simethicone Suspension 30 milliLiter(s) Oral every 4 hours PRN Dyspepsia  guaiFENesin Oral Liquid (Sugar-Free) 100 milliGRAM(s) Oral every 6 hours PRN Cough  melatonin 3 milliGRAM(s) Oral at bedtime PRN Insomnia  ondansetron Injectable 4 milliGRAM(s) IV Push every 8 hours PRN Nausea and/or Vomiting  traMADol 50 milliGRAM(s) Oral every 4 hours PRN Moderate Pain (4 - 6)  traMADol 100 milliGRAM(s) Oral every 6 hours PRN Severe Pain (7 - 10)      CAPILLARY BLOOD GLUCOSE        I&O's Summary      PHYSICAL EXAM:  Vital Signs Last 24 Hrs  T(C): 36.5 (01 Nov 2022 12:23), Max: 36.8 (31 Oct 2022 21:39)  T(F): 97.7 (01 Nov 2022 12:23), Max: 98.3 (31 Oct 2022 21:39)  HR: 82 (01 Nov 2022 12:23) (75 - 82)  BP: 120/60 (01 Nov 2022 12:23) (106/60 - 134/69)  BP(mean): --  RR: 18 (01 Nov 2022 12:23) (18 - 18)  SpO2: 100% (01 Nov 2022 12:23) (100% - 100%)    Parameters below as of 01 Nov 2022 12:23  Patient On (Oxygen Delivery Method): room air        CONSTITUTIONAL: NAD, well-developed  RESPIRATORY: Normal respiratory effort; lungs are clear to auscultation bilaterally  CARDIOVASCULAR: Regular rate and rhythm, normal S1 and S2, no murmur/rub/gallop; No lower extremity edema; Peripheral pulses are 2+ bilaterally  ABDOMEN: Nontender to palpation, normoactive bowel sounds, no rebound/guarding; No hepatosplenomegaly  MUSCLOSKELETAL: no clubbing or cyanosis of digits; no joint swelling or tenderness to palpation  PSYCH: A+O to person, place, and time; affect appropriate    LABS:                        8.5    11.70 )-----------( 462      ( 01 Nov 2022 05:40 )             27.8     11-01    128<L>  |  94<L>  |  16  ----------------------------<  90  4.6   |  23  |  0.74    Ca    9.8      01 Nov 2022 05:40  Phos  3.5     11-01  Mg     2.10     11-01                Culture - Blood (collected 29 Oct 2022 18:25)  Source: .Blood Blood-Venous  Preliminary Report (31 Oct 2022 01:02):    No growth to date.    Culture - Blood (collected 29 Oct 2022 18:10)  Source: .Blood Blood-Peripheral  Preliminary Report (31 Oct 2022 01:02):    No growth to date.        RADIOLOGY & ADDITIONAL TESTS:  Results Reviewed:   Imaging Personally Reviewed:    INTERPRETATION:  .    CLINICAL INFORMATION: MSSA bacteremia. History of cervical cancer. Back   pain.    TECHNIQUE: Multiplanar multi-sequential MR examination of the lumbar   spine was acquired with and without the administration of IV gadolinium.   7.5 cc of IV Gadavist was administered without immediate complications. 0   cc were discarded.    COMPARISON: No prior MRI studies of the thoracic or lumbar spine are   available for comparison.. Comparison is made with the prior chest CT   exam dated 10/25/2022. Comparison is also made to a prior abdomen and   pelvis CT study dated 5/29/2022.    FINDINGS: Fluid signal and enhancement is seen within the T6-T7 disc   space along with endplate erosive changes and mild destruction. Edema   signal enhancement is also seen within the T6 and T7 vertebral bodies.   Mild loss of anterior vertebral body height is noted at T7. There is also   smooth epidural enhancement along the ventral aspect of the canal at   these levels slightly more asymmetric towards the left side, most   compatible with phlegmon formation. No sadiq epidural abscess is seen at   this time.    An additional area of abnormal edema signal and enhancement is seen along   the left side of the T9-T10 paravertebral margins approximating the left   T9-T10 neural foramen, left T9 transverse process, as well as the   costovertebral junction.    Additional abnormal edema signal and enhancement is seen bilaterally   along the SI joint spaces. 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 The   appearance of this nonunited fracture is similar to the prior abdomen and   pelvis CT study given differences in modality.    The thoracic and lumbar alignment are maintained. No additional loss of   vertebral body height is seen. No additional areas of aggressive marrow   signal change are appreciated.    Cord signal is grossly preserved. The conus medullaris appears   unremarkable in signal and morphology and terminates at the L1 level. No   gross abnormal intrathecal enhancement is appreciated.    Thoracic disc spaces are grossly preserved with the exception of the   T6-T7 level as well as the T8-T9 level where there is mild disc   degeneration and reactive endplate changes. Degenerative disc disease is   most marked at the L2-L3, L5-S1, and to a lesser degree at the L4-5   levels.    Mild canal stenosis is seen at the T6-T7 level. Variable degrees of canal   and foraminal narrowing are noted within the lumbar canal secondary to   degenerative changes.    IMPRESSION:    1. T6-T7 discitis/osteomyelitis with associated mild anterior loss of   height of T7 as well as epidural and paravertebral phlegmon. No sadiq   epidural abscess at this time. Mild canal stenosis at this level without   sadiq cord compression.    2. Additional area of left-sided paravertebral phlegmon at the T9-T10   level approximating the costovertebral junction and left T9-T10 neural   foramen.    3. Bilateral SI joint arthropathy/sacroiliitis. Superimposed infection of   the bilateral SI joints are difficult to exclude. 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    4. Multilevel lumbar spondylosis.        Electrocardiogram Personally Reviewed:    COORDINATION OF CARE:  Care Discussed with Consultants/Other Providers [Y/N]:  Prior or Outpatient Records Reviewed [Y/N]:

## 2022-11-01 NOTE — DIETITIAN INITIAL EVALUATION ADULT - PERTINENT LABORATORY DATA
11-01    128<L>  |  94<L>  |  16  ----------------------------<  90  4.6   |  23  |  0.74    Ca    9.8      01 Nov 2022 05:40  Phos  3.5     11-01  Mg     2.10     11-01    A1C with Estimated Average Glucose Result: 5.8 % (10-25-22 @ 05:00)

## 2022-11-01 NOTE — DIETITIAN INITIAL EVALUATION ADULT - PROBLEM/PLAN-4
Physical Therapy Evaluation    Visit Type: Initial Evaluation  Visit: 1  Referring Provider: Roseline Chapin MD  Medical Diagnosis (from order): Diagnosis Information    Diagnosis  724.3 (ICD-9-CM) - M54.32 (ICD-10-CM) - Sciatica of left side       Treatment Diagnosis: lumbar with increased pain/symptoms, impaired posture, impaired range of motion, impaired strength, impaired gait, impaired activity tolerance, impaired muscle length/flexibility and impaired mobility  Onset  - Date of onset: 9/26/2022  - Date of Surgery:  No surgery found  - Procedure: No surgery found  Chart reviewed at time of initial evaluation (relevant co-morbidities, allergies, tests and medications listed):   Chart reviewed, medical and surgical history reviewed, and allergies reviewed        SUBJECTIVE                                                                                                               The patient reports she has been having pain in left buttock for last month.  The patient reports she was given muscle relaxant and pain reliever for her symptoms.  She reports difficulty with sitting, standing, and sleeping that worsens without medication.  The patient reports she was diagnosed with sciatica.  The patient reports she has not had imaging or injections.  She works as a phlebotomist.  The patient reports she would like to get pain relief and decrease pain medication.      Pain / Symptoms  - Pain/symptom is: intermittent  - Pain rating (out of 10): Current: 5 ; Best: 4; Worst: 6  - Location: Low back pain with left sciatica  - Quality / Description: ache, discomfort  - Alleviating Factors: avoiding movement in involved area, rest  - Progression since onset: no change    Function:   Limitations / Exacerbation Factors:   - Patient reports pain and difficulty with function reported below.  - bed mobility, sleep disturbed, grooming/hygiene/self-care activities, driving/riding in a vehicle, standing tasks, house/yard work,  sitting tasks, bending/squatting/lifting, lifting/carrying and standing  Prior Level of Function: declining function, therefore referred to therapy,    Patient Goals: increased motion, increased strength and decreased pain.    Prior treatment  - chiropractic services  - Discharged from hospital, home health, or skilled nursing facility in last 30 days: no  Home Environment   - Patient lives with: significant other  - Type of home: single level home  - Assistance available: as needed  - Denies 2 or more falls or an unexplained fall with injury in the last year.  - Feel safe at home / work / school: yes      OBJECTIVE                                                                                                                    Posture:  - Seated: fair  - Standing: fair  - Correction of posture: makes symptoms better  Spine: increased thoracic kyphosis  Cervical: forward head      Range of Motion (ROM)   (degrees unless noted; active unless noted; norms in ( ); negative=lacking to 0, positive=beyond 0)  Lumbar:  Impairment Level:       - Flexion: WNL    - Extension: pain and moderate impairment    - Rotation:        • Left: pain, minimal impairment         • Right: minimal impairment     - Side Bend:        • Left: pain, minimal impairment         • Right: minimal impairment     Strength  (out of 5 unless noted, standard test position unless noted)   WFL: LLE, RLE  Trunk: WFL  Lumbar:    - Upper Abdominals: 2-     - Lower Abdominals: 2-          Palpation  Left  - Lumbar Paraspinals: tenderness  - Gluteus Medius: tenderness  - Iliotibial Band: tenderness  - Tensor Fasciae Latae: tenderness  Hip: Balta/Tendon/Bone  - Greater Trochanter: greater trochanter tenderness    Muscle Flexibility  - Knee Flexors: • Left: moderate limitation • Right: minimal limitation        Special Tests  Lumbar: Nerve Tests  - Straight Leg Raise:  Left: positive Right: negative  - Slump Test: Left: negative Right: negative        Ambulation / Gait  - Assistive device: no assistive device  - Assist Level: independent  - Surface: even  - Description: antalgic and decreased stance time LLE          Outcome/Assessments  Outcome Measures:   Lower Extremity Functional Scale: LEFS Calculated Total: 67 (0=extreme difficulty; 80=no difficulty) see flowsheet for additional documentation        Treatment    Un-attended Electrical Stimulation (69327/): Interferential Current  - Purpose: pain relief  - Location: low back and gluteal left   - Position: prone  - Pulse Rate:  Hz  - Intensity: patient tolerance  - Delivered via: pads  - In conjunction with: moist hot pack  - Duration: 20 minutes    Results: decreased pain  Reaction: no adverse reaction to treatment      Therapeutic Exercise  HEP given and copy of exercises provided    Skilled input: verbal instruction/cues and posture correction    Writer verbally educated and received verbal consent for hand placement, positioning of patient, and techniques to be performed today from patient for therapist position for techniques and hand placement and palpation for techniques as described above and how they are pertinent to the patient's plan of care.    Home Exercise Program  Access Code: [email or web address]  URL: [email or web address]  Date: 10/26/2022  Prepared by: Erika Alexandre    Exercises  · Supine Posterior Pelvic Tilt - 2 x daily - 7 x weekly - 2 sets - 10 reps  · Supine Hip Adduction Isometric with Ball - 2 x daily - 7 x weekly - 2 sets - 10 reps  · Prone Gluteal Sets - 2 x daily - 7 x weekly - 2 sets - 10 reps  · Prone Knee Flexion - 2 x daily - 7 x weekly - 2 sets - 10 reps  · Prone Press Up - 2 x daily - 7 x weekly - 2 sets - 10 reps  · Standing Lumbar Extension - 2 x daily - 7 x weekly - 2 sets - 10 reps               ASSESSMENT                                                                                                          70 year old patient has reported  functional limitations listed above impacted by signs and symptoms consistent with treatment diagnosis below.  Treatment Diagnosis:   - Involved: lumbar  - Symptoms/impairments: increased pain/symptoms, impaired posture, impaired range of motion, impaired strength, impaired gait, impaired activity tolerance, impaired muscle length/flexibility and impaired mobility    The patient presents with low back pain secondary to sciatica of left side .  The patient presents with deficits in lumbar AROM, core strength, posture awareness, and pain.  The patient is limited in walking, standing tolerance, transfers, bending, and lifting.  The patient would benefit from physical therapy to address deficits, decrease pain and gain independence with home exercise program.      Prognosis: Patient will benefit from skilled therapy.  Rehabilitative potential is: good.  Predicted patient presentation: Low (stable) - Patient comorbidities and complexities, as defined above, will have little effect on progress for prescribed plan of care.    Patient Education:   Who will be receiving education: patient  Are they ready to learn: yes  Preferred learning style: written and verbal  Barriers to learning: no barriers apparent at this time  Results of above outlined education: Verbalizes understanding and Demonstrates understanding    PLAN                                                                                                                         The following skilled interventions to be implemented to achieve goals listed below:  Neuromuscular Re-Education (55447)  Therapeutic Activity (33216)  Therapeutic Exercise (90048)  Manual Therapy (07598)  Electrical Stimulation Unattended (88986 or )  Heat/Cold (03473)  Aquatic Therapy (56164)  Gait Training (98693)    Frequency / Duration  2 times per week tapering as patient progresses for 4 weeks for an estimated total of 8 visits    Patient involved in and agreed to plan of care and  goals.  Patient given attendance policy at time of initial evaluation.    Suggestions for next session as indicated: Progress per plan of care for strengthening and ROM     Goals  Decrease pain/symptoms to 2/10 or less   Improve involved strength to 4/5 or greater in core  Improve involved ROM to 25% or less gross limitation in lumbar AROM  The above improvements in impairments to assist in obtaining goals listed below  Long Term Goals: to be met by end of plan of care  1. Patient will complete toilet transfer and low surface transfer for completion of self-care tasks such as toileting and dressing.  2. Patient will demonstrate ability to negotiate level and unlevel surfaces at variable velocities, including change of direction without increased pain or instability to return to age appropriate and community activities at prior level of function.  3. Patient will stand for 15 minutes for completion of household tasks such as washing dishes and cooking.  4. Patient will sleep 6 hours without disruption from pain/difficulty with positional changes.   5. Oswestry: Patient will complete form to reflect an improved score to less than or equal to 20% to indicate patient reported improvement in function/disability/impairment (minimal detectable change: 12%).  6. Patient will be independent with progressed and modified home exercise program.      Therapy procedure time and total treatment time can be found documented on the Time Entry flowsheet     DISPLAY PLAN FREE TEXT

## 2022-11-01 NOTE — DIETITIAN INITIAL EVALUATION ADULT - ADD RECOMMEND
1. Encourage & assist Pt with meals; Monitor PO diet tolerance;   2. Honor food preferences;   3. Add Multivitamins 1 tab daily for micronutrient coverage;   4. Bowel regimen per MD discretion;  5. Monitor labs, hydration status;

## 2022-11-01 NOTE — PROGRESS NOTE ADULT - SUBJECTIVE AND OBJECTIVE BOX
HPI:  78 yo F with PMHx significant for hypothyroidism, HTN, HLD, SVT (s/p ablation), Cervical cancer (s/p hysterectomy), DVT (June 2022, still on eliquis), OA and peripheral neuropathy presenting to [facility] after having fall from bed overnight. Patient reports fall from bed onto L side. Patient states she was sound asleep and woke up on floor, with no memory of rolling over out fo bed. She denied LOC, memory loss, or hitting her head. Patient denied having confusion when waking up. Patient reportedly had 8/10 pain on L ribs, back and hip, and was unable to get up. Patient states distance from bed to floor was about 2 feet. She pressed life alert alarm and was on floor for about 3 hours before help came. Patient denied lightheadedness, but states she has chronic dizziness. No diarrhea, abdominal pain, nausea or vomiting but patient reports decreased appetite and constipation (no BM x3 days). Additionally, patient reports chronic stress incontinence and nocturia, and wears a diaper at all times. Denied fevers, chills, malaise, dysuria, frequency, hematuria, chest pain, palpitations, changes in vision or hearing, bleeding, new rash, joint pain, and weakness. Patient states she has had chronic cough since she was COVID+ several months ago and has nagging dry cough. Pain in back and shoulder worsens with cough since her fall.     Of note patient was recently admitted for a UTI and DVT in June of 2022, and started on Eliquis. Per discharge note in June, patient was to complete a 3 month course and then stop taking. Per patient and PCP, patient still taking the eliquis. Patient reportedly has had 5 UTIs in the past year. Was discharged from rehab facility to home several weeks ago, but still reporting difficulty with ambulation.     Per ASHWIN Gillespie, who saw patient and yadira blood work at patient's home 10/23, patient had leukocytosis, with iron panel suggesting MAR, HgB 8.7, Na 125, and Cr 1.4 at that time.  (25 Oct 2022 12:14)    PAST MEDICAL & SURGICAL HISTORY:  History of cervical cancer  s/p hysterectomy      H/O peripheral neuropathy      Vertigo      Palpitations      H/O supraventricular tachycardia  s/p ablation      H/O hyperlipidemia      HTN (hypertension)      Hx of cholecystectomy      S/P total abdominal hysterectomy      Hx of ankle fusion        FAMILY HISTORY:  FH: myocardial infarction (Mother)      Home Medications:  Actonel 35 mg oral tablet: 1 tab(s) orally once a week (25 Oct 2022 13:06)  apixaban 5 mg oral tablet: 1 tab(s) orally every 12 hours (25 Oct 2022 13:06)  bisacodyl 5 mg oral delayed release tablet: 1 tab(s) orally once a day (at bedtime) (25 Oct 2022 13:06)  DULoxetine 60 mg oral delayed release capsule: 1 cap(s) orally once a day (25 Oct 2022 13:06)  famotidine 20 mg oral tablet: 1 tab(s) orally 2 times a day (25 Oct 2022 13:06)  fenofibrate 145 mg oral tablet: 1 tab(s) orally once a day (25 Oct 2022 13:06)  fluticasone 50 mcg/inh nasal spray: 1 spray(s) nasal 2 times a day (25 Oct 2022 13:06)  furosemide 40 mg oral tablet: 1 tab(s) orally every 24 hours (25 Oct 2022 13:06)  gabapentin 300 mg oral capsule: 1 cap(s) orally 2 times a day (25 Oct 2022 13:06)  levothyroxine 125 mcg (0.125 mg) oral tablet: 1 tab(s) orally once a day (25 Oct 2022 13:06)  losartan-hydrochlorothiazide 50 mg-12.5 mg oral tablet: 1 tab(s) orally once a day (25 Oct 2022 13:06)  meclizine 12.5 mg oral tablet: 1 tab(s) orally 2 times a day (25 Oct 2022 13:06)  pantoprazole 40 mg oral delayed release tablet: 1 tab(s) orally once a day (25 Oct 2022 13:06)  polyethylene glycol 3350 oral powder for reconstitution: 17 gram(s) orally 2 times a day, As Needed (25 Oct 2022 13:06)  rOPINIRole 2 mg oral tablet, extended release: 1 tab(s) orally once a day (25 Oct 2022 13:06)  senna oral tablet: 2 tab(s) orally once a day (at bedtime) (25 Oct 2022 13:06)  SEROquel 25 mg oral tablet: 0.5 tab(s) orally once a day (at bedtime) (25 Oct 2022 13:06)  sodium chloride 0.65% nasal spray: nasal 2 times a day (25 Oct 2022 13:06)  spironolactone 50 mg oral tablet: 1 tab(s) orally once a day (25 Oct 2022 13:06)  traMADol 50 mg oral tablet: 2 tab(s) orally every 8 hours, As Needed - 10) (25 Oct 2022 13:06)      MEDICATIONS  (STANDING):  albuterol/ipratropium for Nebulization 3 milliLiter(s) Nebulizer every 12 hours  benzonatate 100 milliGRAM(s) Oral three times a day  bisacodyl 5 milliGRAM(s) Oral at bedtime  ceFAZolin   IVPB 2000 milliGRAM(s) IV Intermittent every 8 hours  DULoxetine 60 milliGRAM(s) Oral daily  fenofibrate Tablet 145 milliGRAM(s) Oral daily  ferrous    sulfate 325 milliGRAM(s) Oral daily  gabapentin 300 milliGRAM(s) Oral two times a day  heparin   Injectable 5000 Unit(s) SubCutaneous every 8 hours  lactulose Syrup 10 Gram(s) Oral once  levothyroxine 125 MICROGram(s) Oral daily  lidocaine   4% Patch 1 Patch Transdermal every 24 hours  LORazepam     Tablet 0.5 milliGRAM(s) Oral once  meclizine 12.5 milliGRAM(s) Oral two times a day  pantoprazole    Tablet 40 milliGRAM(s) Oral before breakfast  polyethylene glycol 3350 17 Gram(s) Oral two times a day  QUEtiapine 25 milliGRAM(s) Oral at bedtime  rOPINIRole 2 milliGRAM(s) Oral daily  senna 2 Tablet(s) Oral at bedtime  sodium chloride 0.65% Nasal 1 Spray(s) Both Nostrils two times a day    MEDICATIONS  (PRN):  acetaminophen     Tablet .. 650 milliGRAM(s) Oral every 6 hours PRN Temp greater or equal to 38C (100.4F), Mild Pain (1 - 3)  ALBUTerol    90 MICROgram(s) HFA Inhaler 2 Puff(s) Inhalation every 6 hours PRN Wheezing  aluminum hydroxide/magnesium hydroxide/simethicone Suspension 30 milliLiter(s) Oral every 4 hours PRN Dyspepsia  guaiFENesin Oral Liquid (Sugar-Free) 100 milliGRAM(s) Oral every 6 hours PRN Cough  melatonin 3 milliGRAM(s) Oral at bedtime PRN Insomnia  ondansetron Injectable 4 milliGRAM(s) IV Push every 8 hours PRN Nausea and/or Vomiting  traMADol 50 milliGRAM(s) Oral every 4 hours PRN Moderate Pain (4 - 6)  traMADol 100 milliGRAM(s) Oral every 6 hours PRN Severe Pain (7 - 10)    Vital Signs Last 24 Hrs  T(C): 36.5 (01 Nov 2022 12:23), Max: 36.8 (31 Oct 2022 21:39)  T(F): 97.7 (01 Nov 2022 12:23), Max: 98.3 (31 Oct 2022 21:39)  HR: 82 (01 Nov 2022 12:23) (75 - 82)  BP: 120/60 (01 Nov 2022 12:23) (106/60 - 134/69)  BP(mean): --  RR: 18 (01 Nov 2022 12:23) (18 - 18)  SpO2: 100% (01 Nov 2022 12:23) (100% - 100%)    Parameters below as of 01 Nov 2022 12:23  Patient On (Oxygen Delivery Method): room air        PHYSICAL EXAM:  Awake Alert Oriented x 3 No distress, Speech is clear  PERRL, EOMI, Tongue midline, No facial drop  Motor:             RUE 5/5        LUE 5/5             RLE 5/5         LLE 5/5 HF 4/5  Sensory intac to light touch  When testing reflexes patient screamed from rib pain and refused to continue with exam    LABS:                            8.5    11.70 )-----------( 462      ( 01 Nov 2022 05:40 )             27.8     11-01    128<L>  |  94<L>  |  16  ----------------------------<  90  4.6   |  23  |  0.74    Ca    9.8      01 Nov 2022 05:40  Phos  3.5     11-01  Mg     2.10     11-01        RADIOLOGY:  < from: MR Lumbar Spine w/wo IV Cont (10.31.22 @ 19:33) >    IMPRESSION:    1. T6-T7 discitis/osteomyelitis with associated mild anterior loss of   height of T7 as well as epidural and paravertebral phlegmon. No sadiq   epidural abscess at this time. Mild canal stenosis at this level without   sadiq cord compression.    2. Additional area of left-sided paravertebral phlegmon at the T9-T10   level approximating the costovertebral junction and left T9-T10 neural   foramen.    3. Bilateral SI joint arthropathy/sacroiliitis. Superimposed infection of   the bilateral SI joints are difficult to exclude. 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    4. Multilevel lumbar spondylosis.    < end of copied text >

## 2022-11-01 NOTE — DIETITIAN INITIAL EVALUATION ADULT - OTHER INFO
Pt 78 yo female with PMHx of hypothyroidism, HTN, HLD, SVT (s/p ablation), Cervical cancer (s/p hysterectomy), DVT, OA and peripheral neuropathy presented to [facility] after having fall from bed overnight; Pt found to have UTI - per chart review.     At time of visit, Pt awake, alert. Per Pt, her appetite good; no chewing or swallowing difficulty; no vomiting or diarrhea @ this time. Of note, Pt passed Swallow Bedside Assessment Adult, SLP rec: Regular solids and thin liquids (10/26/22). Per Pt, her height: ~64" & her weight: ~200#; no report of weight loss or weight changes PTA. At home Pt does not follow any therapeutic diet restrictions reported. No other food related concern voiced at present. RDN remains available, Pt made aware.

## 2022-11-02 LAB
ANION GAP SERPL CALC-SCNC: 10 MMOL/L — SIGNIFICANT CHANGE UP (ref 7–14)
BUN SERPL-MCNC: 15 MG/DL — SIGNIFICANT CHANGE UP (ref 7–23)
CALCIUM SERPL-MCNC: 9.7 MG/DL — SIGNIFICANT CHANGE UP (ref 8.4–10.5)
CHLORIDE SERPL-SCNC: 99 MMOL/L — SIGNIFICANT CHANGE UP (ref 98–107)
CO2 SERPL-SCNC: 23 MMOL/L — SIGNIFICANT CHANGE UP (ref 22–31)
CREAT SERPL-MCNC: 0.69 MG/DL — SIGNIFICANT CHANGE UP (ref 0.5–1.3)
CRP SERPL-MCNC: 55.1 MG/L — HIGH
EGFR: 89 ML/MIN/1.73M2 — SIGNIFICANT CHANGE UP
ERYTHROCYTE [SEDIMENTATION RATE] IN BLOOD: 117 MM/HR — HIGH (ref 4–25)
GLUCOSE SERPL-MCNC: 95 MG/DL — SIGNIFICANT CHANGE UP (ref 70–99)
HCT VFR BLD CALC: 27.8 % — LOW (ref 34.5–45)
HGB BLD-MCNC: 8.5 G/DL — LOW (ref 11.5–15.5)
MAGNESIUM SERPL-MCNC: 2 MG/DL — SIGNIFICANT CHANGE UP (ref 1.6–2.6)
MCHC RBC-ENTMCNC: 24.1 PG — LOW (ref 27–34)
MCHC RBC-ENTMCNC: 30.6 GM/DL — LOW (ref 32–36)
MCV RBC AUTO: 79 FL — LOW (ref 80–100)
NRBC # BLD: 0 /100 WBCS — SIGNIFICANT CHANGE UP (ref 0–0)
NRBC # FLD: 0 K/UL — SIGNIFICANT CHANGE UP (ref 0–0)
PHOSPHATE SERPL-MCNC: 3.4 MG/DL — SIGNIFICANT CHANGE UP (ref 2.5–4.5)
PLATELET # BLD AUTO: 442 K/UL — HIGH (ref 150–400)
POTASSIUM SERPL-MCNC: 4.6 MMOL/L — SIGNIFICANT CHANGE UP (ref 3.5–5.3)
POTASSIUM SERPL-SCNC: 4.6 MMOL/L — SIGNIFICANT CHANGE UP (ref 3.5–5.3)
RBC # BLD: 3.52 M/UL — LOW (ref 3.8–5.2)
RBC # FLD: 18.8 % — HIGH (ref 10.3–14.5)
SODIUM SERPL-SCNC: 132 MMOL/L — LOW (ref 135–145)
TROPONIN T, HIGH SENSITIVITY RESULT: 19 NG/L — SIGNIFICANT CHANGE UP
WBC # BLD: 9.8 K/UL — SIGNIFICANT CHANGE UP (ref 3.8–10.5)
WBC # FLD AUTO: 9.8 K/UL — SIGNIFICANT CHANGE UP (ref 3.8–10.5)

## 2022-11-02 PROCEDURE — 74182 MRI ABDOMEN W/CONTRAST: CPT | Mod: 26

## 2022-11-02 PROCEDURE — 99232 SBSQ HOSP IP/OBS MODERATE 35: CPT

## 2022-11-02 PROCEDURE — 72196 MRI PELVIS W/DYE: CPT | Mod: 26

## 2022-11-02 RX ORDER — KETOROLAC TROMETHAMINE 30 MG/ML
15 SYRINGE (ML) INJECTION ONCE
Refills: 0 | Status: DISCONTINUED | OUTPATIENT
Start: 2022-11-02 | End: 2022-11-02

## 2022-11-02 RX ORDER — TRAMADOL HYDROCHLORIDE 50 MG/1
50 TABLET ORAL ONCE
Refills: 0 | Status: DISCONTINUED | OUTPATIENT
Start: 2022-11-02 | End: 2022-11-02

## 2022-11-02 RX ORDER — OXYCODONE HYDROCHLORIDE 5 MG/1
5 TABLET ORAL EVERY 4 HOURS
Refills: 0 | Status: DISCONTINUED | OUTPATIENT
Start: 2022-11-02 | End: 2022-11-02

## 2022-11-02 RX ORDER — OXYCODONE HYDROCHLORIDE 5 MG/1
5 TABLET ORAL EVERY 4 HOURS
Refills: 0 | Status: DISCONTINUED | OUTPATIENT
Start: 2022-11-02 | End: 2022-11-03

## 2022-11-02 RX ADMIN — OXYCODONE HYDROCHLORIDE 5 MILLIGRAM(S): 5 TABLET ORAL at 17:07

## 2022-11-02 RX ADMIN — Medication 3 MILLILITER(S): at 09:11

## 2022-11-02 RX ADMIN — Medication 100 MILLIGRAM(S): at 13:43

## 2022-11-02 RX ADMIN — Medication 15 MILLIGRAM(S): at 23:27

## 2022-11-02 RX ADMIN — Medication 145 MILLIGRAM(S): at 13:34

## 2022-11-02 RX ADMIN — ROPINIROLE 2 MILLIGRAM(S): 8 TABLET, FILM COATED, EXTENDED RELEASE ORAL at 22:12

## 2022-11-02 RX ADMIN — HEPARIN SODIUM 5000 UNIT(S): 5000 INJECTION INTRAVENOUS; SUBCUTANEOUS at 22:13

## 2022-11-02 RX ADMIN — Medication 3 MILLILITER(S): at 21:21

## 2022-11-02 RX ADMIN — Medication 100 MILLIGRAM(S): at 13:33

## 2022-11-02 RX ADMIN — GABAPENTIN 300 MILLIGRAM(S): 400 CAPSULE ORAL at 06:32

## 2022-11-02 RX ADMIN — DULOXETINE HYDROCHLORIDE 60 MILLIGRAM(S): 30 CAPSULE, DELAYED RELEASE ORAL at 13:33

## 2022-11-02 RX ADMIN — Medication 125 MICROGRAM(S): at 05:39

## 2022-11-02 RX ADMIN — QUETIAPINE FUMARATE 25 MILLIGRAM(S): 200 TABLET, FILM COATED ORAL at 22:11

## 2022-11-02 RX ADMIN — Medication 100 MILLIGRAM(S): at 22:11

## 2022-11-02 RX ADMIN — GABAPENTIN 300 MILLIGRAM(S): 400 CAPSULE ORAL at 19:01

## 2022-11-02 RX ADMIN — Medication 325 MILLIGRAM(S): at 13:33

## 2022-11-02 RX ADMIN — Medication 12.5 MILLIGRAM(S): at 19:00

## 2022-11-02 RX ADMIN — TRAMADOL HYDROCHLORIDE 50 MILLIGRAM(S): 50 TABLET ORAL at 07:30

## 2022-11-02 RX ADMIN — Medication 12.5 MILLIGRAM(S): at 06:32

## 2022-11-02 RX ADMIN — ONDANSETRON 4 MILLIGRAM(S): 8 TABLET, FILM COATED ORAL at 06:46

## 2022-11-02 RX ADMIN — POLYETHYLENE GLYCOL 3350 17 GRAM(S): 17 POWDER, FOR SOLUTION ORAL at 19:01

## 2022-11-02 RX ADMIN — Medication 100 MILLIGRAM(S): at 22:21

## 2022-11-02 RX ADMIN — OXYCODONE HYDROCHLORIDE 5 MILLIGRAM(S): 5 TABLET ORAL at 12:44

## 2022-11-02 RX ADMIN — Medication 1 TABLET(S): at 13:33

## 2022-11-02 RX ADMIN — HEPARIN SODIUM 5000 UNIT(S): 5000 INJECTION INTRAVENOUS; SUBCUTANEOUS at 13:34

## 2022-11-02 RX ADMIN — OXYCODONE HYDROCHLORIDE 5 MILLIGRAM(S): 5 TABLET ORAL at 11:44

## 2022-11-02 RX ADMIN — TRAMADOL HYDROCHLORIDE 50 MILLIGRAM(S): 50 TABLET ORAL at 20:51

## 2022-11-02 RX ADMIN — HEPARIN SODIUM 5000 UNIT(S): 5000 INJECTION INTRAVENOUS; SUBCUTANEOUS at 06:31

## 2022-11-02 RX ADMIN — TRAMADOL HYDROCHLORIDE 50 MILLIGRAM(S): 50 TABLET ORAL at 06:31

## 2022-11-02 RX ADMIN — OXYCODONE HYDROCHLORIDE 5 MILLIGRAM(S): 5 TABLET ORAL at 16:07

## 2022-11-02 RX ADMIN — Medication 1 SPRAY(S): at 19:01

## 2022-11-02 RX ADMIN — Medication 100 MILLIGRAM(S): at 06:33

## 2022-11-02 RX ADMIN — Medication 100 MILLIGRAM(S): at 05:27

## 2022-11-02 RX ADMIN — TRAMADOL HYDROCHLORIDE 50 MILLIGRAM(S): 50 TABLET ORAL at 19:51

## 2022-11-02 RX ADMIN — PANTOPRAZOLE SODIUM 40 MILLIGRAM(S): 20 TABLET, DELAYED RELEASE ORAL at 06:32

## 2022-11-02 NOTE — PROGRESS NOTE ADULT - SUBJECTIVE AND OBJECTIVE BOX
CC: Patient is a 77y old  Female who presents with a chief complaint of Fall / UTI (02 Nov 2022 14:55)    ID following for MSSA bacteremia    Interval History/ROS: Patient remains with lower back pain. No fevers. Repeat blood cultures no growth.     Rest of ROS negative.    Allergies  erythromycin (Hives)  IV Contrast (Anaphylaxis)    ANTIMICROBIALS:  ceFAZolin   IVPB 2000 every 8 hours    OTHER MEDS:  acetaminophen     Tablet .. 650 milliGRAM(s) Oral every 6 hours PRN  ALBUTerol    90 MICROgram(s) HFA Inhaler 2 Puff(s) Inhalation every 6 hours PRN  albuterol/ipratropium for Nebulization 3 milliLiter(s) Nebulizer every 12 hours  aluminum hydroxide/magnesium hydroxide/simethicone Suspension 30 milliLiter(s) Oral every 4 hours PRN  benzonatate 100 milliGRAM(s) Oral three times a day  bisacodyl 5 milliGRAM(s) Oral at bedtime  DULoxetine 60 milliGRAM(s) Oral daily  fenofibrate Tablet 145 milliGRAM(s) Oral daily  ferrous    sulfate 325 milliGRAM(s) Oral daily  fluticasone propionate 50 MICROgram(s)/spray Nasal Spray 1 Spray(s) Both Nostrils every 12 hours PRN  gabapentin 300 milliGRAM(s) Oral two times a day  guaiFENesin Oral Liquid (Sugar-Free) 100 milliGRAM(s) Oral every 6 hours PRN  heparin   Injectable 5000 Unit(s) SubCutaneous every 8 hours  lactulose Syrup 10 Gram(s) Oral once  levothyroxine 125 MICROGram(s) Oral daily  lidocaine   4% Patch 1 Patch Transdermal every 24 hours  LORazepam     Tablet 0.5 milliGRAM(s) Oral once  meclizine 12.5 milliGRAM(s) Oral two times a day  melatonin 3 milliGRAM(s) Oral at bedtime PRN  multivitamin 1 Tablet(s) Oral daily  ondansetron Injectable 4 milliGRAM(s) IV Push every 8 hours PRN  oxyCODONE    IR 5 milliGRAM(s) Oral every 4 hours PRN  pantoprazole    Tablet 40 milliGRAM(s) Oral before breakfast  polyethylene glycol 3350 17 Gram(s) Oral two times a day  QUEtiapine 25 milliGRAM(s) Oral at bedtime  rOPINIRole 2 milliGRAM(s) Oral daily  senna 2 Tablet(s) Oral at bedtime  sodium chloride 0.65% Nasal 1 Spray(s) Both Nostrils two times a day    PE:    Vital Signs Last 24 Hrs  T(C): 36.6 (02 Nov 2022 12:24), Max: 36.8 (01 Nov 2022 17:50)  T(F): 97.9 (02 Nov 2022 12:24), Max: 98.2 (01 Nov 2022 17:50)  HR: 79 (02 Nov 2022 12:24) (73 - 84)  BP: 112/67 (02 Nov 2022 12:24) (112/67 - 136/72)  BP(mean): --  RR: 18 (02 Nov 2022 12:24) (18 - 20)  SpO2: 100% (02 Nov 2022 12:24) (96% - 100%)    Parameters below as of 02 Nov 2022 12:24  Patient On (Oxygen Delivery Method): room air    Gen: AOx3, NAD  CV: S1+S2 normal, no murmurs  Resp: Clear bilat, no resp distress  Abd: Soft, nontender, +BS  Ext: No LE edema, no wounds  : No Block  IV/Skin: No thrombophlebitis  Neuro: no focal deficits    LABS:                          8.5    9.80  )-----------( 442      ( 02 Nov 2022 06:01 )             27.8       11-02    132<L>  |  99  |  15  ----------------------------<  95  4.6   |  23  |  0.69    Ca    9.7      02 Nov 2022 06:01  Phos  3.4     11-02  Mg     2.00     11-02    MICROBIOLOGY:  v  .Blood Blood-Venous  10-29-22   No growth to date.  --  --      .Blood Blood-Peripheral  10-29-22   No growth to date.  --  --      .Blood Blood-Peripheral  10-27-22   No Growth Final  --  --      .Blood Blood-Peripheral  10-27-22   Growth in anaerobic bottle: Staphylococcus aureus  See previous culture [id number]  --    Growth in anaerobic bottle: Gram Positive Cocci in Clusters      .Blood Blood  10-25-22   Growth in aerobic and anaerobic bottles: Staphylococcus aureus  ***Blood Panel PCR results on this specimen are available  approximately 3 hours after the Gram stain result.***  Gram stain, PCR, and/or culture results may not always  correspond dueto difference in methodologies.  ************************************************************  This PCR assay was performed by multiplex PCR. This  Assay tests for 66 bacterial and resistance gene targets.  Please refer to the [facility] test directory  at [email or web address] for details.  --  Blood Culture PCR  Staphylococcus aureus      Clean Catch Clean Catch (Midstream)  10-25-22   Culture grew 3 or more types of organisms which indicate  collection contamination; consider recollection only if clinically  indicated.  --  --    RADIOLOGY:    < from: MR Abdomen w/ IV Cont (11.02.22 @ 08:46) >  IMPRESSION:  Bilateral sacroiliitis is again noted with superimposed infection not   excluded. Minimal abnormal enhancement from the left SI joint also   extends into the left gluteus minimus muscle.  Likely an abscess in the right piriformis muscle with abnormal   enhancement of the left piriformis muscle.  Abnormal enhancement of the pubic rami is likely from recent fractures.    < end of copied text >

## 2022-11-02 NOTE — PROGRESS NOTE ADULT - SUBJECTIVE AND OBJECTIVE BOX
Collin Salcedo MD  Academic Hospitalist  Pager [phone]  Email: [email or web address]  Available on [employer] Teams        PROGRESS NOTE:     Patient is a 77y old  Female who presents with a chief complaint of Fall / UTI (01 Nov 2022 18:37)      SUBJECTIVE / OVERNIGHT EVENTS:  Patient seen and examined this morning. Continues to complain of back pain radiating to her ribs.     I discussed plan of care with the patient's sister extensively via patient's cell phone  ADDITIONAL REVIEW OF SYSTEMS:  No f/c/n/v    MEDICATIONS  (STANDING):  albuterol/ipratropium for Nebulization 3 milliLiter(s) Nebulizer every 12 hours  benzonatate 100 milliGRAM(s) Oral three times a day  bisacodyl 5 milliGRAM(s) Oral at bedtime  ceFAZolin   IVPB 2000 milliGRAM(s) IV Intermittent every 8 hours  DULoxetine 60 milliGRAM(s) Oral daily  fenofibrate Tablet 145 milliGRAM(s) Oral daily  ferrous    sulfate 325 milliGRAM(s) Oral daily  gabapentin 300 milliGRAM(s) Oral two times a day  heparin   Injectable 5000 Unit(s) SubCutaneous every 8 hours  lactulose Syrup 10 Gram(s) Oral once  levothyroxine 125 MICROGram(s) Oral daily  lidocaine   4% Patch 1 Patch Transdermal every 24 hours  LORazepam     Tablet 0.5 milliGRAM(s) Oral once  meclizine 12.5 milliGRAM(s) Oral two times a day  multivitamin 1 Tablet(s) Oral daily  pantoprazole    Tablet 40 milliGRAM(s) Oral before breakfast  polyethylene glycol 3350 17 Gram(s) Oral two times a day  QUEtiapine 25 milliGRAM(s) Oral at bedtime  rOPINIRole 2 milliGRAM(s) Oral daily  senna 2 Tablet(s) Oral at bedtime  sodium chloride 0.65% Nasal 1 Spray(s) Both Nostrils two times a day    MEDICATIONS  (PRN):  acetaminophen     Tablet .. 650 milliGRAM(s) Oral every 6 hours PRN Temp greater or equal to 38C (100.4F), Mild Pain (1 - 3)  ALBUTerol    90 MICROgram(s) HFA Inhaler 2 Puff(s) Inhalation every 6 hours PRN Wheezing  aluminum hydroxide/magnesium hydroxide/simethicone Suspension 30 milliLiter(s) Oral every 4 hours PRN Dyspepsia  fluticasone propionate 50 MICROgram(s)/spray Nasal Spray 1 Spray(s) Both Nostrils every 12 hours PRN nasal congestion  guaiFENesin Oral Liquid (Sugar-Free) 100 milliGRAM(s) Oral every 6 hours PRN Cough  melatonin 3 milliGRAM(s) Oral at bedtime PRN Insomnia  ondansetron Injectable 4 milliGRAM(s) IV Push every 8 hours PRN Nausea and/or Vomiting  oxyCODONE    IR 5 milliGRAM(s) Oral every 4 hours PRN Severe Pain (7 - 10)      CAPILLARY BLOOD GLUCOSE        I&O's Summary      PHYSICAL EXAM:  Vital Signs Last 24 Hrs  T(C): 36.6 (02 Nov 2022 12:24), Max: 36.8 (01 Nov 2022 17:50)  T(F): 97.9 (02 Nov 2022 12:24), Max: 98.2 (01 Nov 2022 17:50)  HR: 79 (02 Nov 2022 12:24) (73 - 85)  BP: 112/67 (02 Nov 2022 12:24) (112/67 - 139/73)  BP(mean): --  RR: 18 (02 Nov 2022 12:24) (18 - 21)  SpO2: 100% (02 Nov 2022 12:24) (96% - 100%)    Parameters below as of 02 Nov 2022 12:24  Patient On (Oxygen Delivery Method): room air        CONSTITUTIONAL: NAD, well-developed  RESPIRATORY: Normal respiratory effort; lungs are clear to auscultation bilaterally  CARDIOVASCULAR: Regular rate and rhythm, normal S1 and S2, no murmur/rub/gallop; No lower extremity edema; Peripheral pulses are 2+ bilaterally  ABDOMEN: Nontender to palpation, normoactive bowel sounds, no rebound/guarding; No hepatosplenomegaly  MUSCLOSKELETAL: no clubbing or cyanosis of digits; no joint swelling or tenderness to palpation  PSYCH: A+O to person, place, and time; affect appropriate    LABS:                        8.5    9.80  )-----------( 442      ( 02 Nov 2022 06:01 )             27.8     11-02    132<L>  |  99  |  15  ----------------------------<  95  4.6   |  23  |  0.69    Ca    9.7      02 Nov 2022 06:01  Phos  3.4     11-02  Mg     2.00     11-02                  RADIOLOGY & ADDITIONAL TESTS:  Results Reviewed:   Imaging Personally Reviewed:    PROCEDURE:  MRI of the abdomen and pelvis was performed.  -    FINDINGS:  LOWER CHEST: Trace bilateral pleural effusions with atelectasis.    LIVER: 5 mm cyst in the right hepatic lobe.  BILE DUCTS: Normal caliber.  GALLBLADDER: Cholecystectomy.  SPLEEN: Within normal limits.  PANCREAS: Within normal limits.  ADRENALS: Within normal limits.  KIDNEYS/URETERS: Within normal limits.    BLADDER: Within normal limits.  REPRODUCTIVE ORGANS: Hysterectomy. No adnexal mass.    BOWEL: No bowel obstruction.  PERITONEUM: No ascites. Bilateral pelvic surgical clips.  VESSELS: Within normal limits.  RETROPERITONEUM/LYMPH NODES: No lymphadenopathy.  ABDOMINAL WALL: Within normal limits.  BONES: A rim-enhancing cystic structure of 1.9 x 4.3 cm in the right   piriformis muscle. Abnormal T2 signal and enhancement in the left   piriformis muscle without a discrete collection. Abnormal enhancement is   also seen along the left adductor muscle groups (15, 69). Degenerative   changes of the bilateral sacroiliac joints with adjacent marrow edema and   prominent enhancement, which minimally extends into the left gluteus   minimus muscle (15, images 27 and 28). Abnormal edema signal and   enhancement along the left side of the T9-T10 paravertebral margins in   the left T9-T10 neural foramen. Enhancement and edema of the partially   visualized T7 vertebral body with mild anterior height loss better seen   on recent MR thoracic spine. Chronic fractures of the right sacral ala,   right inferior ramus, and right superior pubic ramus.    IMPRESSION:  Bilateral sacroiliitis is again noted with superimposed infection not   excluded. Minimal abnormal enhancement from the left SI joint also   extends into the left gluteus minimus muscle.  Likely an abscess in the right piriformis muscle with abnormal   enhancement of the left piriformis muscle.  Abnormal enhancement of the pubic rami is likely from recent fractures.  Findings in the T7 and T9-T10 levels were better evaluated on recent   thoracic MRI.            Electrocardiogram Personally Reviewed:    COORDINATION OF CARE:  Care Discussed with Consultants/Other Providers [Y/N]:  Prior or Outpatient Records Reviewed [Y/N]:

## 2022-11-03 LAB
ANION GAP SERPL CALC-SCNC: 8 MMOL/L — SIGNIFICANT CHANGE UP (ref 7–14)
BUN SERPL-MCNC: 17 MG/DL — SIGNIFICANT CHANGE UP (ref 7–23)
CALCIUM SERPL-MCNC: 9.5 MG/DL — SIGNIFICANT CHANGE UP (ref 8.4–10.5)
CHLORIDE SERPL-SCNC: 93 MMOL/L — LOW (ref 98–107)
CO2 SERPL-SCNC: 25 MMOL/L — SIGNIFICANT CHANGE UP (ref 22–31)
CREAT SERPL-MCNC: 0.73 MG/DL — SIGNIFICANT CHANGE UP (ref 0.5–1.3)
EGFR: 85 ML/MIN/1.73M2 — SIGNIFICANT CHANGE UP
GLUCOSE SERPL-MCNC: 92 MG/DL — SIGNIFICANT CHANGE UP (ref 70–99)
HCT VFR BLD CALC: 25.4 % — LOW (ref 34.5–45)
HGB BLD-MCNC: 7.8 G/DL — LOW (ref 11.5–15.5)
MAGNESIUM SERPL-MCNC: 2 MG/DL — SIGNIFICANT CHANGE UP (ref 1.6–2.6)
MCHC RBC-ENTMCNC: 24.3 PG — LOW (ref 27–34)
MCHC RBC-ENTMCNC: 30.7 GM/DL — LOW (ref 32–36)
MCV RBC AUTO: 79.1 FL — LOW (ref 80–100)
NRBC # BLD: 0 /100 WBCS — SIGNIFICANT CHANGE UP (ref 0–0)
NRBC # FLD: 0 K/UL — SIGNIFICANT CHANGE UP (ref 0–0)
PHOSPHATE SERPL-MCNC: 3.8 MG/DL — SIGNIFICANT CHANGE UP (ref 2.5–4.5)
PLATELET # BLD AUTO: 429 K/UL — HIGH (ref 150–400)
POTASSIUM SERPL-MCNC: 4.6 MMOL/L — SIGNIFICANT CHANGE UP (ref 3.5–5.3)
POTASSIUM SERPL-SCNC: 4.6 MMOL/L — SIGNIFICANT CHANGE UP (ref 3.5–5.3)
RBC # BLD: 3.21 M/UL — LOW (ref 3.8–5.2)
RBC # FLD: 19 % — HIGH (ref 10.3–14.5)
SODIUM SERPL-SCNC: 126 MMOL/L — LOW (ref 135–145)
WBC # BLD: 10.08 K/UL — SIGNIFICANT CHANGE UP (ref 3.8–10.5)
WBC # FLD AUTO: 10.08 K/UL — SIGNIFICANT CHANGE UP (ref 3.8–10.5)

## 2022-11-03 PROCEDURE — 99232 SBSQ HOSP IP/OBS MODERATE 35: CPT

## 2022-11-03 RX ORDER — TRAMADOL HYDROCHLORIDE 50 MG/1
100 TABLET ORAL EVERY 4 HOURS
Refills: 0 | Status: DISCONTINUED | OUTPATIENT
Start: 2022-11-03 | End: 2022-11-10

## 2022-11-03 RX ORDER — TRAMADOL HYDROCHLORIDE 50 MG/1
50 TABLET ORAL EVERY 4 HOURS
Refills: 0 | Status: DISCONTINUED | OUTPATIENT
Start: 2022-11-03 | End: 2022-11-10

## 2022-11-03 RX ORDER — ACETAMINOPHEN 500 MG
1000 TABLET ORAL ONCE
Refills: 0 | Status: COMPLETED | OUTPATIENT
Start: 2022-11-03 | End: 2022-11-03

## 2022-11-03 RX ADMIN — ROPINIROLE 2 MILLIGRAM(S): 8 TABLET, FILM COATED, EXTENDED RELEASE ORAL at 21:18

## 2022-11-03 RX ADMIN — Medication 125 MICROGRAM(S): at 11:07

## 2022-11-03 RX ADMIN — Medication 100 MILLIGRAM(S): at 14:07

## 2022-11-03 RX ADMIN — TRAMADOL HYDROCHLORIDE 100 MILLIGRAM(S): 50 TABLET ORAL at 13:09

## 2022-11-03 RX ADMIN — QUETIAPINE FUMARATE 25 MILLIGRAM(S): 200 TABLET, FILM COATED ORAL at 21:19

## 2022-11-03 RX ADMIN — HEPARIN SODIUM 5000 UNIT(S): 5000 INJECTION INTRAVENOUS; SUBCUTANEOUS at 21:17

## 2022-11-03 RX ADMIN — Medication 15 MILLIGRAM(S): at 00:27

## 2022-11-03 RX ADMIN — Medication 5 MILLIGRAM(S): at 21:18

## 2022-11-03 RX ADMIN — TRAMADOL HYDROCHLORIDE 100 MILLIGRAM(S): 50 TABLET ORAL at 22:15

## 2022-11-03 RX ADMIN — HEPARIN SODIUM 5000 UNIT(S): 5000 INJECTION INTRAVENOUS; SUBCUTANEOUS at 13:10

## 2022-11-03 RX ADMIN — TRAMADOL HYDROCHLORIDE 100 MILLIGRAM(S): 50 TABLET ORAL at 17:15

## 2022-11-03 RX ADMIN — TRAMADOL HYDROCHLORIDE 100 MILLIGRAM(S): 50 TABLET ORAL at 21:16

## 2022-11-03 RX ADMIN — GABAPENTIN 300 MILLIGRAM(S): 400 CAPSULE ORAL at 17:14

## 2022-11-03 RX ADMIN — Medication 100 MILLIGRAM(S): at 21:24

## 2022-11-03 RX ADMIN — Medication 400 MILLIGRAM(S): at 09:26

## 2022-11-03 RX ADMIN — Medication 12.5 MILLIGRAM(S): at 17:14

## 2022-11-03 RX ADMIN — Medication 1000 MILLIGRAM(S): at 10:00

## 2022-11-03 RX ADMIN — Medication 100 MILLIGRAM(S): at 13:10

## 2022-11-03 RX ADMIN — Medication 1 SPRAY(S): at 17:14

## 2022-11-03 RX ADMIN — TRAMADOL HYDROCHLORIDE 100 MILLIGRAM(S): 50 TABLET ORAL at 18:15

## 2022-11-03 RX ADMIN — DULOXETINE HYDROCHLORIDE 60 MILLIGRAM(S): 30 CAPSULE, DELAYED RELEASE ORAL at 13:09

## 2022-11-03 RX ADMIN — Medication 325 MILLIGRAM(S): at 14:07

## 2022-11-03 RX ADMIN — Medication 1 TABLET(S): at 14:08

## 2022-11-03 RX ADMIN — TRAMADOL HYDROCHLORIDE 100 MILLIGRAM(S): 50 TABLET ORAL at 14:00

## 2022-11-03 RX ADMIN — POLYETHYLENE GLYCOL 3350 17 GRAM(S): 17 POWDER, FOR SOLUTION ORAL at 17:14

## 2022-11-03 RX ADMIN — Medication 1 SPRAY(S): at 05:56

## 2022-11-03 RX ADMIN — Medication 100 MILLIGRAM(S): at 21:17

## 2022-11-03 RX ADMIN — HEPARIN SODIUM 5000 UNIT(S): 5000 INJECTION INTRAVENOUS; SUBCUTANEOUS at 06:00

## 2022-11-03 RX ADMIN — Medication 3 MILLILITER(S): at 20:05

## 2022-11-03 RX ADMIN — SENNA PLUS 2 TABLET(S): 8.6 TABLET ORAL at 21:18

## 2022-11-03 RX ADMIN — Medication 3 MILLILITER(S): at 09:15

## 2022-11-03 RX ADMIN — Medication 3 MILLIGRAM(S): at 21:18

## 2022-11-03 RX ADMIN — Medication 100 MILLIGRAM(S): at 05:54

## 2022-11-03 RX ADMIN — Medication 145 MILLIGRAM(S): at 14:07

## 2022-11-03 NOTE — PROGRESS NOTE ADULT - SUBJECTIVE AND OBJECTIVE BOX
Collin Salcedo MD  Academic Hospitalist  Pager [phone]  Email: [email or web address]  Available on [employer] Teams        PROGRESS NOTE:     Patient is a 77y old  Female who presents with a chief complaint of Fall / UTI (03 Nov 2022 12:41)      SUBJECTIVE / OVERNIGHT EVENTS:  Patient seen and examined this morning. Patient continues to experience pain, however she does not have any worsening of her symptoms. She states that she would prefer to take tramadol over oxy for her pain. She also stated that she is hungry and thirsty because she is NPO.     Plan of care discussed with her sister Veronica is at bedside and the patient.    ADDITIONAL REVIEW OF SYSTEMS:  No f/c/n/v    MEDICATIONS  (STANDING):  albuterol/ipratropium for Nebulization 3 milliLiter(s) Nebulizer every 12 hours  benzonatate 100 milliGRAM(s) Oral three times a day  bisacodyl 5 milliGRAM(s) Oral at bedtime  ceFAZolin   IVPB 2000 milliGRAM(s) IV Intermittent every 8 hours  DULoxetine 60 milliGRAM(s) Oral daily  fenofibrate Tablet 145 milliGRAM(s) Oral daily  ferrous    sulfate 325 milliGRAM(s) Oral daily  gabapentin 300 milliGRAM(s) Oral two times a day  heparin   Injectable 5000 Unit(s) SubCutaneous every 8 hours  lactulose Syrup 10 Gram(s) Oral once  levothyroxine 125 MICROGram(s) Oral daily  lidocaine   4% Patch 1 Patch Transdermal every 24 hours  meclizine 12.5 milliGRAM(s) Oral two times a day  multivitamin 1 Tablet(s) Oral daily  pantoprazole    Tablet 40 milliGRAM(s) Oral before breakfast  polyethylene glycol 3350 17 Gram(s) Oral two times a day  QUEtiapine 25 milliGRAM(s) Oral at bedtime  rOPINIRole 2 milliGRAM(s) Oral daily  senna 2 Tablet(s) Oral at bedtime  sodium chloride 0.65% Nasal 1 Spray(s) Both Nostrils two times a day    MEDICATIONS  (PRN):  acetaminophen     Tablet .. 650 milliGRAM(s) Oral every 6 hours PRN Temp greater or equal to 38C (100.4F), Mild Pain (1 - 3)  ALBUTerol    90 MICROgram(s) HFA Inhaler 2 Puff(s) Inhalation every 6 hours PRN Wheezing  aluminum hydroxide/magnesium hydroxide/simethicone Suspension 30 milliLiter(s) Oral every 4 hours PRN Dyspepsia  fluticasone propionate 50 MICROgram(s)/spray Nasal Spray 1 Spray(s) Both Nostrils every 12 hours PRN nasal congestion  guaiFENesin Oral Liquid (Sugar-Free) 100 milliGRAM(s) Oral every 6 hours PRN Cough  melatonin 3 milliGRAM(s) Oral at bedtime PRN Insomnia  ondansetron Injectable 4 milliGRAM(s) IV Push every 8 hours PRN Nausea and/or Vomiting  traMADol 50 milliGRAM(s) Oral every 4 hours PRN Moderate Pain (4 - 6)  traMADol 100 milliGRAM(s) Oral every 4 hours PRN Severe Pain (7 - 10)      CAPILLARY BLOOD GLUCOSE        I&O's Summary      PHYSICAL EXAM:  Vital Signs Last 24 Hrs  T(C): 36.2 (03 Nov 2022 11:42), Max: 36.7 (02 Nov 2022 20:08)  T(F): 97.2 (03 Nov 2022 11:42), Max: 98 (02 Nov 2022 20:08)  HR: 81 (03 Nov 2022 11:42) (70 - 87)  BP: 106/61 (03 Nov 2022 11:42) (102/51 - 122/62)  BP(mean): --  RR: 18 (03 Nov 2022 11:42) (18 - 18)  SpO2: 100% (03 Nov 2022 11:42) (94% - 100%)    Parameters below as of 03 Nov 2022 11:42  Patient On (Oxygen Delivery Method): room air        CONSTITUTIONAL: NAD, well-developed, obese, seating at the edge of her bed, speaking with her sister.  General: Well appearing female - comfortable.   HEENT: anicteric sclera; MMM  Neck: supple  Cardiovascular: +S1/S2; RRR  Respiratory: CTA B/L; no W/R/R  Gastrointestinal: soft, NT/ND; +BSx4  Extremities: WWP; no edema, clubbing or cyanosis  Vascular: 2+ radial, DP/PT pulses B/L  Neurological: AAOx3; no focal deficits    LABS:                        7.8    10.08 )-----------( 429      ( 03 Nov 2022 05:38 )             25.4     11-03    126<L>  |  93<L>  |  17  ----------------------------<  92  4.6   |  25  |  0.73    Ca    9.5      03 Nov 2022 05:38  Phos  3.8     11-03  Mg     2.00     11-03                  RADIOLOGY & ADDITIONAL TESTS:  Results Reviewed:   Imaging Personally Reviewed:  Electrocardiogram Personally Reviewed:    COORDINATION OF CARE:  Care Discussed with Consultants/Other Providers [Y/N]:  Prior or Outpatient Records Reviewed [Y/N]:

## 2022-11-03 NOTE — SWALLOW BEDSIDE ASSESSMENT ADULT - COMMENTS
Per Hospitalist Note 11/2/22: "78 yo F with PMHx significant for hypothyroidism, HTN, HLD, SVT (s/p ablation), Cervical cancer (s/p hysterectomy), DVT (June 2022, still on eliquis), OA and peripheral neuropathy presenting to [facility] after having fall from bed overnight. Patient found to have UTI and started on IV CTX. CT head with no evidence of bleed, CT C spine and chest no evidence of fractures. Admit to medicine for further evaluation."     Patient is familiar to this department as the patient was seen for an initial swallow evaluation on 10/26/22 with recommendation of regular solids with thin liquids and consideration for cinesophagram. See consults for details.     Consult received for clinical swallow evaluation, indicating patient reporting "lump in the throat". Per recommendations of clinical swallow evaluation completed on 10/26/22, cinesophagram to be completed in place of repeat swallow evaluation given patient symptoms of "lump in throat". SLP made call to JULIENNE [facility] who is in agreement. Per Hospitalist Note 11/2/22: "78 yo F with PMHx significant for hypothyroidism, HTN, HLD, SVT (s/p ablation), Cervical cancer (s/p hysterectomy), DVT (June 2022, still on eliquis), OA and peripheral neuropathy presenting to [facility] after having fall from bed overnight. Patient found to have UTI and started on IV CTX. CT head with no evidence of bleed, CT C spine and chest no evidence of fractures. Admit to medicine for further evaluation."     Patient is familiar to this department as the patient was seen for an initial swallow evaluation on 10/26/22 with recommendation of regular solids with thin liquids and consideration for cinesophagram. See consults for details.     Consult received for clinical swallow evaluation, indicating patient reporting "lump in the throat". Per recommendations of clinical swallow evaluation completed on 10/26/22, cinesophagram to be completed in place of repeat swallow evaluation given patient symptoms of "lump in throat". SLP made call to JULIENNE St who is in agreement; cinesophagram to be completed tomorrow as patient NPO for SHAUN today.

## 2022-11-03 NOTE — CONSULT NOTE ADULT - ASSESSMENT
Interventional Radiology    Evaluate for Procedure:     HPI: 77y Female with DVT on Eliquis, presents with fall, leukocytosis, recurrent MSSA bacteremia. Has history of gluteal injection for groin pain, MRI now with small 1.9cm x 4cm collection adjacent to the R piriformis muscle and sciatic foramen neurovascular bundle, along with sacroiliitis, IR consulted for drainage of the R piriformis collection          Allergies: erythromycin (Hives)  IV Contrast (Anaphylaxis)        Medications (Abx/Cardiac/Anticoagulation/Blood Products)    ceFAZolin   IVPB: 100 mL/Hr IV Intermittent (11-03 @ 05:54)  heparin   Injectable: 5000 Unit(s) SubCutaneous (11-03 @ 06:00)      Home Medications  Actonel 35 mg oral tablet: 1 tab(s) orally once a week  apixaban 5 mg oral tablet: 1 tab(s) orally every 12 hours  bisacodyl 5 mg oral delayed release tablet: 1 tab(s) orally once a day (at bedtime)  DULoxetine 60 mg oral delayed release capsule: 1 cap(s) orally once a day  famotidine 20 mg oral tablet: 1 tab(s) orally 2 times a day  fenofibrate 145 mg oral tablet: 1 tab(s) orally once a day  fluticasone 50 mcg/inh nasal spray: 1 spray(s) nasal 2 times a day  furosemide 40 mg oral tablet: 1 tab(s) orally every 24 hours  gabapentin 300 mg oral capsule: 1 cap(s) orally 2 times a day  levothyroxine 125 mcg (0.125 mg) oral tablet: 1 tab(s) orally once a day  losartan-hydrochlorothiazide 50 mg-12.5 mg oral tablet: 1 tab(s) orally once a day  meclizine 12.5 mg oral tablet: 1 tab(s) orally 2 times a day  pantoprazole 40 mg oral delayed release tablet: 1 tab(s) orally once a day  polyethylene glycol 3350 oral powder for reconstitution: 17 gram(s) orally 2 times a day, As Needed  rOPINIRole 2 mg oral tablet, extended release: 1 tab(s) orally once a day  senna oral tablet: 2 tab(s) orally once a day (at bedtime)  SEROquel 25 mg oral tablet: 0.5 tab(s) orally once a day (at bedtime)  sodium chloride 0.65% nasal spray: nasal 2 times a day  spironolactone 50 mg oral tablet: 1 tab(s) orally once a day  traMADol 50 mg oral tablet: 2 tab(s) orally every 8 hours, As Needed - 10)      Data:    T(C): 36.2  HR: 81  BP: 106/61  RR: 18  SpO2: 100%    -WBC 10.08 / HgB 7.8 / Hct 25.4 / Plt 429  -Na 126 / Cl 93 / BUN 17 / Glucose 92  -K 4.6 / CO2 25 / Cr 0.73  -ALT -- / Alk Phos -- / T.Bili --    Radiology:     Assessment/Plan:   77y Female with DVT on Eliquis, presents with fall, leukocytosis, recurrent MSSA bacteremia. Has history of gluteal injection for groin pain, MRI now with small 1.9cm x 4cm collection adjacent to the R piriformis muscle and sciatic foramen neurovascular bundle, along with sacroiliitis, IR consulted for drainage of the R piriformis collection      -small R piriformis collection adjacent to sciatic foramen neurovascular bundle, risky percutaneous window for drainage.  -patient with +BCx to guide antibiotic therapy.     will defer drainage or aspiration of collection at this time. Continue antibiotic therapy per infectious disease. If no improvement or worsening in several days to week, can consider reimaging with CT with IV contrast to re-evaluate evolution of collection/drainage window.  Reconsult prn.    Plan discussed with primary team.      Theo Jay MD  Interventional Radiology PGY5  Contact on [facility] Teams for nonemergent issues    - Nonemergent consults:  [facility] order "Consult- Interventional Radiology", no page required  - Emergent issues (pager): [facility] [phone]; [facility] [phone]; [phone]; DO NOT PAGE FOR SCHEDULING QUESTIONS  - Scheduling questions 8am-6pm : [facility] [phone]; [facility] [phone],   - Clinic/outpatient booking: [facility] [phone]; [facility] [phone]

## 2022-11-03 NOTE — CHART NOTE - NSCHARTNOTEFT_GEN_A_CORE
Patient case discussed with Cardiology Fellow Dr Jay, patient hyponatremic with Na 126, and hemoglobin 7.8, SHAUN cancelled for today. Will need further optimization, with  sodium level > 130 , and hemoglobin level > 8.0. Discussed with Attending Dr Salcedo, also updated patient's sister who was at the bedside.

## 2022-11-04 LAB
ANION GAP SERPL CALC-SCNC: 9 MMOL/L — SIGNIFICANT CHANGE UP (ref 7–14)
BUN SERPL-MCNC: 14 MG/DL — SIGNIFICANT CHANGE UP (ref 7–23)
CALCIUM SERPL-MCNC: 9.6 MG/DL — SIGNIFICANT CHANGE UP (ref 8.4–10.5)
CHLORIDE SERPL-SCNC: 93 MMOL/L — LOW (ref 98–107)
CO2 SERPL-SCNC: 24 MMOL/L — SIGNIFICANT CHANGE UP (ref 22–31)
CREAT SERPL-MCNC: 0.62 MG/DL — SIGNIFICANT CHANGE UP (ref 0.5–1.3)
CULTURE RESULTS: SIGNIFICANT CHANGE UP
CULTURE RESULTS: SIGNIFICANT CHANGE UP
EGFR: 92 ML/MIN/1.73M2 — SIGNIFICANT CHANGE UP
GLUCOSE SERPL-MCNC: 100 MG/DL — HIGH (ref 70–99)
HCT VFR BLD CALC: 27 % — LOW (ref 34.5–45)
HGB BLD-MCNC: 8.4 G/DL — LOW (ref 11.5–15.5)
INTERPRETATION 24H UR IFE-IMP: SIGNIFICANT CHANGE UP
INTERPRETATION 24H UR IFE-IMP: SIGNIFICANT CHANGE UP
MAGNESIUM SERPL-MCNC: 2 MG/DL — SIGNIFICANT CHANGE UP (ref 1.6–2.6)
MCHC RBC-ENTMCNC: 24.3 PG — LOW (ref 27–34)
MCHC RBC-ENTMCNC: 31.1 GM/DL — LOW (ref 32–36)
MCV RBC AUTO: 78.3 FL — LOW (ref 80–100)
NRBC # BLD: 0 /100 WBCS — SIGNIFICANT CHANGE UP (ref 0–0)
NRBC # FLD: 0 K/UL — SIGNIFICANT CHANGE UP (ref 0–0)
PHOSPHATE SERPL-MCNC: 3.5 MG/DL — SIGNIFICANT CHANGE UP (ref 2.5–4.5)
PLATELET # BLD AUTO: 469 K/UL — HIGH (ref 150–400)
POTASSIUM SERPL-MCNC: 4.6 MMOL/L — SIGNIFICANT CHANGE UP (ref 3.5–5.3)
POTASSIUM SERPL-SCNC: 4.6 MMOL/L — SIGNIFICANT CHANGE UP (ref 3.5–5.3)
RBC # BLD: 3.45 M/UL — LOW (ref 3.8–5.2)
RBC # FLD: 19.3 % — HIGH (ref 10.3–14.5)
SODIUM SERPL-SCNC: 126 MMOL/L — LOW (ref 135–145)
SPECIMEN SOURCE: SIGNIFICANT CHANGE UP
SPECIMEN SOURCE: SIGNIFICANT CHANGE UP
WBC # BLD: 12.32 K/UL — HIGH (ref 3.8–10.5)
WBC # FLD AUTO: 12.32 K/UL — HIGH (ref 3.8–10.5)

## 2022-11-04 PROCEDURE — 99233 SBSQ HOSP IP/OBS HIGH 50: CPT

## 2022-11-04 PROCEDURE — 99232 SBSQ HOSP IP/OBS MODERATE 35: CPT

## 2022-11-04 PROCEDURE — 74230 X-RAY XM SWLNG FUNCJ C+: CPT | Mod: 26

## 2022-11-04 RX ORDER — LACTULOSE 10 G/15ML
10 SOLUTION ORAL ONCE
Refills: 0 | Status: COMPLETED | OUTPATIENT
Start: 2022-11-04 | End: 2022-11-04

## 2022-11-04 RX ORDER — SODIUM CHLORIDE 9 MG/ML
1 INJECTION INTRAMUSCULAR; INTRAVENOUS; SUBCUTANEOUS THREE TIMES A DAY
Refills: 0 | Status: DISCONTINUED | OUTPATIENT
Start: 2022-11-04 | End: 2022-11-20

## 2022-11-04 RX ORDER — LANOLIN ALCOHOL/MO/W.PET/CERES
3 CREAM (GRAM) TOPICAL AT BEDTIME
Refills: 0 | Status: DISCONTINUED | OUTPATIENT
Start: 2022-11-04 | End: 2022-11-25

## 2022-11-04 RX ADMIN — Medication 12.5 MILLIGRAM(S): at 05:49

## 2022-11-04 RX ADMIN — HEPARIN SODIUM 5000 UNIT(S): 5000 INJECTION INTRAVENOUS; SUBCUTANEOUS at 05:48

## 2022-11-04 RX ADMIN — TRAMADOL HYDROCHLORIDE 100 MILLIGRAM(S): 50 TABLET ORAL at 22:47

## 2022-11-04 RX ADMIN — Medication 12.5 MILLIGRAM(S): at 17:22

## 2022-11-04 RX ADMIN — TRAMADOL HYDROCHLORIDE 100 MILLIGRAM(S): 50 TABLET ORAL at 12:24

## 2022-11-04 RX ADMIN — Medication 1 SPRAY(S): at 17:22

## 2022-11-04 RX ADMIN — SODIUM CHLORIDE 1 GRAM(S): 9 INJECTION INTRAMUSCULAR; INTRAVENOUS; SUBCUTANEOUS at 23:46

## 2022-11-04 RX ADMIN — Medication 100 MILLIGRAM(S): at 14:00

## 2022-11-04 RX ADMIN — Medication 100 MILLIGRAM(S): at 23:44

## 2022-11-04 RX ADMIN — TRAMADOL HYDROCHLORIDE 100 MILLIGRAM(S): 50 TABLET ORAL at 17:22

## 2022-11-04 RX ADMIN — ROPINIROLE 2 MILLIGRAM(S): 8 TABLET, FILM COATED, EXTENDED RELEASE ORAL at 23:48

## 2022-11-04 RX ADMIN — HEPARIN SODIUM 5000 UNIT(S): 5000 INJECTION INTRAVENOUS; SUBCUTANEOUS at 14:00

## 2022-11-04 RX ADMIN — PANTOPRAZOLE SODIUM 40 MILLIGRAM(S): 20 TABLET, DELAYED RELEASE ORAL at 05:49

## 2022-11-04 RX ADMIN — TRAMADOL HYDROCHLORIDE 100 MILLIGRAM(S): 50 TABLET ORAL at 13:24

## 2022-11-04 RX ADMIN — ALBUTEROL 2 PUFF(S): 90 AEROSOL, METERED ORAL at 14:53

## 2022-11-04 RX ADMIN — TRAMADOL HYDROCHLORIDE 100 MILLIGRAM(S): 50 TABLET ORAL at 06:51

## 2022-11-04 RX ADMIN — Medication 325 MILLIGRAM(S): at 12:26

## 2022-11-04 RX ADMIN — GABAPENTIN 300 MILLIGRAM(S): 400 CAPSULE ORAL at 17:22

## 2022-11-04 RX ADMIN — Medication 3 MILLILITER(S): at 20:22

## 2022-11-04 RX ADMIN — Medication 3 MILLILITER(S): at 10:10

## 2022-11-04 RX ADMIN — HEPARIN SODIUM 5000 UNIT(S): 5000 INJECTION INTRAVENOUS; SUBCUTANEOUS at 23:48

## 2022-11-04 RX ADMIN — Medication 145 MILLIGRAM(S): at 12:26

## 2022-11-04 RX ADMIN — TRAMADOL HYDROCHLORIDE 100 MILLIGRAM(S): 50 TABLET ORAL at 18:20

## 2022-11-04 RX ADMIN — Medication 5 MILLIGRAM(S): at 23:49

## 2022-11-04 RX ADMIN — Medication 100 MILLIGRAM(S): at 06:01

## 2022-11-04 RX ADMIN — LACTULOSE 10 GRAM(S): 10 SOLUTION ORAL at 17:24

## 2022-11-04 RX ADMIN — QUETIAPINE FUMARATE 25 MILLIGRAM(S): 200 TABLET, FILM COATED ORAL at 23:46

## 2022-11-04 RX ADMIN — GABAPENTIN 300 MILLIGRAM(S): 400 CAPSULE ORAL at 05:48

## 2022-11-04 RX ADMIN — Medication 3 MILLIGRAM(S): at 23:49

## 2022-11-04 RX ADMIN — POLYETHYLENE GLYCOL 3350 17 GRAM(S): 17 POWDER, FOR SOLUTION ORAL at 17:22

## 2022-11-04 RX ADMIN — TRAMADOL HYDROCHLORIDE 100 MILLIGRAM(S): 50 TABLET ORAL at 01:38

## 2022-11-04 RX ADMIN — ONDANSETRON 4 MILLIGRAM(S): 8 TABLET, FILM COATED ORAL at 02:57

## 2022-11-04 RX ADMIN — TRAMADOL HYDROCHLORIDE 100 MILLIGRAM(S): 50 TABLET ORAL at 02:38

## 2022-11-04 RX ADMIN — TRAMADOL HYDROCHLORIDE 100 MILLIGRAM(S): 50 TABLET ORAL at 07:50

## 2022-11-04 RX ADMIN — Medication 1 SPRAY(S): at 05:49

## 2022-11-04 RX ADMIN — Medication 100 MILLIGRAM(S): at 05:48

## 2022-11-04 RX ADMIN — TRAMADOL HYDROCHLORIDE 100 MILLIGRAM(S): 50 TABLET ORAL at 23:48

## 2022-11-04 RX ADMIN — DULOXETINE HYDROCHLORIDE 60 MILLIGRAM(S): 30 CAPSULE, DELAYED RELEASE ORAL at 12:25

## 2022-11-04 RX ADMIN — Medication 125 MICROGRAM(S): at 05:49

## 2022-11-04 NOTE — SWALLOW VFSS/MBS ASSESSMENT ADULT - ADDITIONAL RECOMMENDATIONS
1. This department to follow up as schedule permits for diet tolerance and swallowing therapy. 2. Medical team advised to reconsult this service if change in medical status and/or patient's tolerance to recommended PO diet.

## 2022-11-04 NOTE — PROGRESS NOTE ADULT - SUBJECTIVE AND OBJECTIVE BOX
[facility] Department of Hospital Medicine  Hermelinda Carney DO  Available on MS Teams  Pager: [phone]    Patient is a 77y old  Female who presents with a chief complaint of Fall / UTI (04 Nov 2022 13:53)    Subjective:  Pt seen and examined at bedside in no acute distress; sister present. Pt in good spirits, says she will sit up in bed today and dangle legs at bedside. Amenable to SHAUN Monday.     VITAL SIGNS:  T(C): 36.8 (11-04-22 @ 20:21), Max: 36.8 (11-04-22 @ 20:21)  T(F): 98.2 (11-04-22 @ 20:21), Max: 98.2 (11-04-22 @ 20:21)  HR: 75 (11-04-22 @ 20:21) (74 - 84)  BP: 103/64 (11-04-22 @ 20:21) (103/64 - 134/76)  BP(mean): --  RR: 18 (11-04-22 @ 20:21) (18 - 19)  SpO2: 98% (11-04-22 @ 20:21) (96% - 98%)  Wt(kg): --    PHYSICAL EXAM:  Constitutional: WDWN resting comfortably in bed; NAD; obese  Head: NC/AT  Eyes: PERRL, EOMI, anicteric sclera  ENT: no nasal discharge; MMM  Neck: supple; no JVD  Respiratory: CTA B/L; no W/R/R; on RA without distress  Cardiac: +S1/S2; RRR; no M/R/G  Gastrointestinal: soft, NT/ND; no rebound or guarding; +BSx4  Extremities: WWP, no clubbing or cyanosis; no peripheral edema  Musculoskeletal: NROM x4; no joint swelling, tenderness or erythema  Vascular: 2+ radial, DP/PT pulses B/L  Dermatologic: skin warm, dry and intact; no rashes, wounds, or scars  Neurologic: AAOx3; CNII-XII grossly intact; no focal deficits  Psychiatric: affect and characteristics of appearance, verbalizations, behaviors are appropriate    MEDICATIONS  (STANDING):  albuterol/ipratropium for Nebulization 3 milliLiter(s) Nebulizer every 12 hours  benzonatate 100 milliGRAM(s) Oral three times a day  bisacodyl 5 milliGRAM(s) Oral at bedtime  ceFAZolin   IVPB 2000 milliGRAM(s) IV Intermittent every 8 hours  DULoxetine 60 milliGRAM(s) Oral daily  fenofibrate Tablet 145 milliGRAM(s) Oral daily  ferrous    sulfate 325 milliGRAM(s) Oral daily  gabapentin 300 milliGRAM(s) Oral two times a day  heparin   Injectable 5000 Unit(s) SubCutaneous every 8 hours  levothyroxine 125 MICROGram(s) Oral daily  lidocaine   4% Patch 1 Patch Transdermal every 24 hours  meclizine 12.5 milliGRAM(s) Oral two times a day  melatonin 3 milliGRAM(s) Oral at bedtime  multivitamin 1 Tablet(s) Oral daily  pantoprazole    Tablet 40 milliGRAM(s) Oral before breakfast  polyethylene glycol 3350 17 Gram(s) Oral two times a day  QUEtiapine 25 milliGRAM(s) Oral at bedtime  rOPINIRole 2 milliGRAM(s) Oral daily  senna 2 Tablet(s) Oral at bedtime  sodium chloride 1 Gram(s) Oral three times a day  sodium chloride 0.65% Nasal 1 Spray(s) Both Nostrils two times a day    MEDICATIONS  (PRN):  acetaminophen     Tablet .. 650 milliGRAM(s) Oral every 6 hours PRN Temp greater or equal to 38C (100.4F), Mild Pain (1 - 3)  ALBUTerol    90 MICROgram(s) HFA Inhaler 2 Puff(s) Inhalation every 6 hours PRN Wheezing  fluticasone propionate 50 MICROgram(s)/spray Nasal Spray 1 Spray(s) Both Nostrils every 12 hours PRN nasal congestion  guaiFENesin Oral Liquid (Sugar-Free) 100 milliGRAM(s) Oral every 6 hours PRN Cough  traMADol 50 milliGRAM(s) Oral every 4 hours PRN Moderate Pain (4 - 6)  traMADol 100 milliGRAM(s) Oral every 4 hours PRN Severe Pain (7 - 10)    LABS:                        8.4    12.32 )-----------( 469      ( 04 Nov 2022 07:56 )             27.0     11-04    126<L>  |  93<L>  |  14  ----------------------------<  100<H>  4.6   |  24  |  0.62    Ca    9.6      04 Nov 2022 07:56  Phos  3.5     11-04  Mg     2.00     11-04          CAPILLARY BLOOD GLUCOSE          RADIOLOGY & ADDITIONAL TESTS: Reviewed.

## 2022-11-04 NOTE — SWALLOW VFSS/MBS ASSESSMENT ADULT - DIAGNOSTIC IMPRESSIONS
1. Functional oral stage for puree, regular solids, mildly thick liquids and thin liquids marked by adequate oral acceptance, adequate containment, adequate chewing for regular solids and adequate tongue motion for anterior to posterior transport. Adequate oral clearance post primary swallow.  2. Mild-moderate pharyngeal dysphagia for puree, regular solids, mildly thick liquids and thin liquids marked by an adequate pharyngeal swallow trigger with reduced base of tongue retraction, reduced hyolaryngeal elevation, reduced epiglottic deflection, adequate pharyngeal contractility and incomplete closure of the laryngeal vestibule. There was mild-moderate vallecular residue post primary swallow for puree and regular solids. A thin liquid wash partially assists with pharyngeal clearance. There is Trace Laryngeal Penetration with retrieval above the level of the vocal folds during the swallow. No Aspiration viewed before, during or after the swallow.

## 2022-11-04 NOTE — SWALLOW VFSS/MBS ASSESSMENT ADULT - COMMENTS
Patient was seen for a clinical swallow assessment of the swallow on 11/ Hospitalist note 11/3 "76 yo F with PMHx significant for hypothyroidism, HTN, HLD, SVT (s/p ablation), Cervical cancer (s/p hysterectomy), DVT (June 2022, still on eliquis), OA and peripheral neuropathy presenting to [facility] after having fall from bed overnight. Patient found to have UTI and started on IV CTX. CT head with no evidence of bleed, CT C spine and chest no evidence of fractures. Admit to medicine for further evaluation."    Patient was seen for a clinical swallow assessment of the swallow on 11/3 at which time regular solids with thin liquids and a Cinesophagram was recommended given patient reporting a globus sensation in the throat (please see note for details).     Patient received in Radiology suite this AM for a Cinesophagram to objectively assess swallow function. Patient reporting globus sensation in the throat, stating "It feels like a knot in my throat".

## 2022-11-05 LAB
ANION GAP SERPL CALC-SCNC: 11 MMOL/L — SIGNIFICANT CHANGE UP (ref 7–14)
BLD GP AB SCN SERPL QL: NEGATIVE — SIGNIFICANT CHANGE UP
BUN SERPL-MCNC: 12 MG/DL — SIGNIFICANT CHANGE UP (ref 7–23)
CALCIUM SERPL-MCNC: 9.8 MG/DL — SIGNIFICANT CHANGE UP (ref 8.4–10.5)
CHLORIDE SERPL-SCNC: 94 MMOL/L — LOW (ref 98–107)
CO2 SERPL-SCNC: 23 MMOL/L — SIGNIFICANT CHANGE UP (ref 22–31)
CREAT SERPL-MCNC: 0.68 MG/DL — SIGNIFICANT CHANGE UP (ref 0.5–1.3)
EGFR: 90 ML/MIN/1.73M2 — SIGNIFICANT CHANGE UP
GLUCOSE SERPL-MCNC: 82 MG/DL — SIGNIFICANT CHANGE UP (ref 70–99)
HCT VFR BLD CALC: 28.5 % — LOW (ref 34.5–45)
HGB BLD-MCNC: 8.5 G/DL — LOW (ref 11.5–15.5)
MAGNESIUM SERPL-MCNC: 2 MG/DL — SIGNIFICANT CHANGE UP (ref 1.6–2.6)
MCHC RBC-ENTMCNC: 24.1 PG — LOW (ref 27–34)
MCHC RBC-ENTMCNC: 29.8 GM/DL — LOW (ref 32–36)
MCV RBC AUTO: 81 FL — SIGNIFICANT CHANGE UP (ref 80–100)
NRBC # BLD: 0 /100 WBCS — SIGNIFICANT CHANGE UP (ref 0–0)
NRBC # FLD: 0 K/UL — SIGNIFICANT CHANGE UP (ref 0–0)
PHOSPHATE SERPL-MCNC: 3.1 MG/DL — SIGNIFICANT CHANGE UP (ref 2.5–4.5)
PLATELET # BLD AUTO: 473 K/UL — HIGH (ref 150–400)
POTASSIUM SERPL-MCNC: 4.5 MMOL/L — SIGNIFICANT CHANGE UP (ref 3.5–5.3)
POTASSIUM SERPL-SCNC: 4.5 MMOL/L — SIGNIFICANT CHANGE UP (ref 3.5–5.3)
RBC # BLD: 3.52 M/UL — LOW (ref 3.8–5.2)
RBC # FLD: 19.6 % — HIGH (ref 10.3–14.5)
RH IG SCN BLD-IMP: POSITIVE — SIGNIFICANT CHANGE UP
SODIUM SERPL-SCNC: 128 MMOL/L — LOW (ref 135–145)
WBC # BLD: 9.58 K/UL — SIGNIFICANT CHANGE UP (ref 3.8–10.5)
WBC # FLD AUTO: 9.58 K/UL — SIGNIFICANT CHANGE UP (ref 3.8–10.5)

## 2022-11-05 PROCEDURE — 99233 SBSQ HOSP IP/OBS HIGH 50: CPT

## 2022-11-05 RX ADMIN — TRAMADOL HYDROCHLORIDE 100 MILLIGRAM(S): 50 TABLET ORAL at 09:55

## 2022-11-05 RX ADMIN — DULOXETINE HYDROCHLORIDE 60 MILLIGRAM(S): 30 CAPSULE, DELAYED RELEASE ORAL at 11:48

## 2022-11-05 RX ADMIN — Medication 145 MILLIGRAM(S): at 11:48

## 2022-11-05 RX ADMIN — TRAMADOL HYDROCHLORIDE 100 MILLIGRAM(S): 50 TABLET ORAL at 08:59

## 2022-11-05 RX ADMIN — Medication 3 MILLILITER(S): at 09:11

## 2022-11-05 RX ADMIN — Medication 100 MILLIGRAM(S): at 13:18

## 2022-11-05 RX ADMIN — Medication 100 MILLIGRAM(S): at 21:19

## 2022-11-05 RX ADMIN — TRAMADOL HYDROCHLORIDE 100 MILLIGRAM(S): 50 TABLET ORAL at 21:16

## 2022-11-05 RX ADMIN — QUETIAPINE FUMARATE 25 MILLIGRAM(S): 200 TABLET, FILM COATED ORAL at 21:20

## 2022-11-05 RX ADMIN — Medication 125 MICROGRAM(S): at 05:37

## 2022-11-05 RX ADMIN — Medication 100 MILLIGRAM(S): at 21:17

## 2022-11-05 RX ADMIN — Medication 12.5 MILLIGRAM(S): at 17:14

## 2022-11-05 RX ADMIN — TRAMADOL HYDROCHLORIDE 100 MILLIGRAM(S): 50 TABLET ORAL at 13:10

## 2022-11-05 RX ADMIN — Medication 100 MILLIGRAM(S): at 05:36

## 2022-11-05 RX ADMIN — SODIUM CHLORIDE 1 GRAM(S): 9 INJECTION INTRAMUSCULAR; INTRAVENOUS; SUBCUTANEOUS at 21:18

## 2022-11-05 RX ADMIN — TRAMADOL HYDROCHLORIDE 100 MILLIGRAM(S): 50 TABLET ORAL at 14:10

## 2022-11-05 RX ADMIN — Medication 1 SPRAY(S): at 05:37

## 2022-11-05 RX ADMIN — Medication 325 MILLIGRAM(S): at 11:48

## 2022-11-05 RX ADMIN — Medication 3 MILLILITER(S): at 21:41

## 2022-11-05 RX ADMIN — HEPARIN SODIUM 5000 UNIT(S): 5000 INJECTION INTRAVENOUS; SUBCUTANEOUS at 13:10

## 2022-11-05 RX ADMIN — TRAMADOL HYDROCHLORIDE 100 MILLIGRAM(S): 50 TABLET ORAL at 18:10

## 2022-11-05 RX ADMIN — Medication 5 MILLIGRAM(S): at 21:19

## 2022-11-05 RX ADMIN — HEPARIN SODIUM 5000 UNIT(S): 5000 INJECTION INTRAVENOUS; SUBCUTANEOUS at 21:17

## 2022-11-05 RX ADMIN — HEPARIN SODIUM 5000 UNIT(S): 5000 INJECTION INTRAVENOUS; SUBCUTANEOUS at 05:36

## 2022-11-05 RX ADMIN — GABAPENTIN 300 MILLIGRAM(S): 400 CAPSULE ORAL at 17:10

## 2022-11-05 RX ADMIN — Medication 1 SPRAY(S): at 17:10

## 2022-11-05 RX ADMIN — SODIUM CHLORIDE 1 GRAM(S): 9 INJECTION INTRAMUSCULAR; INTRAVENOUS; SUBCUTANEOUS at 13:10

## 2022-11-05 RX ADMIN — ROPINIROLE 2 MILLIGRAM(S): 8 TABLET, FILM COATED, EXTENDED RELEASE ORAL at 21:18

## 2022-11-05 RX ADMIN — TRAMADOL HYDROCHLORIDE 100 MILLIGRAM(S): 50 TABLET ORAL at 17:10

## 2022-11-05 RX ADMIN — POLYETHYLENE GLYCOL 3350 17 GRAM(S): 17 POWDER, FOR SOLUTION ORAL at 17:10

## 2022-11-05 NOTE — PROGRESS NOTE ADULT - SUBJECTIVE AND OBJECTIVE BOX
[facility] Department of Hospital Medicine  Hermelinda Carney DO  Available on MS Teams  Pager: [phone]    Patient is a 77y old  Female who presents with a chief complaint of Fall / UTI (04 Nov 2022 13:53)    Subjective:  Pt seen and examined at bedside in no acute distress; sister present. Both in good spirits today and happy Na improving. Agreeable and happy about a lab holiday for tomorrow. No other concerns or complaints.     Vital Signs Last 24 Hrs  T(C): 36.4 (05 Nov 2022 05:29), Max: 36.8 (04 Nov 2022 20:21)  T(F): 97.5 (05 Nov 2022 05:29), Max: 98.2 (04 Nov 2022 20:21)  HR: 66 (05 Nov 2022 05:29) (66 - 75)  BP: 105/55 (05 Nov 2022 05:29) (103/64 - 105/55)  BP(mean): --  RR: 17 (05 Nov 2022 05:29) (17 - 18)  SpO2: 98% (05 Nov 2022 05:29) (98% - 99%)    Parameters below as of 05 Nov 2022 05:29  Patient On (Oxygen Delivery Method): room air    PHYSICAL EXAM:  Constitutional: WDWN resting comfortably in bed; NAD; obese  Head: NC/AT  Eyes: PERRL, EOMI, anicteric sclera  ENT: no nasal discharge; MMM  Neck: supple; no JVD  Respiratory: CTA B/L; no W/R/R; on RA without distress  Cardiac: +S1/S2; RRR; no M/R/G  Gastrointestinal: soft, NT/ND; no rebound or guarding; +BSx4  Extremities: WWP, no clubbing or cyanosis; no peripheral edema  Musculoskeletal: NROM x4; no joint swelling, tenderness or erythema  Vascular: 2+ radial, DP/PT pulses B/L  Dermatologic: skin warm, dry and intact; no rashes, wounds, or scars  Neurologic: AAOx3; CNII-XII grossly intact; no focal deficits  Psychiatric: affect and characteristics of appearance, verbalizations, behaviors are appropriate    MEDICATIONS  (STANDING):  albuterol/ipratropium for Nebulization 3 milliLiter(s) Nebulizer every 12 hours  benzonatate 100 milliGRAM(s) Oral three times a day  bisacodyl 5 milliGRAM(s) Oral at bedtime  ceFAZolin   IVPB 2000 milliGRAM(s) IV Intermittent every 8 hours  DULoxetine 60 milliGRAM(s) Oral daily  fenofibrate Tablet 145 milliGRAM(s) Oral daily  ferrous    sulfate 325 milliGRAM(s) Oral daily  gabapentin 300 milliGRAM(s) Oral two times a day  heparin   Injectable 5000 Unit(s) SubCutaneous every 8 hours  levothyroxine 125 MICROGram(s) Oral daily  lidocaine   4% Patch 1 Patch Transdermal every 24 hours  meclizine 12.5 milliGRAM(s) Oral two times a day  melatonin 3 milliGRAM(s) Oral at bedtime  multivitamin 1 Tablet(s) Oral daily  pantoprazole    Tablet 40 milliGRAM(s) Oral before breakfast  polyethylene glycol 3350 17 Gram(s) Oral two times a day  QUEtiapine 25 milliGRAM(s) Oral at bedtime  rOPINIRole 2 milliGRAM(s) Oral daily  senna 2 Tablet(s) Oral at bedtime  sodium chloride 1 Gram(s) Oral three times a day  sodium chloride 0.65% Nasal 1 Spray(s) Both Nostrils two times a day    MEDICATIONS  (PRN):  acetaminophen     Tablet .. 650 milliGRAM(s) Oral every 6 hours PRN Temp greater or equal to 38C (100.4F), Mild Pain (1 - 3)  ALBUTerol    90 MICROgram(s) HFA Inhaler 2 Puff(s) Inhalation every 6 hours PRN Wheezing  fluticasone propionate 50 MICROgram(s)/spray Nasal Spray 1 Spray(s) Both Nostrils every 12 hours PRN nasal congestion  guaiFENesin Oral Liquid (Sugar-Free) 100 milliGRAM(s) Oral every 6 hours PRN Cough  traMADol 100 milliGRAM(s) Oral every 4 hours PRN Severe Pain (7 - 10)  traMADol 50 milliGRAM(s) Oral every 4 hours PRN Moderate Pain (4 - 6)    LABS:                        8.5    9.58  )-----------( 473      ( 05 Nov 2022 06:24 )             28.5     11-05    128<L>  |  94<L>  |  12  ----------------------------<  82  4.5   |  23  |  0.68    Ca    9.8      05 Nov 2022 06:24  Phos  3.1     11-05  Mg     2.00     11-05          CAPILLARY BLOOD GLUCOSE          RADIOLOGY & ADDITIONAL TESTS: Reviewed.

## 2022-11-06 LAB — SARS-COV-2 RNA SPEC QL NAA+PROBE: SIGNIFICANT CHANGE UP

## 2022-11-06 PROCEDURE — 99233 SBSQ HOSP IP/OBS HIGH 50: CPT

## 2022-11-06 RX ADMIN — Medication 1 SPRAY(S): at 17:22

## 2022-11-06 RX ADMIN — TRAMADOL HYDROCHLORIDE 50 MILLIGRAM(S): 50 TABLET ORAL at 01:41

## 2022-11-06 RX ADMIN — TRAMADOL HYDROCHLORIDE 100 MILLIGRAM(S): 50 TABLET ORAL at 23:39

## 2022-11-06 RX ADMIN — TRAMADOL HYDROCHLORIDE 50 MILLIGRAM(S): 50 TABLET ORAL at 14:15

## 2022-11-06 RX ADMIN — Medication 125 MICROGRAM(S): at 06:40

## 2022-11-06 RX ADMIN — HEPARIN SODIUM 5000 UNIT(S): 5000 INJECTION INTRAVENOUS; SUBCUTANEOUS at 06:48

## 2022-11-06 RX ADMIN — TRAMADOL HYDROCHLORIDE 50 MILLIGRAM(S): 50 TABLET ORAL at 17:21

## 2022-11-06 RX ADMIN — Medication 12.5 MILLIGRAM(S): at 13:23

## 2022-11-06 RX ADMIN — TRAMADOL HYDROCHLORIDE 50 MILLIGRAM(S): 50 TABLET ORAL at 07:27

## 2022-11-06 RX ADMIN — ROPINIROLE 2 MILLIGRAM(S): 8 TABLET, FILM COATED, EXTENDED RELEASE ORAL at 23:53

## 2022-11-06 RX ADMIN — HEPARIN SODIUM 5000 UNIT(S): 5000 INJECTION INTRAVENOUS; SUBCUTANEOUS at 21:57

## 2022-11-06 RX ADMIN — TRAMADOL HYDROCHLORIDE 100 MILLIGRAM(S): 50 TABLET ORAL at 22:39

## 2022-11-06 RX ADMIN — Medication 100 MILLIGRAM(S): at 21:45

## 2022-11-06 RX ADMIN — TRAMADOL HYDROCHLORIDE 50 MILLIGRAM(S): 50 TABLET ORAL at 02:41

## 2022-11-06 RX ADMIN — Medication 100 MILLIGRAM(S): at 06:40

## 2022-11-06 RX ADMIN — Medication 3 MILLILITER(S): at 10:27

## 2022-11-06 RX ADMIN — Medication 3 MILLILITER(S): at 20:45

## 2022-11-06 RX ADMIN — TRAMADOL HYDROCHLORIDE 50 MILLIGRAM(S): 50 TABLET ORAL at 18:00

## 2022-11-06 RX ADMIN — Medication 1 SPRAY(S): at 06:48

## 2022-11-06 RX ADMIN — Medication 100 MILLIGRAM(S): at 14:47

## 2022-11-06 RX ADMIN — POLYETHYLENE GLYCOL 3350 17 GRAM(S): 17 POWDER, FOR SOLUTION ORAL at 17:22

## 2022-11-06 RX ADMIN — TRAMADOL HYDROCHLORIDE 50 MILLIGRAM(S): 50 TABLET ORAL at 13:20

## 2022-11-06 RX ADMIN — Medication 3 MILLIGRAM(S): at 23:52

## 2022-11-06 NOTE — PROGRESS NOTE ADULT - SUBJECTIVE AND OBJECTIVE BOX
[facility] Department of Hospital Medicine  Hermelinda Carney DO  Available on MS Teams  Pager: [phone]    Patient is a 77y old  Female who presents with a chief complaint of Fall / UTI (04 Nov 2022 13:53)    Subjective:  Pt seen and examined at bedside in no acute distress. Sister not present today. Pt happy with lab holiday today. No complaints.     Vital Signs Last 24 Hrs  T(C): 36.6 (06 Nov 2022 11:57), Max: 37 (06 Nov 2022 06:07)  T(F): 97.8 (06 Nov 2022 11:57), Max: 98.6 (06 Nov 2022 06:07)  HR: 75 (06 Nov 2022 11:57) (72 - 79)  BP: 111/51 (06 Nov 2022 11:57) (105/58 - 113/51)  BP(mean): --  RR: 18 (06 Nov 2022 11:57) (18 - 18)  SpO2: 100% (06 Nov 2022 11:57) (100% - 100%)    Parameters below as of 06 Nov 2022 11:57  Patient On (Oxygen Delivery Method): room air    PHYSICAL EXAM:  Constitutional: WDWN resting comfortably in bed; NAD; obese  Head: NC/AT  Eyes: PERRL, EOMI, anicteric sclera  ENT: no nasal discharge; MMM  Neck: supple; no JVD  Respiratory: CTA B/L; no W/R/R; on RA without distress  Cardiac: +S1/S2; RRR; no M/R/G  Gastrointestinal: soft, NT/ND; no rebound or guarding; +BSx4  Extremities: WWP, no clubbing or cyanosis; no peripheral edema  Musculoskeletal: NROM x4; no joint swelling, tenderness or erythema  Vascular: 2+ radial, DP/PT pulses B/L  Dermatologic: skin warm, dry and intact; no rashes, wounds, or scars  Neurologic: AAOx3; CNII-XII grossly intact; no focal deficits  Psychiatric: affect and characteristics of appearance, verbalizations, behaviors are appropriate    MEDICATIONS  (STANDING):  albuterol/ipratropium for Nebulization 3 milliLiter(s) Nebulizer every 12 hours  benzonatate 100 milliGRAM(s) Oral three times a day  bisacodyl 5 milliGRAM(s) Oral at bedtime  ceFAZolin   IVPB 2000 milliGRAM(s) IV Intermittent every 8 hours  DULoxetine 60 milliGRAM(s) Oral daily  fenofibrate Tablet 145 milliGRAM(s) Oral daily  ferrous    sulfate 325 milliGRAM(s) Oral daily  gabapentin 300 milliGRAM(s) Oral two times a day  heparin   Injectable 5000 Unit(s) SubCutaneous every 8 hours  levothyroxine 125 MICROGram(s) Oral daily  lidocaine   4% Patch 1 Patch Transdermal every 24 hours  meclizine 12.5 milliGRAM(s) Oral two times a day  melatonin 3 milliGRAM(s) Oral at bedtime  multivitamin 1 Tablet(s) Oral daily  pantoprazole    Tablet 40 milliGRAM(s) Oral before breakfast  polyethylene glycol 3350 17 Gram(s) Oral two times a day  QUEtiapine 25 milliGRAM(s) Oral at bedtime  rOPINIRole 2 milliGRAM(s) Oral daily  senna 2 Tablet(s) Oral at bedtime  sodium chloride 1 Gram(s) Oral three times a day  sodium chloride 0.65% Nasal 1 Spray(s) Both Nostrils two times a day    MEDICATIONS  (PRN):  acetaminophen     Tablet .. 650 milliGRAM(s) Oral every 6 hours PRN Temp greater or equal to 38C (100.4F), Mild Pain (1 - 3)  ALBUTerol    90 MICROgram(s) HFA Inhaler 2 Puff(s) Inhalation every 6 hours PRN Wheezing  fluticasone propionate 50 MICROgram(s)/spray Nasal Spray 1 Spray(s) Both Nostrils every 12 hours PRN nasal congestion  guaiFENesin Oral Liquid (Sugar-Free) 100 milliGRAM(s) Oral every 6 hours PRN Cough  traMADol 50 milliGRAM(s) Oral every 4 hours PRN Moderate Pain (4 - 6)  traMADol 100 milliGRAM(s) Oral every 4 hours PRN Severe Pain (7 - 10)    Labs: lab holiday

## 2022-11-07 LAB
ANION GAP SERPL CALC-SCNC: 11 MMOL/L — SIGNIFICANT CHANGE UP (ref 7–14)
APTT BLD: 33.6 SEC — SIGNIFICANT CHANGE UP (ref 27–36.3)
BLD GP AB SCN SERPL QL: NEGATIVE — SIGNIFICANT CHANGE UP
BUN SERPL-MCNC: 11 MG/DL — SIGNIFICANT CHANGE UP (ref 7–23)
CALCIUM SERPL-MCNC: 10 MG/DL — SIGNIFICANT CHANGE UP (ref 8.4–10.5)
CHLORIDE SERPL-SCNC: 94 MMOL/L — LOW (ref 98–107)
CO2 SERPL-SCNC: 23 MMOL/L — SIGNIFICANT CHANGE UP (ref 22–31)
CREAT SERPL-MCNC: 0.56 MG/DL — SIGNIFICANT CHANGE UP (ref 0.5–1.3)
EGFR: 94 ML/MIN/1.73M2 — SIGNIFICANT CHANGE UP
GLUCOSE SERPL-MCNC: 99 MG/DL — SIGNIFICANT CHANGE UP (ref 70–99)
HCT VFR BLD CALC: 28.3 % — LOW (ref 34.5–45)
HGB BLD-MCNC: 8.5 G/DL — LOW (ref 11.5–15.5)
INR BLD: 1.17 RATIO — HIGH (ref 0.88–1.16)
MAGNESIUM SERPL-MCNC: 2 MG/DL — SIGNIFICANT CHANGE UP (ref 1.6–2.6)
MCHC RBC-ENTMCNC: 24.4 PG — LOW (ref 27–34)
MCHC RBC-ENTMCNC: 30 GM/DL — LOW (ref 32–36)
MCV RBC AUTO: 81.1 FL — SIGNIFICANT CHANGE UP (ref 80–100)
NRBC # BLD: 0 /100 WBCS — SIGNIFICANT CHANGE UP (ref 0–0)
NRBC # FLD: 0 K/UL — SIGNIFICANT CHANGE UP (ref 0–0)
PHOSPHATE SERPL-MCNC: 3.3 MG/DL — SIGNIFICANT CHANGE UP (ref 2.5–4.5)
PLATELET # BLD AUTO: 480 K/UL — HIGH (ref 150–400)
POTASSIUM SERPL-MCNC: 4.5 MMOL/L — SIGNIFICANT CHANGE UP (ref 3.5–5.3)
POTASSIUM SERPL-SCNC: 4.5 MMOL/L — SIGNIFICANT CHANGE UP (ref 3.5–5.3)
PROTHROM AB SERPL-ACNC: 13.6 SEC — HIGH (ref 10.5–13.4)
RBC # BLD: 3.49 M/UL — LOW (ref 3.8–5.2)
RBC # FLD: 20.3 % — HIGH (ref 10.3–14.5)
RH IG SCN BLD-IMP: POSITIVE — SIGNIFICANT CHANGE UP
SARS-COV-2 RNA SPEC QL NAA+PROBE: SIGNIFICANT CHANGE UP
SODIUM SERPL-SCNC: 128 MMOL/L — LOW (ref 135–145)
WBC # BLD: 10.4 K/UL — SIGNIFICANT CHANGE UP (ref 3.8–10.5)
WBC # FLD AUTO: 10.4 K/UL — SIGNIFICANT CHANGE UP (ref 3.8–10.5)

## 2022-11-07 PROCEDURE — 93320 DOPPLER ECHO COMPLETE: CPT | Mod: 26,GC

## 2022-11-07 PROCEDURE — 99232 SBSQ HOSP IP/OBS MODERATE 35: CPT

## 2022-11-07 PROCEDURE — 93312 ECHO TRANSESOPHAGEAL: CPT | Mod: 26

## 2022-11-07 PROCEDURE — 93325 DOPPLER ECHO COLOR FLOW MAPG: CPT | Mod: 26,GC

## 2022-11-07 PROCEDURE — 76376 3D RENDER W/INTRP POSTPROCES: CPT | Mod: 26

## 2022-11-07 RX ORDER — LANOLIN ALCOHOL/MO/W.PET/CERES
3 CREAM (GRAM) TOPICAL AT BEDTIME
Refills: 0 | Status: DISCONTINUED | OUTPATIENT
Start: 2022-11-07 | End: 2022-11-07

## 2022-11-07 RX ORDER — ONDANSETRON 8 MG/1
4 TABLET, FILM COATED ORAL ONCE
Refills: 0 | Status: COMPLETED | OUTPATIENT
Start: 2022-11-07 | End: 2022-11-07

## 2022-11-07 RX ADMIN — Medication 145 MILLIGRAM(S): at 15:26

## 2022-11-07 RX ADMIN — Medication 12.5 MILLIGRAM(S): at 17:54

## 2022-11-07 RX ADMIN — HEPARIN SODIUM 5000 UNIT(S): 5000 INJECTION INTRAVENOUS; SUBCUTANEOUS at 15:27

## 2022-11-07 RX ADMIN — QUETIAPINE FUMARATE 25 MILLIGRAM(S): 200 TABLET, FILM COATED ORAL at 21:21

## 2022-11-07 RX ADMIN — Medication 1 SPRAY(S): at 06:23

## 2022-11-07 RX ADMIN — Medication 100 MILLIGRAM(S): at 06:23

## 2022-11-07 RX ADMIN — Medication 100 MILLIGRAM(S): at 15:30

## 2022-11-07 RX ADMIN — Medication 1 TABLET(S): at 15:28

## 2022-11-07 RX ADMIN — DULOXETINE HYDROCHLORIDE 60 MILLIGRAM(S): 30 CAPSULE, DELAYED RELEASE ORAL at 15:26

## 2022-11-07 RX ADMIN — Medication 3 MILLIGRAM(S): at 21:21

## 2022-11-07 RX ADMIN — Medication 100 MILLIGRAM(S): at 21:22

## 2022-11-07 RX ADMIN — HEPARIN SODIUM 5000 UNIT(S): 5000 INJECTION INTRAVENOUS; SUBCUTANEOUS at 21:21

## 2022-11-07 RX ADMIN — TRAMADOL HYDROCHLORIDE 50 MILLIGRAM(S): 50 TABLET ORAL at 18:25

## 2022-11-07 RX ADMIN — ROPINIROLE 2 MILLIGRAM(S): 8 TABLET, FILM COATED, EXTENDED RELEASE ORAL at 21:21

## 2022-11-07 RX ADMIN — Medication 100 MILLIGRAM(S): at 21:21

## 2022-11-07 RX ADMIN — SODIUM CHLORIDE 1 GRAM(S): 9 INJECTION INTRAMUSCULAR; INTRAVENOUS; SUBCUTANEOUS at 21:21

## 2022-11-07 RX ADMIN — ONDANSETRON 4 MILLIGRAM(S): 8 TABLET, FILM COATED ORAL at 17:54

## 2022-11-07 RX ADMIN — Medication 3 MILLILITER(S): at 06:20

## 2022-11-07 RX ADMIN — Medication 125 MICROGRAM(S): at 06:26

## 2022-11-07 RX ADMIN — HEPARIN SODIUM 5000 UNIT(S): 5000 INJECTION INTRAVENOUS; SUBCUTANEOUS at 06:27

## 2022-11-07 RX ADMIN — Medication 325 MILLIGRAM(S): at 15:26

## 2022-11-07 RX ADMIN — SENNA PLUS 2 TABLET(S): 8.6 TABLET ORAL at 21:21

## 2022-11-07 RX ADMIN — Medication 1 SPRAY(S): at 17:55

## 2022-11-07 RX ADMIN — Medication 3 MILLILITER(S): at 21:42

## 2022-11-07 RX ADMIN — SODIUM CHLORIDE 1 GRAM(S): 9 INJECTION INTRAMUSCULAR; INTRAVENOUS; SUBCUTANEOUS at 15:28

## 2022-11-07 RX ADMIN — Medication 100 MILLIGRAM(S): at 15:27

## 2022-11-07 RX ADMIN — Medication 5 MILLIGRAM(S): at 21:20

## 2022-11-07 RX ADMIN — POLYETHYLENE GLYCOL 3350 17 GRAM(S): 17 POWDER, FOR SOLUTION ORAL at 17:55

## 2022-11-07 RX ADMIN — GABAPENTIN 300 MILLIGRAM(S): 400 CAPSULE ORAL at 17:55

## 2022-11-07 NOTE — PROGRESS NOTE ADULT - SUBJECTIVE AND OBJECTIVE BOX
CC: Patient is a 77y old  Female who presents with a chief complaint of Fall / UTI (07 Nov 2022 14:43)    ID following for MSSA bacteremia    Interval History/ROS: Patient returned from SHAUN. With nausea. Remains with lower back pain.    Rest of ROS negative.    Allergies  erythromycin (Hives)  IV Contrast (Anaphylaxis)    ANTIMICROBIALS:  ceFAZolin   IVPB 2000 every 8 hours    OTHER MEDS:  acetaminophen     Tablet .. 650 milliGRAM(s) Oral every 6 hours PRN  ALBUTerol    90 MICROgram(s) HFA Inhaler 2 Puff(s) Inhalation every 6 hours PRN  albuterol/ipratropium for Nebulization 3 milliLiter(s) Nebulizer every 12 hours  benzonatate 100 milliGRAM(s) Oral three times a day  bisacodyl 5 milliGRAM(s) Oral at bedtime  DULoxetine 60 milliGRAM(s) Oral daily  fenofibrate Tablet 145 milliGRAM(s) Oral daily  ferrous    sulfate 325 milliGRAM(s) Oral daily  fluticasone propionate 50 MICROgram(s)/spray Nasal Spray 1 Spray(s) Both Nostrils every 12 hours PRN  gabapentin 300 milliGRAM(s) Oral two times a day  guaiFENesin Oral Liquid (Sugar-Free) 100 milliGRAM(s) Oral every 6 hours PRN  heparin   Injectable 5000 Unit(s) SubCutaneous every 8 hours  levothyroxine 125 MICROGram(s) Oral daily  lidocaine   4% Patch 1 Patch Transdermal every 24 hours  meclizine 12.5 milliGRAM(s) Oral two times a day  melatonin 3 milliGRAM(s) Oral at bedtime  multivitamin 1 Tablet(s) Oral daily  ondansetron Injectable 4 milliGRAM(s) IV Push once  pantoprazole    Tablet 40 milliGRAM(s) Oral before breakfast  polyethylene glycol 3350 17 Gram(s) Oral two times a day  QUEtiapine 25 milliGRAM(s) Oral at bedtime  rOPINIRole 2 milliGRAM(s) Oral daily  senna 2 Tablet(s) Oral at bedtime  sodium chloride 1 Gram(s) Oral three times a day  sodium chloride 0.65% Nasal 1 Spray(s) Both Nostrils two times a day  traMADol 50 milliGRAM(s) Oral every 4 hours PRN  traMADol 100 milliGRAM(s) Oral every 4 hours PRN    PE:    Vital Signs Last 24 Hrs  T(C): 36.8 (07 Nov 2022 15:30), Max: 36.8 (07 Nov 2022 06:20)  T(F): 98.3 (07 Nov 2022 15:30), Max: 98.3 (07 Nov 2022 15:30)  HR: 82 (07 Nov 2022 15:30) (71 - 82)  BP: 128/72 (07 Nov 2022 15:30) (119/64 - 130/74)  BP(mean): --  RR: 19 (07 Nov 2022 15:30) (19 - 19)  SpO2: 100% (07 Nov 2022 15:30) (96% - 100%)    Parameters below as of 07 Nov 2022 15:30  Patient On (Oxygen Delivery Method): room air    Gen: AOx3, NAD  CV: S1+S2 normal, no murmurs  Resp: Clear bilat, no resp distress  Abd: Soft, nontender, +BS  Ext: No LE edema, no wounds  : No Block  IV/Skin: No thrombophlebitis  Neuro: no focal deficits    LABS:                          8.5    10.40 )-----------( 480      ( 07 Nov 2022 06:01 )             28.3       11-07    128<L>  |  94<L>  |  11  ----------------------------<  99  4.5   |  23  |  0.56    Ca    10.0      07 Nov 2022 06:01  Phos  3.3     11-07  Mg     2.00     11-07            MICROBIOLOGY:  v  .Blood Blood-Venous  10-29-22   No Growth Final  --  --      .Blood Blood-Peripheral  10-29-22   No Growth Final  --  --      .Blood Blood-Peripheral  10-27-22   No Growth Final  --  --      .Blood Blood-Peripheral  10-27-22   Growth in anaerobic bottle: Staphylococcus aureus  See previous culture [id number]  --    Growth in anaerobic bottle: Gram Positive Cocci in Clusters      .Blood Blood  10-25-22   Growth in aerobic and anaerobic bottles: Staphylococcus aureus  ***Blood Panel PCR results on this specimen are available  approximately 3 hours after the Gram stain result.***  Gram stain, PCR, and/or culture results may not always  correspond dueto difference in methodologies.  ************************************************************  This PCR assay was performed by multiplex PCR. This  Assay tests for 66 bacterial and resistance gene targets.  Please refer to the [facility] Labs test directory  at [email or web address] for details.  --  Blood Culture PCR  Staphylococcus aureus      Clean Catch Clean Catch (Midstream)  10-25-22   Culture grew 3 or more types of organisms which indicate  collection contamination; consider recollection only if clinically  indicated.  --  --    RADIOLOGY:    < from: Xray Cinesophagram Swallow Function w/ Contrast (11.04.22 @ 09:42) >  IMPRESSION:    There is laryngeal penetration without aspiration.    For further information and recommendations, please refer to the speech   pathologist final report which is available for review in the electronic   medical record.    < end of copied text >

## 2022-11-07 NOTE — PROGRESS NOTE ADULT - SUBJECTIVE AND OBJECTIVE BOX
Collin Salcedo MD  Academic Hospitalist  Pager [phone]  Email: [email or web address]  Available on [employer] Teams        PROGRESS NOTE:     Patient is a 77y old  Female who presents with a chief complaint of Fall / UTI (06 Nov 2022 15:56)      SUBJECTIVE / OVERNIGHT EVENTS:  Patient seen and examined this morning. Patient going for SHAUN.   ADDITIONAL REVIEW OF SYSTEMS:  No f/c/n/v    MEDICATIONS  (STANDING):  albuterol/ipratropium for Nebulization 3 milliLiter(s) Nebulizer every 12 hours  benzonatate 100 milliGRAM(s) Oral three times a day  bisacodyl 5 milliGRAM(s) Oral at bedtime  ceFAZolin   IVPB 2000 milliGRAM(s) IV Intermittent every 8 hours  DULoxetine 60 milliGRAM(s) Oral daily  fenofibrate Tablet 145 milliGRAM(s) Oral daily  ferrous    sulfate 325 milliGRAM(s) Oral daily  gabapentin 300 milliGRAM(s) Oral two times a day  heparin   Injectable 5000 Unit(s) SubCutaneous every 8 hours  levothyroxine 125 MICROGram(s) Oral daily  lidocaine   4% Patch 1 Patch Transdermal every 24 hours  meclizine 12.5 milliGRAM(s) Oral two times a day  melatonin 3 milliGRAM(s) Oral at bedtime  multivitamin 1 Tablet(s) Oral daily  pantoprazole    Tablet 40 milliGRAM(s) Oral before breakfast  polyethylene glycol 3350 17 Gram(s) Oral two times a day  QUEtiapine 25 milliGRAM(s) Oral at bedtime  rOPINIRole 2 milliGRAM(s) Oral daily  senna 2 Tablet(s) Oral at bedtime  sodium chloride 1 Gram(s) Oral three times a day  sodium chloride 0.65% Nasal 1 Spray(s) Both Nostrils two times a day    MEDICATIONS  (PRN):  acetaminophen     Tablet .. 650 milliGRAM(s) Oral every 6 hours PRN Temp greater or equal to 38C (100.4F), Mild Pain (1 - 3)  ALBUTerol    90 MICROgram(s) HFA Inhaler 2 Puff(s) Inhalation every 6 hours PRN Wheezing  fluticasone propionate 50 MICROgram(s)/spray Nasal Spray 1 Spray(s) Both Nostrils every 12 hours PRN nasal congestion  guaiFENesin Oral Liquid (Sugar-Free) 100 milliGRAM(s) Oral every 6 hours PRN Cough  traMADol 50 milliGRAM(s) Oral every 4 hours PRN Moderate Pain (4 - 6)  traMADol 100 milliGRAM(s) Oral every 4 hours PRN Severe Pain (7 - 10)      CAPILLARY BLOOD GLUCOSE        I&O's Summary      PHYSICAL EXAM:  Vital Signs Last 24 Hrs  T(C): 36.8 (07 Nov 2022 06:20), Max: 36.8 (07 Nov 2022 06:20)  T(F): 98.2 (07 Nov 2022 06:20), Max: 98.2 (07 Nov 2022 06:20)  HR: 74 (07 Nov 2022 06:23) (71 - 80)  BP: 130/74 (07 Nov 2022 06:20) (119/64 - 130/74)  BP(mean): --  RR: 19 (07 Nov 2022 06:20) (19 - 19)  SpO2: 97% (07 Nov 2022 06:23) (96% - 100%)    Parameters below as of 07 Nov 2022 06:23  Patient On (Oxygen Delivery Method): room air        CONSTITUTIONAL: NAD, well-developed, obese,   General: Well appearing female - comfortable.   HEENT: anicteric sclera; MMM  Neck: supple  Cardiovascular: +S1/S2; RRR  Respiratory: CTA B/L; no W/R/R  Gastrointestinal: soft, NT/ND; +BSx4  Extremities: WWP; no edema, clubbing or cyanosis  Vascular: 2+ radial, DP/PT pulses B/L  Neurological: AAOx3; no focal deficits      LABS:                        8.5    10.40 )-----------( 480      ( 07 Nov 2022 06:01 )             28.3     11-07    128<L>  |  94<L>  |  11  ----------------------------<  99  4.5   |  23  |  0.56    Ca    10.0      07 Nov 2022 06:01  Phos  3.3     11-07  Mg     2.00     11-07      PT/INR - ( 07 Nov 2022 06:01 )   PT: 13.6 sec;   INR: 1.17 ratio         PTT - ( 07 Nov 2022 06:01 )  PTT:33.6 sec            RADIOLOGY & ADDITIONAL TESTS:  Results Reviewed:   Imaging Personally Reviewed:  Electrocardiogram Personally Reviewed:    COORDINATION OF CARE:  Care Discussed with Consultants/Other Providers [Y/N]:  Prior or Outpatient Records Reviewed [Y/N]:

## 2022-11-08 LAB
ANION GAP SERPL CALC-SCNC: 11 MMOL/L — SIGNIFICANT CHANGE UP (ref 7–14)
BUN SERPL-MCNC: 13 MG/DL — SIGNIFICANT CHANGE UP (ref 7–23)
CALCIUM SERPL-MCNC: 9.8 MG/DL — SIGNIFICANT CHANGE UP (ref 8.4–10.5)
CHLORIDE SERPL-SCNC: 95 MMOL/L — LOW (ref 98–107)
CO2 SERPL-SCNC: 23 MMOL/L — SIGNIFICANT CHANGE UP (ref 22–31)
CREAT SERPL-MCNC: 0.7 MG/DL — SIGNIFICANT CHANGE UP (ref 0.5–1.3)
EGFR: 89 ML/MIN/1.73M2 — SIGNIFICANT CHANGE UP
GLUCOSE SERPL-MCNC: 88 MG/DL — SIGNIFICANT CHANGE UP (ref 70–99)
HCT VFR BLD CALC: 28 % — LOW (ref 34.5–45)
HGB BLD-MCNC: 8.3 G/DL — LOW (ref 11.5–15.5)
MAGNESIUM SERPL-MCNC: 2 MG/DL — SIGNIFICANT CHANGE UP (ref 1.6–2.6)
MCHC RBC-ENTMCNC: 24.3 PG — LOW (ref 27–34)
MCHC RBC-ENTMCNC: 29.6 GM/DL — LOW (ref 32–36)
MCV RBC AUTO: 82.1 FL — SIGNIFICANT CHANGE UP (ref 80–100)
NRBC # BLD: 0 /100 WBCS — SIGNIFICANT CHANGE UP (ref 0–0)
NRBC # FLD: 0 K/UL — SIGNIFICANT CHANGE UP (ref 0–0)
PHOSPHATE SERPL-MCNC: 3.9 MG/DL — SIGNIFICANT CHANGE UP (ref 2.5–4.5)
PLATELET # BLD AUTO: 420 K/UL — HIGH (ref 150–400)
POTASSIUM SERPL-MCNC: 4.3 MMOL/L — SIGNIFICANT CHANGE UP (ref 3.5–5.3)
POTASSIUM SERPL-SCNC: 4.3 MMOL/L — SIGNIFICANT CHANGE UP (ref 3.5–5.3)
RBC # BLD: 3.41 M/UL — LOW (ref 3.8–5.2)
RBC # FLD: 20.8 % — HIGH (ref 10.3–14.5)
SODIUM SERPL-SCNC: 129 MMOL/L — LOW (ref 135–145)
WBC # BLD: 8.51 K/UL — SIGNIFICANT CHANGE UP (ref 3.8–10.5)
WBC # FLD AUTO: 8.51 K/UL — SIGNIFICANT CHANGE UP (ref 3.8–10.5)

## 2022-11-08 PROCEDURE — 99232 SBSQ HOSP IP/OBS MODERATE 35: CPT

## 2022-11-08 RX ADMIN — HEPARIN SODIUM 5000 UNIT(S): 5000 INJECTION INTRAVENOUS; SUBCUTANEOUS at 05:13

## 2022-11-08 RX ADMIN — Medication 5 MILLIGRAM(S): at 22:04

## 2022-11-08 RX ADMIN — HEPARIN SODIUM 5000 UNIT(S): 5000 INJECTION INTRAVENOUS; SUBCUTANEOUS at 21:59

## 2022-11-08 RX ADMIN — Medication 3 MILLILITER(S): at 07:05

## 2022-11-08 RX ADMIN — Medication 100 MILLIGRAM(S): at 05:13

## 2022-11-08 RX ADMIN — SODIUM CHLORIDE 1 GRAM(S): 9 INJECTION INTRAMUSCULAR; INTRAVENOUS; SUBCUTANEOUS at 05:13

## 2022-11-08 RX ADMIN — POLYETHYLENE GLYCOL 3350 17 GRAM(S): 17 POWDER, FOR SOLUTION ORAL at 05:13

## 2022-11-08 RX ADMIN — SENNA PLUS 2 TABLET(S): 8.6 TABLET ORAL at 22:04

## 2022-11-08 RX ADMIN — TRAMADOL HYDROCHLORIDE 100 MILLIGRAM(S): 50 TABLET ORAL at 19:40

## 2022-11-08 RX ADMIN — Medication 1 SPRAY(S): at 05:13

## 2022-11-08 RX ADMIN — Medication 3 MILLILITER(S): at 20:46

## 2022-11-08 RX ADMIN — TRAMADOL HYDROCHLORIDE 100 MILLIGRAM(S): 50 TABLET ORAL at 04:27

## 2022-11-08 RX ADMIN — Medication 100 MILLIGRAM(S): at 22:06

## 2022-11-08 RX ADMIN — SODIUM CHLORIDE 1 GRAM(S): 9 INJECTION INTRAMUSCULAR; INTRAVENOUS; SUBCUTANEOUS at 22:06

## 2022-11-08 RX ADMIN — TRAMADOL HYDROCHLORIDE 100 MILLIGRAM(S): 50 TABLET ORAL at 16:10

## 2022-11-08 RX ADMIN — PANTOPRAZOLE SODIUM 40 MILLIGRAM(S): 20 TABLET, DELAYED RELEASE ORAL at 05:12

## 2022-11-08 RX ADMIN — Medication 100 MILLIGRAM(S): at 22:14

## 2022-11-08 RX ADMIN — TRAMADOL HYDROCHLORIDE 100 MILLIGRAM(S): 50 TABLET ORAL at 05:27

## 2022-11-08 RX ADMIN — Medication 12.5 MILLIGRAM(S): at 05:13

## 2022-11-08 RX ADMIN — TRAMADOL HYDROCHLORIDE 100 MILLIGRAM(S): 50 TABLET ORAL at 11:50

## 2022-11-08 RX ADMIN — TRAMADOL HYDROCHLORIDE 100 MILLIGRAM(S): 50 TABLET ORAL at 10:55

## 2022-11-08 RX ADMIN — QUETIAPINE FUMARATE 25 MILLIGRAM(S): 200 TABLET, FILM COATED ORAL at 22:05

## 2022-11-08 RX ADMIN — TRAMADOL HYDROCHLORIDE 100 MILLIGRAM(S): 50 TABLET ORAL at 15:22

## 2022-11-08 RX ADMIN — ROPINIROLE 2 MILLIGRAM(S): 8 TABLET, FILM COATED, EXTENDED RELEASE ORAL at 22:04

## 2022-11-08 RX ADMIN — Medication 125 MICROGRAM(S): at 05:12

## 2022-11-08 RX ADMIN — Medication 3 MILLIGRAM(S): at 22:05

## 2022-11-08 RX ADMIN — GABAPENTIN 300 MILLIGRAM(S): 400 CAPSULE ORAL at 05:12

## 2022-11-08 NOTE — PROGRESS NOTE ADULT - SUBJECTIVE AND OBJECTIVE BOX
CC: Patient is a 77y old  Female who presents with a chief complaint of Fall / UTI (07 Nov 2022 14:43)    ID following for MSSA bacteremia    Interval History/ROS:    feels ok. c/o right arm iv restricting movement.   in chair.  sister at bedside.    Rest of ROS negative.    Allergies  erythromycin (Hives)  IV Contrast (Anaphylaxis)    ANTIMICROBIALS:  ceFAZolin   IVPB 2000 every 8 hours      OTHER MEDS:  acetaminophen     Tablet .. 650 milliGRAM(s) Oral every 6 hours PRN  ALBUTerol    90 MICROgram(s) HFA Inhaler 2 Puff(s) Inhalation every 6 hours PRN  albuterol/ipratropium for Nebulization 3 milliLiter(s) Nebulizer every 12 hours  benzonatate 100 milliGRAM(s) Oral three times a day  bisacodyl 5 milliGRAM(s) Oral at bedtime  DULoxetine 60 milliGRAM(s) Oral daily  fenofibrate Tablet 145 milliGRAM(s) Oral daily  ferrous    sulfate 325 milliGRAM(s) Oral daily  fluticasone propionate 50 MICROgram(s)/spray Nasal Spray 1 Spray(s) Both Nostrils every 12 hours PRN  gabapentin 300 milliGRAM(s) Oral two times a day  guaiFENesin Oral Liquid (Sugar-Free) 100 milliGRAM(s) Oral every 6 hours PRN  heparin   Injectable 5000 Unit(s) SubCutaneous every 8 hours  levothyroxine 125 MICROGram(s) Oral daily  lidocaine   4% Patch 1 Patch Transdermal every 24 hours  meclizine 12.5 milliGRAM(s) Oral two times a day  melatonin 3 milliGRAM(s) Oral at bedtime  multivitamin 1 Tablet(s) Oral daily  ondansetron Injectable 4 milliGRAM(s) IV Push once  pantoprazole    Tablet 40 milliGRAM(s) Oral before breakfast  polyethylene glycol 3350 17 Gram(s) Oral two times a day  QUEtiapine 25 milliGRAM(s) Oral at bedtime  rOPINIRole 2 milliGRAM(s) Oral daily  senna 2 Tablet(s) Oral at bedtime  sodium chloride 1 Gram(s) Oral three times a day  sodium chloride 0.65% Nasal 1 Spray(s) Both Nostrils two times a day  traMADol 50 milliGRAM(s) Oral every 4 hours PRN  traMADol 100 milliGRAM(s) Oral every 4 hours PRN    PE:  Vital Signs Last 24 Hrs  T(F): 98.2 (11-08-22 @ 12:22), Max: 98.2 (11-07-22 @ 21:20)  HR: 80 (11-08-22 @ 12:22)  BP: 113/60 (11-08-22 @ 12:22)  RR: 17 (11-08-22 @ 12:22)  SpO2: 98% (11-08-22 @ 12:22) (97% - 98%)    Gen: AOx3, NAD, in chair  CV: S1+S2   Resp: Clear bilat, no resp distress  Abd: Soft, nontender, +BS  : No Block  IV/Skin: iv right forearm  Neuro: no focal deficits    LABS:                                   8.3    8.51  )-----------( 420      ( 08 Nov 2022 06:55 )             28.0 11-08    129  |  95  |  13  ----------------------------<  88  4.3   |  23  |  0.70  Ca    9.8      08 Nov 2022 06:55Phos  3.9     11-08Mg     2.00     11-08        MICROBIOLOGY:  v  .Blood Blood-Venous  10-29-22   No Growth Final  --  --      .Blood Blood-Peripheral  10-29-22   No Growth Final  --  --      .Blood Blood-Peripheral  10-27-22   No Growth Final  --  --      .Blood Blood-Peripheral  10-27-22   Growth in anaerobic bottle: Staphylococcus aureus  See previous culture [id number]  --    Growth in anaerobic bottle: Gram Positive Cocci in Clusters      .Blood Blood  10-25-22   Growth in aerobic and anaerobic bottles: Staphylococcus aureus  ***Blood Panel PCR results on this specimen are available  approximately 3 hours after the Gram stain result.***  Gram stain, PCR, and/or culture results may not always  correspond dueto difference in methodologies.  ************************************************************  This PCR assay was performed by multiplex PCR. This  Assay tests for 66 bacterial and resistance gene targets.  Please refer to the [facility] test directory  at [email or web address] for details.  --  Blood Culture PCR  Staphylococcus aureus      Clean Catch Clean Catch (Midstream)  10-25-22   Culture grew 3 or more types of organisms which indicate  collection contamination; consider recollection only if clinically  indicated.  --  --        eho< from: SHAUN w/o TTE (w/3D Echo) (11.07.22 @ 07:45) >  ------------------------------------------------------------------------  CONCLUSIONS:  1. Mitral annular calcification, otherwise normal mitral  valve.  No vegetations seen in association with the mitral  valve. Mild mitral regurgitation.  2. Calcified trileaflet aortic valve with normal opening.  No vegetations seen in association with the aortic valve.  Minimal aortic regurgitation.  3. Normal aortic root.  Mild non-mobile atherosclerotic  plaque in the aortic arch and descending thoracic aorta.  4. Normal left atrium.  No left atrial or left atrial  appendage thrombus.  5. Normal left ventricular systolic function. No segmental  wall motion abnormalities.  6. Normal right ventricular size and function.  7. Contrast injection demonstrates no evidence of a patent  foramen ovale.  No valvular vegetations were identified.  ------------------------------------------------------------------------  Confirmed on  11/7/2022 - 14:47:20 by Danish Orozco M.D.,  [facility], [facility]  ------------------------------------------------------------------------    < end of copied text >

## 2022-11-08 NOTE — CHART NOTE - NSCHARTNOTEFT_GEN_A_CORE
Source: Patient [x ]    Family [x ]by bedside     other [x ]Chart Review    Current Diet : Diet, Regular (11-04-22 @ 15:04) [Active]  PO intake:  < 50% [x ]   Height (cm): 166.4 (25 Oct 2022 04:13)  Weight: 86.9kg/191.5 (10/30/2022, per flow sheet), no new weight to assess  BMI (kg/m2): 31.4 (30 Oct 2022 21:00)    Nutrition Note:   77 year old female with past medical history of significant for hypothyroidism, HTN, HLD, SVT (s/p ablation), Cervical cancer (s/p hysterectomy), DVT (June 2022, still on eliquis), OA and peripheral neuropathy presenting to [facility] after having fall from bed overnight. Patient found to have UTI and started on IV CTX. CT head with no evidence of bleed, CT C spine and chest no evidence of fractures. Admit to medicine for further evaluation, per chart.  Patient and patient's family by bedside reports poor meal consumption, 2/2 pain and decreased appetite. As per tray observation during visit, patient is seen consuming <50% of lunch. As per flow sheet, patient is consuming 0-25% of meals, needs assistance in feeding. Food preferences obtained and updated on CBORD. Patient is amendable to Magic cup supplement, refused Ensure supplements when offered. Patient was seen by speech therapist on 11/4/2022, on regular, thin liquid diet. No reports of any GI distress at this time. Last bowel movement 11/6/2022, per flow sheet. Patient is on bowel regimen. As per flow sheet, patient has 2+edema to left leg, right leg, left ankle, right ankle, left foot, right foot, no pressure injury noted at this time. Labs reviewed: low hemoglobin/ hematocrit- 8.3/28.0 (11/8/2022), low sodium-129 (11/8/2022), on sodium chloride, iron sulfate for repletion. Patient is on multivitamins for micronutrient support. As per last RD note dated 11/1/2022, patient reports usual body weight of 200lbs, patient weighs 86.9kg/191.5lbs(10/30/2022, per flow sheet), indicating weight loss of 8.5lbs/ 4.3% BW (unknown time frame). Will continue to monitor weight trend. Nutrition focused physical examination performed, noted patient has mild muscle wasting to clavicle and temporal region. Patient meets the criteria of moderate malnutrition in the context of context of acute illness.     __________________ Pertinent Medications__________________   MEDICATIONS  (STANDING):  albuterol/ipratropium for Nebulization 3 milliLiter(s) Nebulizer every 12 hours  benzonatate 100 milliGRAM(s) Oral three times a day  bisacodyl 5 milliGRAM(s) Oral at bedtime  ceFAZolin   IVPB 2000 milliGRAM(s) IV Intermittent every 8 hours  DULoxetine 60 milliGRAM(s) Oral daily  fenofibrate Tablet 145 milliGRAM(s) Oral daily  ferrous    sulfate 325 milliGRAM(s) Oral daily  gabapentin 300 milliGRAM(s) Oral two times a day  heparin   Injectable 5000 Unit(s) SubCutaneous every 8 hours  levothyroxine 125 MICROGram(s) Oral daily  lidocaine   4% Patch 1 Patch Transdermal every 24 hours  meclizine 12.5 milliGRAM(s) Oral two times a day  melatonin 3 milliGRAM(s) Oral at bedtime  multivitamin 1 Tablet(s) Oral daily  pantoprazole    Tablet 40 milliGRAM(s) Oral before breakfast  polyethylene glycol 3350 17 Gram(s) Oral two times a day  QUEtiapine 25 milliGRAM(s) Oral at bedtime  rOPINIRole 2 milliGRAM(s) Oral daily  senna 2 Tablet(s) Oral at bedtime  sodium chloride 1 Gram(s) Oral three times a day  sodium chloride 0.65% Nasal 1 Spray(s) Both Nostrils two times a day    MEDICATIONS  (PRN):  acetaminophen     Tablet .. 650 milliGRAM(s) Oral every 6 hours PRN Temp greater or equal to 38C (100.4F), Mild Pain (1 - 3)  ALBUTerol    90 MICROgram(s) HFA Inhaler 2 Puff(s) Inhalation every 6 hours PRN Wheezing  fluticasone propionate 50 MICROgram(s)/spray Nasal Spray 1 Spray(s) Both Nostrils every 12 hours PRN nasal congestion  guaiFENesin Oral Liquid (Sugar-Free) 100 milliGRAM(s) Oral every 6 hours PRN Cough  traMADol 50 milliGRAM(s) Oral every 4 hours PRN Moderate Pain (4 - 6)  traMADol 100 milliGRAM(s) Oral every 4 hours PRN Severe Pain (7 - 10)      __________________ Pertinent Labs__________________   11-08 Na129 mmol/L<L> Glu 88 mg/dL K+ 4.3 mmol/L Cr  0.70 mg/dL BUN 13 mg/dL 11-08 Phos 3.9 mg/dL 10-26 Chol 118 mg/dL LDL --    HDL 21 mg/dL<L> Trig 185 mg/dL<H>    Estimated Needs:   [x ] no change since previous assessment    Previous Nutrition Diagnosis:     [x ] No active nutrition diagnosis at this time.     Nutrition Diagnosis is [ x] not applicable     New Nutrition Diagnosis:   [x ] Moderate Malnutrition in the context of acute illness or injury  Related to: decrease ability to consume sufficient energy  As evidenced by: physical signs of mild muscle loss, moderate fluid accumulation.     Interventions:     Recommend    [x ] change diet to DASH.  [x ] Nutrition department to provide Magic cup 2x/day( 580kcal, 18gm protein) for nutrient support.  [x ] Encourage PO intake and honor food preferences as able.      Monitoring and Evaluation:     [x ] PO intake [x ] Tolerance to diet prescription [x ] weights [ x] follow up per protocol  [x ] other: Bowel movement, labs (electrolytes) Source: Patient [x ]    Family [x ]by bedside     other [x ]Chart Review    Current Diet : Diet, Regular (11-04-22 @ 15:04) [Active]  PO intake:  < 50% [x ]   Height (cm): 166.4 (25 Oct 2022 04:13)  Weight: 86.9kg/191.5 (10/30/2022, per flow sheet), no new weight to assess  BMI (kg/m2): 31.4 (30 Oct 2022 21:00)    Nutrition Note:   77 year old female with past medical history of significant for hypothyroidism, HTN, HLD, SVT (s/p ablation), Cervical cancer (s/p hysterectomy), DVT (June 2022, still on eliquis), OA and peripheral neuropathy presenting to [facility] after having fall from bed overnight. Patient found to have UTI and started on IV CTX. CT head with no evidence of bleed, CT C spine and chest no evidence of fractures. Admit to medicine for further evaluation, per chart.  Patient and patient's family by bedside reports poor meal consumption, 2/2 pain and decreased appetite. As per tray observation during visit, patient is seen consuming <50% of lunch. As per flow sheet, patient is consuming 0-25% of meals. Food preferences obtained and updated on CBORD. Patient is amendable to Magic cup supplement, refused Ensure supplements when offered. Patient was seen by speech therapist on 11/4/2022, on regular, thin liquid diet. No reports of any GI distress at this time. Last bowel movement 11/6/2022, per flow sheet. Patient is on bowel regimen. As per flow sheet, patient has 2+edema to left leg, right leg, left ankle, right ankle, left foot, right foot, no pressure injury noted at this time. Labs reviewed: low hemoglobin/ hematocrit- 8.3/28.0 (11/8/2022), low sodium-129 (11/8/2022), on sodium chloride, iron sulfate for repletion. Patient is on multivitamins for micronutrient support. As per last RD note dated 11/1/2022, patient reports usual body weight of 200lbs, patient weighs 86.9kg/191.5lbs(10/30/2022, per flow sheet), indicating weight loss of 8.5lbs/ 4.3% BW (unknown time frame). Will continue to monitor weight trend. Nutrition focused physical examination performed, noted patient has mild muscle wasting to clavicle and temporal region. Patient meets the criteria of moderate malnutrition in the context of context of acute illness.     __________________ Pertinent Medications__________________   MEDICATIONS  (STANDING):  albuterol/ipratropium for Nebulization 3 milliLiter(s) Nebulizer every 12 hours  benzonatate 100 milliGRAM(s) Oral three times a day  bisacodyl 5 milliGRAM(s) Oral at bedtime  ceFAZolin   IVPB 2000 milliGRAM(s) IV Intermittent every 8 hours  DULoxetine 60 milliGRAM(s) Oral daily  fenofibrate Tablet 145 milliGRAM(s) Oral daily  ferrous    sulfate 325 milliGRAM(s) Oral daily  gabapentin 300 milliGRAM(s) Oral two times a day  heparin   Injectable 5000 Unit(s) SubCutaneous every 8 hours  levothyroxine 125 MICROGram(s) Oral daily  lidocaine   4% Patch 1 Patch Transdermal every 24 hours  meclizine 12.5 milliGRAM(s) Oral two times a day  melatonin 3 milliGRAM(s) Oral at bedtime  multivitamin 1 Tablet(s) Oral daily  pantoprazole    Tablet 40 milliGRAM(s) Oral before breakfast  polyethylene glycol 3350 17 Gram(s) Oral two times a day  QUEtiapine 25 milliGRAM(s) Oral at bedtime  rOPINIRole 2 milliGRAM(s) Oral daily  senna 2 Tablet(s) Oral at bedtime  sodium chloride 1 Gram(s) Oral three times a day  sodium chloride 0.65% Nasal 1 Spray(s) Both Nostrils two times a day    MEDICATIONS  (PRN):  acetaminophen     Tablet .. 650 milliGRAM(s) Oral every 6 hours PRN Temp greater or equal to 38C (100.4F), Mild Pain (1 - 3)  ALBUTerol    90 MICROgram(s) HFA Inhaler 2 Puff(s) Inhalation every 6 hours PRN Wheezing  fluticasone propionate 50 MICROgram(s)/spray Nasal Spray 1 Spray(s) Both Nostrils every 12 hours PRN nasal congestion  guaiFENesin Oral Liquid (Sugar-Free) 100 milliGRAM(s) Oral every 6 hours PRN Cough  traMADol 50 milliGRAM(s) Oral every 4 hours PRN Moderate Pain (4 - 6)  traMADol 100 milliGRAM(s) Oral every 4 hours PRN Severe Pain (7 - 10)      __________________ Pertinent Labs__________________   11-08 Na129 mmol/L<L> Glu 88 mg/dL K+ 4.3 mmol/L Cr  0.70 mg/dL BUN 13 mg/dL 11-08 Phos 3.9 mg/dL 10-26 Chol 118 mg/dL LDL --    HDL 21 mg/dL<L> Trig 185 mg/dL<H>    Estimated Needs:   [x ] no change since previous assessment    Previous Nutrition Diagnosis:     [x ] No active nutrition diagnosis at this time.     Nutrition Diagnosis is [ x] not applicable     New Nutrition Diagnosis:   [x ] Moderate Malnutrition in the context of acute illness or injury  Related to: decrease ability to consume sufficient energy  As evidenced by: physical signs of mild muscle loss, moderate fluid accumulation.     Interventions:     Recommend    [x ] change diet to DASH.  [x ] Nutrition department to provide Magic cup 2x/day( 580kcal, 18gm protein) for nutrient support.  [x ] Encourage PO intake and honor food preferences as able.      Monitoring and Evaluation:     [x ] PO intake [x ] Tolerance to diet prescription [x ] weights [ x] follow up per protocol  [x ] other: Bowel movement, labs (electrolytes)

## 2022-11-08 NOTE — PROGRESS NOTE ADULT - SUBJECTIVE AND OBJECTIVE BOX
Collin Salcedo MD  Academic Hospitalist  Pager [phone]  Email: [email or web address]  Available on [employer] Teams        PROGRESS NOTE:     Patient is a 77y old  Female who presents with a chief complaint of Fall / UTI (07 Nov 2022 17:51)      SUBJECTIVE / OVERNIGHT EVENTS:  Patient seen and examined this morning. Patient c/o of back pain after she was placed in a recliner. She attempted to call her sister on her cell phone for an update, but there was no answer.   ADDITIONAL REVIEW OF SYSTEMS:  No f/c/n/v    MEDICATIONS  (STANDING):  albuterol/ipratropium for Nebulization 3 milliLiter(s) Nebulizer every 12 hours  benzonatate 100 milliGRAM(s) Oral three times a day  bisacodyl 5 milliGRAM(s) Oral at bedtime  ceFAZolin   IVPB 2000 milliGRAM(s) IV Intermittent every 8 hours  DULoxetine 60 milliGRAM(s) Oral daily  fenofibrate Tablet 145 milliGRAM(s) Oral daily  ferrous    sulfate 325 milliGRAM(s) Oral daily  gabapentin 300 milliGRAM(s) Oral two times a day  heparin   Injectable 5000 Unit(s) SubCutaneous every 8 hours  levothyroxine 125 MICROGram(s) Oral daily  lidocaine   4% Patch 1 Patch Transdermal every 24 hours  meclizine 12.5 milliGRAM(s) Oral two times a day  melatonin 3 milliGRAM(s) Oral at bedtime  multivitamin 1 Tablet(s) Oral daily  pantoprazole    Tablet 40 milliGRAM(s) Oral before breakfast  polyethylene glycol 3350 17 Gram(s) Oral two times a day  QUEtiapine 25 milliGRAM(s) Oral at bedtime  rOPINIRole 2 milliGRAM(s) Oral daily  senna 2 Tablet(s) Oral at bedtime  sodium chloride 1 Gram(s) Oral three times a day  sodium chloride 0.65% Nasal 1 Spray(s) Both Nostrils two times a day    MEDICATIONS  (PRN):  acetaminophen     Tablet .. 650 milliGRAM(s) Oral every 6 hours PRN Temp greater or equal to 38C (100.4F), Mild Pain (1 - 3)  ALBUTerol    90 MICROgram(s) HFA Inhaler 2 Puff(s) Inhalation every 6 hours PRN Wheezing  fluticasone propionate 50 MICROgram(s)/spray Nasal Spray 1 Spray(s) Both Nostrils every 12 hours PRN nasal congestion  guaiFENesin Oral Liquid (Sugar-Free) 100 milliGRAM(s) Oral every 6 hours PRN Cough  traMADol 50 milliGRAM(s) Oral every 4 hours PRN Moderate Pain (4 - 6)  traMADol 100 milliGRAM(s) Oral every 4 hours PRN Severe Pain (7 - 10)      CAPILLARY BLOOD GLUCOSE        I&O's Summary      PHYSICAL EXAM:  Vital Signs Last 24 Hrs  T(C): 36.8 (08 Nov 2022 12:22), Max: 36.8 (07 Nov 2022 15:30)  T(F): 98.2 (08 Nov 2022 12:22), Max: 98.3 (07 Nov 2022 15:30)  HR: 80 (08 Nov 2022 12:22) (74 - 82)  BP: 113/60 (08 Nov 2022 12:22) (110/50 - 142/74)  BP(mean): --  RR: 17 (08 Nov 2022 12:22) (17 - 19)  SpO2: 98% (08 Nov 2022 12:22) (97% - 100%)    Parameters below as of 08 Nov 2022 12:22  Patient On (Oxygen Delivery Method): room air        CONSTITUTIONAL: NAD, well-developed, obese,   General: Well appearing female - comfortable.   HEENT: anicteric sclera; MMM  Neck: supple  Cardiovascular: +S1/S2; RRR  Respiratory: CTA B/L; no W/R/R  Gastrointestinal: soft, NT/ND; +BSx4  Extremities: WWP; no edema, clubbing or cyanosis  Vascular: 2+ radial, DP/PT pulses B/L  Neurological: AAOx3; no focal deficits    LABS:                        8.3    8.51  )-----------( 420      ( 08 Nov 2022 06:55 )             28.0     11-08    129<L>  |  95<L>  |  13  ----------------------------<  88  4.3   |  23  |  0.70    Ca    9.8      08 Nov 2022 06:55  Phos  3.9     11-08  Mg     2.00     11-08      PT/INR - ( 07 Nov 2022 06:01 )   PT: 13.6 sec;   INR: 1.17 ratio         PTT - ( 07 Nov 2022 06:01 )  PTT:33.6 sec            RADIOLOGY & ADDITIONAL TESTS:  Results Reviewed:   Imaging Personally Reviewed:  Electrocardiogram Personally Reviewed:    COORDINATION OF CARE:  Care Discussed with Consultants/Other Providers [Y/N]:  Prior or Outpatient Records Reviewed [Y/N]:

## 2022-11-09 LAB
ANION GAP SERPL CALC-SCNC: 10 MMOL/L — SIGNIFICANT CHANGE UP (ref 7–14)
BUN SERPL-MCNC: 13 MG/DL — SIGNIFICANT CHANGE UP (ref 7–23)
CALCIUM SERPL-MCNC: 9.8 MG/DL — SIGNIFICANT CHANGE UP (ref 8.4–10.5)
CHLORIDE SERPL-SCNC: 95 MMOL/L — LOW (ref 98–107)
CO2 SERPL-SCNC: 24 MMOL/L — SIGNIFICANT CHANGE UP (ref 22–31)
CREAT SERPL-MCNC: 0.69 MG/DL — SIGNIFICANT CHANGE UP (ref 0.5–1.3)
EGFR: 89 ML/MIN/1.73M2 — SIGNIFICANT CHANGE UP
GLUCOSE SERPL-MCNC: 97 MG/DL — SIGNIFICANT CHANGE UP (ref 70–99)
HCT VFR BLD CALC: 30 % — LOW (ref 34.5–45)
HGB BLD-MCNC: 9 G/DL — LOW (ref 11.5–15.5)
MAGNESIUM SERPL-MCNC: 2 MG/DL — SIGNIFICANT CHANGE UP (ref 1.6–2.6)
MCHC RBC-ENTMCNC: 24.5 PG — LOW (ref 27–34)
MCHC RBC-ENTMCNC: 30 GM/DL — LOW (ref 32–36)
MCV RBC AUTO: 81.7 FL — SIGNIFICANT CHANGE UP (ref 80–100)
NRBC # BLD: 0 /100 WBCS — SIGNIFICANT CHANGE UP (ref 0–0)
NRBC # FLD: 0 K/UL — SIGNIFICANT CHANGE UP (ref 0–0)
PHOSPHATE SERPL-MCNC: 3.1 MG/DL — SIGNIFICANT CHANGE UP (ref 2.5–4.5)
PLATELET # BLD AUTO: 449 K/UL — HIGH (ref 150–400)
POTASSIUM SERPL-MCNC: 4.4 MMOL/L — SIGNIFICANT CHANGE UP (ref 3.5–5.3)
POTASSIUM SERPL-SCNC: 4.4 MMOL/L — SIGNIFICANT CHANGE UP (ref 3.5–5.3)
RBC # BLD: 3.67 M/UL — LOW (ref 3.8–5.2)
RBC # FLD: 21.2 % — HIGH (ref 10.3–14.5)
SODIUM SERPL-SCNC: 129 MMOL/L — LOW (ref 135–145)
WBC # BLD: 7.78 K/UL — SIGNIFICANT CHANGE UP (ref 3.8–10.5)
WBC # FLD AUTO: 7.78 K/UL — SIGNIFICANT CHANGE UP (ref 3.8–10.5)

## 2022-11-09 PROCEDURE — 99232 SBSQ HOSP IP/OBS MODERATE 35: CPT

## 2022-11-09 RX ADMIN — QUETIAPINE FUMARATE 25 MILLIGRAM(S): 200 TABLET, FILM COATED ORAL at 22:38

## 2022-11-09 RX ADMIN — Medication 3 MILLILITER(S): at 07:32

## 2022-11-09 RX ADMIN — Medication 100 MILLIGRAM(S): at 14:16

## 2022-11-09 RX ADMIN — TRAMADOL HYDROCHLORIDE 100 MILLIGRAM(S): 50 TABLET ORAL at 15:10

## 2022-11-09 RX ADMIN — Medication 100 MILLIGRAM(S): at 06:01

## 2022-11-09 RX ADMIN — Medication 12.5 MILLIGRAM(S): at 18:26

## 2022-11-09 RX ADMIN — POLYETHYLENE GLYCOL 3350 17 GRAM(S): 17 POWDER, FOR SOLUTION ORAL at 18:26

## 2022-11-09 RX ADMIN — TRAMADOL HYDROCHLORIDE 100 MILLIGRAM(S): 50 TABLET ORAL at 18:25

## 2022-11-09 RX ADMIN — GABAPENTIN 300 MILLIGRAM(S): 400 CAPSULE ORAL at 18:26

## 2022-11-09 RX ADMIN — TRAMADOL HYDROCHLORIDE 100 MILLIGRAM(S): 50 TABLET ORAL at 09:23

## 2022-11-09 RX ADMIN — SODIUM CHLORIDE 1 GRAM(S): 9 INJECTION INTRAMUSCULAR; INTRAVENOUS; SUBCUTANEOUS at 05:52

## 2022-11-09 RX ADMIN — TRAMADOL HYDROCHLORIDE 100 MILLIGRAM(S): 50 TABLET ORAL at 19:25

## 2022-11-09 RX ADMIN — Medication 3 MILLILITER(S): at 22:53

## 2022-11-09 RX ADMIN — Medication 1 SPRAY(S): at 05:53

## 2022-11-09 RX ADMIN — TRAMADOL HYDROCHLORIDE 100 MILLIGRAM(S): 50 TABLET ORAL at 10:23

## 2022-11-09 RX ADMIN — Medication 145 MILLIGRAM(S): at 14:15

## 2022-11-09 RX ADMIN — Medication 125 MICROGRAM(S): at 05:52

## 2022-11-09 RX ADMIN — PANTOPRAZOLE SODIUM 40 MILLIGRAM(S): 20 TABLET, DELAYED RELEASE ORAL at 06:01

## 2022-11-09 RX ADMIN — Medication 100 MILLIGRAM(S): at 22:24

## 2022-11-09 RX ADMIN — HEPARIN SODIUM 5000 UNIT(S): 5000 INJECTION INTRAVENOUS; SUBCUTANEOUS at 05:52

## 2022-11-09 RX ADMIN — DULOXETINE HYDROCHLORIDE 60 MILLIGRAM(S): 30 CAPSULE, DELAYED RELEASE ORAL at 14:13

## 2022-11-09 RX ADMIN — TRAMADOL HYDROCHLORIDE 50 MILLIGRAM(S): 50 TABLET ORAL at 04:52

## 2022-11-09 RX ADMIN — ROPINIROLE 2 MILLIGRAM(S): 8 TABLET, FILM COATED, EXTENDED RELEASE ORAL at 22:37

## 2022-11-09 RX ADMIN — POLYETHYLENE GLYCOL 3350 17 GRAM(S): 17 POWDER, FOR SOLUTION ORAL at 05:52

## 2022-11-09 RX ADMIN — Medication 100 MILLIGRAM(S): at 05:52

## 2022-11-09 RX ADMIN — SODIUM CHLORIDE 1 GRAM(S): 9 INJECTION INTRAMUSCULAR; INTRAVENOUS; SUBCUTANEOUS at 14:15

## 2022-11-09 RX ADMIN — HEPARIN SODIUM 5000 UNIT(S): 5000 INJECTION INTRAVENOUS; SUBCUTANEOUS at 22:32

## 2022-11-09 RX ADMIN — Medication 1 SPRAY(S): at 18:26

## 2022-11-09 RX ADMIN — TRAMADOL HYDROCHLORIDE 100 MILLIGRAM(S): 50 TABLET ORAL at 14:11

## 2022-11-09 RX ADMIN — TRAMADOL HYDROCHLORIDE 100 MILLIGRAM(S): 50 TABLET ORAL at 22:30

## 2022-11-09 RX ADMIN — Medication 325 MILLIGRAM(S): at 14:15

## 2022-11-09 RX ADMIN — TRAMADOL HYDROCHLORIDE 100 MILLIGRAM(S): 50 TABLET ORAL at 23:32

## 2022-11-09 RX ADMIN — Medication 12.5 MILLIGRAM(S): at 05:52

## 2022-11-09 RX ADMIN — Medication 100 MILLIGRAM(S): at 14:14

## 2022-11-09 RX ADMIN — GABAPENTIN 300 MILLIGRAM(S): 400 CAPSULE ORAL at 05:52

## 2022-11-09 RX ADMIN — HEPARIN SODIUM 5000 UNIT(S): 5000 INJECTION INTRAVENOUS; SUBCUTANEOUS at 14:13

## 2022-11-09 NOTE — PROGRESS NOTE ADULT - SUBJECTIVE AND OBJECTIVE BOX
Collin Salcedo MD  Academic Hospitalist  Pager [phone]  Email: [email or web address]  Available on [employer] Teams        PROGRESS NOTE:     Patient is a 77y old  Female who presents with a chief complaint of Fall / UTI (08 Nov 2022 16:30)      SUBJECTIVE / OVERNIGHT EVENTS:  Patient seen and examined this morning with her sister Veronica at bedside. Patient continues to c/o multiple areas of pain including the both toes and feet bilaterally and shoulders. Sister at bedside urging the patient to be more compliant with physical therapy.   ADDITIONAL REVIEW OF SYSTEMS:  No f/c/n/v    MEDICATIONS  (STANDING):  albuterol/ipratropium for Nebulization 3 milliLiter(s) Nebulizer every 12 hours  benzonatate 100 milliGRAM(s) Oral three times a day  bisacodyl 5 milliGRAM(s) Oral at bedtime  ceFAZolin   IVPB 2000 milliGRAM(s) IV Intermittent every 8 hours  DULoxetine 60 milliGRAM(s) Oral daily  fenofibrate Tablet 145 milliGRAM(s) Oral daily  ferrous    sulfate 325 milliGRAM(s) Oral daily  gabapentin 300 milliGRAM(s) Oral two times a day  heparin   Injectable 5000 Unit(s) SubCutaneous every 8 hours  levothyroxine 125 MICROGram(s) Oral daily  lidocaine   4% Patch 1 Patch Transdermal every 24 hours  meclizine 12.5 milliGRAM(s) Oral two times a day  melatonin 3 milliGRAM(s) Oral at bedtime  multivitamin 1 Tablet(s) Oral daily  pantoprazole    Tablet 40 milliGRAM(s) Oral before breakfast  polyethylene glycol 3350 17 Gram(s) Oral two times a day  QUEtiapine 25 milliGRAM(s) Oral at bedtime  rOPINIRole 2 milliGRAM(s) Oral daily  senna 2 Tablet(s) Oral at bedtime  sodium chloride 1 Gram(s) Oral three times a day  sodium chloride 0.65% Nasal 1 Spray(s) Both Nostrils two times a day    MEDICATIONS  (PRN):  acetaminophen     Tablet .. 650 milliGRAM(s) Oral every 6 hours PRN Temp greater or equal to 38C (100.4F), Mild Pain (1 - 3)  ALBUTerol    90 MICROgram(s) HFA Inhaler 2 Puff(s) Inhalation every 6 hours PRN Wheezing  fluticasone propionate 50 MICROgram(s)/spray Nasal Spray 1 Spray(s) Both Nostrils every 12 hours PRN nasal congestion  guaiFENesin Oral Liquid (Sugar-Free) 100 milliGRAM(s) Oral every 6 hours PRN Cough  traMADol 50 milliGRAM(s) Oral every 4 hours PRN Moderate Pain (4 - 6)  traMADol 100 milliGRAM(s) Oral every 4 hours PRN Severe Pain (7 - 10)      CAPILLARY BLOOD GLUCOSE        I&O's Summary    08 Nov 2022 07:01  -  09 Nov 2022 07:00  --------------------------------------------------------  IN: 0 mL / OUT: 700 mL / NET: -700 mL        PHYSICAL EXAM:  Vital Signs Last 24 Hrs  T(C): 36.8 (09 Nov 2022 11:46), Max: 36.9 (09 Nov 2022 05:58)  T(F): 98.2 (09 Nov 2022 11:46), Max: 98.4 (09 Nov 2022 05:58)  HR: 75 (09 Nov 2022 11:46) (71 - 81)  BP: 116/66 (09 Nov 2022 11:46) (106/52 - 116/66)  BP(mean): --  RR: 18 (09 Nov 2022 11:46) (18 - 18)  SpO2: 98% (09 Nov 2022 11:46) (98% - 98%)    Parameters below as of 09 Nov 2022 11:46  Patient On (Oxygen Delivery Method): room air        CONSTITUTIONAL: NAD, well-developed, obese,   General: Well appearing female - comfortable.   HEENT: anicteric sclera; MMM  Neck: supple  Cardiovascular: +S1/S2; RRR  Respiratory: CTA B/L; no W/R/R  Gastrointestinal: soft, NT/ND; +BSx4  Extremities: WWP; no edema, clubbing or cyanosis  Vascular: 2+ radial, DP/PT pulses B/L  Neurological: AAOx3; no focal deficits      LABS:                        9.0    7.78  )-----------( 449      ( 09 Nov 2022 05:44 )             30.0     11-09    129<L>  |  95<L>  |  13  ----------------------------<  97  4.4   |  24  |  0.69    Ca    9.8      09 Nov 2022 05:44  Phos  3.1     11-09  Mg     2.00     11-09                  RADIOLOGY & ADDITIONAL TESTS:  Results Reviewed:   Imaging Personally Reviewed:  Electrocardiogram Personally Reviewed:    COORDINATION OF CARE:  Care Discussed with Consultants/Other Providers [Y/N]: Case discussed during interdisciplinary rounds with social work and case management  Prior or Outpatient Records Reviewed [Y/N]:

## 2022-11-10 LAB
ANION GAP SERPL CALC-SCNC: 7 MMOL/L — SIGNIFICANT CHANGE UP (ref 7–14)
BUN SERPL-MCNC: 12 MG/DL — SIGNIFICANT CHANGE UP (ref 7–23)
CALCIUM SERPL-MCNC: 9.9 MG/DL — SIGNIFICANT CHANGE UP (ref 8.4–10.5)
CHLORIDE SERPL-SCNC: 95 MMOL/L — LOW (ref 98–107)
CO2 SERPL-SCNC: 25 MMOL/L — SIGNIFICANT CHANGE UP (ref 22–31)
CREAT SERPL-MCNC: 0.64 MG/DL — SIGNIFICANT CHANGE UP (ref 0.5–1.3)
EGFR: 91 ML/MIN/1.73M2 — SIGNIFICANT CHANGE UP
GLUCOSE SERPL-MCNC: 86 MG/DL — SIGNIFICANT CHANGE UP (ref 70–99)
HCT VFR BLD CALC: 29 % — LOW (ref 34.5–45)
HGB BLD-MCNC: 8.8 G/DL — LOW (ref 11.5–15.5)
MAGNESIUM SERPL-MCNC: 2 MG/DL — SIGNIFICANT CHANGE UP (ref 1.6–2.6)
MCHC RBC-ENTMCNC: 24.9 PG — LOW (ref 27–34)
MCHC RBC-ENTMCNC: 30.3 GM/DL — LOW (ref 32–36)
MCV RBC AUTO: 81.9 FL — SIGNIFICANT CHANGE UP (ref 80–100)
NRBC # BLD: 0 /100 WBCS — SIGNIFICANT CHANGE UP (ref 0–0)
NRBC # FLD: 0 K/UL — SIGNIFICANT CHANGE UP (ref 0–0)
PHOSPHATE SERPL-MCNC: 3.2 MG/DL — SIGNIFICANT CHANGE UP (ref 2.5–4.5)
PLATELET # BLD AUTO: 412 K/UL — HIGH (ref 150–400)
POTASSIUM SERPL-MCNC: 4.1 MMOL/L — SIGNIFICANT CHANGE UP (ref 3.5–5.3)
POTASSIUM SERPL-SCNC: 4.1 MMOL/L — SIGNIFICANT CHANGE UP (ref 3.5–5.3)
RBC # BLD: 3.54 M/UL — LOW (ref 3.8–5.2)
RBC # FLD: 20.9 % — HIGH (ref 10.3–14.5)
SODIUM SERPL-SCNC: 127 MMOL/L — LOW (ref 135–145)
WBC # BLD: 8.32 K/UL — SIGNIFICANT CHANGE UP (ref 3.8–10.5)
WBC # FLD AUTO: 8.32 K/UL — SIGNIFICANT CHANGE UP (ref 3.8–10.5)

## 2022-11-10 PROCEDURE — 99232 SBSQ HOSP IP/OBS MODERATE 35: CPT

## 2022-11-10 RX ADMIN — Medication 12.5 MILLIGRAM(S): at 17:32

## 2022-11-10 RX ADMIN — TRAMADOL HYDROCHLORIDE 50 MILLIGRAM(S): 50 TABLET ORAL at 23:43

## 2022-11-10 RX ADMIN — TRAMADOL HYDROCHLORIDE 50 MILLIGRAM(S): 50 TABLET ORAL at 04:26

## 2022-11-10 RX ADMIN — Medication 3 MILLILITER(S): at 10:02

## 2022-11-10 RX ADMIN — TRAMADOL HYDROCHLORIDE 50 MILLIGRAM(S): 50 TABLET ORAL at 03:26

## 2022-11-10 RX ADMIN — Medication 125 MICROGRAM(S): at 06:19

## 2022-11-10 RX ADMIN — PANTOPRAZOLE SODIUM 40 MILLIGRAM(S): 20 TABLET, DELAYED RELEASE ORAL at 06:24

## 2022-11-10 RX ADMIN — Medication 100 MILLIGRAM(S): at 06:17

## 2022-11-10 RX ADMIN — TRAMADOL HYDROCHLORIDE 100 MILLIGRAM(S): 50 TABLET ORAL at 17:29

## 2022-11-10 RX ADMIN — Medication 3 MILLILITER(S): at 19:42

## 2022-11-10 RX ADMIN — Medication 1 SPRAY(S): at 17:30

## 2022-11-10 RX ADMIN — Medication 100 MILLIGRAM(S): at 22:19

## 2022-11-10 RX ADMIN — HEPARIN SODIUM 5000 UNIT(S): 5000 INJECTION INTRAVENOUS; SUBCUTANEOUS at 13:26

## 2022-11-10 RX ADMIN — TRAMADOL HYDROCHLORIDE 100 MILLIGRAM(S): 50 TABLET ORAL at 13:17

## 2022-11-10 RX ADMIN — TRAMADOL HYDROCHLORIDE 100 MILLIGRAM(S): 50 TABLET ORAL at 08:50

## 2022-11-10 RX ADMIN — SODIUM CHLORIDE 1 GRAM(S): 9 INJECTION INTRAMUSCULAR; INTRAVENOUS; SUBCUTANEOUS at 22:21

## 2022-11-10 RX ADMIN — HEPARIN SODIUM 5000 UNIT(S): 5000 INJECTION INTRAVENOUS; SUBCUTANEOUS at 06:25

## 2022-11-10 RX ADMIN — TRAMADOL HYDROCHLORIDE 50 MILLIGRAM(S): 50 TABLET ORAL at 22:33

## 2022-11-10 RX ADMIN — SODIUM CHLORIDE 1 GRAM(S): 9 INJECTION INTRAMUSCULAR; INTRAVENOUS; SUBCUTANEOUS at 13:18

## 2022-11-10 RX ADMIN — Medication 100 MILLIGRAM(S): at 13:21

## 2022-11-10 RX ADMIN — Medication 145 MILLIGRAM(S): at 14:00

## 2022-11-10 RX ADMIN — DULOXETINE HYDROCHLORIDE 60 MILLIGRAM(S): 30 CAPSULE, DELAYED RELEASE ORAL at 14:01

## 2022-11-10 RX ADMIN — TRAMADOL HYDROCHLORIDE 100 MILLIGRAM(S): 50 TABLET ORAL at 14:46

## 2022-11-10 RX ADMIN — Medication 325 MILLIGRAM(S): at 14:01

## 2022-11-10 RX ADMIN — Medication 100 MILLIGRAM(S): at 13:19

## 2022-11-10 RX ADMIN — HEPARIN SODIUM 5000 UNIT(S): 5000 INJECTION INTRAVENOUS; SUBCUTANEOUS at 22:19

## 2022-11-10 RX ADMIN — ROPINIROLE 2 MILLIGRAM(S): 8 TABLET, FILM COATED, EXTENDED RELEASE ORAL at 22:20

## 2022-11-10 RX ADMIN — TRAMADOL HYDROCHLORIDE 100 MILLIGRAM(S): 50 TABLET ORAL at 18:19

## 2022-11-10 RX ADMIN — TRAMADOL HYDROCHLORIDE 100 MILLIGRAM(S): 50 TABLET ORAL at 09:52

## 2022-11-10 RX ADMIN — POLYETHYLENE GLYCOL 3350 17 GRAM(S): 17 POWDER, FOR SOLUTION ORAL at 17:32

## 2022-11-10 RX ADMIN — QUETIAPINE FUMARATE 25 MILLIGRAM(S): 200 TABLET, FILM COATED ORAL at 22:20

## 2022-11-10 RX ADMIN — GABAPENTIN 300 MILLIGRAM(S): 400 CAPSULE ORAL at 17:32

## 2022-11-10 RX ADMIN — Medication 100 MILLIGRAM(S): at 22:21

## 2022-11-10 NOTE — PROGRESS NOTE ADULT - SUBJECTIVE AND OBJECTIVE BOX
CC: Patient is a 77y old  Female who presents with a chief complaint of Fall / UTI (07 Nov 2022 14:43)    ID following for MSSA bacteremia    Interval History/ROS:    planning discharge soon.  tentatively planning home with sister administering iv antibiotics who requests once daily infusion.        Allergies  erythromycin (Hives)  IV Contrast (Anaphylaxis)    ANTIMICROBIALS:  ceFAZolin   IVPB 2000 every 8 hours    MEDICATIONS  (STANDING):  acetaminophen     Tablet .. 650 every 6 hours PRN  ALBUTerol    90 MICROgram(s) HFA Inhaler 2 every 6 hours PRN  albuterol/ipratropium for Nebulization 3 every 12 hours  benzonatate 100 three times a day  bisacodyl 5 at bedtime  DULoxetine 60 daily  fenofibrate Tablet 145 daily  gabapentin 300 two times a day  guaiFENesin Oral Liquid (Sugar-Free) 100 every 6 hours PRN  heparin   Injectable 5000 every 8 hours  levothyroxine 125 daily  meclizine 12.5 two times a day  melatonin 3 at bedtime  pantoprazole    Tablet 40 before breakfast  polyethylene glycol 3350 17 two times a day  QUEtiapine 25 at bedtime  rOPINIRole 2 daily  senna 2 at bedtime  traMADol 100 every 4 hours PRN  traMADol 50 every 4 hours PRN    Vital Signs Last 24 Hrs  T(F): 98 (11-10-22 @ 12:11), Max: 98.5 (11-09-22 @ 20:40)  HR: 85 (11-10-22 @ 12:11)  BP: 116/70 (11-10-22 @ 12:11)  RR: 18 (11-10-22 @ 12:11)  SpO2: 98% (11-10-22 @ 12:11) (95% - 100%)    Gen: AOx3, NAD  CV: S1+S2   Resp: Clear bilat, no resp distress  Abd: Soft, nontender, +BS  : No Block  IV/Skin: iv left arm  Neuro: no focal deficits    LABS:                          8.8    8.32  )-----------( 412      ( 10 Nov 2022 05:45 )             29.0 11-10    127  |  95  |  12  ----------------------------<  86  4.1   |  25  |  0.64  Ca    9.9      10 Nov 2022 05:45Phos  3.2     11-10Mg     2.00     11-10        MICROBIOLOGY:    .Blood Blood-Venous  10-29-22   No Growth Final  --  --      .Blood Blood-Peripheral  10-29-22   No Growth Final  --  --      .Blood Blood-Peripheral  10-27-22   No Growth Final  --  --      .Blood Blood-Peripheral  10-27-22   Growth in anaerobic bottle: Staphylococcus aureus  See previous culture [id number]  --    Growth in anaerobic bottle: Gram Positive Cocci in Clusters      .Blood Blood  10-25-22   Growth in aerobic and anaerobic bottles: Staphylococcus aureus  ***Blood Panel PCR results on this specimen are available  approximately 3 hours after the Gram stain result.***  Gram stain, PCR, and/or culture results may not always  correspond dueto difference in methodologies.  ************************************************************  This PCR assay was performed by multiplex PCR. This  Assay tests for 66 bacterial and resistance gene targets.  Please refer to the [facility] test directory  at [email or web address] for details.  --  Blood Culture PCR  Staphylococcus aureus      Clean Catch Clean Catch (Midstream)  10-25-22   Culture grew 3 or more types of organisms which indicate  collection contamination; consider recollection only if clinically  indicated.  --  --        eho< from: SHAUN w/o TTE (w/3D Echo) (11.07.22 @ 07:45) >  ------------------------------------------------------------------------  CONCLUSIONS:  1. Mitral annular calcification, otherwise normal mitral  valve.  No vegetations seen in association with the mitral  valve. Mild mitral regurgitation.  2. Calcified trileaflet aortic valve with normal opening.  No vegetations seen in association with the aortic valve.  Minimal aortic regurgitation.  3. Normal aortic root.  Mild non-mobile atherosclerotic  plaque in the aortic arch and descending thoracic aorta.  4. Normal left atrium.  No left atrial or left atrial  appendage thrombus.  5. Normal left ventricular systolic function. No segmental  wall motion abnormalities.  6. Normal right ventricular size and function.  7. Contrast injection demonstrates no evidence of a patent  foramen ovale.  No valvular vegetations were identified.  ------------------------------------------------------------------------  Confirmed on  11/7/2022 - 14:47:20 by Danish Orozco M.D.,  [facility], [place]  ------------------------------------------------------------------------    < end of copied text >

## 2022-11-10 NOTE — PROGRESS NOTE ADULT - SUBJECTIVE AND OBJECTIVE BOX
Collin Salcedo MD  Academic Hospitalist  Pager [phone]  Email: [email or web address]  Available on [employer] Teams        PROGRESS NOTE:     Patient is a 77y old  Female who presents with a chief complaint of Fall / UTI (09 Nov 2022 13:17)      SUBJECTIVE / OVERNIGHT EVENTS:  Patient seen and examined this morning with her sister (healthcare proxy) at bedside. She stated that she only has pain when she moves.     Plan of care discussed with sister and patient.    ADDITIONAL REVIEW OF SYSTEMS:  Denies f/c/n/v    MEDICATIONS  (STANDING):  albuterol/ipratropium for Nebulization 3 milliLiter(s) Nebulizer every 12 hours  benzonatate 100 milliGRAM(s) Oral three times a day  bisacodyl 5 milliGRAM(s) Oral at bedtime  ceFAZolin   IVPB 2000 milliGRAM(s) IV Intermittent every 8 hours  DULoxetine 60 milliGRAM(s) Oral daily  fenofibrate Tablet 145 milliGRAM(s) Oral daily  ferrous    sulfate 325 milliGRAM(s) Oral daily  gabapentin 300 milliGRAM(s) Oral two times a day  heparin   Injectable 5000 Unit(s) SubCutaneous every 8 hours  levothyroxine 125 MICROGram(s) Oral daily  lidocaine   4% Patch 1 Patch Transdermal every 24 hours  meclizine 12.5 milliGRAM(s) Oral two times a day  melatonin 3 milliGRAM(s) Oral at bedtime  multivitamin 1 Tablet(s) Oral daily  pantoprazole    Tablet 40 milliGRAM(s) Oral before breakfast  polyethylene glycol 3350 17 Gram(s) Oral two times a day  QUEtiapine 25 milliGRAM(s) Oral at bedtime  rOPINIRole 2 milliGRAM(s) Oral daily  senna 2 Tablet(s) Oral at bedtime  sodium chloride 1 Gram(s) Oral three times a day  sodium chloride 0.65% Nasal 1 Spray(s) Both Nostrils two times a day    MEDICATIONS  (PRN):  acetaminophen     Tablet .. 650 milliGRAM(s) Oral every 6 hours PRN Temp greater or equal to 38C (100.4F), Mild Pain (1 - 3)  ALBUTerol    90 MICROgram(s) HFA Inhaler 2 Puff(s) Inhalation every 6 hours PRN Wheezing  fluticasone propionate 50 MICROgram(s)/spray Nasal Spray 1 Spray(s) Both Nostrils every 12 hours PRN nasal congestion  guaiFENesin Oral Liquid (Sugar-Free) 100 milliGRAM(s) Oral every 6 hours PRN Cough  traMADol 50 milliGRAM(s) Oral every 4 hours PRN Moderate Pain (4 - 6)  traMADol 100 milliGRAM(s) Oral every 4 hours PRN Severe Pain (7 - 10)      CAPILLARY BLOOD GLUCOSE        I&O's Summary      PHYSICAL EXAM:  Vital Signs Last 24 Hrs  T(C): 36.7 (10 Nov 2022 12:11), Max: 36.9 (09 Nov 2022 20:40)  T(F): 98 (10 Nov 2022 12:11), Max: 98.5 (09 Nov 2022 20:40)  HR: 85 (10 Nov 2022 12:11) (73 - 85)  BP: 116/70 (10 Nov 2022 12:11) (116/70 - 133/79)  BP(mean): --  RR: 18 (10 Nov 2022 12:11) (18 - 18)  SpO2: 98% (10 Nov 2022 12:11) (95% - 100%)    Parameters below as of 10 Nov 2022 12:11  Patient On (Oxygen Delivery Method): room air        CONSTITUTIONAL: NAD, well-developed, obese,   General: Well appearing female - comfortable.   HEENT: anicteric sclera; MMM  Neck: supple  Cardiovascular: +S1/S2; RRR  Respiratory: CTA B/L; no W/R/R  Gastrointestinal: soft, NT/ND; +BSx4  Extremities: WWP; no edema, clubbing or cyanosis  Vascular: 2+ radial, DP/PT pulses B/L  Neurological: AAOx3; no focal deficits      LABS:                        8.8    8.32  )-----------( 412      ( 10 Nov 2022 05:45 )             29.0     11-10    127<L>  |  95<L>  |  12  ----------------------------<  86  4.1   |  25  |  0.64    Ca    9.9      10 Nov 2022 05:45  Phos  3.2     11-10  Mg     2.00     11-10                  RADIOLOGY & ADDITIONAL TESTS:  Results Reviewed:   Imaging Personally Reviewed:  Electrocardiogram Personally Reviewed:    COORDINATION OF CARE:  Care Discussed with Consultants/Other Providers [Y/N]: Case discussed during interdisciplinary rounds with social work and case management  Prior or Outpatient Records Reviewed [Y/N]:

## 2022-11-11 LAB
ANION GAP SERPL CALC-SCNC: 12 MMOL/L — SIGNIFICANT CHANGE UP (ref 7–14)
BUN SERPL-MCNC: 12 MG/DL — SIGNIFICANT CHANGE UP (ref 7–23)
CALCIUM SERPL-MCNC: 10 MG/DL — SIGNIFICANT CHANGE UP (ref 8.4–10.5)
CHLORIDE SERPL-SCNC: 93 MMOL/L — LOW (ref 98–107)
CO2 SERPL-SCNC: 23 MMOL/L — SIGNIFICANT CHANGE UP (ref 22–31)
CREAT SERPL-MCNC: 0.66 MG/DL — SIGNIFICANT CHANGE UP (ref 0.5–1.3)
EGFR: 90 ML/MIN/1.73M2 — SIGNIFICANT CHANGE UP
GLUCOSE SERPL-MCNC: 87 MG/DL — SIGNIFICANT CHANGE UP (ref 70–99)
HCT VFR BLD CALC: 27.8 % — LOW (ref 34.5–45)
HGB BLD-MCNC: 8.4 G/DL — LOW (ref 11.5–15.5)
MCHC RBC-ENTMCNC: 24.9 PG — LOW (ref 27–34)
MCHC RBC-ENTMCNC: 30.2 GM/DL — LOW (ref 32–36)
MCV RBC AUTO: 82.2 FL — SIGNIFICANT CHANGE UP (ref 80–100)
NRBC # BLD: 0 /100 WBCS — SIGNIFICANT CHANGE UP (ref 0–0)
NRBC # FLD: 0 K/UL — SIGNIFICANT CHANGE UP (ref 0–0)
PLATELET # BLD AUTO: 416 K/UL — HIGH (ref 150–400)
POTASSIUM SERPL-MCNC: 4.5 MMOL/L — SIGNIFICANT CHANGE UP (ref 3.5–5.3)
POTASSIUM SERPL-SCNC: 4.5 MMOL/L — SIGNIFICANT CHANGE UP (ref 3.5–5.3)
RBC # BLD: 3.38 M/UL — LOW (ref 3.8–5.2)
RBC # FLD: 21.2 % — HIGH (ref 10.3–14.5)
SODIUM SERPL-SCNC: 128 MMOL/L — LOW (ref 135–145)
WBC # BLD: 9.1 K/UL — SIGNIFICANT CHANGE UP (ref 3.8–10.5)
WBC # FLD AUTO: 9.1 K/UL — SIGNIFICANT CHANGE UP (ref 3.8–10.5)

## 2022-11-11 PROCEDURE — 99232 SBSQ HOSP IP/OBS MODERATE 35: CPT

## 2022-11-11 RX ORDER — TRAMADOL HYDROCHLORIDE 50 MG/1
50 TABLET ORAL EVERY 4 HOURS
Refills: 0 | Status: DISCONTINUED | OUTPATIENT
Start: 2022-11-11 | End: 2022-11-18

## 2022-11-11 RX ORDER — ONDANSETRON 8 MG/1
4 TABLET, FILM COATED ORAL ONCE
Refills: 0 | Status: COMPLETED | OUTPATIENT
Start: 2022-11-11 | End: 2022-11-11

## 2022-11-11 RX ORDER — TRAMADOL HYDROCHLORIDE 50 MG/1
100 TABLET ORAL EVERY 4 HOURS
Refills: 0 | Status: DISCONTINUED | OUTPATIENT
Start: 2022-11-11 | End: 2022-11-15

## 2022-11-11 RX ADMIN — Medication 100 MILLIGRAM(S): at 05:55

## 2022-11-11 RX ADMIN — HEPARIN SODIUM 5000 UNIT(S): 5000 INJECTION INTRAVENOUS; SUBCUTANEOUS at 13:48

## 2022-11-11 RX ADMIN — Medication 5 MILLIGRAM(S): at 22:14

## 2022-11-11 RX ADMIN — Medication 325 MILLIGRAM(S): at 12:04

## 2022-11-11 RX ADMIN — Medication 125 MICROGRAM(S): at 06:00

## 2022-11-11 RX ADMIN — Medication 100 MILLIGRAM(S): at 22:13

## 2022-11-11 RX ADMIN — Medication 100 MILLIGRAM(S): at 06:01

## 2022-11-11 RX ADMIN — GABAPENTIN 300 MILLIGRAM(S): 400 CAPSULE ORAL at 06:02

## 2022-11-11 RX ADMIN — Medication 12.5 MILLIGRAM(S): at 06:02

## 2022-11-11 RX ADMIN — GABAPENTIN 300 MILLIGRAM(S): 400 CAPSULE ORAL at 17:25

## 2022-11-11 RX ADMIN — Medication 3 MILLILITER(S): at 10:36

## 2022-11-11 RX ADMIN — Medication 3 MILLIGRAM(S): at 22:14

## 2022-11-11 RX ADMIN — TRAMADOL HYDROCHLORIDE 100 MILLIGRAM(S): 50 TABLET ORAL at 14:17

## 2022-11-11 RX ADMIN — ONDANSETRON 4 MILLIGRAM(S): 8 TABLET, FILM COATED ORAL at 06:41

## 2022-11-11 RX ADMIN — HEPARIN SODIUM 5000 UNIT(S): 5000 INJECTION INTRAVENOUS; SUBCUTANEOUS at 22:13

## 2022-11-11 RX ADMIN — DULOXETINE HYDROCHLORIDE 60 MILLIGRAM(S): 30 CAPSULE, DELAYED RELEASE ORAL at 13:48

## 2022-11-11 RX ADMIN — POLYETHYLENE GLYCOL 3350 17 GRAM(S): 17 POWDER, FOR SOLUTION ORAL at 17:25

## 2022-11-11 RX ADMIN — TRAMADOL HYDROCHLORIDE 50 MILLIGRAM(S): 50 TABLET ORAL at 12:34

## 2022-11-11 RX ADMIN — SODIUM CHLORIDE 1 GRAM(S): 9 INJECTION INTRAMUSCULAR; INTRAVENOUS; SUBCUTANEOUS at 22:14

## 2022-11-11 RX ADMIN — Medication 145 MILLIGRAM(S): at 13:48

## 2022-11-11 RX ADMIN — ROPINIROLE 2 MILLIGRAM(S): 8 TABLET, FILM COATED, EXTENDED RELEASE ORAL at 22:58

## 2022-11-11 RX ADMIN — Medication 100 MILLIGRAM(S): at 13:48

## 2022-11-11 RX ADMIN — TRAMADOL HYDROCHLORIDE 50 MILLIGRAM(S): 50 TABLET ORAL at 12:04

## 2022-11-11 RX ADMIN — Medication 100 MILLIGRAM(S): at 22:10

## 2022-11-11 RX ADMIN — SENNA PLUS 2 TABLET(S): 8.6 TABLET ORAL at 22:14

## 2022-11-11 RX ADMIN — QUETIAPINE FUMARATE 25 MILLIGRAM(S): 200 TABLET, FILM COATED ORAL at 22:14

## 2022-11-11 RX ADMIN — TRAMADOL HYDROCHLORIDE 100 MILLIGRAM(S): 50 TABLET ORAL at 13:47

## 2022-11-11 RX ADMIN — HEPARIN SODIUM 5000 UNIT(S): 5000 INJECTION INTRAVENOUS; SUBCUTANEOUS at 06:00

## 2022-11-11 RX ADMIN — TRAMADOL HYDROCHLORIDE 100 MILLIGRAM(S): 50 TABLET ORAL at 18:13

## 2022-11-11 RX ADMIN — Medication 12.5 MILLIGRAM(S): at 17:24

## 2022-11-11 RX ADMIN — Medication 1 SPRAY(S): at 17:26

## 2022-11-11 RX ADMIN — TRAMADOL HYDROCHLORIDE 100 MILLIGRAM(S): 50 TABLET ORAL at 23:09

## 2022-11-11 RX ADMIN — SODIUM CHLORIDE 1 GRAM(S): 9 INJECTION INTRAMUSCULAR; INTRAVENOUS; SUBCUTANEOUS at 06:02

## 2022-11-11 RX ADMIN — SODIUM CHLORIDE 1 GRAM(S): 9 INJECTION INTRAMUSCULAR; INTRAVENOUS; SUBCUTANEOUS at 13:47

## 2022-11-11 RX ADMIN — Medication 1 SPRAY(S): at 06:01

## 2022-11-11 NOTE — PROGRESS NOTE ADULT - SUBJECTIVE AND OBJECTIVE BOX
CC: Patient is a 77y old  Female who presents with a chief complaint of Fall / UTI (07 Nov 2022 14:43)    ID following for MSSA bacteremia    Interval History/ROS:  5/10 back pain    no fever    Allergies  erythromycin (Hives)  IV Contrast (Anaphylaxis)    ANTIMICROBIALS:  ceFAZolin   IVPB 2000 every 8 hours      MEDICATIONS  (STANDING):  acetaminophen     Tablet .. 650 every 6 hours PRN  ALBUTerol    90 MICROgram(s) HFA Inhaler 2 every 6 hours PRN  albuterol/ipratropium for Nebulization 3 every 12 hours  benzonatate 100 three times a day  bisacodyl 5 at bedtime  DULoxetine 60 daily  fenofibrate Tablet 145 daily  gabapentin 300 two times a day  guaiFENesin Oral Liquid (Sugar-Free) 100 every 6 hours PRN  heparin   Injectable 5000 every 8 hours  levothyroxine 125 daily  meclizine 12.5 two times a day  melatonin 3 at bedtime  pantoprazole    Tablet 40 before breakfast  polyethylene glycol 3350 17 two times a day  QUEtiapine 25 at bedtime  rOPINIRole 2 daily  senna 2 at bedtime  traMADol 100 every 4 hours PRN  traMADol 50 every 4 hours PRN    Vital Signs Last 24 Hrs  T(F): 98.2 (11-11-22 @ 11:37), Max: 99.1 (11-10-22 @ 19:56)  HR: 72 (11-11-22 @ 11:37)  BP: 116/63 (11-11-22 @ 11:37)  RR: 18 (11-11-22 @ 11:37)  SpO2: 99% (11-11-22 @ 11:37) (96% - 99%)    Gen: AOx3, NAD  CV: S1+S2   Resp: Clear bilat, no resp distress  Abd: Soft, nontender, +BS  : No Block  IV/Skin: iv left arm  Neuro: no focal deficits    LABS:                          8.4    9.10  )-----------( 416      ( 11 Nov 2022 05:51 )             27.8 11-11    128  |  93  |  12  ----------------------------<  87  4.5   |  23  |  0.66  Ca    10.0      11 Nov 2022 05:51Phos  3.2     11-10Mg     2.00     11-10        MICROBIOLOGY:    .Blood Blood-Venous  10-29-22   No Growth Final  --  --      .Blood Blood-Peripheral  10-29-22   No Growth Final  --  --      .Blood Blood-Peripheral  10-27-22   No Growth Final  --  --      .Blood Blood-Peripheral  10-27-22   Growth in anaerobic bottle: Staphylococcus aureus  See previous culture [id number]  --    Growth in anaerobic bottle: Gram Positive Cocci in Clusters      .Blood Blood  10-25-22   Growth in aerobic and anaerobic bottles: Staphylococcus aureus  ***Blood Panel PCR results on this specimen are available  approximately 3 hours after the Gram stain result.***  Gram stain, PCR, and/or culture results may not always  correspond dueto difference in methodologies.  ************************************************************  This PCR assay was performed by multiplex PCR. This  Assay tests for 66 bacterial and resistance gene targets.  Please refer to the [facility] Labs test directory  at [email or web address] for details.  --  Blood Culture PCR  Staphylococcus aureus      Clean Catch Clean Catch (Midstream)  10-25-22   Culture grew 3 or more types of organisms which indicate  collection contamination; consider recollection only if clinically  indicated.  --  --        eho< from: SHAUN w/o TTE (w/3D Echo) (11.07.22 @ 07:45) >  ------------------------------------------------------------------------  CONCLUSIONS:  1. Mitral annular calcification, otherwise normal mitral  valve.  No vegetations seen in association with the mitral  valve. Mild mitral regurgitation.  2. Calcified trileaflet aortic valve with normal opening.  No vegetations seen in association with the aortic valve.  Minimal aortic regurgitation.  3. Normal aortic root.  Mild non-mobile atherosclerotic  plaque in the aortic arch and descending thoracic aorta.  4. Normal left atrium.  No left atrial or left atrial  appendage thrombus.  5. Normal left ventricular systolic function. No segmental  wall motion abnormalities.  6. Normal right ventricular size and function.  7. Contrast injection demonstrates no evidence of a patent  foramen ovale.  No valvular vegetations were identified.  ------------------------------------------------------------------------  Confirmed on  11/7/2022 - 14:47:20 by Danish Orozco M.D.,  [facility], [place]  ------------------------------------------------------------------------    < end of copied text >

## 2022-11-11 NOTE — PROGRESS NOTE ADULT - SUBJECTIVE AND OBJECTIVE BOX
Collin Salcedo MD  Academic Hospitalist  Pager [phone]  Email: [email or web address]  Available on [employer] Teams        PROGRESS NOTE:     Patient is a 77y old  Female who presents with a chief complaint of Fall / UTI (10 Nov 2022 17:34)      SUBJECTIVE / OVERNIGHT EVENTS:  Patient seen and examined this morning. No acute events overnight. Will likely be able to transition cefazolin to nafcillin as per the sister request to decrease the frequency of IV antibiotic administration at home.     Discussed with patient and her sister Veronica via patient's cell phone.     ADDITIONAL REVIEW OF SYSTEMS:  No f/c/n/v    MEDICATIONS  (STANDING):  albuterol/ipratropium for Nebulization 3 milliLiter(s) Nebulizer every 12 hours  benzonatate 100 milliGRAM(s) Oral three times a day  bisacodyl 5 milliGRAM(s) Oral at bedtime  ceFAZolin   IVPB 2000 milliGRAM(s) IV Intermittent every 8 hours  DULoxetine 60 milliGRAM(s) Oral daily  fenofibrate Tablet 145 milliGRAM(s) Oral daily  ferrous    sulfate 325 milliGRAM(s) Oral daily  gabapentin 300 milliGRAM(s) Oral two times a day  heparin   Injectable 5000 Unit(s) SubCutaneous every 8 hours  levothyroxine 125 MICROGram(s) Oral daily  lidocaine   4% Patch 1 Patch Transdermal every 24 hours  meclizine 12.5 milliGRAM(s) Oral two times a day  melatonin 3 milliGRAM(s) Oral at bedtime  multivitamin 1 Tablet(s) Oral daily  pantoprazole    Tablet 40 milliGRAM(s) Oral before breakfast  polyethylene glycol 3350 17 Gram(s) Oral two times a day  QUEtiapine 25 milliGRAM(s) Oral at bedtime  rOPINIRole 2 milliGRAM(s) Oral daily  senna 2 Tablet(s) Oral at bedtime  sodium chloride 1 Gram(s) Oral three times a day  sodium chloride 0.65% Nasal 1 Spray(s) Both Nostrils two times a day    MEDICATIONS  (PRN):  acetaminophen     Tablet .. 650 milliGRAM(s) Oral every 6 hours PRN Temp greater or equal to 38C (100.4F), Mild Pain (1 - 3)  ALBUTerol    90 MICROgram(s) HFA Inhaler 2 Puff(s) Inhalation every 6 hours PRN Wheezing  fluticasone propionate 50 MICROgram(s)/spray Nasal Spray 1 Spray(s) Both Nostrils every 12 hours PRN nasal congestion  guaiFENesin Oral Liquid (Sugar-Free) 100 milliGRAM(s) Oral every 6 hours PRN Cough  traMADol 50 milliGRAM(s) Oral every 4 hours PRN Moderate Pain (4 - 6)  traMADol 100 milliGRAM(s) Oral every 4 hours PRN Severe Pain (7 - 10)      CAPILLARY BLOOD GLUCOSE        I&O's Summary      PHYSICAL EXAM:  Vital Signs Last 24 Hrs  T(C): 36.8 (11 Nov 2022 11:37), Max: 37.3 (10 Nov 2022 19:56)  T(F): 98.2 (11 Nov 2022 11:37), Max: 99.1 (10 Nov 2022 19:56)  HR: 72 (11 Nov 2022 11:37) (72 - 88)  BP: 116/63 (11 Nov 2022 11:37) (98/52 - 116/63)  BP(mean): --  RR: 18 (11 Nov 2022 11:37) (18 - 18)  SpO2: 99% (11 Nov 2022 11:37) (96% - 99%)    Parameters below as of 11 Nov 2022 11:37  Patient On (Oxygen Delivery Method): room air        CONSTITUTIONAL: NAD, well-developed, obese,   General: Well appearing female - comfortable.   HEENT: anicteric sclera; MMM  Neck: supple  Cardiovascular: +S1/S2; RRR  Respiratory: CTA B/L; no W/R/R  Gastrointestinal: soft, NT/ND; +BSx4  Extremities: WWP; no edema, clubbing or cyanosis  Vascular: 2+ radial, DP/PT pulses B/L  Neurological: AAOx3; no focal deficits      LABS:                        8.4    9.10  )-----------( 416      ( 11 Nov 2022 05:51 )             27.8     11-11    128<L>  |  93<L>  |  12  ----------------------------<  87  4.5   |  23  |  0.66    Ca    10.0      11 Nov 2022 05:51  Phos  3.2     11-10  Mg     2.00     11-10                  RADIOLOGY & ADDITIONAL TESTS:  Results Reviewed:   Imaging Personally Reviewed:  Electrocardiogram Personally Reviewed:    COORDINATION OF CARE:  Care Discussed with Consultants/Other Providers [Y/N]: Case discussed during interdisciplinary rounds with social work and case management  Prior or Outpatient Records Reviewed [Y/N]:

## 2022-11-12 LAB
ANION GAP SERPL CALC-SCNC: 10 MMOL/L — SIGNIFICANT CHANGE UP (ref 7–14)
BUN SERPL-MCNC: 9 MG/DL — SIGNIFICANT CHANGE UP (ref 7–23)
CALCIUM SERPL-MCNC: 9.9 MG/DL — SIGNIFICANT CHANGE UP (ref 8.4–10.5)
CHLORIDE SERPL-SCNC: 97 MMOL/L — LOW (ref 98–107)
CO2 SERPL-SCNC: 23 MMOL/L — SIGNIFICANT CHANGE UP (ref 22–31)
CREAT SERPL-MCNC: 0.58 MG/DL — SIGNIFICANT CHANGE UP (ref 0.5–1.3)
EGFR: 93 ML/MIN/1.73M2 — SIGNIFICANT CHANGE UP
GLUCOSE SERPL-MCNC: 93 MG/DL — SIGNIFICANT CHANGE UP (ref 70–99)
HCT VFR BLD CALC: 29.6 % — LOW (ref 34.5–45)
HGB BLD-MCNC: 8.9 G/DL — LOW (ref 11.5–15.5)
MAGNESIUM SERPL-MCNC: 1.9 MG/DL — SIGNIFICANT CHANGE UP (ref 1.6–2.6)
MCHC RBC-ENTMCNC: 24.9 PG — LOW (ref 27–34)
MCHC RBC-ENTMCNC: 30.1 GM/DL — LOW (ref 32–36)
MCV RBC AUTO: 82.7 FL — SIGNIFICANT CHANGE UP (ref 80–100)
NRBC # BLD: 0 /100 WBCS — SIGNIFICANT CHANGE UP (ref 0–0)
NRBC # FLD: 0 K/UL — SIGNIFICANT CHANGE UP (ref 0–0)
PHOSPHATE SERPL-MCNC: 3.6 MG/DL — SIGNIFICANT CHANGE UP (ref 2.5–4.5)
PLATELET # BLD AUTO: 376 K/UL — SIGNIFICANT CHANGE UP (ref 150–400)
POTASSIUM SERPL-MCNC: 4.5 MMOL/L — SIGNIFICANT CHANGE UP (ref 3.5–5.3)
POTASSIUM SERPL-SCNC: 4.5 MMOL/L — SIGNIFICANT CHANGE UP (ref 3.5–5.3)
RBC # BLD: 3.58 M/UL — LOW (ref 3.8–5.2)
RBC # FLD: 20.9 % — HIGH (ref 10.3–14.5)
SODIUM SERPL-SCNC: 130 MMOL/L — LOW (ref 135–145)
WBC # BLD: 7.66 K/UL — SIGNIFICANT CHANGE UP (ref 3.8–10.5)
WBC # FLD AUTO: 7.66 K/UL — SIGNIFICANT CHANGE UP (ref 3.8–10.5)

## 2022-11-12 PROCEDURE — 99233 SBSQ HOSP IP/OBS HIGH 50: CPT

## 2022-11-12 RX ORDER — CEFAZOLIN SODIUM 1 G
2 VIAL (EA) INJECTION
Qty: 336 | Refills: 0
Start: 2022-11-12 | End: 2023-01-06

## 2022-11-12 RX ADMIN — TRAMADOL HYDROCHLORIDE 100 MILLIGRAM(S): 50 TABLET ORAL at 14:36

## 2022-11-12 RX ADMIN — Medication 100 MILLIGRAM(S): at 05:57

## 2022-11-12 RX ADMIN — Medication 3 MILLIGRAM(S): at 22:01

## 2022-11-12 RX ADMIN — GABAPENTIN 300 MILLIGRAM(S): 400 CAPSULE ORAL at 05:55

## 2022-11-12 RX ADMIN — TRAMADOL HYDROCHLORIDE 100 MILLIGRAM(S): 50 TABLET ORAL at 19:36

## 2022-11-12 RX ADMIN — Medication 100 MILLIGRAM(S): at 14:35

## 2022-11-12 RX ADMIN — TRAMADOL HYDROCHLORIDE 100 MILLIGRAM(S): 50 TABLET ORAL at 11:00

## 2022-11-12 RX ADMIN — GABAPENTIN 300 MILLIGRAM(S): 400 CAPSULE ORAL at 17:20

## 2022-11-12 RX ADMIN — ROPINIROLE 2 MILLIGRAM(S): 8 TABLET, FILM COATED, EXTENDED RELEASE ORAL at 22:01

## 2022-11-12 RX ADMIN — Medication 100 MILLIGRAM(S): at 05:56

## 2022-11-12 RX ADMIN — Medication 5 MILLIGRAM(S): at 22:01

## 2022-11-12 RX ADMIN — TRAMADOL HYDROCHLORIDE 100 MILLIGRAM(S): 50 TABLET ORAL at 05:51

## 2022-11-12 RX ADMIN — DULOXETINE HYDROCHLORIDE 60 MILLIGRAM(S): 30 CAPSULE, DELAYED RELEASE ORAL at 12:29

## 2022-11-12 RX ADMIN — TRAMADOL HYDROCHLORIDE 100 MILLIGRAM(S): 50 TABLET ORAL at 15:30

## 2022-11-12 RX ADMIN — Medication 12.5 MILLIGRAM(S): at 17:19

## 2022-11-12 RX ADMIN — SODIUM CHLORIDE 1 GRAM(S): 9 INJECTION INTRAMUSCULAR; INTRAVENOUS; SUBCUTANEOUS at 14:35

## 2022-11-12 RX ADMIN — TRAMADOL HYDROCHLORIDE 100 MILLIGRAM(S): 50 TABLET ORAL at 10:05

## 2022-11-12 RX ADMIN — TRAMADOL HYDROCHLORIDE 100 MILLIGRAM(S): 50 TABLET ORAL at 23:58

## 2022-11-12 RX ADMIN — HEPARIN SODIUM 5000 UNIT(S): 5000 INJECTION INTRAVENOUS; SUBCUTANEOUS at 14:35

## 2022-11-12 RX ADMIN — Medication 12.5 MILLIGRAM(S): at 05:56

## 2022-11-12 RX ADMIN — PANTOPRAZOLE SODIUM 40 MILLIGRAM(S): 20 TABLET, DELAYED RELEASE ORAL at 05:55

## 2022-11-12 RX ADMIN — Medication 325 MILLIGRAM(S): at 12:28

## 2022-11-12 RX ADMIN — Medication 125 MICROGRAM(S): at 05:56

## 2022-11-12 RX ADMIN — TRAMADOL HYDROCHLORIDE 100 MILLIGRAM(S): 50 TABLET ORAL at 18:36

## 2022-11-12 RX ADMIN — Medication 1 SPRAY(S): at 17:18

## 2022-11-12 RX ADMIN — POLYETHYLENE GLYCOL 3350 17 GRAM(S): 17 POWDER, FOR SOLUTION ORAL at 17:18

## 2022-11-12 RX ADMIN — SODIUM CHLORIDE 1 GRAM(S): 9 INJECTION INTRAMUSCULAR; INTRAVENOUS; SUBCUTANEOUS at 05:56

## 2022-11-12 RX ADMIN — SENNA PLUS 2 TABLET(S): 8.6 TABLET ORAL at 22:02

## 2022-11-12 RX ADMIN — TRAMADOL HYDROCHLORIDE 100 MILLIGRAM(S): 50 TABLET ORAL at 00:09

## 2022-11-12 RX ADMIN — TRAMADOL HYDROCHLORIDE 100 MILLIGRAM(S): 50 TABLET ORAL at 22:58

## 2022-11-12 RX ADMIN — Medication 3 MILLILITER(S): at 22:39

## 2022-11-12 RX ADMIN — QUETIAPINE FUMARATE 25 MILLIGRAM(S): 200 TABLET, FILM COATED ORAL at 22:01

## 2022-11-12 RX ADMIN — TRAMADOL HYDROCHLORIDE 100 MILLIGRAM(S): 50 TABLET ORAL at 06:51

## 2022-11-12 RX ADMIN — POLYETHYLENE GLYCOL 3350 17 GRAM(S): 17 POWDER, FOR SOLUTION ORAL at 05:51

## 2022-11-12 RX ADMIN — Medication 145 MILLIGRAM(S): at 10:06

## 2022-11-12 RX ADMIN — Medication 3 MILLILITER(S): at 09:51

## 2022-11-12 RX ADMIN — HEPARIN SODIUM 5000 UNIT(S): 5000 INJECTION INTRAVENOUS; SUBCUTANEOUS at 05:56

## 2022-11-12 RX ADMIN — Medication 1 SPRAY(S): at 05:57

## 2022-11-12 RX ADMIN — Medication 100 MILLIGRAM(S): at 22:01

## 2022-11-12 RX ADMIN — HEPARIN SODIUM 5000 UNIT(S): 5000 INJECTION INTRAVENOUS; SUBCUTANEOUS at 22:00

## 2022-11-12 RX ADMIN — SODIUM CHLORIDE 1 GRAM(S): 9 INJECTION INTRAMUSCULAR; INTRAVENOUS; SUBCUTANEOUS at 22:01

## 2022-11-12 RX ADMIN — Medication 100 MILLIGRAM(S): at 22:02

## 2022-11-12 NOTE — PROGRESS NOTE ADULT - SUBJECTIVE AND OBJECTIVE BOX
[facility] Division of Hospital Medicine  Blue Roman MD  Pager [phone]    Patient is a 77y old  Female who presents with a chief complaint of Fall / UTI (11 Nov 2022 16:10)      SUBJECTIVE / OVERNIGHT EVENTS: No new c/o      MEDICATIONS  (STANDING):  albuterol/ipratropium for Nebulization 3 milliLiter(s) Nebulizer every 12 hours  benzonatate 100 milliGRAM(s) Oral three times a day  bisacodyl 5 milliGRAM(s) Oral at bedtime  ceFAZolin   IVPB 2000 milliGRAM(s) IV Intermittent every 8 hours  DULoxetine 60 milliGRAM(s) Oral daily  fenofibrate Tablet 145 milliGRAM(s) Oral daily  ferrous    sulfate 325 milliGRAM(s) Oral daily  gabapentin 300 milliGRAM(s) Oral two times a day  heparin   Injectable 5000 Unit(s) SubCutaneous every 8 hours  levothyroxine 125 MICROGram(s) Oral daily  lidocaine   4% Patch 1 Patch Transdermal every 24 hours  meclizine 12.5 milliGRAM(s) Oral two times a day  melatonin 3 milliGRAM(s) Oral at bedtime  multivitamin 1 Tablet(s) Oral daily  pantoprazole    Tablet 40 milliGRAM(s) Oral before breakfast  polyethylene glycol 3350 17 Gram(s) Oral two times a day  QUEtiapine 25 milliGRAM(s) Oral at bedtime  rOPINIRole 2 milliGRAM(s) Oral daily  senna 2 Tablet(s) Oral at bedtime  sodium chloride 1 Gram(s) Oral three times a day  sodium chloride 0.65% Nasal 1 Spray(s) Both Nostrils two times a day    MEDICATIONS  (PRN):  acetaminophen     Tablet .. 650 milliGRAM(s) Oral every 6 hours PRN Temp greater or equal to 38C (100.4F), Mild Pain (1 - 3)  ALBUTerol    90 MICROgram(s) HFA Inhaler 2 Puff(s) Inhalation every 6 hours PRN Wheezing  fluticasone propionate 50 MICROgram(s)/spray Nasal Spray 1 Spray(s) Both Nostrils every 12 hours PRN nasal congestion  guaiFENesin Oral Liquid (Sugar-Free) 100 milliGRAM(s) Oral every 6 hours PRN Cough  traMADol 50 milliGRAM(s) Oral every 4 hours PRN Moderate Pain (4 - 6)  traMADol 100 milliGRAM(s) Oral every 4 hours PRN Severe Pain (7 - 10)      CAPILLARY BLOOD GLUCOSE        I&O's Summary      PHYSICAL EXAM:  Vital Signs Last 24 Hrs  T(C): 36.6 (12 Nov 2022 12:43), Max: 36.8 (11 Nov 2022 22:10)  T(F): 97.8 (12 Nov 2022 12:43), Max: 98.2 (11 Nov 2022 22:10)  HR: 78 (12 Nov 2022 12:43) (71 - 82)  BP: 135/69 (12 Nov 2022 12:43) (116/75 - 148/79)  BP(mean): --  RR: 17 (12 Nov 2022 12:43) (17 - 17)  SpO2: 98% (12 Nov 2022 12:43) (96% - 100%)    Parameters below as of 12 Nov 2022 12:43  Patient On (Oxygen Delivery Method): room air      CONSTITUTIONAL: NAD  EYES: EOMI, conjunctiva and sclera clear  ENMT: Moist oral mucosa  NECK: Supple  RESPIRATORY: Breathing unlabored, CTAB  CARDIOVASCULAR: S1S2 no MRG  ABDOMEN: Nontender to palpation, normoactive bowel sounds, no rebound/guarding  MUSCULOSKELETAL: no clubbing or cyanosis of digits  NEUROLOGY: No focal deficits   SKIN: No rashes or lesions    LABS:                        8.9    7.66  )-----------( 376      ( 12 Nov 2022 05:31 )             29.6     11-12    130<L>  |  97<L>  |  9   ----------------------------<  93  4.5   |  23  |  0.58    Ca    9.9      12 Nov 2022 05:31  Phos  3.6     11-12  Mg     1.90     11-12        CODE: FULL

## 2022-11-13 LAB
ANION GAP SERPL CALC-SCNC: 12 MMOL/L — SIGNIFICANT CHANGE UP (ref 7–14)
B PERT DNA SPEC QL NAA+PROBE: SIGNIFICANT CHANGE UP
B PERT+PARAPERT DNA PNL SPEC NAA+PROBE: SIGNIFICANT CHANGE UP
BORDETELLA PARAPERTUSSIS (RAPRVP): SIGNIFICANT CHANGE UP
BUN SERPL-MCNC: 9 MG/DL — SIGNIFICANT CHANGE UP (ref 7–23)
C PNEUM DNA SPEC QL NAA+PROBE: SIGNIFICANT CHANGE UP
CALCIUM SERPL-MCNC: 10.1 MG/DL — SIGNIFICANT CHANGE UP (ref 8.4–10.5)
CHLORIDE SERPL-SCNC: 94 MMOL/L — LOW (ref 98–107)
CO2 SERPL-SCNC: 22 MMOL/L — SIGNIFICANT CHANGE UP (ref 22–31)
CREAT SERPL-MCNC: 0.57 MG/DL — SIGNIFICANT CHANGE UP (ref 0.5–1.3)
EGFR: 94 ML/MIN/1.73M2 — SIGNIFICANT CHANGE UP
FLUAV SUBTYP SPEC NAA+PROBE: SIGNIFICANT CHANGE UP
FLUBV RNA SPEC QL NAA+PROBE: SIGNIFICANT CHANGE UP
GLUCOSE SERPL-MCNC: 112 MG/DL — HIGH (ref 70–99)
HADV DNA SPEC QL NAA+PROBE: SIGNIFICANT CHANGE UP
HCOV 229E RNA SPEC QL NAA+PROBE: SIGNIFICANT CHANGE UP
HCOV HKU1 RNA SPEC QL NAA+PROBE: SIGNIFICANT CHANGE UP
HCOV NL63 RNA SPEC QL NAA+PROBE: SIGNIFICANT CHANGE UP
HCOV OC43 RNA SPEC QL NAA+PROBE: SIGNIFICANT CHANGE UP
HCT VFR BLD CALC: 28.6 % — LOW (ref 34.5–45)
HGB BLD-MCNC: 8.8 G/DL — LOW (ref 11.5–15.5)
HMPV RNA SPEC QL NAA+PROBE: SIGNIFICANT CHANGE UP
HPIV1 RNA SPEC QL NAA+PROBE: SIGNIFICANT CHANGE UP
HPIV2 RNA SPEC QL NAA+PROBE: SIGNIFICANT CHANGE UP
HPIV3 RNA SPEC QL NAA+PROBE: SIGNIFICANT CHANGE UP
HPIV4 RNA SPEC QL NAA+PROBE: SIGNIFICANT CHANGE UP
M PNEUMO DNA SPEC QL NAA+PROBE: SIGNIFICANT CHANGE UP
MAGNESIUM SERPL-MCNC: 1.7 MG/DL — SIGNIFICANT CHANGE UP (ref 1.6–2.6)
MCHC RBC-ENTMCNC: 25.1 PG — LOW (ref 27–34)
MCHC RBC-ENTMCNC: 30.8 GM/DL — LOW (ref 32–36)
MCV RBC AUTO: 81.5 FL — SIGNIFICANT CHANGE UP (ref 80–100)
NRBC # BLD: 0 /100 WBCS — SIGNIFICANT CHANGE UP (ref 0–0)
NRBC # FLD: 0 K/UL — SIGNIFICANT CHANGE UP (ref 0–0)
PHOSPHATE SERPL-MCNC: 3.5 MG/DL — SIGNIFICANT CHANGE UP (ref 2.5–4.5)
PLATELET # BLD AUTO: 431 K/UL — HIGH (ref 150–400)
POTASSIUM SERPL-MCNC: 4.6 MMOL/L — SIGNIFICANT CHANGE UP (ref 3.5–5.3)
POTASSIUM SERPL-SCNC: 4.6 MMOL/L — SIGNIFICANT CHANGE UP (ref 3.5–5.3)
RAPID RVP RESULT: SIGNIFICANT CHANGE UP
RBC # BLD: 3.51 M/UL — LOW (ref 3.8–5.2)
RBC # FLD: 21 % — HIGH (ref 10.3–14.5)
RSV RNA SPEC QL NAA+PROBE: SIGNIFICANT CHANGE UP
RV+EV RNA SPEC QL NAA+PROBE: SIGNIFICANT CHANGE UP
SARS-COV-2 RNA SPEC QL NAA+PROBE: SIGNIFICANT CHANGE UP
SARS-COV-2 RNA SPEC QL NAA+PROBE: SIGNIFICANT CHANGE UP
SODIUM SERPL-SCNC: 128 MMOL/L — LOW (ref 135–145)
WBC # BLD: 12.43 K/UL — HIGH (ref 3.8–10.5)
WBC # FLD AUTO: 12.43 K/UL — HIGH (ref 3.8–10.5)

## 2022-11-13 PROCEDURE — 71046 X-RAY EXAM CHEST 2 VIEWS: CPT | Mod: 26

## 2022-11-13 PROCEDURE — 99233 SBSQ HOSP IP/OBS HIGH 50: CPT

## 2022-11-13 RX ADMIN — Medication 5 MILLIGRAM(S): at 21:32

## 2022-11-13 RX ADMIN — DULOXETINE HYDROCHLORIDE 60 MILLIGRAM(S): 30 CAPSULE, DELAYED RELEASE ORAL at 11:41

## 2022-11-13 RX ADMIN — SODIUM CHLORIDE 1 GRAM(S): 9 INJECTION INTRAMUSCULAR; INTRAVENOUS; SUBCUTANEOUS at 21:28

## 2022-11-13 RX ADMIN — HEPARIN SODIUM 5000 UNIT(S): 5000 INJECTION INTRAVENOUS; SUBCUTANEOUS at 13:06

## 2022-11-13 RX ADMIN — Medication 12.5 MILLIGRAM(S): at 17:00

## 2022-11-13 RX ADMIN — Medication 1 SPRAY(S): at 05:45

## 2022-11-13 RX ADMIN — Medication 100 MILLIGRAM(S): at 05:45

## 2022-11-13 RX ADMIN — GABAPENTIN 300 MILLIGRAM(S): 400 CAPSULE ORAL at 17:00

## 2022-11-13 RX ADMIN — Medication 3 MILLILITER(S): at 20:32

## 2022-11-13 RX ADMIN — Medication 125 MICROGRAM(S): at 05:44

## 2022-11-13 RX ADMIN — ALBUTEROL 2 PUFF(S): 90 AEROSOL, METERED ORAL at 07:33

## 2022-11-13 RX ADMIN — TRAMADOL HYDROCHLORIDE 100 MILLIGRAM(S): 50 TABLET ORAL at 12:30

## 2022-11-13 RX ADMIN — Medication 100 MILLIGRAM(S): at 13:06

## 2022-11-13 RX ADMIN — Medication 1 SPRAY(S): at 17:01

## 2022-11-13 RX ADMIN — Medication 100 MILLIGRAM(S): at 21:31

## 2022-11-13 RX ADMIN — Medication 3 MILLIGRAM(S): at 21:26

## 2022-11-13 RX ADMIN — TRAMADOL HYDROCHLORIDE 100 MILLIGRAM(S): 50 TABLET ORAL at 04:33

## 2022-11-13 RX ADMIN — POLYETHYLENE GLYCOL 3350 17 GRAM(S): 17 POWDER, FOR SOLUTION ORAL at 05:44

## 2022-11-13 RX ADMIN — TRAMADOL HYDROCHLORIDE 100 MILLIGRAM(S): 50 TABLET ORAL at 16:58

## 2022-11-13 RX ADMIN — ALBUTEROL 2 PUFF(S): 90 AEROSOL, METERED ORAL at 16:58

## 2022-11-13 RX ADMIN — TRAMADOL HYDROCHLORIDE 100 MILLIGRAM(S): 50 TABLET ORAL at 18:50

## 2022-11-13 RX ADMIN — TRAMADOL HYDROCHLORIDE 100 MILLIGRAM(S): 50 TABLET ORAL at 07:33

## 2022-11-13 RX ADMIN — TRAMADOL HYDROCHLORIDE 100 MILLIGRAM(S): 50 TABLET ORAL at 11:40

## 2022-11-13 RX ADMIN — ROPINIROLE 2 MILLIGRAM(S): 8 TABLET, FILM COATED, EXTENDED RELEASE ORAL at 21:29

## 2022-11-13 RX ADMIN — Medication 12.5 MILLIGRAM(S): at 05:45

## 2022-11-13 RX ADMIN — Medication 100 MILLIGRAM(S): at 14:54

## 2022-11-13 RX ADMIN — QUETIAPINE FUMARATE 25 MILLIGRAM(S): 200 TABLET, FILM COATED ORAL at 21:27

## 2022-11-13 RX ADMIN — POLYETHYLENE GLYCOL 3350 17 GRAM(S): 17 POWDER, FOR SOLUTION ORAL at 17:01

## 2022-11-13 RX ADMIN — Medication 100 MILLIGRAM(S): at 13:05

## 2022-11-13 RX ADMIN — Medication 100 MILLIGRAM(S): at 08:39

## 2022-11-13 RX ADMIN — HEPARIN SODIUM 5000 UNIT(S): 5000 INJECTION INTRAVENOUS; SUBCUTANEOUS at 21:30

## 2022-11-13 RX ADMIN — GABAPENTIN 300 MILLIGRAM(S): 400 CAPSULE ORAL at 05:45

## 2022-11-13 RX ADMIN — SENNA PLUS 2 TABLET(S): 8.6 TABLET ORAL at 21:33

## 2022-11-13 RX ADMIN — TRAMADOL HYDROCHLORIDE 100 MILLIGRAM(S): 50 TABLET ORAL at 03:33

## 2022-11-13 RX ADMIN — TRAMADOL HYDROCHLORIDE 100 MILLIGRAM(S): 50 TABLET ORAL at 08:30

## 2022-11-13 RX ADMIN — Medication 3 MILLILITER(S): at 09:50

## 2022-11-13 RX ADMIN — SODIUM CHLORIDE 1 GRAM(S): 9 INJECTION INTRAMUSCULAR; INTRAVENOUS; SUBCUTANEOUS at 05:44

## 2022-11-13 RX ADMIN — HEPARIN SODIUM 5000 UNIT(S): 5000 INJECTION INTRAVENOUS; SUBCUTANEOUS at 05:45

## 2022-11-13 RX ADMIN — PANTOPRAZOLE SODIUM 40 MILLIGRAM(S): 20 TABLET, DELAYED RELEASE ORAL at 05:44

## 2022-11-13 RX ADMIN — Medication 100 MILLIGRAM(S): at 21:26

## 2022-11-13 NOTE — PROGRESS NOTE ADULT - SUBJECTIVE AND OBJECTIVE BOX
[facility] Division of Hospital Medicine  Blue Roman MD  Pager [phone]    Patient is a 77y old  Female who presents with a chief complaint of Fall / UTI (12 Nov 2022 13:42)      SUBJECTIVE / OVERNIGHT EVENTS: C/o persistent cough, makes chest hurt       MEDICATIONS  (STANDING):  albuterol/ipratropium for Nebulization 3 milliLiter(s) Nebulizer every 12 hours  benzonatate 100 milliGRAM(s) Oral three times a day  bisacodyl 5 milliGRAM(s) Oral at bedtime  ceFAZolin   IVPB 2000 milliGRAM(s) IV Intermittent every 8 hours  DULoxetine 60 milliGRAM(s) Oral daily  fenofibrate Tablet 145 milliGRAM(s) Oral daily  ferrous    sulfate 325 milliGRAM(s) Oral daily  gabapentin 300 milliGRAM(s) Oral two times a day  heparin   Injectable 5000 Unit(s) SubCutaneous every 8 hours  levothyroxine 125 MICROGram(s) Oral daily  lidocaine   4% Patch 1 Patch Transdermal every 24 hours  meclizine 12.5 milliGRAM(s) Oral two times a day  melatonin 3 milliGRAM(s) Oral at bedtime  multivitamin 1 Tablet(s) Oral daily  pantoprazole    Tablet 40 milliGRAM(s) Oral before breakfast  polyethylene glycol 3350 17 Gram(s) Oral two times a day  QUEtiapine 25 milliGRAM(s) Oral at bedtime  rOPINIRole 2 milliGRAM(s) Oral daily  senna 2 Tablet(s) Oral at bedtime  sodium chloride 1 Gram(s) Oral three times a day  sodium chloride 0.65% Nasal 1 Spray(s) Both Nostrils two times a day    MEDICATIONS  (PRN):  acetaminophen     Tablet .. 650 milliGRAM(s) Oral every 6 hours PRN Temp greater or equal to 38C (100.4F), Mild Pain (1 - 3)  ALBUTerol    90 MICROgram(s) HFA Inhaler 2 Puff(s) Inhalation every 6 hours PRN Wheezing  fluticasone propionate 50 MICROgram(s)/spray Nasal Spray 1 Spray(s) Both Nostrils every 12 hours PRN nasal congestion  guaiFENesin Oral Liquid (Sugar-Free) 100 milliGRAM(s) Oral every 6 hours PRN Cough  traMADol 50 milliGRAM(s) Oral every 4 hours PRN Moderate Pain (4 - 6)  traMADol 100 milliGRAM(s) Oral every 4 hours PRN Severe Pain (7 - 10)      CAPILLARY BLOOD GLUCOSE        I&O's Summary      PHYSICAL EXAM:  Vital Signs Last 24 Hrs  T(C): 36.5 (13 Nov 2022 11:51), Max: 36.7 (12 Nov 2022 22:00)  T(F): 97.7 (13 Nov 2022 11:51), Max: 98.1 (12 Nov 2022 22:00)  HR: 83 (13 Nov 2022 11:51) (72 - 83)  BP: 126/82 (13 Nov 2022 11:51) (121/66 - 127/79)  BP(mean): --  RR: 17 (13 Nov 2022 11:51) (17 - 17)  SpO2: 100% (13 Nov 2022 11:51) (96% - 100%)    Parameters below as of 13 Nov 2022 11:51  Patient On (Oxygen Delivery Method): room air      CONSTITUTIONAL: NAD  EYES: EOMI, conjunctiva and sclera clear  ENMT: Moist oral mucosa  NECK: Supple  RESPIRATORY: Breathing unlabored, CTAB  CARDIOVASCULAR: S1S2 no MRG  ABDOMEN: Nontender to palpation, normoactive bowel sounds, no rebound/guarding  MUSCULOSKELETAL: no clubbing or cyanosis of digits  NEUROLOGY: No focal deficits   SKIN: No rashes or lesions    LABS:                        8.9    7.66  )-----------( 376      ( 12 Nov 2022 05:31 )             29.6     11-12    130<L>  |  97<L>  |  9   ----------------------------<  93  4.5   |  23  |  0.58    Ca    9.9      12 Nov 2022 05:31  Phos  3.6     11-12  Mg     1.90     11-12            CODE: FULL

## 2022-11-14 LAB
ALBUMIN SERPL ELPH-MCNC: 3.2 G/DL — LOW (ref 3.3–5)
ALP SERPL-CCNC: 93 U/L — SIGNIFICANT CHANGE UP (ref 40–120)
ALT FLD-CCNC: 6 U/L — SIGNIFICANT CHANGE UP (ref 4–33)
ANION GAP SERPL CALC-SCNC: 12 MMOL/L — SIGNIFICANT CHANGE UP (ref 7–14)
AST SERPL-CCNC: 25 U/L — SIGNIFICANT CHANGE UP (ref 4–32)
BILIRUB DIRECT SERPL-MCNC: 0.3 MG/DL — SIGNIFICANT CHANGE UP (ref 0–0.3)
BILIRUB INDIRECT FLD-MCNC: 0.2 MG/DL — SIGNIFICANT CHANGE UP (ref 0–1)
BILIRUB SERPL-MCNC: 0.5 MG/DL — SIGNIFICANT CHANGE UP (ref 0.2–1.2)
BUN SERPL-MCNC: 11 MG/DL — SIGNIFICANT CHANGE UP (ref 7–23)
CALCIUM SERPL-MCNC: 10.3 MG/DL — SIGNIFICANT CHANGE UP (ref 8.4–10.5)
CHLORIDE SERPL-SCNC: 94 MMOL/L — LOW (ref 98–107)
CO2 SERPL-SCNC: 22 MMOL/L — SIGNIFICANT CHANGE UP (ref 22–31)
CREAT SERPL-MCNC: 0.68 MG/DL — SIGNIFICANT CHANGE UP (ref 0.5–1.3)
EGFR: 90 ML/MIN/1.73M2 — SIGNIFICANT CHANGE UP
GLUCOSE SERPL-MCNC: 111 MG/DL — HIGH (ref 70–99)
HCT VFR BLD CALC: 28.6 % — LOW (ref 34.5–45)
HGB BLD-MCNC: 8.9 G/DL — LOW (ref 11.5–15.5)
MAGNESIUM SERPL-MCNC: 1.8 MG/DL — SIGNIFICANT CHANGE UP (ref 1.6–2.6)
MCHC RBC-ENTMCNC: 25.3 PG — LOW (ref 27–34)
MCHC RBC-ENTMCNC: 31.1 GM/DL — LOW (ref 32–36)
MCV RBC AUTO: 81.3 FL — SIGNIFICANT CHANGE UP (ref 80–100)
NRBC # BLD: 0 /100 WBCS — SIGNIFICANT CHANGE UP (ref 0–0)
NRBC # FLD: 0 K/UL — SIGNIFICANT CHANGE UP (ref 0–0)
PHOSPHATE SERPL-MCNC: 3.4 MG/DL — SIGNIFICANT CHANGE UP (ref 2.5–4.5)
PLATELET # BLD AUTO: 404 K/UL — HIGH (ref 150–400)
POTASSIUM SERPL-MCNC: 4.5 MMOL/L — SIGNIFICANT CHANGE UP (ref 3.5–5.3)
POTASSIUM SERPL-SCNC: 4.5 MMOL/L — SIGNIFICANT CHANGE UP (ref 3.5–5.3)
PROT SERPL-MCNC: 7.8 G/DL — SIGNIFICANT CHANGE UP (ref 6–8.3)
RBC # BLD: 3.52 M/UL — LOW (ref 3.8–5.2)
RBC # FLD: 21.2 % — HIGH (ref 10.3–14.5)
SODIUM SERPL-SCNC: 128 MMOL/L — LOW (ref 135–145)
WBC # BLD: 13.31 K/UL — HIGH (ref 3.8–10.5)
WBC # FLD AUTO: 13.31 K/UL — HIGH (ref 3.8–10.5)

## 2022-11-14 PROCEDURE — 99232 SBSQ HOSP IP/OBS MODERATE 35: CPT

## 2022-11-14 RX ORDER — ONDANSETRON 8 MG/1
4 TABLET, FILM COATED ORAL ONCE
Refills: 0 | Status: COMPLETED | OUTPATIENT
Start: 2022-11-14 | End: 2022-11-14

## 2022-11-14 RX ADMIN — GABAPENTIN 300 MILLIGRAM(S): 400 CAPSULE ORAL at 06:26

## 2022-11-14 RX ADMIN — PANTOPRAZOLE SODIUM 40 MILLIGRAM(S): 20 TABLET, DELAYED RELEASE ORAL at 06:26

## 2022-11-14 RX ADMIN — TRAMADOL HYDROCHLORIDE 100 MILLIGRAM(S): 50 TABLET ORAL at 03:30

## 2022-11-14 RX ADMIN — HEPARIN SODIUM 5000 UNIT(S): 5000 INJECTION INTRAVENOUS; SUBCUTANEOUS at 21:20

## 2022-11-14 RX ADMIN — Medication 100 MILLIGRAM(S): at 21:15

## 2022-11-14 RX ADMIN — TRAMADOL HYDROCHLORIDE 100 MILLIGRAM(S): 50 TABLET ORAL at 07:45

## 2022-11-14 RX ADMIN — Medication 100 MILLIGRAM(S): at 09:18

## 2022-11-14 RX ADMIN — GABAPENTIN 300 MILLIGRAM(S): 400 CAPSULE ORAL at 18:43

## 2022-11-14 RX ADMIN — HEPARIN SODIUM 5000 UNIT(S): 5000 INJECTION INTRAVENOUS; SUBCUTANEOUS at 14:37

## 2022-11-14 RX ADMIN — Medication 100 MILLIGRAM(S): at 21:19

## 2022-11-14 RX ADMIN — Medication 100 MILLIGRAM(S): at 06:27

## 2022-11-14 RX ADMIN — Medication 325 MILLIGRAM(S): at 12:31

## 2022-11-14 RX ADMIN — Medication 100 MILLIGRAM(S): at 14:37

## 2022-11-14 RX ADMIN — Medication 100 MILLIGRAM(S): at 06:26

## 2022-11-14 RX ADMIN — TRAMADOL HYDROCHLORIDE 100 MILLIGRAM(S): 50 TABLET ORAL at 10:35

## 2022-11-14 RX ADMIN — Medication 3 MILLILITER(S): at 08:29

## 2022-11-14 RX ADMIN — TRAMADOL HYDROCHLORIDE 100 MILLIGRAM(S): 50 TABLET ORAL at 23:05

## 2022-11-14 RX ADMIN — ROPINIROLE 2 MILLIGRAM(S): 8 TABLET, FILM COATED, EXTENDED RELEASE ORAL at 21:18

## 2022-11-14 RX ADMIN — Medication 100 MILLIGRAM(S): at 18:43

## 2022-11-14 RX ADMIN — Medication 125 MICROGRAM(S): at 06:26

## 2022-11-14 RX ADMIN — SODIUM CHLORIDE 1 GRAM(S): 9 INJECTION INTRAMUSCULAR; INTRAVENOUS; SUBCUTANEOUS at 06:27

## 2022-11-14 RX ADMIN — TRAMADOL HYDROCHLORIDE 100 MILLIGRAM(S): 50 TABLET ORAL at 11:30

## 2022-11-14 RX ADMIN — TRAMADOL HYDROCHLORIDE 100 MILLIGRAM(S): 50 TABLET ORAL at 23:02

## 2022-11-14 RX ADMIN — DULOXETINE HYDROCHLORIDE 60 MILLIGRAM(S): 30 CAPSULE, DELAYED RELEASE ORAL at 12:31

## 2022-11-14 RX ADMIN — TRAMADOL HYDROCHLORIDE 100 MILLIGRAM(S): 50 TABLET ORAL at 15:30

## 2022-11-14 RX ADMIN — TRAMADOL HYDROCHLORIDE 100 MILLIGRAM(S): 50 TABLET ORAL at 18:43

## 2022-11-14 RX ADMIN — ALBUTEROL 2 PUFF(S): 90 AEROSOL, METERED ORAL at 16:23

## 2022-11-14 RX ADMIN — Medication 3 MILLIGRAM(S): at 21:15

## 2022-11-14 RX ADMIN — QUETIAPINE FUMARATE 25 MILLIGRAM(S): 200 TABLET, FILM COATED ORAL at 21:15

## 2022-11-14 RX ADMIN — Medication 12.5 MILLIGRAM(S): at 06:26

## 2022-11-14 RX ADMIN — Medication 3 MILLILITER(S): at 19:23

## 2022-11-14 RX ADMIN — ONDANSETRON 4 MILLIGRAM(S): 8 TABLET, FILM COATED ORAL at 09:18

## 2022-11-14 RX ADMIN — TRAMADOL HYDROCHLORIDE 100 MILLIGRAM(S): 50 TABLET ORAL at 06:26

## 2022-11-14 RX ADMIN — TRAMADOL HYDROCHLORIDE 100 MILLIGRAM(S): 50 TABLET ORAL at 14:36

## 2022-11-14 RX ADMIN — ALBUTEROL 2 PUFF(S): 90 AEROSOL, METERED ORAL at 07:24

## 2022-11-14 RX ADMIN — TRAMADOL HYDROCHLORIDE 100 MILLIGRAM(S): 50 TABLET ORAL at 02:26

## 2022-11-14 RX ADMIN — HEPARIN SODIUM 5000 UNIT(S): 5000 INJECTION INTRAVENOUS; SUBCUTANEOUS at 06:27

## 2022-11-14 RX ADMIN — Medication 1 SPRAY(S): at 06:27

## 2022-11-14 NOTE — PROGRESS NOTE ADULT - SUBJECTIVE AND OBJECTIVE BOX
Hospitalist Progress Note  Germaine Cardoza MD Pager # [phone]    OVERNIGHT EVENTS: JYOTI    SUBJECTIVE / INTERVAL HPI: Patient seen and examined at bedside. Pt. reports that she continues to feel pain along her left side. She feels the pain when she moves. She reports having BMs. No dysuria or hematuria. No fevers/chills.     VITAL SIGNS:  Vital Signs Last 24 Hrs  T(C): 37.1 (14 Nov 2022 11:08), Max: 37.1 (14 Nov 2022 11:08)  T(F): 98.7 (14 Nov 2022 11:08), Max: 98.7 (14 Nov 2022 11:08)  HR: 88 (14 Nov 2022 11:08) (63 - 88)  BP: 123/64 (14 Nov 2022 11:08) (120/67 - 124/72)  BP(mean): --  RR: 18 (14 Nov 2022 11:08) (18 - 18)  SpO2: 95% (14 Nov 2022 11:08) (95% - 100%)    Parameters below as of 14 Nov 2022 11:08  Patient On (Oxygen Delivery Method): room air        PHYSICAL EXAM:    General: Elderly female resting in bed   HEENT: NC/AT; PERRL, anicteric sclera; MMM  Neck: supple  Cardiovascular: +S1/S2; RRR  Respiratory: CTA B/L; no W/R/R  Gastrointestinal: rigid, distended, mildly tender to palpation in all quadrants +BSx4  Extremities: WWP; no edema, clubbing or cyanosis  Vascular: 2+ radial, DP/PT pulses B/L  Neurological: AAOx3; no focal deficits    MEDICATIONS:  MEDICATIONS  (STANDING):  albuterol/ipratropium for Nebulization 3 milliLiter(s) Nebulizer every 12 hours  benzonatate 100 milliGRAM(s) Oral three times a day  bisacodyl 5 milliGRAM(s) Oral at bedtime  ceFAZolin   IVPB 2000 milliGRAM(s) IV Intermittent every 8 hours  DULoxetine 60 milliGRAM(s) Oral daily  fenofibrate Tablet 145 milliGRAM(s) Oral daily  ferrous    sulfate 325 milliGRAM(s) Oral daily  gabapentin 300 milliGRAM(s) Oral two times a day  heparin   Injectable 5000 Unit(s) SubCutaneous every 8 hours  levothyroxine 125 MICROGram(s) Oral daily  lidocaine   4% Patch 1 Patch Transdermal every 24 hours  meclizine 12.5 milliGRAM(s) Oral two times a day  melatonin 3 milliGRAM(s) Oral at bedtime  multivitamin 1 Tablet(s) Oral daily  pantoprazole    Tablet 40 milliGRAM(s) Oral before breakfast  polyethylene glycol 3350 17 Gram(s) Oral two times a day  QUEtiapine 25 milliGRAM(s) Oral at bedtime  rOPINIRole 2 milliGRAM(s) Oral daily  senna 2 Tablet(s) Oral at bedtime  sodium chloride 1 Gram(s) Oral three times a day  sodium chloride 0.65% Nasal 1 Spray(s) Both Nostrils two times a day    MEDICATIONS  (PRN):  acetaminophen     Tablet .. 650 milliGRAM(s) Oral every 6 hours PRN Temp greater or equal to 38C (100.4F), Mild Pain (1 - 3)  ALBUTerol    90 MICROgram(s) HFA Inhaler 2 Puff(s) Inhalation every 6 hours PRN Wheezing  fluticasone propionate 50 MICROgram(s)/spray Nasal Spray 1 Spray(s) Both Nostrils every 12 hours PRN nasal congestion  guaiFENesin Oral Liquid (Sugar-Free) 100 milliGRAM(s) Oral every 6 hours PRN Cough  traMADol 50 milliGRAM(s) Oral every 4 hours PRN Moderate Pain (4 - 6)  traMADol 100 milliGRAM(s) Oral every 4 hours PRN Severe Pain (7 - 10)      ALLERGIES:  Allergies    erythromycin (Hives)  IV Contrast (Anaphylaxis)    Intolerances        LABS:                        8.9    13.31 )-----------( 404      ( 14 Nov 2022 06:33 )             28.6     11-14    128<L>  |  94<L>  |  11  ----------------------------<  111<H>  4.5   |  22  |  0.68    Ca    10.3      14 Nov 2022 06:33  Phos  3.4     11-14  Mg     1.80     11-14          CAPILLARY BLOOD GLUCOSE          RADIOLOGY & ADDITIONAL TESTS: Reviewed.    ASSESSMENT:    PLAN:

## 2022-11-14 NOTE — PROGRESS NOTE ADULT - SUBJECTIVE AND OBJECTIVE BOX
CC: Patient is a 77y old  Female who presents with a chief complaint of Fall / UTI (07 Nov 2022 14:43)    ID following for MSSA bacteremia    Interval History/ROS:  c/o back pain.  worse now after transferring back to bed.  loose stool after several days of constipation.    c x r 11/13 done    no fever    Allergies  erythromycin (Hives)  IV Contrast (Anaphylaxis)    ANTIMICROBIALS:  ceFAZolin   IVPB 2000 every 8 hours    MEDICATIONS  (STANDING):  acetaminophen     Tablet .. 650 every 6 hours PRN  ALBUTerol    90 MICROgram(s) HFA Inhaler 2 every 6 hours PRN  albuterol/ipratropium for Nebulization 3 every 12 hours  benzonatate 100 three times a day  bisacodyl 5 at bedtime  DULoxetine 60 daily  fenofibrate Tablet 145 daily  gabapentin 300 two times a day  guaiFENesin Oral Liquid (Sugar-Free) 100 every 6 hours PRN  heparin   Injectable 5000 every 8 hours  levothyroxine 125 daily  meclizine 12.5 two times a day  melatonin 3 at bedtime  pantoprazole    Tablet 40 before breakfast  polyethylene glycol 3350 17 two times a day  QUEtiapine 25 at bedtime  rOPINIRole 2 daily  senna 2 at bedtime  traMADol 50 every 4 hours PRN  traMADol 100 every 4 hours PRN    Vital Signs Last 24 Hrs  T(F): 98.7 (11-14-22 @ 11:08), Max: 98.7 (11-14-22 @ 11:08)  HR: 88 (11-14-22 @ 11:08)  BP: 123/64 (11-14-22 @ 11:08)  RR: 18 (11-14-22 @ 11:08)  SpO2: 95% (11-14-22 @ 11:08) (95% - 100%)    Gen: alert, weak  CV: S1+S2   Resp: Clear bilat, no resp distress  Abd: Soft, nontender, +BS  : No Block  IV/Skin: iv right arm  no erythema    LABS:                          8.9    13.31 )-----------( 404      ( 14 Nov 2022 06:33 )             28.6 11-14    128  |  94  |  11  ----------------------------<  111  4.5   |  22  |  0.68  Ca    10.3      14 Nov 2022 06:33Phos  3.4     11-14Mg     1.80     11-14  TPro  7.8  /  Alb  3.2  /  TBili  0.5  /  DBili  0.3  /  AST  25  /  ALT  6   /  AlkPhos  93  11-14      MICROBIOLOGY:    .Blood Blood-Venous  10-29-22   No Growth Final  --  --      .Blood Blood-Peripheral  10-29-22   No Growth Final  --  --      .Blood Blood-Peripheral  10-27-22   No Growth Final  --  --      .Blood Blood-Peripheral  10-27-22   Growth in anaerobic bottle: Staphylococcus aureus  See previous culture [id number]  --    Growth in anaerobic bottle: Gram Positive Cocci in Clusters      .Blood Blood  10-25-22   Growth in aerobic and anaerobic bottles: Staphylococcus aureus  ***Blood Panel PCR results on this specimen are available  approximately 3 hours after the Gram stain result.***  Gram stain, PCR, and/or culture results may not always  correspond dueto difference in methodologies.  ************************************************************  This PCR assay was performed by multiplex PCR. This  Assay tests for 66 bacterial and resistance gene targets.  Please refer to the [facility] test directory  at [email or web address] for details.  --  Blood Culture PCR  Staphylococcus aureus      Clean Catch Clean Catch (Midstream)  10-25-22   Culture grew 3 or more types of organisms which indicate  collection contamination; consider recollection only if clinically  indicated.  --  --        eho< from: SHAUN w/o TTE (w/3D Echo) (11.07.22 @ 07:45) >  ------------------------------------------------------------------------  CONCLUSIONS:  1. Mitral annular calcification, otherwise normal mitral  valve.  No vegetations seen in association with the mitral  valve. Mild mitral regurgitation.  2. Calcified trileaflet aortic valve with normal opening.  No vegetations seen in association with the aortic valve.  Minimal aortic regurgitation.  3. Normal aortic root.  Mild non-mobile atherosclerotic  plaque in the aortic arch and descending thoracic aorta.  4. Normal left atrium.  No left atrial or left atrial  appendage thrombus.  5. Normal left ventricular systolic function. No segmental  wall motion abnormalities.  6. Normal right ventricular size and function.  7. Contrast injection demonstrates no evidence of a patent  foramen ovale.  No valvular vegetations were identified.  ------------------------------------------------------------------------  Confirmed on  11/7/2022 - 14:47:20 by Danish Orozco M.D.,  [facility], [facility]  ------------------------------------------------------------------------    < end of copied text >    ra< from: Xray Chest 2 Views PA/Lat (11.13.22 @ 18:23) >    ACC: [id number] EXAM:  XR CHEST PA LAT 2V                          PROCEDURE DATE:  11/13/2022          INTERPRETATION:  Chest 2 views    HISTORY: Cough and bacteremia    COMPARISON: 10/25/2022    Frontal and lateral views of the chest were obtained with suboptimal   inspiration. With allowance for this, the heart is similar in size and   the lungs show questionable retrocardiac infiltrate in the lateral view.   There is no evidence of pneumothorax nor pleural effusion.    IMPRESSION: Questionable posterior infiltrate. Follow-up study is   recommended as clinically warranted.      Thank you for this referral.    --- End of Report ---    < end of copied text >

## 2022-11-15 LAB
ALBUMIN SERPL ELPH-MCNC: 2.9 G/DL — LOW (ref 3.3–5)
ALP SERPL-CCNC: 80 U/L — SIGNIFICANT CHANGE UP (ref 40–120)
ALT FLD-CCNC: <5 U/L — LOW (ref 4–33)
ANION GAP SERPL CALC-SCNC: 11 MMOL/L — SIGNIFICANT CHANGE UP (ref 7–14)
APPEARANCE UR: ABNORMAL
AST SERPL-CCNC: 17 U/L — SIGNIFICANT CHANGE UP (ref 4–32)
BACTERIA # UR AUTO: NEGATIVE — SIGNIFICANT CHANGE UP
BASOPHILS # BLD AUTO: 0.02 K/UL — SIGNIFICANT CHANGE UP (ref 0–0.2)
BASOPHILS NFR BLD AUTO: 0.3 % — SIGNIFICANT CHANGE UP (ref 0–2)
BILIRUB SERPL-MCNC: 0.5 MG/DL — SIGNIFICANT CHANGE UP (ref 0.2–1.2)
BILIRUB UR-MCNC: NEGATIVE — SIGNIFICANT CHANGE UP
BUN SERPL-MCNC: 11 MG/DL — SIGNIFICANT CHANGE UP (ref 7–23)
CALCIUM SERPL-MCNC: 10 MG/DL — SIGNIFICANT CHANGE UP (ref 8.4–10.5)
CHLORIDE SERPL-SCNC: 93 MMOL/L — LOW (ref 98–107)
CO2 SERPL-SCNC: 24 MMOL/L — SIGNIFICANT CHANGE UP (ref 22–31)
COLOR SPEC: YELLOW — SIGNIFICANT CHANGE UP
CREAT SERPL-MCNC: 0.59 MG/DL — SIGNIFICANT CHANGE UP (ref 0.5–1.3)
DIFF PNL FLD: ABNORMAL
EGFR: 93 ML/MIN/1.73M2 — SIGNIFICANT CHANGE UP
EOSINOPHIL # BLD AUTO: 0 K/UL — SIGNIFICANT CHANGE UP (ref 0–0.5)
EOSINOPHIL NFR BLD AUTO: 0 % — SIGNIFICANT CHANGE UP (ref 0–6)
EPI CELLS # UR: 11 /HPF — HIGH (ref 0–5)
GLUCOSE SERPL-MCNC: 88 MG/DL — SIGNIFICANT CHANGE UP (ref 70–99)
GLUCOSE UR QL: NEGATIVE — SIGNIFICANT CHANGE UP
HCT VFR BLD CALC: 26.7 % — LOW (ref 34.5–45)
HGB BLD-MCNC: 8.2 G/DL — LOW (ref 11.5–15.5)
HYALINE CASTS # UR AUTO: 3 /LPF — SIGNIFICANT CHANGE UP (ref 0–7)
IANC: 5.07 K/UL — SIGNIFICANT CHANGE UP (ref 1.8–7.4)
IMM GRANULOCYTES NFR BLD AUTO: 0.6 % — SIGNIFICANT CHANGE UP (ref 0–0.9)
KETONES UR-MCNC: NEGATIVE — SIGNIFICANT CHANGE UP
LEUKOCYTE ESTERASE UR-ACNC: ABNORMAL
LYMPHOCYTES # BLD AUTO: 2.04 K/UL — SIGNIFICANT CHANGE UP (ref 1–3.3)
LYMPHOCYTES # BLD AUTO: 25.7 % — SIGNIFICANT CHANGE UP (ref 13–44)
MAGNESIUM SERPL-MCNC: 1.9 MG/DL — SIGNIFICANT CHANGE UP (ref 1.6–2.6)
MCHC RBC-ENTMCNC: 24.9 PG — LOW (ref 27–34)
MCHC RBC-ENTMCNC: 30.7 GM/DL — LOW (ref 32–36)
MCV RBC AUTO: 81.2 FL — SIGNIFICANT CHANGE UP (ref 80–100)
MONOCYTES # BLD AUTO: 0.75 K/UL — SIGNIFICANT CHANGE UP (ref 0–0.9)
MONOCYTES NFR BLD AUTO: 9.5 % — SIGNIFICANT CHANGE UP (ref 2–14)
NEUTROPHILS # BLD AUTO: 5.07 K/UL — SIGNIFICANT CHANGE UP (ref 1.8–7.4)
NEUTROPHILS NFR BLD AUTO: 63.9 % — SIGNIFICANT CHANGE UP (ref 43–77)
NITRITE UR-MCNC: NEGATIVE — SIGNIFICANT CHANGE UP
NRBC # BLD: 0 /100 WBCS — SIGNIFICANT CHANGE UP (ref 0–0)
NRBC # FLD: 0 K/UL — SIGNIFICANT CHANGE UP (ref 0–0)
PH UR: 6.5 — SIGNIFICANT CHANGE UP (ref 5–8)
PHOSPHATE SERPL-MCNC: 2.8 MG/DL — SIGNIFICANT CHANGE UP (ref 2.5–4.5)
PLATELET # BLD AUTO: 323 K/UL — SIGNIFICANT CHANGE UP (ref 150–400)
POTASSIUM SERPL-MCNC: 4.3 MMOL/L — SIGNIFICANT CHANGE UP (ref 3.5–5.3)
POTASSIUM SERPL-SCNC: 4.3 MMOL/L — SIGNIFICANT CHANGE UP (ref 3.5–5.3)
PROT SERPL-MCNC: 6.9 G/DL — SIGNIFICANT CHANGE UP (ref 6–8.3)
PROT UR-MCNC: ABNORMAL
RBC # BLD: 3.29 M/UL — LOW (ref 3.8–5.2)
RBC # FLD: 21.1 % — HIGH (ref 10.3–14.5)
RBC CASTS # UR COMP ASSIST: 7 /HPF — HIGH (ref 0–4)
SODIUM SERPL-SCNC: 128 MMOL/L — LOW (ref 135–145)
SP GR SPEC: 1.02 — SIGNIFICANT CHANGE UP (ref 1.01–1.05)
UROBILINOGEN FLD QL: SIGNIFICANT CHANGE UP
WBC # BLD: 7.93 K/UL — SIGNIFICANT CHANGE UP (ref 3.8–10.5)
WBC # FLD AUTO: 7.93 K/UL — SIGNIFICANT CHANGE UP (ref 3.8–10.5)
WBC UR QL: 50 /HPF — HIGH (ref 0–5)

## 2022-11-15 PROCEDURE — 99233 SBSQ HOSP IP/OBS HIGH 50: CPT

## 2022-11-15 PROCEDURE — 74019 RADEX ABDOMEN 2 VIEWS: CPT | Mod: 26

## 2022-11-15 RX ORDER — OXYCODONE HYDROCHLORIDE 5 MG/1
2.5 TABLET ORAL EVERY 4 HOURS
Refills: 0 | Status: DISCONTINUED | OUTPATIENT
Start: 2022-11-15 | End: 2022-11-17

## 2022-11-15 RX ADMIN — Medication 100 MILLIGRAM(S): at 05:57

## 2022-11-15 RX ADMIN — TRAMADOL HYDROCHLORIDE 100 MILLIGRAM(S): 50 TABLET ORAL at 04:38

## 2022-11-15 RX ADMIN — HEPARIN SODIUM 5000 UNIT(S): 5000 INJECTION INTRAVENOUS; SUBCUTANEOUS at 22:01

## 2022-11-15 RX ADMIN — Medication 100 MILLIGRAM(S): at 22:01

## 2022-11-15 RX ADMIN — TRAMADOL HYDROCHLORIDE 100 MILLIGRAM(S): 50 TABLET ORAL at 09:40

## 2022-11-15 RX ADMIN — Medication 325 MILLIGRAM(S): at 12:09

## 2022-11-15 RX ADMIN — Medication 100 MILLIGRAM(S): at 14:40

## 2022-11-15 RX ADMIN — HEPARIN SODIUM 5000 UNIT(S): 5000 INJECTION INTRAVENOUS; SUBCUTANEOUS at 05:57

## 2022-11-15 RX ADMIN — GABAPENTIN 300 MILLIGRAM(S): 400 CAPSULE ORAL at 18:13

## 2022-11-15 RX ADMIN — Medication 3 MILLIGRAM(S): at 22:00

## 2022-11-15 RX ADMIN — OXYCODONE HYDROCHLORIDE 2.5 MILLIGRAM(S): 5 TABLET ORAL at 20:47

## 2022-11-15 RX ADMIN — Medication 145 MILLIGRAM(S): at 12:10

## 2022-11-15 RX ADMIN — DULOXETINE HYDROCHLORIDE 60 MILLIGRAM(S): 30 CAPSULE, DELAYED RELEASE ORAL at 12:10

## 2022-11-15 RX ADMIN — Medication 100 MILLIGRAM(S): at 05:56

## 2022-11-15 RX ADMIN — QUETIAPINE FUMARATE 25 MILLIGRAM(S): 200 TABLET, FILM COATED ORAL at 22:00

## 2022-11-15 RX ADMIN — Medication 5 MILLIGRAM(S): at 22:00

## 2022-11-15 RX ADMIN — HEPARIN SODIUM 5000 UNIT(S): 5000 INJECTION INTRAVENOUS; SUBCUTANEOUS at 14:42

## 2022-11-15 RX ADMIN — OXYCODONE HYDROCHLORIDE 2.5 MILLIGRAM(S): 5 TABLET ORAL at 14:39

## 2022-11-15 RX ADMIN — TRAMADOL HYDROCHLORIDE 100 MILLIGRAM(S): 50 TABLET ORAL at 05:30

## 2022-11-15 RX ADMIN — Medication 3 MILLILITER(S): at 07:40

## 2022-11-15 RX ADMIN — OXYCODONE HYDROCHLORIDE 2.5 MILLIGRAM(S): 5 TABLET ORAL at 19:47

## 2022-11-15 RX ADMIN — Medication 1 SPRAY(S): at 05:56

## 2022-11-15 RX ADMIN — Medication 12.5 MILLIGRAM(S): at 18:13

## 2022-11-15 RX ADMIN — SENNA PLUS 2 TABLET(S): 8.6 TABLET ORAL at 22:00

## 2022-11-15 RX ADMIN — SODIUM CHLORIDE 1 GRAM(S): 9 INJECTION INTRAMUSCULAR; INTRAVENOUS; SUBCUTANEOUS at 22:00

## 2022-11-15 RX ADMIN — Medication 100 MILLIGRAM(S): at 22:00

## 2022-11-15 RX ADMIN — TRAMADOL HYDROCHLORIDE 100 MILLIGRAM(S): 50 TABLET ORAL at 08:47

## 2022-11-15 RX ADMIN — Medication 100 MILLIGRAM(S): at 14:42

## 2022-11-15 RX ADMIN — Medication 3 MILLILITER(S): at 20:13

## 2022-11-15 RX ADMIN — Medication 1 SPRAY(S): at 18:14

## 2022-11-15 RX ADMIN — OXYCODONE HYDROCHLORIDE 2.5 MILLIGRAM(S): 5 TABLET ORAL at 15:33

## 2022-11-15 RX ADMIN — POLYETHYLENE GLYCOL 3350 17 GRAM(S): 17 POWDER, FOR SOLUTION ORAL at 18:14

## 2022-11-15 RX ADMIN — ROPINIROLE 2 MILLIGRAM(S): 8 TABLET, FILM COATED, EXTENDED RELEASE ORAL at 22:00

## 2022-11-15 NOTE — PROGRESS NOTE ADULT - SUBJECTIVE AND OBJECTIVE BOX
Hospitalist Progress Note  Germaine Cardoza MD Pager # [phone]    OVERNIGHT EVENTS: JYOTI     SUBJECTIVE / INTERVAL HPI: Patient seen and examined at bedside. Pt. reports that she continues to have significant back pain that is worse with movement. She has abdominal pain. She says she is having bowel movements and passing gas. She feels nauseous as well. She continues to cough.     VITAL SIGNS:  Vital Signs Last 24 Hrs  T(C): 36.6 (15 Nov 2022 12:40), Max: 36.8 (14 Nov 2022 21:00)  T(F): 97.9 (15 Nov 2022 12:40), Max: 98.2 (14 Nov 2022 21:00)  HR: 77 (15 Nov 2022 12:40) (77 - 86)  BP: 112/60 (15 Nov 2022 12:40) (108/60 - 128/70)  BP(mean): --  RR: 18 (15 Nov 2022 12:40) (18 - 18)  SpO2: 96% (15 Nov 2022 12:40) (95% - 98%)    Parameters below as of 15 Nov 2022 12:40  Patient On (Oxygen Delivery Method): room air        PHYSICAL EXAM:    General: Deconditioned elderly female resting in bed   HEENT: NC/AT; PERRL, anicteric sclera; MMM  Neck: supple  Cardiovascular: +S1/S2; RRR  Respiratory: CTA B/L; no W/R/R  Gastrointestinal: soft, distended and tender abdomen; +BSx4  Extremities: WWP; no edema, clubbing or cyanosis  Vascular: 2+ radial, DP/PT pulses B/L  Neurological: AAOx3; no focal deficits    MEDICATIONS:  MEDICATIONS  (STANDING):  albuterol/ipratropium for Nebulization 3 milliLiter(s) Nebulizer every 12 hours  benzonatate 100 milliGRAM(s) Oral three times a day  bisacodyl 5 milliGRAM(s) Oral at bedtime  ceFAZolin   IVPB 2000 milliGRAM(s) IV Intermittent every 8 hours  DULoxetine 60 milliGRAM(s) Oral daily  fenofibrate Tablet 145 milliGRAM(s) Oral daily  ferrous    sulfate 325 milliGRAM(s) Oral daily  gabapentin 300 milliGRAM(s) Oral two times a day  heparin   Injectable 5000 Unit(s) SubCutaneous every 8 hours  levothyroxine 125 MICROGram(s) Oral daily  lidocaine   4% Patch 1 Patch Transdermal every 24 hours  meclizine 12.5 milliGRAM(s) Oral two times a day  melatonin 3 milliGRAM(s) Oral at bedtime  multivitamin 1 Tablet(s) Oral daily  pantoprazole    Tablet 40 milliGRAM(s) Oral before breakfast  polyethylene glycol 3350 17 Gram(s) Oral two times a day  QUEtiapine 25 milliGRAM(s) Oral at bedtime  rOPINIRole 2 milliGRAM(s) Oral daily  senna 2 Tablet(s) Oral at bedtime  sodium chloride 1 Gram(s) Oral three times a day  sodium chloride 0.65% Nasal 1 Spray(s) Both Nostrils two times a day    MEDICATIONS  (PRN):  acetaminophen     Tablet .. 650 milliGRAM(s) Oral every 6 hours PRN Temp greater or equal to 38C (100.4F), Mild Pain (1 - 3)  ALBUTerol    90 MICROgram(s) HFA Inhaler 2 Puff(s) Inhalation every 6 hours PRN Wheezing  fluticasone propionate 50 MICROgram(s)/spray Nasal Spray 1 Spray(s) Both Nostrils every 12 hours PRN nasal congestion  guaiFENesin Oral Liquid (Sugar-Free) 100 milliGRAM(s) Oral every 6 hours PRN Cough  oxyCODONE    IR 2.5 milliGRAM(s) Oral every 4 hours PRN Severe Pain (7 - 10)  traMADol 50 milliGRAM(s) Oral every 4 hours PRN Moderate Pain (4 - 6)  traMADol 100 milliGRAM(s) Oral every 4 hours PRN Severe Pain (7 - 10)      ALLERGIES:  Allergies    erythromycin (Hives)  IV Contrast (Anaphylaxis)    Intolerances        LABS:                        8.2    7.93  )-----------( 323      ( 15 Nov 2022 05:55 )             26.7     11-15    128<L>  |  93<L>  |  11  ----------------------------<  88  4.3   |  24  |  0.59    Ca    10.0      15 Nov 2022 05:55  Phos  2.8     11-15  Mg     1.90     11-15    TPro  6.9  /  Alb  2.9<L>  /  TBili  0.5  /  DBili  x   /  AST  17  /  ALT  <5<L>  /  AlkPhos  80  11-15        CAPILLARY BLOOD GLUCOSE          RADIOLOGY & ADDITIONAL TESTS: Reviewed.    ASSESSMENT:    PLAN:

## 2022-11-16 LAB
ALBUMIN SERPL ELPH-MCNC: 2.9 G/DL — LOW (ref 3.3–5)
ALP SERPL-CCNC: 83 U/L — SIGNIFICANT CHANGE UP (ref 40–120)
ALT FLD-CCNC: <5 U/L — LOW (ref 4–33)
ANION GAP SERPL CALC-SCNC: 12 MMOL/L — SIGNIFICANT CHANGE UP (ref 7–14)
AST SERPL-CCNC: 19 U/L — SIGNIFICANT CHANGE UP (ref 4–32)
BASOPHILS # BLD AUTO: 0.01 K/UL — SIGNIFICANT CHANGE UP (ref 0–0.2)
BASOPHILS NFR BLD AUTO: 0.1 % — SIGNIFICANT CHANGE UP (ref 0–2)
BILIRUB DIRECT SERPL-MCNC: 0.2 MG/DL — SIGNIFICANT CHANGE UP (ref 0–0.3)
BILIRUB INDIRECT FLD-MCNC: 0.2 MG/DL — SIGNIFICANT CHANGE UP (ref 0–1)
BILIRUB SERPL-MCNC: 0.4 MG/DL — SIGNIFICANT CHANGE UP (ref 0.2–1.2)
BUN SERPL-MCNC: 10 MG/DL — SIGNIFICANT CHANGE UP (ref 7–23)
CALCIUM SERPL-MCNC: 10 MG/DL — SIGNIFICANT CHANGE UP (ref 8.4–10.5)
CHLORIDE SERPL-SCNC: 97 MMOL/L — LOW (ref 98–107)
CO2 SERPL-SCNC: 24 MMOL/L — SIGNIFICANT CHANGE UP (ref 22–31)
CREAT SERPL-MCNC: 0.56 MG/DL — SIGNIFICANT CHANGE UP (ref 0.5–1.3)
EGFR: 94 ML/MIN/1.73M2 — SIGNIFICANT CHANGE UP
EOSINOPHIL # BLD AUTO: 0 K/UL — SIGNIFICANT CHANGE UP (ref 0–0.5)
EOSINOPHIL NFR BLD AUTO: 0 % — SIGNIFICANT CHANGE UP (ref 0–6)
GLUCOSE SERPL-MCNC: 76 MG/DL — SIGNIFICANT CHANGE UP (ref 70–99)
HCT VFR BLD CALC: 28.3 % — LOW (ref 34.5–45)
HGB BLD-MCNC: 8.6 G/DL — LOW (ref 11.5–15.5)
IANC: 6.11 K/UL — SIGNIFICANT CHANGE UP (ref 1.8–7.4)
IMM GRANULOCYTES NFR BLD AUTO: 0.6 % — SIGNIFICANT CHANGE UP (ref 0–0.9)
LYMPHOCYTES # BLD AUTO: 1.7 K/UL — SIGNIFICANT CHANGE UP (ref 1–3.3)
LYMPHOCYTES # BLD AUTO: 19.9 % — SIGNIFICANT CHANGE UP (ref 13–44)
MAGNESIUM SERPL-MCNC: 1.9 MG/DL — SIGNIFICANT CHANGE UP (ref 1.6–2.6)
MCHC RBC-ENTMCNC: 25 PG — LOW (ref 27–34)
MCHC RBC-ENTMCNC: 30.4 GM/DL — LOW (ref 32–36)
MCV RBC AUTO: 82.3 FL — SIGNIFICANT CHANGE UP (ref 80–100)
MONOCYTES # BLD AUTO: 0.67 K/UL — SIGNIFICANT CHANGE UP (ref 0–0.9)
MONOCYTES NFR BLD AUTO: 7.8 % — SIGNIFICANT CHANGE UP (ref 2–14)
NEUTROPHILS # BLD AUTO: 6.11 K/UL — SIGNIFICANT CHANGE UP (ref 1.8–7.4)
NEUTROPHILS NFR BLD AUTO: 71.6 % — SIGNIFICANT CHANGE UP (ref 43–77)
NRBC # BLD: 0 /100 WBCS — SIGNIFICANT CHANGE UP (ref 0–0)
NRBC # FLD: 0 K/UL — SIGNIFICANT CHANGE UP (ref 0–0)
PHOSPHATE SERPL-MCNC: 2.8 MG/DL — SIGNIFICANT CHANGE UP (ref 2.5–4.5)
PLATELET # BLD AUTO: 329 K/UL — SIGNIFICANT CHANGE UP (ref 150–400)
POTASSIUM SERPL-MCNC: 4.1 MMOL/L — SIGNIFICANT CHANGE UP (ref 3.5–5.3)
POTASSIUM SERPL-SCNC: 4.1 MMOL/L — SIGNIFICANT CHANGE UP (ref 3.5–5.3)
PROT SERPL-MCNC: 6.9 G/DL — SIGNIFICANT CHANGE UP (ref 6–8.3)
RBC # BLD: 3.44 M/UL — LOW (ref 3.8–5.2)
RBC # FLD: 21.1 % — HIGH (ref 10.3–14.5)
SODIUM SERPL-SCNC: 133 MMOL/L — LOW (ref 135–145)
WBC # BLD: 8.54 K/UL — SIGNIFICANT CHANGE UP (ref 3.8–10.5)
WBC # FLD AUTO: 8.54 K/UL — SIGNIFICANT CHANGE UP (ref 3.8–10.5)

## 2022-11-16 PROCEDURE — 76705 ECHO EXAM OF ABDOMEN: CPT | Mod: 26

## 2022-11-16 PROCEDURE — 99232 SBSQ HOSP IP/OBS MODERATE 35: CPT

## 2022-11-16 PROCEDURE — 76770 US EXAM ABDO BACK WALL COMP: CPT | Mod: 26,59

## 2022-11-16 RX ORDER — LACTULOSE 10 G/15ML
10 SOLUTION ORAL EVERY 8 HOURS
Refills: 0 | Status: COMPLETED | OUTPATIENT
Start: 2022-11-16 | End: 2022-11-17

## 2022-11-16 RX ORDER — OXYCODONE HYDROCHLORIDE 5 MG/1
5 TABLET ORAL ONCE
Refills: 0 | Status: DISCONTINUED | OUTPATIENT
Start: 2022-11-16 | End: 2022-11-16

## 2022-11-16 RX ADMIN — Medication 1 TABLET(S): at 11:07

## 2022-11-16 RX ADMIN — POLYETHYLENE GLYCOL 3350 17 GRAM(S): 17 POWDER, FOR SOLUTION ORAL at 05:49

## 2022-11-16 RX ADMIN — OXYCODONE HYDROCHLORIDE 2.5 MILLIGRAM(S): 5 TABLET ORAL at 22:41

## 2022-11-16 RX ADMIN — LACTULOSE 10 GRAM(S): 10 SOLUTION ORAL at 14:14

## 2022-11-16 RX ADMIN — ROPINIROLE 2 MILLIGRAM(S): 8 TABLET, FILM COATED, EXTENDED RELEASE ORAL at 21:49

## 2022-11-16 RX ADMIN — OXYCODONE HYDROCHLORIDE 5 MILLIGRAM(S): 5 TABLET ORAL at 11:03

## 2022-11-16 RX ADMIN — Medication 12.5 MILLIGRAM(S): at 17:36

## 2022-11-16 RX ADMIN — Medication 5 MILLIGRAM(S): at 21:48

## 2022-11-16 RX ADMIN — OXYCODONE HYDROCHLORIDE 2.5 MILLIGRAM(S): 5 TABLET ORAL at 17:33

## 2022-11-16 RX ADMIN — SODIUM CHLORIDE 1 GRAM(S): 9 INJECTION INTRAMUSCULAR; INTRAVENOUS; SUBCUTANEOUS at 21:49

## 2022-11-16 RX ADMIN — Medication 125 MICROGRAM(S): at 05:48

## 2022-11-16 RX ADMIN — OXYCODONE HYDROCHLORIDE 2.5 MILLIGRAM(S): 5 TABLET ORAL at 21:41

## 2022-11-16 RX ADMIN — Medication 3 MILLILITER(S): at 07:10

## 2022-11-16 RX ADMIN — PANTOPRAZOLE SODIUM 40 MILLIGRAM(S): 20 TABLET, DELAYED RELEASE ORAL at 05:48

## 2022-11-16 RX ADMIN — Medication 1 SPRAY(S): at 05:47

## 2022-11-16 RX ADMIN — HEPARIN SODIUM 5000 UNIT(S): 5000 INJECTION INTRAVENOUS; SUBCUTANEOUS at 05:49

## 2022-11-16 RX ADMIN — SODIUM CHLORIDE 1 GRAM(S): 9 INJECTION INTRAMUSCULAR; INTRAVENOUS; SUBCUTANEOUS at 14:14

## 2022-11-16 RX ADMIN — Medication 12.5 MILLIGRAM(S): at 05:48

## 2022-11-16 RX ADMIN — SODIUM CHLORIDE 1 GRAM(S): 9 INJECTION INTRAMUSCULAR; INTRAVENOUS; SUBCUTANEOUS at 05:49

## 2022-11-16 RX ADMIN — OXYCODONE HYDROCHLORIDE 2.5 MILLIGRAM(S): 5 TABLET ORAL at 04:02

## 2022-11-16 RX ADMIN — Medication 3 MILLILITER(S): at 21:01

## 2022-11-16 RX ADMIN — HEPARIN SODIUM 5000 UNIT(S): 5000 INJECTION INTRAVENOUS; SUBCUTANEOUS at 14:14

## 2022-11-16 RX ADMIN — Medication 100 MILLIGRAM(S): at 05:48

## 2022-11-16 RX ADMIN — Medication 100 MILLIGRAM(S): at 05:49

## 2022-11-16 RX ADMIN — TRAMADOL HYDROCHLORIDE 50 MILLIGRAM(S): 50 TABLET ORAL at 09:48

## 2022-11-16 RX ADMIN — DULOXETINE HYDROCHLORIDE 60 MILLIGRAM(S): 30 CAPSULE, DELAYED RELEASE ORAL at 11:09

## 2022-11-16 RX ADMIN — Medication 100 MILLIGRAM(S): at 14:13

## 2022-11-16 RX ADMIN — Medication 100 MILLIGRAM(S): at 14:14

## 2022-11-16 RX ADMIN — Medication 100 MILLIGRAM(S): at 21:50

## 2022-11-16 RX ADMIN — Medication 100 MILLIGRAM(S): at 21:49

## 2022-11-16 RX ADMIN — Medication 3 MILLIGRAM(S): at 21:48

## 2022-11-16 RX ADMIN — GABAPENTIN 300 MILLIGRAM(S): 400 CAPSULE ORAL at 05:48

## 2022-11-16 RX ADMIN — GABAPENTIN 300 MILLIGRAM(S): 400 CAPSULE ORAL at 17:36

## 2022-11-16 RX ADMIN — OXYCODONE HYDROCHLORIDE 2.5 MILLIGRAM(S): 5 TABLET ORAL at 03:02

## 2022-11-16 RX ADMIN — HEPARIN SODIUM 5000 UNIT(S): 5000 INJECTION INTRAVENOUS; SUBCUTANEOUS at 21:49

## 2022-11-16 RX ADMIN — ALBUTEROL 2 PUFF(S): 90 AEROSOL, METERED ORAL at 15:03

## 2022-11-16 RX ADMIN — SENNA PLUS 2 TABLET(S): 8.6 TABLET ORAL at 21:49

## 2022-11-16 RX ADMIN — LACTULOSE 10 GRAM(S): 10 SOLUTION ORAL at 21:50

## 2022-11-16 RX ADMIN — OXYCODONE HYDROCHLORIDE 2.5 MILLIGRAM(S): 5 TABLET ORAL at 07:05

## 2022-11-16 RX ADMIN — Medication 145 MILLIGRAM(S): at 11:07

## 2022-11-16 RX ADMIN — QUETIAPINE FUMARATE 25 MILLIGRAM(S): 200 TABLET, FILM COATED ORAL at 21:49

## 2022-11-16 RX ADMIN — Medication 325 MILLIGRAM(S): at 11:09

## 2022-11-16 NOTE — PROVIDER CONTACT NOTE (OTHER) - ACTION/TREATMENT ORDERED:
Provider stated to bladder scan patient in another eight hours and follow protocol for increased bladder scan volume. This writer verbalized understanding.
No Call back from provider. Paged the am provider and passed on with am RN to Follow up.

## 2022-11-16 NOTE — PROGRESS NOTE ADULT - SUBJECTIVE AND OBJECTIVE BOX
Hospitalist Progress Note  Germaine Cardoza MD Pager # [phone]    OVERNIGHT EVENTS: JYOTI    SUBJECTIVE / INTERVAL HPI: Patient seen and examined at bedside. Pt. reports that she currently has 10/10 back pain. The pain medication she received this AM is not working. She says she has been having BMs. She is passing gas.     VITAL SIGNS:  Vital Signs Last 24 Hrs  T(C): 36.6 (2022 12:04), Max: 36.9 (15 Nov 2022 17:46)  T(F): 97.8 (2022 12:04), Max: 98.4 (15 Nov 2022 17:46)  HR: 81 (2022 12:04) (78 - 92)  BP: 117/70 (2022 12:04) (109/64 - 141/88)  BP(mean): --  RR: 18 (2022 12:04) (17 - 18)  SpO2: 97% (2022 12:04) (95% - 98%)    Parameters below as of 2022 12:04  Patient On (Oxygen Delivery Method): room air        PHYSICAL EXAM:    General: Weak elderly female resting in a chair   HEENT: NC/AT; PERRL, anicteric sclera; MMM  Neck: supple  Cardiovascular: +S1/S2; RRR  Respiratory: CTA B/L; no W/R/R  Gastrointestinal: soft, NT/ND; +BSx4  Extremities: WWP; no edema, clubbing or cyanosis  Vascular: 2+ radial, DP/PT pulses B/L  Neurological: AAOx3; no focal deficits    MEDICATIONS:  MEDICATIONS  (STANDING):  albuterol/ipratropium for Nebulization 3 milliLiter(s) Nebulizer every 12 hours  benzonatate 100 milliGRAM(s) Oral three times a day  bisacodyl 5 milliGRAM(s) Oral at bedtime  ceFAZolin   IVPB 2000 milliGRAM(s) IV Intermittent every 8 hours  DULoxetine 60 milliGRAM(s) Oral daily  fenofibrate Tablet 145 milliGRAM(s) Oral daily  ferrous    sulfate 325 milliGRAM(s) Oral daily  gabapentin 300 milliGRAM(s) Oral two times a day  heparin   Injectable 5000 Unit(s) SubCutaneous every 8 hours  lactulose Syrup 10 Gram(s) Oral every 8 hours  levothyroxine 125 MICROGram(s) Oral daily  lidocaine   4% Patch 1 Patch Transdermal every 24 hours  meclizine 12.5 milliGRAM(s) Oral two times a day  melatonin 3 milliGRAM(s) Oral at bedtime  multivitamin 1 Tablet(s) Oral daily  pantoprazole    Tablet 40 milliGRAM(s) Oral before breakfast  polyethylene glycol 3350 17 Gram(s) Oral two times a day  QUEtiapine 25 milliGRAM(s) Oral at bedtime  rOPINIRole 2 milliGRAM(s) Oral daily  senna 2 Tablet(s) Oral at bedtime  sodium chloride 1 Gram(s) Oral three times a day  sodium chloride 0.65% Nasal 1 Spray(s) Both Nostrils two times a day    MEDICATIONS  (PRN):  acetaminophen     Tablet .. 650 milliGRAM(s) Oral every 6 hours PRN Temp greater or equal to 38C (100.4F), Mild Pain (1 - 3)  ALBUTerol    90 MICROgram(s) HFA Inhaler 2 Puff(s) Inhalation every 6 hours PRN Wheezing  fluticasone propionate 50 MICROgram(s)/spray Nasal Spray 1 Spray(s) Both Nostrils every 12 hours PRN nasal congestion  guaiFENesin Oral Liquid (Sugar-Free) 100 milliGRAM(s) Oral every 6 hours PRN Cough  oxyCODONE    IR 2.5 milliGRAM(s) Oral every 4 hours PRN Severe Pain (7 - 10)  traMADol 50 milliGRAM(s) Oral every 4 hours PRN Moderate Pain (4 - 6)      ALLERGIES:  Allergies    erythromycin (Hives)  IV Contrast (Anaphylaxis)    Intolerances        LABS:                        8.6    8.54  )-----------( 329      ( 2022 05:55 )             28.3     -16    133<L>  |  97<L>  |  10  ----------------------------<  76  4.1   |  24  |  0.56    Ca    10.0      2022 05:55  Phos  2.8     -  Mg     1.90         TPro  6.9  /  Alb  2.9<L>  /  TBili  0.4  /  DBili  0.2  /  AST  19  /  ALT  <5<L>  /  AlkPhos  83        Urinalysis Basic - ( 15 Nov 2022 21:08 )    Color: Yellow / Appearance: Slightly Turbid / S.022 / pH: x  Gluc: x / Ketone: Negative  / Bili: Negative / Urobili: <2 mg/dL   Blood: x / Protein: Trace / Nitrite: Negative   Leuk Esterase: Large / RBC: 7 /HPF / WBC 50 /HPF   Sq Epi: x / Non Sq Epi: 11 /HPF / Bacteria: Negative      CAPILLARY BLOOD GLUCOSE          RADIOLOGY & ADDITIONAL TESTS: Reviewed.    ASSESSMENT:    PLAN:

## 2022-11-16 NOTE — PROGRESS NOTE ADULT - SUBJECTIVE AND OBJECTIVE BOX
CC: Patient is a 77y old  Female who presents with a chief complaint of Fall / UTI (07 Nov 2022 14:43)    ID following for MSSA bacteremia    Interval History/ROS:  c/o cough    productive purulent sputum    no fever    back pain +    Allergies  erythromycin (Hives)  IV Contrast (Anaphylaxis)    ANTIMICROBIALS: ceFAZolin   IVPB 2000 every 8 hours    MEDICATIONS  (STANDING):  acetaminophen     Tablet .. 650 every 6 hours PRN  ALBUTerol    90 MICROgram(s) HFA Inhaler 2 every 6 hours PRN  albuterol/ipratropium for Nebulization 3 every 12 hours  benzonatate 100 three times a day  bisacodyl 5 at bedtime  DULoxetine 60 daily  fenofibrate Tablet 145 daily  gabapentin 300 two times a day  guaiFENesin Oral Liquid (Sugar-Free) 100 every 6 hours PRN  heparin   Injectable 5000 every 8 hours  lactulose Syrup 10 every 8 hours  levothyroxine 125 daily  meclizine 12.5 two times a day  melatonin 3 at bedtime  oxyCODONE    IR 2.5 every 4 hours PRN  pantoprazole    Tablet 40 before breakfast  polyethylene glycol 3350 17 two times a day  QUEtiapine 25 at bedtime  rOPINIRole 2 daily  senna 2 at bedtime  traMADol 50 every 4 hours PRN    Vital Signs Last 24 Hrs  T(F): 97.8 (11-16-22 @ 12:04), Max: 98.4 (11-15-22 @ 17:46)  HR: 81 (11-16-22 @ 12:04)  BP: 117/70 (11-16-22 @ 12:04)  RR: 18 (11-16-22 @ 12:04)  SpO2: 97% (11-16-22 @ 12:04) (95% - 98%)    Gen: alert, non toxic  CV: S1+S2   Resp:  no resp distress on RA  Abd: Soft, nontender, +BS  : No Block  IV/Skin: iv right arm  no erythema  anxious    LABS:                    8.6    8.54  )-----------( 329      ( 16 Nov 2022 05:55 )             28.3 11-16    133  |  97  |  10  ----------------------------<  76  4.1   |  24  |  0.56  Ca    10.0      16 Nov 2022 05:55Phos  2.8     11-16Mg     1.90     11-16  TPro  6.9  /  Alb  2.9  /  TBili  0.4  /  DBili  0.2  /  AST  19  /  ALT  <5  /  AlkPhos  83  11-16      MICROBIOLOGY:    .Blood Blood-Venous  10-29-22   No Growth Final  --  --      .Blood Blood-Peripheral  10-29-22   No Growth Final  --  --      .Blood Blood-Peripheral  10-27-22   No Growth Final  --  --      .Blood Blood-Peripheral  10-27-22   Growth in anaerobic bottle: Staphylococcus aureus  See previous culture [id number]  --    Growth in anaerobic bottle: Gram Positive Cocci in Clusters      .Blood Blood  10-25-22   Growth in aerobic and anaerobic bottles: Staphylococcus aureus  ***Blood Panel PCR results on this specimen are available  approximately 3 hours after the Gram stain result.***  Gram stain, PCR, and/or culture results may not always  correspond dueto difference in methodologies.  ************************************************************  This PCR assay was performed by multiplex PCR. This  Assay tests for 66 bacterial and resistance gene targets.  Please refer to the [facility] test directory  at [email or web address] for details.  --  Blood Culture PCR  Staphylococcus aureus      Clean Catch Clean Catch (Midstream)  10-25-22   Culture grew 3 or more types of organisms which indicate  collection contamination; consider recollection only if clinically  indicated.  --  --        eho< from: SHAUN w/o TTE (w/3D Echo) (11.07.22 @ 07:45) >  ------------------------------------------------------------------------  CONCLUSIONS:  1. Mitral annular calcification, otherwise normal mitral  valve.  No vegetations seen in association with the mitral  valve. Mild mitral regurgitation.  2. Calcified trileaflet aortic valve with normal opening.  No vegetations seen in association with the aortic valve.  Minimal aortic regurgitation.  3. Normal aortic root.  Mild non-mobile atherosclerotic  plaque in the aortic arch and descending thoracic aorta.  4. Normal left atrium.  No left atrial or left atrial  appendage thrombus.  5. Normal left ventricular systolic function. No segmental  wall motion abnormalities.  6. Normal right ventricular size and function.  7. Contrast injection demonstrates no evidence of a patent  foramen ovale.  No valvular vegetations were identified.  ------------------------------------------------------------------------  Confirmed on  11/7/2022 - 14:47:20 by Danish Orozco M.D.,  [facility], [place]  ------------------------------------------------------------------------    < end of copied text >    ra< from: Xray Chest 2 Views PA/Lat (11.13.22 @ 18:23) >    ACC: [id number] EXAM:  XR CHEST PA LAT 2V                          PROCEDURE DATE:  11/13/2022          INTERPRETATION:  Chest 2 views    HISTORY: Cough and bacteremia    COMPARISON: 10/25/2022    Frontal and lateral views of the chest were obtained with suboptimal   inspiration. With allowance for this, the heart is similar in size and   the lungs show questionable retrocardiac infiltrate in the lateral view.   There is no evidence of pneumothorax nor pleural effusion.    IMPRESSION: Questionable posterior infiltrate. Follow-up study is   recommended as clinically warranted.      Thank you for this referral.    --- End of Report ---    < end of copied text >

## 2022-11-17 LAB
ALBUMIN SERPL ELPH-MCNC: 2.9 G/DL — LOW (ref 3.3–5)
ALP SERPL-CCNC: 80 U/L — SIGNIFICANT CHANGE UP (ref 40–120)
ALT FLD-CCNC: <5 U/L — LOW (ref 4–33)
ANION GAP SERPL CALC-SCNC: 12 MMOL/L — SIGNIFICANT CHANGE UP (ref 7–14)
AST SERPL-CCNC: 16 U/L — SIGNIFICANT CHANGE UP (ref 4–32)
BILIRUB SERPL-MCNC: 0.4 MG/DL — SIGNIFICANT CHANGE UP (ref 0.2–1.2)
BUN SERPL-MCNC: 8 MG/DL — SIGNIFICANT CHANGE UP (ref 7–23)
CALCIUM SERPL-MCNC: 9.9 MG/DL — SIGNIFICANT CHANGE UP (ref 8.4–10.5)
CHLORIDE SERPL-SCNC: 99 MMOL/L — SIGNIFICANT CHANGE UP (ref 98–107)
CO2 SERPL-SCNC: 23 MMOL/L — SIGNIFICANT CHANGE UP (ref 22–31)
CREAT SERPL-MCNC: 0.57 MG/DL — SIGNIFICANT CHANGE UP (ref 0.5–1.3)
CULTURE RESULTS: SIGNIFICANT CHANGE UP
EGFR: 94 ML/MIN/1.73M2 — SIGNIFICANT CHANGE UP
GLUCOSE SERPL-MCNC: 82 MG/DL — SIGNIFICANT CHANGE UP (ref 70–99)
HCT VFR BLD CALC: 28.7 % — LOW (ref 34.5–45)
HGB BLD-MCNC: 8.6 G/DL — LOW (ref 11.5–15.5)
MAGNESIUM SERPL-MCNC: 1.9 MG/DL — SIGNIFICANT CHANGE UP (ref 1.6–2.6)
MCHC RBC-ENTMCNC: 25.1 PG — LOW (ref 27–34)
MCHC RBC-ENTMCNC: 30 GM/DL — LOW (ref 32–36)
MCV RBC AUTO: 83.9 FL — SIGNIFICANT CHANGE UP (ref 80–100)
NRBC # BLD: 0 /100 WBCS — SIGNIFICANT CHANGE UP (ref 0–0)
NRBC # FLD: 0 K/UL — SIGNIFICANT CHANGE UP (ref 0–0)
PHOSPHATE SERPL-MCNC: 3.1 MG/DL — SIGNIFICANT CHANGE UP (ref 2.5–4.5)
PLATELET # BLD AUTO: 321 K/UL — SIGNIFICANT CHANGE UP (ref 150–400)
POTASSIUM SERPL-MCNC: 4 MMOL/L — SIGNIFICANT CHANGE UP (ref 3.5–5.3)
POTASSIUM SERPL-SCNC: 4 MMOL/L — SIGNIFICANT CHANGE UP (ref 3.5–5.3)
PROT SERPL-MCNC: 6.7 G/DL — SIGNIFICANT CHANGE UP (ref 6–8.3)
RBC # BLD: 3.42 M/UL — LOW (ref 3.8–5.2)
RBC # FLD: 21.1 % — HIGH (ref 10.3–14.5)
SODIUM SERPL-SCNC: 134 MMOL/L — LOW (ref 135–145)
SPECIMEN SOURCE: SIGNIFICANT CHANGE UP
WBC # BLD: 7.09 K/UL — SIGNIFICANT CHANGE UP (ref 3.8–10.5)
WBC # FLD AUTO: 7.09 K/UL — SIGNIFICANT CHANGE UP (ref 3.8–10.5)

## 2022-11-17 PROCEDURE — 99232 SBSQ HOSP IP/OBS MODERATE 35: CPT

## 2022-11-17 RX ORDER — ACETAMINOPHEN 500 MG
650 TABLET ORAL EVERY 6 HOURS
Refills: 0 | Status: COMPLETED | OUTPATIENT
Start: 2022-11-17 | End: 2022-11-19

## 2022-11-17 RX ORDER — GABAPENTIN 400 MG/1
300 CAPSULE ORAL EVERY 8 HOURS
Refills: 0 | Status: DISCONTINUED | OUTPATIENT
Start: 2022-11-17 | End: 2022-11-24

## 2022-11-17 RX ORDER — OXYCODONE HYDROCHLORIDE 5 MG/1
5 TABLET ORAL EVERY 4 HOURS
Refills: 0 | Status: DISCONTINUED | OUTPATIENT
Start: 2022-11-17 | End: 2022-11-19

## 2022-11-17 RX ADMIN — Medication 1 TABLET(S): at 13:23

## 2022-11-17 RX ADMIN — ROPINIROLE 2 MILLIGRAM(S): 8 TABLET, FILM COATED, EXTENDED RELEASE ORAL at 22:00

## 2022-11-17 RX ADMIN — QUETIAPINE FUMARATE 25 MILLIGRAM(S): 200 TABLET, FILM COATED ORAL at 22:00

## 2022-11-17 RX ADMIN — Medication 12.5 MILLIGRAM(S): at 06:10

## 2022-11-17 RX ADMIN — Medication 325 MILLIGRAM(S): at 13:21

## 2022-11-17 RX ADMIN — Medication 3 MILLILITER(S): at 20:56

## 2022-11-17 RX ADMIN — HEPARIN SODIUM 5000 UNIT(S): 5000 INJECTION INTRAVENOUS; SUBCUTANEOUS at 06:10

## 2022-11-17 RX ADMIN — Medication 100 MILLIGRAM(S): at 22:01

## 2022-11-17 RX ADMIN — Medication 650 MILLIGRAM(S): at 22:02

## 2022-11-17 RX ADMIN — OXYCODONE HYDROCHLORIDE 2.5 MILLIGRAM(S): 5 TABLET ORAL at 07:38

## 2022-11-17 RX ADMIN — Medication 100 MILLIGRAM(S): at 06:11

## 2022-11-17 RX ADMIN — Medication 650 MILLIGRAM(S): at 13:20

## 2022-11-17 RX ADMIN — PANTOPRAZOLE SODIUM 40 MILLIGRAM(S): 20 TABLET, DELAYED RELEASE ORAL at 06:10

## 2022-11-17 RX ADMIN — HEPARIN SODIUM 5000 UNIT(S): 5000 INJECTION INTRAVENOUS; SUBCUTANEOUS at 22:01

## 2022-11-17 RX ADMIN — OXYCODONE HYDROCHLORIDE 2.5 MILLIGRAM(S): 5 TABLET ORAL at 02:24

## 2022-11-17 RX ADMIN — SODIUM CHLORIDE 1 GRAM(S): 9 INJECTION INTRAMUSCULAR; INTRAVENOUS; SUBCUTANEOUS at 22:00

## 2022-11-17 RX ADMIN — Medication 100 MILLIGRAM(S): at 22:00

## 2022-11-17 RX ADMIN — Medication 3 MILLIGRAM(S): at 22:00

## 2022-11-17 RX ADMIN — Medication 650 MILLIGRAM(S): at 17:19

## 2022-11-17 RX ADMIN — TRAMADOL HYDROCHLORIDE 50 MILLIGRAM(S): 50 TABLET ORAL at 10:15

## 2022-11-17 RX ADMIN — SODIUM CHLORIDE 1 GRAM(S): 9 INJECTION INTRAMUSCULAR; INTRAVENOUS; SUBCUTANEOUS at 06:10

## 2022-11-17 RX ADMIN — HEPARIN SODIUM 5000 UNIT(S): 5000 INJECTION INTRAVENOUS; SUBCUTANEOUS at 13:20

## 2022-11-17 RX ADMIN — OXYCODONE HYDROCHLORIDE 5 MILLIGRAM(S): 5 TABLET ORAL at 23:37

## 2022-11-17 RX ADMIN — GABAPENTIN 300 MILLIGRAM(S): 400 CAPSULE ORAL at 22:00

## 2022-11-17 RX ADMIN — Medication 12.5 MILLIGRAM(S): at 17:17

## 2022-11-17 RX ADMIN — OXYCODONE HYDROCHLORIDE 5 MILLIGRAM(S): 5 TABLET ORAL at 14:42

## 2022-11-17 RX ADMIN — GABAPENTIN 300 MILLIGRAM(S): 400 CAPSULE ORAL at 06:10

## 2022-11-17 RX ADMIN — OXYCODONE HYDROCHLORIDE 2.5 MILLIGRAM(S): 5 TABLET ORAL at 08:38

## 2022-11-17 RX ADMIN — OXYCODONE HYDROCHLORIDE 5 MILLIGRAM(S): 5 TABLET ORAL at 15:42

## 2022-11-17 RX ADMIN — SODIUM CHLORIDE 1 GRAM(S): 9 INJECTION INTRAMUSCULAR; INTRAVENOUS; SUBCUTANEOUS at 13:22

## 2022-11-17 RX ADMIN — Medication 145 MILLIGRAM(S): at 13:21

## 2022-11-17 RX ADMIN — OXYCODONE HYDROCHLORIDE 5 MILLIGRAM(S): 5 TABLET ORAL at 19:34

## 2022-11-17 RX ADMIN — Medication 125 MICROGRAM(S): at 06:10

## 2022-11-17 RX ADMIN — DULOXETINE HYDROCHLORIDE 60 MILLIGRAM(S): 30 CAPSULE, DELAYED RELEASE ORAL at 13:21

## 2022-11-17 RX ADMIN — Medication 100 MILLIGRAM(S): at 13:22

## 2022-11-17 RX ADMIN — GABAPENTIN 300 MILLIGRAM(S): 400 CAPSULE ORAL at 13:21

## 2022-11-17 RX ADMIN — Medication 3 MILLILITER(S): at 07:58

## 2022-11-17 RX ADMIN — Medication 1 SPRAY(S): at 06:09

## 2022-11-17 RX ADMIN — OXYCODONE HYDROCHLORIDE 5 MILLIGRAM(S): 5 TABLET ORAL at 20:34

## 2022-11-17 RX ADMIN — Medication 5 MILLIGRAM(S): at 22:00

## 2022-11-17 RX ADMIN — OXYCODONE HYDROCHLORIDE 2.5 MILLIGRAM(S): 5 TABLET ORAL at 03:24

## 2022-11-17 RX ADMIN — Medication 1 SPRAY(S): at 17:16

## 2022-11-17 RX ADMIN — Medication 100 MILLIGRAM(S): at 13:18

## 2022-11-17 RX ADMIN — TRAMADOL HYDROCHLORIDE 50 MILLIGRAM(S): 50 TABLET ORAL at 11:15

## 2022-11-17 RX ADMIN — Medication 100 MILLIGRAM(S): at 06:10

## 2022-11-17 NOTE — CHART NOTE - NSCHARTNOTEFT_GEN_A_CORE
MODERATE MALNUTRITION FOLLOW-UP    Source: Patient A&Ox4 [x ]    Family [x ]     other [x ] Chart    77 year old female with PMH of significant for hypothyroidism, HTN, HLD, SVT (s/p ablation), Cervical cancer (s/p hysterectomy), DVT (June 2022, still on eliquis), OA and peripheral neuropathy presenting to [facility] after having fall from bed overnight. Patient found to have UTI, MSSA bacteremia.     Spoke with patient and family member at bedside. As per family member, patient with good intake at breakfast, but overall poor PO intake for the remainder of the day. Patient ordered for magic cup 2x daily, however magic cup is currently out of stock. Patient prefers to swallow soft foods such as ice cream and pudding. Swallow evaluation completed on 11/4 - SLP deemed safe for patient to have regular solids with thin liquids. Patient previously denied ensure shake. Patient agreeable to try ensure pudding to supplement PO intake while magic cup is out of stock. Complains of intermittent nausea - requesting diet gingerale on all trays to ease nausea.     Diet, DASH/TLC:   Sodium & Cholesterol Restricted (11-08-22 @ 18:50)    GI: WDL. Last documented BM 11/17.     PO intake:  < 50% [ ] 50-75% [x ]   % [ ]  other :    Anthropometrics: Height (cm): 166.4 (10-25)  Weight (kg): 86.9 (10-30)  BMI (kg/m2): 31.4 (10-30)  weight assessment: no new weight at this time.     Edema: no edema noted.   Pressure Injuries: Skin remains intact.     __________________ Pertinent Medications__________________   MEDICATIONS  (STANDING):  acetaminophen     Tablet .. 650 milliGRAM(s) Oral every 6 hours  albuterol/ipratropium for Nebulization 3 milliLiter(s) Nebulizer every 12 hours  benzonatate 100 milliGRAM(s) Oral three times a day  bisacodyl 5 milliGRAM(s) Oral at bedtime  ceFAZolin   IVPB 2000 milliGRAM(s) IV Intermittent every 8 hours  DULoxetine 60 milliGRAM(s) Oral daily  fenofibrate Tablet 145 milliGRAM(s) Oral daily  ferrous    sulfate 325 milliGRAM(s) Oral daily  gabapentin 300 milliGRAM(s) Oral every 8 hours  heparin   Injectable 5000 Unit(s) SubCutaneous every 8 hours  levothyroxine 125 MICROGram(s) Oral daily  lidocaine   4% Patch 1 Patch Transdermal every 24 hours  meclizine 12.5 milliGRAM(s) Oral two times a day  melatonin 3 milliGRAM(s) Oral at bedtime  multivitamin 1 Tablet(s) Oral daily  pantoprazole    Tablet 40 milliGRAM(s) Oral before breakfast  polyethylene glycol 3350 17 Gram(s) Oral two times a day  QUEtiapine 25 milliGRAM(s) Oral at bedtime  rOPINIRole 2 milliGRAM(s) Oral daily  senna 2 Tablet(s) Oral at bedtime  sodium chloride 1 Gram(s) Oral three times a day  sodium chloride 0.65% Nasal 1 Spray(s) Both Nostrils two times a day    MEDICATIONS  (PRN):  acetaminophen     Tablet .. 650 milliGRAM(s) Oral every 6 hours PRN Temp greater or equal to 38C (100.4F), Mild Pain (1 - 3)  ALBUTerol    90 MICROgram(s) HFA Inhaler 2 Puff(s) Inhalation every 6 hours PRN Wheezing  fluticasone propionate 50 MICROgram(s)/spray Nasal Spray 1 Spray(s) Both Nostrils every 12 hours PRN nasal congestion  guaiFENesin Oral Liquid (Sugar-Free) 100 milliGRAM(s) Oral every 6 hours PRN Cough  oxyCODONE    IR 5 milliGRAM(s) Oral every 4 hours PRN Severe Pain (7 - 10)  traMADol 50 milliGRAM(s) Oral every 4 hours PRN Moderate Pain (4 - 6)      __________________ Pertinent Labs__________________   11-17 Na134 mmol/L<L> Glu 82 mg/dL K+ 4.0 mmol/L Cr  0.57 mg/dL BUN 8 mg/dL 11-17 Phos 3.1 mg/dL 11-17 Alb 2.9 g/dL<L> 10-26 Chol 118 mg/dL LDL --    HDL 21 mg/dL<L> Trig 185 mg/dL<H>    Estimated Needs:   [x ] no change since previous assessment    Previous Nutrition Diagnosis: Moderate protein-calorie malnutrition    Nutrition Diagnosis is [x ] ongoing  [ ] resolved [ ] not applicable     Recommendations:  1) Continue DASH/TLC diet.   2) Recommend Ensure Pudding twice daily (provides 170 kcal, 4 gm protein per 4oz serving) to supplement PO intake.   3) Continue MVI daily for micronutrient coverage.   4) Kitchen to provide magic cup twice daily when becomes available.   5) Honor patient preferences and provide as able.         Monitoring and Evaluation:  [ x] Tolerance to diet prescription [x ] weights [x ] follow up per protocol

## 2022-11-17 NOTE — PROGRESS NOTE ADULT - SUBJECTIVE AND OBJECTIVE BOX
Hospitalist Progress Note  Germaine Cardoza MD Pager # [phone]    OVERNIGHT EVENTS: JYOTI    SUBJECTIVE / INTERVAL HPI: Patient seen and examined at bedside. Pt. reports she is still having pain in her back. The oxycodone is not helping. She has had multiple bowel movements overnight. No nausea/vomiting.     VITAL SIGNS:  Vital Signs Last 24 Hrs  T(C): 36.5 (2022 06:10), Max: 36.7 (2022 21:45)  T(F): 97.7 (2022 06:10), Max: 98.1 (2022 21:45)  HR: 76 (2022 07:58) (76 - 82)  BP: 131/62 (2022 06:10) (122/72 - 131/62)  BP(mean): --  RR: 18 (2022 06:10) (17 - 18)  SpO2: 100% (2022 06:10) (97% - 100%)    Parameters below as of 2022 06:10  Patient On (Oxygen Delivery Method): room air        PHYSICAL EXAM:    General: Ill appearing female resting in bed   HEENT: NC/AT; PERRL, anicteric sclera; MMM  Neck: supple  Cardiovascular: +S1/S2; RRR  Respiratory: CTA B/L; no W/R/R  Gastrointestinal: soft, abdominal distention is improved - mild tenderness to palpation diffusely; +BSx4  Extremities: WWP; no edema, clubbing or cyanosis  Vascular: 2+ radial, DP/PT pulses B/L  Neurological: AAOx3; no focal deficits    MEDICATIONS:  MEDICATIONS  (STANDING):  acetaminophen     Tablet .. 650 milliGRAM(s) Oral every 6 hours  albuterol/ipratropium for Nebulization 3 milliLiter(s) Nebulizer every 12 hours  benzonatate 100 milliGRAM(s) Oral three times a day  bisacodyl 5 milliGRAM(s) Oral at bedtime  ceFAZolin   IVPB 2000 milliGRAM(s) IV Intermittent every 8 hours  DULoxetine 60 milliGRAM(s) Oral daily  fenofibrate Tablet 145 milliGRAM(s) Oral daily  ferrous    sulfate 325 milliGRAM(s) Oral daily  gabapentin 300 milliGRAM(s) Oral every 8 hours  heparin   Injectable 5000 Unit(s) SubCutaneous every 8 hours  levothyroxine 125 MICROGram(s) Oral daily  lidocaine   4% Patch 1 Patch Transdermal every 24 hours  meclizine 12.5 milliGRAM(s) Oral two times a day  melatonin 3 milliGRAM(s) Oral at bedtime  multivitamin 1 Tablet(s) Oral daily  pantoprazole    Tablet 40 milliGRAM(s) Oral before breakfast  polyethylene glycol 3350 17 Gram(s) Oral two times a day  QUEtiapine 25 milliGRAM(s) Oral at bedtime  rOPINIRole 2 milliGRAM(s) Oral daily  senna 2 Tablet(s) Oral at bedtime  sodium chloride 1 Gram(s) Oral three times a day  sodium chloride 0.65% Nasal 1 Spray(s) Both Nostrils two times a day    MEDICATIONS  (PRN):  acetaminophen     Tablet .. 650 milliGRAM(s) Oral every 6 hours PRN Temp greater or equal to 38C (100.4F), Mild Pain (1 - 3)  ALBUTerol    90 MICROgram(s) HFA Inhaler 2 Puff(s) Inhalation every 6 hours PRN Wheezing  fluticasone propionate 50 MICROgram(s)/spray Nasal Spray 1 Spray(s) Both Nostrils every 12 hours PRN nasal congestion  guaiFENesin Oral Liquid (Sugar-Free) 100 milliGRAM(s) Oral every 6 hours PRN Cough  oxyCODONE    IR 5 milliGRAM(s) Oral every 4 hours PRN Severe Pain (7 - 10)  traMADol 50 milliGRAM(s) Oral every 4 hours PRN Moderate Pain (4 - 6)      ALLERGIES:  Allergies    erythromycin (Hives)  IV Contrast (Anaphylaxis)    Intolerances        LABS:                        8.6    7.09  )-----------( 321      ( 2022 05:37 )             28.7         134<L>  |  99  |  8   ----------------------------<  82  4.0   |  23  |  0.57    Ca    9.9      2022 05:37  Phos  3.1       Mg     1.90         TPro  6.7  /  Alb  2.9<L>  /  TBili  0.4  /  DBili  x   /  AST  16  /  ALT  <5<L>  /  AlkPhos  80        Urinalysis Basic - ( 15 Nov 2022 21:08 )    Color: Yellow / Appearance: Slightly Turbid / S.022 / pH: x  Gluc: x / Ketone: Negative  / Bili: Negative / Urobili: <2 mg/dL   Blood: x / Protein: Trace / Nitrite: Negative   Leuk Esterase: Large / RBC: 7 /HPF / WBC 50 /HPF   Sq Epi: x / Non Sq Epi: 11 /HPF / Bacteria: Negative      CAPILLARY BLOOD GLUCOSE          RADIOLOGY & ADDITIONAL TESTS: Reviewed.    ASSESSMENT:    PLAN:

## 2022-11-18 ENCOUNTER — APPOINTMENT (OUTPATIENT)
Dept: ULTRASOUND IMAGING | Facility: IMAGING CENTER | Age: 77
End: 2022-11-18

## 2022-11-18 LAB
ALBUMIN SERPL ELPH-MCNC: 2.9 G/DL — LOW (ref 3.3–5)
ALP SERPL-CCNC: 78 U/L — SIGNIFICANT CHANGE UP (ref 40–120)
ALT FLD-CCNC: <5 U/L — LOW (ref 4–33)
ANION GAP SERPL CALC-SCNC: 13 MMOL/L — SIGNIFICANT CHANGE UP (ref 7–14)
AST SERPL-CCNC: 13 U/L — SIGNIFICANT CHANGE UP (ref 4–32)
BILIRUB SERPL-MCNC: 0.3 MG/DL — SIGNIFICANT CHANGE UP (ref 0.2–1.2)
BUN SERPL-MCNC: 7 MG/DL — SIGNIFICANT CHANGE UP (ref 7–23)
CALCIUM SERPL-MCNC: 9.6 MG/DL — SIGNIFICANT CHANGE UP (ref 8.4–10.5)
CHLORIDE SERPL-SCNC: 94 MMOL/L — LOW (ref 98–107)
CO2 SERPL-SCNC: 22 MMOL/L — SIGNIFICANT CHANGE UP (ref 22–31)
CREAT SERPL-MCNC: 0.54 MG/DL — SIGNIFICANT CHANGE UP (ref 0.5–1.3)
EGFR: 95 ML/MIN/1.73M2 — SIGNIFICANT CHANGE UP
GLUCOSE SERPL-MCNC: 80 MG/DL — SIGNIFICANT CHANGE UP (ref 70–99)
HCT VFR BLD CALC: 28.9 % — LOW (ref 34.5–45)
HGB BLD-MCNC: 8.7 G/DL — LOW (ref 11.5–15.5)
MAGNESIUM SERPL-MCNC: 2 MG/DL — SIGNIFICANT CHANGE UP (ref 1.6–2.6)
MCHC RBC-ENTMCNC: 25 PG — LOW (ref 27–34)
MCHC RBC-ENTMCNC: 30.1 GM/DL — LOW (ref 32–36)
MCV RBC AUTO: 83 FL — SIGNIFICANT CHANGE UP (ref 80–100)
NRBC # BLD: 0 /100 WBCS — SIGNIFICANT CHANGE UP (ref 0–0)
NRBC # FLD: 0 K/UL — SIGNIFICANT CHANGE UP (ref 0–0)
PHOSPHATE SERPL-MCNC: 2.9 MG/DL — SIGNIFICANT CHANGE UP (ref 2.5–4.5)
PLATELET # BLD AUTO: 331 K/UL — SIGNIFICANT CHANGE UP (ref 150–400)
POTASSIUM SERPL-MCNC: 3.7 MMOL/L — SIGNIFICANT CHANGE UP (ref 3.5–5.3)
POTASSIUM SERPL-SCNC: 3.7 MMOL/L — SIGNIFICANT CHANGE UP (ref 3.5–5.3)
PROT SERPL-MCNC: 6.7 G/DL — SIGNIFICANT CHANGE UP (ref 6–8.3)
RBC # BLD: 3.48 M/UL — LOW (ref 3.8–5.2)
RBC # FLD: 21.2 % — HIGH (ref 10.3–14.5)
SODIUM SERPL-SCNC: 129 MMOL/L — LOW (ref 135–145)
WBC # BLD: 6.35 K/UL — SIGNIFICANT CHANGE UP (ref 3.8–10.5)
WBC # FLD AUTO: 6.35 K/UL — SIGNIFICANT CHANGE UP (ref 3.8–10.5)

## 2022-11-18 PROCEDURE — 99232 SBSQ HOSP IP/OBS MODERATE 35: CPT

## 2022-11-18 RX ADMIN — TRAMADOL HYDROCHLORIDE 50 MILLIGRAM(S): 50 TABLET ORAL at 14:08

## 2022-11-18 RX ADMIN — GABAPENTIN 300 MILLIGRAM(S): 400 CAPSULE ORAL at 22:07

## 2022-11-18 RX ADMIN — DULOXETINE HYDROCHLORIDE 60 MILLIGRAM(S): 30 CAPSULE, DELAYED RELEASE ORAL at 11:19

## 2022-11-18 RX ADMIN — Medication 100 MILLIGRAM(S): at 21:27

## 2022-11-18 RX ADMIN — Medication 325 MILLIGRAM(S): at 11:19

## 2022-11-18 RX ADMIN — Medication 1 TABLET(S): at 11:19

## 2022-11-18 RX ADMIN — Medication 100 MILLIGRAM(S): at 13:09

## 2022-11-18 RX ADMIN — SODIUM CHLORIDE 1 GRAM(S): 9 INJECTION INTRAMUSCULAR; INTRAVENOUS; SUBCUTANEOUS at 22:07

## 2022-11-18 RX ADMIN — Medication 125 MICROGRAM(S): at 06:06

## 2022-11-18 RX ADMIN — Medication 3 MILLIGRAM(S): at 22:12

## 2022-11-18 RX ADMIN — Medication 145 MILLIGRAM(S): at 11:19

## 2022-11-18 RX ADMIN — HEPARIN SODIUM 5000 UNIT(S): 5000 INJECTION INTRAVENOUS; SUBCUTANEOUS at 06:07

## 2022-11-18 RX ADMIN — OXYCODONE HYDROCHLORIDE 5 MILLIGRAM(S): 5 TABLET ORAL at 00:37

## 2022-11-18 RX ADMIN — Medication 12.5 MILLIGRAM(S): at 18:07

## 2022-11-18 RX ADMIN — Medication 5 MILLIGRAM(S): at 22:07

## 2022-11-18 RX ADMIN — PANTOPRAZOLE SODIUM 40 MILLIGRAM(S): 20 TABLET, DELAYED RELEASE ORAL at 06:06

## 2022-11-18 RX ADMIN — Medication 1 SPRAY(S): at 06:07

## 2022-11-18 RX ADMIN — ROPINIROLE 2 MILLIGRAM(S): 8 TABLET, FILM COATED, EXTENDED RELEASE ORAL at 21:26

## 2022-11-18 RX ADMIN — Medication 650 MILLIGRAM(S): at 18:07

## 2022-11-18 RX ADMIN — OXYCODONE HYDROCHLORIDE 5 MILLIGRAM(S): 5 TABLET ORAL at 16:44

## 2022-11-18 RX ADMIN — TRAMADOL HYDROCHLORIDE 50 MILLIGRAM(S): 50 TABLET ORAL at 02:34

## 2022-11-18 RX ADMIN — TRAMADOL HYDROCHLORIDE 50 MILLIGRAM(S): 50 TABLET ORAL at 13:08

## 2022-11-18 RX ADMIN — HEPARIN SODIUM 5000 UNIT(S): 5000 INJECTION INTRAVENOUS; SUBCUTANEOUS at 13:10

## 2022-11-18 RX ADMIN — Medication 12.5 MILLIGRAM(S): at 06:06

## 2022-11-18 RX ADMIN — GABAPENTIN 300 MILLIGRAM(S): 400 CAPSULE ORAL at 06:06

## 2022-11-18 RX ADMIN — Medication 100 MILLIGRAM(S): at 21:24

## 2022-11-18 RX ADMIN — LIDOCAINE 1 PATCH: 4 CREAM TOPICAL at 13:17

## 2022-11-18 RX ADMIN — Medication 100 MILLIGRAM(S): at 06:06

## 2022-11-18 RX ADMIN — LIDOCAINE 1 PATCH: 4 CREAM TOPICAL at 19:45

## 2022-11-18 RX ADMIN — Medication 100 MILLIGRAM(S): at 06:07

## 2022-11-18 RX ADMIN — QUETIAPINE FUMARATE 25 MILLIGRAM(S): 200 TABLET, FILM COATED ORAL at 21:27

## 2022-11-18 RX ADMIN — Medication 650 MILLIGRAM(S): at 19:07

## 2022-11-18 RX ADMIN — OXYCODONE HYDROCHLORIDE 5 MILLIGRAM(S): 5 TABLET ORAL at 11:18

## 2022-11-18 RX ADMIN — OXYCODONE HYDROCHLORIDE 5 MILLIGRAM(S): 5 TABLET ORAL at 12:18

## 2022-11-18 RX ADMIN — Medication 3 MILLILITER(S): at 20:54

## 2022-11-18 RX ADMIN — OXYCODONE HYDROCHLORIDE 5 MILLIGRAM(S): 5 TABLET ORAL at 06:06

## 2022-11-18 RX ADMIN — OXYCODONE HYDROCHLORIDE 5 MILLIGRAM(S): 5 TABLET ORAL at 15:44

## 2022-11-18 RX ADMIN — SODIUM CHLORIDE 1 GRAM(S): 9 INJECTION INTRAMUSCULAR; INTRAVENOUS; SUBCUTANEOUS at 13:09

## 2022-11-18 RX ADMIN — OXYCODONE HYDROCHLORIDE 5 MILLIGRAM(S): 5 TABLET ORAL at 07:06

## 2022-11-18 RX ADMIN — OXYCODONE HYDROCHLORIDE 5 MILLIGRAM(S): 5 TABLET ORAL at 22:07

## 2022-11-18 RX ADMIN — Medication 650 MILLIGRAM(S): at 09:37

## 2022-11-18 RX ADMIN — HEPARIN SODIUM 5000 UNIT(S): 5000 INJECTION INTRAVENOUS; SUBCUTANEOUS at 22:07

## 2022-11-18 RX ADMIN — SODIUM CHLORIDE 1 GRAM(S): 9 INJECTION INTRAMUSCULAR; INTRAVENOUS; SUBCUTANEOUS at 06:06

## 2022-11-18 RX ADMIN — Medication 650 MILLIGRAM(S): at 08:37

## 2022-11-18 RX ADMIN — GABAPENTIN 300 MILLIGRAM(S): 400 CAPSULE ORAL at 13:12

## 2022-11-18 RX ADMIN — Medication 1 SPRAY(S): at 18:06

## 2022-11-18 RX ADMIN — TRAMADOL HYDROCHLORIDE 50 MILLIGRAM(S): 50 TABLET ORAL at 03:34

## 2022-11-18 RX ADMIN — OXYCODONE HYDROCHLORIDE 5 MILLIGRAM(S): 5 TABLET ORAL at 23:07

## 2022-11-18 NOTE — PROGRESS NOTE ADULT - SUBJECTIVE AND OBJECTIVE BOX
CC: Patient is a 77y old  Female who presents with a chief complaint of Fall / UTI (07 Nov 2022 14:43)    ID following for MSSA bacteremia    Interval History/ROS:  cough a bit better.  c/o back pain with movement, cough    no fever    sister at bedside    Allergies  erythromycin (Hives)  IV Contrast (Anaphylaxis)    ANTIMICROBIALS:  ceFAZolin   IVPB 2000 every 8 hours    MEDICATIONS  (STANDING):  acetaminophen     Tablet .. 650 every 6 hours PRN  acetaminophen     Tablet .. 650 every 6 hours  ALBUTerol    90 MICROgram(s) HFA Inhaler 2 every 6 hours PRN  albuterol/ipratropium for Nebulization 3 every 12 hours  benzonatate 100 three times a day  bisacodyl 5 at bedtime  DULoxetine 60 daily  fenofibrate Tablet 145 daily  gabapentin 300 every 8 hours  guaiFENesin Oral Liquid (Sugar-Free) 100 every 6 hours PRN  heparin   Injectable 5000 every 8 hours  levothyroxine 125 daily  meclizine 12.5 two times a day  melatonin 3 at bedtime  oxyCODONE    IR 5 every 4 hours PRN  pantoprazole    Tablet 40 before breakfast  polyethylene glycol 3350 17 two times a day  QUEtiapine 25 at bedtime  rOPINIRole 2 daily  senna 2 at bedtime  traMADol 50 every 4 hours PRN    Vital Signs Last 24 Hrs  T(F): 97.8 (11-18-22 @ 11:47), Max: 97.9 (11-17-22 @ 21:17)  HR: 74 (11-18-22 @ 11:47)  BP: 128/72 (11-18-22 @ 11:47)  RR: 18 (11-18-22 @ 11:47)  SpO2: 98% (11-18-22 @ 11:47) (97% - 98%)    Gen: alert, non toxic  CV: S1+S2   Resp:  no resp distress on RA  Abd: Soft, nontender, +BS  : No Block  IV/Skin:   less anxious     LABS:                    8.6    8.54  )-----------( 329      ( 16 Nov 2022 05:55 )             28.3 11-16    133  |  97  |  10  ----------------------------<  76  4.1   |  24  |  0.56  Ca    10.0      16 Nov 2022 05:55Phos  2.8     11-16Mg     1.90     11-16  TPro  6.9  /  Alb  2.9  /  TBili  0.4  /  DBili  0.2  /  AST  19  /  ALT  <5  /  AlkPhos  83  11-16      MICROBIOLOGY:    .Blood Blood-Venous  10-29-22   No Growth Final  --  --      .Blood Blood-Peripheral  10-29-22   No Growth Final  --  --      .Blood Blood-Peripheral  10-27-22   No Growth Final  --  --      .Blood Blood-Peripheral  10-27-22   Growth in anaerobic bottle: Staphylococcus aureus  See previous culture [id number]  --    Growth in anaerobic bottle: Gram Positive Cocci in Clusters      .Blood Blood  10-25-22   Growth in aerobic and anaerobic bottles: Staphylococcus aureus  ***Blood Panel PCR results on this specimen are available  approximately 3 hours after the Gram stain result.***  Gram stain, PCR, and/or culture results may not always  correspond dueto difference in methodologies.  ************************************************************  This PCR assay was performed by multiplex PCR. This  Assay tests for 66 bacterial and resistance gene targets.  Please refer to the [facility] test directory  at [email or web address] for details.  --  Blood Culture PCR  Staphylococcus aureus      Clean Catch Clean Catch (Midstream)  10-25-22   Culture grew 3 or more types of organisms which indicate  collection contamination; consider recollection only if clinically  indicated.  --  --        eho< from: SHAUN w/o TTE (w/3D Echo) (11.07.22 @ 07:45) >  ------------------------------------------------------------------------  CONCLUSIONS:  1. Mitral annular calcification, otherwise normal mitral  valve.  No vegetations seen in association with the mitral  valve. Mild mitral regurgitation.  2. Calcified trileaflet aortic valve with normal opening.  No vegetations seen in association with the aortic valve.  Minimal aortic regurgitation.  3. Normal aortic root.  Mild non-mobile atherosclerotic  plaque in the aortic arch and descending thoracic aorta.  4. Normal left atrium.  No left atrial or left atrial  appendage thrombus.  5. Normal left ventricular systolic function. No segmental  wall motion abnormalities.  6. Normal right ventricular size and function.  7. Contrast injection demonstrates no evidence of a patent  foramen ovale.  No valvular vegetations were identified.  ------------------------------------------------------------------------  Confirmed on  11/7/2022 - 14:47:20 by Danish Orozco M.D.,  [facility], [place]  ------------------------------------------------------------------------    < end of copied text >    ra< from: Xray Chest 2 Views PA/Lat (11.13.22 @ 18:23) >    ACC: [id number] EXAM:  XR CHEST PA LAT 2V                          PROCEDURE DATE:  11/13/2022          INTERPRETATION:  Chest 2 views    HISTORY: Cough and bacteremia    COMPARISON: 10/25/2022    Frontal and lateral views of the chest were obtained with suboptimal   inspiration. With allowance for this, the heart is similar in size and   the lungs show questionable retrocardiac infiltrate in the lateral view.   There is no evidence of pneumothorax nor pleural effusion.    IMPRESSION: Questionable posterior infiltrate. Follow-up study is   recommended as clinically warranted.      Thank you for this referral.    --- End of Report ---    < end of copied text >

## 2022-11-18 NOTE — PROGRESS NOTE ADULT - SUBJECTIVE AND OBJECTIVE BOX
Hospitalist Progress Note  Germaine Cardoza MD Pager # [phone]    OVERNIGHT EVENTS: JYOTI    SUBJECTIVE / INTERVAL HPI: Patient seen and examined at bedside. Pt. reports her pain is improved today. She is currently in pain but she thinks she is due for pain medication. Her sister (at bedside) says that she seems more comfortable today.     VITAL SIGNS:  Vital Signs Last 24 Hrs  T(C): 36.6 (18 Nov 2022 11:47), Max: 36.6 (17 Nov 2022 21:17)  T(F): 97.8 (18 Nov 2022 11:47), Max: 97.9 (17 Nov 2022 21:17)  HR: 74 (18 Nov 2022 11:47) (71 - 78)  BP: 128/72 (18 Nov 2022 11:47) (122/65 - 136/72)  BP(mean): --  RR: 18 (18 Nov 2022 11:47) (17 - 18)  SpO2: 98% (18 Nov 2022 11:47) (97% - 98%)    Parameters below as of 18 Nov 2022 11:47  Patient On (Oxygen Delivery Method): room air        PHYSICAL EXAM:    General: Weak/elderly appearing female resting in bed   HEENT: NC/AT; PERRL, anicteric sclera; MMM  Neck: supple  Cardiovascular: +S1/S2; RRR  Respiratory: CTA B/L; no W/R/R  Gastrointestinal: soft, distended abdomen - tender ; +BSx4  Extremities: WWP; no edema, clubbing or cyanosis  Vascular: 2+ radial, DP/PT pulses B/L  Neurological: AAOx3; no focal deficits    MEDICATIONS:  MEDICATIONS  (STANDING):  acetaminophen     Tablet .. 650 milliGRAM(s) Oral every 6 hours  albuterol/ipratropium for Nebulization 3 milliLiter(s) Nebulizer every 12 hours  benzonatate 100 milliGRAM(s) Oral three times a day  bisacodyl 5 milliGRAM(s) Oral at bedtime  ceFAZolin   IVPB 2000 milliGRAM(s) IV Intermittent every 8 hours  DULoxetine 60 milliGRAM(s) Oral daily  fenofibrate Tablet 145 milliGRAM(s) Oral daily  ferrous    sulfate 325 milliGRAM(s) Oral daily  gabapentin 300 milliGRAM(s) Oral every 8 hours  heparin   Injectable 5000 Unit(s) SubCutaneous every 8 hours  levothyroxine 125 MICROGram(s) Oral daily  lidocaine   4% Patch 1 Patch Transdermal every 24 hours  meclizine 12.5 milliGRAM(s) Oral two times a day  melatonin 3 milliGRAM(s) Oral at bedtime  multivitamin 1 Tablet(s) Oral daily  pantoprazole    Tablet 40 milliGRAM(s) Oral before breakfast  polyethylene glycol 3350 17 Gram(s) Oral two times a day  QUEtiapine 25 milliGRAM(s) Oral at bedtime  rOPINIRole 2 milliGRAM(s) Oral daily  senna 2 Tablet(s) Oral at bedtime  sodium chloride 1 Gram(s) Oral three times a day  sodium chloride 0.65% Nasal 1 Spray(s) Both Nostrils two times a day    MEDICATIONS  (PRN):  acetaminophen     Tablet .. 650 milliGRAM(s) Oral every 6 hours PRN Temp greater or equal to 38C (100.4F), Mild Pain (1 - 3)  ALBUTerol    90 MICROgram(s) HFA Inhaler 2 Puff(s) Inhalation every 6 hours PRN Wheezing  fluticasone propionate 50 MICROgram(s)/spray Nasal Spray 1 Spray(s) Both Nostrils every 12 hours PRN nasal congestion  guaiFENesin Oral Liquid (Sugar-Free) 100 milliGRAM(s) Oral every 6 hours PRN Cough  oxyCODONE    IR 5 milliGRAM(s) Oral every 4 hours PRN Severe Pain (7 - 10)  traMADol 50 milliGRAM(s) Oral every 4 hours PRN Moderate Pain (4 - 6)      ALLERGIES:  Allergies    erythromycin (Hives)  IV Contrast (Anaphylaxis)    Intolerances        LABS:                        8.7    6.35  )-----------( 331      ( 18 Nov 2022 04:51 )             28.9     11-18    129<L>  |  94<L>  |  7   ----------------------------<  80  3.7   |  22  |  0.54    Ca    9.6      18 Nov 2022 04:51  Phos  2.9     11-18  Mg     2.00     11-18    TPro  6.7  /  Alb  2.9<L>  /  TBili  0.3  /  DBili  x   /  AST  13  /  ALT  <5<L>  /  AlkPhos  78  11-18        CAPILLARY BLOOD GLUCOSE          RADIOLOGY & ADDITIONAL TESTS: Reviewed.    ASSESSMENT:    PLAN:

## 2022-11-19 PROCEDURE — 99232 SBSQ HOSP IP/OBS MODERATE 35: CPT

## 2022-11-19 RX ORDER — ONDANSETRON 8 MG/1
4 TABLET, FILM COATED ORAL ONCE
Refills: 0 | Status: COMPLETED | OUTPATIENT
Start: 2022-11-19 | End: 2022-11-19

## 2022-11-19 RX ORDER — IPRATROPIUM/ALBUTEROL SULFATE 18-103MCG
3 AEROSOL WITH ADAPTER (GRAM) INHALATION EVERY 6 HOURS
Refills: 0 | Status: DISCONTINUED | OUTPATIENT
Start: 2022-11-19 | End: 2022-11-25

## 2022-11-19 RX ORDER — OXYCODONE HYDROCHLORIDE 5 MG/1
5 TABLET ORAL EVERY 4 HOURS
Refills: 0 | Status: DISCONTINUED | OUTPATIENT
Start: 2022-11-19 | End: 2022-11-22

## 2022-11-19 RX ORDER — TRAMADOL HYDROCHLORIDE 50 MG/1
50 TABLET ORAL EVERY 4 HOURS
Refills: 0 | Status: DISCONTINUED | OUTPATIENT
Start: 2022-11-19 | End: 2022-11-24

## 2022-11-19 RX ORDER — ACETAMINOPHEN 500 MG
650 TABLET ORAL EVERY 6 HOURS
Refills: 0 | Status: DISCONTINUED | OUTPATIENT
Start: 2022-11-19 | End: 2022-11-25

## 2022-11-19 RX ORDER — ACETAMINOPHEN 500 MG
650 TABLET ORAL EVERY 6 HOURS
Refills: 0 | Status: DISCONTINUED | OUTPATIENT
Start: 2022-11-19 | End: 2022-11-19

## 2022-11-19 RX ADMIN — LIDOCAINE 1 PATCH: 4 CREAM TOPICAL at 01:28

## 2022-11-19 RX ADMIN — Medication 1 SPRAY(S): at 13:36

## 2022-11-19 RX ADMIN — OXYCODONE HYDROCHLORIDE 5 MILLIGRAM(S): 5 TABLET ORAL at 09:36

## 2022-11-19 RX ADMIN — HEPARIN SODIUM 5000 UNIT(S): 5000 INJECTION INTRAVENOUS; SUBCUTANEOUS at 05:44

## 2022-11-19 RX ADMIN — GABAPENTIN 300 MILLIGRAM(S): 400 CAPSULE ORAL at 05:33

## 2022-11-19 RX ADMIN — Medication 1 SPRAY(S): at 17:36

## 2022-11-19 RX ADMIN — PANTOPRAZOLE SODIUM 40 MILLIGRAM(S): 20 TABLET, DELAYED RELEASE ORAL at 07:29

## 2022-11-19 RX ADMIN — OXYCODONE HYDROCHLORIDE 5 MILLIGRAM(S): 5 TABLET ORAL at 06:40

## 2022-11-19 RX ADMIN — HEPARIN SODIUM 5000 UNIT(S): 5000 INJECTION INTRAVENOUS; SUBCUTANEOUS at 21:31

## 2022-11-19 RX ADMIN — Medication 1 SPRAY(S): at 05:33

## 2022-11-19 RX ADMIN — Medication 3 MILLIGRAM(S): at 23:38

## 2022-11-19 RX ADMIN — OXYCODONE HYDROCHLORIDE 5 MILLIGRAM(S): 5 TABLET ORAL at 13:36

## 2022-11-19 RX ADMIN — OXYCODONE HYDROCHLORIDE 5 MILLIGRAM(S): 5 TABLET ORAL at 18:30

## 2022-11-19 RX ADMIN — Medication 3 MILLILITER(S): at 15:56

## 2022-11-19 RX ADMIN — OXYCODONE HYDROCHLORIDE 5 MILLIGRAM(S): 5 TABLET ORAL at 05:40

## 2022-11-19 RX ADMIN — Medication 125 MICROGRAM(S): at 05:33

## 2022-11-19 RX ADMIN — ONDANSETRON 4 MILLIGRAM(S): 8 TABLET, FILM COATED ORAL at 23:06

## 2022-11-19 RX ADMIN — Medication 100 MILLIGRAM(S): at 13:03

## 2022-11-19 RX ADMIN — HEPARIN SODIUM 5000 UNIT(S): 5000 INJECTION INTRAVENOUS; SUBCUTANEOUS at 13:03

## 2022-11-19 RX ADMIN — Medication 650 MILLIGRAM(S): at 10:00

## 2022-11-19 RX ADMIN — Medication 3 MILLILITER(S): at 07:10

## 2022-11-19 RX ADMIN — OXYCODONE HYDROCHLORIDE 5 MILLIGRAM(S): 5 TABLET ORAL at 23:36

## 2022-11-19 RX ADMIN — OXYCODONE HYDROCHLORIDE 5 MILLIGRAM(S): 5 TABLET ORAL at 14:30

## 2022-11-19 RX ADMIN — OXYCODONE HYDROCHLORIDE 5 MILLIGRAM(S): 5 TABLET ORAL at 17:36

## 2022-11-19 RX ADMIN — Medication 100 MILLIGRAM(S): at 21:29

## 2022-11-19 RX ADMIN — Medication 12.5 MILLIGRAM(S): at 17:36

## 2022-11-19 RX ADMIN — OXYCODONE HYDROCHLORIDE 5 MILLIGRAM(S): 5 TABLET ORAL at 10:26

## 2022-11-19 RX ADMIN — Medication 650 MILLIGRAM(S): at 01:23

## 2022-11-19 RX ADMIN — Medication 325 MILLIGRAM(S): at 10:01

## 2022-11-19 RX ADMIN — Medication 100 MILLIGRAM(S): at 05:42

## 2022-11-19 RX ADMIN — Medication 12.5 MILLIGRAM(S): at 05:34

## 2022-11-19 RX ADMIN — DULOXETINE HYDROCHLORIDE 60 MILLIGRAM(S): 30 CAPSULE, DELAYED RELEASE ORAL at 13:02

## 2022-11-19 RX ADMIN — Medication 650 MILLIGRAM(S): at 02:23

## 2022-11-19 RX ADMIN — TRAMADOL HYDROCHLORIDE 50 MILLIGRAM(S): 50 TABLET ORAL at 22:25

## 2022-11-19 RX ADMIN — SODIUM CHLORIDE 1 GRAM(S): 9 INJECTION INTRAMUSCULAR; INTRAVENOUS; SUBCUTANEOUS at 13:02

## 2022-11-19 RX ADMIN — GABAPENTIN 300 MILLIGRAM(S): 400 CAPSULE ORAL at 13:03

## 2022-11-19 RX ADMIN — Medication 100 MILLIGRAM(S): at 13:36

## 2022-11-19 RX ADMIN — Medication 145 MILLIGRAM(S): at 13:01

## 2022-11-19 RX ADMIN — TRAMADOL HYDROCHLORIDE 50 MILLIGRAM(S): 50 TABLET ORAL at 21:24

## 2022-11-19 NOTE — PROGRESS NOTE ADULT - SUBJECTIVE AND OBJECTIVE BOX
Hospitalist Progress Note  Germaine Cardoza MD Pager # [phone]    OVERNIGHT EVENTS: [email or web address]    SUBJECTIVE / INTERVAL HPI: Patient seen and examined at bedside. Pt. reports that oxy helps with her pain. She says that she is in pain after being washed. She still has the cough and it is causing pain when she coughs.     VITAL SIGNS:  Vital Signs Last 24 Hrs  T(C): 36.6 (19 Nov 2022 12:32), Max: 36.6 (19 Nov 2022 05:30)  T(F): 97.8 (19 Nov 2022 12:32), Max: 97.8 (19 Nov 2022 05:30)  HR: 75 (19 Nov 2022 12:32) (75 - 82)  BP: 139/69 (19 Nov 2022 12:32) (134/73 - 139/69)  BP(mean): --  RR: 18 (19 Nov 2022 12:32) (18 - 18)  SpO2: 99% (19 Nov 2022 12:32) (98% - 99%)    Parameters below as of 19 Nov 2022 12:32  Patient On (Oxygen Delivery Method): room air        PHYSICAL EXAM:    General: Weak appearing female resting in bed   HEENT: NC/AT; PERRL, anicteric sclera; MMM  Neck: supple  Cardiovascular: +S1/S2; RRR  Respiratory: CTA B/L; no W/R/R  Gastrointestinal: soft, NT/ND; +BSx4  Extremities: WWP; no edema, clubbing or cyanosis  Vascular: 2+ radial, DP/PT pulses B/L  Neurological: AAOx3; no focal deficits - diffusely weak but moves all extremities.     MEDICATIONS:  MEDICATIONS  (STANDING):  albuterol/ipratropium for Nebulization 3 milliLiter(s) Nebulizer every 12 hours  benzonatate 100 milliGRAM(s) Oral three times a day  bisacodyl 5 milliGRAM(s) Oral at bedtime  ceFAZolin   IVPB 2000 milliGRAM(s) IV Intermittent every 8 hours  DULoxetine 60 milliGRAM(s) Oral daily  fenofibrate Tablet 145 milliGRAM(s) Oral daily  ferrous    sulfate 325 milliGRAM(s) Oral daily  gabapentin 300 milliGRAM(s) Oral every 8 hours  heparin   Injectable 5000 Unit(s) SubCutaneous every 8 hours  levothyroxine 125 MICROGram(s) Oral daily  lidocaine   4% Patch 1 Patch Transdermal every 24 hours  meclizine 12.5 milliGRAM(s) Oral two times a day  melatonin 3 milliGRAM(s) Oral at bedtime  multivitamin 1 Tablet(s) Oral daily  pantoprazole    Tablet 40 milliGRAM(s) Oral before breakfast  polyethylene glycol 3350 17 Gram(s) Oral two times a day  QUEtiapine 25 milliGRAM(s) Oral at bedtime  rOPINIRole 2 milliGRAM(s) Oral daily  senna 2 Tablet(s) Oral at bedtime  sodium chloride 1 Gram(s) Oral three times a day  sodium chloride 0.65% Nasal 1 Spray(s) Both Nostrils two times a day    MEDICATIONS  (PRN):  acetaminophen     Tablet .. 650 milliGRAM(s) Oral every 6 hours PRN Mild Pain (1 - 3), Moderate Pain (4 - 6)  ALBUTerol    90 MICROgram(s) HFA Inhaler 2 Puff(s) Inhalation every 6 hours PRN Wheezing  fluticasone propionate 50 MICROgram(s)/spray Nasal Spray 1 Spray(s) Both Nostrils every 12 hours PRN nasal congestion  guaiFENesin Oral Liquid (Sugar-Free) 100 milliGRAM(s) Oral every 6 hours PRN Cough  oxyCODONE    IR 5 milliGRAM(s) Oral every 4 hours PRN Severe Pain (7 - 10)  traMADol 50 milliGRAM(s) Oral every 4 hours PRN Moderate Pain (4 - 6)      ALLERGIES:  Allergies    erythromycin (Hives)  IV Contrast (Anaphylaxis)    Intolerances        LABS:                        8.7    6.35  )-----------( 331      ( 18 Nov 2022 04:51 )             28.9     11-18    129<L>  |  94<L>  |  7   ----------------------------<  80  3.7   |  22  |  0.54    Ca    9.6      18 Nov 2022 04:51  Phos  2.9     11-18  Mg     2.00     11-18    TPro  6.7  /  Alb  2.9<L>  /  TBili  0.3  /  DBili  x   /  AST  13  /  ALT  <5<L>  /  AlkPhos  78  11-18        CAPILLARY BLOOD GLUCOSE          RADIOLOGY & ADDITIONAL TESTS: Reviewed.    ASSESSMENT:    PLAN:

## 2022-11-20 ENCOUNTER — TRANSCRIPTION ENCOUNTER (OUTPATIENT)
Age: 77
End: 2022-11-20

## 2022-11-20 LAB
ALBUMIN SERPL ELPH-MCNC: 2.8 G/DL — LOW (ref 3.3–5)
ALP SERPL-CCNC: 80 U/L — SIGNIFICANT CHANGE UP (ref 40–120)
ALT FLD-CCNC: <5 U/L — LOW (ref 4–33)
ANION GAP SERPL CALC-SCNC: 10 MMOL/L — SIGNIFICANT CHANGE UP (ref 7–14)
AST SERPL-CCNC: 16 U/L — SIGNIFICANT CHANGE UP (ref 4–32)
BILIRUB SERPL-MCNC: 0.3 MG/DL — SIGNIFICANT CHANGE UP (ref 0.2–1.2)
BUN SERPL-MCNC: 7 MG/DL — SIGNIFICANT CHANGE UP (ref 7–23)
CALCIUM SERPL-MCNC: 9 MG/DL — SIGNIFICANT CHANGE UP (ref 8.4–10.5)
CHLORIDE SERPL-SCNC: 96 MMOL/L — LOW (ref 98–107)
CK MB BLD-MCNC: 5 % — HIGH (ref 0–2.5)
CK MB BLD-MCNC: 8.7 % — HIGH (ref 0–2.5)
CK MB CFR SERPL CALC: 1.3 NG/ML — SIGNIFICANT CHANGE UP
CK MB CFR SERPL CALC: 1.3 NG/ML — SIGNIFICANT CHANGE UP
CK SERPL-CCNC: 15 U/L — LOW (ref 25–170)
CK SERPL-CCNC: 26 U/L — SIGNIFICANT CHANGE UP (ref 25–170)
CO2 SERPL-SCNC: 23 MMOL/L — SIGNIFICANT CHANGE UP (ref 22–31)
CREAT SERPL-MCNC: 0.54 MG/DL — SIGNIFICANT CHANGE UP (ref 0.5–1.3)
EGFR: 95 ML/MIN/1.73M2 — SIGNIFICANT CHANGE UP
GLUCOSE SERPL-MCNC: 90 MG/DL — SIGNIFICANT CHANGE UP (ref 70–99)
HCT VFR BLD CALC: 29.1 % — LOW (ref 34.5–45)
HGB BLD-MCNC: 8.8 G/DL — LOW (ref 11.5–15.5)
MAGNESIUM SERPL-MCNC: 1.9 MG/DL — SIGNIFICANT CHANGE UP (ref 1.6–2.6)
MCHC RBC-ENTMCNC: 25.1 PG — LOW (ref 27–34)
MCHC RBC-ENTMCNC: 30.2 GM/DL — LOW (ref 32–36)
MCV RBC AUTO: 83.1 FL — SIGNIFICANT CHANGE UP (ref 80–100)
NRBC # BLD: 0 /100 WBCS — SIGNIFICANT CHANGE UP (ref 0–0)
NRBC # FLD: 0 K/UL — SIGNIFICANT CHANGE UP (ref 0–0)
PHOSPHATE SERPL-MCNC: 3.1 MG/DL — SIGNIFICANT CHANGE UP (ref 2.5–4.5)
PLATELET # BLD AUTO: 333 K/UL — SIGNIFICANT CHANGE UP (ref 150–400)
POTASSIUM SERPL-MCNC: 3.5 MMOL/L — SIGNIFICANT CHANGE UP (ref 3.5–5.3)
POTASSIUM SERPL-SCNC: 3.5 MMOL/L — SIGNIFICANT CHANGE UP (ref 3.5–5.3)
PROT SERPL-MCNC: 6.3 G/DL — SIGNIFICANT CHANGE UP (ref 6–8.3)
RBC # BLD: 3.5 M/UL — LOW (ref 3.8–5.2)
RBC # FLD: 21.2 % — HIGH (ref 10.3–14.5)
SODIUM SERPL-SCNC: 129 MMOL/L — LOW (ref 135–145)
TROPONIN T, HIGH SENSITIVITY RESULT: 21 NG/L — SIGNIFICANT CHANGE UP
TROPONIN T, HIGH SENSITIVITY RESULT: 22 NG/L — SIGNIFICANT CHANGE UP
WBC # BLD: 6.94 K/UL — SIGNIFICANT CHANGE UP (ref 3.8–10.5)
WBC # FLD AUTO: 6.94 K/UL — SIGNIFICANT CHANGE UP (ref 3.8–10.5)

## 2022-11-20 PROCEDURE — 93010 ELECTROCARDIOGRAM REPORT: CPT

## 2022-11-20 PROCEDURE — 99232 SBSQ HOSP IP/OBS MODERATE 35: CPT

## 2022-11-20 RX ORDER — SODIUM CHLORIDE 9 MG/ML
2 INJECTION INTRAMUSCULAR; INTRAVENOUS; SUBCUTANEOUS THREE TIMES A DAY
Refills: 0 | Status: DISCONTINUED | OUTPATIENT
Start: 2022-11-20 | End: 2022-11-25

## 2022-11-20 RX ORDER — NYSTATIN CREAM 100000 [USP'U]/G
1 CREAM TOPICAL
Refills: 0 | Status: DISCONTINUED | OUTPATIENT
Start: 2022-11-20 | End: 2022-11-25

## 2022-11-20 RX ORDER — POTASSIUM CHLORIDE 20 MEQ
40 PACKET (EA) ORAL EVERY 4 HOURS
Refills: 0 | Status: COMPLETED | OUTPATIENT
Start: 2022-11-20 | End: 2022-11-20

## 2022-11-20 RX ORDER — POTASSIUM CHLORIDE 20 MEQ
40 PACKET (EA) ORAL EVERY 4 HOURS
Refills: 0 | Status: DISCONTINUED | OUTPATIENT
Start: 2022-11-20 | End: 2022-11-20

## 2022-11-20 RX ADMIN — ROPINIROLE 2 MILLIGRAM(S): 8 TABLET, FILM COATED, EXTENDED RELEASE ORAL at 21:53

## 2022-11-20 RX ADMIN — Medication 100 MILLIGRAM(S): at 06:26

## 2022-11-20 RX ADMIN — GABAPENTIN 300 MILLIGRAM(S): 400 CAPSULE ORAL at 13:20

## 2022-11-20 RX ADMIN — OXYCODONE HYDROCHLORIDE 5 MILLIGRAM(S): 5 TABLET ORAL at 10:15

## 2022-11-20 RX ADMIN — Medication 3 MILLILITER(S): at 16:30

## 2022-11-20 RX ADMIN — OXYCODONE HYDROCHLORIDE 5 MILLIGRAM(S): 5 TABLET ORAL at 22:08

## 2022-11-20 RX ADMIN — Medication 1 SPRAY(S): at 09:15

## 2022-11-20 RX ADMIN — HEPARIN SODIUM 5000 UNIT(S): 5000 INJECTION INTRAVENOUS; SUBCUTANEOUS at 14:51

## 2022-11-20 RX ADMIN — OXYCODONE HYDROCHLORIDE 5 MILLIGRAM(S): 5 TABLET ORAL at 13:19

## 2022-11-20 RX ADMIN — OXYCODONE HYDROCHLORIDE 5 MILLIGRAM(S): 5 TABLET ORAL at 21:08

## 2022-11-20 RX ADMIN — Medication 3 MILLILITER(S): at 04:59

## 2022-11-20 RX ADMIN — GABAPENTIN 300 MILLIGRAM(S): 400 CAPSULE ORAL at 06:24

## 2022-11-20 RX ADMIN — HEPARIN SODIUM 5000 UNIT(S): 5000 INJECTION INTRAVENOUS; SUBCUTANEOUS at 07:13

## 2022-11-20 RX ADMIN — TRAMADOL HYDROCHLORIDE 50 MILLIGRAM(S): 50 TABLET ORAL at 01:36

## 2022-11-20 RX ADMIN — Medication 125 MICROGRAM(S): at 06:24

## 2022-11-20 RX ADMIN — Medication 100 MILLIGRAM(S): at 21:13

## 2022-11-20 RX ADMIN — Medication 40 MILLIEQUIVALENT(S): at 14:51

## 2022-11-20 RX ADMIN — PANTOPRAZOLE SODIUM 40 MILLIGRAM(S): 20 TABLET, DELAYED RELEASE ORAL at 07:12

## 2022-11-20 RX ADMIN — Medication 100 MILLIGRAM(S): at 14:48

## 2022-11-20 RX ADMIN — QUETIAPINE FUMARATE 25 MILLIGRAM(S): 200 TABLET, FILM COATED ORAL at 21:53

## 2022-11-20 RX ADMIN — TRAMADOL HYDROCHLORIDE 50 MILLIGRAM(S): 50 TABLET ORAL at 05:38

## 2022-11-20 RX ADMIN — Medication 12.5 MILLIGRAM(S): at 17:23

## 2022-11-20 RX ADMIN — Medication 40 MILLIEQUIVALENT(S): at 11:36

## 2022-11-20 RX ADMIN — Medication 3 MILLILITER(S): at 22:09

## 2022-11-20 RX ADMIN — Medication 3 MILLILITER(S): at 10:41

## 2022-11-20 RX ADMIN — Medication 100 MILLIGRAM(S): at 21:12

## 2022-11-20 RX ADMIN — Medication 325 MILLIGRAM(S): at 11:38

## 2022-11-20 RX ADMIN — Medication 3 MILLIGRAM(S): at 21:53

## 2022-11-20 RX ADMIN — Medication 100 MILLIGRAM(S): at 13:21

## 2022-11-20 RX ADMIN — Medication 650 MILLIGRAM(S): at 08:11

## 2022-11-20 RX ADMIN — HEPARIN SODIUM 5000 UNIT(S): 5000 INJECTION INTRAVENOUS; SUBCUTANEOUS at 21:14

## 2022-11-20 RX ADMIN — OXYCODONE HYDROCHLORIDE 5 MILLIGRAM(S): 5 TABLET ORAL at 14:11

## 2022-11-20 RX ADMIN — TRAMADOL HYDROCHLORIDE 50 MILLIGRAM(S): 50 TABLET ORAL at 04:38

## 2022-11-20 RX ADMIN — Medication 12.5 MILLIGRAM(S): at 07:11

## 2022-11-20 RX ADMIN — Medication 145 MILLIGRAM(S): at 11:39

## 2022-11-20 RX ADMIN — OXYCODONE HYDROCHLORIDE 5 MILLIGRAM(S): 5 TABLET ORAL at 18:18

## 2022-11-20 RX ADMIN — DULOXETINE HYDROCHLORIDE 60 MILLIGRAM(S): 30 CAPSULE, DELAYED RELEASE ORAL at 11:39

## 2022-11-20 RX ADMIN — Medication 1 SPRAY(S): at 17:22

## 2022-11-20 RX ADMIN — Medication 650 MILLIGRAM(S): at 07:11

## 2022-11-20 RX ADMIN — SODIUM CHLORIDE 2 GRAM(S): 9 INJECTION INTRAMUSCULAR; INTRAVENOUS; SUBCUTANEOUS at 13:20

## 2022-11-20 RX ADMIN — OXYCODONE HYDROCHLORIDE 5 MILLIGRAM(S): 5 TABLET ORAL at 00:23

## 2022-11-20 RX ADMIN — GABAPENTIN 300 MILLIGRAM(S): 400 CAPSULE ORAL at 21:12

## 2022-11-20 RX ADMIN — TRAMADOL HYDROCHLORIDE 50 MILLIGRAM(S): 50 TABLET ORAL at 02:34

## 2022-11-20 RX ADMIN — SODIUM CHLORIDE 2 GRAM(S): 9 INJECTION INTRAMUSCULAR; INTRAVENOUS; SUBCUTANEOUS at 21:13

## 2022-11-20 RX ADMIN — OXYCODONE HYDROCHLORIDE 5 MILLIGRAM(S): 5 TABLET ORAL at 09:15

## 2022-11-20 RX ADMIN — Medication 100 MILLIGRAM(S): at 06:24

## 2022-11-20 RX ADMIN — SODIUM CHLORIDE 1 GRAM(S): 9 INJECTION INTRAMUSCULAR; INTRAVENOUS; SUBCUTANEOUS at 07:12

## 2022-11-20 RX ADMIN — OXYCODONE HYDROCHLORIDE 5 MILLIGRAM(S): 5 TABLET ORAL at 17:22

## 2022-11-20 NOTE — DISCHARGE NOTE PROVIDER - HOSPITAL COURSE
77F w/ PMHx significant for hypothyroidism, HTN, HLD, SVT (s/p ablation), Cervical CA (s/p hysterectomy), DVT (June 2022, still on eliquis), OA and peripheral neuropathy presented to [facility] after having fall from bed overnight. Pt was found to have UTI and MSSA bacteremia, ID consulted and started on IV antibiotics, SHAUN with no vegetations, no intervention per IR for discitis/T6/7 OM. CT head with no evidence of bleed, CT C spine and chest no evidence of fractures. No intervention per neurosurgery, course c/b hyponatremia/KATHERINE Block placed on 11/17, now awaiting for placement      MSSA bacteremia discitis   - BCx positive for MSSA bacteremia. ID consulted. Prior admission pt also had MSSA bacteremia. SHAUN 11/17- did not show any vegetations. Surveillance cultures on 10/27 positive.   - ID following  - MRI AP w/ contrast, MRI T/L spine with contrast. MRI thus far shows discitis/OM in T6/T7 and phlegmon. Also abscess in the right piriformis muscle on MR A/P. Spinal consult placed. For right piriformis abscess, IR was consulted, no intervention at this time, as collection looks small with poor windows. Follow up scan were recommended  - SHAUN unremarkable  - C/w cefazolin (10/27 - ), Rec for 6-8weeks duration from last negative blood culture, Weekly CBC/CMP/ESR/CRP  - last blood Cx Neg from 10/29   - Pt. lives alone and could not administer this medication herself. Insurance coverage has run out for AGNIESZKA days. Dispo remains unclear at this time.    Fall, accidental.   - Pelvic Xray - No acute fractures or dislocations, evidence of nonhealing fractures seen on prior imaging. Bilateral hip joint arthrosis present.   - CXR - No acute displaced rib fracture. Chronic right rib deformities.   - CT Head/ C spine -- > no acute intracranial hemorrhage, territorial infarct, mass effect or calvarial fracture. Evidence of multilevel degenerative changes, no fracture  - CT chest - Possible acute/subacute compression fracture d/t mild loss of height at T7  - Neurosurgery consulted- no intervention, TLSO brace FOR COMFORT which pain aid in back pain/chest pain. Outpatient follow up.   - Pain regimen: Oxycodone 5mg q4 prn (severe)  tramadol 50mg q4 prn (mod pain), tylenol PRN, gabapentin 300mg TID  - PT recs- AGNIESZKA.  - obtain Xray in 3-4 weeks of spine to ensure patient is not developing a kyphosis.  - follow up with Dr. Sherrie Armendariz for thoracic compression fractrue    Osteomyelitis of vertebra, multiple sites in spine.   - OM in T6/T7 and phlegmon   - Spine consulted--no intervention  - IR consulted -no intervention   - D/W ID, will continue antibiotics as above   - SHAUN unremarkable.    Chest pain r/o ACS vs costochondritis 11/20   - c/o B/L chest discomfort 11/20, ECG: NSR @ 77bpm; no acute changes noted  - Troponin 21 >22 neg. CK 15, CKMB 1.3  - Tramadol 50 mg PO x 1 given for pain with improvement in pain  - Pt otherwise appears stable and in NAD  - Will continue to monitor and trend troponins.     Anemia.    - Baseline HgB from 06/22 --> 10-11  - 9.1/ 28.9 on admission  - Per outpatient NP - 10/23 HgB 8.7, evidence of MAR  - Folate/ b12 WNL,  - Monitor CBC    KATHERINE/Hyponatremia   - Baseline SCr from 06/22 --> 0.6 - 1.0, Hypoantremia Likely chronic in nature  - admission documentation stating likely SIADH however Na improved after bolus.  - uptrend Cr likely SIADH related to ongoing infection now improved   - Per outpatient NP - 10/23 SCr 1.4  - Hold home Losartan-HCTZ for now   - Urine Na <20 which is not consistent with SIADH.   - increased salt tabs 2g tid on 11/20   - Avoid nephrotoxins, I/Os    Hypothyroidism.   - TSH ~7. T4 normal   - Continue home synthroid for now.    HTN (hypertension).   - losartan/HCTZ have been held when pt presented with elevated creat  - BP have been stable off these meds  - resume if needed.    SVT (supraventricular tachycardia).   - s/p ablation several years ago   - ECG on admission NSR  - continue to monitor.    Cervical cancer.   - Distal history, s/p hysterectomy  - No history of chemotherapy or RT.    Venous thromboembolism (VTE).   - Recent DVT in 06/22 - started on eliquis  - holding eliquis for now, VTE prophylaxis with heparin 5000 sq q8  - B/L LE swelling present, per patient same amount of swelling as normal, but tender to palpation b/l calves  - B/L LE doppler for DVT negative   - D/c Eliquis given completion of treatment and no evidence of DVT on US     Cough.   - Persistent cough - CXR checked and did not show PNA or other etiology for cough. Possibly post-nasal drip   - Present since patient had covid several months ago. RVP negative. CXR clear. CT chest with atelectasis. Crackles heard on exam is consistent with atelectasis  - Encourage Incentive spirometer   - C/w nebulizer treatments, Cough suppressants PRN, Flonase spray    Abdominal pain,   - Abdominal xray showing dilated loops of bowel. Not concerned for bowel obstruction. Pt was constipated. After lactulose administration she had multiple bowel movements and her abdominal exam has now improved. She is less rigid and her abdomen is now soft. She still has abdominal tenderness to palpation.   - Continue with bowel regimen    Urinary retention,   - Pt. retaining urine 300-500cc. Likely in the setting of pain, constipation and immobility. Ultrasound kidney/bladder/abdomen without hydronephrosis or any obstruction.   - Block placed on 11/17   - UA: trace of blood, large LEs, UCX 11/16 - neg   77F w/ PMHx significant for hypothyroidism, HTN, HLD, SVT (s/p ablation), Cervical CA (s/p hysterectomy), DVT (June 2022, still on eliquis), OA and peripheral neuropathy presented to [facility] after having fall from bed overnight. Pt was found to have UTI and MSSA bacteremia, ID consulted and started on IV antibiotics, SHAUN with no vegetations, no intervention per IR for discitis/T6/7 OM. CT head with no evidence of bleed, CT C spine and chest no evidence of fractures. No intervention per neurosurgery, course c/b hyponatremia/KATHERINE Block placed on 11/17, now awaiting for placement      MSSA bacteremia discitis   - BCx positive for MSSA bacteremia. ID consulted. Started on Cefazolin. Prior admission pt also had MSSA bacteremia. SHAUN 11/17- did not show any vegetations. Surveillance cultures on 10/27 positive.   - MRI AP w/ contrast, MRI T/L spine with contrast. MRI thus far shows discitis/OM in T6/T7 and phlegmon. Also abscess in the right piriformis muscle on MR A/P. Spinal consult placed. For right piriformis abscess, IR was consulted, no intervention at this time, as collection looks small with poor windows. Follow up scans were recommended.  - SHAUN unremarkable  - C/w cefazolin (10/27 - ), Rec for 6-8weeks duration from last negative blood culture, Weekly CBC/CMP/ESR/CRP  - last blood Cx Neg from 10/29 (start date for Abx was 10/27)  - Pt. lives alone and could not administer this medication herself. Dispo to [facility].    Fall, accidental.   - Pelvic Xray - No acute fractures or dislocations, evidence of nonhealing fractures seen on prior imaging. Bilateral hip joint arthrosis present.   - CXR - No acute displaced rib fracture. Chronic right rib deformities.   - CT Head/ C spine -- > no acute intracranial hemorrhage, territorial infarct, mass effect or calvarial fracture. Evidence of multilevel degenerative changes, no fracture  - CT chest - Possible acute/subacute compression fracture d/t mild loss of height at T7  - Neurosurgery consulted- no intervention, TLSO brace FOR COMFORT which pain aid in back pain/chest pain. Outpatient follow up.   - Pain regimen: Oxycodone 5mg q4 prn (severe)  tramadol 50mg q4 prn (mod pain), tylenol PRN, gabapentin 300mg TID  - PT recs- AGNIESZKA.  - obtain Xray in 3-4 weeks of spine to ensure patient is not developing a kyphosis.  - follow up with Dr. Sherrie Armendariz for thoracic compression fractrue    Osteomyelitis of vertebra, multiple sites in spine.   - OM in T6/T7 and phlegmon   - Spine consulted--no intervention  - IR consulted -no intervention   - D/W ID, will continue antibiotics as above   - SHAUN unremarkable.    Chest pain r/o ACS vs costochondritis 11/20   - c/o B/L chest discomfort 11/20, ECG: NSR @ 77bpm; no acute changes noted  - Troponin 21 >22 neg. CK 15, CKMB 1.3  - Tramadol 50 mg PO x 1 given for pain with improvement in pain  - Pt otherwise appears stable and in NAD  - Will continue to monitor and trend troponins.     Anemia.    - Baseline HgB from 06/22 --> 10-11  - 9.1/ 28.9 on admission  - Per outpatient NP - 10/23 HgB 8.7, evidence of MAR  - Folate/ b12 WNL  - Monitor CBC    KATHERINE/Hyponatremia   - Baseline SCr from 06/22 --> 0.6 - 1.0, Hypoantremia Likely chronic in nature  - admission documentation stating likely SIADH however Na improved after bolus.  - uptrend Cr likely SIADH related to ongoing infection now improved   - Per outpatient NP - 10/23 SCr 1.4  - Hold home Losartan-HCTZ for now   - Urine Na <20 which is not consistent with SIADH.   - increased salt tabs 2g tid on 11/20   - Avoid nephrotoxins, I/Os    Hypothyroidism.   - TSH ~7. T4 normal   - Continue home synthroid for now.    HTN (hypertension).   - losartan/HCTZ have been held when pt presented with elevated creat  - BP have been stable off these meds  - resume if needed.    SVT (supraventricular tachycardia).   - s/p ablation several years ago   - ECG on admission NSR  - continue to monitor.    Cervical cancer.   - Distal history, s/p hysterectomy  - No history of chemotherapy or RT.    Venous thromboembolism (VTE).   - Recent DVT in 06/22 - started on eliquis  - holding eliquis for now, VTE prophylaxis with heparin 5000 sq q8  - B/L LE swelling present, per patient same amount of swelling as normal, but tender to palpation b/l calves  - B/L LE doppler for DVT negative   - D/c Eliquis given completion of treatment and no evidence of DVT on US     Cough.   - Persistent cough - CXR checked and did not show PNA or other etiology for cough. Possibly post-nasal drip   - Present since patient had covid several months ago. RVP negative. CXR clear. CT chest with atelectasis. Crackles heard on exam is consistent with atelectasis  - Encourage Incentive spirometer   - C/w nebulizer treatments, Cough suppressants PRN, Flonase spray    Abdominal pain,   - Abdominal xray showing dilated loops of bowel. Not concerned for bowel obstruction. Pt was constipated. After lactulose administration she had multiple bowel movements and her abdominal exam has now improved. She is less rigid and her abdomen is now soft. She still has abdominal tenderness to palpation.   - Continue with bowel regimen    Urinary retention,   - Pt. retaining urine 300-500cc. Likely in the setting of pain, constipation and immobility. Ultrasound kidney/bladder/abdomen without hydronephrosis or any obstruction.   - Block placed on 11/17   - UA: trace of blood, large LEs, UCX 11/16 - neg      On 11/24/22, case was discussed with , patient is medically cleared and optimized for discharge today. All medications were reviewed with attending, and sent to mutually agreed upon pharmacy. 77F w/ PMHx significant for hypothyroidism, HTN, HLD, SVT (s/p ablation), Cervical CA (s/p hysterectomy), DVT (June 2022, still on eliquis), OA and peripheral neuropathy presented to [facility] after having fall from bed overnight. Pt was found to have UTI and MSSA bacteremia, ID consulted and started on IV antibiotics, SHAUN with no vegetations, no intervention per IR for discitis/T6/7 OM. CT head with no evidence of bleed, CT C spine and chest no evidence of fractures. No intervention per neurosurgery, course c/b hyponatremia/KATHERINE Block placed on 11/17, now awaiting for placement      MSSA bacteremia discitis   - BCx positive for MSSA bacteremia. ID consulted. Started on Cefazolin. Prior admission pt also had MSSA bacteremia. SHAUN 11/17- did not show any vegetations. Surveillance cultures on 10/27 positive.   - MRI AP w/ contrast, MRI T/L spine with contrast. MRI thus far shows discitis/OM in T6/T7 and phlegmon. Also abscess in the right piriformis muscle on MR A/P. Spinal consult placed. For right piriformis abscess, IR was consulted, no intervention at this time, as collection looks small with poor windows. Follow up scans were recommended.  - SHAUN unremarkable  - C/w cefazolin (10/27 - ), Rec for 6-8weeks duration from last negative blood culture, Weekly CBC/CMP/ESR/CRP  - last blood Cx Neg from 10/29 (start date for Abx was 10/27)  - Pt. lives alone and could not administer this medication herself. Dispo to [facility].    Fall, accidental.   - Pelvic Xray - No acute fractures or dislocations, evidence of nonhealing fractures seen on prior imaging. Bilateral hip joint arthrosis present.   - CXR - No acute displaced rib fracture. Chronic right rib deformities.   - CT Head/ C spine -- > no acute intracranial hemorrhage, territorial infarct, mass effect or calvarial fracture. Evidence of multilevel degenerative changes, no fracture  - CT chest - Possible acute/subacute compression fracture d/t mild loss of height at T7  - Neurosurgery consulted- no intervention, TLSO brace FOR COMFORT which pain aid in back pain/chest pain. Outpatient follow up.   - Pain regimen: Oxycodone 5mg q4 prn (severe)  tramadol 50mg q4 prn (mod pain), tylenol PRN, gabapentin 300mg TID  - PT recs- AGNIESZKA.  - obtain Xray in 3-4 weeks of spine to ensure patient is not developing a kyphosis.  - follow up with Dr. Sherrie Armendariz for thoracic compression fracture  - Xray on 11/22 w/ Chronic appearing displaced posterior right fifth rib fracture. Chronic 2 displaced posterior right sixth rib fractures with a free fracture fragment.  - Cardiothoracic Surgeon consulted, Chest CT with _______________________.     Osteomyelitis of vertebra, multiple sites in spine.   - OM in T6/T7 and phlegmon   - Spine consulted--no intervention  - IR consulted -no intervention   - D/W ID, will continue antibiotics as above   - SHAUN unremarkable.    Chest pain r/o ACS vs costochondritis 11/20   - c/o B/L chest discomfort 11/20, ECG: NSR @ 77bpm; no acute changes noted  - Troponin 21 >22 neg. CK 15, CKMB 1.3  - Tramadol 50 mg PO x 1 given for pain with improvement in pain  - Pt otherwise appears stable and in NAD  - Will continue to monitor and trend troponins.     Anemia.    - Baseline HgB from 06/22 --> 10-11  - 9.1/ 28.9 on admission  - Per outpatient NP - 10/23 HgB 8.7, evidence of MAR  - Folate/ b12 WNL  - Monitor CBC    KATHERINE/Hyponatremia   - Baseline SCr from 06/22 --> 0.6 - 1.0, Hypoantremia Likely chronic in nature  - admission documentation stating likely SIADH however Na improved after bolus.  - uptrend Cr likely SIADH related to ongoing infection now improved   - Per outpatient NP - 10/23 SCr 1.4  - Hold home Losartan-HCTZ for now   - Urine Na <20 which is not consistent with SIADH.   - increased salt tabs 2g tid on 11/20   - Avoid nephrotoxins, I/Os    Hypothyroidism.   - TSH ~7. T4 normal   - Continue home synthroid for now.    HTN (hypertension).   - losartan/HCTZ have been held when pt presented with elevated creat  - BP have been stable off these meds  - resume if needed.    SVT (supraventricular tachycardia).   - s/p ablation several years ago   - ECG on admission NSR  - continue to monitor.    Cervical cancer.   - Distal history, s/p hysterectomy  - No history of chemotherapy or RT.    Venous thromboembolism (VTE).   - Recent DVT in 06/22 - started on eliquis  - holding eliquis for now, VTE prophylaxis with heparin 5000 sq q8  - B/L LE swelling present, per patient same amount of swelling as normal, but tender to palpation b/l calves  - B/L LE doppler for DVT negative   - D/c Eliquis given completion of treatment and no evidence of DVT on US     Cough.   - Persistent cough - CXR checked and did not show PNA or other etiology for cough. Possibly post-nasal drip   - Present since patient had covid several months ago. RVP negative. CXR clear. CT chest with atelectasis. Crackles heard on exam is consistent with atelectasis  - Encourage Incentive spirometer   - C/w nebulizer treatments, Cough suppressants PRN, Flonase spray    Abdominal pain,   - Abdominal xray showing dilated loops of bowel. Not concerned for bowel obstruction. Pt was constipated. After lactulose administration she had multiple bowel movements and her abdominal exam has now improved. She is less rigid and her abdomen is now soft. She still has abdominal tenderness to palpation.   - Continue with bowel regimen    Urinary retention,   - Pt. retaining urine 300-500cc. Likely in the setting of pain, constipation and immobility. Ultrasound kidney/bladder/abdomen without hydronephrosis or any obstruction.   - Block placed on 11/17   - UA: trace of blood, large LEs, UCX 11/16 - neg      On 11/24/22, case was discussed with , patient is medically cleared and optimized for discharge today. All medications were reviewed with attending, and sent to mutually agreed upon pharmacy. 77F w/ PMHx significant for hypothyroidism, HTN, HLD, SVT (s/p ablation), Cervical CA (s/p hysterectomy), DVT (June 2022, still on eliquis), OA and peripheral neuropathy presented to [facility] after having fall from bed overnight. Pt was found to have UTI and MSSA bacteremia, ID consulted and started on IV antibiotics, SHAUN with no vegetations, no intervention per IR for discitis/T6/7 OM. CT head with no evidence of bleed, CT C spine and chest no evidence of fractures. No intervention per neurosurgery, course c/b hyponatremia/KATHERINE Bolck placed on 11/17, now awaiting for placement.    MSSA bacteremia discitis   - BCx positive for MSSA bacteremia. ID consulted. Started on Cefazolin. Prior admission pt also had MSSA bacteremia. SHAUN 11/17- did not show any vegetations. Surveillance cultures on 10/27 positive.   - MRI AP w/ contrast, MRI T/L spine with contrast. MRI thus far shows discitis/OM in T6/T7 and phlegmon. Also abscess in the right piriformis muscle on MR A/P. Spinal consult placed. For right piriformis abscess, IR was consulted, no intervention at this time, as collection looks small with poor windows. Follow up scans were recommended.  - SHAUN unremarkable. C/w cefazolin (10/27 - ), Rec for 6-8weeks duration from last negative blood culture, Weekly CBC/CMP/ESR/CRP.   - last blood Cx Neg from 10/29 (start date for Abx was 10/27)  - Pt. lives alone and could not administer this medication herself. Dispo to [facility].    Fall, accidental.   - Pelvic Xray - No acute fractures or dislocations, evidence of nonhealing fractures seen on prior imaging. Bilateral hip joint arthrosis present.   - CXR - No acute displaced rib fracture. Chronic right rib deformities.   - CT Head/ C spine -- > no acute intracranial hemorrhage, territorial infarct, mass effect or calvarial fracture. Evidence of multilevel degenerative changes, no fracture  - CT chest - Possible acute/subacute compression fracture d/t mild loss of height at T7  - Neurosurgery consulted- no intervention, TLSO brace FOR COMFORT which pain aid in back pain/chest pain. Outpatient follow up.   - Pain regimen: Oxycodone 5mg q4 prn (severe)  tramadol 50mg q4 prn (mod pain), tylenol PRN, gabapentin 300mg TID  - PT recs- AGNIESZKA.  - obtain Xray in 3-4 weeks of spine to ensure patient is not developing a kyphosis. Follow up with Dr. Sherrie Armendariz for thoracic compression fracture  - Xray on 11/22 w/ Chronic appearing displaced posterior right fifth ribfracture. Chronic 2 displaced posterior right sixth rib fractures with a free fracture fragment.  - Cardiothoracic Surgeon consulted, Chest CT with bilateral pleural effusions and passive atelectasis (right greater than left). The erosions of the T6-7 disc space and paravertebral soft tissue   density was shown on 10/31/2022 and can occur in the clinical setting of discitis osteomyelitis. Healed right fifth and sixth rib fractures.    Osteomyelitis of vertebra, multiple sites in spine.   - OM in T6/T7 and phlegmon, spine consulted--no intervention, IR consulted -no intervention, D/W ID, will continue antibiotics as above, SHAUN unremarkable.    Chest pain, ACS ruled out, costochondritis / rib fractures as noted above  - c/o B/L chest discomfort 11/20, ECG: NSR @ 77bpm; no acute changes noted  - Troponin 21 >22 neg. CK 15, CKMB 1.3     Anemia.    - Baseline HgB from 06/22 --> 10-11. Per outpatient NP - 10/23 HgB 8.7, evidence of MAR. Folate/ b12 WNL.     KATHERINE/Hyponatremia   - Baseline SCr from 06/22 --> 0.6 - 1.0, Hypoantremia Likely chronic in nature  - admission documentation stating likely SIADH however Na improved after bolus. Uptrend Cr likely SIADH related to ongoing infection now improved. Per outpatient NP - 10/23 SCr 1.4. Hold home Losartan-HCTZ for now. Urine Na <20 which is not consistent with SIADH. Increased salt tabs 2g tid on 11/20 .    Hypothyroidism.   - TSH ~7. T4 normal. Continue home synthroid for now.    HTN (hypertension).   - losartan/HCTZ have been held when pt presented with elevated creat. BP have been stable off these meds. Resume if needed.    SVT (supraventricular tachycardia).   - s/p ablation several years ago. ECG on admission NSR    Cervical cancer.   - Distant history, s/p hysterectomy. No history of chemotherapy or RT.    Venous thromboembolism (VTE).   - Recent DVT in 06/22 - started on eliquis  - holding eliquis for now, VTE prophylaxis with heparin 5000 sq q8  - B/L LE swelling present, per patient same amount of swelling as normal, but tender to palpation b/l calves  - B/L LE doppler for DVT negative. D/c Eliquis given completion of treatment and no evidence of DVT on US     Cough.   - Persistent cough - CXR checked and did not show PNA or other etiology for cough. Possibly post-nasal drip. Present since patient had COVID several months ago. RVP negative. CXR clear. CT chest with atelectasis. Crackles heard on exam is consistent with atelectasis. Encourage Incentive spirometer. C/w nebulizer treatments, Cough suppressants PRN, Flonase spray    Abdominal pain,   - Abdominal xray showing dilated loops of bowel. Not concerned for bowel obstruction. Pt was constipated. After lactulose administration she had multiple bowel movements and her abdominal exam has now improved. She is less rigid and her abdomen is now soft. She still has abdominal tenderness to palpation. Continue with bowel regimen.    Urinary retention,   - Pt. retaining urine 300-500cc. Likely in the setting of pain, constipation and immobility. Ultrasound kidney/bladder/abdomen without hydronephrosis or any obstruction. + Block.    On 11/25/22, case was discussed with Dr. Hooker, vitals/labs/medications reviewed, continue Cefazolin for total of 8 weeks via PICC at rehab, resume home dose of Lasix 40mg daily, monitor CBC/CMP at rehab in 3 days to monitor Cr and Na, Pt medically cleared for discharge to rehab today. 77F w/ PMHx significant for hypothyroidism, HTN, HLD, SVT (s/p ablation), Cervical CA (s/p hysterectomy), DVT (June 2022, still on eliquis), OA and peripheral neuropathy presented to [facility] after having fall from bed overnight. Pt was found to have UTI and MSSA bacteremia, ID consulted and started on IV antibiotics, SHAUN with no vegetations, no intervention per IR for discitis/T6/7 OM. CT head with no evidence of bleed, CT C spine and chest no evidence of fractures. No intervention per neurosurgery, course c/b hyponatremia/KATHERINE Block placed on 11/17, now awaiting for placement.    MSSA bacteremia discitis   - BCx positive for MSSA bacteremia. ID consulted. Started on Cefazolin. Prior admission pt also had MSSA bacteremia. SHAUN 11/17- did not show any vegetations. Surveillance cultures on 10/27 positive.   - MRI AP w/ contrast, MRI T/L spine with contrast. MRI thus far shows discitis/OM in T6/T7 and phlegmon. Also abscess in the right piriformis muscle on MR A/P. Spinal consult placed. For right piriformis abscess, IR was consulted, no intervention at this time, as collection looks small with poor windows. Follow up scans were recommended.  - SHAUN unremarkable. C/w cefazolin (10/27 - ), Rec for 6-8weeks duration from last negative blood culture, Weekly CBC/CMP/ESR/CRP.   - last blood Cx Neg from 10/29 (start date for Abx was 10/27)  - Pt. lives alone and could not administer this medication herself. Dispo to [facility].    Fall, accidental.   - Pelvic Xray - No acute fractures or dislocations, evidence of nonhealing fractures seen on prior imaging. Bilateral hip joint arthrosis present.   - CXR - No acute displaced rib fracture. Chronic right rib deformities. CT Head/ C spine -- > no acute intracranial hemorrhage, territorial infarct, mass effect or calvarial fracture. Evidence of multilevel degenerative changes, no fracture. CT chest - Possible acute/subacute compression fracture d/t mild loss of height at T7. Neurosurgery consulted- no intervention, TLSO brace FOR COMFORT which pain aid in back pain/chest pain. Outpatient follow up.   - PT recs- AGNIESZKA.  - obtain Xray in 3-4 weeks of spine to ensure patient is not developing a kyphosis. Follow up with Dr. Sherrie Armendariz for thoracic compression fracture  - Xray on 11/22 w/ Chronic appearing displaced posterior right fifth ribfracture. Chronic 2 displaced posterior right sixth rib fractures with a free fracture fragment.  - Cardiothoracic Surgeon consulted, Chest CT with bilateral pleural effusions and passive atelectasis (right greater than left). The erosions of the T6-7 disc space and paravertebral soft tissue   density was shown on 10/31/2022 and can occur in the clinical setting of discitis osteomyelitis. Healed right fifth and sixth rib fractures.  -- Pain regimen: Discussed with pain management service. Rec increasing gabapentin to 600mg TID, cont  Oxycodone to 10mg q4 prn (severe) and Tylenol. If no improvement in pain tomorrow can add MS contin 15mg q12.    Osteomyelitis of vertebra, multiple sites in spine.   - OM in T6/T7 and phlegmon, spine consulted--no intervention, IR consulted -no intervention, D/W ID, will continue antibiotics as above, SHAUN unremarkable.    Chest pain, ACS ruled out, costochondritis / rib fractures as noted above  - c/o B/L chest discomfort 11/20, ECG: NSR @ 77bpm; no acute changes noted. Troponin 21 >22 neg. CK 15, CKMB 1.3     Anemia.    - Baseline HgB from 06/22 --> 10-11. Per outpatient NP - 10/23 HgB 8.7, evidence of MAR. Folate/ b12 WNL.     KATHERINE/Hyponatremia   - Baseline SCr from 06/22 --> 0.6 - 1.0, Hypoantremia Likely chronic in nature. Admission documentation stating likely SIADH however Na improved after bolus. Uptrend Cr likely SIADH related to ongoing infection now improved. Per outpatient NP - 10/23 SCr 1.4. Hold home Losartan-HCTZ for now. Urine Na <20 which is not consistent with SIADH. Increased salt tabs 2g tid on 11/20 .    Hypothyroidism.   - TSH ~7. T4 normal. Continue home synthroid for now.    HTN (hypertension).   - losartan/HCTZ have been held when pt presented with elevated creat. BP have been stable off these meds. Resume if needed.    SVT (supraventricular tachycardia).   - s/p ablation several years ago. ECG on admission NSR    Cervical cancer.   - Distant history, s/p hysterectomy. No history of chemotherapy or RT.    Venous thromboembolism (VTE).   - Recent DVT in 06/22 - started on eliquis  - holding eliquis for now, VTE prophylaxis with heparin 5000 sq q8  - B/L LE swelling present, per patient same amount of swelling as normal, but tender to palpation b/l calves  - B/L LE doppler for DVT negative. D/c Eliquis given completion of treatment and no evidence of DVT on US     Cough.   - Persistent cough - CXR checked and did not show PNA or other etiology for cough. Possibly post-nasal drip. Present since patient had COVID several months ago. RVP negative. CXR clear. CT chest with atelectasis. Crackles heard on exam is consistent with atelectasis. Encourage Incentive spirometer. C/w nebulizer treatments, Cough suppressants PRN, Flonase spray    Abdominal pain,   - Abdominal xray showing dilated loops of bowel. Not concerned for bowel obstruction. Pt was constipated. After lactulose administration she had multiple bowel movements and her abdominal exam has now improved. She is less rigid and her abdomen is now soft. She still has abdominal tenderness to palpation. Continue with bowel regimen.    Urinary retention,   - Pt. retaining urine 300-500cc. Likely in the setting of pain, constipation and immobility. Ultrasound kidney/bladder/abdomen without hydronephrosis or any obstruction. + Block.    On 11/25/22, case was discussed with Dr. Hooker, vitals/labs/medications reviewed, continue Cefazolin for total of 8 weeks via PICC at rehab, resume home dose of Lasix 40mg daily, monitor CBC/CMP at rehab in 3 days to monitor Cr and Na, Pt medically cleared for discharge to rehab today.

## 2022-11-20 NOTE — DISCHARGE NOTE PROVIDER - DETAILS OF MALNUTRITION DIAGNOSIS/DIAGNOSES
This patient has been assessed with a concern for Malnutrition and was treated during this hospitalization for the following Nutrition diagnosis/diagnoses:     -  11/08/2022: Moderate protein-calorie malnutrition

## 2022-11-20 NOTE — PROGRESS NOTE ADULT - SUBJECTIVE AND OBJECTIVE BOX
Hospitalist Progress Note  Germaine Cardoza MD Pager # [phone]    OVERNIGHT EVENTS: JYOTI    SUBJECTIVE / INTERVAL HPI: Patient seen and examined at bedside. Pt. reports that she continues to have pain but the pain medication helps to take the pain away. Her cough is improved today.     VITAL SIGNS:  Vital Signs Last 24 Hrs  T(C): 36.4 (20 Nov 2022 11:50), Max: 36.7 (20 Nov 2022 01:27)  T(F): 97.6 (20 Nov 2022 11:50), Max: 98.1 (20 Nov 2022 01:27)  HR: 74 (20 Nov 2022 11:50) (74 - 88)  BP: 126/70 (20 Nov 2022 11:50) (126/70 - 143/67)  BP(mean): --  RR: 16 (20 Nov 2022 11:50) (16 - 18)  SpO2: 99% (20 Nov 2022 11:50) (96% - 100%)    Parameters below as of 20 Nov 2022 11:50  Patient On (Oxygen Delivery Method): room air        PHYSICAL EXAM:    General: Deconditioned/elderly female resting in bed   HEENT: NC/AT; PERRL, anicteric sclera; MMM  Neck: supple  Cardiovascular: +S1/S2; RRR  Respiratory: CTA B/L; no W/R/R  Gastrointestinal: soft, NT/ND; +BSx4  Extremities: WWP; no edema, clubbing or cyanosis  Vascular: 2+ radial, DP/PT pulses B/L  Neurological: AAOx3; no focal deficits but diffusely weak in all extremities.     MEDICATIONS:  MEDICATIONS  (STANDING):  albuterol/ipratropium for Nebulization 3 milliLiter(s) Nebulizer every 6 hours  benzonatate 100 milliGRAM(s) Oral three times a day  bisacodyl 5 milliGRAM(s) Oral at bedtime  ceFAZolin   IVPB 2000 milliGRAM(s) IV Intermittent every 8 hours  DULoxetine 60 milliGRAM(s) Oral daily  fenofibrate Tablet 145 milliGRAM(s) Oral daily  ferrous    sulfate 325 milliGRAM(s) Oral daily  gabapentin 300 milliGRAM(s) Oral every 8 hours  heparin   Injectable 5000 Unit(s) SubCutaneous every 8 hours  levothyroxine 125 MICROGram(s) Oral daily  lidocaine   4% Patch 1 Patch Transdermal every 24 hours  meclizine 12.5 milliGRAM(s) Oral two times a day  melatonin 3 milliGRAM(s) Oral at bedtime  multivitamin 1 Tablet(s) Oral daily  pantoprazole    Tablet 40 milliGRAM(s) Oral before breakfast  polyethylene glycol 3350 17 Gram(s) Oral two times a day  potassium chloride   Powder 40 milliEquivalent(s) Oral every 4 hours  QUEtiapine 25 milliGRAM(s) Oral at bedtime  rOPINIRole 2 milliGRAM(s) Oral daily  senna 2 Tablet(s) Oral at bedtime  sodium chloride 2 Gram(s) Oral three times a day  sodium chloride 0.65% Nasal 1 Spray(s) Both Nostrils two times a day    MEDICATIONS  (PRN):  acetaminophen     Tablet .. 650 milliGRAM(s) Oral every 6 hours PRN Mild Pain (1 - 3), Moderate Pain (4 - 6)  ALBUTerol    90 MICROgram(s) HFA Inhaler 2 Puff(s) Inhalation every 6 hours PRN Wheezing  fluticasone propionate 50 MICROgram(s)/spray Nasal Spray 1 Spray(s) Both Nostrils every 12 hours PRN nasal congestion  guaiFENesin Oral Liquid (Sugar-Free) 100 milliGRAM(s) Oral every 6 hours PRN Cough  oxyCODONE    IR 5 milliGRAM(s) Oral every 4 hours PRN Severe Pain (7 - 10)  traMADol 50 milliGRAM(s) Oral every 4 hours PRN Moderate Pain (4 - 6)      ALLERGIES:  Allergies    erythromycin (Hives)  IV Contrast (Anaphylaxis)    Intolerances        LABS:                        8.8    6.94  )-----------( 333      ( 20 Nov 2022 06:39 )             29.1     11-20    129<L>  |  96<L>  |  7   ----------------------------<  90  3.5   |  23  |  0.54    Ca    9.0      20 Nov 2022 06:39  Phos  3.1     11-20  Mg     1.90     11-20    TPro  6.3  /  Alb  2.8<L>  /  TBili  0.3  /  DBili  x   /  AST  16  /  ALT  <5<L>  /  AlkPhos  80  11-20        CAPILLARY BLOOD GLUCOSE          RADIOLOGY & ADDITIONAL TESTS: Reviewed.    ASSESSMENT:    PLAN:

## 2022-11-20 NOTE — DISCHARGE NOTE PROVIDER - NSDCFUADDAPPT_GEN_ALL_CORE_FT
Follow up with your primary care doctor and neurosurgeon for further evaluation and management. Please call to make an appointment within 1-2 weeks of discharge.  Repeat chemistry to monitor kidney function and sodium within 3 days while at rehab.     Follow up with infectious disease Dr. Dove in 1-2 weeks.     Obtain Xray in 3-4 weeks of spine to ensure patient is not developing a kyphosis.  Follow up with Dr. Sherrie Armendariz for thoracic compression fracture.    Follow up with your primary care physician Dr. Jordan for further monitoring in 1-2 weeks. Please call to arrange appointment.

## 2022-11-20 NOTE — CHART NOTE - NSCHARTNOTEFT_GEN_A_CORE
Notified by RN that patient is experiencing chest pain.     Patient is a 77 year old female with PMHx of hypothyroidism, HTN, HLD, SVT (s/p ablation), Cervical CA (s/p hysterectomy), DVT 2022, still on eliquis), OA and peripheral neuropathy who presented for evaluation s/p fall and is admitted to [facility] for treatment of UTI and MSSA bacteremia (on IV Cefazolin). Pt currently awaiting placement.    Patient seen and assessed at bedside. States she started to having bilateral chest pain localized beneath both breasts, worse underneath the left breast. States pain started suddenly and states "it feels bad". Unable to rate pain or describe character of pain. States she did have similar pain to this when she fell. Admits to feeling nauseous earlier but states it has thus resolved. Denies fever, chills, generalized weakness, headache, dizziness, palpitations, shortness of breath, abdominal pain, episodes of emesis, numbness/weakness/tingling, any other complaints.    Vital Signs Last 24 Hrs  T(C): 36.7 (22 @ 01:27), Max: 36.7 (22 @ 01:27)  T(F): 98.1 (22 @ :27), Max: 98.1 (22 @ :27)  HR: 81 (22 @ :27) (74 - 82)  BP: 140/70 (22 @ 01:27) (134/73 - 140/70)  RR: 18 (22 @ :27) (18 - 18)  SpO2: 96% (22 @ :27) (96% - 99%) on (O2)    PHYSICAL EXAM:  Constitutional: NAD, well-developed, well-nourished  Respiratory: Clear to auscultation bilaterally. No wheezes, rales or rhonchi. Normal respiratory effort  Cardiovascular: regular rate and rhythm, S1 and S2, no murmurs, rubs or gallops, no edema, 2+ peripheral pulses  Gastrointestinal: soft, nontender, nondistended, +bowel sounds, no hernia  Psychiatric: A&Ox3, appropriate mood, affect    LABS:                        8.7    6.35  )-----------( 331      ( 2022 04:51 )             28.9         129<L>  |  94<L>  |  7   ----------------------------<  80  3.7   |  22  |  0.54    Ca    9.6      2022 04:51  Phos  2.9       Mg     2.00         TPro  6.7  /  Alb  2.9<L>  /  TBili  0.3  /  DBili  x   /  AST  13  /  ALT  <5<L>  /  AlkPhos  78      PT/INR: PT: 13.6 sec | INR: 1.17 ratio (22 @ 06:01)  PTT: 33.6 sec (22 @ 06:01)    CARDIAC ENZYMES    Creatine Kinase, Serum: 15 U/L (22 @ 01:34)  Creatine Kinase, Serum: 693 U/L (10-25-22 @ 06:38)  Creatine Kinase, Serum: 536 U/L (10-25-22 @ 05:00)          Serum Pro-Brain Natriuretic Peptide:   D-Dimer Assay:     Urinanalysis Basic (22 @ 03:01):  Color: Yellow / Appearance: Slightly Turbid / S.022 / pH: --  Gluc: -- / Ketone: Negative / Bili: Negative / Urobili: <2 mg/dL  Blood: -- / Protein: Trace / Nitrite: Negative  Leuk Esterase: Large / RBC: 7 / WBC: 50  Sq Epi: -- / Non Sq Epi: -- / Bacteria: Negative      Blood Gas Venous (22 @ 03:01):      Blood Gas Arterial (22 @ 03:01):      CAPILLARY BLOOD GLUCOSE:  POCT Blood Glucose: 153 mg/dL (10-25-22 @ 04:15)      COVID PCR:  NotDetec (22 @ 06:37)  NotDetec (22 @ 07:15)  NotDetec (22 @ 07:11)      RADIOLOGY:    MEDICATIONS  (STANDING):  albuterol/ipratropium for Nebulization 3 milliLiter(s) Nebulizer every 6 hours  benzonatate 100 milliGRAM(s) Oral three times a day  bisacodyl 5 milliGRAM(s) Oral at bedtime  ceFAZolin   IVPB 2000 milliGRAM(s) IV Intermittent every 8 hours  DULoxetine 60 milliGRAM(s) Oral daily  fenofibrate Tablet 145 milliGRAM(s) Oral daily  ferrous    sulfate 325 milliGRAM(s) Oral daily  gabapentin 300 milliGRAM(s) Oral every 8 hours  heparin   Injectable 5000 Unit(s) SubCutaneous every 8 hours  levothyroxine 125 MICROGram(s) Oral daily  lidocaine   4% Patch 1 Patch Transdermal every 24 hours  meclizine 12.5 milliGRAM(s) Oral two times a day  melatonin 3 milliGRAM(s) Oral at bedtime  multivitamin 1 Tablet(s) Oral daily  pantoprazole    Tablet 40 milliGRAM(s) Oral before breakfast  polyethylene glycol 3350 17 Gram(s) Oral two times a day  QUEtiapine 25 milliGRAM(s) Oral at bedtime  rOPINIRole 2 milliGRAM(s) Oral daily  senna 2 Tablet(s) Oral at bedtime  sodium chloride 1 Gram(s) Oral three times a day  sodium chloride 0.65% Nasal 1 Spray(s) Both Nostrils two times a day    MEDICATIONS  (PRN):  acetaminophen     Tablet .. 650 milliGRAM(s) Oral every 6 hours PRN Mild Pain (1 - 3), Moderate Pain (4 - 6)  ALBUTerol    90 MICROgram(s) HFA Inhaler 2 Puff(s) Inhalation every 6 hours PRN Wheezing  fluticasone propionate 50 MICROgram(s)/spray Nasal Spray 1 Spray(s) Both Nostrils every 12 hours PRN nasal congestion  guaiFENesin Oral Liquid (Sugar-Free) 100 milliGRAM(s) Oral every 6 hours PRN Cough  oxyCODONE    IR 5 milliGRAM(s) Oral every 4 hours PRN Severe Pain (7 - 10)  traMADol 50 milliGRAM(s) Oral every 4 hours PRN Moderate Pain (4 - 6)    I&O's Summary    2022 07:01  -  2022 07:00  --------------------------------------------------------  IN: 0 mL / OUT: 1000 mL / NET: -1000 mL    2022 07:01  -  2022 03:01  --------------------------------------------------------  IN: 0 mL / OUT: 800 mL / NET: -800 mL      I reviewed the above labs, radiology, medications, tests, telemetry, and EKG interpretation.    ASSESSMENT/PLAN:    - Clinical findings, labs, tests, telemetry, and ekg reviewed with attending. Will monitor patient closely.     Low complexity/risk (30min)  medium complexity/ risk (45 min)  high complexity ( >60 min) Notified by RN that patient is experiencing chest pain.     Patient is a 77 year old female with PMHx of hypothyroidism, HTN, HLD, SVT (s/p ablation), Cervical CA (s/p hysterectomy), DVT June 2022, still on eliquis), OA and peripheral neuropathy who presented for evaluation s/p fall and is admitted to [facility] for treatment of UTI and MSSA bacteremia (on IV Cefazolin). Pt currently awaiting placement.    Patient seen and assessed at bedside. States she started to having bilateral chest pain localized beneath both breasts, worse underneath the left breast. States pain started suddenly and states "it feels bad". Unable to rate pain or describe character of pain. States she did have similar pain to this when she fell. Admits to feeling nauseous earlier but states it has resolved. Denies fever, chills, generalized weakness, headache, dizziness, palpitations, shortness of breath, abdominal pain, episodes of emesis, numbness/weakness/tingling, any other complaints.    Vital Signs Last 24 Hrs  T(C): 36.7 (11-20-22 @ 01:27), Max: 36.7 (11-20-22 @ 01:27)  T(F): 98.1 (11-20-22 @ 01:27), Max: 98.1 (11-20-22 @ 01:27)  HR: 81 (11-20-22 @ 01:27) (74 - 82)  BP: 140/70 (11-20-22 @ 01:27) (134/73 - 140/70)  RR: 18 (11-20-22 @ 01:27) (18 - 18)  SpO2: 96% (11-20-22 @ 01:27) (96% - 99%) on (O2)    PHYSICAL EXAM:  Constitutional: NAD, well-developed, well-nourished  Respiratory: Clear to auscultation bilaterally. No wheezes, rales or rhonchi. Normal respiratory effort  Cardiovascular: regular rate and rhythm, S1 and S2, no murmurs, rubs or gallops, no edema, 2+ peripheral pulses  Gastrointestinal: soft, nontender, nondistended, +bowel sounds, no hernia  Psychiatric: A&Ox3, appropriate mood, affect       CARDIAC ENZYMES  Creatine Kinase, Serum: 15 U/L (11-20-22 @ 01:34)  Troponin T, High Sensitivity: 21 (11.20.22 @ 01:34)  CKMB Mass Assay (11.20.22 @ 01:34)    CKMB Units: 1.3 ng/mL    CPK Mass Assay %: 8.7 %    ASSESSMENT/PLAN:  Patient is a 77 year old female with PMHx of hypothyroidism, HTN, HLD, SVT (s/p ablation), Cervical CA (s/p hysterectomy), DVT June 2022, still on eliquis), OA and peripheral neuropathy who presented for evaluation s/p fall and is admitted to [facility] for treatment of UTI and MSSA bacteremia (on IV Cefazolin). Pt currently with c/o bilateral chest pain.    #Chest pain r/o ACS vs costochondritis  - VSS  - ECG: Normal sinus rhythm @ 77bpm; no acute changes noted  - Troponin with normal limits. CK 15, CKMB 1.3  - Tramadol 50 mg PO x 1 given for pain with improvement in pain  - Pt otherwise appears stable and in NAD  - Will continue to monitor and trend troponins

## 2022-11-20 NOTE — DISCHARGE NOTE PROVIDER - CARE PROVIDERS DIRECT ADDRESSES
,DirectAddress_Unknown ,DirectAddress_Unknown,DirectAddress_Unknown ,DirectAddress_Unknown,DirectAddress_Unknown,[email or web address]

## 2022-11-20 NOTE — DISCHARGE NOTE PROVIDER - NSDCCPCAREPLAN_GEN_ALL_CORE_FT
PRINCIPAL DISCHARGE DIAGNOSIS  Diagnosis: Hyponatremia  Assessment and Plan of Treatment: Noted with low sodium levels, likely chronic, started on salt tablets, encourage oral intake,    Hold home Losartan-HCTZ for now   Repeat BMP within 1week      SECONDARY DISCHARGE DIAGNOSES  Diagnosis: MSSA bacteremia  Assessment and Plan of Treatment: You were seen by the infectious disease doctor and started on IV antibiotics for infection in your blood. You echocardiogram (heart ultrasound) did not show any endocarditis (infection of you heart).   Your MRI did reveal infection of the spine (discitis) and infection of the bone (osteomylitis). Your repeat blood cultures from 10/29/22 are negative till date. *******You will need a course of 6-8weeks of antibiotics from 10/29, Weekly CBC/CMP/ESR/CRP    Diagnosis: Fall, accidental  Assessment and Plan of Treatment: Pelvic xray and CAT scan head unremarkable.   You Chest CAT Scan showed Possible acute/subacute compression fracture due to mild loss of height at the thoracic region 7. You were evaluated by the Neurosurgery doctor with no urgent intervention at this time, Recommend to Continue pain medications as prescribed and use TLSO Brace for Comfort**  **Recommend to obtain Xray in 3-4 weeks of spine to ensure patient is not developing a kyphosis.  ***Outpatient followup with Neurosurgeon Dr. Sherrie Armendariz for thoracic compression fractrue.    Diagnosis: Urinary tract infection  Assessment and Plan of Treatment:      PRINCIPAL DISCHARGE DIAGNOSIS  Diagnosis: Hyponatremia  Assessment and Plan of Treatment: You were found to have low sodium levels. You were started on salt tablets and oral intake has been encouraged. You blood pressure medication (Losartan-HCTZ) has been held. Repeat a basic metabolic panel (blood lab) within 1 week of discharge. Continue medications as prescribed. Follow up with your primary care doctor for further evaluation and management. Please call to make an appointment within 1-2 weeks of discharge.      SECONDARY DISCHARGE DIAGNOSES  Diagnosis: MSSA bacteremia  Assessment and Plan of Treatment: During  your stay you were found to have an infection in your blood. Your blood cultures were positive for bacteria. You were seen by the Infectious Disease team and started on IV antibiotics. An ultrasound of your heart did not show any infection of your heart. MRI of your abdomin, pelvis, and spine showed inflammation and infection in your spine (at vertebra T6/T7) and an infection in the piriformis muscle. You were seen by the Spinal team and the Interventional Radiology team and there was no need for intervention at this time though follow up scan are recommended. You were given a long-standing IV (PICC line) for antibiotics. Please continue to take the antibiotic Cefazolin to complete a 6-8 week course (this antibiotic was started on 10/27/22). Weekly blood labs (CBC/CMP/ESR/CRP). You are being discharged to a subacute rehabilitation facility. Continue medications as prescribed. Follow up with your primary care doctor for further evaluation and management. Please call to make an appointment within 1-2 weeks of discharge.    Diagnosis: Fall, accidental  Assessment and Plan of Treatment: You came to the hospital for an fall. Xray of your pelvis and CAT scan of your head were unremarkable. CAT scan of your chest showed possible acute/subacute compression facture due to mild loss of height in the spine at T7. You were seen by the Neurosurgery team and no intervention is needed at this time. You have been given a spine brace (TLSO brace) for comfort. Recommended to obtain Xray of spine in 3-4 weeks to ensure you develope kyphosis (abnormal curvature of the spine). Maintain a safe environment. Do not change positions quickly. Particpate in program recommended by physical therapy.  Continue medications as prescribed. Follow up with your primary care doctor and Neursosurgeon (Dr.Heshem Armendariz) for further evaluation and management. Please call to make an appointment within 1-2 weeks of discharge.    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 Specifically at T6/T7. You were seen by the spinal team and interventional radiology team and no intervention is needed at this time. You were seen by the infectious disease team and were started on antibiotics. Ultrasound of your heart was unremarkable. Continue medications as prescribed. Follow up with your primary care doctor for further evaluation and management. Please call to make an appointment within 1-2 weeks of discharge.    Diagnosis: Urine retention  Assessment and Plan of Treatment: During your stay at the hospital you were retaining urine (not emptying your bladder completely after urinating). Ultrasound of your kidneys, bladder, and abdomen was negative. Urine culture was negative on 11/16/22. A knox was placed to help. Continue medications as prescribed. Follow up with your primary care doctor for further evaluation and management. Please call to make an appointment within 1-2 weeks of discharge.    Diagnosis: Anemia  Assessment and Plan of Treatment: You have a history of anemia. Your folate and Vitamin B12 levels were normal. Your blood labs were monitored during your stay. Follow-up with your outpatient provider for further care/recommendations. Monitor for signs/symptoms indicating worsening of disease, such as, easy bleeding/bruising, pale skin, fatigue, dizziness, increased heart rate, or chest pain.    Diagnosis: KATHERINE (acute kidney injury)  Assessment and Plan of Treatment: You were found to have poor kidney function during your stay at the hospital. Your blood pressure medications Losartan-HCTZ was held. In order to prevent further disease progression, continue to follow recommendations made by your primary provider/nephrologist. Continue a diet that is low in sodium and avoid foods that are concentrated in potassium and phosphorus. Continue your medications/supplementations as directed and avoid over-the-counter drugs that are harmful to kidneys, such as, Non-Steroidal Anti-Inflammatory Drugs (NSAIDs). Follow-up as outpatient to monitor your kidney function, as well as, vitamin D, Calcium, potassium, and phosphorus levels.    Diagnosis: Hypothyroidism  Assessment and Plan of Treatment: You have a medical history of Hypothyroidism (low thyroid levels). Your thyroid levels were normal. Continue your thyroid medications as recommended and follow-up with your outpatient provider for continual thyroid function testing and further medication management.    Diagnosis: HTN (hypertension)  Assessment and Plan of Treatment: You have a medical history of high blood pressure. Your blood pressure medication Losartan-HCTZ was held. Continue current blood pressure medication regimen as directed. Monitor for any visual changes, headaches or dizziness.  Monitor blood pressure regularly.  Follow up with your PCP for further management for high blood pressure, please call to make appointment within 1 week of discharge.    Diagnosis: SVT (supraventricular tachycardia)  Assessment and Plan of Treatment: You have a medical history of fast heart rate. You previously had an ablation several years ago. Your EKG was normal. Continue medications as prescribed. Follow up with your primary care doctor for further evaluation and management. Please call to make an appointment within 1-2 weeks of discharge.    Diagnosis: Venous thromboembolism (VTE)  Assessment and Plan of Treatment: You have a medical history of blood clots in your veins. You have completed your blood thinning medication treatment. Ultrasound of the blood flow of your legs was negative for blood clots. Continue medications as prescribed. Follow up with your primary care doctor for further evaluation and management. Please call to make an appointment within 1-2 weeks of discharge.     PRINCIPAL DISCHARGE DIAGNOSIS  Diagnosis: MSSA bacteremia  Assessment and Plan of Treatment: During  your stay you were found to have an infection in your blood. Your blood cultures were positive for bacteria. You were seen by the Infectious Disease team and started on IV antibiotics. An ultrasound of your heart did not show any infection of your heart. MRI of your abdomin, pelvis, and spine showed inflammation and infection in your spine (at vertebra T6/T7) and an infection in the piriformis muscle. You were seen by the Spinal team and the Interventional Radiology team and there was no need for intervention at this time though follow up scan are recommended. You were given a long-standing IV (PICC line) for antibiotics. Please continue to take the antibiotic Cefazolin to complete a total of 8 week course (this antibiotic was started on 10/27/22, you completed 4 weeks while admitted, continue 4 more weeks). Weekly blood labs (CBC/CMP/ESR/CRP). You are being discharged to a subacute rehabilitation facility. Continue medications as prescribed. Follow up with your infectious disease doctor Dr. Dove and primary care physician for further monitoring in 1-2 weeks. Please call to arrange appointment.      SECONDARY DISCHARGE DIAGNOSES  Diagnosis: Hyponatremia  Assessment and Plan of Treatment: You were found to have low sodium levels. You were started on salt tablets and oral intake has been encouraged. You blood pressure medication (Losartan-HCTZ) has been held. Repeat a basic metabolic panel (blood lab) within 1 week of discharge. Continue medications as prescribed. Follow up with your primary care doctor for further evaluation and management. Please call to make an appointment within 1-2 weeks of discharge.    Diagnosis: Fall, accidental  Assessment and Plan of Treatment: You came to the hospital for an fall. Xray of your pelvis and CAT scan of your head were unremarkable. CAT scan of your chest showed possible acute/subacute compression fracture due to mild loss of height in the spine at T7. You were seen by the Neurosurgery team and no intervention is needed at this time. You have been given a spine brace (TLSO brace) for comfort. Recommended to obtain Xray of spine in 3-4 weeks to ensure you develope kyphosis (abnormal curvature of the spine). Maintain a safe environment. Do not change positions quickly. Particpate in program recommended by physical therapy.  Continue medications as prescribed. Follow up with your primary care doctor and Neursosurgeon (Dr.Heshem Armendariz) for further evaluation and management. Please call to make an appointment within 1-2 weeks of discharge.    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 Specifically at T6/T7. You were seen by the spinal team and interventional radiology team and no intervention is needed at this time. You were seen by the infectious disease team and were started on antibiotics for total of 8 weeks as noted above. Ultrasound of your heart was unremarkable. Continue medications as prescribed. Follow up with your primary care doctor for further evaluation and management. Please call to make an appointment within 1-2 weeks of discharge.    Diagnosis: Anemia  Assessment and Plan of Treatment: You have a history of anemia. Your folate and Vitamin B12 levels were normal. Your blood labs were monitored during your stay. Follow-up with your outpatient provider for further care/recommendations. Monitor for signs/symptoms indicating worsening of disease, such as, easy bleeding/bruising, pale skin, fatigue, dizziness, increased heart rate, or chest pain.    Diagnosis: KATHERINE (acute kidney injury)  Assessment and Plan of Treatment: You were found to have poor kidney function during your stay at the hospital. Your blood pressure medications Losartan-HCTZ was held. In order to prevent further disease progression, continue to follow recommendations made by your primary provider/nephrologist. Continue a diet that is low in sodium and avoid foods that are concentrated in potassium and phosphorus. Continue your medications/supplementations as directed and avoid over-the-counter drugs that are harmful to kidneys, such as, Non-Steroidal Anti-Inflammatory Drugs (NSAIDs). Follow-up as outpatient to monitor your kidney function, as well as, vitamin D, Calcium, potassium, and phosphorus levels.    Diagnosis: Hypothyroidism  Assessment and Plan of Treatment: You have a medical history of Hypothyroidism (low thyroid levels). Your thyroid levels were normal. Continue your thyroid medications as recommended and follow-up with your outpatient provider for continual thyroid function testing and further medication management.    Diagnosis: HTN (hypertension)  Assessment and Plan of Treatment: You have a medical history of high blood pressure. Your blood pressure medication Losartan-HCTZ was held. Continue current blood pressure medication regimen as directed. Monitor for any visual changes, headaches or dizziness.  Monitor blood pressure regularly.  Follow up with your PCP for further management for high blood pressure, please call to make appointment within 1 week of discharge.    Diagnosis: SVT (supraventricular tachycardia)  Assessment and Plan of Treatment: You have a medical history of fast heart rate. You previously had an ablation several years ago. Your EKG was normal. Continue medications as prescribed. Follow up with your primary care doctor for further evaluation and management. Please call to make an appointment within 1-2 weeks of discharge.    Diagnosis: Venous thromboembolism (VTE)  Assessment and Plan of Treatment: You have a medical history of blood clots in your veins. You have completed your course of blood thinning medication treatment. Ultrasound of the blood flow of your legs was negative for blood clots. Continue medications as prescribed. Follow up with your primary care doctor for further evaluation and management. Please call to make an appointment within 1-2 weeks of discharge.    Diagnosis: Urine retention  Assessment and Plan of Treatment: During your stay at the hospital you were retaining urine (not emptying your bladder completely after urinating). Ultrasound of your kidneys, bladder, and abdomen was negative. Urine culture was negative on 11/16/22. A knox was placed to help. Continue medications as prescribed. Follow up with your doctor at rehab for further care and possible trial of void.    Diagnosis: Pleural effusion  Assessment and Plan of Treatment: Continue Lasix 40mg daily. Follow up with your primary care physician for further monitoring in 1-2 weeks. Please call to arrange appointment.   Repeat chemistry to monitor kidney function and sodium within 3 days while at rehab.    Diagnosis: MSSA bacteremia  Assessment and Plan of Treatment: During  your stay you were found to have an infection in your blood. Your blood cultures were positive for bacteria. You were seen by the Infectious Disease team and started on IV antibiotics. An ultrasound of your heart did not show any infection of your heart. MRI of your abdomin, pelvis, and spine showed inflammation and infection in your spine (at vertebra T6/T7) and an infection in the piriformis muscle. You were seen by the Spinal team and the Interventional Radiology team and there was no need for intervention at this time though follow up scan are recommended. You were given a long-standing IV (PICC line) for antibiotics. Please continue to take the antibiotic Cefazolin to complete a 6-8 week course (this antibiotic was started on 10/27/22). Weekly blood labs (CBC/CMP/ESR/CRP). You are being discharged to a subacute rehabilitation facility. Continue medications as prescribed. Follow up with your primary care doctor for further evaluation and management. Please call to make an appointment within 1-2 weeks of discharge.

## 2022-11-20 NOTE — DISCHARGE NOTE PROVIDER - PROVIDER TOKENS
PROVIDER:[TOKEN:[[id number]]] PROVIDER:[TOKEN:[[id number]]],PROVIDER:[TOKEN:[[id number]]] PROVIDER:[TOKEN:[[id number]]],PROVIDER:[TOKEN:[[id number]]],PROVIDER:[TOKEN:[[id number]]]

## 2022-11-20 NOTE — CHART NOTE - NSCHARTNOTESELECT_GEN_ALL_CORE
Chest pain/Event Note
Event Note
ISTOP/Event Note
Nutrition Services
Follow-up/Nutrition Services
SOB/Event Note

## 2022-11-20 NOTE — DISCHARGE NOTE PROVIDER - CARE PROVIDER_API CALL
Reyes Armendariz)  Neurosurgery  [address], [address]  [place] [address]  Phone: [phone]  Fax: [phone]  Follow Up Time:    Reyes Armendariz (MD)  Neurosurgery  [address], [address]  [place] [address]  Phone: [phone]  Fax: [phone]  Follow Up Time:     SERGIO GALAN  [facility]  [address]  [place] [address]  Phone: [phone]  Fax: [phone]  Follow Up Time:    Reyes Armendariz (MD)  Neurosurgery  [address], [address]  [place] [address]  Phone: [phone]  Fax: [phone]  Follow Up Time:     SERGIO GALAN  [facility]  [address]  [place] [address]  Phone: [phone]  Fax: [phone]  Follow Up Time:     Radha Dove)  Infectious Disease; Internal Medicine  [address] [place] [id number]  Phone: [phone]  Fax: [phone]  Follow Up Time:

## 2022-11-20 NOTE — DISCHARGE NOTE PROVIDER - NSDCMRMEDTOKEN_GEN_ALL_CORE_FT
Actonel 35 mg oral tablet: 1 tab(s) orally once a week  apixaban 5 mg oral tablet: 1 tab(s) orally every 12 hours  bisacodyl 5 mg oral delayed release tablet: 1 tab(s) orally once a day (at bedtime)  ceFAZolin 2 g/20 mL intravenous solution: 2 gram(s) intravenous 3 times a day,  8 weeks  DULoxetine 60 mg oral delayed release capsule: 1 cap(s) orally once a day  famotidine 20 mg oral tablet: 1 tab(s) orally 2 times a day  fenofibrate 145 mg oral tablet: 1 tab(s) orally once a day  fluticasone 50 mcg/inh nasal spray: 1 spray(s) nasal 2 times a day  furosemide 40 mg oral tablet: 1 tab(s) orally every 24 hours  gabapentin 300 mg oral capsule: 1 cap(s) orally 2 times a day  levothyroxine 125 mcg (0.125 mg) oral tablet: 1 tab(s) orally once a day  losartan-hydrochlorothiazide 50 mg-12.5 mg oral tablet: 1 tab(s) orally once a day  meclizine 12.5 mg oral tablet: 1 tab(s) orally 2 times a day  pantoprazole 40 mg oral delayed release tablet: 1 tab(s) orally once a day  polyethylene glycol 3350 oral powder for reconstitution: 17 gram(s) orally 2 times a day, As Needed  rOPINIRole 2 mg oral tablet, extended release: 1 tab(s) orally once a day  senna oral tablet: 2 tab(s) orally once a day (at bedtime)  SEROquel 25 mg oral tablet: 0.5 tab(s) orally once a day (at bedtime)  sodium chloride 0.65% nasal spray: nasal 2 times a day  spironolactone 50 mg oral tablet: 1 tab(s) orally once a day  traMADol 50 mg oral tablet: 2 tab(s) orally every 8 hours, As Needed - 10)   acetaminophen 325 mg oral tablet: 2 tab(s) orally every 6 hours, As needed, Mild Pain (1 - 3), Moderate Pain (4 - 6)  albuterol 90 mcg/inh inhalation aerosol: 2 puff(s) inhaled every 6 hours, As needed, Wheezing  benzonatate 100 mg oral capsule: 1 cap(s) orally 3 times a day  bisacodyl 5 mg oral delayed release tablet: 1 tab(s) orally once a day (at bedtime)  ceFAZolin 2 g/20 mL intravenous solution: 2 gram(s) intravenous 3 times a day,  8 weeks  DULoxetine 60 mg oral delayed release capsule: 1 cap(s) orally once a day  fenofibrate 145 mg oral tablet: 1 tab(s) orally once a day  ferrous sulfate 325 mg (65 mg elemental iron) oral tablet: 1 tab(s) orally once a day  fluticasone 50 mcg/inh nasal spray: 1 spray(s) nasal every 12 hours, As needed, nasal congestion  furosemide 20 mg oral tablet: 1 tab(s) orally once a day  gabapentin 300 mg oral capsule: 1 cap(s) orally every 8 hours  levothyroxine 125 mcg (0.125 mg) oral tablet: 1 tab(s) orally once a day  lidocaine 4% topical film: 1 patch transdermal every 24 hours  meclizine 12.5 mg oral tablet: 1 tab(s) orally 2 times a day  melatonin 3 mg oral tablet: 1 tab(s) orally once a day (at bedtime)  Multiple Vitamins oral tablet: 1 tab(s) orally once a day  nystatin 100,000 units/g topical cream: 1 application topically 2 times a day  oxyCODONE 10 mg oral tablet: 1 tab(s) orally every 3 hours, As needed, Severe Pain (7 - 10)  pantoprazole 40 mg oral delayed release tablet: 1 tab(s) orally once a day (before a meal)  polyethylene glycol 3350 oral powder for reconstitution: 17 gram(s) orally 2 times a day  QUEtiapine 25 mg oral tablet: 1 tab(s) orally once a day (at bedtime)  rOPINIRole 2 mg oral tablet: 1 tab(s) orally once a day  senna leaf extract oral tablet: 2 tab(s) orally once a day (at bedtime)  sodium chloride 0.65% nasal spray: 1 spray(s) in each nostril 2 times a day  sodium chloride 1 g oral tablet: 2 tab(s) orally 3 times a day  traMADol 50 mg oral tablet: 1 tab(s) orally every 4 hours, As needed, Moderate Pain (4 - 6)   acetaminophen 325 mg oral tablet: 2 tab(s) orally every 6 hours, As needed, Mild Pain (1 - 3), Moderate Pain (4 - 6)  albuterol 90 mcg/inh inhalation aerosol: 2 puff(s) inhaled every 6 hours, As needed, Wheezing  benzonatate 100 mg oral capsule: 1 cap(s) orally 3 times a day  bisacodyl 5 mg oral delayed release tablet: 1 tab(s) orally once a day (at bedtime)  ceFAZolin 2 g/20 mL intravenous solution: 2 gram(s) intravenous 3 times a day,  8 weeks  ceFAZolin 2 g/20 mL intravenous solution: 2 gram(s) injectable every 8 hours x 4 more weeks through 12/22/22 (total of 8 week course)   DULoxetine 60 mg oral delayed release capsule: 1 cap(s) orally once a day  fenofibrate 145 mg oral tablet: 1 tab(s) orally once a day  ferrous sulfate 325 mg (65 mg elemental iron) oral tablet: 1 tab(s) orally once a day  fluticasone 50 mcg/inh nasal spray: 1 spray(s) nasal every 12 hours, As needed, nasal congestion  furosemide 40 mg oral tablet: 1 tab(s) orally once a day  gabapentin 600 mg oral tablet: 1 tab(s) orally every 8 hours  levothyroxine 125 mcg (0.125 mg) oral tablet: 1 tab(s) orally once a day  lidocaine 4% topical film: 1 patch transdermal every 24 hours  meclizine 12.5 mg oral tablet: 1 tab(s) orally 2 times a day  melatonin 3 mg oral tablet: 1 tab(s) orally once a day (at bedtime)  Multiple Vitamins oral tablet: 1 tab(s) orally once a day  nystatin 100,000 units/g topical cream: 1 application topically 2 times a day x 10 days  oxyCODONE 10 mg oral tablet: 1 tab(s) orally every 3 hours, As needed, Severe Pain (7 - 10)  pantoprazole 40 mg oral delayed release tablet: 1 tab(s) orally once a day (before a meal)  polyethylene glycol 3350 oral powder for reconstitution: 17 gram(s) orally 2 times a day  QUEtiapine 25 mg oral tablet: 1 tab(s) orally once a day (at bedtime)  rOPINIRole 2 mg oral tablet: 1 tab(s) orally once a day  senna leaf extract oral tablet: 2 tab(s) orally once a day (at bedtime)  sodium chloride 0.65% nasal spray: 1 spray(s) in each nostril 2 times a day  sodium chloride 1 g oral tablet: 2 tab(s) orally 3 times a day  traMADol 50 mg oral tablet: 1 tab(s) orally every 4 hours, As needed, Moderate Pain (4 - 6)

## 2022-11-20 NOTE — DISCHARGE NOTE PROVIDER - NSDCACTIVITY_GEN_ALL_CORE
Ambulate with assistance Ambulate with assistance/Do not drive or operate machinery/No heavy lifting/straining

## 2022-11-21 LAB
ALBUMIN SERPL ELPH-MCNC: 3 G/DL — LOW (ref 3.3–5)
ALP SERPL-CCNC: 84 U/L — SIGNIFICANT CHANGE UP (ref 40–120)
ALT FLD-CCNC: <5 U/L — LOW (ref 4–33)
ANION GAP SERPL CALC-SCNC: 10 MMOL/L — SIGNIFICANT CHANGE UP (ref 7–14)
AST SERPL-CCNC: 17 U/L — SIGNIFICANT CHANGE UP (ref 4–32)
BASOPHILS # BLD AUTO: 0.02 K/UL — SIGNIFICANT CHANGE UP (ref 0–0.2)
BASOPHILS NFR BLD AUTO: 0.3 % — SIGNIFICANT CHANGE UP (ref 0–2)
BILIRUB SERPL-MCNC: 0.2 MG/DL — SIGNIFICANT CHANGE UP (ref 0.2–1.2)
BUN SERPL-MCNC: 6 MG/DL — LOW (ref 7–23)
CALCIUM SERPL-MCNC: 9.8 MG/DL — SIGNIFICANT CHANGE UP (ref 8.4–10.5)
CHLORIDE SERPL-SCNC: 100 MMOL/L — SIGNIFICANT CHANGE UP (ref 98–107)
CO2 SERPL-SCNC: 24 MMOL/L — SIGNIFICANT CHANGE UP (ref 22–31)
CREAT SERPL-MCNC: 0.58 MG/DL — SIGNIFICANT CHANGE UP (ref 0.5–1.3)
EGFR: 93 ML/MIN/1.73M2 — SIGNIFICANT CHANGE UP
EOSINOPHIL # BLD AUTO: 0.02 K/UL — SIGNIFICANT CHANGE UP (ref 0–0.5)
EOSINOPHIL NFR BLD AUTO: 0.3 % — SIGNIFICANT CHANGE UP (ref 0–6)
GLUCOSE SERPL-MCNC: 96 MG/DL — SIGNIFICANT CHANGE UP (ref 70–99)
HCT VFR BLD CALC: 33.3 % — LOW (ref 34.5–45)
HGB BLD-MCNC: 9.8 G/DL — LOW (ref 11.5–15.5)
IANC: 3.64 K/UL — SIGNIFICANT CHANGE UP (ref 1.8–7.4)
IMM GRANULOCYTES NFR BLD AUTO: 0.6 % — SIGNIFICANT CHANGE UP (ref 0–0.9)
LYMPHOCYTES # BLD AUTO: 2.46 K/UL — SIGNIFICANT CHANGE UP (ref 1–3.3)
LYMPHOCYTES # BLD AUTO: 37.5 % — SIGNIFICANT CHANGE UP (ref 13–44)
MAGNESIUM SERPL-MCNC: 2 MG/DL — SIGNIFICANT CHANGE UP (ref 1.6–2.6)
MCHC RBC-ENTMCNC: 25.1 PG — LOW (ref 27–34)
MCHC RBC-ENTMCNC: 29.4 GM/DL — LOW (ref 32–36)
MCV RBC AUTO: 85.2 FL — SIGNIFICANT CHANGE UP (ref 80–100)
MONOCYTES # BLD AUTO: 0.38 K/UL — SIGNIFICANT CHANGE UP (ref 0–0.9)
MONOCYTES NFR BLD AUTO: 5.8 % — SIGNIFICANT CHANGE UP (ref 2–14)
NEUTROPHILS # BLD AUTO: 3.64 K/UL — SIGNIFICANT CHANGE UP (ref 1.8–7.4)
NEUTROPHILS NFR BLD AUTO: 55.5 % — SIGNIFICANT CHANGE UP (ref 43–77)
NRBC # BLD: 0 /100 WBCS — SIGNIFICANT CHANGE UP (ref 0–0)
NRBC # FLD: 0 K/UL — SIGNIFICANT CHANGE UP (ref 0–0)
PHOSPHATE SERPL-MCNC: 3.3 MG/DL — SIGNIFICANT CHANGE UP (ref 2.5–4.5)
PLATELET # BLD AUTO: 317 K/UL — SIGNIFICANT CHANGE UP (ref 150–400)
POTASSIUM SERPL-MCNC: 4.1 MMOL/L — SIGNIFICANT CHANGE UP (ref 3.5–5.3)
POTASSIUM SERPL-SCNC: 4.1 MMOL/L — SIGNIFICANT CHANGE UP (ref 3.5–5.3)
PROT SERPL-MCNC: 7 G/DL — SIGNIFICANT CHANGE UP (ref 6–8.3)
RBC # BLD: 3.91 M/UL — SIGNIFICANT CHANGE UP (ref 3.8–5.2)
RBC # FLD: 21.3 % — HIGH (ref 10.3–14.5)
SODIUM SERPL-SCNC: 134 MMOL/L — LOW (ref 135–145)
WBC # BLD: 6.56 K/UL — SIGNIFICANT CHANGE UP (ref 3.8–10.5)
WBC # FLD AUTO: 6.56 K/UL — SIGNIFICANT CHANGE UP (ref 3.8–10.5)

## 2022-11-21 PROCEDURE — 99233 SBSQ HOSP IP/OBS HIGH 50: CPT

## 2022-11-21 RX ADMIN — OXYCODONE HYDROCHLORIDE 5 MILLIGRAM(S): 5 TABLET ORAL at 22:26

## 2022-11-21 RX ADMIN — Medication 3 MILLIGRAM(S): at 21:15

## 2022-11-21 RX ADMIN — Medication 12.5 MILLIGRAM(S): at 06:04

## 2022-11-21 RX ADMIN — PANTOPRAZOLE SODIUM 40 MILLIGRAM(S): 20 TABLET, DELAYED RELEASE ORAL at 06:27

## 2022-11-21 RX ADMIN — NYSTATIN CREAM 1 APPLICATION(S): 100000 CREAM TOPICAL at 07:46

## 2022-11-21 RX ADMIN — TRAMADOL HYDROCHLORIDE 50 MILLIGRAM(S): 50 TABLET ORAL at 16:45

## 2022-11-21 RX ADMIN — QUETIAPINE FUMARATE 25 MILLIGRAM(S): 200 TABLET, FILM COATED ORAL at 21:16

## 2022-11-21 RX ADMIN — NYSTATIN CREAM 1 APPLICATION(S): 100000 CREAM TOPICAL at 11:37

## 2022-11-21 RX ADMIN — NYSTATIN CREAM 1 APPLICATION(S): 100000 CREAM TOPICAL at 18:14

## 2022-11-21 RX ADMIN — GABAPENTIN 300 MILLIGRAM(S): 400 CAPSULE ORAL at 13:53

## 2022-11-21 RX ADMIN — Medication 650 MILLIGRAM(S): at 07:20

## 2022-11-21 RX ADMIN — OXYCODONE HYDROCHLORIDE 5 MILLIGRAM(S): 5 TABLET ORAL at 13:53

## 2022-11-21 RX ADMIN — Medication 100 MILLIGRAM(S): at 06:27

## 2022-11-21 RX ADMIN — Medication 100 MILLIGRAM(S): at 06:02

## 2022-11-21 RX ADMIN — SODIUM CHLORIDE 2 GRAM(S): 9 INJECTION INTRAMUSCULAR; INTRAVENOUS; SUBCUTANEOUS at 21:19

## 2022-11-21 RX ADMIN — OXYCODONE HYDROCHLORIDE 5 MILLIGRAM(S): 5 TABLET ORAL at 23:20

## 2022-11-21 RX ADMIN — Medication 3 MILLILITER(S): at 09:29

## 2022-11-21 RX ADMIN — OXYCODONE HYDROCHLORIDE 5 MILLIGRAM(S): 5 TABLET ORAL at 03:03

## 2022-11-21 RX ADMIN — Medication 3 MILLILITER(S): at 20:14

## 2022-11-21 RX ADMIN — Medication 145 MILLIGRAM(S): at 11:36

## 2022-11-21 RX ADMIN — Medication 650 MILLIGRAM(S): at 06:26

## 2022-11-21 RX ADMIN — OXYCODONE HYDROCHLORIDE 5 MILLIGRAM(S): 5 TABLET ORAL at 14:50

## 2022-11-21 RX ADMIN — Medication 1 SPRAY(S): at 18:14

## 2022-11-21 RX ADMIN — OXYCODONE HYDROCHLORIDE 5 MILLIGRAM(S): 5 TABLET ORAL at 04:03

## 2022-11-21 RX ADMIN — OXYCODONE HYDROCHLORIDE 5 MILLIGRAM(S): 5 TABLET ORAL at 18:13

## 2022-11-21 RX ADMIN — TRAMADOL HYDROCHLORIDE 50 MILLIGRAM(S): 50 TABLET ORAL at 11:43

## 2022-11-21 RX ADMIN — Medication 100 MILLIGRAM(S): at 13:55

## 2022-11-21 RX ADMIN — HEPARIN SODIUM 5000 UNIT(S): 5000 INJECTION INTRAVENOUS; SUBCUTANEOUS at 21:18

## 2022-11-21 RX ADMIN — Medication 100 MILLIGRAM(S): at 21:13

## 2022-11-21 RX ADMIN — OXYCODONE HYDROCHLORIDE 5 MILLIGRAM(S): 5 TABLET ORAL at 19:00

## 2022-11-21 RX ADMIN — GABAPENTIN 300 MILLIGRAM(S): 400 CAPSULE ORAL at 21:17

## 2022-11-21 RX ADMIN — Medication 3 MILLILITER(S): at 04:03

## 2022-11-21 RX ADMIN — TRAMADOL HYDROCHLORIDE 50 MILLIGRAM(S): 50 TABLET ORAL at 12:40

## 2022-11-21 RX ADMIN — HEPARIN SODIUM 5000 UNIT(S): 5000 INJECTION INTRAVENOUS; SUBCUTANEOUS at 06:27

## 2022-11-21 RX ADMIN — SODIUM CHLORIDE 2 GRAM(S): 9 INJECTION INTRAMUSCULAR; INTRAVENOUS; SUBCUTANEOUS at 06:28

## 2022-11-21 RX ADMIN — TRAMADOL HYDROCHLORIDE 50 MILLIGRAM(S): 50 TABLET ORAL at 22:12

## 2022-11-21 RX ADMIN — GABAPENTIN 300 MILLIGRAM(S): 400 CAPSULE ORAL at 06:03

## 2022-11-21 RX ADMIN — Medication 325 MILLIGRAM(S): at 11:35

## 2022-11-21 RX ADMIN — OXYCODONE HYDROCHLORIDE 5 MILLIGRAM(S): 5 TABLET ORAL at 10:40

## 2022-11-21 RX ADMIN — Medication 3 MILLILITER(S): at 15:08

## 2022-11-21 RX ADMIN — Medication 1 SPRAY(S): at 06:26

## 2022-11-21 RX ADMIN — HEPARIN SODIUM 5000 UNIT(S): 5000 INJECTION INTRAVENOUS; SUBCUTANEOUS at 13:54

## 2022-11-21 RX ADMIN — OXYCODONE HYDROCHLORIDE 5 MILLIGRAM(S): 5 TABLET ORAL at 09:42

## 2022-11-21 RX ADMIN — SODIUM CHLORIDE 2 GRAM(S): 9 INJECTION INTRAMUSCULAR; INTRAVENOUS; SUBCUTANEOUS at 13:54

## 2022-11-21 RX ADMIN — TRAMADOL HYDROCHLORIDE 50 MILLIGRAM(S): 50 TABLET ORAL at 21:12

## 2022-11-21 RX ADMIN — ROPINIROLE 2 MILLIGRAM(S): 8 TABLET, FILM COATED, EXTENDED RELEASE ORAL at 21:14

## 2022-11-21 RX ADMIN — DULOXETINE HYDROCHLORIDE 60 MILLIGRAM(S): 30 CAPSULE, DELAYED RELEASE ORAL at 11:36

## 2022-11-21 RX ADMIN — Medication 100 MILLIGRAM(S): at 13:54

## 2022-11-21 RX ADMIN — TRAMADOL HYDROCHLORIDE 50 MILLIGRAM(S): 50 TABLET ORAL at 15:53

## 2022-11-21 RX ADMIN — Medication 125 MICROGRAM(S): at 06:03

## 2022-11-21 RX ADMIN — Medication 12.5 MILLIGRAM(S): at 18:13

## 2022-11-21 NOTE — PROGRESS NOTE ADULT - SUBJECTIVE AND OBJECTIVE BOX
Patient is a 77y old  Female who presents with a chief complaint of Fall / UTI (20 Nov 2022 17:59)      SUBJECTIVE / OVERNIGHT EVENTS: No acute events overnight. Pt has no new complaints     ADDITIONAL REVIEW OF SYSTEMS:    MEDICATIONS  (STANDING):  albuterol/ipratropium for Nebulization 3 milliLiter(s) Nebulizer every 6 hours  benzonatate 100 milliGRAM(s) Oral three times a day  bisacodyl 5 milliGRAM(s) Oral at bedtime  ceFAZolin   IVPB 2000 milliGRAM(s) IV Intermittent every 8 hours  DULoxetine 60 milliGRAM(s) Oral daily  fenofibrate Tablet 145 milliGRAM(s) Oral daily  ferrous    sulfate 325 milliGRAM(s) Oral daily  gabapentin 300 milliGRAM(s) Oral every 8 hours  heparin   Injectable 5000 Unit(s) SubCutaneous every 8 hours  levothyroxine 125 MICROGram(s) Oral daily  lidocaine   4% Patch 1 Patch Transdermal every 24 hours  meclizine 12.5 milliGRAM(s) Oral two times a day  melatonin 3 milliGRAM(s) Oral at bedtime  multivitamin 1 Tablet(s) Oral daily  nystatin Cream 1 Application(s) Topical two times a day  pantoprazole    Tablet 40 milliGRAM(s) Oral before breakfast  polyethylene glycol 3350 17 Gram(s) Oral two times a day  QUEtiapine 25 milliGRAM(s) Oral at bedtime  rOPINIRole 2 milliGRAM(s) Oral daily  senna 2 Tablet(s) Oral at bedtime  sodium chloride 2 Gram(s) Oral three times a day  sodium chloride 0.65% Nasal 1 Spray(s) Both Nostrils two times a day    MEDICATIONS  (PRN):  acetaminophen     Tablet .. 650 milliGRAM(s) Oral every 6 hours PRN Mild Pain (1 - 3), Moderate Pain (4 - 6)  ALBUTerol    90 MICROgram(s) HFA Inhaler 2 Puff(s) Inhalation every 6 hours PRN Wheezing  fluticasone propionate 50 MICROgram(s)/spray Nasal Spray 1 Spray(s) Both Nostrils every 12 hours PRN nasal congestion  guaiFENesin Oral Liquid (Sugar-Free) 100 milliGRAM(s) Oral every 6 hours PRN Cough  oxyCODONE    IR 5 milliGRAM(s) Oral every 4 hours PRN Severe Pain (7 - 10)  traMADol 50 milliGRAM(s) Oral every 4 hours PRN Moderate Pain (4 - 6)      CAPILLARY BLOOD GLUCOSE        I&O's Summary      PHYSICAL EXAM:  Vital Signs Last 24 Hrs  T(C): 36.7 (21 Nov 2022 11:40), Max: 36.7 (21 Nov 2022 11:40)  T(F): 98.1 (21 Nov 2022 11:40), Max: 98.1 (21 Nov 2022 11:40)  HR: 80 (21 Nov 2022 11:40) (72 - 83)  BP: 134/72 (21 Nov 2022 11:40) (134/72 - 135/68)  BP(mean): --  RR: 16 (21 Nov 2022 11:40) (16 - 18)  SpO2: 100% (21 Nov 2022 11:40) (98% - 100%)    Parameters below as of 21 Nov 2022 11:40  Patient On (Oxygen Delivery Method): room air      General: NAD  HEENT: NC/AT; PERRL, anicteric sclera; MMM  Neck: supple  Cardiovascular: +S1/S2; RRR  Respiratory: CTA B/L; no W/R/R  Gastrointestinal: soft, NT/ND; +BSx4  Extremities: WWP; no edema, clubbing or cyanosis  Vascular: 2+ radial, DP/PT pulses B/L  Neurological: AAOx3; no focal deficits but diffusely weak in all extremities.         LABS:                        9.8    6.56  )-----------( 317      ( 21 Nov 2022 05:46 )             33.3     11-21    134<L>  |  100  |  6<L>  ----------------------------<  96  4.1   |  24  |  0.58    Ca    9.8      21 Nov 2022 05:46  Phos  3.3     11-21  Mg     2.00     11-21    TPro  7.0  /  Alb  3.0<L>  /  TBili  0.2  /  DBili  x   /  AST  17  /  ALT  <5<L>  /  AlkPhos  84  11-21      CARDIAC MARKERS ( 20 Nov 2022 07:33 )  x     / x     / 26 U/L / x     / 1.3 ng/mL  CARDIAC MARKERS ( 20 Nov 2022 01:34 )  x     / x     / 15 U/L / x     / 1.3 ng/mL            RADIOLOGY & ADDITIONAL TESTS:  Results Reviewed:   Imaging Personally Reviewed:  Electrocardiogram Personally Reviewed:    COORDINATION OF CARE:  Care Discussed with Consultants/Other Providers [Y/N]:  Prior or Outpatient Records Reviewed [Y/N]:

## 2022-11-22 ENCOUNTER — APPOINTMENT (OUTPATIENT)
Dept: RADIOLOGY | Facility: HOSPITAL | Age: 77
End: 2022-11-22

## 2022-11-22 LAB
ALBUMIN SERPL ELPH-MCNC: 2.9 G/DL — LOW (ref 3.3–5)
ALP SERPL-CCNC: 82 U/L — SIGNIFICANT CHANGE UP (ref 40–120)
ALT FLD-CCNC: <5 U/L — LOW (ref 4–33)
ANION GAP SERPL CALC-SCNC: 9 MMOL/L — SIGNIFICANT CHANGE UP (ref 7–14)
AST SERPL-CCNC: 13 U/L — SIGNIFICANT CHANGE UP (ref 4–32)
B PERT DNA SPEC QL NAA+PROBE: SIGNIFICANT CHANGE UP
B PERT+PARAPERT DNA PNL SPEC NAA+PROBE: SIGNIFICANT CHANGE UP
BASOPHILS # BLD AUTO: 0.01 K/UL — SIGNIFICANT CHANGE UP (ref 0–0.2)
BASOPHILS NFR BLD AUTO: 0.2 % — SIGNIFICANT CHANGE UP (ref 0–2)
BILIRUB SERPL-MCNC: 0.2 MG/DL — SIGNIFICANT CHANGE UP (ref 0.2–1.2)
BORDETELLA PARAPERTUSSIS (RAPRVP): SIGNIFICANT CHANGE UP
BUN SERPL-MCNC: 7 MG/DL — SIGNIFICANT CHANGE UP (ref 7–23)
C PNEUM DNA SPEC QL NAA+PROBE: SIGNIFICANT CHANGE UP
CALCIUM SERPL-MCNC: 9.4 MG/DL — SIGNIFICANT CHANGE UP (ref 8.4–10.5)
CHLORIDE SERPL-SCNC: 96 MMOL/L — LOW (ref 98–107)
CO2 SERPL-SCNC: 26 MMOL/L — SIGNIFICANT CHANGE UP (ref 22–31)
CREAT SERPL-MCNC: 0.51 MG/DL — SIGNIFICANT CHANGE UP (ref 0.5–1.3)
EGFR: 96 ML/MIN/1.73M2 — SIGNIFICANT CHANGE UP
EOSINOPHIL # BLD AUTO: 0.01 K/UL — SIGNIFICANT CHANGE UP (ref 0–0.5)
EOSINOPHIL NFR BLD AUTO: 0.2 % — SIGNIFICANT CHANGE UP (ref 0–6)
FLUAV SUBTYP SPEC NAA+PROBE: SIGNIFICANT CHANGE UP
FLUBV RNA SPEC QL NAA+PROBE: SIGNIFICANT CHANGE UP
GLUCOSE SERPL-MCNC: 101 MG/DL — HIGH (ref 70–99)
HADV DNA SPEC QL NAA+PROBE: SIGNIFICANT CHANGE UP
HCOV 229E RNA SPEC QL NAA+PROBE: SIGNIFICANT CHANGE UP
HCOV HKU1 RNA SPEC QL NAA+PROBE: SIGNIFICANT CHANGE UP
HCOV NL63 RNA SPEC QL NAA+PROBE: SIGNIFICANT CHANGE UP
HCOV OC43 RNA SPEC QL NAA+PROBE: SIGNIFICANT CHANGE UP
HCT VFR BLD CALC: 30.7 % — LOW (ref 34.5–45)
HGB BLD-MCNC: 9.1 G/DL — LOW (ref 11.5–15.5)
HMPV RNA SPEC QL NAA+PROBE: SIGNIFICANT CHANGE UP
HPIV1 RNA SPEC QL NAA+PROBE: SIGNIFICANT CHANGE UP
HPIV2 RNA SPEC QL NAA+PROBE: SIGNIFICANT CHANGE UP
HPIV3 RNA SPEC QL NAA+PROBE: SIGNIFICANT CHANGE UP
HPIV4 RNA SPEC QL NAA+PROBE: SIGNIFICANT CHANGE UP
IANC: 3.74 K/UL — SIGNIFICANT CHANGE UP (ref 1.8–7.4)
IMM GRANULOCYTES NFR BLD AUTO: 0.5 % — SIGNIFICANT CHANGE UP (ref 0–0.9)
LYMPHOCYTES # BLD AUTO: 2.25 K/UL — SIGNIFICANT CHANGE UP (ref 1–3.3)
LYMPHOCYTES # BLD AUTO: 34.7 % — SIGNIFICANT CHANGE UP (ref 13–44)
M PNEUMO DNA SPEC QL NAA+PROBE: SIGNIFICANT CHANGE UP
MAGNESIUM SERPL-MCNC: 2 MG/DL — SIGNIFICANT CHANGE UP (ref 1.6–2.6)
MCHC RBC-ENTMCNC: 25 PG — LOW (ref 27–34)
MCHC RBC-ENTMCNC: 29.6 GM/DL — LOW (ref 32–36)
MCV RBC AUTO: 84.3 FL — SIGNIFICANT CHANGE UP (ref 80–100)
MONOCYTES # BLD AUTO: 0.45 K/UL — SIGNIFICANT CHANGE UP (ref 0–0.9)
MONOCYTES NFR BLD AUTO: 6.9 % — SIGNIFICANT CHANGE UP (ref 2–14)
NEUTROPHILS # BLD AUTO: 3.74 K/UL — SIGNIFICANT CHANGE UP (ref 1.8–7.4)
NEUTROPHILS NFR BLD AUTO: 57.5 % — SIGNIFICANT CHANGE UP (ref 43–77)
NRBC # BLD: 0 /100 WBCS — SIGNIFICANT CHANGE UP (ref 0–0)
NRBC # FLD: 0 K/UL — SIGNIFICANT CHANGE UP (ref 0–0)
PHOSPHATE SERPL-MCNC: 3.4 MG/DL — SIGNIFICANT CHANGE UP (ref 2.5–4.5)
PLATELET # BLD AUTO: 353 K/UL — SIGNIFICANT CHANGE UP (ref 150–400)
POTASSIUM SERPL-MCNC: 3.9 MMOL/L — SIGNIFICANT CHANGE UP (ref 3.5–5.3)
POTASSIUM SERPL-SCNC: 3.9 MMOL/L — SIGNIFICANT CHANGE UP (ref 3.5–5.3)
PROT SERPL-MCNC: 6.5 G/DL — SIGNIFICANT CHANGE UP (ref 6–8.3)
RAPID RVP RESULT: SIGNIFICANT CHANGE UP
RBC # BLD: 3.64 M/UL — LOW (ref 3.8–5.2)
RBC # FLD: 20.8 % — HIGH (ref 10.3–14.5)
RSV RNA SPEC QL NAA+PROBE: SIGNIFICANT CHANGE UP
RV+EV RNA SPEC QL NAA+PROBE: SIGNIFICANT CHANGE UP
SARS-COV-2 RNA SPEC QL NAA+PROBE: SIGNIFICANT CHANGE UP
SODIUM SERPL-SCNC: 131 MMOL/L — LOW (ref 135–145)
WBC # BLD: 6.49 K/UL — SIGNIFICANT CHANGE UP (ref 3.8–10.5)
WBC # FLD AUTO: 6.49 K/UL — SIGNIFICANT CHANGE UP (ref 3.8–10.5)

## 2022-11-22 PROCEDURE — 99233 SBSQ HOSP IP/OBS HIGH 50: CPT

## 2022-11-22 PROCEDURE — 71045 X-RAY EXAM CHEST 1 VIEW: CPT | Mod: 26

## 2022-11-22 PROCEDURE — 99232 SBSQ HOSP IP/OBS MODERATE 35: CPT

## 2022-11-22 RX ORDER — OXYCODONE HYDROCHLORIDE 5 MG/1
10 TABLET ORAL EVERY 4 HOURS
Refills: 0 | Status: DISCONTINUED | OUTPATIENT
Start: 2022-11-22 | End: 2022-11-23

## 2022-11-22 RX ADMIN — Medication 3 MILLILITER(S): at 16:20

## 2022-11-22 RX ADMIN — GABAPENTIN 300 MILLIGRAM(S): 400 CAPSULE ORAL at 13:01

## 2022-11-22 RX ADMIN — Medication 3 MILLILITER(S): at 10:29

## 2022-11-22 RX ADMIN — PANTOPRAZOLE SODIUM 40 MILLIGRAM(S): 20 TABLET, DELAYED RELEASE ORAL at 05:40

## 2022-11-22 RX ADMIN — TRAMADOL HYDROCHLORIDE 50 MILLIGRAM(S): 50 TABLET ORAL at 10:51

## 2022-11-22 RX ADMIN — GABAPENTIN 300 MILLIGRAM(S): 400 CAPSULE ORAL at 21:58

## 2022-11-22 RX ADMIN — Medication 100 MILLIGRAM(S): at 21:29

## 2022-11-22 RX ADMIN — OXYCODONE HYDROCHLORIDE 5 MILLIGRAM(S): 5 TABLET ORAL at 09:16

## 2022-11-22 RX ADMIN — Medication 100 MILLIGRAM(S): at 13:07

## 2022-11-22 RX ADMIN — Medication 3 MILLIGRAM(S): at 22:06

## 2022-11-22 RX ADMIN — Medication 1 SPRAY(S): at 10:52

## 2022-11-22 RX ADMIN — NYSTATIN CREAM 1 APPLICATION(S): 100000 CREAM TOPICAL at 05:39

## 2022-11-22 RX ADMIN — SODIUM CHLORIDE 2 GRAM(S): 9 INJECTION INTRAMUSCULAR; INTRAVENOUS; SUBCUTANEOUS at 13:02

## 2022-11-22 RX ADMIN — HEPARIN SODIUM 5000 UNIT(S): 5000 INJECTION INTRAVENOUS; SUBCUTANEOUS at 22:06

## 2022-11-22 RX ADMIN — HEPARIN SODIUM 5000 UNIT(S): 5000 INJECTION INTRAVENOUS; SUBCUTANEOUS at 05:42

## 2022-11-22 RX ADMIN — DULOXETINE HYDROCHLORIDE 60 MILLIGRAM(S): 30 CAPSULE, DELAYED RELEASE ORAL at 10:52

## 2022-11-22 RX ADMIN — OXYCODONE HYDROCHLORIDE 5 MILLIGRAM(S): 5 TABLET ORAL at 04:02

## 2022-11-22 RX ADMIN — QUETIAPINE FUMARATE 25 MILLIGRAM(S): 200 TABLET, FILM COATED ORAL at 21:58

## 2022-11-22 RX ADMIN — SODIUM CHLORIDE 2 GRAM(S): 9 INJECTION INTRAMUSCULAR; INTRAVENOUS; SUBCUTANEOUS at 22:08

## 2022-11-22 RX ADMIN — OXYCODONE HYDROCHLORIDE 10 MILLIGRAM(S): 5 TABLET ORAL at 21:58

## 2022-11-22 RX ADMIN — Medication 3 MILLILITER(S): at 03:37

## 2022-11-22 RX ADMIN — Medication 100 MILLIGRAM(S): at 05:13

## 2022-11-22 RX ADMIN — TRAMADOL HYDROCHLORIDE 50 MILLIGRAM(S): 50 TABLET ORAL at 06:18

## 2022-11-22 RX ADMIN — Medication 100 MILLIGRAM(S): at 13:01

## 2022-11-22 RX ADMIN — Medication 325 MILLIGRAM(S): at 10:55

## 2022-11-22 RX ADMIN — TRAMADOL HYDROCHLORIDE 50 MILLIGRAM(S): 50 TABLET ORAL at 11:50

## 2022-11-22 RX ADMIN — Medication 125 MICROGRAM(S): at 05:39

## 2022-11-22 RX ADMIN — OXYCODONE HYDROCHLORIDE 10 MILLIGRAM(S): 5 TABLET ORAL at 13:00

## 2022-11-22 RX ADMIN — Medication 3 MILLILITER(S): at 21:18

## 2022-11-22 RX ADMIN — Medication 100 MILLIGRAM(S): at 21:59

## 2022-11-22 RX ADMIN — OXYCODONE HYDROCHLORIDE 5 MILLIGRAM(S): 5 TABLET ORAL at 03:27

## 2022-11-22 RX ADMIN — Medication 145 MILLIGRAM(S): at 10:54

## 2022-11-22 RX ADMIN — OXYCODONE HYDROCHLORIDE 5 MILLIGRAM(S): 5 TABLET ORAL at 08:20

## 2022-11-22 RX ADMIN — ROPINIROLE 2 MILLIGRAM(S): 8 TABLET, FILM COATED, EXTENDED RELEASE ORAL at 22:07

## 2022-11-22 RX ADMIN — SODIUM CHLORIDE 2 GRAM(S): 9 INJECTION INTRAMUSCULAR; INTRAVENOUS; SUBCUTANEOUS at 05:42

## 2022-11-22 RX ADMIN — HEPARIN SODIUM 5000 UNIT(S): 5000 INJECTION INTRAVENOUS; SUBCUTANEOUS at 13:03

## 2022-11-22 RX ADMIN — Medication 1 SPRAY(S): at 17:42

## 2022-11-22 RX ADMIN — TRAMADOL HYDROCHLORIDE 50 MILLIGRAM(S): 50 TABLET ORAL at 07:18

## 2022-11-22 RX ADMIN — Medication 12.5 MILLIGRAM(S): at 17:42

## 2022-11-22 RX ADMIN — Medication 100 MILLIGRAM(S): at 05:41

## 2022-11-22 RX ADMIN — OXYCODONE HYDROCHLORIDE 10 MILLIGRAM(S): 5 TABLET ORAL at 17:41

## 2022-11-22 RX ADMIN — NYSTATIN CREAM 1 APPLICATION(S): 100000 CREAM TOPICAL at 17:41

## 2022-11-22 RX ADMIN — OXYCODONE HYDROCHLORIDE 10 MILLIGRAM(S): 5 TABLET ORAL at 22:58

## 2022-11-22 RX ADMIN — Medication 1 SPRAY(S): at 05:40

## 2022-11-22 RX ADMIN — Medication 12.5 MILLIGRAM(S): at 05:39

## 2022-11-22 RX ADMIN — GABAPENTIN 300 MILLIGRAM(S): 400 CAPSULE ORAL at 05:40

## 2022-11-22 RX ADMIN — OXYCODONE HYDROCHLORIDE 10 MILLIGRAM(S): 5 TABLET ORAL at 14:00

## 2022-11-22 NOTE — PROGRESS NOTE ADULT - SUBJECTIVE AND OBJECTIVE BOX
Patient is a 77y old  Female who presents with a chief complaint of Fall / UTI (21 Nov 2022 16:05)      SUBJECTIVE / OVERNIGHT EVENTS: No acute events overnight. Pt having persistent back pain, pain somewhat improved with oxy 5mg, requesting higher dose for better pain control     ADDITIONAL REVIEW OF SYSTEMS:    MEDICATIONS  (STANDING):  albuterol/ipratropium for Nebulization 3 milliLiter(s) Nebulizer every 6 hours  benzonatate 100 milliGRAM(s) Oral three times a day  bisacodyl 5 milliGRAM(s) Oral at bedtime  ceFAZolin   IVPB 2000 milliGRAM(s) IV Intermittent every 8 hours  DULoxetine 60 milliGRAM(s) Oral daily  fenofibrate Tablet 145 milliGRAM(s) Oral daily  ferrous    sulfate 325 milliGRAM(s) Oral daily  gabapentin 300 milliGRAM(s) Oral every 8 hours  heparin   Injectable 5000 Unit(s) SubCutaneous every 8 hours  levothyroxine 125 MICROGram(s) Oral daily  lidocaine   4% Patch 1 Patch Transdermal every 24 hours  meclizine 12.5 milliGRAM(s) Oral two times a day  melatonin 3 milliGRAM(s) Oral at bedtime  multivitamin 1 Tablet(s) Oral daily  nystatin Cream 1 Application(s) Topical two times a day  pantoprazole    Tablet 40 milliGRAM(s) Oral before breakfast  polyethylene glycol 3350 17 Gram(s) Oral two times a day  QUEtiapine 25 milliGRAM(s) Oral at bedtime  rOPINIRole 2 milliGRAM(s) Oral daily  senna 2 Tablet(s) Oral at bedtime  sodium chloride 2 Gram(s) Oral three times a day  sodium chloride 0.65% Nasal 1 Spray(s) Both Nostrils two times a day    MEDICATIONS  (PRN):  acetaminophen     Tablet .. 650 milliGRAM(s) Oral every 6 hours PRN Mild Pain (1 - 3), Moderate Pain (4 - 6)  ALBUTerol    90 MICROgram(s) HFA Inhaler 2 Puff(s) Inhalation every 6 hours PRN Wheezing  fluticasone propionate 50 MICROgram(s)/spray Nasal Spray 1 Spray(s) Both Nostrils every 12 hours PRN nasal congestion  guaiFENesin Oral Liquid (Sugar-Free) 100 milliGRAM(s) Oral every 6 hours PRN Cough  oxyCODONE    IR 10 milliGRAM(s) Oral every 4 hours PRN Severe Pain (7 - 10)  traMADol 50 milliGRAM(s) Oral every 4 hours PRN Moderate Pain (4 - 6)      CAPILLARY BLOOD GLUCOSE        I&O's Summary      PHYSICAL EXAM:  Vital Signs Last 24 Hrs  T(C): 36.4 (22 Nov 2022 12:44), Max: 36.7 (21 Nov 2022 20:22)  T(F): 97.6 (22 Nov 2022 12:44), Max: 98.1 (22 Nov 2022 05:37)  HR: 81 (22 Nov 2022 12:44) (80 - 81)  BP: 127/60 (22 Nov 2022 12:44) (119/70 - 143/74)  BP(mean): --  RR: 18 (22 Nov 2022 12:44) (18 - 18)  SpO2: 99% (22 Nov 2022 12:44) (99% - 100%)    Parameters below as of 22 Nov 2022 12:44  Patient On (Oxygen Delivery Method): room air      General: NAD  HEENT: NC/AT; PERRL, anicteric sclera; MMM  Neck: supple  Cardiovascular: +S1/S2; RRR  Respiratory: CTA B/L; no W/R/R  Gastrointestinal: soft, NT/ND; +BSx4  Extremities: WWP; no edema, clubbing or cyanosis  Vascular: 2+ radial, DP/PT pulses B/L  Neurological: AAOx3; no focal deficits but diffusely weak in all extremities.     LABS:                        9.1    6.49  )-----------( 353      ( 22 Nov 2022 06:40 )             30.7     11-22    131<L>  |  96<L>  |  7   ----------------------------<  101<H>  3.9   |  26  |  0.51    Ca    9.4      22 Nov 2022 06:40  Phos  3.4     11-22  Mg     2.00     11-22    TPro  6.5  /  Alb  2.9<L>  /  TBili  0.2  /  DBili  x   /  AST  13  /  ALT  <5<L>  /  AlkPhos  82  11-22                RADIOLOGY & ADDITIONAL TESTS:  Results Reviewed:   Imaging Personally Reviewed:  Electrocardiogram Personally Reviewed:    COORDINATION OF CARE:  Care Discussed with Consultants/Other Providers [Y/N]:  Prior or Outpatient Records Reviewed [Y/N]:

## 2022-11-22 NOTE — PROGRESS NOTE ADULT - SUBJECTIVE AND OBJECTIVE BOX
CC: Patient is a 77y old  Female who presents with a chief complaint of Fall / UTI (07 Nov 2022 14:43)    ID following for MSSA bacteremia    Interval History/ROS:    c/o back pain with movement   sister at bedside     no fever        Allergies  erythromycin (Hives)  IV Contrast (Anaphylaxis)    ANTIMICROBIALS:  ceFAZolin   IVPB 2000 every 8 hours    MEDICATIONS  (STANDING):  acetaminophen     Tablet .. 650 every 6 hours PRN  ALBUTerol    90 MICROgram(s) HFA Inhaler 2 every 6 hours PRN  albuterol/ipratropium for Nebulization 3 every 6 hours  benzonatate 100 three times a day  bisacodyl 5 at bedtime  DULoxetine 60 daily  fenofibrate Tablet 145 daily  gabapentin 300 every 8 hours  guaiFENesin Oral Liquid (Sugar-Free) 100 every 6 hours PRN  heparin   Injectable 5000 every 8 hours  levothyroxine 125 daily  meclizine 12.5 two times a day  melatonin 3 at bedtime  oxyCODONE    IR 10 every 4 hours PRN  pantoprazole    Tablet 40 before breakfast  polyethylene glycol 3350 17 two times a day  QUEtiapine 25 at bedtime  rOPINIRole 2 daily  traMADol 50 every 4 hours PRN    Vital Signs Last 24 Hrs  T(F): 97.6 (11-22-22 @ 12:44), Max: 98.1 (11-22-22 @ 05:37)  HR: 81 (11-22-22 @ 12:44)  BP: 127/60 (11-22-22 @ 12:44)  RR: 18 (11-22-22 @ 12:44)  SpO2: 99% (11-22-22 @ 12:44) (99% - 100%)    Gen: alert, non toxic  CV: S1+S2   Resp:  no resp distress on RA  Abd: Soft, nontender, +BS  : No Block  IV/Skin: left arm       LABS:                               9.1    6.49  )-----------( 353      ( 22 Nov 2022 06:40 )             30.7 11-22    131  |  96  |  7   ----------------------------<  101  3.9   |  26  |  0.51  Ca    9.4      22 Nov 2022 06:40Phos  3.4     11-22Mg     2.00     11-22  TPro  6.5  /  Alb  2.9  /  TBili  0.2  /  DBili  x   /  AST  13  /  ALT  <5  /  AlkPhos  82  11-22          MICROBIOLOGY:    .Blood Blood-Venous  10-29-22   No Growth Final  --  --      .Blood Blood-Peripheral  10-29-22   No Growth Final  --  --      .Blood Blood-Peripheral  10-27-22   No Growth Final  --  --      .Blood Blood-Peripheral  10-27-22   Growth in anaerobic bottle: Staphylococcus aureus  See previous culture [id number]  --    Growth in anaerobic bottle: Gram Positive Cocci in Clusters      .Blood Blood  10-25-22   Growth in aerobic and anaerobic bottles: Staphylococcus aureus  ***Blood Panel PCR results on this specimen are available  approximately 3 hours after the Gram stain result.***  Gram stain, PCR, and/or culture results may not always  correspond dueto difference in methodologies.  ************************************************************  This PCR assay was performed by multiplex PCR. This  Assay tests for 66 bacterial and resistance gene targets.  Please refer to the [facility] test directory  at [email or web address] for details.  --  Blood Culture PCR  Staphylococcus aureus      Clean Catch Clean Catch (Midstream)  10-25-22   Culture grew 3 or more types of organisms which indicate  collection contamination; consider recollection only if clinically  indicated.  --  --        eho< from: SHAUN w/o TTE (w/3D Echo) (11.07.22 @ 07:45) >  ------------------------------------------------------------------------  CONCLUSIONS:  1. Mitral annular calcification, otherwise normal mitral  valve.  No vegetations seen in association with the mitral  valve. Mild mitral regurgitation.  2. Calcified trileaflet aortic valve with normal opening.  No vegetations seen in association with the aortic valve.  Minimal aortic regurgitation.  3. Normal aortic root.  Mild non-mobile atherosclerotic  plaque in the aortic arch and descending thoracic aorta.  4. Normal left atrium.  No left atrial or left atrial  appendage thrombus.  5. Normal left ventricular systolic function. No segmental  wall motion abnormalities.  6. Normal right ventricular size and function.  7. Contrast injection demonstrates no evidence of a patent  foramen ovale.  No valvular vegetations were identified.  ------------------------------------------------------------------------  Confirmed on  11/7/2022 - 14:47:20 by Danish Orozco M.D.,  [facility], [place]  ------------------------------------------------------------------------    < end of copied text >    ra< from: Xray Chest 2 Views PA/Lat (11.13.22 @ 18:23) >    ACC: [id number] EXAM:  XR CHEST PA LAT 2V                          PROCEDURE DATE:  11/13/2022          INTERPRETATION:  Chest 2 views    HISTORY: Cough and bacteremia    COMPARISON: 10/25/2022    Frontal and lateral views of the chest were obtained with suboptimal   inspiration. With allowance for this, the heart is similar in size and   the lungs show questionable retrocardiac infiltrate in the lateral view.   There is no evidence of pneumothorax nor pleural effusion.    IMPRESSION: Questionable posterior infiltrate. Follow-up study is   recommended as clinically warranted.      Thank you for this referral.    --- End of Report ---    < end of copied text >

## 2022-11-23 LAB
ALBUMIN SERPL ELPH-MCNC: 2.5 G/DL — LOW (ref 3.3–5)
ALP SERPL-CCNC: 73 U/L — SIGNIFICANT CHANGE UP (ref 40–120)
ALT FLD-CCNC: <5 U/L — LOW (ref 4–33)
ANION GAP SERPL CALC-SCNC: 8 MMOL/L — SIGNIFICANT CHANGE UP (ref 7–14)
AST SERPL-CCNC: 12 U/L — SIGNIFICANT CHANGE UP (ref 4–32)
BASOPHILS # BLD AUTO: 0.01 K/UL — SIGNIFICANT CHANGE UP (ref 0–0.2)
BASOPHILS NFR BLD AUTO: 0.2 % — SIGNIFICANT CHANGE UP (ref 0–2)
BILIRUB SERPL-MCNC: 0.2 MG/DL — SIGNIFICANT CHANGE UP (ref 0.2–1.2)
BUN SERPL-MCNC: 8 MG/DL — SIGNIFICANT CHANGE UP (ref 7–23)
CALCIUM SERPL-MCNC: 9.3 MG/DL — SIGNIFICANT CHANGE UP (ref 8.4–10.5)
CHLORIDE SERPL-SCNC: 96 MMOL/L — LOW (ref 98–107)
CO2 SERPL-SCNC: 26 MMOL/L — SIGNIFICANT CHANGE UP (ref 22–31)
CREAT SERPL-MCNC: 0.54 MG/DL — SIGNIFICANT CHANGE UP (ref 0.5–1.3)
EGFR: 95 ML/MIN/1.73M2 — SIGNIFICANT CHANGE UP
EOSINOPHIL # BLD AUTO: 0 K/UL — SIGNIFICANT CHANGE UP (ref 0–0.5)
EOSINOPHIL NFR BLD AUTO: 0 % — SIGNIFICANT CHANGE UP (ref 0–6)
GLUCOSE SERPL-MCNC: 92 MG/DL — SIGNIFICANT CHANGE UP (ref 70–99)
HCT VFR BLD CALC: 29 % — LOW (ref 34.5–45)
HGB BLD-MCNC: 8.6 G/DL — LOW (ref 11.5–15.5)
IANC: 3.68 K/UL — SIGNIFICANT CHANGE UP (ref 1.8–7.4)
IMM GRANULOCYTES NFR BLD AUTO: 0.6 % — SIGNIFICANT CHANGE UP (ref 0–0.9)
LYMPHOCYTES # BLD AUTO: 2.23 K/UL — SIGNIFICANT CHANGE UP (ref 1–3.3)
LYMPHOCYTES # BLD AUTO: 34.6 % — SIGNIFICANT CHANGE UP (ref 13–44)
MAGNESIUM SERPL-MCNC: 1.7 MG/DL — SIGNIFICANT CHANGE UP (ref 1.6–2.6)
MCHC RBC-ENTMCNC: 24.9 PG — LOW (ref 27–34)
MCHC RBC-ENTMCNC: 29.7 GM/DL — LOW (ref 32–36)
MCV RBC AUTO: 84.1 FL — SIGNIFICANT CHANGE UP (ref 80–100)
MONOCYTES # BLD AUTO: 0.48 K/UL — SIGNIFICANT CHANGE UP (ref 0–0.9)
MONOCYTES NFR BLD AUTO: 7.5 % — SIGNIFICANT CHANGE UP (ref 2–14)
NEUTROPHILS # BLD AUTO: 3.68 K/UL — SIGNIFICANT CHANGE UP (ref 1.8–7.4)
NEUTROPHILS NFR BLD AUTO: 57.1 % — SIGNIFICANT CHANGE UP (ref 43–77)
NRBC # BLD: 0 /100 WBCS — SIGNIFICANT CHANGE UP (ref 0–0)
NRBC # FLD: 0 K/UL — SIGNIFICANT CHANGE UP (ref 0–0)
PHOSPHATE SERPL-MCNC: 3.6 MG/DL — SIGNIFICANT CHANGE UP (ref 2.5–4.5)
PLATELET # BLD AUTO: 321 K/UL — SIGNIFICANT CHANGE UP (ref 150–400)
POTASSIUM SERPL-MCNC: 4 MMOL/L — SIGNIFICANT CHANGE UP (ref 3.5–5.3)
POTASSIUM SERPL-SCNC: 4 MMOL/L — SIGNIFICANT CHANGE UP (ref 3.5–5.3)
PROT SERPL-MCNC: 6 G/DL — SIGNIFICANT CHANGE UP (ref 6–8.3)
RBC # BLD: 3.45 M/UL — LOW (ref 3.8–5.2)
RBC # FLD: 20.5 % — HIGH (ref 10.3–14.5)
SODIUM SERPL-SCNC: 130 MMOL/L — LOW (ref 135–145)
WBC # BLD: 6.44 K/UL — SIGNIFICANT CHANGE UP (ref 3.8–10.5)
WBC # FLD AUTO: 6.44 K/UL — SIGNIFICANT CHANGE UP (ref 3.8–10.5)

## 2022-11-23 PROCEDURE — 99233 SBSQ HOSP IP/OBS HIGH 50: CPT

## 2022-11-23 PROCEDURE — 36573 INSJ PICC RS&I 5 YR+: CPT

## 2022-11-23 RX ORDER — OXYCODONE HYDROCHLORIDE 5 MG/1
10 TABLET ORAL EVERY 12 HOURS
Refills: 0 | Status: DISCONTINUED | OUTPATIENT
Start: 2022-11-23 | End: 2022-11-23

## 2022-11-23 RX ORDER — FUROSEMIDE 40 MG
20 TABLET ORAL DAILY
Refills: 0 | Status: DISCONTINUED | OUTPATIENT
Start: 2022-11-24 | End: 2022-11-25

## 2022-11-23 RX ORDER — FUROSEMIDE 40 MG
20 TABLET ORAL ONCE
Refills: 0 | Status: COMPLETED | OUTPATIENT
Start: 2022-11-23 | End: 2022-11-23

## 2022-11-23 RX ADMIN — Medication 100 MILLIGRAM(S): at 13:30

## 2022-11-23 RX ADMIN — Medication 125 MICROGRAM(S): at 05:46

## 2022-11-23 RX ADMIN — OXYCODONE HYDROCHLORIDE 10 MILLIGRAM(S): 5 TABLET ORAL at 07:10

## 2022-11-23 RX ADMIN — DULOXETINE HYDROCHLORIDE 60 MILLIGRAM(S): 30 CAPSULE, DELAYED RELEASE ORAL at 12:51

## 2022-11-23 RX ADMIN — Medication 100 MILLIGRAM(S): at 06:57

## 2022-11-23 RX ADMIN — Medication 20 MILLIGRAM(S): at 13:29

## 2022-11-23 RX ADMIN — ALBUTEROL 2 PUFF(S): 90 AEROSOL, METERED ORAL at 13:32

## 2022-11-23 RX ADMIN — Medication 3 MILLILITER(S): at 03:56

## 2022-11-23 RX ADMIN — OXYCODONE HYDROCHLORIDE 10 MILLIGRAM(S): 5 TABLET ORAL at 02:58

## 2022-11-23 RX ADMIN — Medication 1 SPRAY(S): at 18:48

## 2022-11-23 RX ADMIN — Medication 145 MILLIGRAM(S): at 12:51

## 2022-11-23 RX ADMIN — HEPARIN SODIUM 5000 UNIT(S): 5000 INJECTION INTRAVENOUS; SUBCUTANEOUS at 21:55

## 2022-11-23 RX ADMIN — GABAPENTIN 300 MILLIGRAM(S): 400 CAPSULE ORAL at 05:46

## 2022-11-23 RX ADMIN — Medication 3 MILLILITER(S): at 21:11

## 2022-11-23 RX ADMIN — ROPINIROLE 2 MILLIGRAM(S): 8 TABLET, FILM COATED, EXTENDED RELEASE ORAL at 21:50

## 2022-11-23 RX ADMIN — Medication 12.5 MILLIGRAM(S): at 18:48

## 2022-11-23 RX ADMIN — HEPARIN SODIUM 5000 UNIT(S): 5000 INJECTION INTRAVENOUS; SUBCUTANEOUS at 13:31

## 2022-11-23 RX ADMIN — OXYCODONE HYDROCHLORIDE 10 MILLIGRAM(S): 5 TABLET ORAL at 03:58

## 2022-11-23 RX ADMIN — GABAPENTIN 300 MILLIGRAM(S): 400 CAPSULE ORAL at 21:51

## 2022-11-23 RX ADMIN — Medication 12.5 MILLIGRAM(S): at 05:47

## 2022-11-23 RX ADMIN — SODIUM CHLORIDE 2 GRAM(S): 9 INJECTION INTRAMUSCULAR; INTRAVENOUS; SUBCUTANEOUS at 13:32

## 2022-11-23 RX ADMIN — Medication 100 MILLIGRAM(S): at 05:47

## 2022-11-23 RX ADMIN — NYSTATIN CREAM 1 APPLICATION(S): 100000 CREAM TOPICAL at 18:47

## 2022-11-23 RX ADMIN — Medication 3 MILLILITER(S): at 16:01

## 2022-11-23 RX ADMIN — Medication 3 MILLIGRAM(S): at 21:50

## 2022-11-23 RX ADMIN — HEPARIN SODIUM 5000 UNIT(S): 5000 INJECTION INTRAVENOUS; SUBCUTANEOUS at 05:49

## 2022-11-23 RX ADMIN — Medication 1 SPRAY(S): at 06:12

## 2022-11-23 RX ADMIN — Medication 325 MILLIGRAM(S): at 12:51

## 2022-11-23 RX ADMIN — GABAPENTIN 300 MILLIGRAM(S): 400 CAPSULE ORAL at 13:31

## 2022-11-23 RX ADMIN — NYSTATIN CREAM 1 APPLICATION(S): 100000 CREAM TOPICAL at 05:47

## 2022-11-23 RX ADMIN — QUETIAPINE FUMARATE 25 MILLIGRAM(S): 200 TABLET, FILM COATED ORAL at 21:50

## 2022-11-23 RX ADMIN — OXYCODONE HYDROCHLORIDE 10 MILLIGRAM(S): 5 TABLET ORAL at 06:40

## 2022-11-23 NOTE — PRE PROCEDURE NOTE - PRE PROCEDURE EVALUATION
76 yo F with PMHx significant for hypothyroidism, HTN, HLD, SVT (s/p ablation), Cervical cancer (s/p hysterectomy), DVT (June 2022, still on eliquis), OA and peripheral neuropathy presenting to [facility] after having fall from bed overnight. Patient found to have UTI, MSSA bacteremia    #MSSA bacteremia.   -BCx positive for MSSA bacteremia. ID consulted. Started on cefazolin. Prior admission pt also had MSSA bacteremia. SHAUN performed and did not show any vegetations. - - -Surveillance cultures on 10/27 positive.   -F/u surveillance cultures from 10/29  -MRI AP w/ contrast, MRI T/L spine with contrast. MRI thus far shows discitis/OM in T6/T7 and phlegmon. Also abscess in the right piriformis muscle on MR A/P. Spinal consult placed. Discussed with ID.   For right piriformis abscess, IR was consulted, no intervention at this time, as collection looks small with poor windows. Follow up scan were recommended  - C/w cefazolin for now while inpatient.  - Surveillance cultures q48 hours until cleared. Thus far ngtd from 10/29 - start date for antibiotics 10/27.   SHAUN unremarkable, ID recommending cefazolin 2 mg IV q8 for 6-8 weeks in duration, w/weekly blood work. Pt. lives alone and could not administer this medication herself.      Dispo rehab 11/24

## 2022-11-23 NOTE — PROGRESS NOTE ADULT - SUBJECTIVE AND OBJECTIVE BOX
Patient is a 77y old  Female who presents with a chief complaint of Fall / UTI (22 Nov 2022 17:43)      SUBJECTIVE / OVERNIGHT EVENTS: Pt still complaining of back pain. Oxycodone was increased from 5mg to 10mg q4prn. Pt reports having some relief from 10/10 ->5/10 pain with this dose but pain returns after a few hours. I increased freq to q3h and changed oxy 10mg to percocet 2tabs. Patient's sister at bedside this morning. While discussing pain regimen, pt noted to be sleepy. I expressed concern that we will cause oversedation by increasing her pain meds further without much benefit to pain control and pt and sister in agreement with continuing on current regimen with no increase at this time. SW confirmed with rehab that pain meds can be accommodated. I informed pt and sister that transportation arranged for transfer to rehab at 10 am tomorrow     Pt also has persistent cough, no fevers or chills. RVP neg. CXR performed. Discussed with radiology- noted to have atelectasis and vascular congestion. Pt had been on Lasix at home, discontinued inpatient.    ADDITIONAL REVIEW OF SYSTEMS:    MEDICATIONS  (STANDING):  albuterol/ipratropium for Nebulization 3 milliLiter(s) Nebulizer every 6 hours  benzonatate 100 milliGRAM(s) Oral three times a day  bisacodyl 5 milliGRAM(s) Oral at bedtime  ceFAZolin   IVPB 2000 milliGRAM(s) IV Intermittent every 8 hours  DULoxetine 60 milliGRAM(s) Oral daily  fenofibrate Tablet 145 milliGRAM(s) Oral daily  ferrous    sulfate 325 milliGRAM(s) Oral daily  gabapentin 300 milliGRAM(s) Oral every 8 hours  heparin   Injectable 5000 Unit(s) SubCutaneous every 8 hours  levothyroxine 125 MICROGram(s) Oral daily  lidocaine   4% Patch 1 Patch Transdermal every 24 hours  meclizine 12.5 milliGRAM(s) Oral two times a day  melatonin 3 milliGRAM(s) Oral at bedtime  multivitamin 1 Tablet(s) Oral daily  nystatin Cream 1 Application(s) Topical two times a day  pantoprazole    Tablet 40 milliGRAM(s) Oral before breakfast  polyethylene glycol 3350 17 Gram(s) Oral two times a day  QUEtiapine 25 milliGRAM(s) Oral at bedtime  rOPINIRole 2 milliGRAM(s) Oral daily  senna 2 Tablet(s) Oral at bedtime  sodium chloride 2 Gram(s) Oral three times a day  sodium chloride 0.65% Nasal 1 Spray(s) Both Nostrils two times a day    MEDICATIONS  (PRN):  acetaminophen     Tablet .. 650 milliGRAM(s) Oral every 6 hours PRN Mild Pain (1 - 3), Moderate Pain (4 - 6)  ALBUTerol    90 MICROgram(s) HFA Inhaler 2 Puff(s) Inhalation every 6 hours PRN Wheezing  fluticasone propionate 50 MICROgram(s)/spray Nasal Spray 1 Spray(s) Both Nostrils every 12 hours PRN nasal congestion  guaiFENesin Oral Liquid (Sugar-Free) 100 milliGRAM(s) Oral every 6 hours PRN Cough  oxycodone    5 mG/acetaminophen 325 mG 2 Tablet(s) Oral every 3 hours PRN Severe Pain (7 - 10)  traMADol 50 milliGRAM(s) Oral every 4 hours PRN Moderate Pain (4 - 6)      CAPILLARY BLOOD GLUCOSE        I&O's Summary    23 Nov 2022 07:01  -  23 Nov 2022 13:55  --------------------------------------------------------  IN: 0 mL / OUT: 200 mL / NET: -200 mL        PHYSICAL EXAM:  Vital Signs Last 24 Hrs  T(C): 36.8 (23 Nov 2022 12:29), Max: 37.2 (22 Nov 2022 21:42)  T(F): 98.3 (23 Nov 2022 12:29), Max: 98.9 (22 Nov 2022 21:42)  HR: 76 (23 Nov 2022 12:29) (76 - 85)  BP: 135/87 (23 Nov 2022 12:29) (112/64 - 135/87)  BP(mean): --  RR: 18 (23 Nov 2022 12:29) (18 - 18)  SpO2: 97% (23 Nov 2022 12:29) (97% - 98%)    Parameters below as of 23 Nov 2022 12:29  Patient On (Oxygen Delivery Method): room air      General: NAD  HEENT: NC/AT; PERRL, anicteric sclera; MMM  Neck: supple  Cardiovascular: +S1/S2; RRR  Respiratory: CTA B/L; no W/R/R  Gastrointestinal: soft, NT/ND; +BSx4  Extremities: WWP; no edema, clubbing or cyanosis  Vascular: 2+ radial, DP/PT pulses B/L  Neurological: AAOx3; no focal deficits but diffusely weak in all extremities.         LABS:                        8.6    6.44  )-----------( 321      ( 23 Nov 2022 05:30 )             29.0     11-23    130<L>  |  96<L>  |  8   ----------------------------<  92  4.0   |  26  |  0.54    Ca    9.3      23 Nov 2022 05:30  Phos  3.6     11-23  Mg     1.70     11-23    TPro  6.0  /  Alb  2.5<L>  /  TBili  0.2  /  DBili  x   /  AST  12  /  ALT  <5<L>  /  AlkPhos  73  11-23                RADIOLOGY & ADDITIONAL TESTS:  Results Reviewed:   Imaging Personally Reviewed:  Electrocardiogram Personally Reviewed:    COORDINATION OF CARE:  Care Discussed with Consultants/Other Providers [Y/N]:  Prior or Outpatient Records Reviewed [Y/N]:   Patient is a 77y old  Female who presents with a chief complaint of Fall / UTI (22 Nov 2022 17:43)      SUBJECTIVE / OVERNIGHT EVENTS: Pt still complaining of back pain. Oxycodone was increased from 5mg to 10mg q4prn. Pt reports having some relief from 10/10 ->5/10 pain with this dose but pain returns after a few hours. I increased freq to q3h and changed oxy 10mg to percocet 2tabs. Patient's sister at bedside this morning. While discussing pain regimen with sister at [facility], pt noted to be sleepy. I expressed concern that we will cause oversedation by increasing her pain meds further without much benefit to pain control and pt and sister in agreement with continuing on current regimen with no increase at this time. SW confirmed with rehab that pain meds can be accommodated. I informed pt and sister that transportation arranged for transfer to rehab at 10 am tomorrow     Pt also has persistent cough, no fevers or chills. RVP neg. CXR performed. Discussed with radiology- noted to have atelectasis and vascular congestion. Pt had been on Lasix at home, discontinued inpatient. Will restart Lasix     UPDATE: Visited patient's room twice this afternoon and each time patient was sleeping. Once woken up pt became anxious/agitated complaining of severe back pain. Complaining that her pain meds weren't given but I confirmed with nursing that pain meds were given 1hr ago. Once I left the room, pt was again asleep 5 min later.     ADDITIONAL REVIEW OF SYSTEMS:    MEDICATIONS  (STANDING):  albuterol/ipratropium for Nebulization 3 milliLiter(s) Nebulizer every 6 hours  benzonatate 100 milliGRAM(s) Oral three times a day  bisacodyl 5 milliGRAM(s) Oral at bedtime  ceFAZolin   IVPB 2000 milliGRAM(s) IV Intermittent every 8 hours  DULoxetine 60 milliGRAM(s) Oral daily  fenofibrate Tablet 145 milliGRAM(s) Oral daily  ferrous    sulfate 325 milliGRAM(s) Oral daily  gabapentin 300 milliGRAM(s) Oral every 8 hours  heparin   Injectable 5000 Unit(s) SubCutaneous every 8 hours  levothyroxine 125 MICROGram(s) Oral daily  lidocaine   4% Patch 1 Patch Transdermal every 24 hours  meclizine 12.5 milliGRAM(s) Oral two times a day  melatonin 3 milliGRAM(s) Oral at bedtime  multivitamin 1 Tablet(s) Oral daily  nystatin Cream 1 Application(s) Topical two times a day  pantoprazole    Tablet 40 milliGRAM(s) Oral before breakfast  polyethylene glycol 3350 17 Gram(s) Oral two times a day  QUEtiapine 25 milliGRAM(s) Oral at bedtime  rOPINIRole 2 milliGRAM(s) Oral daily  senna 2 Tablet(s) Oral at bedtime  sodium chloride 2 Gram(s) Oral three times a day  sodium chloride 0.65% Nasal 1 Spray(s) Both Nostrils two times a day    MEDICATIONS  (PRN):  acetaminophen     Tablet .. 650 milliGRAM(s) Oral every 6 hours PRN Mild Pain (1 - 3), Moderate Pain (4 - 6)  ALBUTerol    90 MICROgram(s) HFA Inhaler 2 Puff(s) Inhalation every 6 hours PRN Wheezing  fluticasone propionate 50 MICROgram(s)/spray Nasal Spray 1 Spray(s) Both Nostrils every 12 hours PRN nasal congestion  guaiFENesin Oral Liquid (Sugar-Free) 100 milliGRAM(s) Oral every 6 hours PRN Cough  oxycodone    5 mG/acetaminophen 325 mG 2 Tablet(s) Oral every 3 hours PRN Severe Pain (7 - 10)  traMADol 50 milliGRAM(s) Oral every 4 hours PRN Moderate Pain (4 - 6)      CAPILLARY BLOOD GLUCOSE        I&O's Summary    23 Nov 2022 07:01  -  23 Nov 2022 13:55  --------------------------------------------------------  IN: 0 mL / OUT: 200 mL / NET: -200 mL        PHYSICAL EXAM:  Vital Signs Last 24 Hrs  T(C): 36.8 (23 Nov 2022 12:29), Max: 37.2 (22 Nov 2022 21:42)  T(F): 98.3 (23 Nov 2022 12:29), Max: 98.9 (22 Nov 2022 21:42)  HR: 76 (23 Nov 2022 12:29) (76 - 85)  BP: 135/87 (23 Nov 2022 12:29) (112/64 - 135/87)  BP(mean): --  RR: 18 (23 Nov 2022 12:29) (18 - 18)  SpO2: 97% (23 Nov 2022 12:29) (97% - 98%)    Parameters below as of 23 Nov 2022 12:29  Patient On (Oxygen Delivery Method): room air      General: NAD  HEENT: NC/AT; PERRL, anicteric sclera; MMM  Neck: supple  Cardiovascular: +S1/S2; RRR  Respiratory: CTA B/L; no W/R/R  Gastrointestinal: soft, NT/ND; +BSx4  Extremities: WWP; no edema, clubbing or cyanosis  Vascular: 2+ radial, DP/PT pulses B/L  Neurological: AAOx3; no focal deficits but diffusely weak in all extremities.         LABS:                        8.6    6.44  )-----------( 321      ( 23 Nov 2022 05:30 )             29.0     11-23    130<L>  |  96<L>  |  8   ----------------------------<  92  4.0   |  26  |  0.54    Ca    9.3      23 Nov 2022 05:30  Phos  3.6     11-23  Mg     1.70     11-23    TPro  6.0  /  Alb  2.5<L>  /  TBili  0.2  /  DBili  x   /  AST  12  /  ALT  <5<L>  /  AlkPhos  73  11-23                RADIOLOGY & ADDITIONAL TESTS:  Results Reviewed:   Imaging Personally Reviewed:  Electrocardiogram Personally Reviewed:    COORDINATION OF CARE:  Care Discussed with Consultants/Other Providers [Y/N]:  Prior or Outpatient Records Reviewed [Y/N]:

## 2022-11-24 ENCOUNTER — TRANSCRIPTION ENCOUNTER (OUTPATIENT)
Age: 77
End: 2022-11-24

## 2022-11-24 PROCEDURE — 99223 1ST HOSP IP/OBS HIGH 75: CPT | Mod: FS,57

## 2022-11-24 PROCEDURE — 99232 SBSQ HOSP IP/OBS MODERATE 35: CPT

## 2022-11-24 PROCEDURE — 71250 CT THORAX DX C-: CPT | Mod: 26

## 2022-11-24 RX ORDER — QUETIAPINE FUMARATE 200 MG/1
0.5 TABLET, FILM COATED ORAL
Qty: 0 | Refills: 0 | DISCHARGE

## 2022-11-24 RX ORDER — RISEDRONATE SODIUM 25.8; 4.2 MG/1; MG/1
1 TABLET, FILM COATED ORAL
Qty: 0 | Refills: 0 | DISCHARGE

## 2022-11-24 RX ORDER — OXYCODONE HYDROCHLORIDE 5 MG/1
1 TABLET ORAL
Qty: 0 | Refills: 0 | DISCHARGE
Start: 2022-11-24

## 2022-11-24 RX ORDER — GABAPENTIN 400 MG/1
1 CAPSULE ORAL
Qty: 0 | Refills: 0 | DISCHARGE
Start: 2022-11-24

## 2022-11-24 RX ORDER — SODIUM CHLORIDE 9 MG/ML
2 INJECTION INTRAMUSCULAR; INTRAVENOUS; SUBCUTANEOUS
Qty: 0 | Refills: 0 | DISCHARGE
Start: 2022-11-24

## 2022-11-24 RX ORDER — TRAMADOL HYDROCHLORIDE 50 MG/1
1 TABLET ORAL
Qty: 0 | Refills: 0 | DISCHARGE
Start: 2022-11-24

## 2022-11-24 RX ORDER — ACETAMINOPHEN 500 MG
2 TABLET ORAL
Qty: 0 | Refills: 0 | DISCHARGE
Start: 2022-11-24

## 2022-11-24 RX ORDER — HYDROMORPHONE HYDROCHLORIDE 2 MG/ML
2 INJECTION INTRAMUSCULAR; INTRAVENOUS; SUBCUTANEOUS ONCE
Refills: 0 | Status: DISCONTINUED | OUTPATIENT
Start: 2022-11-24 | End: 2022-11-24

## 2022-11-24 RX ORDER — NYSTATIN CREAM 100000 [USP'U]/G
1 CREAM TOPICAL
Qty: 0 | Refills: 0 | DISCHARGE
Start: 2022-11-24

## 2022-11-24 RX ORDER — FENOFIBRATE,MICRONIZED 130 MG
1 CAPSULE ORAL
Qty: 0 | Refills: 0 | DISCHARGE
Start: 2022-11-24

## 2022-11-24 RX ORDER — ALBUTEROL 90 UG/1
2 AEROSOL, METERED ORAL
Qty: 0 | Refills: 0 | DISCHARGE
Start: 2022-11-24

## 2022-11-24 RX ORDER — FERROUS SULFATE 325(65) MG
1 TABLET ORAL
Qty: 0 | Refills: 0 | DISCHARGE
Start: 2022-11-24

## 2022-11-24 RX ORDER — GABAPENTIN 400 MG/1
600 CAPSULE ORAL EVERY 8 HOURS
Refills: 0 | Status: DISCONTINUED | OUTPATIENT
Start: 2022-11-24 | End: 2022-11-25

## 2022-11-24 RX ORDER — LOSARTAN/HYDROCHLOROTHIAZIDE 100MG-25MG
1 TABLET ORAL
Qty: 0 | Refills: 0 | DISCHARGE

## 2022-11-24 RX ORDER — POLYETHYLENE GLYCOL 3350 17 G/17G
17 POWDER, FOR SOLUTION ORAL
Qty: 0 | Refills: 0 | DISCHARGE
Start: 2022-11-24

## 2022-11-24 RX ORDER — LIDOCAINE 4 G/100G
1 CREAM TOPICAL
Qty: 0 | Refills: 0 | DISCHARGE
Start: 2022-11-24

## 2022-11-24 RX ORDER — PANTOPRAZOLE SODIUM 20 MG/1
1 TABLET, DELAYED RELEASE ORAL
Qty: 0 | Refills: 0 | DISCHARGE

## 2022-11-24 RX ORDER — LEVOTHYROXINE SODIUM 125 MCG
1 TABLET ORAL
Qty: 0 | Refills: 0 | DISCHARGE
Start: 2022-11-24

## 2022-11-24 RX ORDER — PANTOPRAZOLE SODIUM 20 MG/1
1 TABLET, DELAYED RELEASE ORAL
Qty: 0 | Refills: 0 | DISCHARGE
Start: 2022-11-24

## 2022-11-24 RX ORDER — SENNA PLUS 8.6 MG/1
2 TABLET ORAL
Qty: 0 | Refills: 0 | DISCHARGE
Start: 2022-11-24

## 2022-11-24 RX ORDER — OXYCODONE HYDROCHLORIDE 5 MG/1
10 TABLET ORAL
Refills: 0 | Status: DISCONTINUED | OUTPATIENT
Start: 2022-11-24 | End: 2022-11-25

## 2022-11-24 RX ORDER — FUROSEMIDE 40 MG
1 TABLET ORAL
Qty: 0 | Refills: 0 | DISCHARGE
Start: 2022-11-24

## 2022-11-24 RX ORDER — SODIUM CHLORIDE 0.65 %
1 AEROSOL, SPRAY (ML) NASAL
Qty: 0 | Refills: 0 | DISCHARGE
Start: 2022-11-24

## 2022-11-24 RX ORDER — OXYCODONE HYDROCHLORIDE 5 MG/1
10 TABLET ORAL
Refills: 0 | Status: DISCONTINUED | OUTPATIENT
Start: 2022-11-24 | End: 2022-11-24

## 2022-11-24 RX ORDER — ROPINIROLE 8 MG/1
1 TABLET, FILM COATED, EXTENDED RELEASE ORAL
Qty: 0 | Refills: 0 | DISCHARGE
Start: 2022-11-24

## 2022-11-24 RX ORDER — QUETIAPINE FUMARATE 200 MG/1
1 TABLET, FILM COATED ORAL
Qty: 0 | Refills: 0 | DISCHARGE
Start: 2022-11-24

## 2022-11-24 RX ORDER — DULOXETINE HYDROCHLORIDE 30 MG/1
1 CAPSULE, DELAYED RELEASE ORAL
Qty: 0 | Refills: 0 | DISCHARGE
Start: 2022-11-24

## 2022-11-24 RX ORDER — MECLIZINE HCL 12.5 MG
1 TABLET ORAL
Qty: 0 | Refills: 0 | DISCHARGE
Start: 2022-11-24

## 2022-11-24 RX ORDER — TRAMADOL HYDROCHLORIDE 50 MG/1
50 TABLET ORAL EVERY 4 HOURS
Refills: 0 | Status: DISCONTINUED | OUTPATIENT
Start: 2022-11-24 | End: 2022-11-25

## 2022-11-24 RX ORDER — FLUTICASONE PROPIONATE 50 MCG
1 SPRAY, SUSPENSION NASAL
Qty: 0 | Refills: 0 | DISCHARGE
Start: 2022-11-24

## 2022-11-24 RX ORDER — LANOLIN ALCOHOL/MO/W.PET/CERES
1 CREAM (GRAM) TOPICAL
Qty: 0 | Refills: 0 | DISCHARGE
Start: 2022-11-24

## 2022-11-24 RX ADMIN — OXYCODONE HYDROCHLORIDE 10 MILLIGRAM(S): 5 TABLET ORAL at 23:05

## 2022-11-24 RX ADMIN — Medication 100 MILLIGRAM(S): at 05:38

## 2022-11-24 RX ADMIN — HYDROMORPHONE HYDROCHLORIDE 2 MILLIGRAM(S): 2 INJECTION INTRAMUSCULAR; INTRAVENOUS; SUBCUTANEOUS at 16:40

## 2022-11-24 RX ADMIN — Medication 325 MILLIGRAM(S): at 11:24

## 2022-11-24 RX ADMIN — OXYCODONE HYDROCHLORIDE 10 MILLIGRAM(S): 5 TABLET ORAL at 14:48

## 2022-11-24 RX ADMIN — HEPARIN SODIUM 5000 UNIT(S): 5000 INJECTION INTRAVENOUS; SUBCUTANEOUS at 22:05

## 2022-11-24 RX ADMIN — Medication 3 MILLILITER(S): at 19:56

## 2022-11-24 RX ADMIN — Medication 125 MICROGRAM(S): at 05:35

## 2022-11-24 RX ADMIN — OXYCODONE HYDROCHLORIDE 10 MILLIGRAM(S): 5 TABLET ORAL at 09:49

## 2022-11-24 RX ADMIN — HYDROMORPHONE HYDROCHLORIDE 2 MILLIGRAM(S): 2 INJECTION INTRAMUSCULAR; INTRAVENOUS; SUBCUTANEOUS at 11:19

## 2022-11-24 RX ADMIN — HEPARIN SODIUM 5000 UNIT(S): 5000 INJECTION INTRAVENOUS; SUBCUTANEOUS at 05:35

## 2022-11-24 RX ADMIN — Medication 20 MILLIGRAM(S): at 05:39

## 2022-11-24 RX ADMIN — HYDROMORPHONE HYDROCHLORIDE 2 MILLIGRAM(S): 2 INJECTION INTRAMUSCULAR; INTRAVENOUS; SUBCUTANEOUS at 17:40

## 2022-11-24 RX ADMIN — Medication 100 MILLIGRAM(S): at 14:45

## 2022-11-24 RX ADMIN — Medication 100 MILLIGRAM(S): at 22:07

## 2022-11-24 RX ADMIN — Medication 1 SPRAY(S): at 05:36

## 2022-11-24 RX ADMIN — SODIUM CHLORIDE 2 GRAM(S): 9 INJECTION INTRAMUSCULAR; INTRAVENOUS; SUBCUTANEOUS at 13:50

## 2022-11-24 RX ADMIN — NYSTATIN CREAM 1 APPLICATION(S): 100000 CREAM TOPICAL at 05:36

## 2022-11-24 RX ADMIN — OXYCODONE HYDROCHLORIDE 10 MILLIGRAM(S): 5 TABLET ORAL at 13:48

## 2022-11-24 RX ADMIN — QUETIAPINE FUMARATE 25 MILLIGRAM(S): 200 TABLET, FILM COATED ORAL at 22:07

## 2022-11-24 RX ADMIN — Medication 12.5 MILLIGRAM(S): at 18:37

## 2022-11-24 RX ADMIN — HEPARIN SODIUM 5000 UNIT(S): 5000 INJECTION INTRAVENOUS; SUBCUTANEOUS at 13:50

## 2022-11-24 RX ADMIN — NYSTATIN CREAM 1 APPLICATION(S): 100000 CREAM TOPICAL at 18:36

## 2022-11-24 RX ADMIN — Medication 100 MILLIGRAM(S): at 22:16

## 2022-11-24 RX ADMIN — GABAPENTIN 600 MILLIGRAM(S): 400 CAPSULE ORAL at 15:38

## 2022-11-24 RX ADMIN — Medication 3 MILLILITER(S): at 15:54

## 2022-11-24 RX ADMIN — OXYCODONE HYDROCHLORIDE 10 MILLIGRAM(S): 5 TABLET ORAL at 08:49

## 2022-11-24 RX ADMIN — Medication 1 SPRAY(S): at 18:36

## 2022-11-24 RX ADMIN — Medication 12.5 MILLIGRAM(S): at 05:36

## 2022-11-24 RX ADMIN — DULOXETINE HYDROCHLORIDE 60 MILLIGRAM(S): 30 CAPSULE, DELAYED RELEASE ORAL at 11:24

## 2022-11-24 RX ADMIN — Medication 100 MILLIGRAM(S): at 13:49

## 2022-11-24 RX ADMIN — Medication 145 MILLIGRAM(S): at 11:24

## 2022-11-24 RX ADMIN — OXYCODONE HYDROCHLORIDE 10 MILLIGRAM(S): 5 TABLET ORAL at 22:05

## 2022-11-24 NOTE — CONSULT NOTE ADULT - ASSESSMENT
ASSESSMENT: 78 yo F with PMHx significant for hypothyroidism, HTN, HLD, SVT (s/p ablation), Cervical cancer (s/p hysterectomy), DVT (June 2022, still on eliquis), OA and peripheral neuropathy presenting to [facility] after having fall from bed overnight. Patient found to have UTI, MSSA bacteremia    PLAN  - Pain control  - F/u CT Chest  - Imaging to be reviewed by Dr. Nielson, await further recs    Thoracic Surgery  [id number] ASSESSMENT: 78 yo F with PMHx significant for hypothyroidism, HTN, HLD, SVT (s/p ablation), Cervical cancer (s/p hysterectomy), DVT (June 2022, still on eliquis), OA and peripheral neuropathy presenting to [facility] after having fall(10/25) from bed overnight. Patient found to have UTI, MSSA bacteremia for which she is being treated. Pt found to have several R rib fractures for which thoracic surgery was consulted.    PLAN  - Pain control  - F/u CT Chest  - Imaging to be reviewed by Dr. Nielson, await further recs    Thoracic Surgery  [id number]

## 2022-11-24 NOTE — CONSULT NOTE ADULT - SUBJECTIVE AND OBJECTIVE BOX
Thoracic Surgery Consultation Note  =====================================================  HPI: 76 yo F with PMHx significant for hypothyroidism, HTN, HLD, SVT (s/p ablation), Cervical cancer (s/p hysterectomy), DVT (June 2022, still on eliquis), OA and peripheral neuropathy presenting to [facility] after having fall from bed overnight. Patient reports fall from bed onto L side. Patient states she was sound asleep and woke up on floor, with no memory of rolling over out fo bed. She denied LOC, memory loss, or hitting her head. Patient denied having confusion when waking up. Patient reportedly had 8/10 pain on L ribs, back and hip, and was unable to get up. Patient states distance from bed to floor was about 2 feet. She pressed life alert alarm and was on floor for about 3 hours before help came. Patient denied lightheadedness, but states she has chronic dizziness. No diarrhea, abdominal pain, nausea or vomiting but patient reports decreased appetite and constipation (no BM x3 days). Additionally, patient reports chronic stress incontinence and nocturia, and wears a diaper at all times. Denied fevers, chills, malaise, dysuria, frequency, hematuria, chest pain, palpitations, changes in vision or hearing, bleeding, new rash, joint pain, and weakness. Patient states she has had chronic cough since she was COVID+ several months ago and has nagging dry cough. Pain in back and shoulder worsens with cough since her fall.   Of note patient was recently admitted for a UTI and DVT in June of 2022, and started on Eliquis. Per discharge note in June, patient was to complete a 3 month course and then stop taking. Per patient and PCP, patient still taking the eliquis. Patient reportedly has had 5 UTIs in the past year. Was discharged from rehab facility to home several weeks ago, but still reporting difficulty with ambulation.   Per NP Jose Miguel, who saw patient and yadira blood work at patient's home 10/23, patient had leukocytosis, with iron panel suggesting MAR, HgB 8.7, Na 125, and Cr 1.4 at that time.  (25 Oct 2022 12:14)    Subjective: Patient seen and assessed at bedside. Pt avidly endorsing pain at sides of chest, b/l, worsened with coughing. Pt did not endorse any SOB, chills, N/V.    Allergies: erythromycin (Hives)  IV Contrast (Anaphylaxis)    PAST MEDICAL & SURGICAL HISTORY:  History of cervical cancer  s/p hysterectomy  H/O peripheral neuropathy  Vertigo  Palpitations  H/O supraventricular tachycardia  s/p ablation  H/O hyperlipidemia  HTN (hypertension)  Hx of cholecystectomy  S/P total abdominal hysterectomy  Hx of ankle fusion    FAMILY HISTORY:  FH: myocardial infarction (Mother)    ADVANCE DIRECTIVES: Presumed Full Code     REVIEW OF SYSTEMS:    General: Mild distress  Skin/Breast: Non-Contributory  Ophthalmologic: Non-Contributory  ENMT: Non-Contributory  Respiratory and Thorax: B/l chest pain, worsened w/ coughing  Cardiovascular: Non-Contributory  Gastrointestinal: Non-Contributory  Genitourinary: Non-Contributory  Musculoskeletal: Non-Contributory  Neurological: Non-Contributory  Psychiatric: Non-Contributory  Hematology/Lymphatics: Non-Contributory  Endocrine: Non-Contributory  Allergic/Immunologic: Non-Contributory    CURRENT MEDICATIONS:   --------------------------------------------------------------------------------------  Neurologic Medications  acetaminophen     Tablet .. 650 milliGRAM(s) Oral every 6 hours PRN Mild Pain (1 - 3), Moderate Pain (4 - 6)  DULoxetine 60 milliGRAM(s) Oral daily  gabapentin 300 milliGRAM(s) Oral every 8 hours  meclizine 12.5 milliGRAM(s) Oral two times a day  melatonin 3 milliGRAM(s) Oral at bedtime  oxyCODONE    IR 10 milliGRAM(s) Oral every 3 hours PRN Severe Pain (7 - 10)  QUEtiapine 25 milliGRAM(s) Oral at bedtime  rOPINIRole 2 milliGRAM(s) Oral daily  traMADol 50 milliGRAM(s) Oral every 4 hours PRN Moderate Pain (4 - 6)    Respiratory Medications  ALBUTerol    90 MICROgram(s) HFA Inhaler 2 Puff(s) Inhalation every 6 hours PRN Wheezing  albuterol/ipratropium for Nebulization 3 milliLiter(s) Nebulizer every 6 hours  benzonatate 100 milliGRAM(s) Oral three times a day  guaiFENesin Oral Liquid (Sugar-Free) 100 milliGRAM(s) Oral every 6 hours PRN Cough    Cardiovascular Medications  furosemide    Tablet 20 milliGRAM(s) Oral daily    Gastrointestinal Medications  bisacodyl 5 milliGRAM(s) Oral at bedtime  ferrous    sulfate 325 milliGRAM(s) Oral daily  multivitamin 1 Tablet(s) Oral daily  pantoprazole    Tablet 40 milliGRAM(s) Oral before breakfast  polyethylene glycol 3350 17 Gram(s) Oral two times a day  senna 2 Tablet(s) Oral at bedtime  sodium chloride 2 Gram(s) Oral three times a day    Genitourinary Medications    Hematologic/Oncologic Medications  heparin   Injectable 5000 Unit(s) SubCutaneous every 8 hours    Antimicrobial/Immunologic Medications  ceFAZolin   IVPB 2000 milliGRAM(s) IV Intermittent every 8 hours    Endocrine/Metabolic Medications  fenofibrate Tablet 145 milliGRAM(s) Oral daily  levothyroxine 125 MICROGram(s) Oral daily    Topical/Other Medications  fluticasone propionate 50 MICROgram(s)/spray Nasal Spray 1 Spray(s) Both Nostrils every 12 hours PRN nasal congestion  lidocaine   4% Patch 1 Patch Transdermal every 24 hours  nystatin Cream 1 Application(s) Topical two times a day  sodium chloride 0.65% Nasal 1 Spray(s) Both Nostrils two times a day    --------------------------------------------------------------------------------------    VITAL SIGNS, INS/OUTS (last 24 hours):  --------------------------------------------------------------------------------------  T(C): 36.6 (11-24-22 @ 05:32), Max: 36.9 (11-23-22 @ 20:52)  HR: 82 (11-24-22 @ 05:32) (73 - 92)  BP: 115/65 (11-24-22 @ 05:32) (115/65 - 138/85)  BP(mean): --  ABP: --  ABP(mean): --  RR: 18 (11-24-22 @ 05:32) (18 - 18)  SpO2: 97% (11-24-22 @ 05:32) (97% - 100%)  Wt(kg): --  CVP(mm Hg): --  CI: --  CAPILLARY BLOOD GLUCOSE       N/A      11-23 @ 07:01  -  11-24 @ 07:00  --------------------------------------------------------  IN:  Total IN: 0 mL    OUT:    Voided (mL): 550 mL  Total OUT: 550 mL    Total NET: -550 mL  --------------------------------------------------------------------------------------    EXAM  NEUROLOGY  Exam: Normal, NAD, alert, no focal deficits  RESPIRATORY  Exam: Lungs clear to auscultation, Normal expansion/effort. B/l chest sites no erythematous, soft, no concern for hematoma. Pain with palpation over R chest.  CARDIOVASCULAR  Exam: S1, S2.     METABOLIC/FLUIDS/ELECTROLYTES  ferrous    sulfate 325 milliGRAM(s) Oral daily  multivitamin 1 Tablet(s) Oral daily  sodium chloride 2 Gram(s) Oral three times a day    HEMATOLOGIC  [x] DVT Prophylaxis: heparin   Injectable 5000 Unit(s) SubCutaneous every 8 hours    INFECTIOUS DISEASE  Antimicrobials/Immunologic Medications:  ceFAZolin   IVPB 2000 milliGRAM(s) IV Intermittent every 8 hours    LABS  --------------------------------------------------------------------------------------    11-23    130<L>  |  96<L>  |  8   ----------------------------<  92  4.0   |  26  |  0.54    Ca    9.3      23 Nov 2022 05:30  Phos  3.6     11-23  Mg     1.70     11-23    TPro  6.0  /  Alb  2.5<L>  /  TBili  0.2  /  DBili  x   /  AST  12  /  ALT  <5<L>  /  AlkPhos  73  11-23          RECENT CULTURES:    --------------------------------------------------------------------------------------    IMAGING RESULTS    Xray:       INTERPRETATION:  EXAMINATION: XR CHEST    CLINICAL INDICATION: cough    TECHNIQUE: Single frontal, portable view of the chest was obtained.    COMPARISON: Chest x-ray 11/13/2022.    FINDINGS:  Most cardiomediastinal silhouette, grossly unchanged since prior.  Right midlung linear atelectasis. Left infiltrate along the mediastinal   border.  There is no pneumothorax or pleural effusion.  Chronic appearing displaced posterior right fifth rib fracture. There are   also chronic 2 displaced posterior right sixth rib fractures with a free   fracture fragment.    IMPRESSION:  Right midlung linear atelectasis.  Left lung infiltrate.    Further information may be obtained from the patient's subsequent CT of   the chest which was pending at the time of this dictation.

## 2022-11-24 NOTE — CONSULT NOTE ADULT - CONSULT REASON
MSSA bacteremia with piriformis muscle collection
Rib Fractures
MSSA Bacteremia
compression fracture

## 2022-11-24 NOTE — DISCHARGE NOTE NURSING/CASE MANAGEMENT/SOCIAL WORK - NSDCFUADDAPPT_GEN_ALL_CORE_FT
Follow up with your primary care doctor and neurosurgeon for further evaluation and management. Please call to make an appointment within 1-2 weeks of discharge.

## 2022-11-24 NOTE — DISCHARGE NOTE NURSING/CASE MANAGEMENT/SOCIAL WORK - PATIENT PORTAL LINK FT
You can access the FollowMyHealth Patient Portal offered by [facility] by registering at the following website: [email or web address]. By joining [facility]’s FollowMyHealth portal, you will also be able to view your health information using other applications (apps) compatible with our system.

## 2022-11-24 NOTE — PROGRESS NOTE ADULT - SUBJECTIVE AND OBJECTIVE BOX
Patient is a 77y old  Female who presents with a chief complaint of Fall / UTI (24 Nov 2022 13:15)      SUBJECTIVE / OVERNIGHT EVENTS:   Pt was scheduled for transfer to rehab this morning. However, transfer cancelled as pt was having 10/10 rib pain not improved with pain meds. CXR performed on 11/22 was read this morning showing multiple displaced rib fractures, not reported on prior imaging but are chronic. CT surg consulted     ADDITIONAL REVIEW OF SYSTEMS:    MEDICATIONS  (STANDING):  albuterol/ipratropium for Nebulization 3 milliLiter(s) Nebulizer every 6 hours  benzonatate 100 milliGRAM(s) Oral three times a day  bisacodyl 5 milliGRAM(s) Oral at bedtime  ceFAZolin   IVPB 2000 milliGRAM(s) IV Intermittent every 8 hours  DULoxetine 60 milliGRAM(s) Oral daily  fenofibrate Tablet 145 milliGRAM(s) Oral daily  ferrous    sulfate 325 milliGRAM(s) Oral daily  furosemide    Tablet 20 milliGRAM(s) Oral daily  gabapentin 300 milliGRAM(s) Oral every 8 hours  heparin   Injectable 5000 Unit(s) SubCutaneous every 8 hours  levothyroxine 125 MICROGram(s) Oral daily  lidocaine   4% Patch 1 Patch Transdermal every 24 hours  meclizine 12.5 milliGRAM(s) Oral two times a day  melatonin 3 milliGRAM(s) Oral at bedtime  multivitamin 1 Tablet(s) Oral daily  nystatin Cream 1 Application(s) Topical two times a day  pantoprazole    Tablet 40 milliGRAM(s) Oral before breakfast  polyethylene glycol 3350 17 Gram(s) Oral two times a day  QUEtiapine 25 milliGRAM(s) Oral at bedtime  rOPINIRole 2 milliGRAM(s) Oral daily  senna 2 Tablet(s) Oral at bedtime  sodium chloride 2 Gram(s) Oral three times a day  sodium chloride 0.65% Nasal 1 Spray(s) Both Nostrils two times a day    MEDICATIONS  (PRN):  acetaminophen     Tablet .. 650 milliGRAM(s) Oral every 6 hours PRN Mild Pain (1 - 3), Moderate Pain (4 - 6)  ALBUTerol    90 MICROgram(s) HFA Inhaler 2 Puff(s) Inhalation every 6 hours PRN Wheezing  fluticasone propionate 50 MICROgram(s)/spray Nasal Spray 1 Spray(s) Both Nostrils every 12 hours PRN nasal congestion  guaiFENesin Oral Liquid (Sugar-Free) 100 milliGRAM(s) Oral every 6 hours PRN Cough  oxyCODONE    IR 10 milliGRAM(s) Oral every 3 hours PRN Severe Pain (7 - 10)  traMADol 50 milliGRAM(s) Oral every 4 hours PRN Moderate Pain (4 - 6)      CAPILLARY BLOOD GLUCOSE        I&O's Summary    23 Nov 2022 07:01  -  24 Nov 2022 07:00  --------------------------------------------------------  IN: 0 mL / OUT: 550 mL / NET: -550 mL        PHYSICAL EXAM:  Vital Signs Last 24 Hrs  T(C): 36.5 (24 Nov 2022 13:37), Max: 36.9 (23 Nov 2022 20:52)  T(F): 97.7 (24 Nov 2022 13:37), Max: 98.4 (23 Nov 2022 20:52)  HR: 59 (24 Nov 2022 13:37) (59 - 92)  BP: 119/73 (24 Nov 2022 13:37) (115/65 - 138/85)  BP(mean): --  RR: 18 (24 Nov 2022 13:37) (18 - 18)  SpO2: 99% (24 Nov 2022 13:37) (97% - 100%)    Parameters below as of 24 Nov 2022 13:37  Patient On (Oxygen Delivery Method): room air      General: NAD  HEENT: NC/AT; PERRL, anicteric sclera; MMM  Neck: supple  Cardiovascular: +S1/S2; RRR  Respiratory: CTA B/L; no W/R/R  Gastrointestinal: soft, NT/ND; +BSx4  Extremities: WWP; no edema, clubbing or cyanosis  Vascular: 2+ radial, DP/PT pulses B/L  Neurological: AAOx3; no focal deficits but diffusely weak in all extremities.     LABS:                        8.6    6.44  )-----------( 321      ( 23 Nov 2022 05:30 )             29.0     11-23    130<L>  |  96<L>  |  8   ----------------------------<  92  4.0   |  26  |  0.54    Ca    9.3      23 Nov 2022 05:30  Phos  3.6     11-23  Mg     1.70     11-23    TPro  6.0  /  Alb  2.5<L>  /  TBili  0.2  /  DBili  x   /  AST  12  /  ALT  <5<L>  /  AlkPhos  73  11-23                RADIOLOGY & ADDITIONAL TESTS:  Results Reviewed:   Imaging Personally Reviewed:  Electrocardiogram Personally Reviewed:    COORDINATION OF CARE:  Care Discussed with Consultants/Other Providers [Y/N]:  Prior or Outpatient Records Reviewed [Y/N]:

## 2022-11-25 VITALS
RESPIRATION RATE: 17 BRPM | DIASTOLIC BLOOD PRESSURE: 54 MMHG | OXYGEN SATURATION: 94 % | TEMPERATURE: 99 F | SYSTOLIC BLOOD PRESSURE: 125 MMHG | HEART RATE: 88 BPM

## 2022-11-25 DIAGNOSIS — R78.81 BACTEREMIA: ICD-10-CM

## 2022-11-25 LAB — SARS-COV-2 RNA SPEC QL NAA+PROBE: SIGNIFICANT CHANGE UP

## 2022-11-25 PROCEDURE — 99239 HOSP IP/OBS DSCHRG MGMT >30: CPT

## 2022-11-25 RX ORDER — CHLORHEXIDINE GLUCONATE 213 G/1000ML
1 SOLUTION TOPICAL DAILY
Refills: 0 | Status: DISCONTINUED | OUTPATIENT
Start: 2022-11-25 | End: 2022-11-25

## 2022-11-25 RX ORDER — TRAMADOL HYDROCHLORIDE 50 MG/1
1 TABLET ORAL
Qty: 0 | Refills: 0 | DISCHARGE
Start: 2022-11-25

## 2022-11-25 RX ORDER — FUROSEMIDE 40 MG
40 TABLET ORAL DAILY
Refills: 0 | Status: DISCONTINUED | OUTPATIENT
Start: 2022-11-26 | End: 2022-11-25

## 2022-11-25 RX ORDER — FUROSEMIDE 40 MG
20 TABLET ORAL ONCE
Refills: 0 | Status: COMPLETED | OUTPATIENT
Start: 2022-11-25 | End: 2022-11-25

## 2022-11-25 RX ORDER — GABAPENTIN 400 MG/1
1 CAPSULE ORAL
Qty: 0 | Refills: 0 | DISCHARGE
Start: 2022-11-25

## 2022-11-25 RX ORDER — FUROSEMIDE 40 MG
1 TABLET ORAL
Qty: 0 | Refills: 0 | DISCHARGE
Start: 2022-11-25

## 2022-11-25 RX ADMIN — Medication 125 MICROGRAM(S): at 05:10

## 2022-11-25 RX ADMIN — Medication 1 SPRAY(S): at 05:09

## 2022-11-25 RX ADMIN — OXYCODONE HYDROCHLORIDE 10 MILLIGRAM(S): 5 TABLET ORAL at 11:30

## 2022-11-25 RX ADMIN — Medication 100 MILLIGRAM(S): at 05:13

## 2022-11-25 RX ADMIN — Medication 3 MILLILITER(S): at 15:15

## 2022-11-25 RX ADMIN — SODIUM CHLORIDE 2 GRAM(S): 9 INJECTION INTRAMUSCULAR; INTRAVENOUS; SUBCUTANEOUS at 13:40

## 2022-11-25 RX ADMIN — Medication 100 MILLIGRAM(S): at 14:39

## 2022-11-25 RX ADMIN — DULOXETINE HYDROCHLORIDE 60 MILLIGRAM(S): 30 CAPSULE, DELAYED RELEASE ORAL at 13:40

## 2022-11-25 RX ADMIN — OXYCODONE HYDROCHLORIDE 10 MILLIGRAM(S): 5 TABLET ORAL at 14:45

## 2022-11-25 RX ADMIN — Medication 20 MILLIGRAM(S): at 05:10

## 2022-11-25 RX ADMIN — HEPARIN SODIUM 5000 UNIT(S): 5000 INJECTION INTRAVENOUS; SUBCUTANEOUS at 05:11

## 2022-11-25 RX ADMIN — OXYCODONE HYDROCHLORIDE 10 MILLIGRAM(S): 5 TABLET ORAL at 02:41

## 2022-11-25 RX ADMIN — Medication 100 MILLIGRAM(S): at 05:10

## 2022-11-25 RX ADMIN — Medication 12.5 MILLIGRAM(S): at 05:10

## 2022-11-25 RX ADMIN — OXYCODONE HYDROCHLORIDE 10 MILLIGRAM(S): 5 TABLET ORAL at 10:28

## 2022-11-25 RX ADMIN — GABAPENTIN 600 MILLIGRAM(S): 400 CAPSULE ORAL at 13:41

## 2022-11-25 RX ADMIN — NYSTATIN CREAM 1 APPLICATION(S): 100000 CREAM TOPICAL at 05:11

## 2022-11-25 RX ADMIN — Medication 3 MILLILITER(S): at 10:47

## 2022-11-25 RX ADMIN — CHLORHEXIDINE GLUCONATE 1 APPLICATION(S): 213 SOLUTION TOPICAL at 13:00

## 2022-11-25 RX ADMIN — Medication 100 MILLIGRAM(S): at 13:40

## 2022-11-25 RX ADMIN — Medication 20 MILLIGRAM(S): at 13:42

## 2022-11-25 RX ADMIN — OXYCODONE HYDROCHLORIDE 10 MILLIGRAM(S): 5 TABLET ORAL at 06:41

## 2022-11-25 RX ADMIN — OXYCODONE HYDROCHLORIDE 10 MILLIGRAM(S): 5 TABLET ORAL at 07:30

## 2022-11-25 RX ADMIN — Medication 1 SPRAY(S): at 05:11

## 2022-11-25 RX ADMIN — GABAPENTIN 600 MILLIGRAM(S): 400 CAPSULE ORAL at 05:09

## 2022-11-25 RX ADMIN — OXYCODONE HYDROCHLORIDE 10 MILLIGRAM(S): 5 TABLET ORAL at 13:38

## 2022-11-25 RX ADMIN — Medication 3 MILLILITER(S): at 03:31

## 2022-11-25 NOTE — PROGRESS NOTE ADULT - ASSESSMENT
76 yo F with PMHx significant for hypothyroidism, HTN, HLD, SVT (s/p ablation), Cervical cancer (s/p hysterectomy), DVT (June 2022, still on eliquis), OA and peripheral neuropathy presenting to [facility] after having fall from bed overnight. Patient found to have UTI, MSSA bacteremia
76 yo F with PMHx significant for hypothyroidism, HTN, HLD, SVT (s/p ablation), Cervical cancer (s/p hysterectomy), DVT (June 2022, still on eliquis), OA and peripheral neuropathy presenting to [facility] after having fall from bed overnight. Patient found to have UTI and started on IV CTX. CT head with no evidence of bleed, CT C spine and chest no evidence of fractures. Admit to medicine for further evaluation. 
77 year old female with  SVT s/p ablation, cervical ca s/p hysterectomy, DVT on Eliquis, OA and peripheral neuropathy presented 10/25 after fall at home, rolling off her bed to the floor on her buttocks.  Blood cultures with MSSA. Repeats negative 10/29/22.    Of note, was admitted to [facility] 4/27 - 5/7/2022 for right groin pain and fever found to have staph bacteremia. She states prior to that admission she received a gluteal shot for the groin pain. Imaging with pelvic fracture with hyperattenuation in the R obturator internus muscle, MRI with rim enhancing hematomas. SHAUN performed no vegetation and was discharged on cefazolin completing on 5/29/2022.    10/31 MRI spine with T6-7 discitis/ OM with phlegmon, T9-10 phlegmon, SI joint arthropathy/ sacroilitis    MRI Abd/ pelvis with B/L sacroilitis and R piriformis abscess, non- united sacral ala fracture right.    SHAUN 11/7 no vegetation.    Has R ankle rods - no signs of septic arthritis on exam - no erythema, no swelling, no tenderness on movement    IR saw patient for drainage of R piriformis abscess - risky percutaneous window for drainage.    Neurosurgery evaluation noted.     WBC normal range     CXR 11/13 questionable posterior infiltrate.  afebrile.  WBC normal range.  would monitor for now.       Constipation.      Suggest:      c/w cefazolin 2 gm iv q 8 h  10/27-     plan for duration 6- 8 weeks         
77 year old female with  SVT s/p ablation, cervical ca s/p hysterectomy, DVT on Eliquis, OA and peripheral neuropathy presented 10/25 after fall at home, rolling off her bed to the floor on her buttocks.  Blood cultures with MSSA. Repeats negative 10/29/22.    Of note, was admitted to [facility] 4/27 - 5/7/2022 for right groin pain and fever found to have staph bacteremia. She states prior to that admission she received a gluteal shot for the groin pain. Imaging with pelvic fracture with hyperattenuation in the R obturator internus muscle, MRI with rim enhancing hematomas. SHAUN performed no vegetation and was discharged on cefazolin completing on 5/29/2022.    10/31 MRI spine with T6-7 discitis/ OM with phlegmon, T9-10 phlegmon, SI joint arthropathy/ sacroilitis    MRI Abd/ pelvis with B/L sacroilitis and R piriformis abscess, non- united sacral ala fracture right.    SHAUN 11/7 no vegetation.    Has R ankle rods - no signs of septic arthritis on exam - no erythema, no swelling, no tenderness on movement    IR saw patient for drainage of R piriformis abscess - risky percutaneous window for drainage.    Neurosurgery evaluation noted.     WBC normal range     CXR 11/13 questionable posterior infiltrate.  afebrile.  WBC normal range.  would monitor for now.       Constipation.      Suggest:      c/w cefazolin 2 gm iv q 8 h  10/27-     plan for duration 6- 8 weeks         
77 year old female with  SVT s/p ablation, cervical ca s/p hysterectomy, DVT on Eliquis, OA and peripheral neuropathy presenting after fall at home, rolling off her bed to the floor on her buttocks. L Blood cultures with MSSA. Repeats negative 10/29/22.    Of note, was admitted to [facility] 4/27 - 5/7/2022 for right groin pain and fever found to have staph bacteremia. She states prior to that admission she received a gluteal shot for the groin pain. Imaging with pelvic fracture with hyperattenuation in the R obturator internus muscle, MRI with rim enhancing hematomas. SHAUN performed no vegetation and was discharged on cefazolin completing on 5/29/2022.    Now with MRI spine with T6-7 discitis/ OM with phlegmon, T9-10 phlegmon, SI joint arthropathy/ sacroilitis    MRI Abd/ pelvis with B/L sacroilitis and R piriformis abscess, non united sacral ala fracture right.    SHAUN 11/7 no vegetation.    Has R ankle rods - no signs of septic arthritis on exam - no erythema, no swelling, no tenderness on movement    IR saw patient for drainage of R piriformis abscess - risky percutaneous window for drainage.    Suggest:    c/w cefazolin 2 gm iv q 8 h   duration 6- 8 weeks   weekly cbc, cmp, CRP, ESR  
77 year old female with hypothyroidism, HTN, HLD, SVT s/p ablation, cervical ca s/p hysterectomy, DVT on Eliquis, OA and peripheral neuropathy presenting after fall at home, rolling off her bed to the floor on her buttocks. Leukocytosis. Afebrile. Urine culture with >3 organisms. CXR clear. Blood cultures with MSSA. Repeats negative 10/29/22.    Of note, was admitted to [facility] 4/27 - 5/7/2022 for right groin pain and fever found to have staph bacteremia. She states prior to that admission she received a gluteal shot for the groin pain. Imaging with pelvic fracture with hyperattenuation in the R obturator internus muscle, MRI with rim enhancing hematomas. SHAUN performed no vegetation and was discharged on cefazolin completing on 5/29/2022.    Now with MRI spine with T6-7 discitis/ OM with phlegmon, T9-10 phlegmon, SI joint arthropathy/ sacroilitis    MRI Abd/ pelvis with B/L sacroilitis and R piriformis abscess.    Recommend:  #Leukocytosis/ MSSA bacteremia  -Has R ankle rods - no signs of septic arthritis on exam - no erythema, no swelling, no tenderness on movement  -Cefazolin 2 grams IV q 8 hours  -F/U repeat blood cultures - so far no growth to date from 10/29/22  -SHAUN to evaluate for endocarditis  -IR evaluation for drainage of R piriformis abscess  -Trend WBC  -Monitor for fevers    Eliseo Sutton MD  Available through MS Teams  If no response, or after 5pm/weekends, call [phone]
77 year old female with hypothyroidism, HTN, HLD, SVT s/p ablation, cervical ca s/p hysterectomy, DVT on Eliquis, OA and peripheral neuropathy presenting after fall at home, rolling off her bed to the floor on her buttocks. Leukocytosis. Afebrile. Urine culture with GNR. CXR clear.  Blood cultures with MSSA.    Of note, was admitted to [facility] 4/27 - 5/7/2022 for right groin pain and fever found to have staph bacteremia. She states prior to that admission she received a gluteal shot for the groin pain. Imaging with pelvic fracture with hyperattenuation in the R obturator internus muscle, MRI with rim enhancing hematomas. SHAUN performed no vegetation and was discharged on cefazolin completing on 5/29/2022.    Recommend:  #Leukocytosis/ MSSA bacteremia  -No skin source identified for the MSSA bacteremia  -Has R ankle rods - no signs of septic arthritis on exam - no erythema, no swelling, no tenderness on movement  -Cefazolin 2 grams IV q 8 hours  -F/U repeat blood cultures - so far no growth to date  -TTE, may need SHAUN given MSSA bacteremia again  -MRI abd/ pelvis to further evaluate prior infected hematoma  -MRI T/L/S spine to evaluate for OM given compression fracture  -Trend WBC  -Monitor for fevers    Eliseo Sutton MD  Available through MS Teams  If no response, or after 5pm/weekends, call [phone]
77 year old female with hypothyroidism, HTN, HLD, SVT s/p ablation, cervical ca s/p hysterectomy, DVT on Eliquis, OA and peripheral neuropathy presenting after fall at home, rolling off her bed to the floor on her buttocks. Leukocytosis. Afebrile. Urine culture with GNR. CXR clear. Blood cultures with MSSA.    Of note, was admitted to [facility] 4/27 - 5/7/2022 for right groin pain and fever found to have staph bacteremia. She states prior to that admission she received a gluteal shot for the groin pain. Imaging with pelvic fracture with hyperattenuation in the R obturator internus muscle, MRI with rim enhancing hematomas. SHAUN performed no vegetation and was discharged on cefazolin completing on 5/29/2022.    MRI spine with T6-7 discitis/ OM with phlegmon, T9-10 phlegmon, SI joint arthropathy/ sacroilitis    Recommend:  #Leukocytosis/ MSSA bacteremia  -No skin source identified for the MSSA bacteremia  -Has R ankle rods - no signs of septic arthritis on exam - no erythema, no swelling, no tenderness on movement  -Cefazolin 2 grams IV q 8 hours  -F/U repeat blood cultures - so far no growth to date  -SHAUN to evaluate for endocarditis  -MRI abd/ pelvis to further evaluate prior infected hematoma  -Trend WBC  -Monitor for fevers    Eliseo Sutton MD  Available through MS Teams  If no response, or after 5pm/weekends, call [phone]
76 yo F with PMHx significant for hypothyroidism, HTN, HLD, SVT (s/p ablation), Cervical cancer (s/p hysterectomy), DVT (June 2022, still on eliquis), OA and peripheral neuropathy presenting to [facility] after having fall from bed overnight. Patient found to have UTI, MSSA bacteremia
77 year old female with  SVT s/p ablation, cervical ca s/p hysterectomy, DVT on Eliquis, OA and peripheral neuropathy presented 10/25 after fall at home, rolling off her bed to the floor on her buttocks.  Blood cultures with MSSA. Repeats negative 10/29/22.    Of note, was admitted to [facility] 4/27 - 5/7/2022 for right groin pain and fever found to have MSSA bacteremia. She states prior to that admission she received a gluteal shot for the groin pain. Imaging with pelvic fracture with hyperattenuation in the R obturator internus muscle, MRI with rim enhancing hematomas. SHAUN performed no vegetation and was discharged on cefazolin completing on 5/29/2022.    10/31 MRI spine with T6-7 discitis/ OM with phlegmon, T9-10 phlegmon, SI joint arthropathy/ sacroilitis    MRI Abd/ pelvis with B/L sacroilitis and R piriformis abscess, non- united sacral ala fracture right.    SHAUN 11/7 no vegetation.    Has R ankle rods - no signs of septic arthritis on exam - no erythema, no swelling, no tenderness on movement    IR saw patient for drainage of R piriformis abscess - risky percutaneous window for drainage.    Neurosurgery evaluation noted.     WBC normal range    MSSA bacteremia, discitis T6/7 with phlegmon, T9/10 phlegmon, SI joint arthropathy, sacroiliitis    Suggest:      c/w cefazolin 2 gm iv q 8 h  10/27-     plan for duration 6- 8 weeks     check CRP, ESR    
77 year old female with  SVT s/p ablation, cervical ca s/p hysterectomy, DVT on Eliquis, OA and peripheral neuropathy presented 10/25 after fall at home, rolling off her bed to the floor on her buttocks.  Blood cultures with MSSA. Repeats negative 10/29/22.    Of note, was admitted to [facility] 4/27 - 5/7/2022 for right groin pain and fever found to have staph bacteremia. She states prior to that admission she received a gluteal shot for the groin pain. Imaging with pelvic fracture with hyperattenuation in the R obturator internus muscle, MRI with rim enhancing hematomas. SHAUN performed no vegetation and was discharged on cefazolin completing on 5/29/2022.    Now with MRI spine with T6-7 discitis/ OM with phlegmon, T9-10 phlegmon, SI joint arthropathy/ sacroilitis    MRI Abd/ pelvis with B/L sacroilitis and R piriformis abscess, non- united sacral ala fracture right.    SHAUN 11/7 no vegetation.    Has R ankle rods - no signs of septic arthritis on exam - no erythema, no swelling, no tenderness on movement    IR saw patient for drainage of R piriformis abscess - risky percutaneous window for drainage.    Neurosurgery evaluation noted.     WBC slightly up trending over last few days to 13K.     CXR 11/13 questionable posterior infiltrate.   Bacteremia cleared 10/29.      Constipation.      Suggest:    trend WBC     c/w cefazolin 2 gm iv q 8 h for now  plan for duration 6- 8 weeks         
77 year old female with  SVT s/p ablation, cervical ca s/p hysterectomy, DVT on Eliquis, OA and peripheral neuropathy presented 10/25 after fall at home, rolling off her bed to the floor on her buttocks. L Blood cultures with MSSA. Repeats negative 10/29/22.    Of note, was admitted to [facility] 4/27 - 5/7/2022 for right groin pain and fever found to have staph bacteremia. She states prior to that admission she received a gluteal shot for the groin pain. Imaging with pelvic fracture with hyperattenuation in the R obturator internus muscle, MRI with rim enhancing hematomas. SHAUN performed no vegetation and was discharged on cefazolin completing on 5/29/2022.    Now with MRI spine with T6-7 discitis/ OM with phlegmon, T9-10 phlegmon, SI joint arthropathy/ sacroilitis    MRI Abd/ pelvis with B/L sacroilitis and R piriformis abscess, non- united sacral ala fracture right.    SHAUN 11/7 no vegetation.    Has R ankle rods - no signs of septic arthritis on exam - no erythema, no swelling, no tenderness on movement    IR saw patient for drainage of R piriformis abscess - risky percutaneous window for drainage.    Suggest:    c/w cefazolin 2 gm iv q 8 h   duration 6- 8 weeks   weekly cbc, cmp, CRP, ESR  if sister unable to do q 8 h infusion then alternative option is Nafcillin 12 gm iv to infuse via pump over 24 h  (equivalent to Nafcillin 2 gms iv q 4 h)  --> Dec 8.       ID service available over weekend.  
77 year old female with  SVT s/p ablation, cervical ca s/p hysterectomy, DVT on Eliquis, OA and peripheral neuropathy presented 10/25 after fall at home, rolling off her bed to the floor on her buttocks. L Blood cultures with MSSA. Repeats negative 10/29/22.    Of note, was admitted to [facility] 4/27 - 5/7/2022 for right groin pain and fever found to have staph bacteremia. She states prior to that admission she received a gluteal shot for the groin pain. Imaging with pelvic fracture with hyperattenuation in the R obturator internus muscle, MRI with rim enhancing hematomas. SHAUN performed no vegetation and was discharged on cefazolin completing on 5/29/2022.    Now with MRI spine with T6-7 discitis/ OM with phlegmon, T9-10 phlegmon, SI joint arthropathy/ sacroilitis    MRI Abd/ pelvis with B/L sacroilitis and R piriformis abscess, non united sacral ala fracture right.    SHAUN 11/7 no vegetation.    Has R ankle rods - no signs of septic arthritis on exam - no erythema, no swelling, no tenderness on movement    IR saw patient for drainage of R piriformis abscess - risky percutaneous window for drainage.    Suggest:    c/w cefazolin 2 gm iv q 8 h   duration 6- 8 weeks   weekly cbc, cmp, CRP, ESR  if sister unable to do q 8 h infusion alternative option Nafcillin 12 gm iv to infuse via pump over 24 h   
78 yo F with PMHx significant for hypothyroidism, HTN, HLD, SVT (s/p ablation), Cervical cancer (s/p hysterectomy), DVT (June 2022, still on eliquis), OA and peripheral neuropathy presenting to [facility] after having fall from bed overnight. Patient found to have UTI, MSSA bacteremia
77 year old female with hypothyroidism, HTN, HLD, SVT s/p ablation, cervical ca s/p hysterectomy, DVT on Eliquis, OA and peripheral neuropathy presenting after fall at home, rolling off her bed to the floor on her buttocks. Leukocytosis. Afebrile. Urine culture with >3 organisms. CXR clear. Blood cultures with MSSA. Repeats negative 10/29/22.    Of note, was admitted to [facility] 4/27 - 5/7/2022 for right groin pain and fever found to have staph bacteremia. She states prior to that admission she received a gluteal shot for the groin pain. Imaging with pelvic fracture with hyperattenuation in the R obturator internus muscle, MRI with rim enhancing hematomas. SHAUN performed no vegetation and was discharged on cefazolin completing on 5/29/2022.    Now with MRI spine with T6-7 discitis/ OM with phlegmon, T9-10 phlegmon, SI joint arthropathy/ sacroilitis    MRI Abd/ pelvis with B/L sacroilitis and R piriformis abscess.    Recommend:  #Leukocytosis/ MSSA bacteremia  -Has R ankle rods - no signs of septic arthritis on exam - no erythema, no swelling, no tenderness on movement  -Cefazolin 2 grams IV q 8 hours  -F/U repeat blood cultures - so far no growth to date from 10/29/22  -F/U SHAUN to evaluate for endocarditis  -IR saw patient for drainage of R piriformis abscess - risky percutaneous window for drainage  -Trend WBC  -Monitor for fevers    Eliseo Sutton MD  Available through MS Teams  If no response, or after 5pm/weekends, call [phone]
77 year old female with hypothyroidism, HTN, HLD, SVT s/p ablation, cervical ca s/p hysterectomy, DVT on Eliquis, OA and peripheral neuropathy presenting after fall at home, rolling off her bed to the floor on her buttocks. Leukocytosis. Afebrile. Urine culture with >3 organisms. CXR clear. Blood cultures with MSSA. Repeats negative 10/29/22.    Of note, was admitted to [facility] 4/27 - 5/7/2022 for right groin pain and fever found to have staph bacteremia. She states prior to that admission she received a gluteal shot for the groin pain. Imaging with pelvic fracture with hyperattenuation in the R obturator internus muscle, MRI with rim enhancing hematomas. SHAUN performed no vegetation and was discharged on cefazolin completing on 5/29/2022.    Now with MRI spine with T6-7 discitis/ OM with phlegmon, T9-10 phlegmon, SI joint arthropathy/ sacroilitis    MRI Abd/ pelvis with B/L sacroilitis and R piriformis abscess.    Recommend:  #Leukocytosis/ MSSA bacteremia  -Has R ankle rods - no signs of septic arthritis on exam - no erythema, no swelling, no tenderness on movement  -Cefazolin 2 grams IV q 8 hours  -F/U repeat blood cultures - so far no growth to date from 10/29/22  -SHAUN to evaluate for endocarditis  -IR evaluation for drainage of R piriformis abscess  -Trend WBC  -Monitor for fevers    Eliseo Sutton MD  Available through MS Teams  If no response, or after 5pm/weekends, call [phone]
77 year old female with hypothyroidism, HTN, HLD, SVT s/p ablation, cervical ca s/p hysterectomy, DVT on Eliquis, OA and peripheral neuropathy presenting after fall at home, rolling off her bed to the floor on her buttocks. Leukocytosis. Afebrile. Urine culture with GNR. CXR clear.  Blood cultures with MSSA.    Of note, was admitted to [facility] 4/27 - 5/7/2022 for right groin pain and fever found to have staph bacteremia. She states prior to that admission she received a gluteal shot for the groin pain. Imaging with pelvic fracture with hyperattenuation in the R obturator internus muscle, MRI with rim enhancing hematomas. SHAUN performed no vegetation and was discharged on cefazolin completing on 5/29/2022.    Recommend:  #Leukocytosis/ MSSA bacteremia  -No skin source identified for the MSSA bacteremia  -Has R ankle rods - no signs of septic arthritis on exam - no erythema, no swelling, no tenderness on movement  -Cefazolin 2 grams IV q 8 hours  -F/U repeat blood cultures - so far no growth to date  -TTE, may need SHAUN given MSSA bacteremia again  -Obtain MRI pelvis to further evaluate prior infected hematoma  -MRI T/L/S spine to evaluate for OM given compression fracture  -Trend WBC  -Monitor for fevers    Eliseo Sutton MD  Available through MS Teams  If no response, or after 5pm/weekends, call [phone]
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
76 yo F with PMHx significant for hypothyroidism, HTN, HLD, SVT (s/p ablation), Cervical cancer (s/p hysterectomy), DVT (June 2022, still on eliquis), OA and peripheral neuropathy presenting to [facility] after having fall from bed overnight. Patient found to have UTI and started on IV CTX. CT head with no evidence of bleed, CT C spine and chest no evidence of fractures. Admit to medicine for further evaluation. 
76 yo F with PMHx significant for hypothyroidism, HTN, HLD, SVT (s/p ablation), Cervical cancer (s/p hysterectomy), DVT (June 2022, still on eliquis), OA and peripheral neuropathy presenting to [facility] after having fall from bed overnight. Patient found to have UTI, MSSA bacteremia
78 yo F with PMHx significant for hypothyroidism, HTN, HLD, SVT (s/p ablation), Cervical cancer (s/p hysterectomy), DVT (June 2022, still on eliquis), OA and peripheral neuropathy presenting to [facility] after having fall from bed overnight. Patient found to have UTI, MSSA bacteremia
78 yo F with PMHx significant for hypothyroidism, HTN, HLD, SVT (s/p ablation), Cervical cancer (s/p hysterectomy), DVT (June 2022, still on eliquis), OA and peripheral neuropathy presenting to [facility] after having fall from bed overnight. Patient found to have UTI and started on IV CTX. CT head with no evidence of bleed, CT C spine and chest no evidence of fractures. Admit to medicine for further evaluation. 
78 yo F with PMHx significant for hypothyroidism, HTN, HLD, SVT (s/p ablation), Cervical cancer (s/p hysterectomy), DVT (June 2022, still on eliquis), OA and peripheral neuropathy presenting to [facility] after having fall from bed overnight. Patient found to have UTI and started on IV CTX. CT head with no evidence of bleed, CT C spine and chest no evidence of fractures. Admit to medicine for further evaluation. 
76 yo F with PMHx significant for hypothyroidism, HTN, HLD, SVT (s/p ablation), Cervical cancer (s/p hysterectomy), DVT (June 2022, still on eliquis), OA and peripheral neuropathy presenting to [facility] after having fall from bed overnight. Patient found to have UTI and started on IV CTX. CT head with no evidence of bleed, CT C spine and chest no evidence of fractures. Admit to medicine for further evaluation. 
78 yo F with PMHx significant for hypothyroidism, HTN, HLD, SVT (s/p ablation), Cervical cancer (s/p hysterectomy), DVT (June 2022, still on eliquis), OA and peripheral neuropathy presenting to [facility] after having fall from bed overnight. Patient found to have UTI and started on IV CTX. CT head with no evidence of bleed, CT C spine and chest no evidence of fractures. Admit to medicine for further evaluation. 
78 yo F with PMHx significant for hypothyroidism, HTN, HLD, SVT (s/p ablation), Cervical cancer (s/p hysterectomy), DVT (June 2022, still on eliquis), OA and peripheral neuropathy presenting to [facility] after having fall from bed overnight. Patient found to have UTI, MSSA bacteremia
76 yo F with PMHx significant for hypothyroidism, HTN, HLD, SVT (s/p ablation), Cervical cancer (s/p hysterectomy), DVT (June 2022, still on eliquis), OA and peripheral neuropathy presenting to [facility] after having fall from bed overnight. Patient found to have UTI and started on IV CTX. CT head with no evidence of bleed, CT C spine and chest no evidence of fractures. Admit to medicine for further evaluation. 
78 yo F with PMHx significant for hypothyroidism, HTN, HLD, SVT (s/p ablation), Cervical cancer (s/p hysterectomy), DVT (June 2022, still on eliquis), OA and peripheral neuropathy presenting to [facility] after having fall from bed overnight. Patient found to have UTI, MSSA bacteremia
78 yo F with PMHx significant for hypothyroidism, HTN, HLD, SVT (s/p ablation), Cervical cancer (s/p hysterectomy), DVT (June 2022, still on eliquis), OA and peripheral neuropathy presenting to [facility] after having fall from bed overnight. Patient found to have UTI and started on IV CTX. CT head with no evidence of bleed, CT C spine and chest no evidence of fractures. Admit to medicine for further evaluation. 
76 yo F with PMHx significant for hypothyroidism, HTN, HLD, SVT (s/p ablation), Cervical cancer (s/p hysterectomy), DVT (June 2022, still on eliquis), OA and peripheral neuropathy presenting to [facility] after having fall from bed overnight. Patient found to have UTI and started on IV CTX. CT head with no evidence of bleed, CT C spine and chest no evidence of fractures. Admit to medicine for further evaluation. 
76 yo F with PMHx significant for hypothyroidism, HTN, HLD, SVT (s/p ablation), Cervical cancer (s/p hysterectomy), DVT (June 2022, still on eliquis), OA and peripheral neuropathy presenting to [facility] after having fall from bed overnight. Patient found to have UTI, MSSA bacteremia
76 yo F with PMHx significant for hypothyroidism, HTN, HLD, SVT (s/p ablation), Cervical cancer (s/p hysterectomy), DVT (June 2022, still on eliquis), OA and peripheral neuropathy presenting to [facility] after having fall from bed overnight. Patient found to have UTI and started on IV CTX. CT head with no evidence of bleed, CT C spine and chest no evidence of fractures. Admit to medicine for further evaluation. 
78 yo F with PMHx significant for hypothyroidism, HTN, HLD, SVT (s/p ablation), Cervical cancer (s/p hysterectomy), DVT (June 2022, still on eliquis), OA and peripheral neuropathy presenting to [facility] after having fall from bed overnight. Patient found to have UTI and started on IV CTX. CT head with no evidence of bleed, CT C spine and chest no evidence of fractures. Admit to medicine for further evaluation. 
78 yo F with PMHx significant for hypothyroidism, HTN, HLD, SVT (s/p ablation), Cervical cancer (s/p hysterectomy), DVT (June 2022, still on eliquis), OA and peripheral neuropathy presenting to [facility] after having fall from bed overnight. Patient found to have UTI, MSSA bacteremia
78 yo F with PMHx significant for hypothyroidism, HTN, HLD, SVT (s/p ablation), Cervical cancer (s/p hysterectomy), DVT (June 2022, still on eliquis), OA and peripheral neuropathy presenting to [facility] after having fall from bed overnight. Patient found to have UTI, MSSA bacteremia
76 yo F with PMHx significant for hypothyroidism, HTN, HLD, SVT (s/p ablation), Cervical cancer (s/p hysterectomy), DVT (June 2022, still on eliquis), OA and peripheral neuropathy presenting to [facility] after having fall from bed overnight. Patient found to have UTI and started on IV CTX. CT head with no evidence of bleed, CT C spine and chest no evidence of fractures. Admit to medicine for further evaluation. 
78 yo F with PMHx significant for hypothyroidism, HTN, HLD, SVT (s/p ablation), Cervical cancer (s/p hysterectomy), DVT (June 2022, still on eliquis), OA and peripheral neuropathy presenting to [facility] after having fall from bed overnight. Patient found to have UTI, MSSA bacteremia
76 yo F with PMHx significant for hypothyroidism, HTN, HLD, SVT (s/p ablation), Cervical cancer (s/p hysterectomy), DVT (June 2022, still on eliquis), OA and peripheral neuropathy presenting to [facility] after having fall from bed overnight. Patient found to have UTI and started on IV CTX. CT head with no evidence of bleed, CT C spine and chest no evidence of fractures. Admit to medicine for further evaluation. 
78 yo F with PMHx significant for hypothyroidism, HTN, HLD, SVT (s/p ablation), Cervical cancer (s/p hysterectomy), DVT (June 2022, still on eliquis), OA and peripheral neuropathy presenting to [facility] after having fall from bed overnight. Patient found to have UTI and started on IV CTX. CT head with no evidence of bleed, CT C spine and chest no evidence of fractures. Admit to medicine for further evaluation. 
78 yo F with PMHx significant for hypothyroidism, HTN, HLD, SVT (s/p ablation), Cervical cancer (s/p hysterectomy), DVT (June 2022, still on eliquis), OA and peripheral neuropathy presenting to [facility] after having fall from bed overnight. Patient found to have UTI and started on IV CTX. CT head with no evidence of bleed, CT C spine and chest no evidence of fractures. Admit to medicine for further evaluation. 
78 yo F with PMHx significant for hypothyroidism, HTN, HLD, SVT (s/p ablation), Cervical cancer (s/p hysterectomy), DVT (June 2022, still on eliquis), OA and peripheral neuropathy presenting to [facility] after having fall from bed overnight. Patient found to have UTI, MSSA bacteremia
76 yo F with PMHx significant for hypothyroidism, HTN, HLD, SVT (s/p ablation), Cervical cancer (s/p hysterectomy), DVT (June 2022, still on eliquis), OA and peripheral neuropathy presenting to [facility] after having fall from bed overnight. Patient found to have UTI and started on IV CTX. CT head with no evidence of bleed, CT C spine and chest no evidence of fractures. Admit to medicine for further evaluation. 
76 yo F with PMHx significant for hypothyroidism, HTN, HLD, SVT (s/p ablation), Cervical cancer (s/p hysterectomy), DVT (June 2022, still on eliquis), OA and peripheral neuropathy presenting to [facility] after having fall from bed overnight. Patient found to have UTI, MSSA bacteremia
78 yo F with PMHx significant for hypothyroidism, HTN, HLD, SVT (s/p ablation), Cervical cancer (s/p hysterectomy), DVT (June 2022, still on eliquis), OA and peripheral neuropathy presenting to [facility] after having fall from bed overnight. Patient found to have UTI and started on IV CTX. CT head with no evidence of bleed, CT C spine and chest no evidence of fractures. Admit to medicine for further evaluation. 
76 yo F with PMHx significant for hypothyroidism, HTN, HLD, SVT (s/p ablation), Cervical cancer (s/p hysterectomy), DVT (June 2022, still on eliquis), OA and peripheral neuropathy presenting to [facility] after having fall from bed overnight. Patient found to have UTI, MSSA bacteremia

## 2022-11-25 NOTE — PROGRESS NOTE ADULT - PROBLEM SELECTOR PROBLEM 7
KATHERINE (acute kidney injury)
Hypothyroidism
KATHERINE (acute kidney injury)
HTN (hypertension)
Hypothyroidism
HTN (hypertension)
Hypothyroidism
KATHERINE (acute kidney injury)
HTN (hypertension)
HTN (hypertension)
KATHERINE (acute kidney injury)
KATHERINE (acute kidney injury)
HTN (hypertension)
Hypothyroidism
Hypothyroidism
KATHERINE (acute kidney injury)
HTN (hypertension)
Hypothyroidism
KATHERINE (acute kidney injury)
KATHERINE (acute kidney injury)
Hypothyroidism
KATHERINE (acute kidney injury)
KATHERINE (acute kidney injury)
Hypothyroidism
KATHERINE (acute kidney injury)
Hypothyroidism
KATHERINE (acute kidney injury)
Hypothyroidism
KATHERINE (acute kidney injury)

## 2022-11-25 NOTE — PROGRESS NOTE ADULT - PROBLEM SELECTOR PLAN 12
- Present since patient had covid several months ago. RVP negative. CXR clear. CT chest with atelectasis. Crackles heard on exam is consistent with atelectasis. It is likely the cough she has is 2/2 atelectasis. No other pathology seen on CT scan that would cause cough other than atelectasis.   - Encouraged ICS   - C/w nebulizer treatments   - Cough suppressants PRN.
- Present since patient had covid several months ago. RVP negative. CXR clear. CT chest with atelectasis. Crackles heard on exam is consistent with atelectasis. It is likely the cough she has is 2/2 atelectasis. No other pathology seen on CT scan that would cause cough other than atelectasis.   - Encouraged ICS   - C/w nebulizer treatments   - Cough suppressants PRN.
- Recent DVT in 06/22 - started on eliquis  - holding eliquis for now, VTE prophylaxis with heparin 5000 sq q8  - B/L LE swelling present, per patient same amount of swelling as normal, but tender to palpation b/l calves  - B/L LE doppler for DVT negative   - D/c Eliquis given completion of treatment and no evidence of DVT on u/s.
- Present since patient had covid several months ago. RVP negative. CXR clear. CT chest with atelectasis. Crackles heard on exam is consistent with atelectasis. It is likely the cough she has is 2/2 atelectasis. No other pathology seen on CT scan that would cause cough other than atelectasis.   - Encouraged ICS   - C/w nebulizer treatments   - Cough suppressants PRN.
- Recent DVT in 06/22 - started on eliquis  - holding eliquis for now, VTE prophylaxis with heparin 5000 sq q8  - B/L LE swelling present, per patient same amount of swelling as normal, but tender to palpation b/l calves  - B/L LE doppler for DVT negative   - D/c Eliquis given completion of treatment and no evidence of DVT on u/s.
VTE: Hep SQ 5000 q8hr  Diet: passed dysphagia screen, starting regular diet  Electrolytes: replenish as needed  Activity: ambulate with assistance, pending PT/OT
- Recent DVT in 06/22 - started on eliquis  - holding eliquis for now, VTE prophylaxis with heparin 5000 sq q8  - B/L LE swelling present, per patient same amount of swelling as normal, but tender to palpation b/l calves  - B/L LE doppler for DVT negative   - D/c Eliquis given completion of treatment and no evidence of DVT on u/s.
- Present since patient had covid several months ago. RVP negative. CXR clear. CT chest with atelectasis. Crackles heard on exam is consistent with atelectasis. It is likely the cough she has is 2/2 atelectasis. No other pathology seen on CT scan that would cause cough other than atelectasis.   - Encouraged ICS   - C/w nebulizer treatments   - Cough suppressants PRN.
- Recent DVT in 06/22 - started on eliquis  - holding eliquis for now, VTE prophylaxis with heparin 5000 sq q8  - B/L LE swelling present, per patient same amount of swelling as normal, but tender to palpation b/l calves  - B/L LE doppler for DVT negative   - D/c Eliquis given completion of treatment and no evidence of DVT on u/s.
- Recent DVT in 06/22 - started on eliquis  - holding eliquis for now, VTE prophylaxis with heparin 5000 sq q8  - B/L LE swelling present, per patient same amount of swelling as normal, but tender to palpation b/l calves  - B/L LE doppler for DVT negative   - D/c Eliquis given completion of treatment and no evidence of DVT on u/s.
- Present since patient had covid several months ago. RVP negative. CXR clear. CT chest with atelectasis. Crackles heard on exam is consistent with atelectasis. It is likely the cough she has is 2/2 atelectasis. No other pathology seen on CT scan that would cause cough other than atelectasis.   - Encouraged ICS   - C/w nebulizer treatments   - Cough suppressants PRN.

## 2022-11-25 NOTE — PROGRESS NOTE ADULT - PROBLEM SELECTOR PLAN 4
- Baseline HgB from 06/22 --> 10-11  - 9.1/ 28.9 on admission  - Per outpatient NP - 10/23 HgB 8.7, evidence of MAR  - Folate/ b12 WNL, iron panel pending   - Monitor CBC  - Keep active T+S
- Pelvic Xray - No acute fractures or dislocations, evidence of nonhealing fractures seen on prior imaging. Bilateral hip joint arthrosis present.   - CXR - No acute displaced rib fracture. Chronic right rib deformities.  - CT Head/ C spine -- > no acute intracranial hemorrhage, territorial infarct, mass effect or calvarial fracture. Evidence of multilevel degenerative changes, no fracture  - CT chest non cont - Possible acute/subacute compression fracture d/t mild loss of height at T7  - Neurosurgery consulted, NTD. No brace needed. Outpatient follow up.   - Activity - ambulate with assistance   - Pain control with tylenol and tramadol   - PT recommending AGNIESZKA, however the patient has used up all her days as per SW/CM
- Pelvic Xray - No acute fractures or dislocations, evidence of nonhealing fractures seen on prior imaging. Bilateral hip joint arthrosis present.   - CXR - No acute displaced rib fracture. Chronic right rib deformities.  - CT Head/ C spine -- > no acute intracranial hemorrhage, territorial infarct, mass effect or calvarial fracture. Evidence of multilevel degenerative changes, no fracture  - CT chest non cont - Possible acute/subacute compression fracture d/t mild loss of height at T7  - Neurosurgery consulted, NTD. No brace needed. Outpatient follow up.   - Activity - ambulate with assistance   - PT recommending AGNIESZKA, however the patient has used up all her days as per SW/CM  - Pain regimen: Increase Oxycodone to 10mg q4 prn (severe)  tramadol 50mg q4 prn (mod pain), tylenol around the clock for 2 days (completed) - now will get tylenol PRN pain, gabapentin 300mg TID.
Will consult spine   D/W ID, will continue antibiotics, she will need SHAUN.  Though requesting improvement with her sodium. Patient has been chronically hyponatremic
- Pelvic Xray - No acute fractures or dislocations, evidence of nonhealing fractures seen on prior imaging. Bilateral hip joint arthrosis present.   - CXR - No acute displaced rib fracture. Chronic right rib deformities.  - CT Head/ C spine -- > no acute intracranial hemorrhage, territorial infarct, mass effect or calvarial fracture. Evidence of multilevel degenerative changes, no fracture  - CT chest non cont - Possible acute/subacute compression fracture d/t mild loss of height at T7  - Neurosurgery consulted, NTD. No brace needed. Outpatient follow up.   - Activity - ambulate with assistance   - PT recommending AGNIESZKA, however the patient has used up all her days as per SW/CM  - Pain regimen: Discussed with pain management service. Rec increasing gabapentin to 600mg TID, cont  Oxycodone to 10mg q4 prn (severe) and Tylenol. If no improvement in pain tomorrow can add MS contin 15mg q12
Spine consulted--no intervention  D/W ID, will continue antibiotics  SHAUN unremarkable
Will consult spine   D/W ID, will continue antibiotics, she will need SHAUN.  Though requesting improvement with her sodium. Patient has been chronically hyponatremic
- Pelvic Xray - No acute fractures or dislocations, evidence of nonhealing fractures seen on prior imaging. Bilateral hip joint arthrosis present.   - CXR - No acute displaced rib fracture. Chronic right rib deformities.  - CT Head/ C spine -- > no acute intracranial hemorrhage, territorial infarct, mass effect or calvarial fracture. Evidence of multilevel degenerative changes, no fracture  - CT chest non cont - Possible acute/subacute compression fracture d/t mild loss of height at T7  - Neurosurgery consulted, NTD. No brace needed. Outpatient follow up.   - Activity - ambulate with assistance   - Pain control with tylenol and tramadol   - PT recommending AGNIESZKA, however the patient has used up all her days as per SW/CM
- Baseline HgB from 06/22 --> 10-11  - 9.1/ 28.9 on admission  - Per outpatient NP - 10/23 HgB 8.7, evidence of MAR  - Folate/ b12 WNL, iron panel pending   - Monitor CBC  - Keep active T+S
Will consult spine   D/W ID, will continue antibiotics, she will need SHAUN.  Though requesting improvement with her sodium. Patient has been chronically hyponatremic
- Pelvic Xray - No acute fractures or dislocations, evidence of nonhealing fractures seen on prior imaging. Bilateral hip joint arthrosis present.   - CXR - No acute displaced rib fracture. Chronic right rib deformities.  - CT Head/ C spine -- > no acute intracranial hemorrhage, territorial infarct, mass effect or calvarial fracture. Evidence of multilevel degenerative changes, no fracture  - CT chest non cont - Possible acute/subacute compression fracture d/t mild loss of height at T7  - Neurosurgery consulted, NTD. No brace needed. Outpatient follow up.   - Activity - ambulate with assistance   - PT recommending AGNIESZKA, however the patient has used up all her days as per SW/CM  - Pain regimen: Increase Oxycodone to 10mg q4 prn (severe)  tramadol 50mg q4 prn (mod pain), tylenol around the clock for 2 days (completed) - now will get tylenol PRN pain, gabapentin 300mg TID.
- Pelvic Xray - No acute fractures or dislocations, evidence of nonhealing fractures seen on prior imaging. Bilateral hip joint arthrosis present.   - CXR - No acute displaced rib fracture. Chronic right rib deformities.  - CT Head/ C spine -- > no acute intracranial hemorrhage, territorial infarct, mass effect or calvarial fracture. Evidence of multilevel degenerative changes, no fracture  - CT chest non cont - Possible acute/subacute compression fracture d/t mild loss of height at T7  - Neurosurgery consulted, NTD. No brace needed. Outpatient follow up.   - Activity - ambulate with assistance   - PT recommending AGNIESZKA, however the patient has used up all her days as per SW/CM  - Patient has significant pain, was on tramadol 50 and 100mg. Still having pain. Will trial oxycodone 5mg q4 hours PRN and assess for improvement. Yesterday oxycodone dose was not changed to 2.5mg. Will trial the higher dose today. Also, increasing the frequency of gabapentin from BID to TID. If no improvement - will consider pain management consult.
- Pelvic Xray - No acute fractures or dislocations, evidence of nonhealing fractures seen on prior imaging. Bilateral hip joint arthrosis present.   - CXR - No acute displaced rib fracture. Chronic right rib deformities.  - CT Head/ C spine -- > no acute intracranial hemorrhage, territorial infarct, mass effect or calvarial fracture. Evidence of multilevel degenerative changes, no fracture  - CT chest non cont - Possible acute/subacute compression fracture d/t mild loss of height at T7  - Neurosurgery consulted, NTD. No brace needed. Outpatient follow up.   - Activity - ambulate with assistance   - PT recommending AGNIESZKA, however the patient has used up all her days as per SW/CM  - Pain regimen: Discussed with pain management service. Rec increasing gabapentin to 600mg TID, cont  Oxycodone to 10mg q4 prn (severe) and Tylenol. If no improvement in pain tomorrow can add MS contin 15mg q12
- Pelvic Xray - No acute fractures or dislocations, evidence of nonhealing fractures seen on prior imaging. Bilateral hip joint arthrosis present.   - CXR - No acute displaced rib fracture. Chronic right rib deformities.  - CT Head/ C spine -- > no acute intracranial hemorrhage, territorial infarct, mass effect or calvarial fracture. Evidence of multilevel degenerative changes, no fracture  - CT chest non cont - Possible acute/subacute compression fracture d/t mild loss of height at T7  - Neurosurgery consulted, NTD. No brace needed. Outpatient follow up.   - Activity - ambulate with assistance   - Pain control with tylenol and tramadol   - PT recommending AGNIESZKA, however the patient has used up all her days as per SW/CM
- Pelvic Xray - No acute fractures or dislocations, evidence of nonhealing fractures seen on prior imaging. Bilateral hip joint arthrosis present.   - CXR - No acute displaced rib fracture. Chronic right rib deformities.  - CT Head/ C spine -- > no acute intracranial hemorrhage, territorial infarct, mass effect or calvarial fracture. Evidence of multilevel degenerative changes, no fracture  - CT chest non cont - Possible acute/subacute compression fracture d/t mild loss of height at T7  - Neurosurgery consulted, NTD. No brace needed. Outpatient follow up.   - Activity - ambulate with assistance   - PT recommending AGNIESZKA, however the patient has used up all her days as per SW/CM  - Patient has significant pain, was on tramadol 50 and 100mg. Still having pain. Will trial oxycodone 5mg q4 hours PRN and assess for improvement. If no improvement - will consider pain management consult.
- Baseline HgB from 06/22 --> 10-11  - 9.1/ 28.9 on admission  - Per outpatient NP - 10/23 HgB 8.7, evidence of MAR  - Folate/ b12 WNL, iron panel pending   - Monitor CBC  - Keep active T+S
Spine consulted  D/W ID, will continue antibiotics,   SHAUN unremarkable
- Baseline HgB from 06/22 --> 10-11  - 9.1/ 28.9 on admission  - Per outpatient NP - 10/23 HgB 8.7, evidence of MAR  - Folate/ b12 WNL, iron panel pending   - Monitor CBC  - Keep active T+S
Will consult spine   D/W ID, will continue antibiotics, she will need SHAUN.  Though requesting improvement with her sodium. Patient has been chronically hyponatremic
- Pelvic Xray - No acute fractures or dislocations, evidence of nonhealing fractures seen on prior imaging. Bilateral hip joint arthrosis present.   - CXR - No acute displaced rib fracture. Chronic right rib deformities.  - CT Head/ C spine -- > no acute intracranial hemorrhage, territorial infarct, mass effect or calvarial fracture. Evidence of multilevel degenerative changes, no fracture  - CT chest non cont - Possible acute/subacute compression fracture d/t mild loss of height at T7  - Neurosurgery consulted, NTD. No brace needed. Outpatient follow up.   - Activity - ambulate with assistance   - PT recommending AGNIESZKA, however the patient has used up all her days as per SW/CM  - Pain regimen: Oxycodone 5mg q4 prn (severe)  tramadol 50mg q4 prn (mod pain), tylenol around the clock for 2 days (completed) - now will get tylenol PRN pain, gabapentin 300mg TID.
- Baseline HgB from 06/22 --> 10-11  - 9.1/ 28.9 on admission  - Per outpatient NP - 10/23 HgB 8.7, evidence of MAR  - Folate/ b12 WNL, iron panel pending   - Monitor CBC  - Keep active T+S
Spine consulted  D/W ID, will continue antibiotics, Follow up SHAUN results
Will consult spine   D/W ID, will continue antibiotics, she may need SHAUN.
- Pelvic Xray - No acute fractures or dislocations, evidence of nonhealing fractures seen on prior imaging. Bilateral hip joint arthrosis present.   - CXR - No acute displaced rib fracture. Chronic right rib deformities.  - CT Head/ C spine -- > no acute intracranial hemorrhage, territorial infarct, mass effect or calvarial fracture. Evidence of multilevel degenerative changes, no fracture  - CT chest non cont - Possible acute/subacute compression fracture d/t mild loss of height at T7  - Neurosurgery consulted, NTD. No brace needed. Outpatient follow up.   - Activity - ambulate with assistance   - PT recommending AGNIESZKA, however the patient has used up all her days as per SW/CM  - Pain regimen: Oxycodone 5mg q4 prn (severe)  tramadol 50mg q4 prn (mod pain), tylenol around the clock for 2 days (completed) - now will get tylenol PRN pain, gabapentin 300mg TID.
Will consult spine   D/W ID, will continue antibiotics, she may need SHAUN.
- Baseline HgB from 06/22 --> 10-11  - 9.1/ 28.9 on admission  - Per outpatient NP - 10/23 HgB 8.7, evidence of MAR  - Folate/ b12 WNL, iron panel pending   - Monitor CBC  - Keep active T+S
- Pelvic Xray - No acute fractures or dislocations, evidence of nonhealing fractures seen on prior imaging. Bilateral hip joint arthrosis present.   - CXR - No acute displaced rib fracture. Chronic right rib deformities.  - CT Head/ C spine -- > no acute intracranial hemorrhage, territorial infarct, mass effect or calvarial fracture. Evidence of multilevel degenerative changes, no fracture  - CT chest non cont - Possible acute/subacute compression fracture d/t mild loss of height at T7  - Neurosurgery consulted, NTD. No brace needed. Outpatient follow up.   - Activity - ambulate with assistance   - PT recommending AGNIESZKA, however the patient has used up all her days as per SW/CM  - Pain regimen: Oxycodone 5mg q4 prn (severe)  tramadol 50mg q4 prn (mod pain), tylenol around the clock for 2 days (completed) - now will get tylenol PRN pain, gabapentin 300mg TID.
Spine consulted  D/W ID, will continue antibiotics,   SHAUN unremarkable
Spine consulted  D/W ID, will continue antibiotics,   SHAUN unremarkable
- Pelvic Xray - No acute fractures or dislocations, evidence of nonhealing fractures seen on prior imaging. Bilateral hip joint arthrosis present.   - CXR - No acute displaced rib fracture. Chronic right rib deformities.  - CT Head/ C spine -- > no acute intracranial hemorrhage, territorial infarct, mass effect or calvarial fracture. Evidence of multilevel degenerative changes, no fracture  - CT chest non cont - Possible acute/subacute compression fracture d/t mild loss of height at T7  - Neurosurgery consulted, NTD. No brace needed. Outpatient follow up.   - Activity - ambulate with assistance   - PT recommending AGNIESZKA, however the patient has used up all her days as per SW/CM  - Pain regimen: Oxycodone 5mg q4 prn (severe)  tramadol 50mg q4 prn (mod pain), tylenol around the clock for 2 days, gabapentin 300mg TID.
Spine consulted  D/W ID, will continue antibiotics,   SHAUN unremarkable

## 2022-11-25 NOTE — PROGRESS NOTE ADULT - PROBLEM SELECTOR PLAN 1
BCx positive for MSSA bacteremia. ID consulted. Started on cefazolin. Prior admission pt also had MSSA bacteremia. SHAUN performed and did not show any vegetations. Surveillance cultures on 10/27 positive.   - F/u surveillance cultures from 10/29  - MRI AP w/ contrast, MRI T/L spine with contrast. MRI thus far shows discitis/OM in T6/T7 and phlegmon. Also abscess in the right piriformis muscle on MR A/P. Spinal consult placed. Discussed with ID.   For right piriformis abscess, IR was consulted, no intervention at this time, as collection looks small with poor windows. Follow up scan were recommended  - C/w cefazolin for now while inpatient.  - Surveillance cultures q48 hours until cleared. Thus far ngtd from 10/29 - start date for antibiotics 10/27.   SHAUN unremarkable, ID recommending cefazolin 2 mg IV q8 for 6-8 weeks in duration, w/weekly blood work. Pt. lives alone and could not administer this medication herself. Insurance coverage has run out for AGNIESZKA days. Dispo likely to [facility], [facility] awaiting choices from family
BCx positive for MSSA bacteremia. ID consulted. Started on cefazolin. Prior admission pt also had MSSA bacteremia. SHAUN performed and did not show any vegetations. Surveillance cultures on 10/27 positive.   - F/u surveillance cultures from 10/29  - MRI AP w/ contrast, MRI T/L spine with contrast. MRI thus far shows discitis/OM in T6/T7 and phlegmon. Also abscess in the right piriformis muscle on MR A/P. Spinal consult placed. Discussed with ID.   For right piriformis abscess, IR was consulted, no intervention at this time, as collection looks small with poor windows. Follow up scan were recommended  - C/w cefazolin for now while inpatient.  - Surveillance cultures q48 hours until cleared. Thus far ngtd from 10/29 - start date for antibiotics 10/27.   SHAUN unremarkable, ID recommending cefazolin 2 mg IV q8 for 6-8 weeks in duration, w/weekly blood work. Pt. lives alone and could not administer this medication herself. Dispo to rehab
BCx positive for MSSA bacteremia. ID consulted. Started on cefazolin. Prior admission pt also had MSSA bacteremia. NAMITA performed and did not show any vegetations. Surveillance cultures on 10/27 positive.   - F/u surveillance cultures from 10/29  - ID recommending: namita, MRI AP w/ contrast, MRI T/L spine with contrast. MRI thus far shows discitis/OM in T6/T7 and phlegmon in phlegmon. Also abscess in the right piriformis muscle on MR A/P. Spinal consult placed. Discussed with ID.   For right piriformis abscess, IR was consulted, no intervention at this time, as collection looks small with poor windows. Follow up scan were recommended  - C/w cefazolin   - Surveillance cultures q48 hours until cleared. Thus far ngtd from 10/29  NAMITA unremarkable, ID recommending cefazolin 2 mg IV q* for 6-8 weeks in duration, w/weekly blood work
BCx positive for MSSA bacteremia. ID consulted. Started on cefazolin. Prior admission pt also had MSSA bacteremia. SHAUN performed and did not show any vegetations. Surveillance cultures on 10/27 positive.   - F/u surveillance cultures from 10/29  - MRI AP w/ contrast, MRI T/L spine with contrast. MRI thus far shows discitis/OM in T6/T7 and phlegmon. Also abscess in the right piriformis muscle on MR A/P. Spinal consult placed. Discussed with ID.   For right piriformis abscess, IR was consulted, no intervention at this time, as collection looks small with poor windows. Follow up scan were recommended  - C/w cefazolin for now while inpatient.  - Surveillance cultures q48 hours until cleared. Thus far ngtd from 10/29  SHAUN unremarkable, ID recommending cefazolin 2 mg IV q8 for 6-8 weeks in duration, w/weekly blood work. Pt. lives alone and could not administer this medication herself. Insurance coverage has run out for AGNIESZKA days. Dispo remains unclear at this time.
BCx positive for MSSA bacteremia. ID consulted. Started on cefazolin. Prior admission pt also had MSSA bacteremia. SHAUN performed and did not show any vegetations. Surveillance cultures on 10/27 positive.   - F/u surveillance cultures from 10/29  - ID recommending: tte, MRI AP w/ contrast, MRI T/L spine with contrast. MRI thus far shows discitis/OM in T6/T7 and phlegmon in phlegmon. Spinal consult to be placed, also follow up ID for further recs  - C/w cefazolin   - Surveillance cultures q48 hours until cleared. Thus far ngtd from 10/29
BCx positive for MSSA bacteremia. ID consulted. Started on cefazolin. Prior admission pt also had MSSA bacteremia. SHAUN performed and did not show any vegetations. Surveillance cultures on 10/27 positive.   - F/u surveillance cultures from 10/29  - MRI AP w/ contrast, MRI T/L spine with contrast. MRI thus far shows discitis/OM in T6/T7 and phlegmon. Also abscess in the right piriformis muscle on MR A/P. Spinal consult placed. Discussed with ID.   For right piriformis abscess, IR was consulted, no intervention at this time, as collection looks small with poor windows. Follow up scan were recommended  - C/w cefazolin for now while inpatient.  - Surveillance cultures q48 hours until cleared. Thus far ngtd from 10/29  SHAUN unremarkable, ID recommending cefazolin 2 mg IV q8 for 6-8 weeks in duration, w/weekly blood work. However, I discussed with the patient's sister who is offering to assist with the administration of the antibiotics and she stated that she would prefer to administer the antibiotics only once a day. I discussed with Dr. Dove (ID) who states that we may be able to transition to nafcillin continuous infusion for continued outpatient treatment.  f/u case management for d/c planning
- No acute neurosurgical intervetion  - D/w Patient to notify provider if patient has any paresthesias, LE weakness, bowel or bladder incontience  - Continue management or ID  - Please obtain ESR, CRP for next blood mark  - Will order TLSO brace FOR COMFORT which pain aid in back pain/chest pain  - Will obtained Xray in 3-4 weeks of spine to ensure patient is not developing a kyphosis
BCx positive for MSSA bacteremia. ID consulted. Started on cefazolin. Prior admission pt also had MSSA bacteremia. SHAUN performed and did not show any vegetations. Surveillance cultures on 10/27 positive.   - F/u surveillance cultures from 10/29  - MRI AP w/ contrast, MRI T/L spine with contrast. MRI thus far shows discitis/OM in T6/T7 and phlegmon. Also abscess in the right piriformis muscle on MR A/P. Spinal consult placed. Discussed with ID.   For right piriformis abscess, IR was consulted, no intervention at this time, as collection looks small with poor windows. Follow up scan were recommended  - C/w cefazolin for now while inpatient.  - Surveillance cultures q48 hours until cleared. Thus far ngtd from 10/29 - start date for antibiotics 10/27.   SHAUN unremarkable, ID recommending cefazolin 2 mg IV q8 for 6-8 weeks in duration, w/weekly blood work. Pt. lives alone and could not administer this medication herself. Insurance coverage has run out for AGNIESZKA days. Dispo remains unclear at this time.
BCx positive for MSSA bacteremia. ID consulted. Started on cefazolin. Prior admission pt also had MSSA bacteremia. SHAUN performed and did not show any vegetations. Surveillance cultures on 10/27 positive.   - F/u surveillance cultures from 10/29  - MRI AP w/ contrast, MRI T/L spine with contrast. MRI thus far shows discitis/OM in T6/T7 and phlegmon. Also abscess in the right piriformis muscle on MR A/P. Spinal consult placed. Discussed with ID.   For right piriformis abscess, IR was consulted, no intervention at this time, as collection looks small with poor windows. Follow up scan were recommended  - C/w cefazolin for now while inpatient.  - Surveillance cultures q48 hours until cleared. Thus far ngtd from 10/29 - start date for antibiotics 10/27.   SHAUN unremarkable, ID recommending cefazolin 2 mg IV q8 for 6-8 weeks in duration, w/weekly blood work. Pt. lives alone and could not administer this medication herself. Dispo to rehab
BCx positive for MSSA bacteremia. ID consulted. Started on cefazolin. Prior admission pt also had MSSA bacteremia. SHAUN performed and did not show any vegetations. Surveillance cultures on 10/27 positive.   - F/u surveillance cultures from 10/29  - MRI AP w/ contrast, MRI T/L spine with contrast. MRI thus far shows discitis/OM in T6/T7 and phlegmon. Also abscess in the right piriformis muscle on MR A/P. Spinal consult placed. Discussed with ID.   For right piriformis abscess, IR was consulted, no intervention at this time, as collection looks small with poor windows. Follow up scan were recommended  - C/w cefazolin for now while inpatient.  - Surveillance cultures q48 hours until cleared. Thus far ngtd from 10/29 - start date for antibiotics 10/27.   SHAUN unremarkable, ID recommending cefazolin 2 mg IV q8 for 6-8 weeks in duration, w/weekly blood work. Pt. lives alone and could not administer this medication herself. Insurance coverage has run out for AGNIESZ[place] days. Dispo remains unclear at this time.
BCx positive for MSSA bacteremia. ID consulted. Started on cefazolin. Prior admission pt also had MSSA bacteremia. SHAUN performed and did not show any vegetations. Surveillance cultures on 10/27 positive.   - F/u surveillance cultures from 10/29  - ID recommending: tte, MRI AP w/ contrast, MRI T/L spine with contrast.  - C/w cefazolin   - Surveillance cultures q48 hours until cleared.
BCx positive for MSSA bacteremia. ID consulted. Started on cefazolin. Prior admission pt also had MSSA bacteremia. SHAUN performed and did not show any vegetations.   - ID recommending: tte, MRI AP w/ contrast, MRI T/L spine with contrast.  - C/w cefazolin   - Surveillance cultures q48 hours until cleared.
BCx positive for MSSA bacteremia. ID consulted. Started on cefazolin. Prior admission pt also had MSSA bacteremia. NAMITA performed and did not show any vegetations. Surveillance cultures on 10/27 positive.   - F/u surveillance cultures from 10/29  - ID recommending: namita, MRI AP w/ contrast, MRI T/L spine with contrast. MRI thus far shows discitis/OM in T6/T7 and phlegmon in phlegmon. Also abscess in the right piriformis muscle on MR A/P. Spinal consult placed. Discussed with ID.   For right piriformis abscess, IR was consulted, no intervention at this time, as collection looks small with poor windows. Follow up scan were recommended  - C/w cefazolin   - Surveillance cultures q48 hours until cleared. Thus far ngtd from 10/29
BCx positive for MSSA bacteremia. ID consulted. Started on cefazolin. Prior admission pt also had MSSA bacteremia. NAMITA performed and did not show any vegetations. Surveillance cultures on 10/27 positive.   - F/u surveillance cultures from 10/29  - ID recommending: namita, MRI AP w/ contrast, MRI T/L spine with contrast. MRI thus far shows discitis/OM in T6/T7 and phlegmon in phlegmon. Also abscess in the right piriformis muscle on MR A/P. Spinal consult placed. Discussed with ID.   For right piriformis abscess, IR was consulted, no intervention at this time, as collection looks small with poor windows. Follow up scan were recommended  - C/w cefazolin   - Surveillance cultures q48 hours until cleared. Thus far ngtd from 10/29
BCx positive for MSSA bacteremia. ID consulted. Started on cefazolin. Prior admission pt also had MSSA bacteremia. NAMITA performed and did not show any vegetations. Surveillance cultures on 10/27 positive.   - F/u surveillance cultures from 10/29  - ID recommending: namita, MRI AP w/ contrast, MRI T/L spine with contrast. MRI thus far shows discitis/OM in T6/T7 and phlegmon in phlegmon. Also abscess in the right piriformis muscle on MR A/P. Spinal consult placed. Discussed with ID.   For right piriformis abscess, IR was consulted, no intervention at this time, as collection looks small with poor windows. Follow up scan were recommended  - C/w cefazolin for now while inpatient.  - Surveillance cultures q48 hours until cleared. Thus far ngtd from 10/29  NAMITA unremarkable, ID recommending cefazolin 2 mg IV q8 for 6-8 weeks in duration, w/weekly blood work. However, I discussed with the patient's sister who is offering to assist with the administration of the antibiotics and she stated that she would prefer to administer the antibiotics only once a day. I discussed with Dr. Dove (ID) who states that we may be able to transition to nafcillin for continued outpatient treatment.
BCx positive for MSSA bacteremia. ID consulted. Started on cefazolin. Prior admission pt also had MSSA bacteremia. SHAUN performed and did not show any vegetations. Surveillance cultures on 10/27 positive.   - Repeat BCx today.   - ID recommending: tte, MRI AP w/ contrast, MRI T/L spine with contrast.  - C/w cefazolin   - Surveillance cultures q48 hours until cleared.
BCx positive for MSSA bacteremia. ID consulted. Started on cefazolin. Prior admission pt also had MSSA bacteremia. SHAUN performed and did not show any vegetations. Surveillance cultures on 10/27 positive.   - F/u surveillance cultures from 10/29  - ID recommending: tte, MRI AP w/ contrast, MRI T/L spine with contrast. MRI thus far shows discitis/OM in T6/T7 and phlegmon in phlegmon. Also abscess in the right piriformis muscle on MR A/P. Spinal consult placed. Discussed with ID.   For right piriformis abscess, IR was consulted, no intervention at this time, as collection looks small with poor windows. Follow up scan were recommended  - C/w cefazolin   - Surveillance cultures q48 hours until cleared. Thus far ngtd from 10/29
BCx positive for MSSA bacteremia. ID consulted. Started on cefazolin. Prior admission pt also had MSSA bacteremia. SHAUN performed and did not show any vegetations. Surveillance cultures on 10/27 positive.   - F/u surveillance cultures from 10/29  - ID recommending: tte, MRI AP w/ contrast, MRI T/L spine with contrast. MRI thus far shows discitis/OM in T6/T7 and phlegmon in phlegmon. Also abscess in the right piriformis muscle on MR A/P. Spinal consult placed. Discussed with ID  - C/w cefazolin   - Surveillance cultures q48 hours until cleared. Thus far ngtd from 10/29
BCx positive for MSSA bacteremia. ID consulted. Started on cefazolin. Prior admission pt also had MSSA bacteremia. SHAUN performed and did not show any vegetations. Surveillance cultures on 10/27 positive.   - F/u surveillance cultures from 10/29  - MRI AP w/ contrast, MRI T/L spine with contrast. MRI thus far shows discitis/OM in T6/T7 and phlegmon. Also abscess in the right piriformis muscle on MR A/P. Spinal consult placed. Discussed with ID.   For right piriformis abscess, IR was consulted, no intervention at this time, as collection looks small with poor windows. Follow up scan were recommended  - C/w cefazolin for now while inpatient.  - Surveillance cultures q48 hours until cleared. Thus far ngtd from 10/29  SHAUN unremarkable, ID recommending cefazolin 2 mg IV q8 for 6-8 weeks in duration, w/weekly blood work. Pt. lives alone and could not administer this medication herself. Insurance coverage has run out for AGNIESZKA days. Dispo remains unclear at this time.
BCx positive for MSSA bacteremia. ID consulted. Started on cefazolin. Prior admission pt also had MSSA bacteremia. SHAUN performed and did not show any vegetations. Surveillance cultures on 10/27 positive.   - F/u surveillance cultures from 10/29  - MRI AP w/ contrast, MRI T/L spine with contrast. MRI thus far shows discitis/OM in T6/T7 and phlegmon. Also abscess in the right piriformis muscle on MR A/P. Spinal consult placed. Discussed with ID.   For right piriformis abscess, IR was consulted, no intervention at this time, as collection looks small with poor windows. Follow up scan were recommended  - C/w cefazolin for now while inpatient.  - Surveillance cultures q48 hours until cleared. Thus far ngtd from 10/29 - start date for antibiotics 10/27.   SHAUN unremarkable, ID recommending cefazolin 2 mg IV q8 for 6-8 weeks in duration, w/weekly blood work. Pt. lives alone and could not administer this medication herself. Dispo to rehab
BCx positive for MSSA bacteremia. ID consulted. Started on cefazolin. Prior admission pt also had MSSA bacteremia. SHAUN performed and did not show any vegetations. Surveillance cultures on 10/27 positive.   - F/u surveillance cultures from 10/29  - ID recommending: tte, MRI AP w/ contrast, MRI T/L spine with contrast. MRI thus far shows discitis/OM in T6/T7 and phlegmon in phlegmon. Also abscess in the right piriformis muscle on MR A/P. Spinal consult placed. Discussed with ID.   For right piriformis abscess, IR was consulted, no intervention at this time, as collection looks small with poor windows. Follow up scan were recommended  - C/w cefazolin   - Surveillance cultures q48 hours until cleared. Thus far ngtd from 10/29
- Admit to medicine  - Pelvic Xray - No acute fractures or dislocations, evidence of nonhealing fractures seen on prior imaging. Bilateral hip joint arthrosis present.   - CXR - No acute displaced rib fracture. Chronic right rib deformities.  - CT Head/ C spine -- > no acute intracranial hemorrhage, territorial infarct, mass effect or calvarial fracture. Evidence of multilevel degenerative changes, no fracture  - CT chest non cont - Possible acute/subacute compression fracture d/t mild loss of height at T7  - Neurosurgery consulted, NTD. No brace needed. Outpatient follow up.   - Activity - ambulate with assistance   - Pain control with tylenol and tramadol   - PT recommending AGNIESZKA
BCx positive for MSSA bacteremia. ID consulted. Started on cefazolin. Prior admission pt also had MSSA bacteremia. SHAUN performed and did not show any vegetations. Surveillance cultures on 10/27 positive.   - F/u surveillance cultures from 10/29  - MRI AP w/ contrast, MRI T/L spine with contrast. MRI thus far shows discitis/OM in T6/T7 and phlegmon. Also abscess in the right piriformis muscle on MR A/P. Spinal consult placed. Discussed with ID.   For right piriformis abscess, IR was consulted, no intervention at this time, as collection looks small with poor windows. Follow up scan were recommended  - C/w cefazolin for now while inpatient.  - Surveillance cultures q48 hours until cleared. Thus far ngtd from 10/29 - start date for antibiotics 10/27.   SHAUN unremarkable, ID recommending cefazolin 2 mg IV q8 for 6-8 weeks in duration, w/weekly blood work. Pt. lives alone and could not administer this medication herself. Insurance coverage has run out for AGNIESZKA days. Dispo remains unclear at this time.
BCx positive for MSSA bacteremia. ID consulted. Started on cefazolin. Prior admission pt also had MSSA bacteremia. NAMITA performed and did not show any vegetations. Surveillance cultures on 10/27 positive.   - F/u surveillance cultures from 10/29  - ID recommending: namita, MRI AP w/ contrast, MRI T/L spine with contrast. MRI thus far shows discitis/OM in T6/T7 and phlegmon in phlegmon. Also abscess in the right piriformis muscle on MR A/P. Spinal consult placed. Discussed with ID.   For right piriformis abscess, IR was consulted, no intervention at this time, as collection looks small with poor windows. Follow up scan were recommended  - C/w cefazolin   - Surveillance cultures q48 hours until cleared. Thus far ngtd from 10/29  NAMITA unremarkable, ID recommending cefazolin 2 mg IV q8 for 6-8 weeks in duration, w/weekly blood work. However, I discussed with the patient's sister who is offering to assist with the administration of the antibiotics and she stated that she would prefer to administer the antibiotics only once a day. I'll discuss with ID.
BCx positive for MSSA bacteremia. ID consulted. Started on cefazolin. Prior admission pt also had MSSA bacteremia. SHAUN performed and did not show any vegetations. Surveillance cultures on 10/27 positive.   - F/u surveillance cultures from 10/29  - MRI AP w/ contrast, MRI T/L spine with contrast. MRI thus far shows discitis/OM in T6/T7 and phlegmon. Also abscess in the right piriformis muscle on MR A/P. Spinal consult placed. Discussed with ID.   For right piriformis abscess, IR was consulted, no intervention at this time, as collection looks small with poor windows. Follow up scan were recommended  - C/w cefazolin for now while inpatient.  - Surveillance cultures q48 hours until cleared. Thus far ngtd from 10/29  SHAUN unremarkable, ID recommending cefazolin 2 mg IV q8 for 6-8 weeks in duration, w/weekly blood work. Pt. lives alone and could not administer this medication herself. Insurance coverage has run out for AGNIESZKA days. Dispo remains unclear at this time.
BCx positive for MSSA bacteremia. ID consulted. Started on cefazolin. Prior admission pt also had MSSA bacteremia. SHAUN performed and did not show any vegetations. Surveillance cultures on 10/27 positive.   - F/u surveillance cultures from 10/29  - MRI AP w/ contrast, MRI T/L spine with contrast. MRI thus far shows discitis/OM in T6/T7 and phlegmon. Also abscess in the right piriformis muscle on MR A/P. Spinal consult placed. Discussed with ID.   For right piriformis abscess, IR was consulted, no intervention at this time, as collection looks small with poor windows. Follow up scan were recommended  - C/w cefazolin for now while inpatient.  - Surveillance cultures q48 hours until cleared. Thus far ngtd from 10/29  SHAUN unremarkable, ID recommending cefazolin 2 mg IV q8 for 6-8 weeks in duration, w/weekly blood work. However, I discussed with the patient's sister who is offering to assist with the administration of the antibiotics and she stated that she would prefer to administer the antibiotics only once a day. I discussed with Dr. Dove (ID) who states that we may be able to transition to nafcillin continuous infusion for continued outpatient treatment.  f/u case management for d/c planning
BCx positive for MSSA bacteremia. ID consulted. Started on cefazolin. Prior admission pt also had MSSA bacteremia. SHAUN performed and did not show any vegetations. Surveillance cultures on 10/27 positive.   - F/u surveillance cultures from 10/29  - ID recommending: tte, MRI AP w/ contrast, MRI T/L spine with contrast.  - C/w cefazolin   - Surveillance cultures q48 hours until cleared.
BCx positive for MSSA bacteremia. ID consulted. Started on cefazolin. Prior admission pt also had MSSA bacteremia. NAMITA performed and did not show any vegetations. Surveillance cultures on 10/27 positive.   - F/u surveillance cultures from 10/29  - ID recommending: namita, MRI AP w/ contrast, MRI T/L spine with contrast. MRI thus far shows discitis/OM in T6/T7 and phlegmon in phlegmon. Also abscess in the right piriformis muscle on MR A/P. Spinal consult placed. Discussed with ID.   For right piriformis abscess, IR was consulted, no intervention at this time, as collection looks small with poor windows. Follow up scan were recommended  - C/w cefazolin   - Surveillance cultures q48 hours until cleared. Thus far ngtd from 10/29
BCx positive for MSSA bacteremia. ID consulted. Started on cefazolin. Prior admission pt also had MSSA bacteremia. SHAUN performed and did not show any vegetations. Surveillance cultures on 10/27 positive.   - F/u surveillance cultures from 10/29  - MRI AP w/ contrast, MRI T/L spine with contrast. MRI thus far shows discitis/OM in T6/T7 and phlegmon. Also abscess in the right piriformis muscle on MR A/P. Spinal consult placed. Discussed with ID.   For right piriformis abscess, IR was consulted, no intervention at this time, as collection looks small with poor windows. Follow up scan were recommended  - C/w cefazolin for now while inpatient.  - Surveillance cultures q48 hours until cleared. Thus far ngtd from 10/29  SHAUN unremarkable, ID recommending cefazolin 2 mg IV q8 for 6-8 weeks in duration, w/weekly blood work. However, I discussed with the patient's sister who is offering to assist with the administration of the antibiotics and she stated that she would prefer to administer the antibiotics only once a day. Dr. Dove (ID) states that we may be able to transition to nafcillin continuous infusion for continued outpatient treatment.  f/u case management for d/c planning
BCx positive for MSSA bacteremia. ID consulted. Started on cefazolin. Prior admission pt also had MSSA bacteremia. NAMITA performed and did not show any vegetations. Surveillance cultures on 10/27 positive.   - F/u surveillance cultures from 10/29  - ID recommending: namita, MRI AP w/ contrast, MRI T/L spine with contrast. MRI thus far shows discitis/OM in T6/T7 and phlegmon in phlegmon. Also abscess in the right piriformis muscle on MR A/P. Spinal consult placed. Discussed with ID.   For right piriformis abscess, IR was consulted, no intervention at this time, as collection looks small with poor windows. Follow up scan were recommended  - C/w cefazolin   - Surveillance cultures q48 hours until cleared. Thus far ngtd from 10/29  NAMITA unremarkable
BCx positive for MSSA bacteremia. ID consulted. Started on cefazolin. Prior admission pt also had MSSA bacteremia. SHAUN performed and did not show any vegetations. Surveillance cultures on 10/27 positive.   - F/u surveillance cultures from 10/29  - MRI AP w/ contrast, MRI T/L spine with contrast. MRI thus far shows discitis/OM in T6/T7 and phlegmon. Also abscess in the right piriformis muscle on MR A/P. Spinal consult placed. Discussed with ID.   For right piriformis abscess, IR was consulted, no intervention at this time, as collection looks small with poor windows. Follow up scan were recommended  - C/w cefazolin for now while inpatient.  - Surveillance cultures q48 hours until cleared. Thus far ngtd from 10/29 - start date for antibiotics 10/27.   SHAUN unremarkable, ID recommending cefazolin 2 mg IV q8 for 6-8 weeks in duration, w/weekly blood work. Pt. lives alone and could not administer this medication herself. Insurance coverage has run out for AGNIESZKA days. Dispo likely to rehab, SW awaiting choices from family
BCx positive for MSSA bacteremia. ID consulted. Started on cefazolin. Prior admission pt also had MSSA bacteremia. SHAUN performed and did not show any vegetations.   - ID recommending: tte, MRI AP w/ contrast, MRI T/L spine with contrast.  - C/w cefazolin   - Surveillance cultures q48 hours until cleared.

## 2022-11-25 NOTE — PROGRESS NOTE ADULT - PROVIDER SPECIALTY LIST ADULT
Infectious Disease
Hospitalist
Hospitalist
Infectious Disease
Infectious Disease
Hospitalist
Infectious Disease
Hospitalist
Neurosurgery
Hospitalist

## 2022-11-25 NOTE — PROGRESS NOTE ADULT - PROBLEM SELECTOR PLAN 10
- Recent DVT in 06/22 - started on eliquis  - holding eliquis for now, VTE prophylaxis with heparin 5000 sq q8  - B/L LE swelling present, per patient same amount of swelling as normal, but tender to palpation b/l calves  - B/L LE doppler for DVT negative   - D/c Eliquis given completion of treatment and no evidence of DVT on u/s.
- s/p ablation several years ago  - ECG on admission NSR  - continue to monitor
- Distal history, s/p hysterectomy  - No history of chemotherapy or RT
- Distal history, s/p hysterectomy  - No history of chemotherapy or RT
- s/p ablation several years ago  - ECG on admission NSR  - continue to monitor
- Recent DVT in 06/22 - started on eliquis  - holding eliquis for now, VTE prophylaxis with heparin 5000 sq q8  - B/L LE swelling present, per patient same amount of swelling as normal, but tender to palpation b/l calves  - B/L LE doppler for DVT negative   - D/c Eliquis given completion of treatment and no evidence of DVT on u/s.
- Distal history, s/p hysterectomy  - No history of chemotherapy or RT
- Distal history, s/p hysterectomy  - No history of chemotherapy or RT
- s/p ablation several years ago  - ECG on admission NSR  - continue to monitor
- s/p ablation several years ago  - ECG on admission NSR  - continue to monitor
- Distal history, s/p hysterectomy  - No history of chemotherapy or RT
- Recent DVT in 06/22 - started on eliquis  - holding eliquis for now, VTE prophylaxis with heparin 5000 sq q8  - B/L LE swelling present, per patient same amount of swelling as normal, but tender to palpation b/l calves  - B/L LE doppler for DVT negative   - D/c Eliquis given completion of treatment and no evidence of DVT on u/s.
- s/p ablation several years ago  - ECG on admission NSR  - continue to monitor
- Distal history, s/p hysterectomy  - No history of chemotherapy or RT
- Recent DVT in 06/22 - started on eliquis  - holding eliquis for now, VTE prophylaxis with heparin 5000 sq q8  - B/L LE swelling present, per patient same amount of swelling as normal, but tender to palpation b/l calves  - B/L LE doppler for DVT negative   - D/c Eliquis given completion of treatment and no evidence of DVT on u/s.
- s/p ablation several years ago  - ECG on admission NSR  - continue to monitor
- Recent DVT in 06/22 - started on eliquis  - holding eliquis for now, VTE prophylaxis with heparin 5000 sq q8  - B/L LE swelling present, per patient same amount of swelling as normal, but tender to palpation b/l calves  - B/L LE doppler for DVT negative   - D/c Eliquis given completion of treatment and no evidence of DVT on u/s.
- Distal history, s/p hysterectomy  - No history of chemotherapy or RT
- Recent DVT in 06/22 - started on eliquis  - holding eliquis for now, VTE prophylaxis with heparin 5000 sq q8  - B/L LE swelling present, per patient same amount of swelling as normal, but tender to palpation b/l calves  - B/L LE doppler for DVT negative   - D/c Eliquis given completion of treatment and no evidence of DVT on u/s.
- s/p ablation several years ago  - ECG on admission NSR  - continue to monitor
- Distal history, s/p hysterectomy  - No history of chemotherapy or RT
- s/p ablation several years ago  - ECG on admission NSR  - continue to monitor
- s/p ablation several years ago  - ECG on admission NSR  - continue to monitor
- Distal history, s/p hysterectomy  - No history of chemotherapy or RT
- s/p ablation several years ago  - ECG on admission NSR  - continue to monitor
- s/p ablation several years ago  - ECG on admission NSR  - continue to monitor

## 2022-11-25 NOTE — PROGRESS NOTE ADULT - NSPROGADDITIONALINFOA_GEN_ALL_CORE
Discussed with HCP sister at bedside. She understands the patient is unsafe to go home. Working with Jia () to facilitate insurance coverage for inpatient facility given she has ran out of rehab days on her current insurance.
Discussed with sister at bedside.
Time spent on discharge: 35min

## 2022-11-25 NOTE — PROGRESS NOTE ADULT - PROBLEM SELECTOR PLAN 9
- Distal history, s/p hysterectomy  - No history of chemotherapy or RT
- s/p ablation several years ago  - ECG on admission NSR  - continue to monitor
- Distal history, s/p hysterectomy  - No history of chemotherapy or RT
- s/p ablation several years ago  - ECG on admission NSR  - continue to monitor
-losartan/HCTZ have been held when pt presented with elevated creat  BP have been stable off these meds  resume if needed
- s/p ablation several years ago  - ECG on admission NSR  - continue to monitor
-losartan/HCTZ have been held when pt presented with elevated creat  BP have been stable off these meds  resume if needed
- s/p ablation several years ago  - ECG on admission NSR  - continue to monitor
- s/p ablation several years ago  - ECG on admission NSR  - continue to monitor
-losartan/HCTZ have been held when pt presented with elevated creat  BP have been stable off these meds  resume if needed
- Distal history, s/p hysterectomy  - No history of chemotherapy or RT
-losartan/HCTZ have been held when pt presented with elevated creat  BP have been stable off these meds  resume if needed
- Distal history, s/p hysterectomy  - No history of chemotherapy or RT
-losartan/HCTZ have been held when pt presented with elevated creat  BP have been stable off these meds  resume if needed
- Distal history, s/p hysterectomy  - No history of chemotherapy or RT
-losartan/HCTZ have been held when pt presented with elevated creat  BP have been stable off these meds  resume if needed
- s/p ablation several years ago  - ECG on admission NSR  - continue to monitor
- s/p ablation several years ago  - ECG on admission NSR  - continue to monitor
-losartan/HCTZ have been held when pt presented with elevated creat  BP have been stable off these meds  resume if needed
- Distal history, s/p hysterectomy  - No history of chemotherapy or RT
-losartan/HCTZ have been held when pt presented with elevated creat  BP have been stable off these meds  resume if needed
- s/p ablation several years ago  - ECG on admission NSR  - continue to monitor
- s/p ablation several years ago  - ECG on admission NSR  - continue to monitor
-losartan/HCTZ have been held when pt presented with elevated creat  BP have been stable off these meds  resume if needed
-losartan/HCTZ have been held when pt presented with elevated creat  BP have been stable off these meds  resume if needed
- s/p ablation several years ago  - ECG on admission NSR  - continue to monitor
-losartan/HCTZ have been held when pt presented with elevated creat  BP have been stable off these meds  resume if needed

## 2022-11-25 NOTE — PROGRESS NOTE ADULT - PROBLEM SELECTOR PROBLEM 2
Hyponatremia
Cough
Leukocytosis
Hyponatremia
Cough
Cough
Hyponatremia
Cough
Hyponatremia
Cough
Hyponatremia
Cough
Hyponatremia
Hyponatremia
Cough
Hyponatremia
Hyponatremia
Cough

## 2022-11-25 NOTE — PROGRESS NOTE ADULT - PROBLEM SELECTOR PROBLEM 10
Cervical cancer
SVT (supraventricular tachycardia)
SVT (supraventricular tachycardia)
Venous thromboembolism (VTE)
Cervical cancer
SVT (supraventricular tachycardia)
SVT (supraventricular tachycardia)
Venous thromboembolism (VTE)
SVT (supraventricular tachycardia)
Venous thromboembolism (VTE)
Cervical cancer
Cervical cancer
Venous thromboembolism (VTE)
Cervical cancer
SVT (supraventricular tachycardia)
SVT (supraventricular tachycardia)
Cervical cancer
SVT (supraventricular tachycardia)
Cervical cancer
SVT (supraventricular tachycardia)
Cervical cancer
Cervical cancer
SVT (supraventricular tachycardia)
Venous thromboembolism (VTE)
SVT (supraventricular tachycardia)
Venous thromboembolism (VTE)
Cervical cancer

## 2022-11-25 NOTE — PROGRESS NOTE ADULT - PROBLEM SELECTOR PROBLEM 11
Cervical cancer
Cough
Cough
Venous thromboembolism (VTE)
Cervical cancer
Cervical cancer
Venous thromboembolism (VTE)
Cervical cancer
Venous thromboembolism (VTE)
Venous thromboembolism (VTE)
Cervical cancer
Cough
Cervical cancer
Cough
Cough
Venous thromboembolism (VTE)
Cervical cancer
Cough
Venous thromboembolism (VTE)
Venous thromboembolism (VTE)
Cervical cancer
Venous thromboembolism (VTE)
Venous thromboembolism (VTE)
Cervical cancer
Venous thromboembolism (VTE)
Venous thromboembolism (VTE)
Cervical cancer
Venous thromboembolism (VTE)
Cervical cancer

## 2022-11-25 NOTE — PROGRESS NOTE ADULT - PROBLEM SELECTOR PLAN 3
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
- Pelvic Xray - No acute fractures or dislocations, evidence of nonhealing fractures seen on prior imaging. Bilateral hip joint arthrosis present.   - CXR - No acute displaced rib fracture. Chronic right rib deformities.  - CT Head/ C spine -- > no acute intracranial hemorrhage, territorial infarct, mass effect or calvarial fracture. Evidence of multilevel degenerative changes, no fracture  - CT chest non cont - Possible acute/subacute compression fracture d/t mild loss of height at T7  - Neurosurgery consulted, NTD. No brace needed. Outpatient follow up.   - Activity - ambulate with assistance   - Pain control with tylenol and tramadol   - PT recommending AGNIESZKA
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
- Pelvic Xray - No acute fractures or dislocations, evidence of nonhealing fractures seen on prior imaging. Bilateral hip joint arthrosis present.   - CXR - No acute displaced rib fracture. Chronic right rib deformities.  - CT Head/ C spine -- > no acute intracranial hemorrhage, territorial infarct, mass effect or calvarial fracture. Evidence of multilevel degenerative changes, no fracture  - CT chest non cont - Possible acute/subacute compression fracture d/t mild loss of height at T7  - Neurosurgery consulted, NTD. No brace needed. Outpatient follow up.   - Activity - ambulate with assistance   - Pain control with tylenol and tramadol   - PT recommending AGNIESZKA, however the patient has used up all her days as per SW/CM
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
- Pelvic Xray - No acute fractures or dislocations, evidence of nonhealing fractures seen on prior imaging. Bilateral hip joint arthrosis present.   - CXR - No acute displaced rib fracture. Chronic right rib deformities.  - CT Head/ C spine -- > no acute intracranial hemorrhage, territorial infarct, mass effect or calvarial fracture. Evidence of multilevel degenerative changes, no fracture  - CT chest non cont - Possible acute/subacute compression fracture d/t mild loss of height at T7  - Neurosurgery consulted, NTD. No brace needed. Outpatient follow up.   - Activity - ambulate with assistance   - Pain control with tylenol and tramadol   - PT recommending AGNIESZKA, however the patient has used up all her days as per SW/CM. Will likely need to explore other options.
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
- Pelvic Xray - No acute fractures or dislocations, evidence of nonhealing fractures seen on prior imaging. Bilateral hip joint arthrosis present.   - CXR - No acute displaced rib fracture. Chronic right rib deformities.  - CT Head/ C spine -- > no acute intracranial hemorrhage, territorial infarct, mass effect or calvarial fracture. Evidence of multilevel degenerative changes, no fracture  - CT chest non cont - Possible acute/subacute compression fracture d/t mild loss of height at T7  - Neurosurgery consulted, NTD. No brace needed. Outpatient follow up.   - Activity - ambulate with assistance   - Pain control with tylenol and tramadol   - PT recommending AGNIESZKA
- Admit to medicine  - Pelvic Xray - No acute fractures or dislocations, evidence of nonhealing fractures seen on prior imaging. Bilateral hip joint arthrosis present.   - CXR - No acute displaced rib fracture. Chronic right rib deformities.  - CT Head/ C spine -- > no acute intracranial hemorrhage, territorial infarct, mass effect or calvarial fracture. Evidence of multilevel degenerative changes, no fracture  - CT chest non cont - Possible acute/subacute compression fracture d/t mild loss of height at T7  - Neurosurgery consulted, NTD. No brace needed. Outpatient follow up.   - Activity - ambulate with assistance   - Pain control with tylenol and tramadol   - PT recommending AGNIESZKA
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
- Pelvic Xray - No acute fractures or dislocations, evidence of nonhealing fractures seen on prior imaging. Bilateral hip joint arthrosis present.   - CXR - No acute displaced rib fracture. Chronic right rib deformities.  - CT Head/ C spine -- > no acute intracranial hemorrhage, territorial infarct, mass effect or calvarial fracture. Evidence of multilevel degenerative changes, no fracture  - CT chest non cont - Possible acute/subacute compression fracture d/t mild loss of height at T7  - Neurosurgery consulted, NTD. No brace needed. Outpatient follow up.   - Activity - ambulate with assistance   - Pain control with tylenol and tramadol   - PT recommending AGNIESZKA
- Admit to medicine  - Pelvic Xray - No acute fractures or dislocations, evidence of nonhealing fractures seen on prior imaging. Bilateral hip joint arthrosis present.   - CXR - No acute displaced rib fracture. Chronic right rib deformities.  - CT Head/ C spine -- > no acute intracranial hemorrhage, territorial infarct, mass effect or calvarial fracture. Evidence of multilevel degenerative changes, no fracture  - CT chest non cont - Possible acute/subacute compression fracture d/t mild loss of height at T7  - Neurosurgery consulted, NTD. No brace needed. Outpatient follow up.   - Activity - ambulate with assistance   - Pain control with tylenol and tramadol   - PT recommending AGNIESZKA
- Pelvic Xray - No acute fractures or dislocations, evidence of nonhealing fractures seen on prior imaging. Bilateral hip joint arthrosis present.   - CXR - No acute displaced rib fracture. Chronic right rib deformities.  - CT Head/ C spine -- > no acute intracranial hemorrhage, territorial infarct, mass effect or calvarial fracture. Evidence of multilevel degenerative changes, no fracture  - CT chest non cont - Possible acute/subacute compression fracture d/t mild loss of height at T7  - Neurosurgery consulted, NTD. No brace needed. Outpatient follow up.   - Activity - ambulate with assistance   - Pain control with tylenol and tramadol   - PT recommending AGNIESZKA
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
- Admit to medicine  - Pelvic Xray - No acute fractures or dislocations, evidence of nonhealing fractures seen on prior imaging. Bilateral hip joint arthrosis present.   - CXR - No acute displaced rib fracture. Chronic right rib deformities.  - CT Head/ C spine -- > no acute intracranial hemorrhage, territorial infarct, mass effect or calvarial fracture. Evidence of multilevel degenerative changes, no fracture  - CT chest non cont - Possible acute/subacute compression fracture d/t mild loss of height at T7  - Neurosurgery consulted, NTD. No brace needed. Outpatient follow up.   - Activity - ambulate with assistance   - Pain control with tylenol and tramadol   - PT recommending AGNIESZKA
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
- Admit to medicine  - Pelvic Xray - No acute fractures or dislocations, evidence of nonhealing fractures seen on prior imaging. Bilateral hip joint arthrosis present.   - CXR - No acute displaced rib fracture. Chronic right rib deformities.  - CT Head/ C spine -- > no acute intracranial hemorrhage, territorial infarct, mass effect or calvarial fracture. Evidence of multilevel degenerative changes, no fracture  - CT chest non cont - Possible acute/subacute compression fracture d/t mild loss of height at T7  - Neurosurgery consulted, NTD. No brace needed. Outpatient follow up.   - Activity - ambulate with assistance   - Pain control with tylenol and tramadol   - PT recommending AGNIESZKA
- Admit to medicine  - Pelvic Xray - No acute fractures or dislocations, evidence of nonhealing fractures seen on prior imaging. Bilateral hip joint arthrosis present.   - CXR - No acute displaced rib fracture. Chronic right rib deformities.  - CT Head/ C spine -- > no acute intracranial hemorrhage, territorial infarct, mass effect or calvarial fracture. Evidence of multilevel degenerative changes, no fracture  - CT chest non cont - Possible acute/subacute compression fracture d/t mild loss of height at T7  - Neurosurgery consulted, NTD. No brace needed. Outpatient follow up.   - Activity - ambulate with assistance   - Pain control with tylenol and tramadol   - PT recommending AGNIESZKA
- Pelvic Xray - No acute fractures or dislocations, evidence of nonhealing fractures seen on prior imaging. Bilateral hip joint arthrosis present.   - CXR - No acute displaced rib fracture. Chronic right rib deformities.  - CT Head/ C spine -- > no acute intracranial hemorrhage, territorial infarct, mass effect or calvarial fracture. Evidence of multilevel degenerative changes, no fracture  - CT chest non cont - Possible acute/subacute compression fracture d/t mild loss of height at T7  - Neurosurgery consulted, NTD. No brace needed. Outpatient follow up.   - Activity - ambulate with assistance   - Pain control with tylenol and tramadol   - PT recommending AGNIESZKA
- Likely chronic in nature. Prior admission documentation stating likely SIADH however Na improved after bolus. 123 -> 126  s/p 0.9% NaCl 1L bolus.   - F/u urine lytes.    - Fluid restriction 1000L   - Has been on salt tabs previously, will necessary - will start.   - Trend BMP
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
- Admit to medicine  - Pelvic Xray - No acute fractures or dislocations, evidence of nonhealing fractures seen on prior imaging. Bilateral hip joint arthrosis present.   - CXR - No acute displaced rib fracture. Chronic right rib deformities.  - CT Head/ C spine -- > no acute intracranial hemorrhage, territorial infarct, mass effect or calvarial fracture. Evidence of multilevel degenerative changes, no fracture  - CT chest non cont - Possible acute/subacute compression fracture d/t mild loss of height at T7  - Neurosurgery consulted, NTD. No brace needed. Outpatient follow up.   - Activity - ambulate with assistance   - Pain control with tylenol and tramadol   - PT recommending AGNIESZKA
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
- Pelvic Xray - No acute fractures or dislocations, evidence of nonhealing fractures seen on prior imaging. Bilateral hip joint arthrosis present.   - CXR - No acute displaced rib fracture. Chronic right rib deformities.  - CT Head/ C spine -- > no acute intracranial hemorrhage, territorial infarct, mass effect or calvarial fracture. Evidence of multilevel degenerative changes, no fracture  - CT chest non cont - Possible acute/subacute compression fracture d/t mild loss of height at T7  - Neurosurgery consulted, NTD. No brace needed. Outpatient follow up.   - Activity - ambulate with assistance   - Pain control with tylenol and tramadol   - PT recommending AGNIESZKA, however the patient has used up all her days as per SW/CM.
- Pelvic Xray - No acute fractures or dislocations, evidence of nonhealing fractures seen on prior imaging. Bilateral hip joint arthrosis present.   - CXR - No acute displaced rib fracture. Chronic right rib deformities.  - CT Head/ C spine -- > no acute intracranial hemorrhage, territorial infarct, mass effect or calvarial fracture. Evidence of multilevel degenerative changes, no fracture  - CT chest non cont - Possible acute/subacute compression fracture d/t mild loss of height at T7  - Neurosurgery consulted, NTD. No brace needed. Outpatient follow up.   - Activity - ambulate with assistance   - Pain control with tylenol and tramadol   - PT recommending AGNIESZKA

## 2022-11-25 NOTE — PROGRESS NOTE ADULT - PROBLEM SELECTOR PROBLEM 9
Cervical cancer
Cervical cancer
SVT (supraventricular tachycardia)
SVT (supraventricular tachycardia)
HTN (hypertension)
SVT (supraventricular tachycardia)
Cervical cancer
Cervical cancer
SVT (supraventricular tachycardia)
HTN (hypertension)
SVT (supraventricular tachycardia)
HTN (hypertension)
HTN (hypertension)
Cervical cancer
SVT (supraventricular tachycardia)
HTN (hypertension)
SVT (supraventricular tachycardia)
Cervical cancer
HTN (hypertension)
SVT (supraventricular tachycardia)
SVT (supraventricular tachycardia)
HTN (hypertension)

## 2022-11-25 NOTE — PROGRESS NOTE ADULT - PROBLEM SELECTOR PLAN 11
- Present since patient had covid several months ago  - RVP negative  - CXR clear lungs  - Started albuterol prn, tessalon pearle
- Distal history, s/p hysterectomy  - No history of chemotherapy or RT
- Present since patient had covid several months ago. RVP negative. CXR clear. CT chest with atelectasis. Crackles heard on exam is consistent with atelectasis. It is likely the cough she has is 2/2 atelectasis. No other pathology seen on CT scan that would cause cough other than atelectasis.   - Encouraged ICS   - C/w nebulizer treatments   - Cough suppressants PRN.
- Present since patient had covid several months ago. RVP negative. CXR clear. CT chest with atelectasis. Crackles heard on exam is consistent with atelectasis. It is likely the cough she has is 2/2 atelectasis. No other pathology seen on CT scan that would cause cough other than atelectasis.   - Encouraged ICS   - C/w nebulizer treatments   - Cough suppressants PRN.
- Recent DVT in 06/22 - started on eliquis  - holding eliquis for now, VTE prophylaxis with heparin 5000 sq q8  - B/L LE swelling present, per patient same amount of swelling as normal, but tender to palpation b/l calves  - B/L LE doppler for DVT negative   - D/c Eliquis given completion of treatment and no evidence of DVT on u/s.
- Distal history, s/p hysterectomy  - No history of chemotherapy or RT
- Distal history, s/p hysterectomy  - No history of chemotherapy or RT
- Recent DVT in 06/22 - started on eliquis  - holding eliquis for now, VTE prophylaxis with heparin 5000 sq q8  - B/L LE swelling present, per patient same amount of swelling as normal, but tender to palpation b/l calves  - B/L LE doppler for DVT negative   - D/c Eliquis given completion of treatment and no evidence of DVT on u/s.
- Recent DVT in 06/22 - started on eliquis  - holding eliquis for now, VTE prophylaxis with heparin 5000 sq q8  - B/L LE swelling present, per patient same amount of swelling as normal, but tender to palpation b/l calves  - B/L LE doppler for DVT negative   - D/c Eliquis given completion of treatment and no evidence of DVT on u/s.
- Distal history, s/p hysterectomy  - No history of chemotherapy or RT
- Distal history, s/p hysterectomy  - No history of chemotherapy or RT
- Recent DVT in 06/22 - started on eliquis  - holding eliquis for now, VTE prophylaxis with heparin 5000 sq q8  - B/L LE swelling present, per patient same amount of swelling as normal, but tender to palpation b/l calves  - B/L LE doppler for DVT negative   - D/c Eliquis given completion of treatment and no evidence of DVT on u/s.
- Present since patient had covid several months ago  - RVP negative  - CXR clear lungs  - Started albuterol prn, tessalon pearle
- Present since patient had covid several months ago. RVP negative. CXR clear. CT chest with atelectasis. Crackles heard on exam is consistent with atelectasis. It is likely the cough she has is 2/2 atelectasis. No other pathology seen on CT scan that would cause cough other than atelectasis.   - Encouraged ICS   - C/w nebulizer treatments   - Cough suppressants PRN.
- Recent DVT in 06/22 - started on eliquis  - holding eliquis for now, VTE prophylaxis with heparin 5000 sq q8  - B/L LE swelling present, per patient same amount of swelling as normal, but tender to palpation b/l calves  - B/L LE doppler for DVT negative   - D/c Eliquis given completion of treatment and no evidence of DVT on u/s.
- Present since patient had covid several months ago. RVP negative. CXR clear. CT chest with atelectasis. Crackles heard on exam is consistent with atelectasis. It is likely the cough she has is 2/2 atelectasis. No other pathology seen on CT scan that would cause cough other than atelectasis.   - Encouraged ICS   - C/w nebulizer treatments   - Cough suppressants PRN.
- Recent DVT in 06/22 - started on eliquis  - holding eliquis for now, VTE prophylaxis with heparin 5000 sq q8  - B/L LE swelling present, per patient same amount of swelling as normal, but tender to palpation b/l calves  - B/L LE doppler for DVT negative   - D/c Eliquis given completion of treatment and no evidence of DVT on u/s.
- Distal history, s/p hysterectomy  - No history of chemotherapy or RT
- Recent DVT in 06/22 - started on eliquis  - holding eliquis for now, VTE prophylaxis with heparin 5000 sq q8  - B/L LE swelling present, per patient same amount of swelling as normal, but tender to palpation b/l calves  - B/L LE doppler for DVT negative   - D/c Eliquis given completion of treatment and no evidence of DVT on u/s.
- Recent DVT in 06/22 - started on eliquis  - holding eliquis for now, VTE prophylaxis with heparin 5000 sq q8  - B/L LE swelling present, per patient same amount of swelling as normal, but tender to palpation b/l calves  - B/L LE doppler for DVT negative   - D/c Eliquis given completion of treatment and no evidence of DVT on u/s.
- Distal history, s/p hysterectomy  - No history of chemotherapy or RT
- Recent DVT in 06/22 - started on eliquis  - holding eliquis for now, VTE prophylaxis with heparin 5000 sq q8  - B/L LE swelling present, per patient same amount of swelling as normal, but tender to palpation b/l calves  - B/L LE doppler for DVT negative   - D/c Eliquis given completion of treatment and no evidence of DVT on u/s.
- Distal history, s/p hysterectomy  - No history of chemotherapy or RT
- Recent DVT in 06/22 - started on eliquis  - holding eliquis for now, VTE prophylaxis with heparin 5000 sq q8  - B/L LE swelling present, per patient same amount of swelling as normal, but tender to palpation b/l calves  - B/L LE doppler for DVT negative   - D/c Eliquis given completion of treatment and no evidence of DVT on u/s.
- Distal history, s/p hysterectomy  - No history of chemotherapy or RT
- Distal history, s/p hysterectomy  - No history of chemotherapy or RT

## 2022-11-25 NOTE — PROGRESS NOTE ADULT - PROBLEM SELECTOR PROBLEM 12
Venous thromboembolism (VTE)
Venous thromboembolism (VTE)
Cough
Cough
Venous thromboembolism (VTE)
Venous thromboembolism (VTE)
Cough
Venous thromboembolism (VTE)
Cough
Cough
Venous thromboembolism (VTE)
Cough
Venous thromboembolism (VTE)
Cough
Venous thromboembolism (VTE)
Venous thromboembolism (VTE)
Cough
Prophylactic measure
Venous thromboembolism (VTE)
Venous thromboembolism (VTE)

## 2022-11-25 NOTE — PROGRESS NOTE ADULT - PROBLEM SELECTOR PLAN 8
- TSH ~7, T4 normal   - Continue home synthroid for now
- s/p ablation several years ago  - ECG on admission NSR  - continue to monitor
- Holding home HCTZ- Losartan i/s/o KATHERINE  - Monitor BP- acceptable pressures on admission
- TSH ~7, T4 normal   - Continue home synthroid for now
- Holding home HCTZ- Losartan i/s/o KATHERINE  - Monitor BP- acceptable pressures on admission
- TSH ~7, T4 normal   - Continue home synthroid for now
- Holding home HCTZ- Losartan i/s/o KATHERINE  - Monitor BP- acceptable pressures on admission
- s/p ablation several years ago  - ECG on admission NSR  - continue to monitor
- Holding home HCTZ- Losartan i/s/o KATHERINE  - Monitor BP- acceptable pressures on admission
- TSH ~7, T4 normal   - Continue home synthroid for now
- Holding home HCTZ- Losartan i/s/o KATHERINE  - Monitor BP- acceptable pressures on admission
- TSH ~7, T4 normal   - Continue home synthroid for now
- TSH ~7, T4 normal   - Continue home synthroid for now
- Holding home HCTZ- Losartan i/s/o KATHERINE  - Monitor BP- acceptable pressures on admission
-losartan/HCTZ have been held when pt presented with elevated creat  BP have been stable off these meds  resume if needed
- TSH ~7, T4 normal   - Continue home synthroid for now
- Holding home HCTZ- Losartan i/s/o KATHERINE  - Monitor BP- acceptable pressures on admission
- TSH ~7, T4 normal   - Continue home synthroid for now
- s/p ablation several years ago  - ECG on admission NSR  - continue to monitor
- Holding home HCTZ- Losartan i/s/o KATHERINE  - Monitor BP- acceptable pressures on admission
- s/p ablation several years ago  - ECG on admission NSR  - continue to monitor
- TSH ~7, T4 normal   - Continue home synthroid for now
- Holding home HCTZ- Losartan i/s/o KATHERINE  - Monitor BP- acceptable pressures on admission

## 2022-11-25 NOTE — PROGRESS NOTE ADULT - NUTRITIONAL ASSESSMENT
This patient has been assessed with a concern for Malnutrition and has been determined to have a diagnosis/diagnoses of Moderate protein-calorie malnutrition.    This patient is being managed with:   Diet Regular-  Supplement Feeding Modality:  Oral  Ensure Pudding Cans or Servings Per Day:  1       Frequency:  Two Times a day  Entered: Nov 19 2022  7:02PM    
This patient has been assessed with a concern for Malnutrition and has been determined to have a diagnosis/diagnoses of Moderate protein-calorie malnutrition.    This patient is being managed with:   Diet DASH/TLC-  Sodium & Cholesterol Restricted  Entered: Nov 8 2022  6:50PM    
This patient has been assessed with a concern for Malnutrition and has been determined to have a diagnosis/diagnoses of Moderate protein-calorie malnutrition.    This patient is being managed with:   Diet Regular-  Supplement Feeding Modality:  Oral  Ensure Pudding Cans or Servings Per Day:  1       Frequency:  Two Times a day  Entered: Nov 19 2022  7:02PM    
This patient has been assessed with a concern for Malnutrition and has been determined to have a diagnosis/diagnoses of Moderate protein-calorie malnutrition.    This patient is being managed with:   Diet DASH/TLC-  Sodium & Cholesterol Restricted  Entered: Nov 8 2022  6:50PM    
This patient has been assessed with a concern for Malnutrition and has been determined to have a diagnosis/diagnoses of Moderate protein-calorie malnutrition.    This patient is being managed with:   Diet DASH/TLC-  Sodium & Cholesterol Restricted  Supplement Feeding Modality:  Oral  Ensure Pudding Cans or Servings Per Day:  2       Frequency:  Daily  Entered: Nov 18 2022  3:36PM    
This patient has been assessed with a concern for Malnutrition and has been determined to have a diagnosis/diagnoses of Moderate protein-calorie malnutrition.    This patient is being managed with:   Diet Regular-  Supplement Feeding Modality:  Oral  Ensure Pudding Cans or Servings Per Day:  1       Frequency:  Two Times a day  Entered: Nov 19 2022  7:02PM    
This patient has been assessed with a concern for Malnutrition and has been determined to have a diagnosis/diagnoses of Moderate protein-calorie malnutrition.    This patient is being managed with:   Diet DASH/TLC-  Sodium & Cholesterol Restricted  Entered: Nov 8 2022  6:50PM    
This patient has been assessed with a concern for Malnutrition and has been determined to have a diagnosis/diagnoses of Moderate protein-calorie malnutrition.    This patient is being managed with:   Diet Regular-  Supplement Feeding Modality:  Oral  Ensure Pudding Cans or Servings Per Day:  1       Frequency:  Two Times a day  Entered: Nov 19 2022  7:02PM    
This patient has been assessed with a concern for Malnutrition and has been determined to have a diagnosis/diagnoses of Moderate protein-calorie malnutrition.    This patient is being managed with:   Diet Regular-  Supplement Feeding Modality:  Oral  Ensure Pudding Cans or Servings Per Day:  1       Frequency:  Two Times a day  Entered: Nov 19 2022  7:02PM    
This patient has been assessed with a concern for Malnutrition and has been determined to have a diagnosis/diagnoses of Moderate protein-calorie malnutrition.    This patient is being managed with:   Diet DASH/TLC-  Sodium & Cholesterol Restricted  Entered: Nov 8 2022  6:50PM    
This patient has been assessed with a concern for Malnutrition and has been determined to have a diagnosis/diagnoses of Moderate protein-calorie malnutrition.    This patient is being managed with:   Diet Regular-  Supplement Feeding Modality:  Oral  Ensure Pudding Cans or Servings Per Day:  1       Frequency:  Two Times a day  Entered: Nov 19 2022  7:02PM

## 2022-11-25 NOTE — PROGRESS NOTE ADULT - PROBLEM SELECTOR PROBLEM 4
Osteomyelitis of vertebra, multiple sites in spine
Anemia
Osteomyelitis of vertebra, multiple sites in spine
Osteomyelitis of vertebra, multiple sites in spine
Anemia
Osteomyelitis of vertebra, multiple sites in spine
Anemia
Anemia
Fall, accidental
Fall, accidental
Anemia
Fall, accidental
Fall, accidental
Osteomyelitis of vertebra, multiple sites in spine
Osteomyelitis of vertebra, multiple sites in spine
Anemia
Osteomyelitis of vertebra, multiple sites in spine
Fall, accidental
Fall, accidental
Osteomyelitis of vertebra, multiple sites in spine
Osteomyelitis of vertebra, multiple sites in spine
Fall, accidental
Osteomyelitis of vertebra, multiple sites in spine
Fall, accidental
Fall, accidental
Osteomyelitis of vertebra, multiple sites in spine
Fall, accidental
Fall, accidental
Osteomyelitis of vertebra, multiple sites in spine
Fall, accidental
Fall, accidental

## 2022-11-25 NOTE — PROGRESS NOTE ADULT - PROBLEM SELECTOR PLAN 5
- Baseline HgB from 06/22 --> 10-11  - 9.1/ 28.9 on admission  - Per outpatient NP - 10/23 HgB 8.7, evidence of MAR  - Folate/ b12 WNL,  - Monitor CBC  - Keep active T+S
- Baseline SCr from 06/22 --> 0.6 - 1.0   - SCr on admission 1.13   - Per outpatient NP - 10/23 SCr 1.4  - Hold home Losartan-HCTZ for now   - Monitor SCr, if worsening order urine electrolytes / Protein   - Avoid nephrotoxins
- Baseline SCr from 06/22 --> 0.6 - 1.0   - SCr on admission 1.13   - Per outpatient NP - 10/23 SCr 1.4  - Hold home Losartan-HCTZ for now   - Monitor SCr, if worsening order urine electrolytes / Protein   - Avoid nephrotoxins
Spine consulted--no intervention  D/W ID, will continue antibiotics  SHAUN unremarkable
Spine consulted--no intervention  D/W ID, will continue antibiotics  SHAUN unremarkable
- Baseline HgB from 06/22 --> 10-11  - 9.1/ 28.9 on admission  - Per outpatient NP - 10/23 HgB 8.7, evidence of MAR  - Folate/ b12 WNL,  - Monitor CBC  - Keep active T+S
Spine consulted--no intervention  D/W ID, will continue antibiotics  SHAUN unremarkable
Spine consulted--no intervention  D/W ID, will continue antibiotics  SHAUN unremarkable
- Baseline HgB from 06/22 --> 10-11  - 9.1/ 28.9 on admission  - Per outpatient NP - 10/23 HgB 8.7, evidence of MAR  - Folate/ b12 WNL,  - Monitor CBC  - Keep active T+S
Spine consulted--no intervention  D/W ID, will continue antibiotics  SHAUN unremarkable
- Baseline HgB from 06/22 --> 10-11  - 9.1/ 28.9 on admission  - Per outpatient NP - 10/23 HgB 8.7, evidence of MAR  - Folate/ b12 WNL,  - Monitor CBC  - Keep active T+S
Spine consulted--no intervention  D/W ID, will continue antibiotics  SHAUN unremarkable
- Baseline HgB from 06/22 --> 10-11  - 9.1/ 28.9 on admission  - Per outpatient NP - 10/23 HgB 8.7, evidence of MAR  - Folate/ b12 WNL,  - Monitor CBC  - Keep active T+S
Spine consulted--no intervention  D/W ID, will continue antibiotics  SHAUN unremarkable
- Baseline HgB from 06/22 --> 10-11  - 9.1/ 28.9 on admission  - Per outpatient NP - 10/23 HgB 8.7, evidence of MAR  - Folate/ b12 WNL,  - Monitor CBC  - Keep active T+S
Spine consulted--no intervention  D/W ID, will continue antibiotics  SHAUN unremarkable
Spine consulted--no intervention  D/W ID, will continue antibiotics  SHAUN unremarkable
- Baseline SCr from 06/22 --> 0.6 - 1.0   - SCr on admission 1.13   - Per outpatient NP - 10/23 SCr 1.4  - Hold home Losartan-HCTZ for now   - Monitor SCr, if worsening order urine electrolytes / Protein   - Avoid nephrotoxins
Spine consulted--no intervention  D/W ID, will continue antibiotics  SHAUN unremarkable
Spine consulted--no intervention  D/W ID, will continue antibiotics  SHAUN unremarkable
- Baseline HgB from 06/22 --> 10-11  - 9.1/ 28.9 on admission  - Per outpatient NP - 10/23 HgB 8.7, evidence of MAR  - Folate/ b12 WNL,  - Monitor CBC  - Keep active T+S

## 2022-11-25 NOTE — PROGRESS NOTE ADULT - PROBLEM SELECTOR PROBLEM 3
Fall, accidental
Fall, accidental
Hyponatremia
Fall, accidental
Hyponatremia
Fall, accidental
Hyponatremia
Fall, accidental
Fall, accidental
Hyponatremia
Fall, accidental
Hyponatremia
Fall, accidental
Hyponatremia
Fall, accidental
Hyponatremia
Hyponatremia
Fall, accidental
Fall, accidental
Hyponatremia

## 2022-11-25 NOTE — PROGRESS NOTE ADULT - PROBLEM SELECTOR PLAN 2
Persistent, unclear etiology  continue antitussives  check RVP, CXR    #abdominal pain,   - Abdominal xray showing dilated loops of bowel. Not concerned for bowel obstruction. Pt was constipated. After lactulose administration she had multiple bowel movements and her abdominal exam has now improved. She is less rigid and her abdomen is now soft. She still has abdominal tenderness to palpation.   - Continue with bowel regiment.    #Urinary retention,   - Pt. retaining urine 300-500cc. Likely in the setting of pain, constipation and immobility. Ultrasound kidney/bladder/abdomen without hydronephrosis or any obstruction.   - Block placed.
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
Persistent cough - CXR checked and did not show PNA or other etiology for cough. Possibly post-nasal drip   - Ordered for flonase but she has not been receiving - will receive flonase today   - Duonebs are q12 but will order q6 hours.     #Urinary retention,   - Pt. retaining urine 300-500cc. Likely in the setting of pain, constipation and immobility. Ultrasound kidney/bladder/abdomen without hydronephrosis or any obstruction.   - Knox placed. Failed TOV - Can likely re-try this week but may need to go to rehab with knox.
- Likely chronic in nature. Prior admission documentation stating likely SIADH however Na improved after bolus. 123 -> 126 --> 124  --> 126  - Fluid restriction 1000L   - Has been on salt tabs previously, will necessary - will start.   - Trend BMP  - Pending urine lytes
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
Persistent cough - CXR checked and did not show PNA or other etiology for cough. Possibly post-nasal drip   - Ordered for flonase but she has not been receiving - will receive flonase today   - Duonebs are q12 but will order q6 hours.     #Urinary retention,   - Pt. retaining urine 300-500cc. Likely in the setting of pain, constipation and immobility. Ultrasound kidney/bladder/abdomen without hydronephrosis or any obstruction.   - Knox placed. Failed TOV - Can likely re-try this week but may need to go to rehab with knox.
- Likely chronic in nature. Prior admission documentation stating likely SIADH however Na improved after bolus. 123 -> 126 --> 124  --> 126. Urine Na <20 which is not consistent with SIADH. Will d/c fluid restriction and monitor for improvement.
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
- Likely chronic in nature. Prior admission documentation stating likely SIADH however Na improved after bolus. 123 -> 126 --> 124  --> 126. Urine Na <20 which is not consistent with SIADH. Will d/c fluid restriction and monitor for improvement.
Persistent cough - CXR checked and did not show PNA or other etiology for cough. Possibly post-nasal drip   - Ordered for flonase. Cont Tessalon perle   - Duonebs are q12 but will order q6 hours.   - CXR showing pleural effusions. O2 sat normal. Restarted her home Lasix    #Urinary retention,   - Pt. retaining urine 300-500cc. Likely in the setting of pain, constipation and immobility. Ultrasound kidney/bladder/abdomen without hydronephrosis or any obstruction.   - Knox placed. Failed TOV - dc to rehab with knox
- Likely chronic in nature. Prior admission documentation stating likely SIADH however Na improved after bolus. 123 -> 126 --> 124  --> 126. Urine Na <20 which is not consistent with SIADH. Will d/c fluid restriction and monitor for improvement.
- Likely chronic in nature. Prior admission documentation stating likely SIADH however Na improved after bolus. 123 -> 126 --> 124  --> 126. Urine Na <20 which is not consistent with SIADH. Will d/c fluid restriction and monitor for improvement.  Today improved to 132
Persistent, unclear etiology  continue antitussives  check RVP, CXR    #abdominal pain,   - Left sided abdominal pain. Worse with movement. Exam: abdomen distended and TTP. Rigid.   - Plan to obtain abdominal xray
Persistent cough - CXR checked and did not show PNA or other etiology for cough. Possibly post-nasal drip   - Ordered for flonase but she has not been receiving - will receive flonase today   - Duonebs are q12 but will order q6 hours.     #Urinary retention,   - Pt. retaining urine 300-500cc. Likely in the setting of pain, constipation and immobility. Ultrasound kidney/bladder/abdomen without hydronephrosis or any obstruction.   - Knox placed. Failed TOV - dc to rehab with knox
Persistent, unclear etiology  continue antitussives  check RVP, CXR    #abdominal pain,   - Left sided abdominal pain. Worse with movement. Exam: abdomen distended and TTP. Rigid.   - Plan to obtain abdominal xray but the patient refused yesterday. Counseled the patient not to refuse.
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
- Likely chronic in nature. Prior admission documentation stating likely SIADH however Na improved after bolus. 123 -> 126 --> 124  --> 126. Urine Na <20 which is not consistent with SIADH. Will d/c fluid restriction and monitor for improvement.
- Likely chronic in nature. Prior admission documentation stating likely SIADH however Na improved after bolus. 123 -> 126 --> 124 today.     - Fluid restriction 1000L   - Has been on salt tabs previously, will necessary - will start.   - Trend BMP
- WBC 25.22 ( IANC 20.95). Downtrending today.   - UA with many bacteria, small blood, mod LE and protein  - continue with empiric CTX for now   - Urine culture pending, follow sensitivities
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
Persistent cough - CXR checked and did not show PNA or other etiology for cough. Possibly post-nasal drip   - Ordered for flonase but she has not been receiving - will receive flonase today   - Duonebs are q12 but will order q6 hours.     #Urinary retention,   - Pt. retaining urine 300-500cc. Likely in the setting of pain, constipation and immobility. Ultrasound kidney/bladder/abdomen without hydronephrosis or any obstruction.   - Knox placed. Failed TOV - dc to rehab with knox
Persistent, unclear etiology  continue antitussives  check RVP, CXR    #abdominal pain,   - Abdominal xray showing dilated loops of bowel. Not concerned for bowel obstruction. Pt was constipated. After lactulose administration she had multiple bowel movements and her abdominal exam has now improved. She is less rigid and her abdomen is now soft. She still has abdominal tenderness to palpation.   - Continue with bowel regiment.    #Urinary retention,   - Pt. retaining urine 300-500cc. Likely in the setting of pain, constipation and immobility. Ultrasound kidney/bladder/abdomen without hydronephrosis or any obstruction.   - Block placed.
Persistent, unclear etiology  continue antitussives  check RVP, CXR    #abdominal pain,   - Abdominal xray showing dilated loops of bowel. Not concerned for bowel obstruction. She is passing gas and had a BM this morning. Likely constipated.   - Plan to give lactulose for 1 day and follow up for improvement.
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
Persistent cough - CXR checked and did not show PNA or other etiology for cough. Possibly post-nasal drip   - Ordered for flonase but she has not been receiving - will receive flonase today   - Duonebs are q12 but will order q6 hours.     #Urinary retention,   - Pt. retaining urine 300-500cc. Likely in the setting of pain, constipation and immobility. Ultrasound kidney/bladder/abdomen without hydronephrosis or any obstruction.   - Knox placed. Failed TOV - Can likely re-try this week but may need to go to rehab with knox.
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
Persistent cough - CXR checked and did not show PNA or other etiology for cough. Possibly post-nasal drip   - Ordered for flonase but she has not been receiving - will receive flonase today   - Duonebs are q12 but will order q6 hours.     #abdominal pain,   - Abdominal xray showing dilated loops of bowel. Not concerned for bowel obstruction. Pt was constipated. After lactulose administration she had multiple bowel movements and her abdominal exam has now improved. She is less rigid and her abdomen is now soft. She still has abdominal tenderness to palpation.   - Continue with bowel regiment.    #Urinary retention,   - Pt. retaining urine 300-500cc. Likely in the setting of pain, constipation and immobility. Ultrasound kidney/bladder/abdomen without hydronephrosis or any obstruction.   - Block placed.
Persistent, unclear etiology  continue antitussives  check RVP, CXR

## 2022-11-25 NOTE — PROGRESS NOTE ADULT - REASON FOR ADMISSION
Fall / UTI

## 2022-11-25 NOTE — PROGRESS NOTE ADULT - PROBLEM SELECTOR PLAN 7
- TSH ~7, T4 normal   - Continue home synthroid for now
Resolved  - Baseline SCr from 06/22 --> 0.6 - 1.0   - SCr on admission 1.13   - Per outpatient NP - 10/23 SCr 1.4  - Hold home Losartan-HCTZ for now   - Monitor SCr, if worsening order urine electrolytes / Protein   - Avoid nephrotoxins
- Holding home HCTZ- Losartan i/s/o KATHERINE  - Monitor BP- acceptable pressures on admission
- Holding home HCTZ- Losartan i/s/o KATHERINE  - Monitor BP- acceptable pressures on admission
- TSH ~7, T4 normal   - Continue home synthroid for now
Resolved  - Baseline SCr from 06/22 --> 0.6 - 1.0   - SCr on admission 1.13   - Per outpatient NP - 10/23 SCr 1.4  - Hold home Losartan-HCTZ for now   - Monitor SCr, if worsening order urine electrolytes / Protein   - Avoid nephrotoxins
Resolved  - Baseline SCr from 06/22 --> 0.6 - 1.0   - SCr on admission 1.13   - Per outpatient NP - 10/23 SCr 1.4  - Hold home Losartan-HCTZ for now   - Monitor SCr, if worsening order urine electrolytes / Protein   - Avoid nephrotoxins
- TSH ~7, T4 normal   - Continue home synthroid for now
Resolved  - Baseline SCr from 06/22 --> 0.6 - 1.0   - SCr on admission 1.13   - Per outpatient NP - 10/23 SCr 1.4  - Hold home Losartan-HCTZ for now   - Monitor SCr, if worsening order urine electrolytes / Protein   - Avoid nephrotoxins
Resolved  - Baseline SCr from 06/22 --> 0.6 - 1.0   - SCr on admission 1.13   - Per outpatient NP - 10/23 SCr 1.4  - Hold home Losartan-HCTZ for now   - Monitor SCr, if worsening order urine electrolytes / Protein   - Avoid nephrotoxins
- Holding home HCTZ- Losartan i/s/o KATHERINE  - Monitor BP- acceptable pressures on admission
Resolved  - Baseline SCr from 06/22 --> 0.6 - 1.0   - SCr on admission 1.13   - Per outpatient NP - 10/23 SCr 1.4  - Hold home Losartan-HCTZ for now   - Monitor SCr, if worsening order urine electrolytes / Protein   - Avoid nephrotoxins
Resolved  - Baseline SCr from 06/22 --> 0.6 - 1.0   - SCr on admission 1.13   - Per outpatient NP - 10/23 SCr 1.4  - Monitor SCr, if worsening order urine electrolytes / Protein   - Avoid nephrotoxins
- TSH ~7, T4 normal   - Continue home synthroid for now
Resolved  - Baseline SCr from 06/22 --> 0.6 - 1.0   - SCr on admission 1.13   - Per outpatient NP - 10/23 SCr 1.4  - Hold home Losartan-HCTZ for now   - Monitor SCr, if worsening order urine electrolytes / Protein   - Avoid nephrotoxins
- Holding home HCTZ- Losartan i/s/o KATHERINE  - Monitor BP- acceptable pressures on admission
- TSH ~7, T4 normal   - Continue home synthroid for now
- TSH ~7, T4 normal   - Continue home synthroid for now
Resolved  - Baseline SCr from 06/22 --> 0.6 - 1.0   - SCr on admission 1.13   - Per outpatient NP - 10/23 SCr 1.4  - Hold home Losartan-HCTZ for now   - Monitor SCr, if worsening order urine electrolytes / Protein   - Avoid nephrotoxins
Resolved  - Baseline SCr from 06/22 --> 0.6 - 1.0   - SCr on admission 1.13   - Per outpatient NP - 10/23 SCr 1.4  - Hold home Losartan-HCTZ for now   - Monitor SCr, if worsening order urine electrolytes / Protein   - Avoid nephrotoxins
- TSH ~7, T4 normal   - Continue home synthroid for now
- TSH ~7, T4 normal   - Continue home synthroid for now
Resolved  - Baseline SCr from 06/22 --> 0.6 - 1.0   - SCr on admission 1.13   - Per outpatient NP - 10/23 SCr 1.4  - Monitor SCr, if worsening order urine electrolytes / Protein   - Avoid nephrotoxins
- TSH ~7, T4 normal   - Continue home synthroid for now
- TSH ~7, T4 normal   - Continue home synthroid for now
Resolved  - Baseline SCr from 06/22 --> 0.6 - 1.0   - SCr on admission 1.13   - Per outpatient NP - 10/23 SCr 1.4  - Hold home Losartan-HCTZ for now   - Monitor SCr, if worsening order urine electrolytes / Protein   - Avoid nephrotoxins
- Holding home HCTZ- Losartan i/s/o KATHERINE  - Monitor BP- acceptable pressures on admission
Resolved  - Baseline SCr from 06/22 --> 0.6 - 1.0   - SCr on admission 1.13   - Per outpatient NP - 10/23 SCr 1.4  - Monitor SCr, if worsening order urine electrolytes / Protein   - Avoid nephrotoxins
- Holding home HCTZ- Losartan i/s/o KATHERINE  - Monitor BP- acceptable pressures on admission
- TSH ~7, T4 normal   - Continue home synthroid for now
- TSH ~7, T4 normal   - Continue home synthroid for now

## 2022-11-25 NOTE — PROGRESS NOTE ADULT - PROBLEM SELECTOR PLAN 6
- TSH ~7. Pending T4.   - Continue home synthroid for now
- TSH ~7. Pending T4.   - Continue home synthroid for now
- Baseline HgB from 06/22 --> 10-11  - 9.1/ 28.9 on admission  - Per outpatient NP - 10/23 HgB 8.7, evidence of MAR  - Folate/ b12 WNL,  - Monitor CBC  - Keep active T+S
- Baseline HgB from 06/22 --> 10-11  - 9.1/ 28.9 on admission  - Per outpatient NP - 10/23 HgB 8.7, evidence of MAR  - Folate/ b12 WNL,  - Monitor CBC  - Keep active T+S
- TSH ~7. Pending T4.   - Continue home synthroid for now
- Baseline HgB from 06/22 --> 10-11  - 9.1/ 28.9 on admission  - Per outpatient NP - 10/23 HgB 8.7, evidence of MAR  - Folate/ b12 WNL,  - Monitor CBC  - Keep active T+S
- Baseline HgB from 06/22 --> 10-11  - 9.1/ 28.9 on admission  - Per outpatient NP - 10/23 HgB 8.7, evidence of MAR  - Folate/ b12 WNL,  - Monitor CBC  - Keep active T+S
Resolved  - Baseline SCr from 06/22 --> 0.6 - 1.0   - SCr on admission 1.13   - Per outpatient NP - 10/23 SCr 1.4  - Hold home Losartan-HCTZ for now   - Monitor SCr, if worsening order urine electrolytes / Protein   - Avoid nephrotoxins
Resolved  - Baseline SCr from 06/22 --> 0.6 - 1.0   - SCr on admission 1.13   - Per outpatient NP - 10/23 SCr 1.4  - Hold home Losartan-HCTZ for now   - Monitor SCr, if worsening order urine electrolytes / Protein   - Avoid nephrotoxins
- TSH ~7. Pending T4.   - Continue home synthroid for now
- Baseline SCr from 06/22 --> 0.6 - 1.0   - SCr on admission 1.13   - Per outpatient NP - 10/23 SCr 1.4  - Hold home Losartan-HCTZ for now   - Monitor SCr, if worsening order urine electrolytes / Protein   - Avoid nephrotoxins
- Baseline HgB from 06/22 --> 10-11  - 9.1/ 28.9 on admission  - Per outpatient NP - 10/23 HgB 8.7, evidence of MAR  - Folate/ b12 WNL,  - Monitor CBC  - Keep active T+S
- Baseline HgB from 06/22 --> 10-11  - 9.1/ 28.9 on admission  - Per outpatient NP - 10/23 HgB 8.7, evidence of MAR  - Folate/ b12 WNL,  - Monitor CBC  - Keep active T+S
- Baseline SCr from 06/22 --> 0.6 - 1.0   - SCr on admission 1.13   - Per outpatient NP - 10/23 SCr 1.4  - Hold home Losartan-HCTZ for now   - Monitor SCr, if worsening order urine electrolytes / Protein   - Avoid nephrotoxins
- Baseline HgB from 06/22 --> 10-11  - 9.1/ 28.9 on admission  - Per outpatient NP - 10/23 HgB 8.7, evidence of MAR  - Folate/ b12 WNL,  - Monitor CBC  - Keep active T+S
- Baseline HgB from 06/22 --> 10-11  - 9.1/ 28.9 on admission  - Per outpatient NP - 10/23 HgB 8.7, evidence of MAR  - Folate/ b12 WNL,  - Monitor CBC  - Keep active T+S
Resolved  - Baseline SCr from 06/22 --> 0.6 - 1.0   - SCr on admission 1.13   - Per outpatient NP - 10/23 SCr 1.4  - Hold home Losartan-HCTZ for now   - Monitor SCr, if worsening order urine electrolytes / Protein   - Avoid nephrotoxins
- Baseline HgB from 06/22 --> 10-11  - 9.1/ 28.9 on admission  - Per outpatient NP - 10/23 HgB 8.7, evidence of MAR  - Folate/ b12 WNL,  - Monitor CBC  - Keep active T+S
- Baseline HgB from 06/22 --> 10-11  - 9.1/ 28.9 on admission  - Per outpatient NP - 10/23 HgB 8.7, evidence of MAR  - Folate/ b12 WNL,  - Monitor CBC  - Keep active T+S
- Baseline SCr from 06/22 --> 0.6 - 1.0   - SCr on admission 1.13   - Per outpatient NP - 10/23 SCr 1.4  - Hold home Losartan-HCTZ for now   - Monitor SCr, if worsening order urine electrolytes / Protein   - Avoid nephrotoxins
- Baseline HgB from 06/22 --> 10-11  - 9.1/ 28.9 on admission  - Per outpatient NP - 10/23 HgB 8.7, evidence of MAR  - Folate/ b12 WNL,  - Monitor CBC  - Keep active T+S
Resolved  - Baseline SCr from 06/22 --> 0.6 - 1.0   - SCr on admission 1.13   - Per outpatient NP - 10/23 SCr 1.4  - Hold home Losartan-HCTZ for now   - Monitor SCr, if worsening order urine electrolytes / Protein   - Avoid nephrotoxins
Resolved  - Baseline SCr from 06/22 --> 0.6 - 1.0   - SCr on admission 1.13   - Per outpatient NP - 10/23 SCr 1.4  - Hold home Losartan-HCTZ for now   - Monitor SCr, if worsening order urine electrolytes / Protein   - Avoid nephrotoxins
- Baseline HgB from 06/22 --> 10-11  - 9.1/ 28.9 on admission  - Per outpatient NP - 10/23 HgB 8.7, evidence of MAR  - Folate/ b12 WNL,  - Monitor CBC  - Keep active T+S
- Baseline SCr from 06/22 --> 0.6 - 1.0   - SCr on admission 1.13   - Per outpatient NP - 10/23 SCr 1.4  - Hold home Losartan-HCTZ for now   - Monitor SCr, if worsening order urine electrolytes / Protein   - Avoid nephrotoxins
- TSH ~7. Pending T4.   - Continue home synthroid for now
- TSH ~7. Pending T4.   - Continue home synthroid for now
- Baseline HgB from 06/22 --> 10-11  - 9.1/ 28.9 on admission  - Per outpatient NP - 10/23 HgB 8.7, evidence of MAR  - Folate/ b12 WNL,  - Monitor CBC  - Keep active T+S

## 2022-11-25 NOTE — PROGRESS NOTE ADULT - PROBLEM SELECTOR PROBLEM 8
HTN (hypertension)
HTN (hypertension)
Hypothyroidism
Hypothyroidism
HTN (hypertension)
Hypothyroidism
HTN (hypertension)
SVT (supraventricular tachycardia)
Hypothyroidism
HTN (hypertension)
HTN (hypertension)
Hypothyroidism
Hypothyroidism
HTN (hypertension)
SVT (supraventricular tachycardia)
HTN (hypertension)
Hypothyroidism
HTN (hypertension)
Hypothyroidism
HTN (hypertension)
SVT (supraventricular tachycardia)
HTN (hypertension)
HTN (hypertension)
Hypothyroidism
Hypothyroidism
SVT (supraventricular tachycardia)
Hypothyroidism

## 2022-11-25 NOTE — PROGRESS NOTE ADULT - PROBLEM SELECTOR PROBLEM 6
Anemia
Hypothyroidism
Anemia
Hypothyroidism
KATHERINE (acute kidney injury)
Anemia
Hypothyroidism
KATHERINE (acute kidney injury)
Anemia
Anemia
KATHERINE (acute kidney injury)
Anemia
Hypothyroidism
Anemia
Hypothyroidism
KATHERINE (acute kidney injury)
KATHERINE (acute kidney injury)
Anemia
Anemia
KATHERINE (acute kidney injury)
Hypothyroidism
KATHERINE (acute kidney injury)
Anemia
KATHERINE (acute kidney injury)
Anemia
Anemia
KATHERINE (acute kidney injury)
KATHERINE (acute kidney injury)
Anemia

## 2022-11-25 NOTE — PROGRESS NOTE ADULT - SUBJECTIVE AND OBJECTIVE BOX
Patient is a 77y old  Female who presents with a chief complaint of Fall / UTI (24 Nov 2022 13:52)      SUBJECTIVE / OVERNIGHT EVENTS: No acute events overnight. Spoke with patient's sister via phone and informed her of CT chest results showing healed rib fractures. Pt still reporting intermittent pain. Gabapentin dose increased yesterday to 600mg TID. I informed pt and her sister that we will be discharging her to rehab today and they are in agreement with plan     ADDITIONAL REVIEW OF SYSTEMS:    MEDICATIONS  (STANDING):  albuterol/ipratropium for Nebulization 3 milliLiter(s) Nebulizer every 6 hours  benzonatate 100 milliGRAM(s) Oral three times a day  bisacodyl 5 milliGRAM(s) Oral at bedtime  ceFAZolin   IVPB 2000 milliGRAM(s) IV Intermittent every 8 hours  chlorhexidine 2% Cloths 1 Application(s) Topical daily  DULoxetine 60 milliGRAM(s) Oral daily  fenofibrate Tablet 145 milliGRAM(s) Oral daily  ferrous    sulfate 325 milliGRAM(s) Oral daily  furosemide    Tablet 20 milliGRAM(s) Oral daily  gabapentin 600 milliGRAM(s) Oral every 8 hours  heparin   Injectable 5000 Unit(s) SubCutaneous every 8 hours  levothyroxine 125 MICROGram(s) Oral daily  lidocaine   4% Patch 1 Patch Transdermal every 24 hours  meclizine 12.5 milliGRAM(s) Oral two times a day  melatonin 3 milliGRAM(s) Oral at bedtime  multivitamin 1 Tablet(s) Oral daily  nystatin Cream 1 Application(s) Topical two times a day  pantoprazole    Tablet 40 milliGRAM(s) Oral before breakfast  polyethylene glycol 3350 17 Gram(s) Oral two times a day  QUEtiapine 25 milliGRAM(s) Oral at bedtime  rOPINIRole 2 milliGRAM(s) Oral daily  senna 2 Tablet(s) Oral at bedtime  sodium chloride 2 Gram(s) Oral three times a day  sodium chloride 0.65% Nasal 1 Spray(s) Both Nostrils two times a day    MEDICATIONS  (PRN):  acetaminophen     Tablet .. 650 milliGRAM(s) Oral every 6 hours PRN Mild Pain (1 - 3), Moderate Pain (4 - 6)  ALBUTerol    90 MICROgram(s) HFA Inhaler 2 Puff(s) Inhalation every 6 hours PRN Wheezing  fluticasone propionate 50 MICROgram(s)/spray Nasal Spray 1 Spray(s) Both Nostrils every 12 hours PRN nasal congestion  guaiFENesin Oral Liquid (Sugar-Free) 100 milliGRAM(s) Oral every 6 hours PRN Cough  oxyCODONE    IR 10 milliGRAM(s) Oral every 3 hours PRN Severe Pain (7 - 10)  traMADol 50 milliGRAM(s) Oral every 4 hours PRN Moderate Pain (4 - 6)      CAPILLARY BLOOD GLUCOSE        I&O's Summary      PHYSICAL EXAM:  Vital Signs Last 24 Hrs  T(C): 36.7 (25 Nov 2022 05:00), Max: 36.7 (24 Nov 2022 22:05)  T(F): 98.1 (25 Nov 2022 05:00), Max: 98.1 (24 Nov 2022 22:05)  HR: 68 (25 Nov 2022 05:00) (59 - 74)  BP: 120/75 (25 Nov 2022 05:00) (118/70 - 123/70)  BP(mean): --  RR: 18 (25 Nov 2022 05:00) (18 - 18)  SpO2: 99% (25 Nov 2022 05:00) (99% - 99%)    Parameters below as of 25 Nov 2022 05:00  Patient On (Oxygen Delivery Method): room air      General: NAD  HEENT: NC/AT; PERRL, anicteric sclera; MMM  Neck: supple  Cardiovascular: +S1/S2; RRR  Respiratory: CTA B/L; no W/R/R  Gastrointestinal: soft, NT/ND; +BSx4  Extremities: WWP; no edema, clubbing or cyanosis  Vascular: 2+ radial, DP/PT pulses B/L  Neurological: AAOx3; no focal deficits but diffusely weak in all extremities.     LABS:                      RADIOLOGY & ADDITIONAL TESTS:  Results Reviewed:   Imaging Personally Reviewed:  Electrocardiogram Personally Reviewed:    COORDINATION OF CARE:  Care Discussed with Consultants/Other Providers [Y/N]:  Prior or Outpatient Records Reviewed [Y/N]:

## 2022-11-25 NOTE — PROGRESS NOTE ADULT - PROBLEM SELECTOR PROBLEM 1
MSSA bacteremia
Fall, accidental
MSSA bacteremia
MSSA bacteremia
Fall, accidental
MSSA bacteremia

## 2022-11-25 NOTE — PROGRESS NOTE ADULT - PROBLEM SELECTOR PROBLEM 5
Anemia
Osteomyelitis of vertebra, multiple sites in spine
Anemia
Anemia
KATHERINE (acute kidney injury)
Osteomyelitis of vertebra, multiple sites in spine
Osteomyelitis of vertebra, multiple sites in spine
KATHERINE (acute kidney injury)
Anemia
KATHERINE (acute kidney injury)
Osteomyelitis of vertebra, multiple sites in spine
Anemia
Osteomyelitis of vertebra, multiple sites in spine
KATHERINE (acute kidney injury)
Osteomyelitis of vertebra, multiple sites in spine
Anemia
Anemia
Osteomyelitis of vertebra, multiple sites in spine
Anemia
KATHERINE (acute kidney injury)
Anemia
Anemia
KATHERINE (acute kidney injury)
Osteomyelitis of vertebra, multiple sites in spine

## 2022-12-06 NOTE — PROGRESS NOTE ADULT - ASSESSMENT
75 y/o F with pmh of SVT s/p ablation, HTN, HLD, hypothyroidism, vertigo, peripheral neuropathy, osteoarthritis, cervical ca p/w R sided groin/pelvic pain and found to have fx for R superior and inferior pubic rami with hematoma of R obturator internus muscle.  Now afebrile on antibiotics and adequate pain regimen pending further workup 160

## 2022-12-16 NOTE — PROGRESS NOTE ADULT - PROBLEM/PLAN-4
Daily Note     Today's date: 2022  Patient name: Antonio Chavez  : 1951  MRN: [id number]  Referring provider: Arabella Carney,*  Dx:   Encounter Diagnosis     ICD-10-CM    1  Balance disorder  R26 89       2  Gait abnormality  R26 9       3  Other spondylosis with myelopathy, cervical region  M47 12       4  S/P cervical spinal fusion  Z98 1                      Subjective: Feeling well today      Objective: See treatment diary below      Assessment: Patient did well this session with hurdles compared to previous  Attempted narrowed base of support which he struggled to complete  Will continue to work on  Trialed use of SPC as well today which he was significantly more steady than when not using AD  Plan: Continue per plan of care          Precautions: fall risk       Manuals                                     Neuro Re-Ed         Side stepping 2  20 ft laps solo step  Solo step 1/4 length track  x3 laps  3 laps solo step 3 laps solo step    Up and over airex pads 4 pad - solo step 3 laps       STS 2 airex pads on chair x 10 1 airex pads on chair x10 with BUE use  2x10    Hurdles   Solo step 1/4 length track  4" hurdles (5)    x3 laps fwd (step to pattern) Low hurdles 4 laps fwd/lat ea solo step  3 laps fwd/lat ea     Bwd amb  Solo step 1/4 length track  x2 laps  3 laps solo 3#    Step ups   6" x2 and 4" step 3 laps  6" x2 and 4" step 2 laps     Resisted walking   MTB - 4 laps solo      Semi tandem    3 laps solo                             Ther Ex        treadmill Endurance training - 5 min x 1, 6 min x 1 at 1 4 -1 6 mph b/l UE solo step Endurance training--solo BUE use    1 4 mph x2 mins  1 6 mph x8 mins      V c for foot clearance to minimize audible heel scuff Endurance training--solo BUE use    1 4 mph x2 mins  1 6 mph x8 mins      V c for foot clearance to minimize audible heel scuff Endurance and NMR - solo B UE - 1 5 mph 6 min, 7 min
Ther Activity                        Gait Training        No AD 20 ft x 8 solo Solo 1/4 length track x4 laps  SPC - 4 lps solo            Modalities
DISPLAY PLAN FREE TEXT

## 2022-12-22 ENCOUNTER — NON-APPOINTMENT (OUTPATIENT)
Age: 77
End: 2022-12-22

## 2022-12-23 ENCOUNTER — NON-APPOINTMENT (OUTPATIENT)
Age: 77
End: 2022-12-23

## 2022-12-30 ENCOUNTER — NON-APPOINTMENT (OUTPATIENT)
Age: 77
End: 2022-12-30

## 2023-01-04 ENCOUNTER — NON-APPOINTMENT (OUTPATIENT)
Age: 78
End: 2023-01-04

## 2023-02-09 ENCOUNTER — APPOINTMENT (OUTPATIENT)
Dept: INFECTIOUS DISEASE | Facility: CLINIC | Age: 78
End: 2023-02-09

## 2023-12-28 ENCOUNTER — APPOINTMENT (OUTPATIENT)
Dept: PULMONOLOGY | Facility: CLINIC | Age: 78
End: 2023-12-28
Payer: MEDICARE

## 2023-12-28 VITALS — SYSTOLIC BLOOD PRESSURE: 126 MMHG | HEART RATE: 81 BPM | OXYGEN SATURATION: 96 % | DIASTOLIC BLOOD PRESSURE: 80 MMHG

## 2023-12-28 DIAGNOSIS — R05.3 CHRONIC COUGH: ICD-10-CM

## 2023-12-28 DIAGNOSIS — J34.9 UNSPECIFIED DISORDER OF NOSE AND NASAL SINUSES: ICD-10-CM

## 2023-12-28 LAB — POCT - HEMOGLOBIN (HGB), QUANTITATIVE, TRANSCUTANEOUS: 12.9

## 2023-12-28 PROCEDURE — ZZZZZ: CPT

## 2023-12-28 PROCEDURE — 94729 DIFFUSING CAPACITY: CPT

## 2023-12-28 PROCEDURE — 94727 GAS DIL/WSHOT DETER LNG VOL: CPT

## 2023-12-28 PROCEDURE — 71046 X-RAY EXAM CHEST 2 VIEWS: CPT

## 2023-12-28 PROCEDURE — 94060 EVALUATION OF WHEEZING: CPT

## 2023-12-28 PROCEDURE — 99205 OFFICE O/P NEW HI 60 MIN: CPT | Mod: 25

## 2023-12-28 PROCEDURE — 88738 HGB QUANT TRANSCUTANEOUS: CPT

## 2023-12-29 NOTE — DISCUSSION/SUMMARY
[FreeTextEntry1] : History as reviewed above Chief complaint chest congestion wheeze shortness of breath Pulmonary physiology does demonstrate a component of air trapping with response to bronchodilator at the small airways and a decline in flow rates dating back to comparison 7 years ago Based on clinical reports of wheezing likely does have some component of obstructive airway disease chronic airflow limitation small airways disease Recommendations Controller therapy long-acting bronchodilator with inhaled corticosteroid Ordered Breo 200 mcg 1 puff daily with instructions Notify of any wheezing.  Notify if rescue inhaler is needed greater than 2-3 times in any week.  Avoid known triggers. MDI instructions provided as well for short acting bronchodilator  Follow-up 1 month to see if there is interval improvement

## 2023-12-29 NOTE — PHYSICAL EXAM
[No Acute Distress] : no acute distress [Normal Oropharynx] : normal oropharynx [II] : Mallampati Class: II [Normal Appearance] : normal appearance [Supple] : supple [No Neck Mass] : no neck mass [No JVD] : no jvd [Normal Rate/Rhythm] : normal rate/rhythm [Normal S1, S2] : normal s1, s2 [No Murmurs] : no murmurs [No Resp Distress] : no resp distress [No Acc Muscle Use] : no acc muscle use [Normal Palpation] : normal palpation [Normal Rhythm and Effort] : normal rhythm and effort [Clear to Auscultation Bilaterally] : clear to auscultation bilaterally [Normal to Percussion] : normal to percussion [No Clubbing] : no clubbing [No Cyanosis] : no cyanosis [No Edema] : no edema [Oriented x3] : oriented x3 [Normal Affect] : normal affect [TextBox_99] : Ambulates with walker

## 2023-12-29 NOTE — HISTORY OF PRESENT ILLNESS
[Never] : never [TextBox_4] : 78-year-old female Chief complaint chronic refractory course greater than 1 year including when she was hospitalized as well as rehab She states the rehab hospital she has been given short acting bronchodilator Close  Respiratory disorder She now is complaining intermittent wheezing There is shortness of breath with wheezing She states the resuspend portable and noted by her aide as well She does not carry any formal diagnosis as to her knowledge regarding COPD bronchitis emphysema asthma interstitial lung disease She has a remote history she is a COVID infection She has had several COVID vaccines There are some component of sinus congestion Past medical history history of MSSA bacteremia requiring long-term hospitalization rehab IV antibiotic protocol times approximately 5 weeks He was also on imaging a pyriform abscess which was not draining at the time MRI suggested discitis  History of cardiac arrhythmia SVT with cardiac ablation Additional past medical history hypertension History cervical cancer History of vertigo 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

## 2023-12-29 NOTE — PROCEDURE
[FreeTextEntry1] : Chest x-ray PA lateral December 28, 2023 Limited technique apices not well-visualized Cardiac size normal Aorta appears unclear Mild scoliosis No appreciable parenchymal infiltrates pleural effusions with dominant pulmonary nodules  CT chest November 24, 2022 Bilateral pleural effusions with passive atelectasis right greater than left Erosion of T6-T7 disc space Vertebral soft tissue density seen on prior imaging felt to be occurring in the clinical setting of a discitis osteomyelitis Healed right fifth and sixth ribs Linear atelectasis right upper lobe left upper lobe right middle lobe Calcification upper lobe reported Status post findings of cholecystectomy  PFT 7/28/2023 Mild reduction flow rates Very minimal obstructive pattern ratio 78 No bronchodilator response in FEV1  21% response to bronchodilator at small airways Lung volumes normal Total lung capacity 91% predicted Air trapping with RV/TLC ratio 134% predicted Diffusion normal range 77% of predicted. Hemoglobin 12.9 Noted the patient was last seen in this office dating back to December 2016 spirometric flow rates demonstrate significant decline without decline at the diffusion or total lung capacity

## 2023-12-30 ENCOUNTER — EMERGENCY (EMERGENCY)
Facility: HOSPITAL | Age: 78
LOS: 1 days | Discharge: HOME | End: 2023-12-30
Attending: EMERGENCY MEDICINE | Admitting: EMERGENCY MEDICINE
Payer: MEDICARE

## 2023-12-30 VITALS
SYSTOLIC BLOOD PRESSURE: 154 MMHG | HEART RATE: 88 BPM | OXYGEN SATURATION: 97 % | DIASTOLIC BLOOD PRESSURE: 77 MMHG | RESPIRATION RATE: 18 BRPM | TEMPERATURE: 98 F

## 2023-12-30 PROCEDURE — 99285 EMERGENCY DEPT VISIT HI MDM: CPT

## 2023-12-30 NOTE — ED ADULT TRIAGE NOTE - CHIEF COMPLAINT QUOTE
As per EMT " Pt bending to pet cat lost balance was unable to get up, found on floor laying on left shoulder.,,uses walker for past 4 months.  " Pt st" I landed on my knees and fell onto left shoulder could not move...used the medical alert button. Did not black out, was not dizzy just lost my balance." hx frx pelvic , spinal frx.  high cholesterol, thyroid

## 2023-12-30 NOTE — ED ADULT TRIAGE NOTE - CCCP TRG CHIEF CMPLNT
This is a historical note converted from [employer]. Formatting and pictures may have been removed.  Please reference [facility] for original formatting and attached multimedia. Admit and Discharge Dates  Admit Date: 06/21/2020  Discharge Date: 06/23/2020  Physicians  Attending Physician - Jaron RICARDO, Hanh BARRETT  Admitting Physician - Jaron RICARDO, Hanh BARRETT  Consulting Physician - Inez RICARDO, Clemente Vinson  Primary Care Physician - Mariely NP, [facility]  Discharge Diagnosis  1.?Positive blood culture?R78.81  2.?Acute colitis?K52.9  3.?Acquired neutropenia?D70.9  4.?Immunosuppression?D89.9  Unspecified severe protein-calorie malnutrition?E43  Surgical Procedures  No procedures recorded for this visit.  Immunizations  No immunizations recorded for this visit.  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  ?  Patient reports last cycle of Avastin/Folfiri was on 6/15/2020 at which time he experienced N/V and diarrhea (for which he has previously been prescribed loperamide). Patient states he returned to oncology/infusion clinic 6/17 for removal of chemotx pump and received IV fluid hydration and Zofran for nausea with resolution. However, on Saturday, nausea and vomiting returned in addition to new onset abdominal cramping and increasingly watery diarrhea. Patient reports 3-4 intermittent episodes of diffuse abdominal cramping lasting only a few seconds. The pain ranges from 4-7/10 on the pain scale. ?Patient also reports increasingly watery diarrhea ~3-5 episodes per day. ?He denies any bloody bowel movements, melena or hematochezia. ?He also denies any fever, chills, dysuria or hematuria. He denies any association with food. No  alleviating or aggravating factors. Patient lives at home with wife and children and denies any recent sick contacts. Patient also denies any recent changes in diet. He endorses decreased appetite, but states that 2/2 to his diarrhea he ensures he drinks plenty of water (8 bottles). He denies any CP, SOB, palpitations, lightheadedness, muscle cramping, syncope, HA, cough.?  ?  In ED:?patient afebrile, tachycardic, normotensive saturating 100% on room air. ?Patient was mildly hyponatremic 129 (corrected 130), hypokalemic 2.8, with elevated total bilirubin 2.4 and direct bilirubin 1.2, elevated AST to 46, , , and lactic acid of 2.6. ?CBC showed WBC 1, hemoglobin 11.2 and absolute neutropenia 0.32 with 24 bands. EKG showed NSR,90bpm, and prolonged QTc 499. IN ED patient received 1.25L bolus of NS, 20KCl, zosyn, and zofran for nausea. ?CT ab/Pelvis w./contrast showed?Circumferential wall thickening, hyperemia, and surrounding inflammatory stranding involving the distal ileum just proximal to the  ileocolonic anastomosis. Additionally there is minimal stricturing in the rectosigmoid. These findings suggest an inflammatory colitis such as Crohns.  ?  Hospital Course  Patient was admitted with a diagnosis of neutropenic enterocolitis and severe neutropenia.? Patient had leukopenia 1, neutropenia ANC 0.32, bandemia 24.? CT abdomen and pelvis with contrast showed circumferential wall thickening hyperemia and high inflammatory stranding involving the distal ileum just proximal to the ileal colonic anastomosis.? It also showed minimal stricturing in the rectosigmoid region.? According to the CT findings suggest inflammatory colitis such as Crohns disease however the patient with a given history of patients adenocarcinoma with colonic resection and chemotherapy leading to neutropenia is the reason for him having enterocolitis.? Patient does not complain of any bloody bowel movements.? He was started on  broad-spectrum antibiotics meropenem and vancomycin.? He was given Compazine for nausea, electrolytes were repleted, lactic acidosis was resolved and Megace was started for his nutrition.? On the following day blood culture on 6/21/2020 grew gram-positive rods seen in Gram stain broth only, patient was continued on meropenem and vancomycin day 2.? Patients fecal leukocyte was positive but C. difficile was negative.? ID was consulted.  ?  ? Today on the day of discharge  - Patient feels much better than the day of admission.  - His bowel movements has decreased from 6-7 times on admission to 1 today.? Started to have formed stools.? Denies of abdominal pain.  - He completed total 3 dose of meropenem and vancomycin inpatient  - Stool culture is negative for Salmonella, Shigella, O, Yersinia, Shiga toxin.? Urine culture is pending.? Culture on 6/21/2020 grew bacillus which is? a contaminant  - He is being discharged on 1) Flagyl 500 mg every 8 hourly for 7 days 2) ciprofloxacin 500 mg every 12 hourly for 7 days  - Recommended follow-up with PCP within 1 to 2 weeks  - Lomotil as needed for diarrhea  ?  ?  Significant Findings  ?? ? ? ??  * Final Report *  ?  Reason For Exam  Abdominal pain, epigastric  ?  Radiology Report  Clinical History  Epigastric pain.  ?  Technique  Limited abdominal Ultrasound Images obtained in grayscale and color.  ?  Comparison  CT of the abdomen and pelvis 6/12/2020.  ?  Findings  The LIVER is normal in size and contour at 14.6 cm (sagittal right  lobe). Hepatic parenchyma has normal echogenicity with normal  delineation of the periportal triads. No definite intrahepatic masses  are noted. The portal vein is patent with hepatopetal flow. There is a  hypoechoic well-circumscribed lesion in the right upper quadrant  measuring 6.4 x 8.9 x 5.3 cm with low-level internal echoes.  ?  The BILIARY SYSTEM is normal in caliber; the common hepatic duct  measures 3 mm. There are no ?stones seen in the  GALL BLADDER. There is  no evidence of acute cholecystitis.?  ?  The PANCREATIC head and body are partially visualized but grossly  normal in appearance. The pancreatic duct is not dilated.  ?  The RIGHT KIDNEY is normal in size at 9.5 x 6.0 x 5.2 cm and  echogenicity/texture. No stones or hydronephrosis seen. No solid  masses are noted.?  ?  The AORTA and IVC are partially visualized and unremarkable.  ?  Impression  Well-circumscribed hypoechoic lesion in the right upper quadrant  measuring up to 8.9 cm with low-level internal echoes. An abscess  cannot be excluded. Recommend CT of the abdomen and pelvis with IV  contrast.  ?  ?  ?  Abdominal Pain  ?  Radiology Report  ?  CT abdomen pelvis with contrast  ?  Technique: Images of the abdomen and pelvis were obtained with  contrast.  ?  Total DLP: 1536.88 mGy cm?  ?  Indication: Abdominal pain.  ?  Comparison: CT of the abdomen and pelvis 6/12/2020.  ?  Findings:?  There is an unchanged 6 nodule in the medial basilar segment of the  right lower lobe.. The heart is normal in size.  ?  The hypodense nodule in the posterior right hepatic lobe is unchanged.  The portal vein is patent. The gallbladder, spleen, pancreas, and  adrenal glands are normal. There is a simple cyst in the left kidney.  The right kidney is normal. There is no hydronephrosis or  nephrolithiasis.  ?  There are postsurgical changes in the terminal ileum. There is  circumferential wall thickening, hyperemia, and surrounding  inflammatory stranding involving the distal ileum just proximal to the  anastomosis. There is no extraluminal air. There is no abscess. This  was not present on the prior exam. There is mild wall thickening and  apparent stricturing in the rectosigmoid. There is no pelvic or  retroperitoneal adenopathy. The aorta is nonaneurysmal. There is no  lytic or blastic osseous lesion. The mesenteric soft tissue nodule  just right of midline best visualized on series 2 image 43  is  unchanged. The midline epigastric nodule is unchanged and best  visualized on image 36.  ?  Impression:  ?  1. Circumferential wall thickening, hyperemia, and surrounding  inflammatory stranding involving the distal ileum just proximal to the  ileocolonic anastomosis. Additionally there is minimal stricturing in  the rectosigmoid. These findings suggest an inflammatory colitis such  as Crohns.  2. Unchanged hepatic, pulmonary, and mesenteric metastatic disease.  ?  Signature Line  Labs Last 24 Hours?  ?Chemistry? Hematology/Coagulation?   Sodium Lvl:?134 mmol/L?Low (06/23/20 10:20:00) WBC:?1.8 x10(3)/mcL?Low (06/23/20 01:55:00)   Potassium Lvl:?3.4 mmol/L?Low (06/23/20 10:20:00) RBC:?3.29 x10(6)/mcL?Low (06/23/20 01:55:00)   Chloride: 104 mmol/L (06/23/20 10:20:00) Hgb:?9.3 gm/dL?Low (06/23/20 01:55:00)   CO2: 26 mmol/L (06/23/20 10:20:00) Hct:?28.3 %?Low (06/23/20 01:55:00)   Calcium Lvl:?8.2 mg/dL?Low (06/23/20 10:20:00) Platelet:?118 x10(3)/mcL?Low (06/23/20 01:55:00)   Magnesium Lvl: 2.6 mg/dL (06/23/20 10:20:00) MCV: 86 fL (06/23/20 01:55:00)   Glucose Lvl: 94 mg/dL (06/23/20 10:20:00) MCH: 28.3 pg (06/23/20 01:55:00)   BUN:?5 mg/dL?Low (06/23/20 10:20:00) MCHC: 32.9 gm/dL (06/23/20 01:55:00)   Creatinine: 1 mg/dL (06/23/20 10:20:00) RDW:?15.6 %?High (06/23/20 01:55:00)   BUN/Creat Ratio: 5 (06/23/20 10:20:00) MPV:?11 fL?High (06/23/20 01:55:00)   eGFR-AA: >105 (06/23/20 10:20:00) Abs Neut:?0.52 x10(3)/mcL?Low (06/23/20 01:55:00)   eGFR-SHANT:?89 mL/min?Low (06/23/20 10:20:00) Segs Man:?20 %?Low (06/23/20 01:55:00)   Bili Total: 0.6 mg/dL (06/23/20 01:55:00) Band Man: 2 % (06/23/20 01:55:00)   Bili Direct:?0.3 mg/dL?High (06/23/20 01:55:00) Lymph Man:?68 %?High (06/23/20 01:55:00)   Bili Indirect: 0.3 mg/dL (06/23/20 01:55:00) Monocyte Man: 4 % (06/23/20 01:55:00)   AST:?85 unit/L?High (06/23/20 01:55:00) Eos Man: 2 % (06/23/20 01:55:00)   ALT:?276 unit/L?High (06/23/20 01:55:00) Basophil Man:?4 %?High  (06/23/20 01:55:00)   Alk Phos: 98 unit/L (06/23/20 01:55:00) Hypochrom: 1+ (06/23/20 01:55:00)   Total Protein:?6 gm/dL?Low (06/23/20 01:55:00) Platelet Est: Decreased (06/23/20 01:55:00)   Albumin Lvl:?2.3 gm/dL?Low (06/23/20 01:55:00) Anisocyte: 2+ (06/23/20 01:55:00)   Globulin:?3.7 gm/mL?High (06/23/20 01:55:00) Poik: 1+ (06/23/20 01:55:00)   A/G Ratio:?0.6 ratio?Low (06/23/20 01:55:00) Microcyte: 1+ (06/23/20 01:55:00)   Phosphorus:?1.4 mg/dL?Low (06/23/20 10:20:00) RBC Morph: Abnormal (06/23/20 01:55:00)    Schistocyte: 1+ (06/23/20 01:55:00)   Time Spent on discharge  30 min  Objective  Vitals & Measurements  T:?36.7? ?C (Oral)? TMIN:?36.1? ?C (Oral)? TMAX:?37.1? ?C (Oral)? HR:?91(Peripheral)? RR:?18? BP:?121/85? SpO2:?100%?  Physical Exam  GENERAL: NAD, resting comfortably in bed  SKIN: Warm, Dry, good turgor  HEENT: NC/AT, ?no evidence of icterus or conjunctivitis. Nose Clear.  NECK: Supple, No Thyromegaly, No tracheal deviation, no neck vein distention  LUNGS: CTABL, No R/R/W  CV: RRR. Normal?S1,S2. No M/R/G. No S3,S4  ABDOMEN: Soft, ND, no?tenderness to deep palpation, No guarding, No rigidity, No signs of of peritonitis. BS present.  Well healed midline longitudinal scar from colon resection  VASCULAR: Radial Pulses palpable +2 B/L UE. DP/PT pulses palpable +2 B/L LE  EXTREMITIES: No evidence of Wasting, no pedal edema, no cyanosis or clubbing  NEURO: CN II-XII grossly intact, no focal deficits  ?  Patient Discharge Condition  stable  Discharge Disposition  home   Discharge Medication Reconciliation  Prescribed  ciprofloxacin (Cipro 500 mg oral tablet)?500 mg, Oral, q12hr  metroNIDAZOLE (Flagyl 500 mg oral tablet)?500 mg, Oral, q8hr  Continue  hydrochlorothiazide-lisinopril (hydrochlorothiazide-lisinopril 12.5 mg-20 mg oral tablet)?2 tab(s), Oral, Daily  levothyroxine (levothyroxine 75 mcg (0.075 mg) oral tablet)?75 mcg, Oral, Daily  megestrol (megestrol 40 mg/mL oral suspension)?800 mg, Oral,  Daily  potassium chloride (potassium chloride 10 mEq oral CAPSULE extended release)?10 mEq, Oral, Daily  Discontinue  potassium chloride (potassium chloride 20 mEq oral TABLET extended release)?20 mEq, Oral, BID  Education and Orders Provided  Discharge - 06/23/20 15:42:00 CDT, Home?  Follow up  Jose Schuster, within 1 week  Report to Emergency Department if symptoms return or worsen      I was present with the resident during the history and exam.  ???  [x ] I discussed the case with the resident and agree with the findings and plan as documented in the residents note.  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   Holding hydrochlorothiazide-lisinopril (hydrochlorothiazide-lisinopril 12.5 mg-20 mg oral tablet) for now as he was normotensive in hospital with out this medication. Will defer the further management to the PCP in managing his anti?HTN meds   left shoulder/fall

## 2023-12-31 VITALS
TEMPERATURE: 98 F | RESPIRATION RATE: 16 BRPM | OXYGEN SATURATION: 96 % | SYSTOLIC BLOOD PRESSURE: 123 MMHG | DIASTOLIC BLOOD PRESSURE: 78 MMHG | HEART RATE: 80 BPM

## 2023-12-31 LAB
ANION GAP SERPL CALC-SCNC: 11 MMOL/L — SIGNIFICANT CHANGE UP (ref 7–14)
ANION GAP SERPL CALC-SCNC: 11 MMOL/L — SIGNIFICANT CHANGE UP (ref 7–14)
BASOPHILS # BLD AUTO: 0.02 K/UL — SIGNIFICANT CHANGE UP (ref 0–0.2)
BASOPHILS # BLD AUTO: 0.02 K/UL — SIGNIFICANT CHANGE UP (ref 0–0.2)
BASOPHILS NFR BLD AUTO: 0.1 % — SIGNIFICANT CHANGE UP (ref 0–2)
BASOPHILS NFR BLD AUTO: 0.1 % — SIGNIFICANT CHANGE UP (ref 0–2)
BUN SERPL-MCNC: 26 MG/DL — HIGH (ref 7–23)
BUN SERPL-MCNC: 26 MG/DL — HIGH (ref 7–23)
CALCIUM SERPL-MCNC: 8.9 MG/DL — SIGNIFICANT CHANGE UP (ref 8.4–10.5)
CALCIUM SERPL-MCNC: 8.9 MG/DL — SIGNIFICANT CHANGE UP (ref 8.4–10.5)
CHLORIDE SERPL-SCNC: 105 MMOL/L — SIGNIFICANT CHANGE UP (ref 98–107)
CHLORIDE SERPL-SCNC: 105 MMOL/L — SIGNIFICANT CHANGE UP (ref 98–107)
CO2 SERPL-SCNC: 23 MMOL/L — SIGNIFICANT CHANGE UP (ref 22–31)
CO2 SERPL-SCNC: 23 MMOL/L — SIGNIFICANT CHANGE UP (ref 22–31)
CREAT SERPL-MCNC: 0.97 MG/DL — SIGNIFICANT CHANGE UP (ref 0.5–1.3)
CREAT SERPL-MCNC: 0.97 MG/DL — SIGNIFICANT CHANGE UP (ref 0.5–1.3)
EGFR: 60 ML/MIN/1.73M2 — SIGNIFICANT CHANGE UP
EGFR: 60 ML/MIN/1.73M2 — SIGNIFICANT CHANGE UP
EOSINOPHIL # BLD AUTO: 0 K/UL — SIGNIFICANT CHANGE UP (ref 0–0.5)
EOSINOPHIL # BLD AUTO: 0 K/UL — SIGNIFICANT CHANGE UP (ref 0–0.5)
EOSINOPHIL NFR BLD AUTO: 0 % — SIGNIFICANT CHANGE UP (ref 0–6)
EOSINOPHIL NFR BLD AUTO: 0 % — SIGNIFICANT CHANGE UP (ref 0–6)
GLUCOSE SERPL-MCNC: 135 MG/DL — HIGH (ref 70–99)
GLUCOSE SERPL-MCNC: 135 MG/DL — HIGH (ref 70–99)
HCT VFR BLD CALC: 38.9 % — SIGNIFICANT CHANGE UP (ref 34.5–45)
HCT VFR BLD CALC: 38.9 % — SIGNIFICANT CHANGE UP (ref 34.5–45)
HGB BLD-MCNC: 12.6 G/DL — SIGNIFICANT CHANGE UP (ref 11.5–15.5)
HGB BLD-MCNC: 12.6 G/DL — SIGNIFICANT CHANGE UP (ref 11.5–15.5)
IANC: 12.92 K/UL — HIGH (ref 1.8–7.4)
IANC: 12.92 K/UL — HIGH (ref 1.8–7.4)
IMM GRANULOCYTES NFR BLD AUTO: 0.4 % — SIGNIFICANT CHANGE UP (ref 0–0.9)
IMM GRANULOCYTES NFR BLD AUTO: 0.4 % — SIGNIFICANT CHANGE UP (ref 0–0.9)
LYMPHOCYTES # BLD AUTO: 16.5 % — SIGNIFICANT CHANGE UP (ref 13–44)
LYMPHOCYTES # BLD AUTO: 16.5 % — SIGNIFICANT CHANGE UP (ref 13–44)
LYMPHOCYTES # BLD AUTO: 2.72 K/UL — SIGNIFICANT CHANGE UP (ref 1–3.3)
LYMPHOCYTES # BLD AUTO: 2.72 K/UL — SIGNIFICANT CHANGE UP (ref 1–3.3)
MCHC RBC-ENTMCNC: 28 PG — SIGNIFICANT CHANGE UP (ref 27–34)
MCHC RBC-ENTMCNC: 28 PG — SIGNIFICANT CHANGE UP (ref 27–34)
MCHC RBC-ENTMCNC: 32.4 GM/DL — SIGNIFICANT CHANGE UP (ref 32–36)
MCHC RBC-ENTMCNC: 32.4 GM/DL — SIGNIFICANT CHANGE UP (ref 32–36)
MCV RBC AUTO: 86.4 FL — SIGNIFICANT CHANGE UP (ref 80–100)
MCV RBC AUTO: 86.4 FL — SIGNIFICANT CHANGE UP (ref 80–100)
MONOCYTES # BLD AUTO: 0.72 K/UL — SIGNIFICANT CHANGE UP (ref 0–0.9)
MONOCYTES # BLD AUTO: 0.72 K/UL — SIGNIFICANT CHANGE UP (ref 0–0.9)
MONOCYTES NFR BLD AUTO: 4.4 % — SIGNIFICANT CHANGE UP (ref 2–14)
MONOCYTES NFR BLD AUTO: 4.4 % — SIGNIFICANT CHANGE UP (ref 2–14)
NEUTROPHILS # BLD AUTO: 12.92 K/UL — HIGH (ref 1.8–7.4)
NEUTROPHILS # BLD AUTO: 12.92 K/UL — HIGH (ref 1.8–7.4)
NEUTROPHILS NFR BLD AUTO: 78.6 % — HIGH (ref 43–77)
NEUTROPHILS NFR BLD AUTO: 78.6 % — HIGH (ref 43–77)
NRBC # BLD: 0 /100 WBCS — SIGNIFICANT CHANGE UP (ref 0–0)
NRBC # BLD: 0 /100 WBCS — SIGNIFICANT CHANGE UP (ref 0–0)
NRBC # FLD: 0 K/UL — SIGNIFICANT CHANGE UP (ref 0–0)
NRBC # FLD: 0 K/UL — SIGNIFICANT CHANGE UP (ref 0–0)
PLATELET # BLD AUTO: 302 K/UL — SIGNIFICANT CHANGE UP (ref 150–400)
PLATELET # BLD AUTO: 302 K/UL — SIGNIFICANT CHANGE UP (ref 150–400)
POTASSIUM SERPL-MCNC: 4.4 MMOL/L — SIGNIFICANT CHANGE UP (ref 3.5–5.3)
POTASSIUM SERPL-MCNC: 4.4 MMOL/L — SIGNIFICANT CHANGE UP (ref 3.5–5.3)
POTASSIUM SERPL-SCNC: 4.4 MMOL/L — SIGNIFICANT CHANGE UP (ref 3.5–5.3)
POTASSIUM SERPL-SCNC: 4.4 MMOL/L — SIGNIFICANT CHANGE UP (ref 3.5–5.3)
RBC # BLD: 4.5 M/UL — SIGNIFICANT CHANGE UP (ref 3.8–5.2)
RBC # BLD: 4.5 M/UL — SIGNIFICANT CHANGE UP (ref 3.8–5.2)
RBC # FLD: 14.2 % — SIGNIFICANT CHANGE UP (ref 10.3–14.5)
RBC # FLD: 14.2 % — SIGNIFICANT CHANGE UP (ref 10.3–14.5)
SODIUM SERPL-SCNC: 139 MMOL/L — SIGNIFICANT CHANGE UP (ref 135–145)
SODIUM SERPL-SCNC: 139 MMOL/L — SIGNIFICANT CHANGE UP (ref 135–145)
WBC # BLD: 16.44 K/UL — HIGH (ref 3.8–10.5)
WBC # BLD: 16.44 K/UL — HIGH (ref 3.8–10.5)
WBC # FLD AUTO: 16.44 K/UL — HIGH (ref 3.8–10.5)
WBC # FLD AUTO: 16.44 K/UL — HIGH (ref 3.8–10.5)

## 2023-12-31 PROCEDURE — 72170 X-RAY EXAM OF PELVIS: CPT | Mod: 26

## 2023-12-31 PROCEDURE — 73562 X-RAY EXAM OF KNEE 3: CPT | Mod: 26,LT

## 2023-12-31 PROCEDURE — 73030 X-RAY EXAM OF SHOULDER: CPT | Mod: 26,LT

## 2023-12-31 PROCEDURE — 73200 CT UPPER EXTREMITY W/O DYE: CPT | Mod: 26,LT,MA

## 2023-12-31 PROCEDURE — 73070 X-RAY EXAM OF ELBOW: CPT | Mod: 26,LT

## 2023-12-31 PROCEDURE — 71045 X-RAY EXAM CHEST 1 VIEW: CPT | Mod: 26

## 2023-12-31 PROCEDURE — 73060 X-RAY EXAM OF HUMERUS: CPT | Mod: 26,LT

## 2023-12-31 PROCEDURE — 70450 CT HEAD/BRAIN W/O DYE: CPT | Mod: 26,MA

## 2023-12-31 PROCEDURE — 73020 X-RAY EXAM OF SHOULDER: CPT | Mod: 26,XE,LT

## 2023-12-31 RX ORDER — FENTANYL CITRATE 50 UG/ML
50 INJECTION INTRAVENOUS ONCE
Refills: 0 | Status: DISCONTINUED | OUTPATIENT
Start: 2023-12-31 | End: 2023-12-31

## 2023-12-31 RX ORDER — OXYCODONE AND ACETAMINOPHEN 5; 325 MG/1; MG/1
1 TABLET ORAL
Qty: 9 | Refills: 0
Start: 2023-12-31 | End: 2024-01-02

## 2023-12-31 RX ORDER — SODIUM CHLORIDE 9 MG/ML
1000 INJECTION INTRAMUSCULAR; INTRAVENOUS; SUBCUTANEOUS ONCE
Refills: 0 | Status: DISCONTINUED | OUTPATIENT
Start: 2023-12-31 | End: 2024-01-03

## 2023-12-31 RX ORDER — FENTANYL CITRATE 50 UG/ML
25 INJECTION INTRAVENOUS ONCE
Refills: 0 | Status: DISCONTINUED | OUTPATIENT
Start: 2023-12-31 | End: 2023-12-31

## 2023-12-31 RX ORDER — ONDANSETRON 8 MG/1
4 TABLET, FILM COATED ORAL ONCE
Refills: 0 | Status: DISCONTINUED | OUTPATIENT
Start: 2023-12-31 | End: 2024-01-03

## 2023-12-31 RX ORDER — OXYCODONE HYDROCHLORIDE 5 MG/1
5 TABLET ORAL ONCE
Refills: 0 | Status: DISCONTINUED | OUTPATIENT
Start: 2023-12-31 | End: 2023-12-31

## 2023-12-31 RX ORDER — ACETAMINOPHEN 500 MG
1000 TABLET ORAL ONCE
Refills: 0 | Status: COMPLETED | OUTPATIENT
Start: 2023-12-31 | End: 2023-12-31

## 2023-12-31 RX ADMIN — OXYCODONE HYDROCHLORIDE 5 MILLIGRAM(S): 5 TABLET ORAL at 06:48

## 2023-12-31 RX ADMIN — FENTANYL CITRATE 50 MICROGRAM(S): 50 INJECTION INTRAVENOUS at 03:17

## 2023-12-31 RX ADMIN — Medication 400 MILLIGRAM(S): at 03:16

## 2023-12-31 NOTE — ED PROVIDER NOTE - PHYSICAL EXAMINATION
GEN: Patient awake and alert. moderate acute distress, non-toxic.  Head: normocephalic, atraumatic.  Neck: Nontender, full ROM  Eyes: PERRLA b/l. EOMI, no scleral icterus, no conjunctival injection. Moist mucous membranes.  CARDIAC: RRR. Normal S1, S2. No murmur, rubs, or gallops. bilateral peripheral edema noted.  PULM: CTA B/L no wheeze, rales or rhonchi.  ABD: Soft, nontender, nondistended.   MSK: tenderness over the L knee to palpation. Exquisite tenderness over the left shoulder to minimal palpation.  Tenderness over the hips bilaterally.  No obvious erythema, lacerations.  NEURO: A&Ox3, no focal neurological deficits  SKIN: Warm, dry

## 2023-12-31 NOTE — ED ADULT NURSE NOTE - NSFALLUNIVINTERV_ED_ALL_ED
Bed/Stretcher in lowest position, wheels locked, appropriate side rails in place/Call bell, personal items and telephone in reach/Instruct patient to call for assistance before getting out of bed/chair/stretcher/Non-slip footwear applied when patient is off stretcher/[place] to call system/Physically safe environment - no spills, clutter or unnecessary equipment/Purposeful proactive rounding/Room/bathroom lighting operational, light cord in reach Bed/Stretcher in lowest position, wheels locked, appropriate side rails in place/Call bell, personal items and telephone in reach/Instruct patient to call for assistance before getting out of bed/chair/stretcher/Non-slip footwear applied when patient is off stretcher/[place] to call system/Physically safe environment - no spills, clutter or unnecessary equipment/Purposeful proactive rounding/Room/bathroom lighting operational, light cord in reach

## 2023-12-31 NOTE — ED PROVIDER NOTE - PROGRESS NOTE DETAILS
Discussed safe discharge vs admission for rehab placement. Pt and daughter feel they can manage patient at home. Pt has home health aids. They request Percocet and understand that the medication can make pt drowsy and at risk for falling. BELA.

## 2023-12-31 NOTE — ED ADULT NURSE NOTE - NSSEPSISSUSPECTED_ED_A_ED
Brief Postoperative Note  ______________________________________________________________    Patient: Humza Espinoza  YOB: 1937  MRN: [id number]  Date of Procedure: 3/7/2018    Pre-Op Diagnosis: T-11, T-12 FRACTURES    Post-Op Diagnosis: Same       Procedure(s):  T-11, T-12 VERTEBRAL AUGMENTATION    Anesthesia: General    Surgeon(s):  Yuli Neal MD    Staff:  Scrub Person First: Oksana Michel     Estimated Blood Loss: 5 mL    Complications: None      Implants:    Implant Name Type Inv.  Item Serial No.  Lot No. LRB No. Used   KIT CEMENT BONE W/KYPHON MIXER TUBE FUNNL - [id number] Cement KIT CEMENT BONE W/KYPHON MIXER TUBE FUNNL [id number] [facility] [id number] N/A 1   KIT CEMENT BONE W/KYPHON [address] [facility] KIT CEMENT BONE W/KYPHON [address] [facility] [id number] N/A 1       Oscar Diez MD  Date: 3/7/2018  Time: 5:58 PM No

## 2023-12-31 NOTE — ED PROVIDER NOTE - NSFOLLOWUPINSTRUCTIONS_ED_ALL_ED_FT
You are seen in the emergency department after a fall.  You were determined to have a fracture of your humerus.    You were splinted.  You do not require surgical intervention at this time.     If you develop fever, chills, numbness or tingling over the arm, or for any other questions or concerns please return to the emergency department.    You should follow-up with your primary care doctor within 1-3 days. You are seen in the emergency department after a fall.  You were determined to have a fracture of your humerus.    Wear left arm sling at all times.    You will be contacted by a hospital representative to help set up an appointment with an Orthopedic doctor during business hours. You may also use the patient access number above to set up your own appointment.    Take Percocet as prescribed. This medication may make you drowsy.    You may use Tylenol or Motrin over the counter per package instructions as needed for pain.    Please seek medical attention if your symptoms worsen or if you have any concerns.

## 2023-12-31 NOTE — ED PROVIDER NOTE - CLINICAL SUMMARY MEDICAL DECISION MAKING FREE TEXT BOX
Patient has pain over the left shoulder, left knee, hips bilaterally on exam.  Concern for fracture versus dislocation of the left shoulder and humerus. Will obtain x-rays of these areas as well as CT head given impact to her head.

## 2023-12-31 NOTE — CONSULT NOTE ADULT - ASSESSMENT
ASSESSMENT & PLAN  78yFemale w/ L proximal humerus fx s/p immobilization. Old pelvic fractures seen on AP pelvis.    - WBAT  -NWB LUE in a sling  -pain control  -ice/cold compress  -no acute ortho surgery at this time  -f/u outpt with Dr. Carrington or Dr. Ashley  in 1 week, call office for appt    For all questions related to patient care, please reach out to the on-call team via the pager.     Denice Cloud, PGY 2  Orthopaedic Surgery  [facility] [phone]  [facility] [id number]  [facility] [phone]   ASSESSMENT & PLAN  78yFemale w/ L proximal humerus fx s/p immobilization. Old pelvic fractures seen on AP pelvis.    - WBAT  -NWB LUE in a sling  -pain control  -ice/cold compress  -no acute ortho surgery at this time  -f/u outpt with Dr. Carrington or Dr. Ashley  in 1 week, call office for appt    For all questions related to patient care, please reach out to the on-call team via the pager.     Denice Cloud, PGY 2  Orthopaedic Surgery  [facility] [phone]  [facility] [phone]  [facility] [phone]

## 2023-12-31 NOTE — ED PROVIDER NOTE - OBJECTIVE STATEMENT
Corbin Bland is a 78 y.o. F PMHx significant for HTN, HLD presents to the ED s/p fall approximately 10 PM tonight.  Patient denies feeling lightheaded or dizzy prior to fall.  She Attempted to pet Her cat and had a mechanical trip and fall with impact to the back of her head and left shoulder.  Patient denies LOC, N/V, CP, SOB, ABD pain, changes in urination or bowel movement.  Otherwise does have a headache at this time.

## 2023-12-31 NOTE — ED ADULT NURSE NOTE - OBJECTIVE STATEMENT
Break RN; A&OX4, awake, and alert, ambulatory, h/o HTN. Patient is coming to ED in regards to a fall that occurred today. Patiet was picking her pet up when she lost balance and landed on her left sided. Patient endorses patient o left shoulder and left knee, patient has limited ROM to left knee and left shoulder a this time. Meds given as ordered, will continue ot monitor.

## 2023-12-31 NOTE — ED POST DISCHARGE NOTE - REASON FOR FOLLOW-UP
Patient's sister called needs Rx for pain that was supposed to be sent to pharmacy. RX failed. I re-sent RX to Patient's preferred pharmacy. Other

## 2023-12-31 NOTE — ED PROVIDER NOTE - ATTENDING CONTRIBUTION TO CARE
Afebrile. Awake and Alert. Head atraumatic. No mid-line CS TTP. Lungs CTA. Heart RRR. Abdomen soft NTND. CN II-XII grossly intact. Left: +TTP shoulder proximal arm, cannot range due to pain, NV intact distally.

## 2023-12-31 NOTE — ED PROVIDER NOTE - PATIENT PORTAL LINK FT
You can access the FollowMyHealth Patient Portal offered by [facility] by registering at the following website: [email or web address]. By joining [employer]’s FollowMyHealth portal, you will also be able to view your health information using other applications (apps) compatible with our system. You can access the FollowMyHealth Patient Portal offered by [facility] by registering at the following website: [email or web address]. By joining [employer]’s FollowMyHealth portal, you will also be able to view your health information using other applications (apps) compatible with our system.

## 2023-12-31 NOTE — CONSULT NOTE ADULT - SUBJECTIVE AND OBJECTIVE BOX
HPI  78yFemale R-hand dominant w/ PMH of Cervical cancer (s/p hysterectomy), Pelvic and spinal fractures this past year, HTN and hypothyroidism c/o L shoulder pain s/p mechanical fall after trying to pet her cat last night. Denies headstrike or LOC. Denies numbness/tingling in the LUE. Denies any other trauma/injuries at this time. Pt denies any pain in pelvis or legs.    ROS  Negative unless otherwise specified in HPI.    PAST MEDICAL & SURGICAL Hx  PAST MEDICAL & SURGICAL HISTORY:  History of cervical cancer  s/p hysterectomy      H/O peripheral neuropathy      Vertigo      Palpitations      H/O supraventricular tachycardia  s/p ablation      H/O hyperlipidemia      HTN (hypertension)      Hx of cholecystectomy      S/P total abdominal hysterectomy      Hx of ankle fusion          MEDICATIONS  Home Medications:  acetaminophen 325 mg oral tablet: 2 tab(s) orally every 6 hours, As needed, Mild Pain (1 - 3), Moderate Pain (4 - 6) (24 Nov 2022 08:47)  albuterol 90 mcg/inh inhalation aerosol: 2 puff(s) inhaled every 6 hours, As needed, Wheezing (24 Nov 2022 08:47)  benzonatate 100 mg oral capsule: 1 cap(s) orally 3 times a day (24 Nov 2022 08:47)  bisacodyl 5 mg oral delayed release tablet: 1 tab(s) orally once a day (at bedtime) (24 Nov 2022 08:47)  ceFAZolin 2 g/20 mL intravenous solution: 2 gram(s) injectable every 8 hours x 4 more weeks through 12/22/22 (total of 8 week course)  (25 Nov 2022 13:46)  DULoxetine 60 mg oral delayed release capsule: 1 cap(s) orally once a day (24 Nov 2022 08:47)  fenofibrate 145 mg oral tablet: 1 tab(s) orally once a day (24 Nov 2022 08:47)  ferrous sulfate 325 mg (65 mg elemental iron) oral tablet: 1 tab(s) orally once a day (24 Nov 2022 08:47)  fluticasone 50 mcg/inh nasal spray: 1 spray(s) nasal every 12 hours, As needed, nasal congestion (24 Nov 2022 08:47)  furosemide 40 mg oral tablet: 1 tab(s) orally once a day (25 Nov 2022 13:22)  gabapentin 600 mg oral tablet: 1 tab(s) orally every 8 hours (25 Nov 2022 13:15)  levothyroxine 125 mcg (0.125 mg) oral tablet: 1 tab(s) orally once a day (24 Nov 2022 08:47)  lidocaine 4% topical film: 1 patch transdermal every 24 hours (24 Nov 2022 08:47)  meclizine 12.5 mg oral tablet: 1 tab(s) orally 2 times a day (24 Nov 2022 08:47)  melatonin 3 mg oral tablet: 1 tab(s) orally once a day (at bedtime) (24 Nov 2022 08:47)  Multiple Vitamins oral tablet: 1 tab(s) orally once a day (24 Nov 2022 08:47)  nystatin 100,000 units/g topical cream: 1 application topically 2 times a day x 10 days (25 Nov 2022 13:22)  oxyCODONE 10 mg oral tablet: 1 tab(s) orally every 3 hours, As needed, Severe Pain (7 - 10) (24 Nov 2022 08:47)  pantoprazole 40 mg oral delayed release tablet: 1 tab(s) orally once a day (before a meal) (24 Nov 2022 08:47)  polyethylene glycol 3350 oral powder for reconstitution: 17 gram(s) orally 2 times a day (24 Nov 2022 08:47)  QUEtiapine 25 mg oral tablet: 1 tab(s) orally once a day (at bedtime) (24 Nov 2022 08:47)  rOPINIRole 2 mg oral tablet: 1 tab(s) orally once a day (24 Nov 2022 08:47)  senna leaf extract oral tablet: 2 tab(s) orally once a day (at bedtime) (24 Nov 2022 08:47)  sodium chloride 0.65% nasal spray: 1 spray(s) in each nostril 2 times a day (24 Nov 2022 08:47)  sodium chloride 1 g oral tablet: 2 tab(s) orally 3 times a day (24 Nov 2022 08:47)  traMADol 50 mg oral tablet: 1 tab(s) orally every 4 hours, As needed, Moderate Pain (4 - 6) (25 Nov 2022 13:15)      ALLERGIES  erythromycin (Hives)  IV Contrast (Anaphylaxis)      FAMILY Hx  FAMILY HISTORY:  FH: myocardial infarction (Mother)        SOCIAL Hx  Social History:      VITALS  Vital Signs Last 24 Hrs  T(C): 36.7 (30 Dec 2023 23:27), Max: 36.7 (30 Dec 2023 23:27)  T(F): 98 (30 Dec 2023 23:27), Max: 98 (30 Dec 2023 23:27)  HR: 88 (30 Dec 2023 23:27) (88 - 88)  BP: 154/77 (30 Dec 2023 23:27) (154/77 - 154/77)  BP(mean): --  RR: 18 (30 Dec 2023 23:27) (18 - 18)  SpO2: 97% (30 Dec 2023 23:27) (97% - 97%)    Parameters below as of 30 Dec 2023 23:27  Patient On (Oxygen Delivery Method): room air        PHYSICAL EXAM  Gen: Lying in bed, NAD  Resp: No increased WOB  LUE:  Skin intact, +edema and +ecchymosis over shoulder  +TTP over shoulder, no TTP along remainder of extremity; compartments soft  Limited ROM at shoulder 2/2 pain  Motor: Ax/Musc/Med/Rad/AIN/PIN/U intact  Sensory: Ax/Musc/Med/Rad/U SILT  +Rad pulse, WWP    LABS                        12.6   16.44 )-----------( 302      ( 31 Dec 2023 03:14 )             38.9     12-31    139  |  105  |  26<H>  ----------------------------<  135<H>  4.4   |  23  |  0.97    Ca    8.9      31 Dec 2023 03:14          IMAGING  XRs: L proximal humerus fx

## 2024-01-04 ENCOUNTER — APPOINTMENT (OUTPATIENT)
Dept: ORTHOPEDIC SURGERY | Facility: CLINIC | Age: 79
End: 2024-01-04
Payer: MEDICARE

## 2024-01-04 VITALS — HEIGHT: 64 IN | WEIGHT: 192 LBS | BODY MASS INDEX: 32.78 KG/M2

## 2024-01-04 PROCEDURE — 99204 OFFICE O/P NEW MOD 45 MIN: CPT

## 2024-01-04 RX ORDER — TRAMADOL HYDROCHLORIDE 50 MG/1
50 TABLET, COATED ORAL
Qty: 14 | Refills: 0 | Status: ACTIVE | COMMUNITY
Start: 2024-01-04 | End: 1900-01-01

## 2024-01-08 ENCOUNTER — OUTPATIENT (OUTPATIENT)
Dept: OUTPATIENT SERVICES | Facility: HOSPITAL | Age: 79
LOS: 1 days | End: 2024-01-08

## 2024-01-08 ENCOUNTER — TRANSCRIPTION ENCOUNTER (OUTPATIENT)
Age: 79
End: 2024-01-08

## 2024-01-08 VITALS
TEMPERATURE: 98 F | OXYGEN SATURATION: 99 % | RESPIRATION RATE: 16 BRPM | HEIGHT: 64 IN | SYSTOLIC BLOOD PRESSURE: 121 MMHG | DIASTOLIC BLOOD PRESSURE: 85 MMHG | HEART RATE: 79 BPM | WEIGHT: 194.01 LBS

## 2024-01-08 DIAGNOSIS — S42.202A UNSPECIFIED FRACTURE OF UPPER END OF LEFT HUMERUS, INITIAL ENCOUNTER FOR CLOSED FRACTURE: ICD-10-CM

## 2024-01-08 DIAGNOSIS — Z01.818 ENCOUNTER FOR OTHER PREPROCEDURAL EXAMINATION: ICD-10-CM

## 2024-01-08 DIAGNOSIS — Z29.9 ENCOUNTER FOR PROPHYLACTIC MEASURES, UNSPECIFIED: ICD-10-CM

## 2024-01-08 DIAGNOSIS — Z86.79 PERSONAL HISTORY OF OTHER DISEASES OF THE CIRCULATORY SYSTEM: ICD-10-CM

## 2024-01-08 DIAGNOSIS — Z86.018 PERSONAL HISTORY OF OTHER BENIGN NEOPLASM: Chronic | ICD-10-CM

## 2024-01-08 DIAGNOSIS — Z90.89 ACQUIRED ABSENCE OF OTHER ORGANS: Chronic | ICD-10-CM

## 2024-01-08 DIAGNOSIS — J98.4 OTHER DISORDERS OF LUNG: ICD-10-CM

## 2024-01-08 DIAGNOSIS — Z98.890 OTHER SPECIFIED POSTPROCEDURAL STATES: Chronic | ICD-10-CM

## 2024-01-08 DIAGNOSIS — S42.302A UNSPECIFIED FRACTURE OF SHAFT OF HUMERUS, LEFT ARM, INITIAL ENCOUNTER FOR CLOSED FRACTURE: ICD-10-CM

## 2024-01-08 LAB
ANION GAP SERPL CALC-SCNC: 10 MMOL/L — SIGNIFICANT CHANGE UP (ref 5–17)
ANION GAP SERPL CALC-SCNC: 10 MMOL/L — SIGNIFICANT CHANGE UP (ref 5–17)
BLD GP AB SCN SERPL QL: NEGATIVE — SIGNIFICANT CHANGE UP
BLD GP AB SCN SERPL QL: NEGATIVE — SIGNIFICANT CHANGE UP
BUN SERPL-MCNC: 20 MG/DL — SIGNIFICANT CHANGE UP (ref 7–23)
BUN SERPL-MCNC: 20 MG/DL — SIGNIFICANT CHANGE UP (ref 7–23)
CALCIUM SERPL-MCNC: 9.7 MG/DL — SIGNIFICANT CHANGE UP (ref 8.4–10.5)
CALCIUM SERPL-MCNC: 9.7 MG/DL — SIGNIFICANT CHANGE UP (ref 8.4–10.5)
CHLORIDE SERPL-SCNC: 102 MMOL/L — SIGNIFICANT CHANGE UP (ref 96–108)
CHLORIDE SERPL-SCNC: 102 MMOL/L — SIGNIFICANT CHANGE UP (ref 96–108)
CO2 SERPL-SCNC: 24 MMOL/L — SIGNIFICANT CHANGE UP (ref 22–31)
CO2 SERPL-SCNC: 24 MMOL/L — SIGNIFICANT CHANGE UP (ref 22–31)
CREAT SERPL-MCNC: 0.96 MG/DL — SIGNIFICANT CHANGE UP (ref 0.5–1.3)
CREAT SERPL-MCNC: 0.96 MG/DL — SIGNIFICANT CHANGE UP (ref 0.5–1.3)
EGFR: 61 ML/MIN/1.73M2 — SIGNIFICANT CHANGE UP
EGFR: 61 ML/MIN/1.73M2 — SIGNIFICANT CHANGE UP
GLUCOSE SERPL-MCNC: 75 MG/DL — SIGNIFICANT CHANGE UP (ref 70–99)
GLUCOSE SERPL-MCNC: 75 MG/DL — SIGNIFICANT CHANGE UP (ref 70–99)
HCT VFR BLD CALC: 37.3 % — SIGNIFICANT CHANGE UP (ref 34.5–45)
HCT VFR BLD CALC: 37.3 % — SIGNIFICANT CHANGE UP (ref 34.5–45)
HGB BLD-MCNC: 11.8 G/DL — SIGNIFICANT CHANGE UP (ref 11.5–15.5)
HGB BLD-MCNC: 11.8 G/DL — SIGNIFICANT CHANGE UP (ref 11.5–15.5)
MCHC RBC-ENTMCNC: 28.2 PG — SIGNIFICANT CHANGE UP (ref 27–34)
MCHC RBC-ENTMCNC: 28.2 PG — SIGNIFICANT CHANGE UP (ref 27–34)
MCHC RBC-ENTMCNC: 31.6 GM/DL — LOW (ref 32–36)
MCHC RBC-ENTMCNC: 31.6 GM/DL — LOW (ref 32–36)
MCV RBC AUTO: 89.2 FL — SIGNIFICANT CHANGE UP (ref 80–100)
MCV RBC AUTO: 89.2 FL — SIGNIFICANT CHANGE UP (ref 80–100)
NRBC # BLD: 0 /100 WBCS — SIGNIFICANT CHANGE UP (ref 0–0)
NRBC # BLD: 0 /100 WBCS — SIGNIFICANT CHANGE UP (ref 0–0)
PLATELET # BLD AUTO: 407 K/UL — HIGH (ref 150–400)
PLATELET # BLD AUTO: 407 K/UL — HIGH (ref 150–400)
POTASSIUM SERPL-MCNC: 4.4 MMOL/L — SIGNIFICANT CHANGE UP (ref 3.5–5.3)
POTASSIUM SERPL-MCNC: 4.4 MMOL/L — SIGNIFICANT CHANGE UP (ref 3.5–5.3)
POTASSIUM SERPL-SCNC: 4.4 MMOL/L — SIGNIFICANT CHANGE UP (ref 3.5–5.3)
POTASSIUM SERPL-SCNC: 4.4 MMOL/L — SIGNIFICANT CHANGE UP (ref 3.5–5.3)
RBC # BLD: 4.18 M/UL — SIGNIFICANT CHANGE UP (ref 3.8–5.2)
RBC # BLD: 4.18 M/UL — SIGNIFICANT CHANGE UP (ref 3.8–5.2)
RBC # FLD: 15 % — HIGH (ref 10.3–14.5)
RBC # FLD: 15 % — HIGH (ref 10.3–14.5)
RH IG SCN BLD-IMP: POSITIVE — SIGNIFICANT CHANGE UP
RH IG SCN BLD-IMP: POSITIVE — SIGNIFICANT CHANGE UP
SODIUM SERPL-SCNC: 136 MMOL/L — SIGNIFICANT CHANGE UP (ref 135–145)
SODIUM SERPL-SCNC: 136 MMOL/L — SIGNIFICANT CHANGE UP (ref 135–145)
WBC # BLD: 12.14 K/UL — HIGH (ref 3.8–10.5)
WBC # BLD: 12.14 K/UL — HIGH (ref 3.8–10.5)
WBC # FLD AUTO: 12.14 K/UL — HIGH (ref 3.8–10.5)
WBC # FLD AUTO: 12.14 K/UL — HIGH (ref 3.8–10.5)

## 2024-01-08 RX ORDER — SODIUM CHLORIDE 9 MG/ML
3 INJECTION INTRAMUSCULAR; INTRAVENOUS; SUBCUTANEOUS EVERY 8 HOURS
Refills: 0 | Status: DISCONTINUED | OUTPATIENT
Start: 2024-01-09 | End: 2024-01-09

## 2024-01-08 RX ORDER — CEFAZOLIN SODIUM 1 G
2 VIAL (EA) INJECTION
Qty: 0 | Refills: 0 | DISCHARGE

## 2024-01-08 RX ORDER — CHLORHEXIDINE GLUCONATE 213 G/1000ML
1 SOLUTION TOPICAL ONCE
Refills: 0 | Status: DISCONTINUED | OUTPATIENT
Start: 2024-01-09 | End: 2024-01-11

## 2024-01-08 RX ORDER — LIDOCAINE HCL 20 MG/ML
0.2 VIAL (ML) INJECTION ONCE
Refills: 0 | Status: DISCONTINUED | OUTPATIENT
Start: 2024-01-09 | End: 2024-01-11

## 2024-01-08 RX ORDER — CEFAZOLIN SODIUM 1 G
2000 VIAL (EA) INJECTION ONCE
Refills: 0 | Status: COMPLETED | OUTPATIENT
Start: 2024-01-09 | End: 2024-01-09

## 2024-01-08 NOTE — H&P PST ADULT - NSICDXPASTSURGICALHX_GEN_ALL_CORE_FT
PAST SURGICAL HISTORY:  Hx of ankle fusion     Hx of cholecystectomy     S/P total abdominal hysterectomy      PAST SURGICAL HISTORY:  H/O cardiac radiofrequency ablation     History of lipoma     History of tonsillectomy     Hx of ankle fusion     Hx of cholecystectomy     S/P total abdominal hysterectomy

## 2024-01-08 NOTE — H&P PST ADULT - NSICDXPASTMEDICALHX_GEN_ALL_CORE_FT
PAST MEDICAL HISTORY:  H/O hyperlipidemia     H/O peripheral neuropathy     H/O supraventricular tachycardia s/p ablation    History of cervical cancer s/p hysterectomy    HTN (hypertension)     Palpitations     Vertigo      PAST MEDICAL HISTORY:  Closed fracture of left humerus     GERD (gastroesophageal reflux disease)     H/O hyperlipidemia     H/O peripheral neuropathy     H/O supraventricular tachycardia s/p ablation    History of cervical cancer s/p hysterectomy    History of COVID-19     History of DVT of lower extremity     History of iron deficiency anemia     History of osteoporosis     History of pelvic fracture     History of restless legs syndrome     History of small bowel obstruction     History of UTI     Hypothyroidism     Leg edema     Small airways disease     Vertigo      PAST MEDICAL HISTORY:  Chronic cough     Closed fracture of left humerus     Disorder of lumbar spine     GERD (gastroesophageal reflux disease)     H/O hyperlipidemia     H/O peripheral neuropathy     H/O supraventricular tachycardia s/p ablation    History of cervical cancer s/p hysterectomy    History of COVID-19     History of DVT of lower extremity     History of iron deficiency anemia     History of osteoporosis     History of pelvic fracture     History of restless legs syndrome     History of small bowel obstruction     History of UTI     Hypothyroidism     Leg edema     Obesity (BMI 30-39.9)     Small airways disease     Vertigo

## 2024-01-08 NOTE — H&P PST ADULT - HISTORY OF PRESENT ILLNESS
76 yo F with PMHx significant for hypothyroidism, HTN, HLD, SVT (s/p ablation), Cervical cancer (s/p hysterectomy), DVT (June 2022, still on eliquis), OA and peripheral neuropathy presenting to [facility] after having fall from bed overnight. Patient reports fall from bed onto L side. Patient states she was sound asleep and woke up on floor, with no memory of rolling over out fo bed. She denied LOC, memory loss, or hitting her head. Patient denied having confusion when waking up. Patient reportedly had 8/10 pain on L ribs, back and hip, and was unable to get up. Patient states distance from bed to floor was about 2 feet. She pressed life alert alarm and was on floor for about 3 hours before help came. Patient denied lightheadedness, but states she has chronic dizziness. No diarrhea, abdominal pain, nausea or vomiting but patient reports decreased appetite and constipation (no BM x3 days). Additionally, patient reports chronic stress incontinence and nocturia, and wears a diaper at all times. Denied fevers, chills, malaise, dysuria, frequency, hematuria, chest pain, palpitations, changes in vision or hearing, bleeding, new rash, joint pain, and weakness. Patient states she has had chronic cough since she was COVID+ several months ago and has nagging dry cough. Pain in back and shoulder worsens with cough since her fall.     Of note patient was recently admitted for a UTI and DVT in June of 2022, and started on Eliquis. Per discharge note in June, patient was to complete a 3 month course and then stop taking. Per patient and PCP, patient still taking the eliquis. Patient reportedly has had 5 UTIs in the past year. Was discharged from rehab facility to home several weeks ago, but still reporting difficulty with ambulation.     Per ASHWIN Gillespie, who saw patient and yadira blood work at patient's home 10/23, patient had leukocytosis, with iron panel suggesting MAR, HgB 8.7, Na 125, and Cr 1.4 at that time.  78 yo F with PMHx significant for hypothyroidism, HTN, HLD, SVT (s/p ablation), Cervical cancer (s/p hysterectomy), DVT (June 2022, still on eliquis), OA and peripheral neuropathy presenting to [facility] after having fall from bed overnight. Patient reports fall from bed onto L side. Patient states she was sound asleep and woke up on floor, with no memory of rolling over out fo bed. She denied LOC, memory loss, or hitting her head. Patient denied having confusion when waking up. Patient reportedly had 8/10 pain on L ribs, back and hip, and was unable to get up. Patient states distance from bed to floor was about 2 feet. She pressed life alert alarm and was on floor for about 3 hours before help came. Patient denied lightheadedness, but states she has chronic dizziness. No diarrhea, abdominal pain, nausea or vomiting but patient reports decreased appetite and constipation (no BM x3 days). Additionally, patient reports chronic stress incontinence and nocturia, and wears a diaper at all times. Denied fevers, chills, malaise, dysuria, frequency, hematuria, chest pain, palpitations, changes in vision or hearing, bleeding, new rash, joint pain, and weakness. Patient states she has had chronic cough since she was COVID+ several months ago and has nagging dry cough. Pain in back and shoulder worsens with cough since her fall.     Of note patient was recently admitted for a UTI and DVT in June of 2022, and started on Eliquis. Per discharge note in June, patient was to complete a 3 month course and then stop taking. Per patient and PCP, patient still taking the eliquis. Patient reportedly has had 5 UTIs in the past year. Was discharged from rehab facility to home several weeks ago, but still reporting difficulty with ambulation.     Per ASHWIN Gillespie, who saw patient and yadira blood work at patient's home 10/23, patient had leukocytosis, with iron panel suggesting MAR, HgB 8.7, Na 125, and Cr 1.4 at that time.  78 yo F with PMHx significant for hypothyroidism, HTN, HLD, SVT (s/p ablation), Cervical cancer (s/p hysterectomy), DVT (June 2022, still on eliquis), OA and peripheral neuropathy presenting to [facility] after having fall from bed overnight. Patient reports fall from bed onto L side. Patient states she was sound asleep and woke up on floor, with no memory of rolling over out fo bed. She denied LOC, memory loss, or hitting her head. Patient denied having confusion when waking up. Patient reportedly had 8/10 pain on L ribs, back and hip, and was unable to get up. Patient states distance from bed to floor was about 2 feet. She pressed life alert alarm and was on floor for about 3 hours before help came. Patient denied lightheadedness, but states she has chronic dizziness. No diarrhea, abdominal pain, nausea or vomiting but patient reports decreased appetite and constipation (no BM x3 days). Additionally, patient reports chronic stress incontinence and nocturia, and wears a diaper at all times. Denied fevers, chills, malaise, dysuria, frequency, hematuria, chest pain, palpitations, changes in vision or hearing, bleeding, new rash, joint pain, and weakness. Patient states she has had chronic cough since she was COVID+ several months ago and has nagging dry cough. Pain in back and shoulder worsens with cough since her fall.     Of note patient was recently admitted for a UTI and DVT in June of 2022, and started on Eliquis. Per discharge note in June, patient was to complete a 3 month course and then stop taking. Per patient and PCP, patient still taking the eliquis. Patient reportedly has had 5 UTIs in the past year. Was discharged from rehab facility to home several weeks ago, but still reporting difficulty with ambulation.     Per ASHWIN Gillespie, who saw patient and yadira blood work at patient's home 10/23, patient had leukocytosis, with iron panel suggesting MAR, HgB 8.7, Na 125, and Cr 1.4 at that time.       in rehab over past few years due to pelvic fx, UTI, lumbar  spinal disc fx , home since 9/2023, fell 12/30/2023 78 yo F with PMHx significant for hypothyroidism, HTN, HLD, SVT (s/p ablation), Cervical cancer (s/p hysterectomy), DVT (June 2022, still on eliquis), OA and peripheral neuropathy presenting to [facility] after having fall from bed overnight. Patient reports fall from bed onto L side. Patient states she was sound asleep and woke up on floor, with no memory of rolling over out fo bed. She denied LOC, memory loss, or hitting her head. Patient denied having confusion when waking up. Patient reportedly had 8/10 pain on L ribs, back and hip, and was unable to get up. Patient states distance from bed to floor was about 2 feet. She pressed life alert alarm and was on floor for about 3 hours before help came. Patient denied lightheadedness, but states she has chronic dizziness. No diarrhea, abdominal pain, nausea or vomiting but patient reports decreased appetite and constipation (no BM x3 days). Additionally, patient reports chronic stress incontinence and nocturia, and wears a diaper at all times. Denied fevers, chills, malaise, dysuria, frequency, hematuria, chest pain, palpitations, changes in vision or hearing, bleeding, new rash, joint pain, and weakness. Patient states she has had chronic cough since she was COVID+ several months ago and has nagging dry cough. Pain in back and shoulder worsens with cough since her fall.     Of note patient was recently admitted for a UTI and DVT in June of 2022, and started on Eliquis. Per discharge note in June, patient was to complete a 3 month course and then stop taking. Per patient and PCP, patient still taking the eliquis. Patient reportedly has had 5 UTIs in the past year. Was discharged from rehab facility to home several weeks ago, but still reporting difficulty with ambulation.     Per ASHWIN Gillespie, who saw patient and yadira blood work at patient's home 10/23, patient had leukocytosis, with iron panel suggesting MAR, HgB 8.7, Na 125, and Cr 1.4 at that time.       in rehab over past few years due to pelvic fx, UTI, lumbar  spinal disc fx , home since 9/2023, fell 12/30/2023 79 yo F with PMHx significant for hypothyroidism, HLD, SVT (s/p successful ablation), stable small airway dz/ chronic cough recent pulm evaluation started Advair , Cervical cancer (s/p hysterectomy), DVT post pelvic fx (June 2022 was treated with Eliquis), BMI 33, Iron deficiency anemia, GERD,  OA, peripheral neuropathy, LE edema, osteoporosis .     Patient reports being in and out of  rehab over past few years due to fall and  pelvic fx  , UTIs , fall and lumbar spine herniated disc fx. Patient discharged home 9/2023. Living along, has home aid and support of sister Shanna.  Ambulating with walker.     Today presents to PST for scheduled ORIF left proximal humerus tomorrow 1/9/2024. Patient bend down to pet a cat and fell fracturing left arm. Pain 6/10, wear sling.    77 yo F with PMHx significant for hypothyroidism, HLD, SVT (s/p successful ablation), stable small airway dz/ chronic cough recent pulm evaluation started Advair , Cervical cancer (s/p hysterectomy), DVT post pelvic fx (June 2022 was treated with Eliquis), BMI 33, Iron deficiency anemia, GERD,  OA, peripheral neuropathy, LE edema, osteoporosis .     Patient reports being in and out of  rehab over past few years due to fall and  pelvic fx  , UTIs , fall and lumbar spine herniated disc fx. Patient discharged home 9/2023. Living along, has home aid and support of sister Shanna.  Ambulating with walker.     Today presents to PST for scheduled ORIF left proximal humerus tomorrow 1/9/2024. Patient bend down to pet a cat and fell fracturing left arm. Pain 6/10, wear sling.

## 2024-01-08 NOTE — H&P PST ADULT - PROBLEM SELECTOR PLAN 1
ORIF left proximal humerus   PST instructions provided, patient and sister verbalized understanding , chlorhexidine soap given.   CBC, BMP, T&S collected and sent

## 2024-01-08 NOTE — H&P PST ADULT - ASSESSMENT
Airway : no airway abnormalities , denies prior anesthesia complications   Mallampati : III  Denies loose teeth / Implants     Tommie abrasion risk : Denies     CAPRINI VTE 2.0 SCORE [CLOT updated 2019]    AGE RELATED RISK FACTORS                                                       MOBILITY RELATED FACTORS  [ ] Age 41-60 years                                            (1 Point)                    [ ] Bed rest                                                        (1 Point)  [ ] Age: 61-74 years                                           (2 Points)                  [ ] Plaster cast                                                   (2 Points)  [ x] Age= 75 years                                              (3 Points)                    [ ] Bed bound for more than 72 hours                 (2 Points)    DISEASE RELATED RISK FACTORS                                               GENDER SPECIFIC FACTORS  [ ] Edema in the lower extremities                       (1 Point)              [ ] Pregnancy                                                     (1 Point)  [ ] Varicose veins                                               (1 Point)                     [ ] Post-partum < 6 weeks                                   (1 Point)             [ x] BMI > 25 Kg/m2                                            (1 Point)                     [ ] Hormonal therapy  or oral contraception          (1 Point)                 [ ] Sepsis (in the previous month)                        (1 Point)               [ ] History of pregnancy complications                 (1 point)  [ ] Pneumonia or serious lung disease                                               [ ] Unexplained or recurrent                     (1 Point)           (in the previous month)                               (1 Point)  [ ] Abnormal pulmonary function test                     (1 Point)                 SURGERY RELATED RISK FACTORS  [ ] Acute myocardial infarction                              (1 Point)               [ ]  Section                                             (1 Point)  [ ] Congestive heart failure (in the previous month)  (1 Point)      [ ] Minor surgery                                                  (1 Point)   [ ] Inflammatory bowel disease                             (1 Point)               [ ] Arthroscopic surgery                                        (2 Points)  [ ] Central venous access                                      (2 Points)                [ ] General surgery lasting more than 45 minutes (2 points)  [ x] Malignancy- Present or previous                   (2 Points)                [ ] Elective arthroplasty                                         (5 points)    [ ] Stroke (in the previous month)                          (5 Points)                                                                                                                                                           HEMATOLOGY RELATED FACTORS                                                 TRAUMA RELATED RISK FACTORS  [ x] Prior episodes of VTE                                     (3 Points)                [ ] Fracture of the hip, pelvis, or leg                       (5 Points)  [ ] Positive family history for VTE                         (3 Points)             [ ] Acute spinal cord injury (in the previous month)  (5 Points)  [ ] Prothrombin 85848 A                                     (3 Points)               [ ] Paralysis  (less than 1 month)                             (5 Points)  [ ] Factor V Leiden                                             (3 Points)                  [ ] Multiple Trauma within 1 month                        (5 Points)  [ ] Lupus anticoagulants                                     (3 Points)                                                           [ ] Anticardiolipin antibodies                               (3 Points)                                                       [ ] High homocysteine in the blood                      (3 Points)                                             [ ] Other congenital or acquired thrombophilia      (3 Points)                                                [ ] Heparin induced thrombocytopenia                  (3 Points)                                     Total Score [      9    ] Airway : no airway abnormalities , denies prior anesthesia complications   Mallampati : III  Denies loose teeth / Implants     Tommie abrasion risk : Denies     CAPRINI VTE 2.0 SCORE [CLOT updated 2019]    AGE RELATED RISK FACTORS                                                       MOBILITY RELATED FACTORS  [ ] Age 41-60 years                                            (1 Point)                    [ ] Bed rest                                                        (1 Point)  [ ] Age: 61-74 years                                           (2 Points)                  [ ] Plaster cast                                                   (2 Points)  [ x] Age= 75 years                                              (3 Points)                    [ ] Bed bound for more than 72 hours                 (2 Points)    DISEASE RELATED RISK FACTORS                                               GENDER SPECIFIC FACTORS  [ ] Edema in the lower extremities                       (1 Point)              [ ] Pregnancy                                                     (1 Point)  [ ] Varicose veins                                               (1 Point)                     [ ] Post-partum < 6 weeks                                   (1 Point)             [ x] BMI > 25 Kg/m2                                            (1 Point)                     [ ] Hormonal therapy  or oral contraception          (1 Point)                 [ ] Sepsis (in the previous month)                        (1 Point)               [ ] History of pregnancy complications                 (1 point)  [ ] Pneumonia or serious lung disease                                               [ ] Unexplained or recurrent                     (1 Point)           (in the previous month)                               (1 Point)  [ ] Abnormal pulmonary function test                     (1 Point)                 SURGERY RELATED RISK FACTORS  [ ] Acute myocardial infarction                              (1 Point)               [ ]  Section                                             (1 Point)  [ ] Congestive heart failure (in the previous month)  (1 Point)      [ ] Minor surgery                                                  (1 Point)   [ ] Inflammatory bowel disease                             (1 Point)               [ ] Arthroscopic surgery                                        (2 Points)  [ ] Central venous access                                      (2 Points)                [ ] General surgery lasting more than 45 minutes (2 points)  [ x] Malignancy- Present or previous                   (2 Points)                [ ] Elective arthroplasty                                         (5 points)    [ ] Stroke (in the previous month)                          (5 Points)                                                                                                                                                           HEMATOLOGY RELATED FACTORS                                                 TRAUMA RELATED RISK FACTORS  [ x] Prior episodes of VTE                                     (3 Points)                [ ] Fracture of the hip, pelvis, or leg                       (5 Points)  [ ] Positive family history for VTE                         (3 Points)             [ ] Acute spinal cord injury (in the previous month)  (5 Points)  [ ] Prothrombin 01591 A                                     (3 Points)               [ ] Paralysis  (less than 1 month)                             (5 Points)  [ ] Factor V Leiden                                             (3 Points)                  [ ] Multiple Trauma within 1 month                        (5 Points)  [ ] Lupus anticoagulants                                     (3 Points)                                                           [ ] Anticardiolipin antibodies                               (3 Points)                                                       [ ] High homocysteine in the blood                      (3 Points)                                             [ ] Other congenital or acquired thrombophilia      (3 Points)                                                [ ] Heparin induced thrombocytopenia                  (3 Points)                                     Total Score [      9    ]

## 2024-01-08 NOTE — H&P PST ADULT - LAST STRESS TEST
long time ago ( no cardiology follow up for a long time ) . Last 2 years in and out of hospital /rehab

## 2024-01-08 NOTE — H&P PST ADULT - PRIMARY CARE PROVIDER
Dr. Briseyda Palm [phone] Dr. Briseyda Palm [phone] Dr. Briseyda Palm [phone] last visit within a month Dr. Briseyda Palm [phone] last visit within a month Dr. Briseyda Palm [phone] last visit within a month- new PCP Dr. Briseyda Palm [phone] last visit within a month- new PCP

## 2024-01-08 NOTE — H&P PST ADULT - PROBLEM SELECTOR PLAN 3
remote history of SVT post ablation , reports feeling well, does not follow with cardiologist for a long time   cardiac testing in [facility] 11/2022 remote history of SVT post ablation , reports feeling well, does not follow with cardiologist for a long time   cardiac testing in [facility] 11/2022

## 2024-01-08 NOTE — H&P PST ADULT - FUNCTIONAL STATUS
DASI walker/4-10 METS DASI 3.63 walker at home, has home aid . Not active, Denies BRANDON , CP, palpitations/4-10 METS

## 2024-01-09 ENCOUNTER — APPOINTMENT (OUTPATIENT)
Dept: ORTHOPEDIC SURGERY | Facility: HOSPITAL | Age: 79
End: 2024-01-09

## 2024-01-09 ENCOUNTER — INPATIENT (INPATIENT)
Facility: HOSPITAL | Age: 79
LOS: 1 days | Discharge: HOME HEALTH | DRG: 493 | End: 2024-01-11
Attending: GENERAL ACUTE CARE HOSPITAL | Admitting: GENERAL ACUTE CARE HOSPITAL
Payer: MEDICARE

## 2024-01-09 VITALS
SYSTOLIC BLOOD PRESSURE: 143 MMHG | DIASTOLIC BLOOD PRESSURE: 84 MMHG | TEMPERATURE: 98 F | RESPIRATION RATE: 16 BRPM | WEIGHT: 194.01 LBS | HEART RATE: 83 BPM | OXYGEN SATURATION: 97 % | HEIGHT: 64 IN

## 2024-01-09 DIAGNOSIS — Z86.018 PERSONAL HISTORY OF OTHER BENIGN NEOPLASM: Chronic | ICD-10-CM

## 2024-01-09 DIAGNOSIS — Z98.890 OTHER SPECIFIED POSTPROCEDURAL STATES: Chronic | ICD-10-CM

## 2024-01-09 DIAGNOSIS — Z90.89 ACQUIRED ABSENCE OF OTHER ORGANS: Chronic | ICD-10-CM

## 2024-01-09 DIAGNOSIS — S42.202A UNSPECIFIED FRACTURE OF UPPER END OF LEFT HUMERUS, INITIAL ENCOUNTER FOR CLOSED FRACTURE: ICD-10-CM

## 2024-01-09 PROCEDURE — 23615 OPTX PROX HUMRL FX W/INT FIX: CPT | Mod: LT

## 2024-01-09 DEVICE — PLATE LCP PROX HUM STD 3H 3.5X90MM: Type: IMPLANTABLE DEVICE | Site: LEFT | Status: FUNCTIONAL

## 2024-01-09 DEVICE — SCREW LOKG SLF-TPNG W/ STARDRIVE RECESS 3.5X46MM: Type: IMPLANTABLE DEVICE | Site: LEFT | Status: FUNCTIONAL

## 2024-01-09 DEVICE — SCREW LOKG SLF-TPNG W/ STARDRIVE RECESS 3.5X44MM: Type: IMPLANTABLE DEVICE | Site: LEFT | Status: FUNCTIONAL

## 2024-01-09 DEVICE — SCREW CORT S-T 3.5X26MM: Type: IMPLANTABLE DEVICE | Site: LEFT | Status: FUNCTIONAL

## 2024-01-09 DEVICE — WAX MONTAGE 5CC STRL: Type: IMPLANTABLE DEVICE | Site: LEFT | Status: FUNCTIONAL

## 2024-01-09 DEVICE — SCREW LOKG SLF-TPNG W/ STARDRIVE RECESS 3.5X24MM: Type: IMPLANTABLE DEVICE | Site: LEFT | Status: FUNCTIONAL

## 2024-01-09 DEVICE — SCREW CORT S-T 3.5X24MM: Type: IMPLANTABLE DEVICE | Site: LEFT | Status: FUNCTIONAL

## 2024-01-09 DEVICE — SCREW LOCKING SLF-TPNG W/ STARDRIVE RECESS 3.5X40MM: Type: IMPLANTABLE DEVICE | Site: LEFT | Status: FUNCTIONAL

## 2024-01-09 DEVICE — SCREW LOKG SLF-TPNG W/ STARDRIVE RECESS 3.5X42MM: Type: IMPLANTABLE DEVICE | Site: LEFT | Status: FUNCTIONAL

## 2024-01-09 RX ORDER — GABAPENTIN 400 MG/1
600 CAPSULE ORAL EVERY 8 HOURS
Refills: 0 | Status: DISCONTINUED | OUTPATIENT
Start: 2024-01-09 | End: 2024-01-11

## 2024-01-09 RX ORDER — DULOXETINE HYDROCHLORIDE 30 MG/1
60 CAPSULE, DELAYED RELEASE ORAL DAILY
Refills: 0 | Status: DISCONTINUED | OUTPATIENT
Start: 2024-01-09 | End: 2024-01-11

## 2024-01-09 RX ORDER — CHOLECALCIFEROL (VITAMIN D3) 125 MCG
1 CAPSULE ORAL
Refills: 0 | DISCHARGE

## 2024-01-09 RX ORDER — OXYCODONE HYDROCHLORIDE 5 MG/1
1 TABLET ORAL
Qty: 20 | Refills: 0
Start: 2024-01-09 | End: 2024-01-13

## 2024-01-09 RX ORDER — POLYETHYLENE GLYCOL 3350 17 G/17G
17 POWDER, FOR SOLUTION ORAL AT BEDTIME
Refills: 0 | Status: DISCONTINUED | OUTPATIENT
Start: 2024-01-09 | End: 2024-01-11

## 2024-01-09 RX ORDER — HYDROMORPHONE HYDROCHLORIDE 2 MG/ML
0.25 INJECTION INTRAMUSCULAR; INTRAVENOUS; SUBCUTANEOUS
Refills: 0 | Status: DISCONTINUED | OUTPATIENT
Start: 2024-01-09 | End: 2024-01-10

## 2024-01-09 RX ORDER — CEFAZOLIN SODIUM 1 G
2000 VIAL (EA) INJECTION EVERY 8 HOURS
Refills: 0 | Status: COMPLETED | OUTPATIENT
Start: 2024-01-09 | End: 2024-01-10

## 2024-01-09 RX ORDER — SODIUM CHLORIDE 9 MG/ML
1000 INJECTION INTRAMUSCULAR; INTRAVENOUS; SUBCUTANEOUS
Refills: 0 | Status: DISCONTINUED | OUTPATIENT
Start: 2024-01-09 | End: 2024-01-11

## 2024-01-09 RX ORDER — RISEDRONATE SODIUM 25.8; 4.2 MG/1; MG/1
1 TABLET, FILM COATED ORAL
Refills: 0 | DISCHARGE

## 2024-01-09 RX ORDER — ROPINIROLE 8 MG/1
1 TABLET, FILM COATED, EXTENDED RELEASE ORAL
Refills: 0 | DISCHARGE

## 2024-01-09 RX ORDER — TRIAMTERENE 100 MG/1
1 CAPSULE ORAL
Refills: 0 | DISCHARGE

## 2024-01-09 RX ORDER — MECLIZINE HCL 12.5 MG
12.5 TABLET ORAL
Refills: 0 | Status: DISCONTINUED | OUTPATIENT
Start: 2024-01-09 | End: 2024-01-11

## 2024-01-09 RX ORDER — ACETAMINOPHEN 500 MG
975 TABLET ORAL EVERY 8 HOURS
Refills: 0 | Status: DISCONTINUED | OUTPATIENT
Start: 2024-01-09 | End: 2024-01-11

## 2024-01-09 RX ORDER — TRIAMTERENE 100 MG/1
50 CAPSULE ORAL DAILY
Refills: 0 | Status: DISCONTINUED | OUTPATIENT
Start: 2024-01-09 | End: 2024-01-11

## 2024-01-09 RX ORDER — LEVOTHYROXINE SODIUM 125 MCG
1 TABLET ORAL
Refills: 0 | DISCHARGE

## 2024-01-09 RX ORDER — OXYCODONE HYDROCHLORIDE 5 MG/1
5 TABLET ORAL EVERY 4 HOURS
Refills: 0 | Status: DISCONTINUED | OUTPATIENT
Start: 2024-01-09 | End: 2024-01-11

## 2024-01-09 RX ORDER — GLUCOSAMINE HCL/CHONDROITIN SU 500-400 MG
0 CAPSULE ORAL
Refills: 0 | DISCHARGE

## 2024-01-09 RX ORDER — SENNA PLUS 8.6 MG/1
2 TABLET ORAL AT BEDTIME
Refills: 0 | Status: DISCONTINUED | OUTPATIENT
Start: 2024-01-09 | End: 2024-01-11

## 2024-01-09 RX ORDER — DOCUSATE SODIUM 100 MG
0 CAPSULE ORAL
Refills: 0 | DISCHARGE

## 2024-01-09 RX ORDER — FLUTICASONE PROPIONATE AND SALMETEROL 50; 250 UG/1; UG/1
1 POWDER ORAL; RESPIRATORY (INHALATION)
Refills: 0 | DISCHARGE

## 2024-01-09 RX ORDER — TRAMADOL HYDROCHLORIDE 50 MG/1
50 TABLET ORAL EVERY 6 HOURS
Refills: 0 | Status: DISCONTINUED | OUTPATIENT
Start: 2024-01-09 | End: 2024-01-11

## 2024-01-09 RX ORDER — MONTELUKAST 4 MG/1
10 TABLET, CHEWABLE ORAL DAILY
Refills: 0 | Status: DISCONTINUED | OUTPATIENT
Start: 2024-01-09 | End: 2024-01-11

## 2024-01-09 RX ORDER — ROPINIROLE 8 MG/1
5 TABLET, FILM COATED, EXTENDED RELEASE ORAL AT BEDTIME
Refills: 0 | Status: DISCONTINUED | OUTPATIENT
Start: 2024-01-09 | End: 2024-01-11

## 2024-01-09 RX ORDER — PANTOPRAZOLE SODIUM 20 MG/1
40 TABLET, DELAYED RELEASE ORAL
Refills: 0 | Status: DISCONTINUED | OUTPATIENT
Start: 2024-01-09 | End: 2024-01-11

## 2024-01-09 RX ORDER — BUDESONIDE AND FORMOTEROL FUMARATE DIHYDRATE 160; 4.5 UG/1; UG/1
2 AEROSOL RESPIRATORY (INHALATION)
Refills: 0 | Status: DISCONTINUED | OUTPATIENT
Start: 2024-01-09 | End: 2024-01-11

## 2024-01-09 RX ORDER — ONDANSETRON 8 MG/1
4 TABLET, FILM COATED ORAL ONCE
Refills: 0 | Status: DISCONTINUED | OUTPATIENT
Start: 2024-01-09 | End: 2024-01-10

## 2024-01-09 RX ORDER — LEVOTHYROXINE SODIUM 125 MCG
125 TABLET ORAL DAILY
Refills: 0 | Status: DISCONTINUED | OUTPATIENT
Start: 2024-01-09 | End: 2024-01-11

## 2024-01-09 RX ORDER — MONTELUKAST 4 MG/1
1 TABLET, CHEWABLE ORAL
Refills: 0 | DISCHARGE

## 2024-01-09 RX ORDER — OXYCODONE HYDROCHLORIDE 5 MG/1
2.5 TABLET ORAL EVERY 4 HOURS
Refills: 0 | Status: DISCONTINUED | OUTPATIENT
Start: 2024-01-09 | End: 2024-01-11

## 2024-01-09 RX ORDER — MECLIZINE HCL 12.5 MG
12.5 TABLET ORAL ONCE
Refills: 0 | Status: COMPLETED | OUTPATIENT
Start: 2024-01-09 | End: 2024-01-10

## 2024-01-09 RX ORDER — CALCIUM POLYCARBOPHIL 625 MG/1
2 TABLET, FILM COATED ORAL
Refills: 0 | DISCHARGE

## 2024-01-09 RX ORDER — FENOFIBRATE,MICRONIZED 130 MG
145 CAPSULE ORAL DAILY
Refills: 0 | Status: DISCONTINUED | OUTPATIENT
Start: 2024-01-09 | End: 2024-01-11

## 2024-01-09 RX ORDER — ALUMINUM ZIRCONIUM TRICHLOROHYDREX GLY 0.2 G/G
1 STICK TOPICAL
Refills: 0 | DISCHARGE

## 2024-01-09 RX ADMIN — Medication 100 MILLIGRAM(S): at 23:14

## 2024-01-09 RX ADMIN — ROPINIROLE 5 MILLIGRAM(S): 8 TABLET, FILM COATED, EXTENDED RELEASE ORAL at 22:50

## 2024-01-09 RX ADMIN — OXYCODONE HYDROCHLORIDE 5 MILLIGRAM(S): 5 TABLET ORAL at 22:30

## 2024-01-09 RX ADMIN — SODIUM CHLORIDE 50 MILLILITER(S): 9 INJECTION INTRAMUSCULAR; INTRAVENOUS; SUBCUTANEOUS at 21:39

## 2024-01-09 RX ADMIN — HYDROMORPHONE HYDROCHLORIDE 0.25 MILLIGRAM(S): 2 INJECTION INTRAMUSCULAR; INTRAVENOUS; SUBCUTANEOUS at 18:00

## 2024-01-09 RX ADMIN — HYDROMORPHONE HYDROCHLORIDE 0.25 MILLIGRAM(S): 2 INJECTION INTRAMUSCULAR; INTRAVENOUS; SUBCUTANEOUS at 18:15

## 2024-01-09 RX ADMIN — GABAPENTIN 600 MILLIGRAM(S): 400 CAPSULE ORAL at 21:37

## 2024-01-09 RX ADMIN — OXYCODONE HYDROCHLORIDE 5 MILLIGRAM(S): 5 TABLET ORAL at 21:29

## 2024-01-09 RX ADMIN — TRAMADOL HYDROCHLORIDE 50 MILLIGRAM(S): 50 TABLET ORAL at 23:51

## 2024-01-09 NOTE — ASU DISCHARGE PLAN (ADULT/PEDIATRIC) - NS MD DC FALL RISK RISK
For information on Fall & Injury Prevention, visit: [email or web address] OR  [email or web address] OR  https://www.cdc.gov/steadi/patient.html For information on Fall & Injury Prevention, visit: [email or web address] OR  [email or web address]://[email or web address]-[email or web address]-[email or web address]-injury OR  https://www.cdc.gov/steadi/patient.html

## 2024-01-09 NOTE — PHYSICAL THERAPY INITIAL EVALUATION ADULT - PERTINENT HX OF CURRENT PROBLEM, REHAB EVAL
79 y/o F w/ PMH significant for hypothyroidism, HLD, SVT (s/p successful ablation), stable small airway dz/ chronic cough recent pulm evaluation started Advair, Cervical cancer (s/p hysterectomy), DVT post pelvic fx, BMI 33, Iron deficiency anemia, GERD,  OA, peripheral neuropathy, LE edema, osteoporosis. Pt s/p MF w/ L proximal humerus fx s/p ORIF.

## 2024-01-09 NOTE — PRE-ANESTHESIA EVALUATION ADULT - NSANTHADDINFOFT_GEN_ALL_CORE
extensive rba dw pt at bedside, agrees with plan . surgeon specifically declines nerve block for post op pain

## 2024-01-09 NOTE — ASU DISCHARGE PLAN (ADULT/PEDIATRIC) - CARE PROVIDER_API CALL
Reddy De La Torre  Orthopaedic Trauma  [address], [address]  [place] [address]  Phone: [phone]  Fax: [phone]  Follow Up Time: 2 weeks   Reddy De La Torre  Orthopaedic Trauma  [address], [address]  [place] [address]  Phone: [phone]  Fax: [phone]  Follow Up Time: 2 weeks

## 2024-01-09 NOTE — PRE-ANESTHESIA EVALUATION ADULT - NSANTHPMHFT_GEN_ALL_CORE
chart and consultant notes reviewed. no hx sig cad/chf dz - states et > 1 flight prior to pelvic injury last yr, no cp/sob. 2022 ekg + tte unremarkable. remote svt ablation. c/o months mild wheezing, recent pulm eval - c/w copd, med optimization started 3d ago ?mild improvement?

## 2024-01-09 NOTE — ASU DISCHARGE PLAN (ADULT/PEDIATRIC) - FOLLOW UP APPOINTMENTS
[facility], [facility], [facility]

## 2024-01-09 NOTE — ASU DISCHARGE PLAN (ADULT/PEDIATRIC) - PROVIDER TOKENS
PROVIDER:[TOKEN:[[id number]],FOLLOWUP:[2 weeks]] PROVIDER:[TOKEN:[[id number]],FOLLOWUP:[2 weeks]]

## 2024-01-09 NOTE — ASU PATIENT PROFILE, ADULT - FALL HARM RISK - HARM RISK INTERVENTIONS
Communicate Risk of Fall with Harm to all staff/Monitor for mental status changes/Reinforce activity limits and safety measures with patient and family/Review medications for side effects contributing to fall risk/Tailored Fall Risk Interventions/Visual Cue: Yellow wristband and red socks/Bed in lowest position, wheels locked, appropriate side rails in place/Call bell, personal items and telephone in reach/Instruct patient to call for assistance before getting out of bed or chair/Non-slip footwear when patient is out of bed/[place] to call system/Physically safe environment - no spills, clutter or unnecessary equipment/Purposeful Proactive Rounding/Room/bathroom lighting operational, light cord in reach Communicate Risk of Fall with Harm to all staff/Monitor for mental status changes/Reinforce activity limits and safety measures with patient and family/Review medications for side effects contributing to fall risk/Tailored Fall Risk Interventions/Visual Cue: Yellow wristband and red socks/Bed in lowest position, wheels locked, appropriate side rails in place/Call bell, personal items and telephone in reach/Instruct patient to call for assistance before getting out of bed or chair/Non-slip footwear when patient is out of bed/[place] to call system/Physically safe environment - no spills, clutter or unnecessary equipment/Purposeful Proactive Rounding/Room/bathroom lighting operational, light cord in reach

## 2024-01-09 NOTE — PHYSICAL THERAPY INITIAL EVALUATION ADULT - NSPTDISCHREC_GEN_A_CORE
Subacute rehab, however if pt to be d/c'd home would need assistance w/ all ADLs and functional mobility; home PT services for general strengthening, fall prevention, balance training, safety assessment, and to restore pt's prior level of function. DME: [employer]

## 2024-01-09 NOTE — CHART NOTE - NSCHARTNOTEFT_GEN_A_CORE
Post-Operative Check    Pt seen and evaluated in RR resting without acute complaints.  Pt admits pain is well controlled.  Denies CP/SOB, dyspnea, paresthesias.  Patient and patient's sister expressed concerns about going home tonight.  Patient lives alone, uses a walker, and has had multiple falls. PT examined patient and recommend disposition to subacute rehab.    Vitals:  T(C): 36.1 (09 Jan 2024 20:30), Max: 36.7 (09 Jan 2024 12:17)  T(F): 97 (09 Jan 2024 20:30), Max: 98.1 (09 Jan 2024 12:17)  HR: 82 (09 Jan 2024 20:30) (75 - 85)  BP: 111/61 (09 Jan 2024 20:30) (106/56 - 143/84)  BP(mean): 80 (09 Jan 2024 20:30) (74 - 95)  RR: 17 (09 Jan 2024 20:30) (15 - 18)  SpO2: 96% (09 Jan 2024 20:30) (95% - 99%)    Parameters below as of 09 Jan 2024 20:30  Patient On (Oxygen Delivery Method): room air    Exam:  General: alert and orientated, no acute distress  Extremity: LUE     Aquacel dressing is clean, dry, and intact     Sling in place     Digits are pink, warm with sensation intact to light touch.      No pain with PROM of digits     Mobility of digits/wrist/elbow intact     +PIN/AIN/ulnar/median/radial       Cap refill <2secs       2+ radial pulse     Compartments are soft and compressible  Calves are soft, nontender bilaterally      A/P: 78y Female s/p ORIF of LEft proximal humerus  -Admit to ortho, [facility]  -Pain management  -IS encouraged  -Ice/elevation  -DVT ppx: SCDs  -PT/OT: NWMALKA LUE  -Continue with post-op abx x24hrs  -f/u AM labs  -Discharge: PACU to floor     -PT recommending subacute rehab    Bernardino Grullon PA-C  Orthopedic Surgery  Pager: [phone] Post-Operative Check    Pt seen and evaluated in RR resting without acute complaints.  Pt admits pain is well controlled.  Denies CP/SOB, dyspnea, paresthesias.  Patient and patient's sister expressed concerns about going home tonight.  Patient lives alone, uses a walker, and has had multiple falls. PT examined patient and recommend disposition to subacute rehab.    Vitals:  T(C): 36.1 (09 Jan 2024 20:30), Max: 36.7 (09 Jan 2024 12:17)  T(F): 97 (09 Jan 2024 20:30), Max: 98.1 (09 Jan 2024 12:17)  HR: 82 (09 Jan 2024 20:30) (75 - 85)  BP: 111/61 (09 Jan 2024 20:30) (106/56 - 143/84)  BP(mean): 80 (09 Jan 2024 20:30) (74 - 95)  RR: 17 (09 Jan 2024 20:30) (15 - 18)  SpO2: 96% (09 Jan 2024 20:30) (95% - 99%)    Parameters below as of 09 Jan 2024 20:30  Patient On (Oxygen Delivery Method): room air    Exam:  General: alert and orientated, no acute distress  Extremity: LUE     Aquacel dressing is clean, dry, and intact     Sling in place     Digits are pink, warm with sensation intact to light touch.      No pain with PROM of digits     Mobility of digits/wrist/elbow intact     +PIN/AIN/ulnar/median/radial       Cap refill <2secs       2+ radial pulse     Compartments are soft and compressible  Calves are soft, nontender bilaterally      A/P: 78y Female s/p ORIF of LEft proximal humerus  -Admit to ortho, [facility]  -Pain management  -IS encouraged  -Ice/elevation  -DVT ppx: SCDs  -PT/OT: NWMALKA LUE  -Continue with post-op abx x24hrs  -f/u AM labs  -Discharge: PACU to floor     -PT recommending subacute rehab    Bernardino Grullon PA-C  Orthopedic Surgery  Pager: [phone]

## 2024-01-09 NOTE — PHYSICAL THERAPY INITIAL EVALUATION ADULT - ADDITIONAL COMMENTS
Pt lives in a second floor apt +chair lift at entry, ambulated w/ RW since the last hospital admission, pt being ambulating w/ the RW s/p fall navigating w/ one had. Pt has HHA 9-2 and then 5:30 to 8:30 x6 days a week.

## 2024-01-09 NOTE — BRIEF OPERATIVE NOTE - NSICDXBRIEFPROCEDURE_GEN_ALL_CORE_FT
PROCEDURES:  Open reduction and internal fixation of fracture of left proximal humerus 09-Jan-2024 17:54:26  Sandra Bryant

## 2024-01-09 NOTE — BRIEF OPERATIVE NOTE - NSICDXBRIEFPREOP_GEN_ALL_CORE_FT
PRE-OP DIAGNOSIS:  Closed 3-part fracture of proximal end of left humerus 09-Jan-2024 17:54:36  Sandra Bryant

## 2024-01-09 NOTE — PHYSICAL THERAPY INITIAL EVALUATION ADULT - ACTIVE RANGE OF MOTION EXAMINATION, REHAB EVAL
Pt deferring LUE testing/Right UE Active ROM was WFL (within functional limits)/bilateral  lower extremity Active ROM was WFL (within functional limits)

## 2024-01-09 NOTE — BRIEF OPERATIVE NOTE - NSICDXBRIEFPOSTOP_GEN_ALL_CORE_FT
POST-OP DIAGNOSIS:  Closed 3-part fracture of proximal end of left humerus 09-Jan-2024 17:54:39  Sandra Bryant

## 2024-01-09 NOTE — ASU DISCHARGE PLAN (ADULT/PEDIATRIC) - ASU DC SPECIAL INSTRUCTIONSFT
FOLLOW UP: Please follow up with your surgeon in 2 weeks, call to make an appt.  DIET: You may resume your regular diet. Narcotic pain medicine can cause extreme nausea and constipation. Drink plenty of water and take stool softeners (colace, Miralax) as needed. You can get them from your local pharmacy.  WOUND CARE:  Please keep dressing in place until seen in the office.   BATHING: You may shower or use sponge bath to bathe.  Do not submerge wound underwater.   PAIN CONTROL:  Alternate between taking Ibuprofen and Tylenol so you are taking pain medication every 3 to 4 hours. You should take oxycodone in addition to this only if tylenol and ibuprofen are not working for your pain. It is important to ice and elevate your arm to keep swelling down and the pain manageable. Keep the ice on for 20 minutes, and then keep off for 20 minutes. Repeat while awake.  MEDICATIONS: Take all medications as prescribed.   ACTIVITY: No lifting with the left arm. You should wear the sling as little as possible to prevent stiffness. If you are taking narcotic pain medication (such as vicodin, oxycodone, or Percocet) DO NOT drive a car, operate machinery or make important decisions.  NOTIFY YOUR SURGEON IF: You have any bleeding that does not stop, any pus draining from your wound(s), any fever (over 100.4 F) or chills, persistent nausea/vomiting, persistent diarrhea, or if your pain is not controlled on your discharge pain medications.

## 2024-01-10 ENCOUNTER — TRANSCRIPTION ENCOUNTER (OUTPATIENT)
Age: 79
End: 2024-01-10

## 2024-01-10 LAB
ANION GAP SERPL CALC-SCNC: 11 MMOL/L — SIGNIFICANT CHANGE UP (ref 5–17)
ANION GAP SERPL CALC-SCNC: 11 MMOL/L — SIGNIFICANT CHANGE UP (ref 5–17)
BUN SERPL-MCNC: 20 MG/DL — SIGNIFICANT CHANGE UP (ref 7–23)
BUN SERPL-MCNC: 20 MG/DL — SIGNIFICANT CHANGE UP (ref 7–23)
CALCIUM SERPL-MCNC: 8.7 MG/DL — SIGNIFICANT CHANGE UP (ref 8.4–10.5)
CALCIUM SERPL-MCNC: 8.7 MG/DL — SIGNIFICANT CHANGE UP (ref 8.4–10.5)
CHLORIDE SERPL-SCNC: 103 MMOL/L — SIGNIFICANT CHANGE UP (ref 96–108)
CHLORIDE SERPL-SCNC: 103 MMOL/L — SIGNIFICANT CHANGE UP (ref 96–108)
CO2 SERPL-SCNC: 22 MMOL/L — SIGNIFICANT CHANGE UP (ref 22–31)
CO2 SERPL-SCNC: 22 MMOL/L — SIGNIFICANT CHANGE UP (ref 22–31)
CREAT SERPL-MCNC: 0.92 MG/DL — SIGNIFICANT CHANGE UP (ref 0.5–1.3)
CREAT SERPL-MCNC: 0.92 MG/DL — SIGNIFICANT CHANGE UP (ref 0.5–1.3)
EGFR: 64 ML/MIN/1.73M2 — SIGNIFICANT CHANGE UP
EGFR: 64 ML/MIN/1.73M2 — SIGNIFICANT CHANGE UP
GLUCOSE SERPL-MCNC: 120 MG/DL — HIGH (ref 70–99)
GLUCOSE SERPL-MCNC: 120 MG/DL — HIGH (ref 70–99)
HCT VFR BLD CALC: 32.1 % — LOW (ref 34.5–45)
HCT VFR BLD CALC: 32.1 % — LOW (ref 34.5–45)
HGB BLD-MCNC: 10 G/DL — LOW (ref 11.5–15.5)
HGB BLD-MCNC: 10 G/DL — LOW (ref 11.5–15.5)
MCHC RBC-ENTMCNC: 28.5 PG — SIGNIFICANT CHANGE UP (ref 27–34)
MCHC RBC-ENTMCNC: 28.5 PG — SIGNIFICANT CHANGE UP (ref 27–34)
MCHC RBC-ENTMCNC: 31.2 GM/DL — LOW (ref 32–36)
MCHC RBC-ENTMCNC: 31.2 GM/DL — LOW (ref 32–36)
MCV RBC AUTO: 91.5 FL — SIGNIFICANT CHANGE UP (ref 80–100)
MCV RBC AUTO: 91.5 FL — SIGNIFICANT CHANGE UP (ref 80–100)
NRBC # BLD: 0 /100 WBCS — SIGNIFICANT CHANGE UP (ref 0–0)
NRBC # BLD: 0 /100 WBCS — SIGNIFICANT CHANGE UP (ref 0–0)
PLATELET # BLD AUTO: 366 K/UL — SIGNIFICANT CHANGE UP (ref 150–400)
PLATELET # BLD AUTO: 366 K/UL — SIGNIFICANT CHANGE UP (ref 150–400)
POTASSIUM SERPL-MCNC: 4.8 MMOL/L — SIGNIFICANT CHANGE UP (ref 3.5–5.3)
POTASSIUM SERPL-MCNC: 4.8 MMOL/L — SIGNIFICANT CHANGE UP (ref 3.5–5.3)
POTASSIUM SERPL-SCNC: 4.8 MMOL/L — SIGNIFICANT CHANGE UP (ref 3.5–5.3)
POTASSIUM SERPL-SCNC: 4.8 MMOL/L — SIGNIFICANT CHANGE UP (ref 3.5–5.3)
RBC # BLD: 3.51 M/UL — LOW (ref 3.8–5.2)
RBC # BLD: 3.51 M/UL — LOW (ref 3.8–5.2)
RBC # FLD: 15.2 % — HIGH (ref 10.3–14.5)
RBC # FLD: 15.2 % — HIGH (ref 10.3–14.5)
SODIUM SERPL-SCNC: 136 MMOL/L — SIGNIFICANT CHANGE UP (ref 135–145)
SODIUM SERPL-SCNC: 136 MMOL/L — SIGNIFICANT CHANGE UP (ref 135–145)
WBC # BLD: 15.88 K/UL — HIGH (ref 3.8–10.5)
WBC # BLD: 15.88 K/UL — HIGH (ref 3.8–10.5)
WBC # FLD AUTO: 15.88 K/UL — HIGH (ref 3.8–10.5)
WBC # FLD AUTO: 15.88 K/UL — HIGH (ref 3.8–10.5)

## 2024-01-10 RX ORDER — MECLIZINE HCL 12.5 MG
12.5 TABLET ORAL ONCE
Refills: 0 | Status: COMPLETED | OUTPATIENT
Start: 2024-01-10 | End: 2024-01-10

## 2024-01-10 RX ORDER — MECLIZINE HCL 12.5 MG
25 TABLET ORAL DAILY
Refills: 0 | Status: DISCONTINUED | OUTPATIENT
Start: 2024-01-11 | End: 2024-01-11

## 2024-01-10 RX ORDER — TRAMADOL HYDROCHLORIDE 50 MG/1
1 TABLET ORAL
Refills: 0 | DISCHARGE

## 2024-01-10 RX ORDER — ACETAMINOPHEN 500 MG
3 TABLET ORAL
Qty: 0 | Refills: 0 | DISCHARGE
Start: 2024-01-10

## 2024-01-10 RX ADMIN — BUDESONIDE AND FORMOTEROL FUMARATE DIHYDRATE 2 PUFF(S): 160; 4.5 AEROSOL RESPIRATORY (INHALATION) at 17:01

## 2024-01-10 RX ADMIN — Medication 975 MILLIGRAM(S): at 00:49

## 2024-01-10 RX ADMIN — Medication 975 MILLIGRAM(S): at 02:00

## 2024-01-10 RX ADMIN — DULOXETINE HYDROCHLORIDE 60 MILLIGRAM(S): 30 CAPSULE, DELAYED RELEASE ORAL at 11:31

## 2024-01-10 RX ADMIN — Medication 975 MILLIGRAM(S): at 18:16

## 2024-01-10 RX ADMIN — MONTELUKAST 10 MILLIGRAM(S): 4 TABLET, CHEWABLE ORAL at 11:31

## 2024-01-10 RX ADMIN — Medication 12.5 MILLIGRAM(S): at 13:21

## 2024-01-10 RX ADMIN — OXYCODONE HYDROCHLORIDE 5 MILLIGRAM(S): 5 TABLET ORAL at 21:00

## 2024-01-10 RX ADMIN — Medication 975 MILLIGRAM(S): at 09:42

## 2024-01-10 RX ADMIN — Medication 12.5 MILLIGRAM(S): at 12:30

## 2024-01-10 RX ADMIN — GABAPENTIN 600 MILLIGRAM(S): 400 CAPSULE ORAL at 08:41

## 2024-01-10 RX ADMIN — Medication 125 MICROGRAM(S): at 06:07

## 2024-01-10 RX ADMIN — Medication 975 MILLIGRAM(S): at 08:42

## 2024-01-10 RX ADMIN — GABAPENTIN 600 MILLIGRAM(S): 400 CAPSULE ORAL at 17:01

## 2024-01-10 RX ADMIN — Medication 145 MILLIGRAM(S): at 11:31

## 2024-01-10 RX ADMIN — GABAPENTIN 600 MILLIGRAM(S): 400 CAPSULE ORAL at 23:16

## 2024-01-10 RX ADMIN — Medication 975 MILLIGRAM(S): at 17:01

## 2024-01-10 RX ADMIN — BUDESONIDE AND FORMOTEROL FUMARATE DIHYDRATE 2 PUFF(S): 160; 4.5 AEROSOL RESPIRATORY (INHALATION) at 06:07

## 2024-01-10 RX ADMIN — OXYCODONE HYDROCHLORIDE 5 MILLIGRAM(S): 5 TABLET ORAL at 13:23

## 2024-01-10 RX ADMIN — OXYCODONE HYDROCHLORIDE 5 MILLIGRAM(S): 5 TABLET ORAL at 14:23

## 2024-01-10 RX ADMIN — SENNA PLUS 2 TABLET(S): 8.6 TABLET ORAL at 20:15

## 2024-01-10 RX ADMIN — TRAMADOL HYDROCHLORIDE 50 MILLIGRAM(S): 50 TABLET ORAL at 00:50

## 2024-01-10 RX ADMIN — ROPINIROLE 5 MILLIGRAM(S): 8 TABLET, FILM COATED, EXTENDED RELEASE ORAL at 23:16

## 2024-01-10 RX ADMIN — OXYCODONE HYDROCHLORIDE 5 MILLIGRAM(S): 5 TABLET ORAL at 20:14

## 2024-01-10 NOTE — PATIENT PROFILE ADULT - FALL HARM RISK - HARM RISK INTERVENTIONS
Assistance with ambulation/Assistance OOB with selected safe patient handling equipment/Communicate Risk of Fall with Harm to all staff/Discuss with provider need for PT consult/Monitor gait and stability/Provide patient with walking aids - walker, cane, crutches/Reinforce activity limits and safety measures with patient and family/Tailored Fall Risk Interventions/Visual Cue: Yellow wristband and red socks/Bed in lowest position, wheels locked, appropriate side rails in place/Call bell, personal items and telephone in reach/Instruct patient to call for assistance before getting out of bed or chair/Non-slip footwear when patient is out of bed/[place] to call system/Physically safe environment - no spills, clutter or unnecessary equipment/Purposeful Proactive Rounding/Room/bathroom lighting operational, light cord in reach Assistance with ambulation/Assistance OOB with selected safe patient handling equipment/Communicate Risk of Fall with Harm to all staff/Discuss with provider need for PT consult/Monitor gait and stability/Provide patient with walking aids - walker, cane, crutches/Reinforce activity limits and safety measures with patient and family/Tailored Fall Risk Interventions/Visual Cue: Yellow wristband and red socks/Bed in lowest position, wheels locked, appropriate side rails in place/Call bell, personal items and telephone in reach/Instruct patient to call for assistance before getting out of bed or chair/Non-slip footwear when patient is out of bed/[place] to call system/Physically safe environment - no spills, clutter or unnecessary equipment/Purposeful Proactive Rounding/Room/bathroom lighting operational, light cord in reach

## 2024-01-10 NOTE — PATIENT PROFILE ADULT - DO YOU LACK THE NECESSARY SUPPORT TO HELP YOU COPE WITH LIFE CHALLENGES?
D: VSS, assessments WDL.   I: Pt. received complete discharge paperwork and home medications not needed per patient.  Pt. was given times of last dose for all discharge medications in writing on discharge medication sheets.  Discharge teaching included home medication, pain management, activity restrictions, postpartum cares, and signs and symptoms of infection.    A: Discharge outcomes on care plan met.  Mother states understanding and comfort with self and baby cares.  P: Pt. discharged to home.  Pt. was discharged with baby, and bands were checked at time of discharge.  Pt. was accompanied by , nurse and baby, and left with personal belongings.  Pt. to follow up with OB per MD order.  Pt. had no further questions at the time of discharge and no unmet needs were identified.   no

## 2024-01-10 NOTE — DISCHARGE NOTE PROVIDER - NSDCFUADDAPPT_GEN_ALL_CORE_FT
It is highly recommended you followup with your primary care physician within 4 weeks to discuss recent surgery/general checkup/possible medication adjustment.

## 2024-01-10 NOTE — PROGRESS NOTE ADULT - SUBJECTIVE AND OBJECTIVE BOX
SUBJECTIVE:   Patient seen and examined at bedside. Pt doing generally well.    Pain well controlled. States that she would like to go home because she has had a negative experience in rehabs before.  Discussed getting PT to re-evaluate later this morning and setting up a plan (patient agreed)      OBJECTIVE:  Vital Signs Last 24 Hrs  T(C): 36.5 (10 Ever 2024 07:45), Max: 36.7 (09 Jan 2024 12:17)  T(F): 97.7 (10 Ever 2024 07:45), Max: 98.1 (09 Jan 2024 12:17)  HR: 72 (10 Ever 2024 07:45) (72 - 85)  BP: 105/70 (10 Ever 2024 07:45) (100/56 - 143/84)  BP(mean): 79 (10 Ever 2024 04:28) (72 - 95)  RR: 18 (10 Ever 2024 07:45) (15 - 18)  SpO2: 95% (10 Ever 2024 07:45) (93% - 100%)    Parameters below as of 10 Ever 2024 07:45  Patient On (Oxygen Delivery Method): room air        General: NAD  Resp: Non-labored breathing, no accessory muscle use  Left Upper extremity:       Dressing: clean/dry/intact   in sling       SILT radial/median/ulnar/axillary      +AIN/PIN/Ulnar/      +wrist ROM        shoulder ROM limited 2/2 pain       soft compartments      +radial pulse         LABS:                        10.0   15.88 )-----------( 366      ( 10 Ever 2024 07:34 )             32.1     01-10    136  |  103  |  20  ----------------------------<  120<H>  4.8   |  22  |  0.92    Ca    8.7      10 Ever 2024 07:31      I&O's Summary    09 Jan 2024 07:01  -  10 Ever 2024 07:00  --------------------------------------------------------  IN: 1115 mL / OUT: 700 mL / NET: 415 mL        MEDS:  MEDICATIONS  (STANDING):  acetaminophen     Tablet .. 975 milliGRAM(s) Oral every 8 hours  budesonide 160 MICROgram(s)/formoterol 4.5 MICROgram(s) Inhaler 2 Puff(s) Inhalation two times a day  chlorhexidine 2% Cloths 1 Application(s) Topical once  DULoxetine 60 milliGRAM(s) Oral daily  fenofibrate Tablet 145 milliGRAM(s) Oral daily  gabapentin 600 milliGRAM(s) Oral every 8 hours  levothyroxine 125 MICROGram(s) Oral daily  lidocaine 1% Injectable 0.2 milliLiter(s) Local Injection once  meclizine 12.5 milliGRAM(s) Oral once  montelukast 10 milliGRAM(s) Oral daily  pantoprazole    Tablet 40 milliGRAM(s) Oral before breakfast  rOPINIRole 5 milliGRAM(s) Oral at bedtime  senna 2 Tablet(s) Oral at bedtime  sodium chloride 0.9%. 1000 milliLiter(s) (50 mL/Hr) IV Continuous <Continuous>  triamterene 50 milliGRAM(s) Oral daily    MEDICATIONS  (PRN):  meclizine 12.5 milliGRAM(s) Oral two times a day PRN Dizziness  oxyCODONE    IR 5 milliGRAM(s) Oral every 4 hours PRN Severe Pain (7 - 10)  oxyCODONE    IR 2.5 milliGRAM(s) Oral every 4 hours PRN Moderate Pain (4 - 6)  polyethylene glycol 3350 17 Gram(s) Oral at bedtime PRN Constipation  traMADol 50 milliGRAM(s) Oral every 6 hours PRN Mild Pain (1 - 3)      ASSESSMENT & PLAN:  Pt is a 78yy/o Female sp Left Proximal Humerus ORIF on 1/9  . Patient is hemodynamically stable; recovering well from orthopaedic standpoint.  -Multimodal Pain control  -IS encouraged  -Ice/elevation  -DVT ppx: SCDs  -PT/OT: ASHWIN MARTIN  -PT re-eval  -Dispo: home pending PT    [place]  [facility] [id number]  [facility]        [id number]  [facility]  [id number]/ [phone]     SUBJECTIVE:   Patient seen and examined at bedside. Pt doing generally well.    Pain well controlled. States that she would like to go home because she has had a negative experience in rehabs before.  Discussed getting PT to re-evaluate later this morning and setting up a plan (patient agreed)      OBJECTIVE:  Vital Signs Last 24 Hrs  T(C): 36.5 (10 Ever 2024 07:45), Max: 36.7 (09 Jan 2024 12:17)  T(F): 97.7 (10 Ever 2024 07:45), Max: 98.1 (09 Jan 2024 12:17)  HR: 72 (10 Ever 2024 07:45) (72 - 85)  BP: 105/70 (10 Ever 2024 07:45) (100/56 - 143/84)  BP(mean): 79 (10 Ever 2024 04:28) (72 - 95)  RR: 18 (10 Ever 2024 07:45) (15 - 18)  SpO2: 95% (10 Ever 2024 07:45) (93% - 100%)    Parameters below as of 10 Ever 2024 07:45  Patient On (Oxygen Delivery Method): room air        General: NAD  Resp: Non-labored breathing, no accessory muscle use  Left Upper extremity:       Dressing: clean/dry/intact   in sling       SILT radial/median/ulnar/axillary      +AIN/PIN/Ulnar/      +wrist ROM        shoulder ROM limited 2/2 pain       soft compartments      +radial pulse         LABS:                        10.0   15.88 )-----------( 366      ( 10 Ever 2024 07:34 )             32.1     01-10    136  |  103  |  20  ----------------------------<  120<H>  4.8   |  22  |  0.92    Ca    8.7      10 Ever 2024 07:31      I&O's Summary    09 Jan 2024 07:01  -  10 Ever 2024 07:00  --------------------------------------------------------  IN: 1115 mL / OUT: 700 mL / NET: 415 mL        MEDS:  MEDICATIONS  (STANDING):  acetaminophen     Tablet .. 975 milliGRAM(s) Oral every 8 hours  budesonide 160 MICROgram(s)/formoterol 4.5 MICROgram(s) Inhaler 2 Puff(s) Inhalation two times a day  chlorhexidine 2% Cloths 1 Application(s) Topical once  DULoxetine 60 milliGRAM(s) Oral daily  fenofibrate Tablet 145 milliGRAM(s) Oral daily  gabapentin 600 milliGRAM(s) Oral every 8 hours  levothyroxine 125 MICROGram(s) Oral daily  lidocaine 1% Injectable 0.2 milliLiter(s) Local Injection once  meclizine 12.5 milliGRAM(s) Oral once  montelukast 10 milliGRAM(s) Oral daily  pantoprazole    Tablet 40 milliGRAM(s) Oral before breakfast  rOPINIRole 5 milliGRAM(s) Oral at bedtime  senna 2 Tablet(s) Oral at bedtime  sodium chloride 0.9%. 1000 milliLiter(s) (50 mL/Hr) IV Continuous <Continuous>  triamterene 50 milliGRAM(s) Oral daily    MEDICATIONS  (PRN):  meclizine 12.5 milliGRAM(s) Oral two times a day PRN Dizziness  oxyCODONE    IR 5 milliGRAM(s) Oral every 4 hours PRN Severe Pain (7 - 10)  oxyCODONE    IR 2.5 milliGRAM(s) Oral every 4 hours PRN Moderate Pain (4 - 6)  polyethylene glycol 3350 17 Gram(s) Oral at bedtime PRN Constipation  traMADol 50 milliGRAM(s) Oral every 6 hours PRN Mild Pain (1 - 3)      ASSESSMENT & PLAN:  Pt is a 78yy/o Female sp Left Proximal Humerus ORIF on 1/9  . Patient is hemodynamically stable; recovering well from orthopaedic standpoint.  -Multimodal Pain control  -IS encouraged  -Ice/elevation  -DVT ppx: SCDs  -PT/OT: ASHWIN MARTIN  -PT re-eval  -Dispo: home pending PT    [place]  [facility] [place] [id number]  [facility]        [id number]  [facility]  [id number]/ [phone]

## 2024-01-10 NOTE — DISCHARGE NOTE PROVIDER - NSDCCPCAREPLAN_GEN_ALL_CORE_FT
PRINCIPAL DISCHARGE DIAGNOSIS  Diagnosis: Closed fracture of left proximal humerus  Assessment and Plan of Treatment:

## 2024-01-10 NOTE — DISCHARGE NOTE PROVIDER - HOSPITAL COURSE
History of Present Illness	  79 yo F with PMHx significant for hypothyroidism, HLD, SVT (s/p successful ablation), stable small airway dz/ chronic cough recent pulm evaluation started Advair , Cervical cancer (s/p hysterectomy), DVT post pelvic fx (June 2022 was treated with Eliquis), BMI 33, Iron deficiency anemia, GERD,  OA, peripheral neuropathy, LE edema, osteoporosis .     Patient reports being in and out of  rehab over past few years due to fall and  pelvic fx  , UTIs , fall and lumbar spine herniated disc fx. Patient discharged home 9/2023. Living along, has home aid and support of sister Shanna.  Ambulating with walker.     Today presents to [facility] for scheduled ORIF left proximal humerus tomorrow 1/9/2024. Patient bend down to pet a cat and fell fracturing left arm. Pain 6/10, wear sling.     Hospital Course:  Patient underwent Left proximal humerus open reduction internal fixation without complications. Evaluated and treated by  physical therapy whom recommended [facility] for discharge disposition, but family and patient opted for discharge to home.  Remainder of  hospital stay unremarkable and discharged to home when necessary DME delivered. History of Present Illness	  77 yo F with PMHx significant for hypothyroidism, HLD, SVT (s/p successful ablation), stable small airway dz/ chronic cough recent pulm evaluation started Advair , Cervical cancer (s/p hysterectomy), DVT post pelvic fx (June 2022 was treated with Eliquis), BMI 33, Iron deficiency anemia, GERD,  OA, peripheral neuropathy, LE edema, osteoporosis .     Patient reports being in and out of  rehab over past few years due to fall and  pelvic fx  , UTIs , fall and lumbar spine herniated disc fx. Patient discharged home 9/2023. Living along, has home aid and support of sister Shanna.  Ambulating with walker.     Today presents to [facility] for scheduled ORIF left proximal humerus tomorrow 1/9/2024. Patient bend down to pet a cat and fell fracturing left arm. Pain 6/10, wear sling.     Hospital Course:  Patient underwent Left proximal humerus open reduction internal fixation without complications. Evaluated and treated by  physical therapy whom recommended [facility] for discharge disposition, but family and patient opted for discharge to home.  Remainder of  hospital stay unremarkable and discharged to home when necessary DME delivered.

## 2024-01-10 NOTE — OCCUPATIONAL THERAPY INITIAL EVALUATION ADULT - ASSISTIVE DEVICE FOR TOILET TRANSFER, REHAB EVAL
FINDINGS FROM YOUR EXAM:  · 2 colon polyps removed  · Multiple small benign polyps, sample biopsies    INSTRUCTIONS:  · During your exam biopsies were obtained. You will be called with the results within 7 to 10 days. If you have not heard from Dr. Stephen Ruvalcaba by then, please call his [place] office at [phone].    DIET:  · Resume your regular diet today.    ACTIVITY:  · Following sedation, your judgement, perception and coordination are impaired for a minimum of 24 hours. Therefore:  · A RESPONSIBLE ADULT MUST ACCOMPANY YOU AND DRIVE YOU HOME.  · Do not drive.   · Do not return to work today.   · Do not operate appliances or machinery.  · Do not sign legal documents or be involved in work decisions.   · Do not smoke or drink alcoholic beverages for 24 hours.  · Plan to spend a few hours resting before resuming your normal routine.   · If you are on narcotics or sedative medications. Hold your dose today to avoid oversedation.    SYMPTOMS TO WATCH FOR AFTER THE PROCEDURE:  · Please call Dr. Stephen Ruvalcaba at [phone] or proceed to the nearest hospital in the event that you experience any of the following:     After an upper endoscopy:  · Difficulty swallowing or breathing  · Neck swelling  · Pain  · Nausea or vomiting  · Abdominal distention  · Fever  · Severe throat pain. Mild throat soreness may follow this procedure. Warm salt-water gargle or lozenges may relieve your mild discomfort.   · Red or black stools     After a colonoscopy or sigmoidoscopy:  · Severe abdominal distention or pain. Some mild distention and/or cramping are normal after these procedure but should pass within 1-2 hours with the passage of air.   · Rectal bleeding   · Nausea or vomiting  · Fever      CONTACT INFORMATION:  · Dr. Stephen Ruvalcaba's [place] office - [phone].  · To make a follow up office visit with Dr. Stephen Ruvalcaba. Call [facility] Scheduling - [phone]  · To schedule a radiology imaging test call -  [phone]    Understanding Colon and Rectal Polyps     The colon (also called the large intestine) is a muscular tube that forms the last part of the digestive tract. It absorbs water and stores food waste. The colon is about 4 to 6 feet long. The rectum is the last 6 inches of the colon. The colon and rectum have a smooth lining composed of millions of cells. Changes in these cells can lead to growths in the colon that can become cancerous and should be removed.       The colon has a smooth lining composed of millions of cells.        Changes that occur in the cells that line the colon or rectum can lead to growths called polyps. Over a period of years, polyps can turn cancerous. Removing polyps early may prevent cancer from ever forming.      Polyps      Polyps are fleshy clumps of tissue that form on the lining of the colon or rectum. Small polyps are usually benign (not cancerous). However, over time, cells in a polyp can change and become cancerous. The larger a polyp grows, the more likely this is to happen. Also, certain types of polyps known as adenomatous polyps are considered premalignant. This means that they could become cancerous if they’re not removed.    Cancer   Almost all colorectal cancers start when polyp cells begin growing abnormally. As a cancerous tumor grows, it may involve more and more of the colon or rectum. In time, cancer can also grow beyond the colon or rectum and spread to nearby organs or to glands called lymph nodes. The cells can also travel to other parts of the body. This is known as metastasis. The earlier a cancerous tumor is removed, the better the chance of preventing its spread.            © 8027-8277 [employer], [address] [place] [address], [place] [address]. All rights reserved. This information is not intended as a substitute for professional medical care. Always follow your healthcare professional's instructions.     wilver walker

## 2024-01-10 NOTE — PATIENT PROFILE ADULT - FUNCTIONAL ASSESSMENT - BASIC MOBILITY 6.
1-calculated by average/Not able to assess (calculate score using [facility] averaging method) 1-calculated by average/Not able to assess (calculate score using [facility] averaging method)

## 2024-01-10 NOTE — DISCHARGE NOTE PROVIDER - NSDCFUSCHEDAPPT_GEN_ALL_CORE_FT
Reddy De La Torre  [facility]  ORTHOSURG [address] [facility]  Scheduled Appointment: 01/24/2024    Jesus Nicole  [facility] Physician [facility]  PULMMED [id number]  Scheduled Appointment: 01/25/2024     Reddy De La Torre  [facility]  ORTHOSURG [address] [facility]  Scheduled Appointment: 01/24/2024    Jesus Nicole  [facility] Physician [facility]  PULMMED [id number]  Scheduled Appointment: 01/25/2024     Reddy De La Torre  [facility]  ORTHOSURG [address] [facility]  Scheduled Appointment: 01/24/2024    Jesus Nicole  [facility] Physician [facility]  PULMMED [id number]  Scheduled Appointment: 02/22/2024     Reddy De La Torre  [facility]  ORTHOSURG [address] [facility]  Scheduled Appointment: 01/24/2024    Jesus Nicole  [facility] Physician [facility]  PULMMED [id number]  Scheduled Appointment: 02/22/2024

## 2024-01-10 NOTE — DISCHARGE NOTE PROVIDER - NSDCFUADDINST_GEN_ALL_CORE_FT
Followup with Dr. Lozano on 1/24/24- please call office to confirm appointment.  Activity: Nonweight bearing on left upper extremity in sling.  Dressing: Keep clean and dry. Do not remove. To be removed in office on followup visit.

## 2024-01-10 NOTE — OCCUPATIONAL THERAPY INITIAL EVALUATION ADULT - ADDITIONAL COMMENTS
Pt lives in a second floor apt +chair lift at entry, ambulated w/ RW since the last hospital admission, pt being ambulating w/ the RW s/p fall navigating w/ one had. Pt has HHA 9-2 and then 5:30 to 8:30 x6 days a week. Pt lives in a second floor apt +chair lift at entry, ambulated w/ RW since the last hospital admission, pt being ambulating w/ the RW s/p fall navigating w/ one had. Pt has HHA 9-2 and then 5:30 to 8:30 x6 days a week. tub shower +tub transfer bench. Owns: W/C, rolling walker, cane, tub transfer bench and commode. Reports HHAs assisted her w/ bathing & dressing

## 2024-01-10 NOTE — DISCHARGE NOTE PROVIDER - CARE PROVIDER_API CALL
Reddy De La Torre  Orthopaedic Trauma  [address], [address]  [place] [address]  Phone: [phone]  Fax: [phone]  Follow Up Time:    Reddy De La Torre  Orthopaedic Trauma  [address], [address]  [place] [address]  Phone: [phone]  Fax: [phone]  Follow Up Time:

## 2024-01-10 NOTE — DISCHARGE NOTE PROVIDER - NSDCMRMEDTOKEN_GEN_ALL_CORE_FT
acetaminophen 325 mg oral tablet: 3 tab(s) orally every 8 hours X 7 days, then as needed for mild pain  Align 4 mg oral capsule: 1 cap(s) orally  Calcium wirh vit D:   Chondroitin-Glucosamine oral tablet: orally once a day  D3 50 mcg (2000 intl units) oral capsule: 1 cap(s) orally once a day  DULoxetine 60 mg oral delayed release capsule: 1 cap(s) orally once a day  eye health:   fenofibrate 145 mg oral tablet: 1 tab(s) orally once a day  Fiber Laxative 625 mg oral tablet: 2 tab(s) orally once a day  fluticasone 50 mcg/inh nasal spray: 1 spray(s) nasal every 12 hours, As needed, nasal congestion  fluticasone-salmeterol 500 mcg-50 mcg inhalation powder: 1 inhaled 2 times a day  gabapentin 600 mg oral tablet: 1 tab(s) orally every 8 hours  levothyroxine 125 mcg (0.125 mg) oral tablet: 1 tab(s) orally once a day  meclizine 12.5 mg oral tablet: 1 tab(s) orally 2 times a day  montelukast 10 mg oral tablet: 1 tab(s) orally once a day  oxyCODONE 5 mg oral tablet: 1 tab(s) orally every 6 hours as needed for  severe pain MDD: 20  pantoprazole 40 mg oral delayed release tablet: 1 tab(s) orally once a day (before a meal)  risedronate 35 mg oral tablet: 1 tab(s) orally once a week sunday  rOPINIRole 5 mg oral tablet: 1 tab(s) orally once a day (at bedtime)  stool softener:   triamterene 50 mg oral capsule: 1 cap(s) orally once a day  vit B12:    acetaminophen 325 mg oral tablet: 3 tab(s) orally every 8 hours X 7 days, then as needed for mild pain  Align 4 mg oral capsule: 1 cap(s) orally  Calcium wirh vit D:   Chondroitin-Glucosamine oral tablet: orally once a day  D3 50 mcg (2000 intl units) oral capsule: 1 cap(s) orally once a day  DULoxetine 60 mg oral delayed release capsule: 1 cap(s) orally once a day  eye health:   fenofibrate 145 mg oral tablet: 1 tab(s) orally once a day  Fiber Laxative 625 mg oral tablet: 2 tab(s) orally once a day  fluticasone 50 mcg/inh nasal spray: 1 spray(s) nasal every 12 hours, As needed, nasal congestion  fluticasone-salmeterol 500 mcg-50 mcg inhalation powder: 1 inhaled 2 times a day  gabapentin 600 mg oral tablet: 1 tab(s) orally every 8 hours  Hemicane: s/p Left Proximal Humerus Fx. Use for Ambulation  levothyroxine 125 mcg (0.125 mg) oral tablet: 1 tab(s) orally once a day  meclizine 12.5 mg oral tablet: 1 tab(s) orally 2 times a day  montelukast 10 mg oral tablet: 1 tab(s) orally once a day  oxyCODONE 5 mg oral tablet: 1 tab(s) orally every 6 hours as needed for  severe pain MDD: 20  pantoprazole 40 mg oral delayed release tablet: 1 tab(s) orally once a day (before a meal)  risedronate 35 mg oral tablet: 1 tab(s) orally once a week sunday  rOPINIRole 5 mg oral tablet: 1 tab(s) orally once a day (at bedtime)  stool softener:   triamterene 50 mg oral capsule: 1 cap(s) orally once a day  vit B12:

## 2024-01-10 NOTE — OCCUPATIONAL THERAPY INITIAL EVALUATION ADULT - DIAGNOSIS, OT EVAL
----------------------------------------------------------------------------------------------------------  [facility], [place]   Psychiatric Progress Note  Hospital Day #2     Assessment    Presentation: This patient is a 51 year old female with history of methamphetamine use disorder, substance-induced psychosis, delusional disorder, PTSD, anxiety, and depression who presented with methamphetamine induced psychosis on 11/28/2017.      Diagnostic Impression: Patient is a 51 year old  female with historical psychiatric diagnoses of delusional disorder, substance induced psychotic disorder, methamphetamine use disorder, PTSD, depression, and anxiety. History is significant for previous hospitalization two years prior with similar paranoid symptoms in the setting of methamphetamine use. Potential biological contributions to mental health presentation include diagnosis of rheumatoid arthritis and family history of alcoholism and schizophrenia in the patient's paternal uncle. Historical symptoms include anxiety, panic attacks, depressed mood, and paranoia. Current mental status exam reveals middle-aged woman with quite disorganized and tangential thought process with thought content that includes paranoid delusions, auditory hallucinations, and possible visual hallucinations.This is consistent with a diagnosis of substance induced psychotic disorder, given patient's recent methamphetamine use, positive urine toxicology, and history of similar presentation two years prior when presenting to the hospital with paranoia while using meth. Additionally, psychologic factors contributing to current presentation include history of trauma, poor coping skills, and fractured family relationships. Psychosocial stressors include relational discord with parents, children, and siblings.    Hospital course: Toshia Palmer was admitted to station 22 as a voluntary patient. PTA medications were  decreased functional mobility, decreased ADL performance resumed, except for Celexa 20 mg daily due to not taking this medication in over a month. Patient was psychotic upon admission, being paranoid and disorganized in her thought process. Some improvement noted on hospital day 2 after one dose Zyprexa. First episode psychosis workup was ordered, including CMP, CBC, UA, Vit D, B12, Folate, TSH, JAKE, Lyme, HIV, RPR, ESR, Ceruloplasmin, Anti-NMDA, Heavy Metals, and MRI. Patient started Risperidone 1 mg BID with goal of initiating Consta FAULKNER due to potential benefit from history of  nonadherance, patient preference, and greater protection if patient does relapse once discharged. On 11/30, she reported significant anxiety with regard to missing work and her dog. Given her history of hypertension and anxiety, propranolol was discussed with the patient. Additionally, an increase in dose to Risperidone 1mg in the morning, 2mg in the evening was started.      Medical course: Patient was medically cleared for admission to station 22. PTA medications for her medical conditions were resumed, except for Lisinopril that will likely be resumed once patient's blood pressure is more stable. Patient found to have vitamin d deficiency being started on ergocalciferol 50,000 IU every 7 days x 8 weeks. Patient also complained of new unilateral headaches with some visual changes over three weeks. MRI was negative for any mass or intracranial abnormality beyond white matter hyperintensities. Recommended patient set up appointment with optometrist to address visual acuity changes. Patient found to have small growth lateral to her uvula that appeared a couple months ago. Patient will follow-up with her PCP regarding this issue.     Plan     Principal psychiatric diagnosis:   -Substance induced psychotic disorder     Secondary psychiatric diagnoses:   -R/O delusional disorder  -Methamphetamine use disorder  -PTSD  -Tobacco use disorder  -History of anxiety and depression     Psychotropic  Medications:    Modify:    -Risperidone 1 mg BID, increase to 1 mg daily and 2 mg qhs    Continue:    -Zyprexa 10 mg PO/IM prn severe agitation/psychosis   -Melatonin 1 mg qhs prn sleep  -Hydroxyzine 25-50 mg prn anxiety  -Nicoderm cq 14 mg      Laboratory/Imaging: UA, Anti-NMDA, Heavy Metals, and MRI pending    Patient will be treated in therapeutic milieu with appropriate individual and group therapies as described.    Medical diagnoses to be addressed this admission:      1. New onset unilateral headaches with visual changes, mostly change in visual acuity  -MRI negative   -Tylenol prn  -Recommend visit with optometrist after discharge    2. Vitamin d deficiency  -50,000 IU every 7 days x 8 weeks    3. Hypertension  -ASA 81 mg   -Hold Lisinopril 10 mg daily for now due to BP being lower     4. Arthritis, more consistent with OA  -Aleve 500 mg daily prn pain      5. GERD  -Conservative management     6. Carpal tunnel syndrome  -Aleve 500 mg daily prn pain      7. Hypertriglyceridemia  -Conservative management     8. Allergy to Bees  -Epipen prn    9. Skin growth later to uvula  -Appears benign  -F/U with PCP on an outpatient basis.    Consults: Medicine for abnormal EKG    Relevant psychosocial stressors: Relational discord with parents, children, and siblings; living situation; and financial constraints     Legal Status: Voluntary    Safety Assessment:     Checks: Status 15  Precautions: None    Patient has not required locked seclusion or restraints in the past 24 hours to maintain safety, please refer to RN documentation for further details.    The risks, benefits, alternatives and side effects have been discussed and are understood by the patient and other caregivers.    Anticipated Disposition/Discharge Date: TBD, pending clinical stabilization, medication optimization, and appropriate discharge planning.    Rae Zepeda, MS3, acted as scribe for Kevin Melgar DO MA    I have reviewed and edited the  "documentation recorded by the scribe.  This documentation accurately reflects the services I personally performed and treatment decisions made by me in consultation with the attending physician, MD Kevin Fields DO MA  PGY-1 Psychiatry Resident    Attestation:  This patient has been seen and evaluated by me, Tessa Graham.  I have discussed this patient with the psychiatry resident and I agree with the findings and plan in this note.    I have reviewed today's vital signs, medications, labs and imaging.   Tessa Graham MD.       Interim History:     The patient's care was discussed with the treatment team and chart notes were reviewed.    VSS:   Reported Sleep: 6.5  Scheduled Medications: Adherent   PRN Medications: Zyprexa 10 mg and Melatonin 1 mg     Staff Report: Patient actively participated in group sessions, offering \"words of wisdom,\" and was pleasant and polite. Appeared to have blunted affect and depressed mood.     Interview: Patient was interviewed in her room, having just awaken. She reports feeling calmer than she was upon admission, but that she becomes very anxious when thinking about all of the aspects of her life that are \"waiting\" for her upon discharge--her job, her dog, the complexity of her moving from her apartment into her cousin's residence, etc. She expresses that she has considered discharging so that she can address all of these stressors.     When asked about how her new medications are affecting her, she expressed some paranoia regarding the nurse giving her a medication last evening. When discussing her recent lab work and imaging, she mentioned some concern about having a second uvula, and wanting the resident to re-check it. She reported that a coworker had once told her that she had cancer in her throat, as noted by a second uvula.    After emphasizing the importance of taking care of her mental health before discharging, Toshia agreed to stay at least one more day, " "and she also agreed to an increased dose of risperidone. The potential of adding propranolol as a treatment for both anxiety and hypertension was discussed. Additionally, she was reassured that she could receive a doctor's note for her leave from work.      When asked about interest in outpatient treatment for her methamphetamine use, she expressed that she would be interested in a once a week group meeting (AA/NA type meetings) near her new residence.     Review of systems:     ROS was negative unless noted above.          Allergies:     Allergies   Allergen Reactions     Bees      Penicillins Hives     Sulfa Drugs Hives            Psychiatric Examination:   /68  Pulse 114  Temp 98.1  F (36.7  C)  Resp 16  Ht 1.575 m (5' 2\")  Wt 58.1 kg (128 lb)  SpO2 98%  BMI 23.41 kg/m2  Weight is 128 lbs 0 oz  Body mass index is 23.41 kg/(m^2).    Appearance: Alert, oriented, dressed in hospital scrubs, found walking in unit  Attitude: Cooperative  Eye Contact: Good  Mood: \"Anxious\"  Affect: Heightened affect at times, full range, mood congruent  Speech: Regular rate and rhythm   Psychomotor Behavior: No tremor, akathisia, dystonia, or abnormal activity  Thought Process: Logical and linear  Associations: No loose associations  Thought Content: Denies A/V hallucinations. Denies SI, plan, or SIB.  Insight: Limited  Judgment: Poor  Oriented to: Person, place, and time  Attention Span and Concentration: Grossly normal  Recent and Remote Memory: Intact  Language: English with appropriate syntax and vocabulary  Fund of Knowledge: Low to average   Muscle Strength and Tone: Grossly normal  Gait and Station: Grossly normal         Physical Examination:     General: Awake and alert, NAD  HEENT: EOMI, no scleral icterus, no injection of conjunctivae, moist mucus membranes, tonsils removed, uvula present, small skin growth lateral to uvula that is symmetrical and pink  Neck: Trachea midline  Extremities: No edema   Skin: No " sign of infection         Labs:     Recent Results (from the past 24 hour(s))   EKG 12-lead, complete    Collection Time: 11/29/17  3:53 PM   Result Value Ref Range    Interpretation ECG Click View Image link to view waveform and result    HIV Antigen Antibody Combo    Collection Time: 11/29/17  4:20 PM   Result Value Ref Range    HIV Antigen Antibody Combo Nonreactive NR^Nonreactive       Erythrocyte sedimentation rate auto    Collection Time: 11/29/17  4:20 PM   Result Value Ref Range    Sed Rate 15 0 - 30 mm/h   Folate    Collection Time: 11/29/17  4:20 PM   Result Value Ref Range    Folate 17.5 >5.4 ng/mL   Lyme Disease Va with reflex to WB Serum    Collection Time: 11/29/17  4:20 PM   Result Value Ref Range    Lyme Disease Antibodies Serum 0.06 0.00 - 0.89   Lipid profile    Collection Time: 11/29/17  4:20 PM   Result Value Ref Range    Cholesterol 148 <200 mg/dL    Triglycerides 227 (H) <150 mg/dL    HDL Cholesterol 54 >49 mg/dL    LDL Cholesterol Calculated 49 <100 mg/dL    Non HDL Cholesterol 94 <130 mg/dL   Anti Nuclear Va IgG by IFA with Reflex    Collection Time: 11/29/17  4:20 PM   Result Value Ref Range    JAKE interpretation Negative NEG^Negative   EKG 12-lead, complete    Collection Time: 11/30/17  9:03 AM   Result Value Ref Range    Interpretation ECG Click View Image link to view waveform and result

## 2024-01-10 NOTE — OCCUPATIONAL THERAPY INITIAL EVALUATION ADULT - ADL RETRAINING, OT EVAL
GOAL: Patient will perform LB dressing min1 within 4 weeks. GOAL: Patient will perform UB dressing sup within 4 weeks.

## 2024-01-10 NOTE — OCCUPATIONAL THERAPY INITIAL EVALUATION ADULT - PERTINENT HX OF CURRENT PROBLEM, REHAB EVAL
77 y/o F w/ PMH significant for hypothyroidism, HLD, SVT (s/p successful ablation), stable small airway dz/ chronic cough recent pulm evaluation started Advair, Cervical cancer (s/p hysterectomy), DVT post pelvic fx, BMI 33, Iron deficiency anemia, GERD,  OA, peripheral neuropathy, LE edema, osteoporosis. Pt s/p MF w/ L proximal humerus fx s/p ORIF.

## 2024-01-10 NOTE — OCCUPATIONAL THERAPY INITIAL EVALUATION ADULT - NSOTDISCHREC_GEN_A_CORE
If pt returns home, pt would require assist with ALL ADL's and functional mobility and home OT. DME: [facility]

## 2024-01-11 ENCOUNTER — TRANSCRIPTION ENCOUNTER (OUTPATIENT)
Age: 79
End: 2024-01-11

## 2024-01-11 VITALS
HEART RATE: 87 BPM | OXYGEN SATURATION: 95 % | TEMPERATURE: 98 F | RESPIRATION RATE: 18 BRPM | SYSTOLIC BLOOD PRESSURE: 118 MMHG | DIASTOLIC BLOOD PRESSURE: 80 MMHG

## 2024-01-11 LAB
ANION GAP SERPL CALC-SCNC: 9 MMOL/L — SIGNIFICANT CHANGE UP (ref 5–17)
ANION GAP SERPL CALC-SCNC: 9 MMOL/L — SIGNIFICANT CHANGE UP (ref 5–17)
BUN SERPL-MCNC: 24 MG/DL — HIGH (ref 7–23)
BUN SERPL-MCNC: 24 MG/DL — HIGH (ref 7–23)
CALCIUM SERPL-MCNC: 9 MG/DL — SIGNIFICANT CHANGE UP (ref 8.4–10.5)
CALCIUM SERPL-MCNC: 9 MG/DL — SIGNIFICANT CHANGE UP (ref 8.4–10.5)
CHLORIDE SERPL-SCNC: 103 MMOL/L — SIGNIFICANT CHANGE UP (ref 96–108)
CHLORIDE SERPL-SCNC: 103 MMOL/L — SIGNIFICANT CHANGE UP (ref 96–108)
CO2 SERPL-SCNC: 25 MMOL/L — SIGNIFICANT CHANGE UP (ref 22–31)
CO2 SERPL-SCNC: 25 MMOL/L — SIGNIFICANT CHANGE UP (ref 22–31)
CREAT SERPL-MCNC: 1.03 MG/DL — SIGNIFICANT CHANGE UP (ref 0.5–1.3)
CREAT SERPL-MCNC: 1.03 MG/DL — SIGNIFICANT CHANGE UP (ref 0.5–1.3)
EGFR: 56 ML/MIN/1.73M2 — LOW
EGFR: 56 ML/MIN/1.73M2 — LOW
GLUCOSE SERPL-MCNC: 111 MG/DL — HIGH (ref 70–99)
GLUCOSE SERPL-MCNC: 111 MG/DL — HIGH (ref 70–99)
HCT VFR BLD CALC: 32.1 % — LOW (ref 34.5–45)
HCT VFR BLD CALC: 32.1 % — LOW (ref 34.5–45)
HGB BLD-MCNC: 9.8 G/DL — LOW (ref 11.5–15.5)
HGB BLD-MCNC: 9.8 G/DL — LOW (ref 11.5–15.5)
MCHC RBC-ENTMCNC: 28.3 PG — SIGNIFICANT CHANGE UP (ref 27–34)
MCHC RBC-ENTMCNC: 28.3 PG — SIGNIFICANT CHANGE UP (ref 27–34)
MCHC RBC-ENTMCNC: 30.5 GM/DL — LOW (ref 32–36)
MCHC RBC-ENTMCNC: 30.5 GM/DL — LOW (ref 32–36)
MCV RBC AUTO: 92.8 FL — SIGNIFICANT CHANGE UP (ref 80–100)
MCV RBC AUTO: 92.8 FL — SIGNIFICANT CHANGE UP (ref 80–100)
NRBC # BLD: 0 /100 WBCS — SIGNIFICANT CHANGE UP (ref 0–0)
NRBC # BLD: 0 /100 WBCS — SIGNIFICANT CHANGE UP (ref 0–0)
PLATELET # BLD AUTO: 327 K/UL — SIGNIFICANT CHANGE UP (ref 150–400)
PLATELET # BLD AUTO: 327 K/UL — SIGNIFICANT CHANGE UP (ref 150–400)
POTASSIUM SERPL-MCNC: 4.5 MMOL/L — SIGNIFICANT CHANGE UP (ref 3.5–5.3)
POTASSIUM SERPL-MCNC: 4.5 MMOL/L — SIGNIFICANT CHANGE UP (ref 3.5–5.3)
POTASSIUM SERPL-SCNC: 4.5 MMOL/L — SIGNIFICANT CHANGE UP (ref 3.5–5.3)
POTASSIUM SERPL-SCNC: 4.5 MMOL/L — SIGNIFICANT CHANGE UP (ref 3.5–5.3)
RBC # BLD: 3.46 M/UL — LOW (ref 3.8–5.2)
RBC # BLD: 3.46 M/UL — LOW (ref 3.8–5.2)
RBC # FLD: 15.5 % — HIGH (ref 10.3–14.5)
RBC # FLD: 15.5 % — HIGH (ref 10.3–14.5)
SODIUM SERPL-SCNC: 137 MMOL/L — SIGNIFICANT CHANGE UP (ref 135–145)
SODIUM SERPL-SCNC: 137 MMOL/L — SIGNIFICANT CHANGE UP (ref 135–145)
WBC # BLD: 12.02 K/UL — HIGH (ref 3.8–10.5)
WBC # BLD: 12.02 K/UL — HIGH (ref 3.8–10.5)
WBC # FLD AUTO: 12.02 K/UL — HIGH (ref 3.8–10.5)
WBC # FLD AUTO: 12.02 K/UL — HIGH (ref 3.8–10.5)

## 2024-01-11 PROCEDURE — 97116 GAIT TRAINING THERAPY: CPT

## 2024-01-11 PROCEDURE — C9399: CPT

## 2024-01-11 PROCEDURE — C1713: CPT

## 2024-01-11 PROCEDURE — G0463: CPT

## 2024-01-11 PROCEDURE — 85027 COMPLETE CBC AUTOMATED: CPT

## 2024-01-11 PROCEDURE — 97530 THERAPEUTIC ACTIVITIES: CPT

## 2024-01-11 PROCEDURE — 80048 BASIC METABOLIC PNL TOTAL CA: CPT

## 2024-01-11 PROCEDURE — 36415 COLL VENOUS BLD VENIPUNCTURE: CPT

## 2024-01-11 PROCEDURE — 86900 BLOOD TYPING SEROLOGIC ABO: CPT

## 2024-01-11 PROCEDURE — 94640 AIRWAY INHALATION TREATMENT: CPT

## 2024-01-11 PROCEDURE — 86901 BLOOD TYPING SEROLOGIC RH(D): CPT

## 2024-01-11 PROCEDURE — 97161 PT EVAL LOW COMPLEX 20 MIN: CPT

## 2024-01-11 PROCEDURE — 97110 THERAPEUTIC EXERCISES: CPT

## 2024-01-11 PROCEDURE — 76000 FLUOROSCOPY <1 HR PHYS/QHP: CPT

## 2024-01-11 PROCEDURE — 97165 OT EVAL LOW COMPLEX 30 MIN: CPT

## 2024-01-11 PROCEDURE — C1889: CPT

## 2024-01-11 PROCEDURE — 86850 RBC ANTIBODY SCREEN: CPT

## 2024-01-11 PROCEDURE — 97535 SELF CARE MNGMENT TRAINING: CPT

## 2024-01-11 RX ADMIN — OXYCODONE HYDROCHLORIDE 5 MILLIGRAM(S): 5 TABLET ORAL at 13:51

## 2024-01-11 RX ADMIN — Medication 975 MILLIGRAM(S): at 09:02

## 2024-01-11 RX ADMIN — Medication 975 MILLIGRAM(S): at 16:58

## 2024-01-11 RX ADMIN — OXYCODONE HYDROCHLORIDE 5 MILLIGRAM(S): 5 TABLET ORAL at 14:21

## 2024-01-11 RX ADMIN — DULOXETINE HYDROCHLORIDE 60 MILLIGRAM(S): 30 CAPSULE, DELAYED RELEASE ORAL at 11:43

## 2024-01-11 RX ADMIN — GABAPENTIN 600 MILLIGRAM(S): 400 CAPSULE ORAL at 07:57

## 2024-01-11 RX ADMIN — BUDESONIDE AND FORMOTEROL FUMARATE DIHYDRATE 2 PUFF(S): 160; 4.5 AEROSOL RESPIRATORY (INHALATION) at 05:43

## 2024-01-11 RX ADMIN — Medication 145 MILLIGRAM(S): at 11:42

## 2024-01-11 RX ADMIN — Medication 125 MICROGRAM(S): at 05:43

## 2024-01-11 RX ADMIN — Medication 975 MILLIGRAM(S): at 17:05

## 2024-01-11 RX ADMIN — Medication 25 MILLIGRAM(S): at 09:11

## 2024-01-11 RX ADMIN — MONTELUKAST 10 MILLIGRAM(S): 4 TABLET, CHEWABLE ORAL at 11:43

## 2024-01-11 RX ADMIN — GABAPENTIN 600 MILLIGRAM(S): 400 CAPSULE ORAL at 15:58

## 2024-01-11 RX ADMIN — Medication 975 MILLIGRAM(S): at 09:32

## 2024-01-11 NOTE — DIETITIAN INITIAL EVALUATION ADULT - REASON
Nutrition Focused Physical Assessment deferred at this time; Pt with no overt signs of muscle wasting or fat loss upon visual assessment.

## 2024-01-11 NOTE — DIETITIAN INITIAL EVALUATION ADULT - ENERGY INTAKE
In house, pt reports good appetite and PO intake. No PO intake information available per flowsheets at this time.  Adequate (%)

## 2024-01-11 NOTE — PROGRESS NOTE ADULT - SUBJECTIVE AND OBJECTIVE BOX
ORTHO  Patient is a 78y old  Female who presents with a chief complaint of Left Proximal Humerus Fracture  (10 Ever 2024 07:22)    Pt. resting without complaint    VS-  T(C): 36.9 (01-11-24 @ 05:45), Max: 36.9 (01-11-24 @ 05:45)  HR: 81 (01-11-24 @ 05:45) (72 - 81)  BP: 110/59 (01-11-24 @ 05:45) (105/70 - 126/75)  RR: 18 (01-11-24 @ 05:45) (18 - 18)  SpO2: 95% (01-11-24 @ 05:45) (93% - 96%)  Wt(kg): --    M.S. A&O  Extremity- Left Shoulder- Aquacel dressing- C/D/I, sling in place  Neuro-              Motor- AROM- elbow, wrist, hand, and digits              Sensation- grossly intact to light touch              Calves- soft, nontender- PAS                               10.0   15.88 )-----------( 366      ( 10 Ever 2024 07:34 )             32.1     01-10    136  |  103  |  20  ----------------------------<  120<H>  4.8   |  22  |  0.92    Ca    8.7      10 Ever 2024 07:31

## 2024-01-11 NOTE — DISCHARGE NOTE NURSING/CASE MANAGEMENT/SOCIAL WORK - NSDCPEFALRISK_GEN_ALL_CORE
For information on Fall & Injury Prevention, visit: [email or web address] OR  [email or web address] OR  https://www.cdc.gov/steadi/patient.html For information on Fall & Injury Prevention, visit: [email or web address] OR  [email or web address] OR  https://www.cdc.gov/steadi/patient.html

## 2024-01-11 NOTE — DIETITIAN INITIAL EVALUATION ADULT - ADD RECOMMEND
1) Continue current Regular diet free of therapeutic restrictions as tolerated.   	- Defer texture/consistency to SLP/team.   2) Recommend Multivitamin daily to prevent micronutrient deficiencies pending no medical contraindications.  3) Continue to monitor PO intake, weight, labs, skin, GI status, and diet.  4) RD remains available for diet education PRN.

## 2024-01-11 NOTE — DIETITIAN INITIAL EVALUATION ADULT - REASON INDICATOR FOR ASSESSMENT
Nutrition Consult for MST score 2 or > (Unsure).   Source: Pt, Electronic Medical Record.   Chart reviewed, events noted.

## 2024-01-11 NOTE — DISCHARGE NOTE NURSING/CASE MANAGEMENT/SOCIAL WORK - PATIENT PORTAL LINK FT
You can access the FollowMyHealth Patient Portal offered by [facility] by registering at the following website: [email or web address]. By joining [facility]’s FollowMyHealth portal, you will also be able to view your health information using other applications (apps) compatible with our system. You can access the FollowMyHealth Patient Portal offered by [facility] by registering at the following website: [email or web address]. By joining [facility]’s FollowMyHealth portal, you will also be able to view your health information using other applications (apps) compatible with our system.

## 2024-01-11 NOTE — DIETITIAN INITIAL EVALUATION ADULT - ORAL INTAKE PTA/DIET HISTORY
Pt reports having a good appetite and PO intake PTA; consuming >75% of most meals. Follows regular diet; no therapeutic restrictions reported. Pt denies any known food allergies or intolerances. Pt reports taking vitamin B12, vitamin D3, and Calcium supplements at home. Denies any difficulty chewing/swallowing at this time.

## 2024-01-11 NOTE — DIETITIAN INITIAL EVALUATION ADULT - OTHER INFO
- UBW: ~190s pounds per pt  - Dosing wt: 194 pounds (1/09)  - Wt hx per chart: No new wts available per chart at this time.  - Wt hx per [facility] in pounds: 191 (10/30/2022)  - RD to continue to monitor weight trends as able.   - Nutritionally Pertinent Meds in-house: IVF, synthroid.   - Endo: high BG in house noted.   - Ortho: S/p ORIF of fracture of left proximal humerus 1/09.  - UBW: ~190s pounds per pt  - Dosing wt: 194 pounds (1/09)  - Wt hx per chart: No new wts available per chart at this time.  - Wt hx per [facility] in pounds: 191 (10/30/2022)  - RD to continue to monitor weight trends as able.   - Nutritionally Pertinent Meds in-house: IVF, synthroid.   - Endo: high BG in house noted.   - Ortho: S/p ORIF of fracture of left proximal humerus 1/09.

## 2024-01-11 NOTE — DIETITIAN INITIAL EVALUATION ADULT - PERTINENT LABORATORY DATA
01-11    137  |  103  |  24<H>  ----------------------------<  111<H>  4.5   |  25  |  1.03    Ca    9.0      11 Jan 2024 06:41

## 2024-01-11 NOTE — PROGRESS NOTE ADULT - ASSESSMENT
Impression: Stable       Plan:   Continue present treatment                 Out of bed, ambulate, non-weight bearing left UE                  Physical therapy follow up                  Continue to monitor    Zen Baca PA-C  Orthopaedic Surgery  Team pager [phone]  [phone]                         Impression: Stable       Plan:   Continue present treatment                 Out of bed, ambulate, non-weight bearing left UE                  Physical therapy follow up                  Continue to monitor    Zen Baca PA-C  Orthopaedic Surgery  Team pager [phone]  [phone]

## 2024-01-11 NOTE — DIETITIAN INITIAL EVALUATION ADULT - PERTINENT MEDS FT
MEDICATIONS  (STANDING):  acetaminophen     Tablet .. 975 milliGRAM(s) Oral every 8 hours  budesonide 160 MICROgram(s)/formoterol 4.5 MICROgram(s) Inhaler 2 Puff(s) Inhalation two times a day  chlorhexidine 2% Cloths 1 Application(s) Topical once  DULoxetine 60 milliGRAM(s) Oral daily  fenofibrate Tablet 145 milliGRAM(s) Oral daily  gabapentin 600 milliGRAM(s) Oral every 8 hours  levothyroxine 125 MICROGram(s) Oral daily  lidocaine 1% Injectable 0.2 milliLiter(s) Local Injection once  meclizine 25 milliGRAM(s) Oral daily  montelukast 10 milliGRAM(s) Oral daily  pantoprazole    Tablet 40 milliGRAM(s) Oral before breakfast  rOPINIRole 5 milliGRAM(s) Oral at bedtime  senna 2 Tablet(s) Oral at bedtime  sodium chloride 0.9%. 1000 milliLiter(s) (50 mL/Hr) IV Continuous <Continuous>  triamterene 50 milliGRAM(s) Oral daily    MEDICATIONS  (PRN):  meclizine 12.5 milliGRAM(s) Oral two times a day PRN Dizziness  oxyCODONE    IR 5 milliGRAM(s) Oral every 4 hours PRN Severe Pain (7 - 10)  oxyCODONE    IR 2.5 milliGRAM(s) Oral every 4 hours PRN Moderate Pain (4 - 6)  polyethylene glycol 3350 17 Gram(s) Oral at bedtime PRN Constipation  traMADol 50 milliGRAM(s) Oral every 6 hours PRN Mild Pain (1 - 3)

## 2024-01-22 ENCOUNTER — NON-APPOINTMENT (OUTPATIENT)
Age: 79
End: 2024-01-22

## 2024-01-22 RX ORDER — TRAMADOL HYDROCHLORIDE 50 MG/1
50 TABLET, COATED ORAL
Qty: 20 | Refills: 0 | Status: ACTIVE | COMMUNITY
Start: 2024-01-22 | End: 1900-01-01

## 2024-01-24 ENCOUNTER — APPOINTMENT (OUTPATIENT)
Dept: ORTHOPEDIC SURGERY | Facility: CLINIC | Age: 79
End: 2024-01-24
Payer: MEDICARE

## 2024-01-24 VITALS
HEART RATE: 94 BPM | BODY MASS INDEX: 33.29 KG/M2 | HEIGHT: 64 IN | DIASTOLIC BLOOD PRESSURE: 77 MMHG | SYSTOLIC BLOOD PRESSURE: 146 MMHG | WEIGHT: 195 LBS

## 2024-01-24 PROBLEM — Z87.39 PERSONAL HISTORY OF OTHER DISEASES OF THE MUSCULOSKELETAL SYSTEM AND CONNECTIVE TISSUE: Chronic | Status: ACTIVE | Noted: 2024-01-08

## 2024-01-24 PROBLEM — Z87.81 PERSONAL HISTORY OF (HEALED) TRAUMATIC FRACTURE: Chronic | Status: ACTIVE | Noted: 2024-01-08

## 2024-01-24 PROBLEM — Z87.19 PERSONAL HISTORY OF OTHER DISEASES OF THE DIGESTIVE SYSTEM: Chronic | Status: ACTIVE | Noted: 2024-01-08

## 2024-01-24 PROBLEM — Z86.718 PERSONAL HISTORY OF OTHER VENOUS THROMBOSIS AND EMBOLISM: Chronic | Status: ACTIVE | Noted: 2024-01-08

## 2024-01-24 PROBLEM — Z86.69 PERSONAL HISTORY OF OTHER DISEASES OF THE NERVOUS SYSTEM AND SENSE ORGANS: Chronic | Status: ACTIVE | Noted: 2024-01-08

## 2024-01-24 PROBLEM — J98.4 OTHER DISORDERS OF LUNG: Chronic | Status: ACTIVE | Noted: 2024-01-08

## 2024-01-24 PROCEDURE — 99024 POSTOP FOLLOW-UP VISIT: CPT

## 2024-01-24 NOTE — HISTORY OF PRESENT ILLNESS
[de-identified] : s/p ORIF left proximal humerus, DOS: 1/9/24 [de-identified] : Ms. SHELL MARTINEZ is a 78 year old woman presents today for post-op evaluation s/p ORIF left proximal humerus on 1/9/24. Since her surgery she states she is feeling better. She has been participating in PT and OT at home.  [de-identified] : Ms. SHELL MARTINEZ is sitting comfortably in the exam room  LEFT shoulder -Skin is intact, swelling of the left hand, ecchymosis at the left shoulder -Incision is clean and dry, no erythema, no signs of infection -Passive FE: 90 , ER: 5 , IR: Hip , ABD: 15 -Able to make a fist -Sensation is intact median, radial, ulnar, axillary nerves -Motor is intact median, radial, ulnar, axillary nerves -Hand is warm and well-perfused, Palpable radial and ulnar pulses   [de-identified] : No Xrays were taken in the office today, 1/24/24 [de-identified] : 78-year-old woman s/p ORIF left proximal humerus, approximately 2 weeks out.  [de-identified] : -X-ray and physical exam findings were discussed with the patient -ASHWIN left UE -Discontinue sling -Physical therapy: work on ROM of the left shoulder -Follow up in 4 weeks with x-rays of the left shoulder at that time. -All the patient's questions and concerns were addressed during this visit

## 2024-01-31 ENCOUNTER — APPOINTMENT (OUTPATIENT)
Dept: PULMONOLOGY | Facility: CLINIC | Age: 79
End: 2024-01-31
Payer: MEDICARE

## 2024-01-31 VITALS — HEART RATE: 82 BPM | OXYGEN SATURATION: 100 % | DIASTOLIC BLOOD PRESSURE: 74 MMHG | SYSTOLIC BLOOD PRESSURE: 126 MMHG

## 2024-01-31 PROCEDURE — 94727 GAS DIL/WSHOT DETER LNG VOL: CPT

## 2024-01-31 PROCEDURE — 94010 BREATHING CAPACITY TEST: CPT

## 2024-01-31 PROCEDURE — 99214 OFFICE O/P EST MOD 30 MIN: CPT | Mod: 25

## 2024-01-31 PROCEDURE — 94729 DIFFUSING CAPACITY: CPT

## 2024-01-31 RX ORDER — METHYLPREDNISOLONE 4 MG/1
4 TABLET ORAL
Qty: 1 | Refills: 0 | Status: ACTIVE | COMMUNITY
Start: 2024-01-31 | End: 1900-01-01

## 2024-01-31 RX ORDER — FLUTICASONE FUROATE AND VILANTEROL TRIFENATATE 200; 25 UG/1; UG/1
200-25 POWDER RESPIRATORY (INHALATION) DAILY
Qty: 1 | Refills: 3 | Status: DISCONTINUED | COMMUNITY
Start: 2023-12-28 | End: 2024-01-31

## 2024-01-31 RX ORDER — MONTELUKAST 10 MG/1
10 TABLET, FILM COATED ORAL
Qty: 3 | Refills: 3 | Status: ACTIVE | COMMUNITY
Start: 2024-01-31 | End: 1900-01-01

## 2024-01-31 NOTE — PHYSICAL EXAM
Detail Level: Generalized Detail Level: Detailed [No Acute Distress] : no acute distress [Normal Oropharynx] : normal oropharynx [II] : Mallampati Class: II [Normal Appearance] : normal appearance [Supple] : supple [No Neck Mass] : no neck mass [No JVD] : no jvd [Normal Rate/Rhythm] : normal rate/rhythm [Normal S1, S2] : normal s1, s2 [No Murmurs] : no murmurs [No Resp Distress] : no resp distress [No Acc Muscle Use] : no acc muscle use [Normal Palpation] : normal palpation [Normal Rhythm and Effort] : normal rhythm and effort [Normal to Percussion] : normal to percussion [Wheeze] : wheeze [Rhonchi] : rhonchi [No Clubbing] : no clubbing [No Cyanosis] : no cyanosis [No Edema] : no edema [Oriented x3] : oriented x3 [Normal Affect] : normal affect [TextBox_99] : Ambulates with walker

## 2024-01-31 NOTE — PROCEDURE
[FreeTextEntry1] : PFT 1/31/24  moderate reduction flow  rates TLC 57 %  DLCO 46 %  HGB 12.9 Combined obstructive and restrictive  ventilatory impairment pos decline at flow rates   Chest x-ray PA lateral December 28, 2023 Limited technique apices not well-visualized Cardiac size normal Aorta appears unclear Mild scoliosis No appreciable parenchymal infiltrates pleural effusions with dominant pulmonary nodules  CT chest November 24, 2022 Bilateral pleural effusions with passive atelectasis right greater than left Erosion of T6-T7 disc space Vertebral soft tissue density seen on prior imaging felt to be occurring in the clinical setting of a discitis osteomyelitis Healed right fifth and sixth ribs Linear atelectasis right upper lobe left upper lobe right middle lobe Calcification upper lobe reported Status post findings of cholecystectomy  PFT 7/28/2023 Mild reduction flow rates Very minimal obstructive pattern ratio 78 No bronchodilator response in FEV1  21% response to bronchodilator at small airways Lung volumes normal Total lung capacity 91% predicted Air trapping with RV/TLC ratio 134% predicted Diffusion normal range 77% of predicted. Hemoglobin 12.9 Noted the patient was last seen in this office dating back to December 2016 spirometric flow rates demonstrate significant decline without decline at the diffusion or total lung capacity

## 2024-01-31 NOTE — DISCUSSION/SUMMARY
[FreeTextEntry1] : History as reviewed above Chief complaint chest congestion wheeze shortness of breath Pos decline   Airway inflammation with restrictive component  Pulmonary physiology does demonstrate a component of air trapping with response to bronchodilator at the small airways and a decline in flow rates dating back to comparison 7 years ago Based on clinical reports of wheezing likely does have some component of obstructive airway disease chronic airflow limitation small airways disease Recommendations Controller therapy long-acting bronchodilator with inhaled corticosteroid Ordered Breo 200 mcg 1 puff daily with instructions Changed Advair Change if Covereed BREZTRI 2 puffs BID  add singulair 10 mg QD with food Medrol dose  souleymane Notify of any wheezing.  Notify if rescue inhaler is needed greater than 2-3 times in any week.  Avoid known triggers. MDI instructions provided as well for short acting bronchodilator  Follow-up 1 month to see if there is interval improvement

## 2024-01-31 NOTE — REASON FOR VISIT
[Initial] : an initial visit [Cough] : cough [Shortness of Breath] : shortness of breath [Wheezing] : wheezing

## 2024-01-31 NOTE — REVIEW OF SYSTEMS
[Fatigue] : fatigue [Sinus Problems] : sinus problems [Arthralgias] : arthralgias [Negative] : Neurologic [Depression] : no depression [Anxiety] : no anxiety [TextBox_30] : Per HPI [TextBox_44] : Hypertension [TextBox_94] : History of disc osteomyelitis [TextBox_144] : Hypothyroid

## 2024-02-01 PROBLEM — S42.302A UNSPECIFIED FRACTURE OF SHAFT OF HUMERUS, LEFT ARM, INITIAL ENCOUNTER FOR CLOSED FRACTURE: Chronic | Status: ACTIVE | Noted: 2024-01-08

## 2024-02-01 PROBLEM — R05.3 CHRONIC COUGH: Chronic | Status: ACTIVE | Noted: 2024-01-08

## 2024-02-01 PROBLEM — E66.9 OBESITY, UNSPECIFIED: Chronic | Status: ACTIVE | Noted: 2024-01-08

## 2024-02-01 PROBLEM — Z86.2 PERSONAL HISTORY OF DISEASES OF THE BLOOD AND BLOOD-FORMING ORGANS AND CERTAIN DISORDERS INVOLVING THE IMMUNE MECHANISM: Chronic | Status: ACTIVE | Noted: 2024-01-08

## 2024-02-01 PROBLEM — R60.0 LOCALIZED EDEMA: Chronic | Status: ACTIVE | Noted: 2024-01-08

## 2024-02-01 PROBLEM — K21.9 GASTRO-ESOPHAGEAL REFLUX DISEASE WITHOUT ESOPHAGITIS: Chronic | Status: ACTIVE | Noted: 2024-01-08

## 2024-02-01 PROBLEM — Z86.16 PERSONAL HISTORY OF COVID-19: Chronic | Status: ACTIVE | Noted: 2024-01-08

## 2024-02-01 PROBLEM — M53.86 OTHER SPECIFIED DORSOPATHIES, LUMBAR REGION: Chronic | Status: ACTIVE | Noted: 2024-01-08

## 2024-02-01 PROBLEM — Z87.440 PERSONAL HISTORY OF URINARY (TRACT) INFECTIONS: Chronic | Status: ACTIVE | Noted: 2024-01-08

## 2024-02-01 PROBLEM — E03.9 HYPOTHYROIDISM, UNSPECIFIED: Chronic | Status: ACTIVE | Noted: 2024-01-08

## 2024-02-22 NOTE — REVIEW OF SYSTEMS
Patient spit out clobazam medication completely, did not swallow any of it [Fatigue] : fatigue [Sinus Problems] : sinus problems [Arthralgias] : arthralgias [Negative] : Neurologic [Depression] : no depression [Anxiety] : no anxiety [TextBox_30] : Per HPI [TextBox_44] : Hypertension [TextBox_94] : History of disc osteomyelitis [TextBox_144] : Hypothyroid

## 2024-02-26 ENCOUNTER — APPOINTMENT (OUTPATIENT)
Dept: PULMONOLOGY | Facility: CLINIC | Age: 79
End: 2024-02-26
Payer: MEDICARE

## 2024-02-26 VITALS — SYSTOLIC BLOOD PRESSURE: 126 MMHG | OXYGEN SATURATION: 94 % | DIASTOLIC BLOOD PRESSURE: 76 MMHG | HEART RATE: 87 BPM

## 2024-02-26 DIAGNOSIS — R06.09 OTHER FORMS OF DYSPNEA: ICD-10-CM

## 2024-02-26 PROCEDURE — 94060 EVALUATION OF WHEEZING: CPT

## 2024-02-26 PROCEDURE — 99214 OFFICE O/P EST MOD 30 MIN: CPT | Mod: 25

## 2024-02-26 NOTE — REASON FOR VISIT
[Cough] : cough [Follow-Up] : a follow-up visit [Shortness of Breath] : shortness of breath [Wheezing] : wheezing

## 2024-02-26 NOTE — PHYSICAL EXAM
[No Acute Distress] : no acute distress [Normal Oropharynx] : normal oropharynx [Normal Appearance] : normal appearance [II] : Mallampati Class: II [Supple] : supple [No Neck Mass] : no neck mass [No JVD] : no jvd [Normal Rate/Rhythm] : normal rate/rhythm [Normal S1, S2] : normal s1, s2 [No Murmurs] : no murmurs [No Acc Muscle Use] : no acc muscle use [No Resp Distress] : no resp distress [Normal Palpation] : normal palpation [Normal Rhythm and Effort] : normal rhythm and effort [Normal to Percussion] : normal to percussion [Wheeze] : wheeze [Rhonchi] : rhonchi [No Clubbing] : no clubbing [No Cyanosis] : no cyanosis [No Edema] : no edema [Oriented x3] : oriented x3 [Normal Affect] : normal affect [TextBox_99] : Ambulates with walker

## 2024-02-26 NOTE — DISCUSSION/SUMMARY
[FreeTextEntry1] : History as reviewed above Chief complaint chest congestion wheeze shortness of breath stable FEV1  stable Pos decline  PRIOR sxs improvement with BREZTRI  Airway inflammation with restrictive component  Pulmonary physiology does demonstrate a component of air trapping with response to bronchodilator at the small airways and a decline in flow rates dating back to comparison 7 years ago Based on clinical reports of wheezing likely does have some component of obstructive airway disease chronic airflow limitation small airways disease Recommendations Controller therapy long-acting bronchodilator with inhaled corticosteroid Ordered Breo 200 mcg 1 puff daily with instructions Changed Advair Change if Covereed BREZTRI 2 puffs BID  add singulair 10 mg QD with food Medrol dose  souleymane Notify of any wheezing.  Notify if rescue inhaler is needed greater than 2-3 times in any week.  Avoid known triggers. MDI instructions provided as well for short acting bronchodilator  Follow-up 1 month to see if there is interval improvement

## 2024-02-26 NOTE — REVIEW OF SYSTEMS
[Fatigue] : fatigue [Arthralgias] : arthralgias [Sinus Problems] : sinus problems [Negative] : Hematologic [Depression] : no depression [Anxiety] : no anxiety [TextBox_30] : Per HPI [TextBox_44] : Hypertension [TextBox_94] : History of disc osteomyelitis [TextBox_144] : Hypothyroid

## 2024-02-26 NOTE — HISTORY OF PRESENT ILLNESS
[Never] : never [TextBox_4] : 78-year-old female Chief complaint chronic refractory course greater than 1 year including when she was hospitalized as well as rehab She states the rehab hospital she has been given short acting bronchodilator Post use BREZTRI with less  cough  and  wheeze no sputum  Respiratory disorder She now is complaining intermittent wheezing There is shortness of breath with wheezing She states the resuspend portable and noted by her aide as well She does not carry any formal diagnosis as to her knowledge regarding COPD bronchitis emphysema asthma interstitial lung disease She has a remote history she is a COVID infection She has had several COVID vaccines There are some component of sinus congestion Past medical history history of MSSA bacteremia requiring long-term hospitalization rehab IV antibiotic protocol times approximately 5 weeks He was also on imaging a pyriform abscess which was not draining at the time MRI suggested discitis  History of cardiac arrhythmia SVT with cardiac ablation Additional past medical history hypertension History cervical cancer History of vertigo 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

## 2024-02-26 NOTE — PROCEDURE
[FreeTextEntry1] :  LORENA 2/26/24 mod severe reduction flow   rates   No BD at FEV1 pos  small airways 39 % pos BD  PFT 1/31/24  moderate reduction flow  rates TLC 57 %  DLCO 46 %  HGB 12.9 Combined obstructive and restrictive  ventilatory impairment pos decline at flow rates   Chest x-ray PA lateral December 28, 2023 Limited technique apices not well-visualized Cardiac size normal Aorta appears unclear Mild scoliosis No appreciable parenchymal infiltrates pleural effusions with dominant pulmonary nodules  CT chest November 24, 2022 Bilateral pleural effusions with passive atelectasis right greater than left Erosion of T6-T7 disc space Vertebral soft tissue density seen on prior imaging felt to be occurring in the clinical setting of a discitis osteomyelitis Healed right fifth and sixth ribs Linear atelectasis right upper lobe left upper lobe right middle lobe Calcification upper lobe reported Status post findings of cholecystectomy  PFT 7/28/2023 Mild reduction flow rates Very minimal obstructive pattern ratio 78 No bronchodilator response in FEV1  21% response to bronchodilator at small airways Lung volumes normal Total lung capacity 91% predicted Air trapping with RV/TLC ratio 134% predicted Diffusion normal range 77% of predicted. Hemoglobin 12.9 Noted the patient was last seen in this office dating back to December 2016 spirometric flow rates demonstrate significant decline without decline at the diffusion or total lung capacity

## 2024-02-28 ENCOUNTER — APPOINTMENT (OUTPATIENT)
Dept: ORTHOPEDIC SURGERY | Facility: CLINIC | Age: 79
End: 2024-02-28
Payer: MEDICARE

## 2024-02-28 VITALS — HEIGHT: 64 IN | WEIGHT: 200 LBS | BODY MASS INDEX: 34.15 KG/M2

## 2024-02-28 PROCEDURE — 73030 X-RAY EXAM OF SHOULDER: CPT | Mod: LT

## 2024-02-28 PROCEDURE — 99024 POSTOP FOLLOW-UP VISIT: CPT

## 2024-03-06 NOTE — HISTORY OF PRESENT ILLNESS
[de-identified] : s/p ORIF left proximal humerus, DOS: 1/9/24 [de-identified] : Ms. SHELL MARTINEZ is a 78 year old woman presents today for post-op evaluation s/p ORIF left proximal humerus on 1/9/24. Since her surgery she states she is feeling better. She is participating in PT twice a week. She denies any pain at this time.  [de-identified] : Ms. SHELL MARTINEZ is sitting comfortably in the exam room  LEFT shoulder -Skin is intact, swelling of the left hand, ecchymosis at the left shoulder -Incision well-healed, no erythema, no signs of infection -FE: 90 , ER: 5 , IR: Hip , ABD: 90 -Able to make a fist -Sensation is intact median, radial, ulnar, axillary nerves -Motor is intact median, radial, ulnar, axillary nerves -Hand is warm and well-perfused, Palpable radial and ulnar pulses   [de-identified] :  X-rays of the left shoulder were taken in the office today, 2/28/24. AP, Scap Y, Axillary.  X-rays show good overall alignment of the proximal humerus.  Implants are in good position.  The glenohumeral joint is well reduced. [de-identified] : 78-year-old woman s/p ORIF left proximal humerus, approximately 7 weeks out.  [de-identified] : -X-ray and physical exam findings were discussed with the patient -5lb weight limit left UE -Physical therapy: work on ROM of the left shoulder -Follow up in 2 months with x-rays of the left shoulder at that time. -All the patient's questions and concerns were addressed during this visit

## 2024-03-14 NOTE — HISTORY OF PRESENT ILLNESS
[de-identified] : Ms. SHELL MARTINEZ is a 78 year old RHD woman presents today for initial evaluation of a left shoulder injury sustained on 12/30/23. She was seen at [facility] ED where x-rays and a CT scan were performed. She presents today in a sling.   PMH: HLD controlled on medication, denies smoking Of note, the patient has been hospitalized on and off for about a year and a half for various injuries and infections and is working on strengthening and gait training.

## 2024-03-14 NOTE — PHYSICAL EXAM
[de-identified] : Ms. SHELL MARTINEZ is sitting comfortably in the exam room  let shoulder -Skin is intact, no swelling, no ecchymosis -ROM limited due to pain  -Able to make a fist -Sensation is intact median, radial, ulnar, axillary nerves -Motor is intact median, radial, ulnar, axillary nerves -Hand is warm and well-perfused, Palpable radial and ulnar pulses  [de-identified] : X-rays and CT scan from the hospital show a left proximal humerus fracture  PROCEDURE DATE: 12/31/2023  INTERPRETATION: CLINICAL INDICATION: fracuter disolation  TECHNIQUE: CT axial images of the left shoulder were obtained without intravenous contrast. Coronal and sagittal reformatted images were also obtained.  CONTRAST/COMPLICATIONS: IV Contrast: NONE Complications: None reported at time of study completion  COMPARISON: XR: 12/31/2023. CT: 11/24/2022. MR: None.  FINDINGS: Evaluation of soft tissue is limited without intravenous contrast.  Bones: Decreased bone mineralization. Impacted fracture of the left humeral neck involving the greater tuberosity/bicipital groove. Superior and anteromedial displacement of the left humeral diaphysis. Intermediate attenuation at the fracture margin, presumably related to blood product. No dislocation.  Degenerative changes of the left acromioclavicular and glenohumeral joints as well as visualized spine. Chronic appearing small density at the left anterior inferior glenoid, noted on the prior study.  Soft tissue: Prominent left shoulder and lateral pectoralis muscles with stranding, suggesting edema/hematoma. Diffuse muscle bulk loss with fatty replacement. Left glenohumeral lipohemarthrosis.  Additional: Again noted, periaortic/perihilar opacity, suggesting atelectasis. No obvious pneumothorax at the visualized chest.  IMPRESSION:  Impacted fracture of the left proximal humerus as described.  --- End of Report ---  TERE FLANNERY MD; Attending Radiologist This document has been electronically signed. Dec 31 2023 5:40AM

## 2024-03-14 NOTE — DISCUSSION/SUMMARY
[de-identified] : 78-year-old woman with a left proximal humerus fracture, approximately 5 days out.   -The risks and benefits of operative versus nonoperative management were discussed at length with the patient. The patient shows a good understanding of the injury and treatment options. They would like to move forward with operative management. -NWB left UE -Sling for comfort -Tramadol for pain  -Schedule surgery for Monday 1/8  -Follow-up in the office 2 weeks after surgery  -All of the patient's questions and concerns were addressed.

## 2024-04-07 NOTE — OCCUPATIONAL THERAPY INITIAL EVALUATION ADULT - LIGHT TOUCH SENSATION, RUE, REHAB EVAL
The catheter was repositioned into the ostium   right coronary artery. An angiography was performed of the right coronary arteries. Multiple views were taken. The angiography was performed via power injection. The injected amount was 6 mL contrast at 3 mL/s. within normal limits

## 2024-04-22 ENCOUNTER — RX RENEWAL (OUTPATIENT)
Age: 79
End: 2024-04-22

## 2024-04-22 RX ORDER — FLUTICASONE PROPIONATE AND SALMETEROL 500; 50 UG/1; UG/1
500-50 POWDER RESPIRATORY (INHALATION)
Qty: 60 | Refills: 3 | Status: ACTIVE | COMMUNITY
Start: 2024-01-02 | End: 1900-01-01

## 2024-04-24 RX ORDER — BUDESONIDE, GLYCOPYRROLATE, AND FORMOTEROL FUMARATE 160; 9; 4.8 UG/1; UG/1; UG/1
160-9-4.8 AEROSOL, METERED RESPIRATORY (INHALATION)
Qty: 1 | Refills: 3 | Status: ACTIVE | COMMUNITY
Start: 2024-01-31 | End: 1900-01-01

## 2024-04-29 ENCOUNTER — OFFICE (OUTPATIENT)
Dept: URBAN - METROPOLITAN AREA CLINIC 90 | Facility: CLINIC | Age: 79
Setting detail: OPHTHALMOLOGY
End: 2024-04-29
Payer: MEDICARE

## 2024-04-29 DIAGNOSIS — H43.393: ICD-10-CM

## 2024-04-29 DIAGNOSIS — H35.373: ICD-10-CM

## 2024-04-29 DIAGNOSIS — H02.64: ICD-10-CM

## 2024-04-29 DIAGNOSIS — H40.033: ICD-10-CM

## 2024-04-29 DIAGNOSIS — D31.31: ICD-10-CM

## 2024-04-29 DIAGNOSIS — H25.13: ICD-10-CM

## 2024-04-29 DIAGNOSIS — H47.323: ICD-10-CM

## 2024-04-29 PROCEDURE — 92250 FUNDUS PHOTOGRAPHY W/I&R: CPT | Performed by: OPHTHALMOLOGY

## 2024-04-29 PROCEDURE — 92014 COMPRE OPH EXAM EST PT 1/>: CPT | Performed by: OPHTHALMOLOGY

## 2024-04-29 PROCEDURE — 92020 GONIOSCOPY: CPT | Performed by: OPHTHALMOLOGY

## 2024-05-01 ENCOUNTER — APPOINTMENT (OUTPATIENT)
Dept: ORTHOPEDIC SURGERY | Facility: CLINIC | Age: 79
End: 2024-05-01
Payer: MEDICARE

## 2024-05-01 DIAGNOSIS — S42.202A UNSPECIFIED FRACTURE OF UPPER END OF LEFT HUMERUS, INITIAL ENCOUNTER FOR CLOSED FRACTURE: ICD-10-CM

## 2024-05-01 PROCEDURE — 73030 X-RAY EXAM OF SHOULDER: CPT | Mod: LT

## 2024-05-01 PROCEDURE — 99213 OFFICE O/P EST LOW 20 MIN: CPT

## 2024-05-01 NOTE — REASON FOR VISIT
[Follow-Up Visit] : a follow-up visit for [FreeTextEntry2] : s/p ORIF left proximal humerus, DOS: 1/9/24.

## 2024-05-01 NOTE — PHYSICAL EXAM
[de-identified] : Ms. SHELL MARTINEZ is sitting comfortably in the exam room LEFT shoulder -Skin is intact, no swelling, no ecchymosis -Incision well-healed, no erythema, no signs of infection -FE: 90 , ER: 5 , IR: L5 , ABD: 90 -Able to make a fist -Sensation is intact median, radial, ulnar, axillary nerves -Motor is intact median, radial, ulnar, axillary nerves -Hand is warm and well-perfused, Palpable radial and ulnar pulses. [de-identified] : X-rays of the left shoulder were taken in the office today, 5/1/24. AP, Scap Y, Axillary. X-rays show good overall alignment of the proximal humerus. Implants are in good position. The glenohumeral joint is well reduced.  There is interval healing at the fracture site.  No lucencies around the screws.

## 2024-05-01 NOTE — HISTORY OF PRESENT ILLNESS
[de-identified] : Ms. SHELL MARTINEZ is a 78 year old woman presents today for follow up evaluation s/p ORIF left proximal humerus on 1/9/24. Since her last visit she states she is feeling better. She has been participating in PT twice a week. She notes mild pain at the left shoulder.

## 2024-05-01 NOTE — DISCUSSION/SUMMARY
[de-identified] : 78-year-old woman s/p ORIF left proximal humerus, nearly 4 months out.  -X-ray and physical exam findings were discussed with the patient -Weightbearing as tolerated left UE -Physical therapy: work on ROM of the left shoulder -Follow up in 3 months with x-rays of the left shoulder at that time. -All the patient's questions and concerns were addressed during this visit.

## 2024-05-29 NOTE — OCCUPATIONAL THERAPY INITIAL EVALUATION ADULT - THERAPY FREQUENCY, OT EVAL
1-2x/week [No Acute Distress] : no acute distress [Normal Oropharynx] : normal oropharynx [Normal Appearance] : normal appearance [No Neck Mass] : no neck mass [Normal Rate/Rhythm] : normal rate/rhythm [Normal S1, S2] : normal s1, s2 [No Murmurs] : no murmurs [No Resp Distress] : no resp distress [Clear to Auscultation Bilaterally] : clear to auscultation bilaterally [No Abnormalities] : no abnormalities [Benign] : benign [Normal Gait] : normal gait [No Clubbing] : no clubbing [No Cyanosis] : no cyanosis [No Edema] : no edema [FROM] : FROM [Normal Color/ Pigmentation] : normal color/ pigmentation [No Focal Deficits] : no focal deficits [Oriented x3] : oriented x3 [Normal Affect] : normal affect

## 2024-06-03 ENCOUNTER — APPOINTMENT (OUTPATIENT)
Dept: PULMONOLOGY | Facility: CLINIC | Age: 79
End: 2024-06-03
Payer: MEDICARE

## 2024-06-03 ENCOUNTER — APPOINTMENT (OUTPATIENT)
Dept: PULMONOLOGY | Facility: CLINIC | Age: 79
End: 2024-06-03

## 2024-06-03 VITALS — OXYGEN SATURATION: 90 % | HEART RATE: 86 BPM | SYSTOLIC BLOOD PRESSURE: 137 MMHG | DIASTOLIC BLOOD PRESSURE: 71 MMHG

## 2024-06-03 DIAGNOSIS — J45.991 COUGH VARIANT ASTHMA: ICD-10-CM

## 2024-06-03 DIAGNOSIS — R06.2 WHEEZING: ICD-10-CM

## 2024-06-03 DIAGNOSIS — J98.4 OTHER DISORDERS OF LUNG: ICD-10-CM

## 2024-06-03 DIAGNOSIS — R09.89 OTHER SPECIFIED SYMPTOMS AND SIGNS INVOLVING THE CIRCULATORY AND RESPIRATORY SYSTEMS: ICD-10-CM

## 2024-06-03 LAB — POCT - HEMOGLOBIN (HGB), QUANTITATIVE, TRANSCUTANEOUS: 12.1

## 2024-06-03 PROCEDURE — 94060 EVALUATION OF WHEEZING: CPT

## 2024-06-03 PROCEDURE — 94729 DIFFUSING CAPACITY: CPT

## 2024-06-03 PROCEDURE — 88738 HGB QUANT TRANSCUTANEOUS: CPT

## 2024-06-03 PROCEDURE — 99214 OFFICE O/P EST MOD 30 MIN: CPT | Mod: 25

## 2024-06-03 PROCEDURE — 94727 GAS DIL/WSHOT DETER LNG VOL: CPT

## 2024-06-03 NOTE — PROCEDURE
[FreeTextEntry1] :  PFT 6/3/24 mild reduction flow  rates  TLC 69 %  DLCO 62 % HGB 12.1  Combined obstructive restrictive ventilatory impairment with mild gas exchange impairment Interval improvement pulmonary physiology   LORENA 2/26/24 mod severe reduction flow   rates   No BD at FEV1 pos  small airways 39 % pos BD  PFT 1/31/24  moderate reduction flow  rates TLC 57 %  DLCO 46 %  HGB 12.9 Combined obstructive and restrictive  ventilatory impairment pos decline at flow rates   Chest x-ray PA lateral December 28, 2023 Limited technique apices not well-visualized Cardiac size normal Aorta appears unclear Mild scoliosis No appreciable parenchymal infiltrates pleural effusions with dominant pulmonary nodules  CT chest November 24, 2022 Bilateral pleural effusions with passive atelectasis right greater than left Erosion of T6-T7 disc space Vertebral soft tissue density seen on prior imaging felt to be occurring in the clinical setting of a discitis osteomyelitis Healed right fifth and sixth ribs Linear atelectasis right upper lobe left upper lobe right middle lobe Calcification upper lobe reported Status post findings of cholecystectomy  PFT 7/28/2023 Mild reduction flow rates Very minimal obstructive pattern ratio 78 No bronchodilator response in FEV1  21% response to bronchodilator at small airways Lung volumes normal Total lung capacity 91% predicted Air trapping with RV/TLC ratio 134% predicted Diffusion normal range 77% of predicted. Hemoglobin 12.9 Noted the patient was last seen in this office dating back to December 2016 spirometric flow rates demonstrate significant decline without decline at the diffusion or total lung capacity

## 2024-06-03 NOTE — HISTORY OF PRESENT ILLNESS
[Never] : never [TextBox_4] : 78-year-old female c/o nocturnal wheeze pos GERD PPI/ generic Pepcid Chief complaint chronic refractory course greater than 1 year including when she was hospitalized as well as rehab She states the rehab hospital she has been given short acting bronchodilator Post use BREZTRI with less  cough  and  wheeze no sputum  Respiratory disorder She now is complaining intermittent wheezing There is shortness of breath with wheezing She states the resuspend portable and noted by her aide as well She does not carry any formal diagnosis as to her knowledge regarding COPD bronchitis emphysema asthma interstitial lung disease She has a remote history she is a COVID infection She has had several COVID vaccines There are some component of sinus congestion Past medical history history of MSSA bacteremia requiring long-term hospitalization rehab IV antibiotic protocol times approximately 5 weeks He was also on imaging a pyriform abscess which was not draining at the time MRI suggested discitis  History of cardiac arrhythmia SVT with cardiac ablation Additional past medical history hypertension History cervical cancer History of vertigo 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

## 2024-06-03 NOTE — PHYSICAL EXAM
[No Acute Distress] : no acute distress [Normal Oropharynx] : normal oropharynx [II] : Mallampati Class: II [Normal Appearance] : normal appearance [Supple] : supple [No Neck Mass] : no neck mass [No JVD] : no jvd [Normal Rate/Rhythm] : normal rate/rhythm [Normal S1, S2] : normal s1, s2 [No Murmurs] : no murmurs [No Resp Distress] : no resp distress [No Acc Muscle Use] : no acc muscle use [Normal Palpation] : normal palpation [Normal Rhythm and Effort] : normal rhythm and effort [Normal to Percussion] : normal to percussion [Wheeze] : wheeze [Rhonchi] : rhonchi [No Clubbing] : no clubbing [No Cyanosis] : no cyanosis [No Edema] : no edema [Oriented x3] : oriented x3 [Normal Affect] : normal affect [TextBox_99] : Ambulates with walker

## 2024-06-03 NOTE — DISCUSSION/SUMMARY
[FreeTextEntry1] : History as reviewed above CT Chest dynamic r/o tracheomalacia due to nocturnal sxs or inc exertional activity  Chief complaint chest congestion wheeze shortness of breath stable FEV1  stable Pos decline  PRIOR sxs improvement with BREZTRI  Airway inflammation with restrictive component  Pulmonary physiology does demonstrate a component of air trapping with response to bronchodilator at the small airways and a decline in flow rates dating back to comparison 7 years ago Based on clinical reports of wheezing likely does have some component of obstructive airway disease chronic airflow limitation small airways disease Recommendations Controller therapy long-acting bronchodilator with inhaled corticosteroid Ordered Breo 200 mcg 1 puff daily with instructions Changed Advair Change if Covereed BREZTRI 2 puffs BID  add singulair 10 mg QD with food Medrol dose  souleymane Notify of any wheezing.  Notify if rescue inhaler is needed greater than 2-3 times in any week.  Avoid known triggers. MDI instructions provided as well for short acting bronchodilator  Follow-up 1 month to see if there is interval improvement

## 2024-06-10 NOTE — PRE-ANESTHESIA EVALUATION ADULT - NSATTENDATTESTRD_GEN_ALL_CORE
Physician confirms case reviewed for anesthesia consultation requirements. The patient has been re-examined and I agree with the above assessment or I updated with my findings.

## 2024-06-12 ENCOUNTER — APPOINTMENT (OUTPATIENT)
Dept: CT IMAGING | Facility: CLINIC | Age: 79
End: 2024-06-12
Payer: MEDICARE

## 2024-06-12 ENCOUNTER — OUTPATIENT (OUTPATIENT)
Dept: OUTPATIENT SERVICES | Facility: HOSPITAL | Age: 79
LOS: 1 days | End: 2024-06-12
Payer: MEDICARE

## 2024-06-12 DIAGNOSIS — R09.89 OTHER SPECIFIED SYMPTOMS AND SIGNS INVOLVING THE CIRCULATORY AND RESPIRATORY SYSTEMS: ICD-10-CM

## 2024-06-12 DIAGNOSIS — Z98.890 OTHER SPECIFIED POSTPROCEDURAL STATES: Chronic | ICD-10-CM

## 2024-06-12 DIAGNOSIS — Z90.89 ACQUIRED ABSENCE OF OTHER ORGANS: Chronic | ICD-10-CM

## 2024-06-12 DIAGNOSIS — Z86.018 PERSONAL HISTORY OF OTHER BENIGN NEOPLASM: Chronic | ICD-10-CM

## 2024-06-12 PROCEDURE — 71250 CT THORAX DX C-: CPT | Mod: 26,MH

## 2024-06-12 PROCEDURE — 71250 CT THORAX DX C-: CPT

## 2024-06-21 ENCOUNTER — NON-APPOINTMENT (OUTPATIENT)
Age: 79
End: 2024-06-21

## 2024-06-21 DIAGNOSIS — J47.9 BRONCHIECTASIS, UNCOMPLICATED: ICD-10-CM

## 2024-06-21 DIAGNOSIS — I25.10 ATHEROSCLEROTIC HEART DISEASE OF NATIVE CORONARY ARTERY W/OUT ANGINA PECTORIS: ICD-10-CM

## 2024-06-21 DIAGNOSIS — I25.84 ATHEROSCLEROTIC HEART DISEASE OF NATIVE CORONARY ARTERY W/OUT ANGINA PECTORIS: ICD-10-CM

## 2024-06-21 DIAGNOSIS — J98.11 ATELECTASIS: ICD-10-CM

## 2024-06-27 NOTE — H&P ADULT - PROBLEM/PLAN-7
Labor Progress Note - Cammy Mariee 37 y.o. female MRN: [id number]    Unit/Bed#: -01 Encounter: [id number]       Contraction Frequency (minutes): 2-3 (palpable, difficulte tracing while patient ambulating)  Contraction Intensity: Mild/Moderate  Uterine Activity Characteristics: Regular  Cervical Dilation: 5-6        Cervical Effacement: 70  Fetal Station: -2  Baseline Rate (FHR): 125 bpm  Fetal Heart Rate (FHT): 140 BPM  FHR Category: 1               Vital Signs:   Vitals:    06/26/24 2159   BP: 136/83   Pulse: 56   Resp:    Temp:    SpO2:        Notes/comments:   FB dislodged since 2130. FHT category 1. SVE 5.5/70/-2. Amniotomy performed with clear fluid appreciated. Ctx q1-3m. Continue expectant management. Dr. Godinez aware.     Roshan Mcconnell MD 6/26/2024 11:43 PM       DISPLAY PLAN FREE TEXT

## 2024-07-08 ENCOUNTER — APPOINTMENT (OUTPATIENT)
Dept: PULMONOLOGY | Facility: CLINIC | Age: 79
End: 2024-07-08
Payer: MEDICARE

## 2024-07-08 VITALS — SYSTOLIC BLOOD PRESSURE: 139 MMHG | OXYGEN SATURATION: 97 % | DIASTOLIC BLOOD PRESSURE: 80 MMHG | HEART RATE: 83 BPM

## 2024-07-08 DIAGNOSIS — J45.991 COUGH VARIANT ASTHMA: ICD-10-CM

## 2024-07-08 DIAGNOSIS — J47.9 BRONCHIECTASIS, UNCOMPLICATED: ICD-10-CM

## 2024-07-08 DIAGNOSIS — R05.3 CHRONIC COUGH: ICD-10-CM

## 2024-07-08 DIAGNOSIS — J98.4 OTHER DISORDERS OF LUNG: ICD-10-CM

## 2024-07-08 PROCEDURE — 95012 NITRIC OXIDE EXP GAS DETER: CPT

## 2024-07-08 PROCEDURE — 94010 BREATHING CAPACITY TEST: CPT

## 2024-07-08 PROCEDURE — 99214 OFFICE O/P EST MOD 30 MIN: CPT | Mod: 25

## 2024-07-08 RX ORDER — BENZONATATE 200 MG/1
200 CAPSULE ORAL
Qty: 90 | Refills: 1 | Status: ACTIVE | COMMUNITY
Start: 2024-07-08 | End: 1900-01-01

## 2024-07-08 RX ORDER — IPRATROPIUM BROMIDE 42 UG/1
0.06 SPRAY NASAL
Qty: 3 | Refills: 3 | Status: ACTIVE | COMMUNITY
Start: 2024-07-08 | End: 1900-01-01

## 2024-08-01 ENCOUNTER — APPOINTMENT (OUTPATIENT)
Dept: ORTHOPEDIC SURGERY | Facility: CLINIC | Age: 79
End: 2024-08-01
Payer: MEDICARE

## 2024-08-01 DIAGNOSIS — S42.202A UNSPECIFIED FRACTURE OF UPPER END OF LEFT HUMERUS, INITIAL ENCOUNTER FOR CLOSED FRACTURE: ICD-10-CM

## 2024-08-01 PROCEDURE — 99213 OFFICE O/P EST LOW 20 MIN: CPT

## 2024-08-01 PROCEDURE — 73030 X-RAY EXAM OF SHOULDER: CPT | Mod: LT

## 2024-08-01 NOTE — HISTORY OF PRESENT ILLNESS
[de-identified] : Ms. SHELL MARTINEZ is a 79 year old woman presents today for follow up evaluation s/p ORIF left proximal humerus on 1/9/24. Since her last visit she states she is feeling better. She completed PT but would like to continue. She is happy with her care and progress and able to perform her ADL's.

## 2024-08-01 NOTE — DISCUSSION/SUMMARY
[de-identified] : 79-year-old woman s/p ORIF left proximal humerus, nearly 8 months out.  -X-ray and physical exam findings were discussed with the patient -Weightbearing as tolerated left UE -Physical therapy: work on ROM of the left shoulder. It is medically necessary to continue PT to optimize recovery.  -Follow up in 4 months with x-rays of the left shoulder at that time. -All the patient's questions and concerns were addressed during this visit.

## 2024-08-01 NOTE — PHYSICAL EXAM
[de-identified] : Ms. SHELL MARTINEZ is sitting comfortably in the exam room LEFT shoulder -Skin is intact, no swelling, no ecchymosis -Incision well-healed, no erythema, no signs of infection -FE: 90 , ER: 5 , IR: L5 , ABD: 90 -Able to make a fist -Sensation is intact median, radial, ulnar, axillary nerves -Motor is intact median, radial, ulnar, axillary nerves -Hand is warm and well-perfused, Palpable radial and ulnar pulses. [de-identified] : X-rays of the left shoulder were taken in the office today, 8/1/24. AP, Scap Y, Axillary. X-rays show good overall alignment of the proximal humerus. Implants are in good position. The glenohumeral joint is well reduced.  There is interval healing at the fracture site.  No lucencies around the screws.

## 2024-08-01 NOTE — HISTORY OF PRESENT ILLNESS
[de-identified] : Ms. SHELL MARTINEZ is a 79 year old woman presents today for follow up evaluation s/p ORIF left proximal humerus on 1/9/24. Since her last visit she states she is feeling better. She completed PT but would like to continue. She is happy with her care and progress and able to perform her ADL's.

## 2024-08-01 NOTE — PHYSICAL EXAM
[de-identified] : Ms. SHELL MARTINEZ is sitting comfortably in the exam room LEFT shoulder -Skin is intact, no swelling, no ecchymosis -Incision well-healed, no erythema, no signs of infection -FE: 90 , ER: 5 , IR: L5 , ABD: 90 -Able to make a fist -Sensation is intact median, radial, ulnar, axillary nerves -Motor is intact median, radial, ulnar, axillary nerves -Hand is warm and well-perfused, Palpable radial and ulnar pulses. [de-identified] : X-rays of the left shoulder were taken in the office today, 8/1/24. AP, Scap Y, Axillary. X-rays show good overall alignment of the proximal humerus. Implants are in good position. The glenohumeral joint is well reduced.  There is interval healing at the fracture site.  No lucencies around the screws.

## 2024-08-01 NOTE — DISCUSSION/SUMMARY
[de-identified] : 79-year-old woman s/p ORIF left proximal humerus, nearly 8 months out.  -X-ray and physical exam findings were discussed with the patient -Weightbearing as tolerated left UE -Physical therapy: work on ROM of the left shoulder. It is medically necessary to continue PT to optimize recovery.  -Follow up in 4 months with x-rays of the left shoulder at that time. -All the patient's questions and concerns were addressed during this visit.

## 2024-08-12 ENCOUNTER — APPOINTMENT (OUTPATIENT)
Dept: PULMONOLOGY | Facility: CLINIC | Age: 79
End: 2024-08-12
Payer: MEDICARE

## 2024-08-12 VITALS — SYSTOLIC BLOOD PRESSURE: 130 MMHG | DIASTOLIC BLOOD PRESSURE: 77 MMHG | HEART RATE: 83 BPM | OXYGEN SATURATION: 93 %

## 2024-08-12 DIAGNOSIS — R05.3 CHRONIC COUGH: ICD-10-CM

## 2024-08-12 DIAGNOSIS — J47.9 BRONCHIECTASIS, UNCOMPLICATED: ICD-10-CM

## 2024-08-12 DIAGNOSIS — J98.4 OTHER DISORDERS OF LUNG: ICD-10-CM

## 2024-08-12 PROCEDURE — 99213 OFFICE O/P EST LOW 20 MIN: CPT | Mod: 25

## 2024-08-12 PROCEDURE — 94010 BREATHING CAPACITY TEST: CPT

## 2024-08-12 NOTE — PROCEDURE
[FreeTextEntry1] : Repeat spirometry no bronchodilator August 2024 Mild reduction in flow rates   Spirometry no bronchodilator July 8 2024: Moderate reduction in flow rates FVC 57% predicted Less than 200 cc decline at the flow rates compared to Michelle 3, 2024 NIOX less than 5 July 20, 2024 Chest x-ray PA lateral July 8, 2024 Cardiac size normal Scoliosis with kyphosis No parenchymal infiltrates pleural effusion or dominant pulmonary nodules Left upper extremity with hardware identified That finding is new compared to chest x-ray of 12/28/2000 PFT 6/3/24 mild reduction flow  rates  TLC 69 %  DLCO 62 % HGB 12.1  Combined obstructive restrictive ventilatory impairment with mild gas exchange impairment Interval improvement pulmonary physiology   LORENA 2/26/24 mod severe reduction flow   rates   No BD at FEV1 pos  small airways 39 % pos BD  PFT 1/31/24  moderate reduction flow  rates TLC 57 %  DLCO 46 %  HGB 12.9 Combined obstructive and restrictive  ventilatory impairment pos decline at flow rates   Chest x-ray PA lateral December 28, 2023 Limited technique apices not well-visualized Cardiac size normal Aorta appears unclear Mild scoliosis No appreciable parenchymal infiltrates pleural effusions with dominant pulmonary nodules  CT chest November 24, 2022 Bilateral pleural effusions with passive atelectasis right greater than left Erosion of T6-T7 disc space Vertebral soft tissue density seen on prior imaging felt to be occurring in the clinical setting of a discitis osteomyelitis Healed right fifth and sixth ribs Linear atelectasis right upper lobe left upper lobe right middle lobe Calcification upper lobe reported Status post findings of cholecystectomy  PFT 7/28/2023 Mild reduction flow rates Very minimal obstructive pattern ratio 78 No bronchodilator response in FEV1  21% response to bronchodilator at small airways Lung volumes normal Total lung capacity 91% predicted Air trapping with RV/TLC ratio 134% predicted Diffusion normal range 77% of predicted. Hemoglobin 12.9 Noted the patient was last seen in this office dating back to December 2016 spirometric flow rates demonstrate significant decline without decline at the diffusion or total lung capacity

## 2024-08-12 NOTE — DISCUSSION/SUMMARY
[FreeTextEntry1] : History as reviewed above CT Chest dynamic r/o tracheomalacia due to nocturnal sxs or inc exertional activity  Chief complaint chest congestion wheeze shortness of breath stable FEV1  stable Pos decline  PRIOR sxs improvement with BREZTRI  Airway inflammation with restrictive component  Pulmonary physiology does demonstrate a component of air trapping with response to bronchodilator at the small airways and a decline in flow rates dating back to comparison 7 years ago Based on clinical reports of wheezing likely does have some component of obstructive airway disease chronic airflow limitation small airways disease Recommendations Controller therapy long-acting bronchodilator with inhaled corticosteroid Ordered Breo 200 mcg 1 puff daily with instructions Changed Advair Change if Covereed BREZTRI 2 puffs BID  add singulair 10 mg QD with food Medrol dose  souleymane Notify of any wheezing.  Notify if rescue inhaler is needed greater than 2-3 times in any week.  Avoid known triggers. MDI instructions provided as well for short acting bronchodilator Addendum Continue nasal Flonase Will add nasal ipratropium Tessalon Perles  Follow-up 6 weeks

## 2024-08-12 NOTE — REVIEW OF SYSTEMS
[Fatigue] : fatigue [Sinus Problems] : sinus problems [Arthralgias] : arthralgias [Negative] : Neurologic [Depression] : no depression [Anxiety] : no anxiety [TextBox_44] : Hypertension [TextBox_30] : Per HPI [TextBox_94] : History of disc osteomyelitis [TextBox_144] : Hypothyroid

## 2024-08-12 NOTE — HISTORY OF PRESENT ILLNESS
[Never] : never [TextBox_4] : 78-year-old female Longstanding refractory cough without decline stable with tessalon and nasal spray c/o nocturnal wheeze pos GERD PPI/ generic Pepcid Chief complaint chronic refractory course greater than 1 year including when she was hospitalized as well as rehab She states the rehab hospital she has been given short acting bronchodilator Post use BREZTRI with less  cough  and  wheeze no sputum  Respiratory disorder She now is complaining intermittent wheezing There is shortness of breath with wheezing She states the resuspend portable and noted by her aide as well She does not carry any formal diagnosis as to her knowledge regarding COPD bronchitis emphysema asthma interstitial lung disease She has a remote history she is a COVID infection She has had several COVID vaccines There are some component of sinus congestion Past medical history history of MSSA bacteremia requiring long-term hospitalization rehab IV antibiotic protocol times approximately 5 weeks He was also on imaging a pyriform abscess which was not draining at the time MRI suggested discitis  History of cardiac arrhythmia SVT with cardiac ablation Additional past medical history hypertension History cervical cancer History of vertigo 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

## 2024-09-10 NOTE — CONSULT NOTE ADULT - NS ATTEND AMEND GEN_ALL_CORE FT
patient with fall > 6 weeks ago and healed fractures on ct. no role for surgical rib plating as the ribs are healed,   recommend pain consult for management if narcotics/NSAID not sufficient.   reconsult prn
10-Sep-2024 23:13

## 2024-09-17 NOTE — PROGRESS NOTE ADULT - PROBLEM SELECTOR PLAN 11
Render Risk Assessment In Note?: no Additional Notes: This medication should be written under the diagnosis androgenetic alopecia, it was already sent to the pharmacy so it cannot be removed DVT PPx: Lovenox SubQ    Diet: DASH    Dispo: Pending clinical course Detail Level: Simple DVT PPx: Lovenox SubQ    Diet: DASH

## 2024-09-30 ENCOUNTER — APPOINTMENT (OUTPATIENT)
Dept: PULMONOLOGY | Facility: CLINIC | Age: 79
End: 2024-09-30
Payer: MEDICARE

## 2024-09-30 VITALS
HEART RATE: 80 BPM | OXYGEN SATURATION: 94 % | RESPIRATION RATE: 16 BRPM | DIASTOLIC BLOOD PRESSURE: 82 MMHG | SYSTOLIC BLOOD PRESSURE: 136 MMHG

## 2024-09-30 DIAGNOSIS — Z23 ENCOUNTER FOR IMMUNIZATION: ICD-10-CM

## 2024-09-30 DIAGNOSIS — J47.9 BRONCHIECTASIS, UNCOMPLICATED: ICD-10-CM

## 2024-09-30 DIAGNOSIS — R05.3 CHRONIC COUGH: ICD-10-CM

## 2024-09-30 DIAGNOSIS — J98.4 OTHER DISORDERS OF LUNG: ICD-10-CM

## 2024-09-30 PROCEDURE — 94010 BREATHING CAPACITY TEST: CPT

## 2024-09-30 PROCEDURE — G0008: CPT

## 2024-09-30 PROCEDURE — 90662 IIV NO PRSV INCREASED AG IM: CPT

## 2024-09-30 PROCEDURE — 99214 OFFICE O/P EST MOD 30 MIN: CPT | Mod: 25

## 2024-09-30 NOTE — DISCUSSION/SUMMARY
[FreeTextEntry1] : History as reviewed above CT Chest dynamic r/o tracheomalacia due to nocturnal sxs or inc exertional activity Neg /IMPRESSION: Thick linear atelectasis left upper lobe medially. Minimal bronchiectasis.  Chief complaint chest congestion wheeze shortness of breath stable  Resolved FEV1  stable Pos decline  PRIOR sxs improvement with BREZTRI  Airway inflammation with restrictive component  Pulmonary physiology does demonstrate a component of air trapping with response to bronchodilator at the small airways and a decline in flow rates dating back to comparison 7 years ago Based on clinical reports of wheezing likely does have some component of obstructive airway disease chronic airflow limitation small airways disease Recommendations Controller therapy long-acting bronchodilator with inhaled corticosteroid Ordered Breo 200 mcg 1 puff daily with instructions Changed Advair Change if Covereed BREZTRI 2 puffs BID  add singulair 10 mg QD with food Notify of any wheezing.  Notify if rescue inhaler is needed greater than 2-3 times in any week.  Avoid known triggers. MDI instructions provided as well for short acting bronchodilator Addendum Continue nasal Flonase Will add nasal ipratropium Tessalon Perles  Follow-up 6 weeks

## 2024-09-30 NOTE — PROGRESS NOTE ADULT - PROBLEM SELECTOR PROBLEM 8
[FreeTextEntry1] : 21 year old patient presents for evaluation of shortness of breath  throat tightness  mucus in throat  He had COVID-19 infection 1 month ago but symptoms predate the  COVID-19 infection   he does hear some abnormal breath sounds  he has increased symptoms with activity  No  history of obstructive airway disease    he has clear lungs on exam including with forced exhalation  PFT  was   unremarkable   CXR   unremarkable   He plans to have cardiologist as well  Will see allergist  PLAN  Trial of inhaled bronchodilator   complete cardiologist workup  Further recommendations based on results.  El Bills MD  Hydronephrosis

## 2024-09-30 NOTE — PROCEDURE
[FreeTextEntry1] : LORENA 9/30/24 mild  reduction rates pos interval improvement  Repeat spirometry no bronchodilator August 2024 Mild reduction in flow rates  Spirometry no bronchodilator July 8 2024: Moderate reduction in flow rates FVC 57% predicted Less than 200 cc decline at the flow rates compared to Michelle 3, 2024 NIOX less than 5 July 20, 2024 Chest x-ray PA lateral July 8, 2024 Cardiac size normal Scoliosis with kyphosis No parenchymal infiltrates pleural effusion or dominant pulmonary nodules Left upper extremity with hardware identified That finding is new compared to chest x-ray of 12/28/2000 PFT 6/3/24 mild reduction flow  rates  TLC 69 %  DLCO 62 % HGB 12.1  Combined obstructive restrictive ventilatory impairment with mild gas exchange impairment Interval improvement pulmonary physiology   LORENA 2/26/24 mod severe reduction flow   rates   No BD at FEV1 pos  small airways 39 % pos BD  PFT 1/31/24  moderate reduction flow  rates TLC 57 %  DLCO 46 %  HGB 12.9 Combined obstructive and restrictive  ventilatory impairment pos decline at flow rates   Chest x-ray PA lateral December 28, 2023 Limited technique apices not well-visualized Cardiac size normal Aorta appears unclear Mild scoliosis No appreciable parenchymal infiltrates pleural effusions with dominant pulmonary nodules  CT chest November 24, 2022 Bilateral pleural effusions with passive atelectasis right greater than left Erosion of T6-T7 disc space Vertebral soft tissue density seen on prior imaging felt to be occurring in the clinical setting of a discitis osteomyelitis Healed right fifth and sixth ribs Linear atelectasis right upper lobe left upper lobe right middle lobe Calcification upper lobe reported Status post findings of cholecystectomy  PFT 7/28/2023 Mild reduction flow rates Very minimal obstructive pattern ratio 78 No bronchodilator response in FEV1  21% response to bronchodilator at small airways Lung volumes normal Total lung capacity 91% predicted Air trapping with RV/TLC ratio 134% predicted Diffusion normal range 77% of predicted. Hemoglobin 12.9 Noted the patient was last seen in this office dating back to December 2016 spirometric flow rates demonstrate significant decline without decline at the diffusion or total lung capacity  HD Flu IM 9/30/24

## 2024-10-23 ENCOUNTER — OFFICE (OUTPATIENT)
Age: 79
Setting detail: OPHTHALMOLOGY
End: 2024-10-23
Payer: MEDICARE

## 2024-10-23 DIAGNOSIS — H35.373: ICD-10-CM

## 2024-10-23 DIAGNOSIS — Z83.511: ICD-10-CM

## 2024-10-23 DIAGNOSIS — H43.393: ICD-10-CM

## 2024-10-23 DIAGNOSIS — H02.64: ICD-10-CM

## 2024-10-23 DIAGNOSIS — H25.13: ICD-10-CM

## 2024-10-23 DIAGNOSIS — D31.31: ICD-10-CM

## 2024-10-23 DIAGNOSIS — H47.323: ICD-10-CM

## 2024-10-23 PROCEDURE — 92134 CPTRZ OPH DX IMG PST SGM RTA: CPT | Performed by: OPHTHALMOLOGY

## 2024-10-23 PROCEDURE — 92014 COMPRE OPH EXAM EST PT 1/>: CPT | Performed by: OPHTHALMOLOGY

## 2024-10-23 ASSESSMENT — REFRACTION_CURRENTRX
OS_VPRISM_DIRECTION: SV
OS_VPRISM_DIRECTION: SV
OS_OVR_VA: 20/
OS_OVR_VA: 20/
OD_VPRISM_DIRECTION: SV
OD_SPHERE: +2.00
OS_SPHERE: +4.50
OD_AXIS: 33
OS_CYLINDER: -0.50
OD_OVR_VA: 20/
OS_VPRISM_DIRECTION: SV
OD_VPRISM_DIRECTION: SV
OD_SPHERE: +4.25
OD_SPHERE: +4.25
OS_SPHERE: +2.25
OD_AXIS: 053
OS_VPRISM_DIRECTION: SV
OD_CYLINDER: -0.25
OD_VPRISM_DIRECTION: SV
OS_OVR_VA: 20/
OD_CYLINDER: -0.50
OD_VPRISM_DIRECTION: SV
OD_AXIS: 039
OD_SPHERE: +1.75
OS_SPHERE: +2.25
OD_AXIS: 051
OD_CYLINDER: -0.50
OD_AXIS: 035
OS_AXIS: 102
OD_CYLINDER: -0.50
OS_CYLINDER: SPH
OD_CYLINDER: -0.50
OS_CYLINDER: SPH
OS_CYLINDER: SPHERE
OS_CYLINDER: SPHERE
OD_AXIS: 41
OD_VPRISM_DIRECTION: SV
OD_AXIS: 035
OD_CYLINDER: -0.50
OS_SPHERE: +4.50
OS_SPHERE: +2.25
OD_VPRISM_DIRECTION: SV
OD_OVR_VA: 20/
OD_SPHERE: +4.25
OD_SPHERE: +2.00
OS_CYLINDER: SPHERE
OD_CYLINDER: -0.25
OS_SPHERE: +2.75
OD_OVR_VA: 20/
OS_SPHERE: +4.50
OS_VPRISM_DIRECTION: SV
OD_AXIS: 055
OS_VPRISM_DIRECTION: SV
OD_SPHERE: +4.25
OD_CYLINDER: -0.25
OS_CYLINDER: SPH
OD_VPRISM_DIRECTION: SV
OS_VPRISM_DIRECTION: SV
OD_SPHERE: +2.00
OS_CYLINDER: -SPH
OS_SPHERE: +4.50

## 2024-10-23 ASSESSMENT — KERATOMETRY
OS_AXISANGLE_DEGREES: 114
OS_K2POWER_DIOPTERS: 44.75
OS_K1POWER_DIOPTERS: 44.50
OD_K2POWER_DIOPTERS: 44.50
METHOD_AUTO_MANUAL: AUTO
OD_AXISANGLE_DEGREES: 101
OD_K1POWER_DIOPTERS: 44.00

## 2024-10-23 ASSESSMENT — REFRACTION_MANIFEST
OS_CYLINDER: -0.50
OS_VA2: 20/20
OD_VA1: 20/20
OD_SPHERE: +2.00
OD_CYLINDER: -0.50
OD_VA2: 20/20
OS_AXIS: 098
OD_CYLINDER: -0.50
OS_AXIS: 102
OS_VA1: 20/20
OU_VA: 20/20
OD_SPHERE: +1.75
OD_SPHERE: +4.75
OD_AXIS: 054
OD_CYLINDER: -0.25
OS_AXIS: 068
OS_CYLINDER: -0.50
OS_SPHERE: +2.75
OS_CYLINDER: -0.25
OD_AXIS: 068
OS_SPHERE: +3.00
OD_AXIS: 055
OS_SPHERE: +4.75

## 2024-10-23 ASSESSMENT — TONOMETRY
OS_IOP_MMHG: 12
OD_IOP_MMHG: 12

## 2024-10-23 ASSESSMENT — CONFRONTATIONAL VISUAL FIELD TEST (CVF)
OD_FINDINGS: FULL
OS_FINDINGS: FULL

## 2024-10-23 ASSESSMENT — VISUAL ACUITY
OS_BCVA: 20/20-2
OD_BCVA: 20/20-2

## 2024-10-23 ASSESSMENT — REFRACTION_AUTOREFRACTION
OS_AXIS: 115
OD_SPHERE: +2.25
OS_CYLINDER: -0.25
OS_SPHERE: +3.00
OD_CYLINDER: 0.00

## 2024-11-13 ENCOUNTER — RX RENEWAL (OUTPATIENT)
Age: 79
End: 2024-11-13

## 2024-11-13 ENCOUNTER — APPOINTMENT (OUTPATIENT)
Dept: PULMONOLOGY | Facility: CLINIC | Age: 79
End: 2024-11-13
Payer: MEDICARE

## 2024-11-13 VITALS — DIASTOLIC BLOOD PRESSURE: 77 MMHG | HEART RATE: 81 BPM | SYSTOLIC BLOOD PRESSURE: 127 MMHG | OXYGEN SATURATION: 96 %

## 2024-11-13 DIAGNOSIS — J47.9 BRONCHIECTASIS, UNCOMPLICATED: ICD-10-CM

## 2024-11-13 DIAGNOSIS — R05.3 CHRONIC COUGH: ICD-10-CM

## 2024-11-13 DIAGNOSIS — J98.11 ATELECTASIS: ICD-10-CM

## 2024-11-13 DIAGNOSIS — J45.991 COUGH VARIANT ASTHMA: ICD-10-CM

## 2024-11-13 DIAGNOSIS — R09.89 OTHER SPECIFIED SYMPTOMS AND SIGNS INVOLVING THE CIRCULATORY AND RESPIRATORY SYSTEMS: ICD-10-CM

## 2024-11-13 PROCEDURE — 94729 DIFFUSING CAPACITY: CPT

## 2024-11-13 PROCEDURE — 94010 BREATHING CAPACITY TEST: CPT

## 2024-11-13 PROCEDURE — ZZZZZ: CPT

## 2024-11-13 PROCEDURE — 94727 GAS DIL/WSHOT DETER LNG VOL: CPT

## 2024-11-13 PROCEDURE — 99214 OFFICE O/P EST MOD 30 MIN: CPT | Mod: 25

## 2024-12-05 ENCOUNTER — APPOINTMENT (OUTPATIENT)
Dept: ORTHOPEDIC SURGERY | Facility: CLINIC | Age: 79
End: 2024-12-05
Payer: MEDICARE

## 2024-12-05 VITALS — BODY MASS INDEX: 37.56 KG/M2 | WEIGHT: 220 LBS | HEIGHT: 64 IN

## 2024-12-05 DIAGNOSIS — S42.202A UNSPECIFIED FRACTURE OF UPPER END OF LEFT HUMERUS, INITIAL ENCOUNTER FOR CLOSED FRACTURE: ICD-10-CM

## 2024-12-05 PROCEDURE — 99213 OFFICE O/P EST LOW 20 MIN: CPT

## 2024-12-05 PROCEDURE — 73030 X-RAY EXAM OF SHOULDER: CPT | Mod: LT

## 2024-12-05 NOTE — ED ADULT TRIAGE NOTE - TEMPERATURE IN FAHRENHEIT (DEGREES F)
Patient calling regards out of medication, states if possible to get an emergency refill for two weeks or 14 days while awaiting on the mail order prescription. Please call.     ([facility] on file in [place], [place])   UNITHROID 88 MCG Oral Tab          98.4

## 2024-12-23 ENCOUNTER — APPOINTMENT (OUTPATIENT)
Dept: VASCULAR SURGERY | Facility: CLINIC | Age: 79
End: 2024-12-23
Payer: MEDICARE

## 2024-12-23 VITALS
DIASTOLIC BLOOD PRESSURE: 86 MMHG | SYSTOLIC BLOOD PRESSURE: 141 MMHG | BODY MASS INDEX: 37.1 KG/M2 | WEIGHT: 220 LBS | HEIGHT: 64.5 IN | TEMPERATURE: 98.5 F | HEART RATE: 82 BPM

## 2024-12-23 DIAGNOSIS — I83.893 VARICOSE VEINS OF BILATERAL LOWER EXTREMITIES WITH OTHER COMPLICATIONS: ICD-10-CM

## 2024-12-23 PROCEDURE — 99204 OFFICE O/P NEW MOD 45 MIN: CPT

## 2024-12-23 PROCEDURE — 93970 EXTREMITY STUDY: CPT

## 2024-12-26 ENCOUNTER — APPOINTMENT (OUTPATIENT)
Dept: PULMONOLOGY | Facility: CLINIC | Age: 79
End: 2024-12-26
Payer: MEDICARE

## 2024-12-26 VITALS
SYSTOLIC BLOOD PRESSURE: 148 MMHG | OXYGEN SATURATION: 94 % | HEART RATE: 85 BPM | DIASTOLIC BLOOD PRESSURE: 79 MMHG | RESPIRATION RATE: 16 BRPM

## 2024-12-26 DIAGNOSIS — R05.3 CHRONIC COUGH: ICD-10-CM

## 2024-12-26 DIAGNOSIS — J98.11 ATELECTASIS: ICD-10-CM

## 2024-12-26 DIAGNOSIS — J98.4 OTHER DISORDERS OF LUNG: ICD-10-CM

## 2024-12-26 DIAGNOSIS — J47.9 BRONCHIECTASIS, UNCOMPLICATED: ICD-10-CM

## 2024-12-26 PROCEDURE — 94060 EVALUATION OF WHEEZING: CPT

## 2024-12-26 PROCEDURE — 99214 OFFICE O/P EST MOD 30 MIN: CPT | Mod: 25

## 2025-01-04 ENCOUNTER — RX RENEWAL (OUTPATIENT)
Age: 80
End: 2025-01-04

## 2025-01-10 ENCOUNTER — RX RENEWAL (OUTPATIENT)
Age: 80
End: 2025-01-10

## 2025-01-14 ENCOUNTER — APPOINTMENT (OUTPATIENT)
Dept: PULMONOLOGY | Facility: CLINIC | Age: 80
End: 2025-01-14
Payer: MEDICARE

## 2025-01-14 PROCEDURE — 71046 X-RAY EXAM CHEST 2 VIEWS: CPT

## 2025-01-27 ENCOUNTER — RX RENEWAL (OUTPATIENT)
Age: 80
End: 2025-01-27

## 2025-02-27 ENCOUNTER — APPOINTMENT (OUTPATIENT)
Dept: OTOLARYNGOLOGY | Facility: CLINIC | Age: 80
End: 2025-02-27
Payer: MEDICARE

## 2025-02-27 ENCOUNTER — APPOINTMENT (OUTPATIENT)
Facility: CLINIC | Age: 80
End: 2025-02-27
Payer: MEDICARE

## 2025-02-27 VITALS
OXYGEN SATURATION: 96 % | WEIGHT: 220 LBS | SYSTOLIC BLOOD PRESSURE: 134 MMHG | HEIGHT: 64.5 IN | HEART RATE: 79 BPM | DIASTOLIC BLOOD PRESSURE: 86 MMHG | BODY MASS INDEX: 37.1 KG/M2

## 2025-02-27 DIAGNOSIS — J47.9 BRONCHIECTASIS, UNCOMPLICATED: ICD-10-CM

## 2025-02-27 DIAGNOSIS — J34.89 OTHER SPECIFIED DISORDERS OF NOSE AND NASAL SINUSES: ICD-10-CM

## 2025-02-27 DIAGNOSIS — J34.9 UNSPECIFIED DISORDER OF NOSE AND NASAL SINUSES: ICD-10-CM

## 2025-02-27 DIAGNOSIS — R13.10 DYSPHAGIA, UNSPECIFIED: ICD-10-CM

## 2025-02-27 DIAGNOSIS — R49.0 DYSPHONIA: ICD-10-CM

## 2025-02-27 DIAGNOSIS — R29.818 OTHER SYMPTOMS AND SIGNS INVOLVING THE NERVOUS SYSTEM: ICD-10-CM

## 2025-02-27 DIAGNOSIS — R05.3 CHRONIC COUGH: ICD-10-CM

## 2025-02-27 PROCEDURE — 31579 LARYNGOSCOPY TELESCOPIC: CPT

## 2025-02-27 PROCEDURE — G2211 COMPLEX E/M VISIT ADD ON: CPT

## 2025-02-27 PROCEDURE — 99213 OFFICE O/P EST LOW 20 MIN: CPT

## 2025-02-27 PROCEDURE — 99205 OFFICE O/P NEW HI 60 MIN: CPT | Mod: 25

## 2025-03-04 ENCOUNTER — APPOINTMENT (OUTPATIENT)
Dept: VASCULAR SURGERY | Facility: CLINIC | Age: 80
End: 2025-03-04

## 2025-03-12 ENCOUNTER — APPOINTMENT (OUTPATIENT)
Dept: SPEECH THERAPY | Facility: HOSPITAL | Age: 80
End: 2025-03-12

## 2025-03-17 ENCOUNTER — APPOINTMENT (OUTPATIENT)
Dept: PULMONOLOGY | Facility: CLINIC | Age: 80
End: 2025-03-17
Payer: MEDICARE

## 2025-03-17 ENCOUNTER — NON-APPOINTMENT (OUTPATIENT)
Age: 80
End: 2025-03-17

## 2025-03-17 VITALS
OXYGEN SATURATION: 95 % | RESPIRATION RATE: 16 BRPM | SYSTOLIC BLOOD PRESSURE: 149 MMHG | DIASTOLIC BLOOD PRESSURE: 82 MMHG | HEART RATE: 89 BPM

## 2025-03-17 DIAGNOSIS — R09.89 OTHER SPECIFIED SYMPTOMS AND SIGNS INVOLVING THE CIRCULATORY AND RESPIRATORY SYSTEMS: ICD-10-CM

## 2025-03-17 DIAGNOSIS — J98.4 OTHER DISORDERS OF LUNG: ICD-10-CM

## 2025-03-17 DIAGNOSIS — J47.9 BRONCHIECTASIS, UNCOMPLICATED: ICD-10-CM

## 2025-03-17 DIAGNOSIS — J45.991 COUGH VARIANT ASTHMA: ICD-10-CM

## 2025-03-17 LAB — POCT - HEMOGLOBIN (HGB), QUANTITATIVE, TRANSCUTANEOUS: 12

## 2025-03-17 PROCEDURE — 94729 DIFFUSING CAPACITY: CPT

## 2025-03-17 PROCEDURE — 94664 DEMO&/EVAL PT USE INHALER: CPT

## 2025-03-17 PROCEDURE — ZZZZZ: CPT

## 2025-03-17 PROCEDURE — 94010 BREATHING CAPACITY TEST: CPT

## 2025-03-17 PROCEDURE — 71046 X-RAY EXAM CHEST 2 VIEWS: CPT

## 2025-03-17 PROCEDURE — 88738 HGB QUANT TRANSCUTANEOUS: CPT

## 2025-03-17 PROCEDURE — 94727 GAS DIL/WSHOT DETER LNG VOL: CPT

## 2025-03-17 PROCEDURE — 99214 OFFICE O/P EST MOD 30 MIN: CPT | Mod: 25

## 2025-03-17 RX ORDER — BUDESONIDE 0.5 MG/2ML
0.5 INHALANT ORAL
Qty: 2 | Refills: 0 | Status: ACTIVE | COMMUNITY
Start: 2025-03-17 | End: 1900-01-01

## 2025-03-17 RX ORDER — ACETYLCYSTEINE 200 MG/ML
20 SOLUTION ORAL; RESPIRATORY (INHALATION)
Qty: 120 | Refills: 0 | Status: ACTIVE | COMMUNITY
Start: 2025-03-17 | End: 1900-01-01

## 2025-03-17 RX ORDER — BLOOD-GLUCOSE METER
KIT MISCELLANEOUS
Qty: 1 | Refills: 0 | Status: ACTIVE | COMMUNITY
Start: 2025-03-17 | End: 1900-01-01

## 2025-03-17 RX ORDER — FORMOTEROL FUMARATE DIHYDRATE 0.02 MG/2ML
20 SOLUTION RESPIRATORY (INHALATION)
Qty: 60 | Refills: 0 | Status: ACTIVE | COMMUNITY
Start: 2025-03-17 | End: 1900-01-01

## 2025-03-19 DIAGNOSIS — Z00.00 ENCOUNTER FOR GENERAL ADULT MEDICAL EXAMINATION W/OUT ABNORMAL FINDINGS: ICD-10-CM

## 2025-03-25 ENCOUNTER — APPOINTMENT (OUTPATIENT)
Dept: ORTHOPEDIC SURGERY | Facility: CLINIC | Age: 80
End: 2025-03-25
Payer: MEDICARE

## 2025-03-25 VITALS — HEIGHT: 64.5 IN | BODY MASS INDEX: 39.3 KG/M2 | WEIGHT: 233 LBS

## 2025-03-25 VITALS — WEIGHT: 250 LBS | HEIGHT: 64.5 IN | BODY MASS INDEX: 42.16 KG/M2

## 2025-03-25 VITALS — BODY MASS INDEX: 37.1 KG/M2 | HEIGHT: 64.5 IN | WEIGHT: 220 LBS

## 2025-03-25 DIAGNOSIS — M17.0 BILATERAL PRIMARY OSTEOARTHRITIS OF KNEE: ICD-10-CM

## 2025-03-25 PROCEDURE — 99214 OFFICE O/P EST MOD 30 MIN: CPT | Mod: 25

## 2025-03-25 PROCEDURE — 20610 DRAIN/INJ JOINT/BURSA W/O US: CPT | Mod: 50

## 2025-03-25 PROCEDURE — 73564 X-RAY EXAM KNEE 4 OR MORE: CPT | Mod: 50

## 2025-03-26 ENCOUNTER — APPOINTMENT (OUTPATIENT)
Dept: RADIOLOGY | Facility: HOSPITAL | Age: 80
End: 2025-03-26
Payer: MEDICARE

## 2025-03-26 ENCOUNTER — APPOINTMENT (OUTPATIENT)
Dept: SPEECH THERAPY | Facility: HOSPITAL | Age: 80
End: 2025-03-26
Payer: MEDICARE

## 2025-03-26 ENCOUNTER — OUTPATIENT (OUTPATIENT)
Dept: OUTPATIENT SERVICES | Facility: HOSPITAL | Age: 80
LOS: 1 days | End: 2025-03-26

## 2025-03-26 DIAGNOSIS — Z98.890 OTHER SPECIFIED POSTPROCEDURAL STATES: Chronic | ICD-10-CM

## 2025-03-26 DIAGNOSIS — Z86.018 PERSONAL HISTORY OF OTHER BENIGN NEOPLASM: Chronic | ICD-10-CM

## 2025-03-26 DIAGNOSIS — R13.10 DYSPHAGIA, UNSPECIFIED: ICD-10-CM

## 2025-03-26 DIAGNOSIS — Z90.89 ACQUIRED ABSENCE OF OTHER ORGANS: Chronic | ICD-10-CM

## 2025-03-26 PROCEDURE — 74230 X-RAY XM SWLNG FUNCJ C+: CPT | Mod: 26

## 2025-04-01 ENCOUNTER — APPOINTMENT (OUTPATIENT)
Dept: OTOLARYNGOLOGY | Facility: CLINIC | Age: 80
End: 2025-04-01
Payer: MEDICARE

## 2025-04-01 DIAGNOSIS — J32.9 CHRONIC SINUSITIS, UNSPECIFIED: ICD-10-CM

## 2025-04-01 DIAGNOSIS — R49.0 DYSPHONIA: ICD-10-CM

## 2025-04-01 DIAGNOSIS — R13.10 DYSPHAGIA, UNSPECIFIED: ICD-10-CM

## 2025-04-01 DIAGNOSIS — H04.123 DRY MOUTH, UNSPECIFIED: ICD-10-CM

## 2025-04-01 DIAGNOSIS — R68.2 DRY MOUTH, UNSPECIFIED: ICD-10-CM

## 2025-04-01 DIAGNOSIS — J34.89 OTHER SPECIFIED DISORDERS OF NOSE AND NASAL SINUSES: ICD-10-CM

## 2025-04-01 PROCEDURE — 31575 DIAGNOSTIC LARYNGOSCOPY: CPT

## 2025-04-01 PROCEDURE — 99214 OFFICE O/P EST MOD 30 MIN: CPT | Mod: 25

## 2025-04-14 ENCOUNTER — APPOINTMENT (OUTPATIENT)
Dept: PULMONOLOGY | Facility: CLINIC | Age: 80
End: 2025-04-14
Payer: MEDICARE

## 2025-04-14 VITALS — OXYGEN SATURATION: 93 % | SYSTOLIC BLOOD PRESSURE: 143 MMHG | DIASTOLIC BLOOD PRESSURE: 83 MMHG | HEART RATE: 86 BPM

## 2025-04-14 DIAGNOSIS — J98.11 ATELECTASIS: ICD-10-CM

## 2025-04-14 DIAGNOSIS — J98.4 OTHER DISORDERS OF LUNG: ICD-10-CM

## 2025-04-14 DIAGNOSIS — R05.3 CHRONIC COUGH: ICD-10-CM

## 2025-04-14 DIAGNOSIS — J47.9 BRONCHIECTASIS, UNCOMPLICATED: ICD-10-CM

## 2025-04-14 PROCEDURE — 94010 BREATHING CAPACITY TEST: CPT

## 2025-04-14 PROCEDURE — 99214 OFFICE O/P EST MOD 30 MIN: CPT | Mod: 25

## 2025-04-17 ENCOUNTER — OUTPATIENT (OUTPATIENT)
Dept: OUTPATIENT SERVICES | Facility: HOSPITAL | Age: 80
LOS: 1 days | End: 2025-04-17
Payer: MEDICARE

## 2025-04-17 ENCOUNTER — APPOINTMENT (OUTPATIENT)
Dept: CT IMAGING | Facility: CLINIC | Age: 80
End: 2025-04-17

## 2025-04-17 DIAGNOSIS — Z86.018 PERSONAL HISTORY OF OTHER BENIGN NEOPLASM: Chronic | ICD-10-CM

## 2025-04-17 DIAGNOSIS — Z90.89 ACQUIRED ABSENCE OF OTHER ORGANS: Chronic | ICD-10-CM

## 2025-04-17 DIAGNOSIS — Z98.890 OTHER SPECIFIED POSTPROCEDURAL STATES: Chronic | ICD-10-CM

## 2025-04-17 DIAGNOSIS — J32.9 CHRONIC SINUSITIS, UNSPECIFIED: ICD-10-CM

## 2025-04-17 PROCEDURE — 70486 CT MAXILLOFACIAL W/O DYE: CPT | Mod: 26

## 2025-04-17 PROCEDURE — 70486 CT MAXILLOFACIAL W/O DYE: CPT

## 2025-04-21 ENCOUNTER — RX RENEWAL (OUTPATIENT)
Age: 80
End: 2025-04-21

## 2025-05-02 ENCOUNTER — RX RENEWAL (OUTPATIENT)
Age: 80
End: 2025-05-02

## 2025-05-27 ENCOUNTER — RX RENEWAL (OUTPATIENT)
Age: 80
End: 2025-05-27

## 2025-05-28 ENCOUNTER — APPOINTMENT (OUTPATIENT)
Dept: PULMONOLOGY | Facility: CLINIC | Age: 80
End: 2025-05-28
Payer: MEDICARE

## 2025-05-28 VITALS
BODY MASS INDEX: 39.3 KG/M2 | HEIGHT: 64.5 IN | OXYGEN SATURATION: 96 % | WEIGHT: 233 LBS | DIASTOLIC BLOOD PRESSURE: 81 MMHG | SYSTOLIC BLOOD PRESSURE: 160 MMHG | HEART RATE: 85 BPM

## 2025-05-28 DIAGNOSIS — J98.4 OTHER DISORDERS OF LUNG: ICD-10-CM

## 2025-05-28 DIAGNOSIS — R09.89 OTHER SPECIFIED SYMPTOMS AND SIGNS INVOLVING THE CIRCULATORY AND RESPIRATORY SYSTEMS: ICD-10-CM

## 2025-05-28 DIAGNOSIS — J47.9 BRONCHIECTASIS, UNCOMPLICATED: ICD-10-CM

## 2025-05-28 DIAGNOSIS — S29.9XXA UNSPECIFIED INJURY OF THORAX, INITIAL ENCOUNTER: ICD-10-CM

## 2025-05-28 DIAGNOSIS — J45.991 COUGH VARIANT ASTHMA: ICD-10-CM

## 2025-05-28 PROCEDURE — 71046 X-RAY EXAM CHEST 2 VIEWS: CPT

## 2025-05-28 PROCEDURE — 94010 BREATHING CAPACITY TEST: CPT

## 2025-05-28 PROCEDURE — 99214 OFFICE O/P EST MOD 30 MIN: CPT | Mod: 25

## 2025-05-28 RX ORDER — METHYLPREDNISOLONE 4 MG/1
4 TABLET ORAL
Qty: 21 | Refills: 0 | Status: ACTIVE | COMMUNITY
Start: 2025-05-28 | End: 1900-01-01

## 2025-06-17 ENCOUNTER — APPOINTMENT (OUTPATIENT)
Dept: OTOLARYNGOLOGY | Facility: CLINIC | Age: 80
End: 2025-06-17
Payer: MEDICARE

## 2025-06-17 VITALS
BODY MASS INDEX: 37.56 KG/M2 | OXYGEN SATURATION: 97 % | DIASTOLIC BLOOD PRESSURE: 84 MMHG | SYSTOLIC BLOOD PRESSURE: 146 MMHG | WEIGHT: 220 LBS | HEIGHT: 64 IN | HEART RATE: 105 BPM

## 2025-06-17 PROCEDURE — 99213 OFFICE O/P EST LOW 20 MIN: CPT | Mod: 25

## 2025-06-17 PROCEDURE — 31575 DIAGNOSTIC LARYNGOSCOPY: CPT

## 2025-06-25 ENCOUNTER — OFFICE (OUTPATIENT)
Facility: LOCATION | Age: 80
Setting detail: OPHTHALMOLOGY
End: 2025-06-25
Payer: MEDICARE

## 2025-06-25 DIAGNOSIS — H25.13: ICD-10-CM

## 2025-06-25 DIAGNOSIS — H01.004: ICD-10-CM

## 2025-06-25 DIAGNOSIS — H01.001: ICD-10-CM

## 2025-06-25 DIAGNOSIS — H01.002: ICD-10-CM

## 2025-06-25 DIAGNOSIS — H01.005: ICD-10-CM

## 2025-06-25 DIAGNOSIS — H43.393: ICD-10-CM

## 2025-06-25 DIAGNOSIS — H02.64: ICD-10-CM

## 2025-06-25 DIAGNOSIS — Z83.511: ICD-10-CM

## 2025-06-25 DIAGNOSIS — H43.813: ICD-10-CM

## 2025-06-25 DIAGNOSIS — H35.373: ICD-10-CM

## 2025-06-25 DIAGNOSIS — H47.323: ICD-10-CM

## 2025-06-25 PROCEDURE — 92083 EXTENDED VISUAL FIELD XM: CPT | Performed by: OPHTHALMOLOGY

## 2025-06-25 PROCEDURE — 92014 COMPRE OPH EXAM EST PT 1/>: CPT | Performed by: OPHTHALMOLOGY

## 2025-06-25 PROCEDURE — 92133 CPTRZD OPH DX IMG PST SGM ON: CPT | Performed by: OPHTHALMOLOGY

## 2025-06-25 ASSESSMENT — REFRACTION_CURRENTRX
OD_SPHERE: +2.00
OS_SPHERE: +4.50
OD_CYLINDER: -0.50
OD_AXIS: 042
OD_CYLINDER: -0.50
OD_CYLINDER: -0.25
OD_AXIS: 33
OS_SPHERE: +4.50
OD_VPRISM_DIRECTION: SV
OS_VPRISM_DIRECTION: SV
OS_OVR_VA: 20/
OS_SPHERE: +4.50
OS_SPHERE: +4.50
OS_SPHERE: +2.25
OD_SPHERE: +2.00
OS_VPRISM_DIRECTION: SV
OS_CYLINDER: SPH
OS_VPRISM_DIRECTION: SV
OD_VPRISM_DIRECTION: SV
OS_CYLINDER: SPH
OD_OVR_VA: 20/
OS_AXIS: 102
OD_AXIS: 41
OD_CYLINDER: -0.50
OD_AXIS: 039
OD_CYLINDER: -0.50
OD_AXIS: 053
OD_OVR_VA: 20/
OS_CYLINDER: SPHERE
OD_CYLINDER: -0.25
OD_VPRISM_DIRECTION: SV
OS_CYLINDER: -SPH
OS_CYLINDER: -SPH
OS_VPRISM_DIRECTION: SV
OD_SPHERE: +2.00
OD_SPHERE: +4.25
OS_VPRISM_DIRECTION: SV
OD_AXIS: 035
OD_VPRISM_DIRECTION: SV
OD_VPRISM_DIRECTION: SV
OD_AXIS: 051
OD_OVR_VA: 20/
OD_AXIS: 055
OS_SPHERE: +2.25
OD_SPHERE: +1.75
OS_VPRISM_DIRECTION: SV
OS_SPHERE: +2.25
OD_AXIS: 035
OS_OVR_VA: 20/
OS_SPHERE: +2.25
OS_CYLINDER: SPHERE
OD_SPHERE: +4.25
OD_VPRISM_DIRECTION: SV
OD_SPHERE: +2.00
OS_VPRISM_DIRECTION: SV
OD_VPRISM_DIRECTION: SV
OS_CYLINDER: SPHERE
OS_OVR_VA: 20/
OD_CYLINDER: -0.50
OD_SPHERE: +4.25
OS_SPHERE: +2.75
OS_VPRISM_DIRECTION: SV
OD_SPHERE: +4.25
OS_CYLINDER: -0.50
OD_VPRISM_DIRECTION: SV
OS_CYLINDER: SPH

## 2025-06-25 ASSESSMENT — REFRACTION_MANIFEST
OS_SPHERE: +2.75
OD_SPHERE: +4.75
OS_CYLINDER: -0.25
OD_CYLINDER: -0.50
OS_AXIS: 068
OS_SPHERE: +4.75
OS_VA1: 20/20
OD_AXIS: 068
OS_CYLINDER: -0.50
OD_VA2: 20/20
OS_VA2: 20/20
OD_SPHERE: +2.00
OD_AXIS: 054
OD_SPHERE: +1.75
OS_SPHERE: +3.00
OS_AXIS: 098
OD_CYLINDER: -0.50
OS_AXIS: 102
OD_AXIS: 055
OU_VA: 20/20
OD_CYLINDER: -0.25
OS_CYLINDER: -0.50
OD_VA1: 20/20

## 2025-06-25 ASSESSMENT — LID EXAM ASSESSMENTS
OD_BLEPHARITIS: RLL RUL 2+
OS_BLEPHARITIS: LLL LUL 2+

## 2025-06-25 ASSESSMENT — REFRACTION_AUTOREFRACTION
OD_SPHERE: +2.00
OS_CYLINDER: -SPH
OD_CYLINDER: -0.25
OS_SPHERE: +2.75
OD_AXIS: 008

## 2025-06-25 ASSESSMENT — KERATOMETRY
METHOD_AUTO_MANUAL: AUTO
OD_AXISANGLE_DEGREES: 090
OS_K1POWER_DIOPTERS: 44.50
OS_AXISANGLE_DEGREES: 101
OS_K2POWER_DIOPTERS: 45.50
OD_K1POWER_DIOPTERS: 44.25
OD_K2POWER_DIOPTERS: 44.25

## 2025-06-25 ASSESSMENT — CONFRONTATIONAL VISUAL FIELD TEST (CVF)
OS_FINDINGS: FULL
OD_FINDINGS: FULL

## 2025-06-25 ASSESSMENT — VISUAL ACUITY
OD_BCVA: 20/20-2
OS_BCVA: 20/20-2

## 2025-06-27 NOTE — H&P PST ADULT - NSANTHGENDERRD_ENT_A_CORE
(5) A permanent ultrasound image was saved in the patient's record.            Medications Administered  BUPivacaine liposome (EXPAREL) injection 1.3% - Perineural   10 mL - 6/27/2025 8:55:00 AM  BUPivacaine (MARCAINE) PF injection 0.5% - Perineural   15 mL - 6/27/2025 8:55:00 AM          
No

## 2025-07-09 ENCOUNTER — APPOINTMENT (OUTPATIENT)
Dept: PULMONOLOGY | Facility: CLINIC | Age: 80
End: 2025-07-09
Payer: MEDICARE

## 2025-07-09 VITALS — DIASTOLIC BLOOD PRESSURE: 82 MMHG | OXYGEN SATURATION: 95 % | SYSTOLIC BLOOD PRESSURE: 149 MMHG | HEART RATE: 84 BPM

## 2025-07-09 PROCEDURE — 94060 EVALUATION OF WHEEZING: CPT

## 2025-07-09 PROCEDURE — 99214 OFFICE O/P EST MOD 30 MIN: CPT | Mod: 25

## 2025-08-20 ENCOUNTER — APPOINTMENT (OUTPATIENT)
Dept: PULMONOLOGY | Facility: CLINIC | Age: 80
End: 2025-08-20

## 2025-09-10 ENCOUNTER — RX RENEWAL (OUTPATIENT)
Age: 80
End: 2025-09-10

## (undated) DEVICE — SLING SHOULDER IMMOBILIZER CLINIC LARGE

## (undated) DEVICE — SOL IRR POUR H2O 250ML

## (undated) DEVICE — PACK EXTREMITY

## (undated) DEVICE — GLV 7 PROTEXIS (WHITE)

## (undated) DEVICE — SLING ARM CHIEFTAIN MESH MEDIUM

## (undated) DEVICE — DRAPE IOBAN 23" X 23"

## (undated) DEVICE — GLV 7.5 PROTEXIS (WHITE)

## (undated) DEVICE — BLADE SCALPEL SAFETYLOCK #15

## (undated) DEVICE — FOLEY TRAY 16FR 5CC LTX UMETER CLOSED

## (undated) DEVICE — SPECIMEN CONTAINER 100ML

## (undated) DEVICE — STAPLER SKIN VISI-STAT 35 WIDE

## (undated) DEVICE — DRILL BIT SYNTHES ORTHO CALIBRATED 2.8MM

## (undated) DEVICE — GLV 8 PROTEXIS (WHITE)

## (undated) DEVICE — MEDICATION LABELS W MARKER

## (undated) DEVICE — POSITIONER FOAM EGG CRATE ULNAR 2PCS (PINK)

## (undated) DEVICE — GLV 9 DURAPRENE

## (undated) DEVICE — DRILL BIT SYNTHES ORTHO QC 2.5X110MM

## (undated) DEVICE — DRSG AQUACEL 3.5 X 10"

## (undated) DEVICE — SUT VICRYL 2-0 36" CT UNDYED

## (undated) DEVICE — DRSG DERMABOND 0.7ML

## (undated) DEVICE — GLV 6.5 PROTEXIS (WHITE)

## (undated) DEVICE — DRSG STERISTRIPS 0.5 X 4"

## (undated) DEVICE — LAP PAD 18 X 18"

## (undated) DEVICE — DRAPE MAYO STAND 30"

## (undated) DEVICE — GLV 8.5 PROTEXIS (WHITE)

## (undated) DEVICE — MARKING PEN W RULER

## (undated) DEVICE — DRAPE C ARM UNIVERSAL

## (undated) DEVICE — DRAPE SPLIT SHEET 77" X 108"

## (undated) DEVICE — GOWN TRIMAX LG

## (undated) DEVICE — DRAPE TOWEL BLUE 17" X 24"

## (undated) DEVICE — WARMING BLANKET LOWER ADULT

## (undated) DEVICE — VENODYNE/SCD SLEEVE CALF LARGE

## (undated) DEVICE — DRAPE INSTRUMENT POUCH 6.75" X 11"

## (undated) DEVICE — SOL IRR POUR NS 0.9% 500ML

## (undated) DEVICE — DRSG STOCKINETTE IMPERVIOUS MED

## (undated) DEVICE — VISITEC 4X4